# Patient Record
Sex: MALE | Race: WHITE | NOT HISPANIC OR LATINO | Employment: UNEMPLOYED | URBAN - METROPOLITAN AREA
[De-identification: names, ages, dates, MRNs, and addresses within clinical notes are randomized per-mention and may not be internally consistent; named-entity substitution may affect disease eponyms.]

---

## 2021-07-29 ENCOUNTER — TELEPHONE (OUTPATIENT)
Dept: ENDOSCOPY | Facility: HOSPITAL | Age: 56
End: 2021-07-29

## 2021-08-02 ENCOUNTER — TELEPHONE (OUTPATIENT)
Dept: ENDOSCOPY | Facility: HOSPITAL | Age: 56
End: 2021-08-02

## 2021-08-05 ENCOUNTER — TELEPHONE (OUTPATIENT)
Dept: ENDOSCOPY | Facility: HOSPITAL | Age: 56
End: 2021-08-05

## 2021-09-07 ENCOUNTER — TELEPHONE (OUTPATIENT)
Dept: ENDOSCOPY | Facility: HOSPITAL | Age: 56
End: 2021-09-07

## 2021-09-27 ENCOUNTER — HOSPITAL ENCOUNTER (INPATIENT)
Facility: HOSPITAL | Age: 56
LOS: 3 days | Discharge: HOME | DRG: 440 | End: 2021-09-30
Attending: EMERGENCY MEDICINE | Admitting: STUDENT IN AN ORGANIZED HEALTH CARE EDUCATION/TRAINING PROGRAM
Payer: COMMERCIAL

## 2021-09-27 DIAGNOSIS — K85.10 ACUTE BILIARY PANCREATITIS, UNSPECIFIED COMPLICATION STATUS: Primary | ICD-10-CM

## 2021-09-27 DIAGNOSIS — K86.3 PANCREATIC PSEUDOCYST: ICD-10-CM

## 2021-09-27 DIAGNOSIS — R07.9 CHEST PAIN: ICD-10-CM

## 2021-09-27 DIAGNOSIS — K85.90 ACUTE RECURRENT PANCREATITIS: ICD-10-CM

## 2021-09-27 DIAGNOSIS — R10.13 EPIGASTRIC PAIN: ICD-10-CM

## 2021-09-27 PROBLEM — D72.829 LEUKOCYTOSIS: Status: ACTIVE | Noted: 2021-09-27

## 2021-09-27 PROBLEM — E87.6 HYPOKALEMIA: Status: ACTIVE | Noted: 2021-09-27

## 2021-09-27 LAB
ALBUMIN SERPL BCP-MCNC: 3.6 G/DL (ref 3.5–5.2)
ALP SERPL-CCNC: 85 U/L (ref 55–135)
ALT SERPL W/O P-5'-P-CCNC: 8 U/L (ref 10–44)
ANION GAP SERPL CALC-SCNC: 14 MMOL/L (ref 8–16)
ANION GAP SERPL CALC-SCNC: 15 MMOL/L (ref 8–16)
AST SERPL-CCNC: 11 U/L (ref 10–40)
BASOPHILS # BLD AUTO: 0.03 K/UL (ref 0–0.2)
BASOPHILS NFR BLD: 0.2 % (ref 0–1.9)
BILIRUB SERPL-MCNC: 1 MG/DL (ref 0.1–1)
BUN SERPL-MCNC: 10 MG/DL (ref 6–20)
BUN SERPL-MCNC: 14 MG/DL (ref 6–20)
CALCIUM SERPL-MCNC: 10.2 MG/DL (ref 8.7–10.5)
CALCIUM SERPL-MCNC: 9.4 MG/DL (ref 8.7–10.5)
CHLORIDE SERPL-SCNC: 100 MMOL/L (ref 95–110)
CHLORIDE SERPL-SCNC: 101 MMOL/L (ref 95–110)
CHOLEST SERPL-MCNC: 140 MG/DL (ref 120–199)
CHOLEST/HDLC SERPL: 5.8 {RATIO} (ref 2–5)
CO2 SERPL-SCNC: 21 MMOL/L (ref 23–29)
CO2 SERPL-SCNC: 22 MMOL/L (ref 23–29)
CREAT SERPL-MCNC: 0.7 MG/DL (ref 0.5–1.4)
CREAT SERPL-MCNC: 1 MG/DL (ref 0.5–1.4)
CTP QC/QA: YES
DIFFERENTIAL METHOD: ABNORMAL
EOSINOPHIL # BLD AUTO: 0.1 K/UL (ref 0–0.5)
EOSINOPHIL NFR BLD: 0.6 % (ref 0–8)
ERYTHROCYTE [DISTWIDTH] IN BLOOD BY AUTOMATED COUNT: 15 % (ref 11.5–14.5)
EST. GFR  (AFRICAN AMERICAN): >60 ML/MIN/1.73 M^2
EST. GFR  (AFRICAN AMERICAN): >60 ML/MIN/1.73 M^2
EST. GFR  (NON AFRICAN AMERICAN): >60 ML/MIN/1.73 M^2
EST. GFR  (NON AFRICAN AMERICAN): >60 ML/MIN/1.73 M^2
ETHANOL SERPL-MCNC: <10 MG/DL
GLUCOSE SERPL-MCNC: 106 MG/DL (ref 70–110)
GLUCOSE SERPL-MCNC: 130 MG/DL (ref 70–110)
HCT VFR BLD AUTO: 37.8 % (ref 40–54)
HCV AB SERPL QL IA: NEGATIVE
HDLC SERPL-MCNC: 24 MG/DL (ref 40–75)
HDLC SERPL: 17.1 % (ref 20–50)
HEP C VIRUS HOLD SPECIMEN: NORMAL
HGB BLD-MCNC: 12.2 G/DL (ref 14–18)
HIV 1+2 AB+HIV1 P24 AG SERPL QL IA: NEGATIVE
IMM GRANULOCYTES # BLD AUTO: 0.08 K/UL (ref 0–0.04)
IMM GRANULOCYTES NFR BLD AUTO: 0.5 % (ref 0–0.5)
LDLC SERPL CALC-MCNC: 101 MG/DL (ref 63–159)
LIPASE SERPL-CCNC: 333 U/L (ref 4–60)
LYMPHOCYTES # BLD AUTO: 1.1 K/UL (ref 1–4.8)
LYMPHOCYTES NFR BLD: 7.4 % (ref 18–48)
MCH RBC QN AUTO: 27.4 PG (ref 27–31)
MCHC RBC AUTO-ENTMCNC: 32.3 G/DL (ref 32–36)
MCV RBC AUTO: 85 FL (ref 82–98)
MONOCYTES # BLD AUTO: 1 K/UL (ref 0.3–1)
MONOCYTES NFR BLD: 6.5 % (ref 4–15)
NEUTROPHILS # BLD AUTO: 12.5 K/UL (ref 1.8–7.7)
NEUTROPHILS NFR BLD: 84.8 % (ref 38–73)
NONHDLC SERPL-MCNC: 116 MG/DL
NRBC BLD-RTO: 0 /100 WBC
PLATELET # BLD AUTO: 259 K/UL (ref 150–450)
PMV BLD AUTO: 9.6 FL (ref 9.2–12.9)
POTASSIUM SERPL-SCNC: 3.3 MMOL/L (ref 3.5–5.1)
POTASSIUM SERPL-SCNC: 3.4 MMOL/L (ref 3.5–5.1)
PROT SERPL-MCNC: 7.2 G/DL (ref 6–8.4)
RBC # BLD AUTO: 4.45 M/UL (ref 4.6–6.2)
SARS-COV-2 RDRP RESP QL NAA+PROBE: NEGATIVE
SODIUM SERPL-SCNC: 136 MMOL/L (ref 136–145)
SODIUM SERPL-SCNC: 137 MMOL/L (ref 136–145)
TRIGL SERPL-MCNC: 75 MG/DL (ref 30–150)
TROPONIN I SERPL DL<=0.01 NG/ML-MCNC: <0.006 NG/ML (ref 0–0.03)
WBC # BLD AUTO: 14.7 K/UL (ref 3.9–12.7)

## 2021-09-27 PROCEDURE — 83690 ASSAY OF LIPASE: CPT | Performed by: EMERGENCY MEDICINE

## 2021-09-27 PROCEDURE — 63600175 PHARM REV CODE 636 W HCPCS: Performed by: PHYSICIAN ASSISTANT

## 2021-09-27 PROCEDURE — U0002 COVID-19 LAB TEST NON-CDC: HCPCS | Performed by: EMERGENCY MEDICINE

## 2021-09-27 PROCEDURE — 99223 1ST HOSP IP/OBS HIGH 75: CPT | Mod: ,,, | Performed by: INTERNAL MEDICINE

## 2021-09-27 PROCEDURE — 80061 LIPID PANEL: CPT | Performed by: NURSE PRACTITIONER

## 2021-09-27 PROCEDURE — 25000003 PHARM REV CODE 250: Performed by: PHYSICIAN ASSISTANT

## 2021-09-27 PROCEDURE — 63600175 PHARM REV CODE 636 W HCPCS: Performed by: EMERGENCY MEDICINE

## 2021-09-27 PROCEDURE — 87389 HIV-1 AG W/HIV-1&-2 AB AG IA: CPT | Performed by: EMERGENCY MEDICINE

## 2021-09-27 PROCEDURE — 86803 HEPATITIS C AB TEST: CPT | Performed by: EMERGENCY MEDICINE

## 2021-09-27 PROCEDURE — 36415 COLL VENOUS BLD VENIPUNCTURE: CPT | Performed by: NURSE PRACTITIONER

## 2021-09-27 PROCEDURE — 82077 ASSAY SPEC XCP UR&BREATH IA: CPT | Performed by: EMERGENCY MEDICINE

## 2021-09-27 PROCEDURE — 25000003 PHARM REV CODE 250: Performed by: EMERGENCY MEDICINE

## 2021-09-27 PROCEDURE — 25000003 PHARM REV CODE 250: Performed by: NURSE PRACTITIONER

## 2021-09-27 PROCEDURE — 21400001 HC TELEMETRY ROOM

## 2021-09-27 PROCEDURE — 96365 THER/PROPH/DIAG IV INF INIT: CPT

## 2021-09-27 PROCEDURE — 93005 ELECTROCARDIOGRAM TRACING: CPT

## 2021-09-27 PROCEDURE — 63600175 PHARM REV CODE 636 W HCPCS: Performed by: NURSE PRACTITIONER

## 2021-09-27 PROCEDURE — 96361 HYDRATE IV INFUSION ADD-ON: CPT | Performed by: EMERGENCY MEDICINE

## 2021-09-27 PROCEDURE — 93010 EKG 12-LEAD: ICD-10-PCS | Mod: ,,, | Performed by: INTERNAL MEDICINE

## 2021-09-27 PROCEDURE — 99291 CRITICAL CARE FIRST HOUR: CPT | Mod: 25

## 2021-09-27 PROCEDURE — 99223 PR INITIAL HOSPITAL CARE,LEVL III: ICD-10-PCS | Mod: ,,, | Performed by: INTERNAL MEDICINE

## 2021-09-27 PROCEDURE — 96361 HYDRATE IV INFUSION ADD-ON: CPT

## 2021-09-27 PROCEDURE — 93010 ELECTROCARDIOGRAM REPORT: CPT | Mod: ,,, | Performed by: INTERNAL MEDICINE

## 2021-09-27 PROCEDURE — 25500020 PHARM REV CODE 255: Performed by: EMERGENCY MEDICINE

## 2021-09-27 PROCEDURE — 96375 TX/PRO/DX INJ NEW DRUG ADDON: CPT

## 2021-09-27 PROCEDURE — 85025 COMPLETE CBC W/AUTO DIFF WBC: CPT | Performed by: EMERGENCY MEDICINE

## 2021-09-27 PROCEDURE — 80048 BASIC METABOLIC PNL TOTAL CA: CPT | Performed by: STUDENT IN AN ORGANIZED HEALTH CARE EDUCATION/TRAINING PROGRAM

## 2021-09-27 PROCEDURE — 36415 COLL VENOUS BLD VENIPUNCTURE: CPT | Performed by: STUDENT IN AN ORGANIZED HEALTH CARE EDUCATION/TRAINING PROGRAM

## 2021-09-27 PROCEDURE — 96376 TX/PRO/DX INJ SAME DRUG ADON: CPT | Performed by: EMERGENCY MEDICINE

## 2021-09-27 PROCEDURE — 80053 COMPREHEN METABOLIC PANEL: CPT | Performed by: EMERGENCY MEDICINE

## 2021-09-27 PROCEDURE — 84484 ASSAY OF TROPONIN QUANT: CPT | Performed by: EMERGENCY MEDICINE

## 2021-09-27 PROCEDURE — 96376 TX/PRO/DX INJ SAME DRUG ADON: CPT

## 2021-09-27 RX ORDER — SODIUM CHLORIDE 0.9 % (FLUSH) 0.9 %
10 SYRINGE (ML) INJECTION EVERY 12 HOURS PRN
Status: DISCONTINUED | OUTPATIENT
Start: 2021-09-27 | End: 2021-09-30 | Stop reason: HOSPADM

## 2021-09-27 RX ORDER — ONDANSETRON 8 MG/1
TABLET, ORALLY DISINTEGRATING ORAL
COMMUNITY
Start: 2021-07-13 | End: 2022-03-23

## 2021-09-27 RX ORDER — NALOXONE HCL 0.4 MG/ML
0.02 VIAL (ML) INJECTION
Status: DISCONTINUED | OUTPATIENT
Start: 2021-09-27 | End: 2021-09-30 | Stop reason: HOSPADM

## 2021-09-27 RX ORDER — SODIUM CHLORIDE 9 MG/ML
INJECTION, SOLUTION INTRAVENOUS CONTINUOUS
Status: DISCONTINUED | OUTPATIENT
Start: 2021-09-27 | End: 2021-09-30 | Stop reason: HOSPADM

## 2021-09-27 RX ORDER — HYDROMORPHONE HYDROCHLORIDE 2 MG/ML
1 INJECTION, SOLUTION INTRAMUSCULAR; INTRAVENOUS; SUBCUTANEOUS
Status: COMPLETED | OUTPATIENT
Start: 2021-09-27 | End: 2021-09-27

## 2021-09-27 RX ORDER — CARVEDILOL 6.25 MG/1
6.25 TABLET ORAL 2 TIMES DAILY
COMMUNITY
Start: 2021-09-10 | End: 2022-08-16

## 2021-09-27 RX ORDER — MORPHINE SULFATE 4 MG/ML
4 INJECTION, SOLUTION INTRAMUSCULAR; INTRAVENOUS
Status: COMPLETED | OUTPATIENT
Start: 2021-09-27 | End: 2021-09-27

## 2021-09-27 RX ORDER — PANTOPRAZOLE SODIUM 40 MG/1
40 TABLET, DELAYED RELEASE ORAL DAILY
COMMUNITY
Start: 2021-09-16 | End: 2022-01-06 | Stop reason: SDUPTHER

## 2021-09-27 RX ORDER — HYDRALAZINE HYDROCHLORIDE 20 MG/ML
10 INJECTION INTRAMUSCULAR; INTRAVENOUS
Status: COMPLETED | OUTPATIENT
Start: 2021-09-27 | End: 2021-09-27

## 2021-09-27 RX ORDER — HYDRALAZINE HYDROCHLORIDE 20 MG/ML
10 INJECTION INTRAMUSCULAR; INTRAVENOUS EVERY 6 HOURS PRN
Status: DISCONTINUED | OUTPATIENT
Start: 2021-09-27 | End: 2021-09-30 | Stop reason: HOSPADM

## 2021-09-27 RX ORDER — OXYCODONE AND ACETAMINOPHEN 10; 325 MG/1; MG/1
1 TABLET ORAL EVERY 4 HOURS PRN
COMMUNITY
Start: 2021-09-10 | End: 2022-02-19 | Stop reason: SDUPTHER

## 2021-09-27 RX ORDER — PROMETHAZINE HYDROCHLORIDE 25 MG/1
TABLET ORAL
COMMUNITY
Start: 2021-07-13 | End: 2022-02-19

## 2021-09-27 RX ORDER — LANOLIN ALCOHOL/MO/W.PET/CERES
800 CREAM (GRAM) TOPICAL
Status: DISCONTINUED | OUTPATIENT
Start: 2021-09-27 | End: 2021-09-30 | Stop reason: HOSPADM

## 2021-09-27 RX ORDER — PANCRELIPASE 24000; 76000; 120000 [USP'U]/1; [USP'U]/1; [USP'U]/1
1 CAPSULE, DELAYED RELEASE PELLETS ORAL 4 TIMES DAILY
Status: ON HOLD | COMMUNITY
Start: 2021-09-13 | End: 2021-09-30 | Stop reason: ALTCHOICE

## 2021-09-27 RX ORDER — ACETAMINOPHEN 325 MG/1
650 TABLET ORAL EVERY 6 HOURS PRN
Status: DISCONTINUED | OUTPATIENT
Start: 2021-09-27 | End: 2021-09-30 | Stop reason: HOSPADM

## 2021-09-27 RX ORDER — ONDANSETRON 2 MG/ML
4 INJECTION INTRAMUSCULAR; INTRAVENOUS EVERY 6 HOURS
Status: COMPLETED | OUTPATIENT
Start: 2021-09-27 | End: 2021-09-27

## 2021-09-27 RX ORDER — CARVEDILOL 6.25 MG/1
6.25 TABLET ORAL 2 TIMES DAILY
Status: DISCONTINUED | OUTPATIENT
Start: 2021-09-28 | End: 2021-09-30 | Stop reason: HOSPADM

## 2021-09-27 RX ORDER — HYDROMORPHONE HYDROCHLORIDE 2 MG/ML
1 INJECTION, SOLUTION INTRAMUSCULAR; INTRAVENOUS; SUBCUTANEOUS EVERY 6 HOURS PRN
Status: DISCONTINUED | OUTPATIENT
Start: 2021-09-27 | End: 2021-09-30 | Stop reason: HOSPADM

## 2021-09-27 RX ORDER — APIXABAN 5 MG/1
5 TABLET, FILM COATED ORAL 2 TIMES DAILY
Status: ON HOLD | COMMUNITY
Start: 2021-09-09 | End: 2021-10-28 | Stop reason: HOSPADM

## 2021-09-27 RX ORDER — METOPROLOL TARTRATE 1 MG/ML
5 INJECTION, SOLUTION INTRAVENOUS ONCE
Status: COMPLETED | OUTPATIENT
Start: 2021-09-27 | End: 2021-09-27

## 2021-09-27 RX ORDER — OXYCODONE AND ACETAMINOPHEN 10; 325 MG/1; MG/1
1 TABLET ORAL EVERY 4 HOURS PRN
Status: DISCONTINUED | OUTPATIENT
Start: 2021-09-27 | End: 2021-09-30 | Stop reason: HOSPADM

## 2021-09-27 RX ORDER — HYDROMORPHONE HYDROCHLORIDE 2 MG/ML
0.5 INJECTION, SOLUTION INTRAMUSCULAR; INTRAVENOUS; SUBCUTANEOUS
Status: COMPLETED | OUTPATIENT
Start: 2021-09-27 | End: 2021-09-27

## 2021-09-27 RX ORDER — IBUPROFEN 200 MG
16 TABLET ORAL
Status: DISCONTINUED | OUTPATIENT
Start: 2021-09-27 | End: 2021-09-30 | Stop reason: HOSPADM

## 2021-09-27 RX ORDER — ONDANSETRON 2 MG/ML
4 INJECTION INTRAMUSCULAR; INTRAVENOUS EVERY 6 HOURS PRN
Status: DISCONTINUED | OUTPATIENT
Start: 2021-09-27 | End: 2021-09-27

## 2021-09-27 RX ORDER — ENOXAPARIN SODIUM 100 MG/ML
40 INJECTION SUBCUTANEOUS EVERY 24 HOURS
Status: DISCONTINUED | OUTPATIENT
Start: 2021-09-27 | End: 2021-09-30 | Stop reason: HOSPADM

## 2021-09-27 RX ORDER — IPRATROPIUM BROMIDE AND ALBUTEROL SULFATE 2.5; .5 MG/3ML; MG/3ML
3 SOLUTION RESPIRATORY (INHALATION) EVERY 4 HOURS PRN
Status: DISCONTINUED | OUTPATIENT
Start: 2021-09-27 | End: 2021-09-30 | Stop reason: HOSPADM

## 2021-09-27 RX ORDER — HYDROMORPHONE HYDROCHLORIDE 2 MG/ML
0.5 INJECTION, SOLUTION INTRAMUSCULAR; INTRAVENOUS; SUBCUTANEOUS
Status: DISCONTINUED | OUTPATIENT
Start: 2021-09-27 | End: 2021-09-30 | Stop reason: HOSPADM

## 2021-09-27 RX ORDER — ONDANSETRON 2 MG/ML
4 INJECTION INTRAMUSCULAR; INTRAVENOUS
Status: COMPLETED | OUTPATIENT
Start: 2021-09-27 | End: 2021-09-27

## 2021-09-27 RX ORDER — HYOSCYAMINE SULFATE 0.125 MG
125 TABLET ORAL 4 TIMES DAILY PRN
COMMUNITY
Start: 2021-07-16 | End: 2021-10-22

## 2021-09-27 RX ORDER — PROCHLORPERAZINE EDISYLATE 5 MG/ML
10 INJECTION INTRAMUSCULAR; INTRAVENOUS
Status: COMPLETED | OUTPATIENT
Start: 2021-09-27 | End: 2021-09-27

## 2021-09-27 RX ORDER — GLUCAGON 1 MG
1 KIT INJECTION
Status: DISCONTINUED | OUTPATIENT
Start: 2021-09-27 | End: 2021-09-30 | Stop reason: HOSPADM

## 2021-09-27 RX ORDER — IBUPROFEN 200 MG
24 TABLET ORAL
Status: DISCONTINUED | OUTPATIENT
Start: 2021-09-27 | End: 2021-09-30 | Stop reason: HOSPADM

## 2021-09-27 RX ORDER — CARVEDILOL 6.25 MG/1
6.25 TABLET ORAL 2 TIMES DAILY
Status: DISCONTINUED | OUTPATIENT
Start: 2021-09-27 | End: 2021-09-27

## 2021-09-27 RX ADMIN — PROCHLORPERAZINE EDISYLATE 10 MG: 5 INJECTION INTRAMUSCULAR; INTRAVENOUS at 07:09

## 2021-09-27 RX ADMIN — POTASSIUM BICARBONATE 35 MEQ: 391 TABLET, EFFERVESCENT ORAL at 05:09

## 2021-09-27 RX ADMIN — PROMETHAZINE HYDROCHLORIDE 12.5 MG: 25 INJECTION INTRAMUSCULAR; INTRAVENOUS at 05:09

## 2021-09-27 RX ADMIN — ONDANSETRON HYDROCHLORIDE 4 MG: 2 SOLUTION INTRAMUSCULAR; INTRAVENOUS at 05:09

## 2021-09-27 RX ADMIN — HYDROMORPHONE HYDROCHLORIDE 1 MG: 2 INJECTION INTRAMUSCULAR; INTRAVENOUS; SUBCUTANEOUS at 07:09

## 2021-09-27 RX ADMIN — SODIUM CHLORIDE 1000 ML: 0.9 INJECTION, SOLUTION INTRAVENOUS at 05:09

## 2021-09-27 RX ADMIN — ONDANSETRON 4 MG: 2 INJECTION INTRAMUSCULAR; INTRAVENOUS at 04:09

## 2021-09-27 RX ADMIN — SODIUM CHLORIDE 1000 ML: 0.9 INJECTION, SOLUTION INTRAVENOUS at 06:09

## 2021-09-27 RX ADMIN — ONDANSETRON 4 MG: 2 INJECTION INTRAMUSCULAR; INTRAVENOUS at 10:09

## 2021-09-27 RX ADMIN — HYDROMORPHONE HYDROCHLORIDE 0.5 MG: 2 INJECTION INTRAMUSCULAR; INTRAVENOUS; SUBCUTANEOUS at 11:09

## 2021-09-27 RX ADMIN — HYDROMORPHONE HYDROCHLORIDE 0.5 MG: 2 INJECTION INTRAMUSCULAR; INTRAVENOUS; SUBCUTANEOUS at 05:09

## 2021-09-27 RX ADMIN — IOHEXOL 100 ML: 350 INJECTION, SOLUTION INTRAVENOUS at 06:09

## 2021-09-27 RX ADMIN — OXYCODONE AND ACETAMINOPHEN 1 TABLET: 10; 325 TABLET ORAL at 04:09

## 2021-09-27 RX ADMIN — METOROPROLOL TARTRATE 5 MG: 5 INJECTION, SOLUTION INTRAVENOUS at 09:09

## 2021-09-27 RX ADMIN — CARVEDILOL 6.25 MG: 6.25 TABLET, FILM COATED ORAL at 10:09

## 2021-09-27 RX ADMIN — ONDANSETRON 4 MG: 2 INJECTION INTRAMUSCULAR; INTRAVENOUS at 11:09

## 2021-09-27 RX ADMIN — HYDROMORPHONE HYDROCHLORIDE 0.5 MG: 2 INJECTION INTRAMUSCULAR; INTRAVENOUS; SUBCUTANEOUS at 01:09

## 2021-09-27 RX ADMIN — HYDRALAZINE HYDROCHLORIDE 10 MG: 20 INJECTION INTRAMUSCULAR; INTRAVENOUS at 06:09

## 2021-09-27 RX ADMIN — HYDRALAZINE HYDROCHLORIDE 10 MG: 20 INJECTION INTRAMUSCULAR; INTRAVENOUS at 05:09

## 2021-09-27 RX ADMIN — HYDROMORPHONE HYDROCHLORIDE 1 MG: 2 INJECTION INTRAMUSCULAR; INTRAVENOUS; SUBCUTANEOUS at 05:09

## 2021-09-27 RX ADMIN — PROMETHAZINE HYDROCHLORIDE 12.5 MG: 25 INJECTION INTRAMUSCULAR; INTRAVENOUS at 12:09

## 2021-09-27 RX ADMIN — ENOXAPARIN SODIUM 40 MG: 100 INJECTION SUBCUTANEOUS at 04:09

## 2021-09-27 RX ADMIN — MORPHINE SULFATE 4 MG: 4 INJECTION INTRAVENOUS at 06:09

## 2021-09-27 RX ADMIN — SODIUM CHLORIDE: 0.9 INJECTION, SOLUTION INTRAVENOUS at 10:09

## 2021-09-27 RX ADMIN — HYDROMORPHONE HYDROCHLORIDE 0.5 MG: 2 INJECTION INTRAMUSCULAR; INTRAVENOUS; SUBCUTANEOUS at 10:09

## 2021-09-28 PROBLEM — D64.9 NORMOCYTIC ANEMIA: Status: ACTIVE | Noted: 2021-09-28

## 2021-09-28 LAB
ALBUMIN SERPL BCP-MCNC: 2.9 G/DL (ref 3.5–5.2)
ALP SERPL-CCNC: 64 U/L (ref 55–135)
ALT SERPL W/O P-5'-P-CCNC: 8 U/L (ref 10–44)
ANION GAP SERPL CALC-SCNC: 10 MMOL/L (ref 8–16)
AST SERPL-CCNC: 6 U/L (ref 10–40)
BASOPHILS # BLD AUTO: 0.04 K/UL (ref 0–0.2)
BASOPHILS NFR BLD: 0.4 % (ref 0–1.9)
BILIRUB SERPL-MCNC: 0.8 MG/DL (ref 0.1–1)
BUN SERPL-MCNC: 13 MG/DL (ref 6–20)
CALCIUM SERPL-MCNC: 9.1 MG/DL (ref 8.7–10.5)
CHLORIDE SERPL-SCNC: 100 MMOL/L (ref 95–110)
CO2 SERPL-SCNC: 26 MMOL/L (ref 23–29)
CREAT SERPL-MCNC: 0.7 MG/DL (ref 0.5–1.4)
DIFFERENTIAL METHOD: ABNORMAL
EOSINOPHIL # BLD AUTO: 0 K/UL (ref 0–0.5)
EOSINOPHIL NFR BLD: 0.3 % (ref 0–8)
ERYTHROCYTE [DISTWIDTH] IN BLOOD BY AUTOMATED COUNT: 15.5 % (ref 11.5–14.5)
EST. GFR  (AFRICAN AMERICAN): >60 ML/MIN/1.73 M^2
EST. GFR  (NON AFRICAN AMERICAN): >60 ML/MIN/1.73 M^2
GLUCOSE SERPL-MCNC: 95 MG/DL (ref 70–110)
HCT VFR BLD AUTO: 36.1 % (ref 40–54)
HGB BLD-MCNC: 10.8 G/DL (ref 14–18)
IMM GRANULOCYTES # BLD AUTO: 0.1 K/UL (ref 0–0.04)
IMM GRANULOCYTES NFR BLD AUTO: 0.9 % (ref 0–0.5)
LIPASE SERPL-CCNC: 169 U/L (ref 4–60)
LYMPHOCYTES # BLD AUTO: 1 K/UL (ref 1–4.8)
LYMPHOCYTES NFR BLD: 9.2 % (ref 18–48)
MAGNESIUM SERPL-MCNC: 1.8 MG/DL (ref 1.6–2.6)
MCH RBC QN AUTO: 26.5 PG (ref 27–31)
MCHC RBC AUTO-ENTMCNC: 29.9 G/DL (ref 32–36)
MCV RBC AUTO: 89 FL (ref 82–98)
MONOCYTES # BLD AUTO: 1.1 K/UL (ref 0.3–1)
MONOCYTES NFR BLD: 9.6 % (ref 4–15)
NEUTROPHILS # BLD AUTO: 8.9 K/UL (ref 1.8–7.7)
NEUTROPHILS NFR BLD: 79.6 % (ref 38–73)
NRBC BLD-RTO: 0 /100 WBC
PLATELET # BLD AUTO: 210 K/UL (ref 150–450)
PMV BLD AUTO: 9.3 FL (ref 9.2–12.9)
POTASSIUM SERPL-SCNC: 3.9 MMOL/L (ref 3.5–5.1)
PROT SERPL-MCNC: 6.1 G/DL (ref 6–8.4)
RBC # BLD AUTO: 4.08 M/UL (ref 4.6–6.2)
SODIUM SERPL-SCNC: 136 MMOL/L (ref 136–145)
WBC # BLD AUTO: 11.22 K/UL (ref 3.9–12.7)

## 2021-09-28 PROCEDURE — 99231 SBSQ HOSP IP/OBS SF/LOW 25: CPT | Mod: ,,, | Performed by: PHYSICIAN ASSISTANT

## 2021-09-28 PROCEDURE — 83690 ASSAY OF LIPASE: CPT | Performed by: NURSE PRACTITIONER

## 2021-09-28 PROCEDURE — 85025 COMPLETE CBC W/AUTO DIFF WBC: CPT | Performed by: NURSE PRACTITIONER

## 2021-09-28 PROCEDURE — 25000003 PHARM REV CODE 250: Performed by: PHYSICIAN ASSISTANT

## 2021-09-28 PROCEDURE — 63600175 PHARM REV CODE 636 W HCPCS: Performed by: PHYSICIAN ASSISTANT

## 2021-09-28 PROCEDURE — 36415 COLL VENOUS BLD VENIPUNCTURE: CPT | Performed by: NURSE PRACTITIONER

## 2021-09-28 PROCEDURE — 99231 PR SUBSEQUENT HOSPITAL CARE,LEVL I: ICD-10-PCS | Mod: ,,, | Performed by: PHYSICIAN ASSISTANT

## 2021-09-28 PROCEDURE — 21400001 HC TELEMETRY ROOM

## 2021-09-28 PROCEDURE — 80053 COMPREHEN METABOLIC PANEL: CPT | Performed by: NURSE PRACTITIONER

## 2021-09-28 PROCEDURE — 83735 ASSAY OF MAGNESIUM: CPT | Performed by: NURSE PRACTITIONER

## 2021-09-28 PROCEDURE — 63600175 PHARM REV CODE 636 W HCPCS: Performed by: NURSE PRACTITIONER

## 2021-09-28 PROCEDURE — 25000003 PHARM REV CODE 250: Performed by: NURSE PRACTITIONER

## 2021-09-28 RX ADMIN — PROMETHAZINE HYDROCHLORIDE 12.5 MG: 25 INJECTION INTRAMUSCULAR; INTRAVENOUS at 06:09

## 2021-09-28 RX ADMIN — POTASSIUM BICARBONATE 35 MEQ: 391 TABLET, EFFERVESCENT ORAL at 12:09

## 2021-09-28 RX ADMIN — CARVEDILOL 6.25 MG: 6.25 TABLET, FILM COATED ORAL at 08:09

## 2021-09-28 RX ADMIN — SODIUM CHLORIDE: 0.9 INJECTION, SOLUTION INTRAVENOUS at 08:09

## 2021-09-28 RX ADMIN — OXYCODONE AND ACETAMINOPHEN 1 TABLET: 10; 325 TABLET ORAL at 06:09

## 2021-09-28 RX ADMIN — HYDROMORPHONE HYDROCHLORIDE 1 MG: 2 INJECTION INTRAMUSCULAR; INTRAVENOUS; SUBCUTANEOUS at 01:09

## 2021-09-28 RX ADMIN — SODIUM CHLORIDE: 0.9 INJECTION, SOLUTION INTRAVENOUS at 01:09

## 2021-09-28 RX ADMIN — ENOXAPARIN SODIUM 40 MG: 100 INJECTION SUBCUTANEOUS at 05:09

## 2021-09-28 RX ADMIN — PROMETHAZINE HYDROCHLORIDE 12.5 MG: 25 INJECTION INTRAMUSCULAR; INTRAVENOUS at 12:09

## 2021-09-28 RX ADMIN — PROMETHAZINE HYDROCHLORIDE 12.5 MG: 25 INJECTION INTRAMUSCULAR; INTRAVENOUS at 11:09

## 2021-09-29 ENCOUNTER — ANESTHESIA EVENT (OUTPATIENT)
Dept: ENDOSCOPY | Facility: HOSPITAL | Age: 56
DRG: 440 | End: 2021-09-29
Payer: COMMERCIAL

## 2021-09-29 ENCOUNTER — ANESTHESIA (OUTPATIENT)
Dept: ENDOSCOPY | Facility: HOSPITAL | Age: 56
DRG: 440 | End: 2021-09-29
Payer: COMMERCIAL

## 2021-09-29 LAB
ALBUMIN SERPL BCP-MCNC: 2.8 G/DL (ref 3.5–5.2)
ALP SERPL-CCNC: 67 U/L (ref 55–135)
ALT SERPL W/O P-5'-P-CCNC: 5 U/L (ref 10–44)
ANION GAP SERPL CALC-SCNC: 11 MMOL/L (ref 8–16)
AST SERPL-CCNC: 8 U/L (ref 10–40)
BASOPHILS # BLD AUTO: 0.03 K/UL (ref 0–0.2)
BASOPHILS NFR BLD: 0.5 % (ref 0–1.9)
BILIRUB SERPL-MCNC: 0.6 MG/DL (ref 0.1–1)
BUN SERPL-MCNC: 10 MG/DL (ref 6–20)
CALCIUM SERPL-MCNC: 9.1 MG/DL (ref 8.7–10.5)
CHLORIDE SERPL-SCNC: 104 MMOL/L (ref 95–110)
CO2 SERPL-SCNC: 23 MMOL/L (ref 23–29)
CREAT SERPL-MCNC: 0.7 MG/DL (ref 0.5–1.4)
DIFFERENTIAL METHOD: ABNORMAL
EOSINOPHIL # BLD AUTO: 0.1 K/UL (ref 0–0.5)
EOSINOPHIL NFR BLD: 1.1 % (ref 0–8)
ERYTHROCYTE [DISTWIDTH] IN BLOOD BY AUTOMATED COUNT: 15.1 % (ref 11.5–14.5)
EST. GFR  (AFRICAN AMERICAN): >60 ML/MIN/1.73 M^2
EST. GFR  (NON AFRICAN AMERICAN): >60 ML/MIN/1.73 M^2
GLUCOSE SERPL-MCNC: 83 MG/DL (ref 70–110)
HCT VFR BLD AUTO: 34.6 % (ref 40–54)
HGB BLD-MCNC: 10.4 G/DL (ref 14–18)
IMM GRANULOCYTES # BLD AUTO: 0.02 K/UL (ref 0–0.04)
IMM GRANULOCYTES NFR BLD AUTO: 0.4 % (ref 0–0.5)
LIPASE SERPL-CCNC: 20 U/L (ref 4–60)
LYMPHOCYTES # BLD AUTO: 0.7 K/UL (ref 1–4.8)
LYMPHOCYTES NFR BLD: 12.1 % (ref 18–48)
MAGNESIUM SERPL-MCNC: 1.8 MG/DL (ref 1.6–2.6)
MCH RBC QN AUTO: 26.6 PG (ref 27–31)
MCHC RBC AUTO-ENTMCNC: 30.1 G/DL (ref 32–36)
MCV RBC AUTO: 89 FL (ref 82–98)
MONOCYTES # BLD AUTO: 0.4 K/UL (ref 0.3–1)
MONOCYTES NFR BLD: 7.5 % (ref 4–15)
NEUTROPHILS # BLD AUTO: 4.3 K/UL (ref 1.8–7.7)
NEUTROPHILS NFR BLD: 78.4 % (ref 38–73)
NRBC BLD-RTO: 0 /100 WBC
PLATELET # BLD AUTO: 176 K/UL (ref 150–450)
PMV BLD AUTO: 9.3 FL (ref 9.2–12.9)
POTASSIUM SERPL-SCNC: 3.5 MMOL/L (ref 3.5–5.1)
PROT SERPL-MCNC: 6 G/DL (ref 6–8.4)
RBC # BLD AUTO: 3.91 M/UL (ref 4.6–6.2)
SODIUM SERPL-SCNC: 138 MMOL/L (ref 136–145)
WBC # BLD AUTO: 5.46 K/UL (ref 3.9–12.7)

## 2021-09-29 PROCEDURE — 43262 ENDO CHOLANGIOPANCREATOGRAPH: CPT | Mod: ,,, | Performed by: INTERNAL MEDICINE

## 2021-09-29 PROCEDURE — 43262 PR ERCP,SPHINCTEROTOMY: ICD-10-PCS | Mod: ,,, | Performed by: INTERNAL MEDICINE

## 2021-09-29 PROCEDURE — 63600175 PHARM REV CODE 636 W HCPCS: Performed by: NURSE ANESTHETIST, CERTIFIED REGISTERED

## 2021-09-29 PROCEDURE — 37000008 HC ANESTHESIA 1ST 15 MINUTES: Performed by: INTERNAL MEDICINE

## 2021-09-29 PROCEDURE — 63600175 PHARM REV CODE 636 W HCPCS: Performed by: STUDENT IN AN ORGANIZED HEALTH CARE EDUCATION/TRAINING PROGRAM

## 2021-09-29 PROCEDURE — 25000003 PHARM REV CODE 250: Performed by: NURSE ANESTHETIST, CERTIFIED REGISTERED

## 2021-09-29 PROCEDURE — 43262 ENDO CHOLANGIOPANCREATOGRAPH: CPT | Performed by: INTERNAL MEDICINE

## 2021-09-29 PROCEDURE — 36415 COLL VENOUS BLD VENIPUNCTURE: CPT | Performed by: NURSE PRACTITIONER

## 2021-09-29 PROCEDURE — 83735 ASSAY OF MAGNESIUM: CPT | Performed by: NURSE PRACTITIONER

## 2021-09-29 PROCEDURE — 74329 X-RAY FOR PANCREAS ENDOSCOPY: CPT | Performed by: INTERNAL MEDICINE

## 2021-09-29 PROCEDURE — 25000003 PHARM REV CODE 250: Performed by: NURSE PRACTITIONER

## 2021-09-29 PROCEDURE — 25500020 PHARM REV CODE 255: Performed by: INTERNAL MEDICINE

## 2021-09-29 PROCEDURE — 21400001 HC TELEMETRY ROOM

## 2021-09-29 PROCEDURE — C1769 GUIDE WIRE: HCPCS | Performed by: INTERNAL MEDICINE

## 2021-09-29 PROCEDURE — 63600175 PHARM REV CODE 636 W HCPCS: Performed by: PHYSICIAN ASSISTANT

## 2021-09-29 PROCEDURE — 37000009 HC ANESTHESIA EA ADD 15 MINS: Performed by: INTERNAL MEDICINE

## 2021-09-29 PROCEDURE — 25000003 PHARM REV CODE 250: Performed by: INTERNAL MEDICINE

## 2021-09-29 PROCEDURE — 74329 PR  X-RAY FOR PANCREAS ENDOSCOPY: ICD-10-PCS | Mod: 26,,, | Performed by: INTERNAL MEDICINE

## 2021-09-29 PROCEDURE — 83690 ASSAY OF LIPASE: CPT | Performed by: NURSE PRACTITIONER

## 2021-09-29 PROCEDURE — 80053 COMPREHEN METABOLIC PANEL: CPT | Performed by: NURSE PRACTITIONER

## 2021-09-29 PROCEDURE — 25000003 PHARM REV CODE 250: Performed by: STUDENT IN AN ORGANIZED HEALTH CARE EDUCATION/TRAINING PROGRAM

## 2021-09-29 PROCEDURE — 27201674 HC SPHINCTERTOME: Performed by: INTERNAL MEDICINE

## 2021-09-29 PROCEDURE — 25000003 PHARM REV CODE 250: Performed by: PHYSICIAN ASSISTANT

## 2021-09-29 PROCEDURE — 74329 X-RAY FOR PANCREAS ENDOSCOPY: CPT | Mod: 26,,, | Performed by: INTERNAL MEDICINE

## 2021-09-29 PROCEDURE — 63600175 PHARM REV CODE 636 W HCPCS: Performed by: NURSE PRACTITIONER

## 2021-09-29 PROCEDURE — 85025 COMPLETE CBC W/AUTO DIFF WBC: CPT | Performed by: NURSE PRACTITIONER

## 2021-09-29 RX ORDER — INDOMETHACIN 50 MG/1
100 SUPPOSITORY RECTAL ONCE
Status: COMPLETED | OUTPATIENT
Start: 2021-09-29 | End: 2021-09-29

## 2021-09-29 RX ORDER — LIDOCAINE HYDROCHLORIDE 10 MG/ML
INJECTION, SOLUTION EPIDURAL; INFILTRATION; INTRACAUDAL; PERINEURAL
Status: DISCONTINUED | OUTPATIENT
Start: 2021-09-29 | End: 2021-09-29

## 2021-09-29 RX ORDER — SUCCINYLCHOLINE CHLORIDE 20 MG/ML
INJECTION INTRAMUSCULAR; INTRAVENOUS
Status: DISCONTINUED | OUTPATIENT
Start: 2021-09-29 | End: 2021-09-29

## 2021-09-29 RX ORDER — PROPOFOL 10 MG/ML
VIAL (ML) INTRAVENOUS
Status: DISCONTINUED | OUTPATIENT
Start: 2021-09-29 | End: 2021-09-29

## 2021-09-29 RX ORDER — FENTANYL CITRATE 50 UG/ML
INJECTION, SOLUTION INTRAMUSCULAR; INTRAVENOUS
Status: DISCONTINUED | OUTPATIENT
Start: 2021-09-29 | End: 2021-09-29

## 2021-09-29 RX ORDER — ONDANSETRON 4 MG/1
4 TABLET, ORALLY DISINTEGRATING ORAL ONCE
Status: COMPLETED | OUTPATIENT
Start: 2021-09-29 | End: 2021-09-29

## 2021-09-29 RX ORDER — SODIUM CHLORIDE, SODIUM LACTATE, POTASSIUM CHLORIDE, CALCIUM CHLORIDE 600; 310; 30; 20 MG/100ML; MG/100ML; MG/100ML; MG/100ML
INJECTION, SOLUTION INTRAVENOUS CONTINUOUS PRN
Status: DISCONTINUED | OUTPATIENT
Start: 2021-09-29 | End: 2021-09-29

## 2021-09-29 RX ORDER — ROCURONIUM BROMIDE 10 MG/ML
INJECTION, SOLUTION INTRAVENOUS
Status: DISCONTINUED | OUTPATIENT
Start: 2021-09-29 | End: 2021-09-29

## 2021-09-29 RX ORDER — ONDANSETRON 2 MG/ML
INJECTION INTRAMUSCULAR; INTRAVENOUS
Status: DISCONTINUED | OUTPATIENT
Start: 2021-09-29 | End: 2021-09-29

## 2021-09-29 RX ADMIN — SUCCINYLCHOLINE CHLORIDE 120 MG: 20 INJECTION, SOLUTION INTRAMUSCULAR; INTRAVENOUS at 08:09

## 2021-09-29 RX ADMIN — CARVEDILOL 6.25 MG: 6.25 TABLET, FILM COATED ORAL at 11:09

## 2021-09-29 RX ADMIN — ENOXAPARIN SODIUM 40 MG: 100 INJECTION SUBCUTANEOUS at 04:09

## 2021-09-29 RX ADMIN — ONDANSETRON 4 MG: 2 INJECTION, SOLUTION INTRAMUSCULAR; INTRAVENOUS at 08:09

## 2021-09-29 RX ADMIN — FENTANYL CITRATE 50 MCG: 50 INJECTION, SOLUTION INTRAMUSCULAR; INTRAVENOUS at 08:09

## 2021-09-29 RX ADMIN — FENTANYL CITRATE 25 MCG: 50 INJECTION, SOLUTION INTRAMUSCULAR; INTRAVENOUS at 08:09

## 2021-09-29 RX ADMIN — SODIUM CHLORIDE: 0.9 INJECTION, SOLUTION INTRAVENOUS at 11:09

## 2021-09-29 RX ADMIN — INDOMETHACIN 100 MG: 50 SUPPOSITORY RECTAL at 08:09

## 2021-09-29 RX ADMIN — CARVEDILOL 6.25 MG: 6.25 TABLET, FILM COATED ORAL at 09:09

## 2021-09-29 RX ADMIN — SODIUM CHLORIDE, SODIUM LACTATE, POTASSIUM CHLORIDE, AND CALCIUM CHLORIDE: 600; 310; 30; 20 INJECTION, SOLUTION INTRAVENOUS at 08:09

## 2021-09-29 RX ADMIN — SODIUM CHLORIDE, SODIUM LACTATE, POTASSIUM CHLORIDE, AND CALCIUM CHLORIDE: 600; 310; 30; 20 INJECTION, SOLUTION INTRAVENOUS at 09:09

## 2021-09-29 RX ADMIN — PROMETHAZINE HYDROCHLORIDE 12.5 MG: 25 INJECTION INTRAMUSCULAR; INTRAVENOUS at 05:09

## 2021-09-29 RX ADMIN — PROPOFOL 50 MG: 10 INJECTION, EMULSION INTRAVENOUS at 08:09

## 2021-09-29 RX ADMIN — PROMETHAZINE HYDROCHLORIDE 12.5 MG: 25 INJECTION INTRAMUSCULAR; INTRAVENOUS at 11:09

## 2021-09-29 RX ADMIN — LIDOCAINE HYDROCHLORIDE 50 MG: 10 INJECTION, SOLUTION EPIDURAL; INFILTRATION; INTRACAUDAL; PERINEURAL at 08:09

## 2021-09-29 RX ADMIN — IOHEXOL 30 ML: 300 INJECTION, SOLUTION INTRAVENOUS at 08:09

## 2021-09-29 RX ADMIN — PROPOFOL 150 MG: 10 INJECTION, EMULSION INTRAVENOUS at 08:09

## 2021-09-29 RX ADMIN — ROCURONIUM BROMIDE 5 MG: 10 INJECTION, SOLUTION INTRAVENOUS at 08:09

## 2021-09-29 RX ADMIN — ONDANSETRON 4 MG: 4 TABLET, ORALLY DISINTEGRATING ORAL at 09:09

## 2021-09-30 VITALS
DIASTOLIC BLOOD PRESSURE: 88 MMHG | HEART RATE: 67 BPM | OXYGEN SATURATION: 97 % | SYSTOLIC BLOOD PRESSURE: 139 MMHG | HEIGHT: 71 IN | TEMPERATURE: 98 F | BODY MASS INDEX: 27.97 KG/M2 | RESPIRATION RATE: 19 BRPM | WEIGHT: 199.75 LBS

## 2021-09-30 PROBLEM — D72.829 LEUKOCYTOSIS: Status: RESOLVED | Noted: 2021-09-27 | Resolved: 2021-09-30

## 2021-09-30 LAB
ALBUMIN SERPL BCP-MCNC: 2.6 G/DL (ref 3.5–5.2)
ALP SERPL-CCNC: 52 U/L (ref 55–135)
ALT SERPL W/O P-5'-P-CCNC: 6 U/L (ref 10–44)
ANION GAP SERPL CALC-SCNC: 11 MMOL/L (ref 8–16)
AST SERPL-CCNC: 8 U/L (ref 10–40)
BASOPHILS # BLD AUTO: 0.01 K/UL (ref 0–0.2)
BASOPHILS NFR BLD: 0.2 % (ref 0–1.9)
BILIRUB SERPL-MCNC: 0.3 MG/DL (ref 0.1–1)
BUN SERPL-MCNC: 19 MG/DL (ref 6–20)
CALCIUM SERPL-MCNC: 8.6 MG/DL (ref 8.7–10.5)
CHLORIDE SERPL-SCNC: 105 MMOL/L (ref 95–110)
CO2 SERPL-SCNC: 24 MMOL/L (ref 23–29)
CREAT SERPL-MCNC: 0.7 MG/DL (ref 0.5–1.4)
DIFFERENTIAL METHOD: ABNORMAL
EOSINOPHIL # BLD AUTO: 0.1 K/UL (ref 0–0.5)
EOSINOPHIL NFR BLD: 1 % (ref 0–8)
ERYTHROCYTE [DISTWIDTH] IN BLOOD BY AUTOMATED COUNT: 14.5 % (ref 11.5–14.5)
EST. GFR  (AFRICAN AMERICAN): >60 ML/MIN/1.73 M^2
EST. GFR  (NON AFRICAN AMERICAN): >60 ML/MIN/1.73 M^2
GLUCOSE SERPL-MCNC: 106 MG/DL (ref 70–110)
HCT VFR BLD AUTO: 28.9 % (ref 40–54)
HGB BLD-MCNC: 8.8 G/DL (ref 14–18)
IMM GRANULOCYTES # BLD AUTO: 0.02 K/UL (ref 0–0.04)
IMM GRANULOCYTES NFR BLD AUTO: 0.4 % (ref 0–0.5)
LIPASE SERPL-CCNC: 22 U/L (ref 4–60)
LYMPHOCYTES # BLD AUTO: 0.7 K/UL (ref 1–4.8)
LYMPHOCYTES NFR BLD: 13.6 % (ref 18–48)
MAGNESIUM SERPL-MCNC: 1.7 MG/DL (ref 1.6–2.6)
MCH RBC QN AUTO: 26.6 PG (ref 27–31)
MCHC RBC AUTO-ENTMCNC: 30.4 G/DL (ref 32–36)
MCV RBC AUTO: 87 FL (ref 82–98)
MONOCYTES # BLD AUTO: 0.4 K/UL (ref 0.3–1)
MONOCYTES NFR BLD: 8.4 % (ref 4–15)
NEUTROPHILS # BLD AUTO: 3.9 K/UL (ref 1.8–7.7)
NEUTROPHILS NFR BLD: 76.4 % (ref 38–73)
NRBC BLD-RTO: 0 /100 WBC
PLATELET # BLD AUTO: 171 K/UL (ref 150–450)
PMV BLD AUTO: 9.2 FL (ref 9.2–12.9)
POTASSIUM SERPL-SCNC: 3.7 MMOL/L (ref 3.5–5.1)
PROT SERPL-MCNC: 5.5 G/DL (ref 6–8.4)
RBC # BLD AUTO: 3.31 M/UL (ref 4.6–6.2)
SODIUM SERPL-SCNC: 140 MMOL/L (ref 136–145)
WBC # BLD AUTO: 5.14 K/UL (ref 3.9–12.7)

## 2021-09-30 PROCEDURE — 83735 ASSAY OF MAGNESIUM: CPT | Performed by: NURSE PRACTITIONER

## 2021-09-30 PROCEDURE — 80053 COMPREHEN METABOLIC PANEL: CPT | Performed by: NURSE PRACTITIONER

## 2021-09-30 PROCEDURE — 83690 ASSAY OF LIPASE: CPT | Performed by: NURSE PRACTITIONER

## 2021-09-30 PROCEDURE — 85025 COMPLETE CBC W/AUTO DIFF WBC: CPT | Performed by: NURSE PRACTITIONER

## 2021-09-30 PROCEDURE — 25000003 PHARM REV CODE 250: Performed by: NURSE PRACTITIONER

## 2021-09-30 PROCEDURE — 99232 SBSQ HOSP IP/OBS MODERATE 35: CPT | Mod: ,,, | Performed by: PHYSICIAN ASSISTANT

## 2021-09-30 PROCEDURE — 36415 COLL VENOUS BLD VENIPUNCTURE: CPT | Performed by: NURSE PRACTITIONER

## 2021-09-30 PROCEDURE — 99232 PR SUBSEQUENT HOSPITAL CARE,LEVL II: ICD-10-PCS | Mod: ,,, | Performed by: PHYSICIAN ASSISTANT

## 2021-09-30 RX ORDER — ONDANSETRON 4 MG/1
4 TABLET, ORALLY DISINTEGRATING ORAL EVERY 6 HOURS PRN
Qty: 30 TABLET | Refills: 0 | Status: ON HOLD | OUTPATIENT
Start: 2021-09-30 | End: 2021-10-27

## 2021-09-30 RX ORDER — PANCRELIPASE LIPASE, PANCRELIPASE AMYLASE, AND PANCRELIPASE PROTEASE 16800; 98400; 56800 [USP'U]/1; [USP'U]/1; [USP'U]/1
CAPSULE, DELAYED RELEASE ORAL
Qty: 120 CAPSULE | Refills: 2 | Status: SHIPPED | OUTPATIENT
Start: 2021-09-30 | End: 2022-03-02 | Stop reason: SDUPTHER

## 2021-09-30 RX ADMIN — CARVEDILOL 6.25 MG: 6.25 TABLET, FILM COATED ORAL at 08:09

## 2021-09-30 RX ADMIN — SODIUM CHLORIDE: 0.9 INJECTION, SOLUTION INTRAVENOUS at 04:09

## 2021-09-30 RX ADMIN — SODIUM CHLORIDE: 0.9 INJECTION, SOLUTION INTRAVENOUS at 08:09

## 2021-10-05 ENCOUNTER — TELEPHONE (OUTPATIENT)
Dept: GASTROENTEROLOGY | Facility: CLINIC | Age: 56
End: 2021-10-05

## 2021-10-06 ENCOUNTER — TELEPHONE (OUTPATIENT)
Dept: GASTROENTEROLOGY | Facility: CLINIC | Age: 56
End: 2021-10-06

## 2021-10-07 ENCOUNTER — OFFICE VISIT (OUTPATIENT)
Dept: GASTROENTEROLOGY | Facility: CLINIC | Age: 56
End: 2021-10-07
Payer: COMMERCIAL

## 2021-10-07 VITALS
HEART RATE: 102 BPM | SYSTOLIC BLOOD PRESSURE: 128 MMHG | BODY MASS INDEX: 26.33 KG/M2 | DIASTOLIC BLOOD PRESSURE: 70 MMHG | WEIGHT: 188.06 LBS | HEIGHT: 71 IN | OXYGEN SATURATION: 95 %

## 2021-10-07 DIAGNOSIS — K86.1 OTHER CHRONIC PANCREATITIS: ICD-10-CM

## 2021-10-07 DIAGNOSIS — R59.0 ABDOMINAL LYMPHADENOPATHY: ICD-10-CM

## 2021-10-07 DIAGNOSIS — Z01.818 PRE-OP TESTING: ICD-10-CM

## 2021-10-07 DIAGNOSIS — K85.90 ACUTE RECURRENT PANCREATITIS: Primary | ICD-10-CM

## 2021-10-07 PROCEDURE — 3008F PR BODY MASS INDEX (BMI) DOCUMENTED: ICD-10-PCS | Mod: CPTII,S$GLB,, | Performed by: INTERNAL MEDICINE

## 2021-10-07 PROCEDURE — 1111F PR DISCHARGE MEDS RECONCILED W/ CURRENT OUTPATIENT MED LIST: ICD-10-PCS | Mod: CPTII,S$GLB,, | Performed by: INTERNAL MEDICINE

## 2021-10-07 PROCEDURE — 3008F BODY MASS INDEX DOCD: CPT | Mod: CPTII,S$GLB,, | Performed by: INTERNAL MEDICINE

## 2021-10-07 PROCEDURE — 1159F MED LIST DOCD IN RCRD: CPT | Mod: CPTII,S$GLB,, | Performed by: INTERNAL MEDICINE

## 2021-10-07 PROCEDURE — 99999 PR PBB SHADOW E&M-EST. PATIENT-LVL IV: ICD-10-PCS | Mod: PBBFAC,,, | Performed by: INTERNAL MEDICINE

## 2021-10-07 PROCEDURE — 1111F DSCHRG MED/CURRENT MED MERGE: CPT | Mod: CPTII,S$GLB,, | Performed by: INTERNAL MEDICINE

## 2021-10-07 PROCEDURE — 99999 PR PBB SHADOW E&M-EST. PATIENT-LVL IV: CPT | Mod: PBBFAC,,, | Performed by: INTERNAL MEDICINE

## 2021-10-07 PROCEDURE — 3078F DIAST BP <80 MM HG: CPT | Mod: CPTII,S$GLB,, | Performed by: INTERNAL MEDICINE

## 2021-10-07 PROCEDURE — 3078F PR MOST RECENT DIASTOLIC BLOOD PRESSURE < 80 MM HG: ICD-10-PCS | Mod: CPTII,S$GLB,, | Performed by: INTERNAL MEDICINE

## 2021-10-07 PROCEDURE — 99215 PR OFFICE/OUTPT VISIT, EST, LEVL V, 40-54 MIN: ICD-10-PCS | Mod: S$GLB,,, | Performed by: INTERNAL MEDICINE

## 2021-10-07 PROCEDURE — 1159F PR MEDICATION LIST DOCUMENTED IN MEDICAL RECORD: ICD-10-PCS | Mod: CPTII,S$GLB,, | Performed by: INTERNAL MEDICINE

## 2021-10-07 PROCEDURE — 99215 OFFICE O/P EST HI 40 MIN: CPT | Mod: S$GLB,,, | Performed by: INTERNAL MEDICINE

## 2021-10-07 PROCEDURE — 3074F SYST BP LT 130 MM HG: CPT | Mod: CPTII,S$GLB,, | Performed by: INTERNAL MEDICINE

## 2021-10-07 PROCEDURE — 3074F PR MOST RECENT SYSTOLIC BLOOD PRESSURE < 130 MM HG: ICD-10-PCS | Mod: CPTII,S$GLB,, | Performed by: INTERNAL MEDICINE

## 2021-10-08 ENCOUNTER — PATIENT MESSAGE (OUTPATIENT)
Dept: GASTROENTEROLOGY | Facility: CLINIC | Age: 56
End: 2021-10-08

## 2021-10-08 ENCOUNTER — TELEPHONE (OUTPATIENT)
Dept: GASTROENTEROLOGY | Facility: CLINIC | Age: 56
End: 2021-10-08

## 2021-10-09 ENCOUNTER — LAB VISIT (OUTPATIENT)
Dept: PRIMARY CARE CLINIC | Facility: CLINIC | Age: 56
End: 2021-10-09
Payer: COMMERCIAL

## 2021-10-09 DIAGNOSIS — Z01.818 PRE-OP TESTING: ICD-10-CM

## 2021-10-09 PROCEDURE — U0003 INFECTIOUS AGENT DETECTION BY NUCLEIC ACID (DNA OR RNA); SEVERE ACUTE RESPIRATORY SYNDROME CORONAVIRUS 2 (SARS-COV-2) (CORONAVIRUS DISEASE [COVID-19]), AMPLIFIED PROBE TECHNIQUE, MAKING USE OF HIGH THROUGHPUT TECHNOLOGIES AS DESCRIBED BY CMS-2020-01-R: HCPCS | Performed by: INTERNAL MEDICINE

## 2021-10-09 PROCEDURE — U0005 INFEC AGEN DETEC AMPLI PROBE: HCPCS | Performed by: INTERNAL MEDICINE

## 2021-10-11 ENCOUNTER — TELEPHONE (OUTPATIENT)
Dept: PREADMISSION TESTING | Facility: HOSPITAL | Age: 56
End: 2021-10-11

## 2021-10-11 VITALS — HEIGHT: 71 IN | BODY MASS INDEX: 26.6 KG/M2 | WEIGHT: 190 LBS

## 2021-10-11 LAB
SARS-COV-2 RNA RESP QL NAA+PROBE: NOT DETECTED
SARS-COV-2- CYCLE NUMBER: NORMAL

## 2021-10-12 ENCOUNTER — ANESTHESIA (OUTPATIENT)
Dept: ENDOSCOPY | Facility: HOSPITAL | Age: 56
End: 2021-10-12
Payer: COMMERCIAL

## 2021-10-12 ENCOUNTER — HOSPITAL ENCOUNTER (OUTPATIENT)
Facility: HOSPITAL | Age: 56
Discharge: HOME | End: 2021-10-12
Attending: INTERNAL MEDICINE | Admitting: INTERNAL MEDICINE
Payer: COMMERCIAL

## 2021-10-12 ENCOUNTER — ANESTHESIA EVENT (OUTPATIENT)
Dept: ENDOSCOPY | Facility: HOSPITAL | Age: 56
End: 2021-10-12
Payer: COMMERCIAL

## 2021-10-12 DIAGNOSIS — K85.90 ACUTE RECURRENT PANCREATITIS: Primary | ICD-10-CM

## 2021-10-12 DIAGNOSIS — R59.0 ABDOMINAL LYMPHADENOPATHY: ICD-10-CM

## 2021-10-12 PROCEDURE — 27202131 HC NEEDLE, FNB SINGLE (ANY): Performed by: INTERNAL MEDICINE

## 2021-10-12 PROCEDURE — 37000009 HC ANESTHESIA EA ADD 15 MINS: Performed by: INTERNAL MEDICINE

## 2021-10-12 PROCEDURE — 88173 PR  INTERPRETATION OF FNA SMEAR: ICD-10-PCS | Mod: 26,,, | Performed by: PATHOLOGY

## 2021-10-12 PROCEDURE — 25000003 PHARM REV CODE 250: Performed by: NURSE ANESTHETIST, CERTIFIED REGISTERED

## 2021-10-12 PROCEDURE — 88173 CYTOPATH EVAL FNA REPORT: CPT | Mod: 26,,, | Performed by: PATHOLOGY

## 2021-10-12 PROCEDURE — 88173 CYTOPATH EVAL FNA REPORT: CPT | Performed by: PATHOLOGY

## 2021-10-12 PROCEDURE — 43238 PR UPGI ENDOSCOPY W/US FN BX: ICD-10-PCS | Mod: ,,, | Performed by: INTERNAL MEDICINE

## 2021-10-12 PROCEDURE — 43238 EGD US FINE NEEDLE BX/ASPIR: CPT | Performed by: INTERNAL MEDICINE

## 2021-10-12 PROCEDURE — 37000008 HC ANESTHESIA 1ST 15 MINUTES: Performed by: INTERNAL MEDICINE

## 2021-10-12 PROCEDURE — 43238 EGD US FINE NEEDLE BX/ASPIR: CPT | Mod: ,,, | Performed by: INTERNAL MEDICINE

## 2021-10-12 PROCEDURE — 63600175 PHARM REV CODE 636 W HCPCS: Performed by: NURSE ANESTHETIST, CERTIFIED REGISTERED

## 2021-10-12 PROCEDURE — 43242 EGD US FINE NEEDLE BX/ASPIR: CPT | Performed by: INTERNAL MEDICINE

## 2021-10-12 RX ORDER — LIDOCAINE HYDROCHLORIDE 10 MG/ML
INJECTION, SOLUTION EPIDURAL; INFILTRATION; INTRACAUDAL; PERINEURAL
Status: DISCONTINUED | OUTPATIENT
Start: 2021-10-12 | End: 2021-10-12

## 2021-10-12 RX ORDER — SODIUM CHLORIDE, SODIUM LACTATE, POTASSIUM CHLORIDE, CALCIUM CHLORIDE 600; 310; 30; 20 MG/100ML; MG/100ML; MG/100ML; MG/100ML
INJECTION, SOLUTION INTRAVENOUS CONTINUOUS PRN
Status: DISCONTINUED | OUTPATIENT
Start: 2021-10-12 | End: 2021-10-12

## 2021-10-12 RX ORDER — PROPOFOL 10 MG/ML
VIAL (ML) INTRAVENOUS
Status: DISCONTINUED | OUTPATIENT
Start: 2021-10-12 | End: 2021-10-12

## 2021-10-12 RX ORDER — SODIUM CHLORIDE 9 MG/ML
INJECTION, SOLUTION INTRAVENOUS CONTINUOUS
Status: DISCONTINUED | OUTPATIENT
Start: 2021-10-12 | End: 2021-10-12 | Stop reason: HOSPADM

## 2021-10-12 RX ADMIN — PROPOFOL 50 MG: 10 INJECTION, EMULSION INTRAVENOUS at 08:10

## 2021-10-12 RX ADMIN — PROPOFOL 40 MG: 10 INJECTION, EMULSION INTRAVENOUS at 08:10

## 2021-10-12 RX ADMIN — PROPOFOL 70 MG: 10 INJECTION, EMULSION INTRAVENOUS at 08:10

## 2021-10-12 RX ADMIN — SODIUM CHLORIDE, SODIUM LACTATE, POTASSIUM CHLORIDE, AND CALCIUM CHLORIDE: 600; 310; 30; 20 INJECTION, SOLUTION INTRAVENOUS at 08:10

## 2021-10-12 RX ADMIN — LIDOCAINE HYDROCHLORIDE 50 MG: 10 INJECTION, SOLUTION EPIDURAL; INFILTRATION; INTRACAUDAL; PERINEURAL at 08:10

## 2021-10-13 VITALS
DIASTOLIC BLOOD PRESSURE: 77 MMHG | RESPIRATION RATE: 17 BRPM | BODY MASS INDEX: 25.94 KG/M2 | WEIGHT: 186 LBS | OXYGEN SATURATION: 98 % | SYSTOLIC BLOOD PRESSURE: 135 MMHG | TEMPERATURE: 98 F | HEART RATE: 71 BPM

## 2021-10-14 LAB
FINAL PATHOLOGIC DIAGNOSIS: NORMAL
Lab: NORMAL

## 2021-10-15 ENCOUNTER — TELEPHONE (OUTPATIENT)
Dept: GASTROENTEROLOGY | Facility: CLINIC | Age: 56
End: 2021-10-15

## 2021-10-18 ENCOUNTER — PATIENT MESSAGE (OUTPATIENT)
Dept: GASTROENTEROLOGY | Facility: CLINIC | Age: 56
End: 2021-10-18
Payer: COMMERCIAL

## 2021-10-19 ENCOUNTER — TELEPHONE (OUTPATIENT)
Dept: UROLOGY | Facility: CLINIC | Age: 56
End: 2021-10-19

## 2021-10-19 DIAGNOSIS — K86.89 PANCREATIC STONES: Primary | ICD-10-CM

## 2021-10-19 DIAGNOSIS — K85.90 ACUTE RECURRENT PANCREATITIS: Primary | ICD-10-CM

## 2021-10-21 ENCOUNTER — PATIENT MESSAGE (OUTPATIENT)
Dept: GASTROENTEROLOGY | Facility: CLINIC | Age: 56
End: 2021-10-21
Payer: COMMERCIAL

## 2021-10-21 ENCOUNTER — PATIENT MESSAGE (OUTPATIENT)
Dept: SURGERY | Facility: HOSPITAL | Age: 56
End: 2021-10-21
Payer: COMMERCIAL

## 2021-10-21 ENCOUNTER — TELEPHONE (OUTPATIENT)
Dept: UROLOGY | Facility: CLINIC | Age: 56
End: 2021-10-21

## 2021-10-21 ENCOUNTER — PATIENT MESSAGE (OUTPATIENT)
Dept: UROLOGY | Facility: CLINIC | Age: 56
End: 2021-10-21
Payer: COMMERCIAL

## 2021-10-22 ENCOUNTER — TELEPHONE (OUTPATIENT)
Dept: GASTROENTEROLOGY | Facility: CLINIC | Age: 56
End: 2021-10-22
Payer: COMMERCIAL

## 2021-10-22 ENCOUNTER — HOSPITAL ENCOUNTER (EMERGENCY)
Facility: HOSPITAL | Age: 56
Discharge: HOME | End: 2021-10-22
Attending: EMERGENCY MEDICINE
Payer: COMMERCIAL

## 2021-10-22 VITALS
BODY MASS INDEX: 25.12 KG/M2 | DIASTOLIC BLOOD PRESSURE: 67 MMHG | OXYGEN SATURATION: 100 % | RESPIRATION RATE: 18 BRPM | SYSTOLIC BLOOD PRESSURE: 106 MMHG | TEMPERATURE: 98 F | WEIGHT: 180.13 LBS | HEART RATE: 80 BPM

## 2021-10-22 DIAGNOSIS — Z01.818 PREOP TESTING: Primary | ICD-10-CM

## 2021-10-22 DIAGNOSIS — D64.9 CHRONIC ANEMIA: Primary | ICD-10-CM

## 2021-10-22 LAB
ALBUMIN SERPL BCP-MCNC: 3.7 G/DL (ref 3.5–5.2)
ALP SERPL-CCNC: 82 U/L (ref 55–135)
ALT SERPL W/O P-5'-P-CCNC: 12 U/L (ref 10–44)
ANION GAP SERPL CALC-SCNC: 13 MMOL/L (ref 8–16)
AST SERPL-CCNC: 13 U/L (ref 10–40)
BASOPHILS # BLD AUTO: 0.04 K/UL (ref 0–0.2)
BASOPHILS NFR BLD: 0.6 % (ref 0–1.9)
BILIRUB SERPL-MCNC: 0.5 MG/DL (ref 0.1–1)
BILIRUB UR QL STRIP: NEGATIVE
BUN SERPL-MCNC: 18 MG/DL (ref 6–20)
CALCIUM SERPL-MCNC: 10.3 MG/DL (ref 8.7–10.5)
CHLORIDE SERPL-SCNC: 98 MMOL/L (ref 95–110)
CLARITY UR: CLEAR
CO2 SERPL-SCNC: 27 MMOL/L (ref 23–29)
COLOR UR: YELLOW
CREAT SERPL-MCNC: 1 MG/DL (ref 0.5–1.4)
DIFFERENTIAL METHOD: ABNORMAL
EOSINOPHIL # BLD AUTO: 0.2 K/UL (ref 0–0.5)
EOSINOPHIL NFR BLD: 2.1 % (ref 0–8)
ERYTHROCYTE [DISTWIDTH] IN BLOOD BY AUTOMATED COUNT: 16.8 % (ref 11.5–14.5)
EST. GFR  (AFRICAN AMERICAN): >60 ML/MIN/1.73 M^2
EST. GFR  (NON AFRICAN AMERICAN): >60 ML/MIN/1.73 M^2
GLUCOSE SERPL-MCNC: 103 MG/DL (ref 70–110)
GLUCOSE UR QL STRIP: NEGATIVE
HCT VFR BLD AUTO: 31.9 % (ref 40–54)
HGB BLD-MCNC: 9.3 G/DL (ref 14–18)
HGB UR QL STRIP: ABNORMAL
IMM GRANULOCYTES # BLD AUTO: 0.04 K/UL (ref 0–0.04)
IMM GRANULOCYTES NFR BLD AUTO: 0.6 % (ref 0–0.5)
KETONES UR QL STRIP: NEGATIVE
LEUKOCYTE ESTERASE UR QL STRIP: NEGATIVE
LIPASE SERPL-CCNC: 11 U/L (ref 4–60)
LYMPHOCYTES # BLD AUTO: 1.4 K/UL (ref 1–4.8)
LYMPHOCYTES NFR BLD: 19.9 % (ref 18–48)
MCH RBC QN AUTO: 26.9 PG (ref 27–31)
MCHC RBC AUTO-ENTMCNC: 29.2 G/DL (ref 32–36)
MCV RBC AUTO: 92 FL (ref 82–98)
MONOCYTES # BLD AUTO: 1 K/UL (ref 0.3–1)
MONOCYTES NFR BLD: 13.5 % (ref 4–15)
NEUTROPHILS # BLD AUTO: 4.6 K/UL (ref 1.8–7.7)
NEUTROPHILS NFR BLD: 63.3 % (ref 38–73)
NITRITE UR QL STRIP: NEGATIVE
NRBC BLD-RTO: 0 /100 WBC
PH UR STRIP: 6 [PH] (ref 5–8)
PLATELET # BLD AUTO: 213 K/UL (ref 150–450)
PLATELET BLD QL SMEAR: ABNORMAL
PMV BLD AUTO: 8.3 FL (ref 9.2–12.9)
POTASSIUM SERPL-SCNC: 4.4 MMOL/L (ref 3.5–5.1)
PROT SERPL-MCNC: 7.4 G/DL (ref 6–8.4)
PROT UR QL STRIP: NEGATIVE
RBC # BLD AUTO: 3.46 M/UL (ref 4.6–6.2)
SODIUM SERPL-SCNC: 138 MMOL/L (ref 136–145)
SP GR UR STRIP: >=1.03 (ref 1–1.03)
URN SPEC COLLECT METH UR: ABNORMAL
UROBILINOGEN UR STRIP-ACNC: NEGATIVE EU/DL
WBC # BLD AUTO: 7.24 K/UL (ref 3.9–12.7)

## 2021-10-22 PROCEDURE — 96376 TX/PRO/DX INJ SAME DRUG ADON: CPT

## 2021-10-22 PROCEDURE — 96361 HYDRATE IV INFUSION ADD-ON: CPT

## 2021-10-22 PROCEDURE — 25000003 PHARM REV CODE 250: Performed by: EMERGENCY MEDICINE

## 2021-10-22 PROCEDURE — 81003 URINALYSIS AUTO W/O SCOPE: CPT | Performed by: NURSE PRACTITIONER

## 2021-10-22 PROCEDURE — 80053 COMPREHEN METABOLIC PANEL: CPT | Performed by: NURSE PRACTITIONER

## 2021-10-22 PROCEDURE — 99284 EMERGENCY DEPT VISIT MOD MDM: CPT | Mod: 25

## 2021-10-22 PROCEDURE — 96375 TX/PRO/DX INJ NEW DRUG ADDON: CPT

## 2021-10-22 PROCEDURE — 63600175 PHARM REV CODE 636 W HCPCS: Performed by: EMERGENCY MEDICINE

## 2021-10-22 PROCEDURE — 85025 COMPLETE CBC W/AUTO DIFF WBC: CPT | Performed by: NURSE PRACTITIONER

## 2021-10-22 PROCEDURE — 96374 THER/PROPH/DIAG INJ IV PUSH: CPT

## 2021-10-22 PROCEDURE — 83690 ASSAY OF LIPASE: CPT | Performed by: NURSE PRACTITIONER

## 2021-10-22 RX ORDER — ONDANSETRON 2 MG/ML
4 INJECTION INTRAMUSCULAR; INTRAVENOUS
Status: COMPLETED | OUTPATIENT
Start: 2021-10-22 | End: 2021-10-22

## 2021-10-22 RX ORDER — FERROUS GLUCONATE 324(38)MG
324 TABLET ORAL
COMMUNITY
End: 2022-07-15

## 2021-10-22 RX ORDER — MULTIVITAMIN
1 TABLET ORAL DAILY
COMMUNITY

## 2021-10-22 RX ORDER — MORPHINE SULFATE 4 MG/ML
4 INJECTION, SOLUTION INTRAMUSCULAR; INTRAVENOUS
Status: COMPLETED | OUTPATIENT
Start: 2021-10-22 | End: 2021-10-22

## 2021-10-22 RX ADMIN — MORPHINE SULFATE 4 MG: 4 INJECTION INTRAVENOUS at 04:10

## 2021-10-22 RX ADMIN — ONDANSETRON 4 MG: 2 INJECTION INTRAMUSCULAR; INTRAVENOUS at 04:10

## 2021-10-22 RX ADMIN — SODIUM CHLORIDE 1000 ML: 0.9 INJECTION, SOLUTION INTRAVENOUS at 04:10

## 2021-10-25 ENCOUNTER — HOSPITAL ENCOUNTER (OUTPATIENT)
Dept: RADIOLOGY | Facility: HOSPITAL | Age: 56
Discharge: HOME | End: 2021-10-25
Attending: UROLOGY
Payer: COMMERCIAL

## 2021-10-25 DIAGNOSIS — Z01.818 PREOP TESTING: ICD-10-CM

## 2021-10-25 PROCEDURE — 71046 XR CHEST PA AND LATERAL PRE-OP: ICD-10-PCS | Mod: 26,,, | Performed by: RADIOLOGY

## 2021-10-25 PROCEDURE — 71046 X-RAY EXAM CHEST 2 VIEWS: CPT | Mod: 26,,, | Performed by: RADIOLOGY

## 2021-10-25 PROCEDURE — 71046 X-RAY EXAM CHEST 2 VIEWS: CPT | Mod: TC

## 2021-10-26 ENCOUNTER — HOSPITAL ENCOUNTER (OUTPATIENT)
Facility: HOSPITAL | Age: 56
Discharge: HOME | End: 2021-10-28
Attending: EMERGENCY MEDICINE | Admitting: INTERNAL MEDICINE
Payer: COMMERCIAL

## 2021-10-26 ENCOUNTER — TELEPHONE (OUTPATIENT)
Dept: UROLOGY | Facility: CLINIC | Age: 56
End: 2021-10-26
Payer: COMMERCIAL

## 2021-10-26 DIAGNOSIS — K85.90 PANCREATITIS: ICD-10-CM

## 2021-10-26 DIAGNOSIS — K85.90 ACUTE RECURRENT PANCREATITIS: Primary | ICD-10-CM

## 2021-10-26 PROBLEM — E03.9 HYPOTHYROIDISM: Status: ACTIVE | Noted: 2021-10-26

## 2021-10-26 PROBLEM — Z78.9 ALCOHOL USE: Status: ACTIVE | Noted: 2017-11-23

## 2021-10-26 PROBLEM — K21.9 GASTROESOPHAGEAL REFLUX DISEASE: Status: ACTIVE | Noted: 2021-10-26

## 2021-10-26 PROBLEM — Z90.49 HISTORY OF EXCISION OF INTESTINAL STRUCTURE: Status: ACTIVE | Noted: 2021-10-26

## 2021-10-26 PROBLEM — D64.9 NORMOCYTIC ANEMIA: Chronic | Status: ACTIVE | Noted: 2021-09-28

## 2021-10-26 PROBLEM — Z98.890 HISTORY OF EXCISION OF INTESTINAL STRUCTURE: Status: ACTIVE | Noted: 2021-10-26

## 2021-10-26 PROBLEM — G47.33 OSA ON CPAP: Status: ACTIVE | Noted: 2017-11-23

## 2021-10-26 PROBLEM — K52.9 COLITIS: Status: ACTIVE | Noted: 2021-10-26

## 2021-10-26 PROBLEM — Z86.718 HISTORY OF DVT (DEEP VEIN THROMBOSIS): Status: ACTIVE | Noted: 2021-10-26

## 2021-10-26 PROBLEM — G51.0 FACIAL PALSY: Status: ACTIVE | Noted: 2021-10-26

## 2021-10-26 PROBLEM — F10.90 ALCOHOL USE: Status: ACTIVE | Noted: 2017-11-23

## 2021-10-26 PROBLEM — I81 PORTAL VEIN THROMBOSIS: Status: ACTIVE | Noted: 2021-10-26

## 2021-10-26 PROBLEM — K57.30 DIVERTICULAR DISEASE OF COLON: Status: ACTIVE | Noted: 2021-10-26

## 2021-10-26 PROBLEM — E78.2 MIXED HYPERLIPIDEMIA: Status: ACTIVE | Noted: 2021-10-26

## 2021-10-26 LAB
ALBUMIN SERPL BCP-MCNC: 3.8 G/DL (ref 3.5–5.2)
ALP SERPL-CCNC: 79 U/L (ref 55–135)
ALT SERPL W/O P-5'-P-CCNC: 11 U/L (ref 10–44)
ANION GAP SERPL CALC-SCNC: 14 MMOL/L (ref 8–16)
AST SERPL-CCNC: 17 U/L (ref 10–40)
BASOPHILS # BLD AUTO: 0.04 K/UL (ref 0–0.2)
BASOPHILS NFR BLD: 0.4 % (ref 0–1.9)
BILIRUB SERPL-MCNC: 0.2 MG/DL (ref 0.1–1)
BILIRUB UR QL STRIP: NEGATIVE
BUN SERPL-MCNC: 16 MG/DL (ref 6–20)
CALCIUM SERPL-MCNC: 9.7 MG/DL (ref 8.7–10.5)
CHLORIDE SERPL-SCNC: 103 MMOL/L (ref 95–110)
CLARITY UR: CLEAR
CO2 SERPL-SCNC: 23 MMOL/L (ref 23–29)
COLOR UR: YELLOW
CREAT SERPL-MCNC: 0.9 MG/DL (ref 0.5–1.4)
DIFFERENTIAL METHOD: ABNORMAL
EOSINOPHIL # BLD AUTO: 0.2 K/UL (ref 0–0.5)
EOSINOPHIL NFR BLD: 2.3 % (ref 0–8)
ERYTHROCYTE [DISTWIDTH] IN BLOOD BY AUTOMATED COUNT: 16.4 % (ref 11.5–14.5)
EST. GFR  (AFRICAN AMERICAN): >60 ML/MIN/1.73 M^2
EST. GFR  (NON AFRICAN AMERICAN): >60 ML/MIN/1.73 M^2
GLUCOSE SERPL-MCNC: 84 MG/DL (ref 70–110)
GLUCOSE UR QL STRIP: NEGATIVE
HCT VFR BLD AUTO: 31.9 % (ref 40–54)
HGB BLD-MCNC: 9.6 G/DL (ref 14–18)
HGB UR QL STRIP: ABNORMAL
IMM GRANULOCYTES # BLD AUTO: 0.08 K/UL (ref 0–0.04)
IMM GRANULOCYTES NFR BLD AUTO: 0.8 % (ref 0–0.5)
KETONES UR QL STRIP: NEGATIVE
LEUKOCYTE ESTERASE UR QL STRIP: NEGATIVE
LIPASE SERPL-CCNC: 223 U/L (ref 4–60)
LYMPHOCYTES # BLD AUTO: 1.7 K/UL (ref 1–4.8)
LYMPHOCYTES NFR BLD: 17.5 % (ref 18–48)
MCH RBC QN AUTO: 27.3 PG (ref 27–31)
MCHC RBC AUTO-ENTMCNC: 30.1 G/DL (ref 32–36)
MCV RBC AUTO: 91 FL (ref 82–98)
MONOCYTES # BLD AUTO: 0.7 K/UL (ref 0.3–1)
MONOCYTES NFR BLD: 6.6 % (ref 4–15)
NEUTROPHILS # BLD AUTO: 7.2 K/UL (ref 1.8–7.7)
NEUTROPHILS NFR BLD: 72.4 % (ref 38–73)
NITRITE UR QL STRIP: NEGATIVE
NRBC BLD-RTO: 0 /100 WBC
PH UR STRIP: 7 [PH] (ref 5–8)
PLATELET # BLD AUTO: 238 K/UL (ref 150–450)
PMV BLD AUTO: 8.4 FL (ref 9.2–12.9)
POTASSIUM SERPL-SCNC: 3.9 MMOL/L (ref 3.5–5.1)
PROT SERPL-MCNC: 7.3 G/DL (ref 6–8.4)
PROT UR QL STRIP: NEGATIVE
RBC # BLD AUTO: 3.52 M/UL (ref 4.6–6.2)
SODIUM SERPL-SCNC: 140 MMOL/L (ref 136–145)
SP GR UR STRIP: 1.01 (ref 1–1.03)
TROPONIN I SERPL DL<=0.01 NG/ML-MCNC: 0.01 NG/ML (ref 0–0.03)
URN SPEC COLLECT METH UR: ABNORMAL
UROBILINOGEN UR STRIP-ACNC: NEGATIVE EU/DL
WBC # BLD AUTO: 9.93 K/UL (ref 3.9–12.7)

## 2021-10-26 PROCEDURE — 83690 ASSAY OF LIPASE: CPT | Performed by: EMERGENCY MEDICINE

## 2021-10-26 PROCEDURE — 85025 COMPLETE CBC W/AUTO DIFF WBC: CPT | Performed by: EMERGENCY MEDICINE

## 2021-10-26 PROCEDURE — G0378 HOSPITAL OBSERVATION PER HR: HCPCS

## 2021-10-26 PROCEDURE — 25000003 PHARM REV CODE 250: Performed by: EMERGENCY MEDICINE

## 2021-10-26 PROCEDURE — 99285 EMERGENCY DEPT VISIT HI MDM: CPT | Mod: 25

## 2021-10-26 PROCEDURE — 80053 COMPREHEN METABOLIC PANEL: CPT | Performed by: EMERGENCY MEDICINE

## 2021-10-26 PROCEDURE — 96361 HYDRATE IV INFUSION ADD-ON: CPT

## 2021-10-26 PROCEDURE — 96374 THER/PROPH/DIAG INJ IV PUSH: CPT

## 2021-10-26 PROCEDURE — 84484 ASSAY OF TROPONIN QUANT: CPT | Performed by: EMERGENCY MEDICINE

## 2021-10-26 PROCEDURE — 25500020 PHARM REV CODE 255: Performed by: EMERGENCY MEDICINE

## 2021-10-26 PROCEDURE — 96375 TX/PRO/DX INJ NEW DRUG ADDON: CPT

## 2021-10-26 PROCEDURE — 96376 TX/PRO/DX INJ SAME DRUG ADON: CPT

## 2021-10-26 PROCEDURE — 81003 URINALYSIS AUTO W/O SCOPE: CPT | Performed by: EMERGENCY MEDICINE

## 2021-10-26 PROCEDURE — 63600175 PHARM REV CODE 636 W HCPCS: Performed by: EMERGENCY MEDICINE

## 2021-10-26 RX ORDER — CARVEDILOL 6.25 MG/1
6.25 TABLET ORAL 2 TIMES DAILY
Status: DISCONTINUED | OUTPATIENT
Start: 2021-10-27 | End: 2021-10-28 | Stop reason: HOSPADM

## 2021-10-26 RX ORDER — HYDROMORPHONE HYDROCHLORIDE 2 MG/ML
1 INJECTION, SOLUTION INTRAMUSCULAR; INTRAVENOUS; SUBCUTANEOUS
Status: COMPLETED | OUTPATIENT
Start: 2021-10-26 | End: 2021-10-26

## 2021-10-26 RX ORDER — KETOROLAC TROMETHAMINE 30 MG/ML
30 INJECTION, SOLUTION INTRAMUSCULAR; INTRAVENOUS
Status: COMPLETED | OUTPATIENT
Start: 2021-10-26 | End: 2021-10-26

## 2021-10-26 RX ORDER — PANTOPRAZOLE SODIUM 40 MG/1
40 TABLET, DELAYED RELEASE ORAL DAILY
Status: DISCONTINUED | OUTPATIENT
Start: 2021-10-27 | End: 2021-10-28 | Stop reason: HOSPADM

## 2021-10-26 RX ORDER — SODIUM CHLORIDE 9 MG/ML
INJECTION, SOLUTION INTRAVENOUS
Status: COMPLETED | OUTPATIENT
Start: 2021-10-26 | End: 2021-10-26

## 2021-10-26 RX ORDER — NALOXONE HCL 0.4 MG/ML
0.02 VIAL (ML) INJECTION
Status: DISCONTINUED | OUTPATIENT
Start: 2021-10-27 | End: 2021-10-28 | Stop reason: HOSPADM

## 2021-10-26 RX ORDER — SODIUM CHLORIDE, SODIUM LACTATE, POTASSIUM CHLORIDE, CALCIUM CHLORIDE 600; 310; 30; 20 MG/100ML; MG/100ML; MG/100ML; MG/100ML
INJECTION, SOLUTION INTRAVENOUS CONTINUOUS
Status: DISCONTINUED | OUTPATIENT
Start: 2021-10-27 | End: 2021-10-28 | Stop reason: HOSPADM

## 2021-10-26 RX ORDER — MORPHINE SULFATE 4 MG/ML
4 INJECTION, SOLUTION INTRAMUSCULAR; INTRAVENOUS EVERY 4 HOURS PRN
Status: DISCONTINUED | OUTPATIENT
Start: 2021-10-27 | End: 2021-10-27

## 2021-10-26 RX ORDER — MORPHINE SULFATE 2 MG/ML
2 INJECTION, SOLUTION INTRAMUSCULAR; INTRAVENOUS EVERY 4 HOURS PRN
Status: DISCONTINUED | OUTPATIENT
Start: 2021-10-27 | End: 2021-10-27

## 2021-10-26 RX ORDER — SODIUM CHLORIDE 0.9 % (FLUSH) 0.9 %
10 SYRINGE (ML) INJECTION EVERY 12 HOURS PRN
Status: DISCONTINUED | OUTPATIENT
Start: 2021-10-27 | End: 2021-10-28 | Stop reason: HOSPADM

## 2021-10-26 RX ORDER — FERROUS GLUCONATE 324(37.5)
324 TABLET ORAL
Status: DISCONTINUED | OUTPATIENT
Start: 2021-10-27 | End: 2021-10-28 | Stop reason: HOSPADM

## 2021-10-26 RX ORDER — ONDANSETRON 2 MG/ML
4 INJECTION INTRAMUSCULAR; INTRAVENOUS EVERY 8 HOURS PRN
Status: DISCONTINUED | OUTPATIENT
Start: 2021-10-27 | End: 2021-10-28 | Stop reason: HOSPADM

## 2021-10-26 RX ORDER — ONDANSETRON 2 MG/ML
4 INJECTION INTRAMUSCULAR; INTRAVENOUS
Status: COMPLETED | OUTPATIENT
Start: 2021-10-26 | End: 2021-10-26

## 2021-10-26 RX ADMIN — HYDROMORPHONE HYDROCHLORIDE 1 MG: 2 INJECTION INTRAMUSCULAR; INTRAVENOUS; SUBCUTANEOUS at 11:10

## 2021-10-26 RX ADMIN — ONDANSETRON 4 MG: 2 INJECTION INTRAMUSCULAR; INTRAVENOUS at 06:10

## 2021-10-26 RX ADMIN — KETOROLAC TROMETHAMINE 30 MG: 30 INJECTION, SOLUTION INTRAMUSCULAR at 06:10

## 2021-10-26 RX ADMIN — SODIUM CHLORIDE: 0.9 INJECTION, SOLUTION INTRAVENOUS at 11:10

## 2021-10-26 RX ADMIN — SODIUM CHLORIDE 1000 ML: 0.9 INJECTION, SOLUTION INTRAVENOUS at 06:10

## 2021-10-26 RX ADMIN — IOHEXOL 75 ML: 350 INJECTION, SOLUTION INTRAVENOUS at 07:10

## 2021-10-26 RX ADMIN — HYDROMORPHONE HYDROCHLORIDE 1 MG: 2 INJECTION INTRAMUSCULAR; INTRAVENOUS; SUBCUTANEOUS at 07:10

## 2021-10-27 PROBLEM — D50.9 IRON DEFICIENCY ANEMIA: Chronic | Status: ACTIVE | Noted: 2021-09-28

## 2021-10-27 LAB
ALBUMIN SERPL BCP-MCNC: 3.4 G/DL (ref 3.5–5.2)
ALP SERPL-CCNC: 73 U/L (ref 55–135)
ALT SERPL W/O P-5'-P-CCNC: 11 U/L (ref 10–44)
ANION GAP SERPL CALC-SCNC: 12 MMOL/L (ref 8–16)
AST SERPL-CCNC: 11 U/L (ref 10–40)
BASOPHILS # BLD AUTO: 0.03 K/UL (ref 0–0.2)
BASOPHILS NFR BLD: 0.3 % (ref 0–1.9)
BILIRUB SERPL-MCNC: 0.3 MG/DL (ref 0.1–1)
BUN SERPL-MCNC: 14 MG/DL (ref 6–20)
CALCIUM SERPL-MCNC: 9.2 MG/DL (ref 8.7–10.5)
CHLORIDE SERPL-SCNC: 102 MMOL/L (ref 95–110)
CO2 SERPL-SCNC: 26 MMOL/L (ref 23–29)
CREAT SERPL-MCNC: 0.8 MG/DL (ref 0.5–1.4)
DIFFERENTIAL METHOD: ABNORMAL
EOSINOPHIL # BLD AUTO: 0.1 K/UL (ref 0–0.5)
EOSINOPHIL NFR BLD: 1.4 % (ref 0–8)
ERYTHROCYTE [DISTWIDTH] IN BLOOD BY AUTOMATED COUNT: 16.9 % (ref 11.5–14.5)
EST. GFR  (AFRICAN AMERICAN): >60 ML/MIN/1.73 M^2
EST. GFR  (NON AFRICAN AMERICAN): >60 ML/MIN/1.73 M^2
GLUCOSE SERPL-MCNC: 87 MG/DL (ref 70–110)
HCT VFR BLD AUTO: 31.6 % (ref 40–54)
HGB BLD-MCNC: 8.9 G/DL (ref 14–18)
IMM GRANULOCYTES # BLD AUTO: 0.05 K/UL (ref 0–0.04)
IMM GRANULOCYTES NFR BLD AUTO: 0.6 % (ref 0–0.5)
LIPASE SERPL-CCNC: 92 U/L (ref 4–60)
LYMPHOCYTES # BLD AUTO: 1 K/UL (ref 1–4.8)
LYMPHOCYTES NFR BLD: 11.5 % (ref 18–48)
MAGNESIUM SERPL-MCNC: 2.1 MG/DL (ref 1.6–2.6)
MCH RBC QN AUTO: 26.3 PG (ref 27–31)
MCHC RBC AUTO-ENTMCNC: 28.2 G/DL (ref 32–36)
MCV RBC AUTO: 94 FL (ref 82–98)
MONOCYTES # BLD AUTO: 0.4 K/UL (ref 0.3–1)
MONOCYTES NFR BLD: 4.8 % (ref 4–15)
NEUTROPHILS # BLD AUTO: 7.1 K/UL (ref 1.8–7.7)
NEUTROPHILS NFR BLD: 81.4 % (ref 38–73)
NRBC BLD-RTO: 0 /100 WBC
PHOSPHATE SERPL-MCNC: 4 MG/DL (ref 2.7–4.5)
PLATELET # BLD AUTO: 164 K/UL (ref 150–450)
PMV BLD AUTO: 8.5 FL (ref 9.2–12.9)
POTASSIUM SERPL-SCNC: 4 MMOL/L (ref 3.5–5.1)
PROT SERPL-MCNC: 6.3 G/DL (ref 6–8.4)
RBC # BLD AUTO: 3.38 M/UL (ref 4.6–6.2)
SODIUM SERPL-SCNC: 140 MMOL/L (ref 136–145)
WBC # BLD AUTO: 8.76 K/UL (ref 3.9–12.7)

## 2021-10-27 PROCEDURE — 63600175 PHARM REV CODE 636 W HCPCS: Performed by: INTERNAL MEDICINE

## 2021-10-27 PROCEDURE — 96376 TX/PRO/DX INJ SAME DRUG ADON: CPT

## 2021-10-27 PROCEDURE — 85025 COMPLETE CBC W/AUTO DIFF WBC: CPT | Performed by: NURSE PRACTITIONER

## 2021-10-27 PROCEDURE — 80053 COMPREHEN METABOLIC PANEL: CPT | Performed by: NURSE PRACTITIONER

## 2021-10-27 PROCEDURE — 63600175 PHARM REV CODE 636 W HCPCS: Performed by: FAMILY MEDICINE

## 2021-10-27 PROCEDURE — 83690 ASSAY OF LIPASE: CPT | Performed by: NURSE PRACTITIONER

## 2021-10-27 PROCEDURE — 96375 TX/PRO/DX INJ NEW DRUG ADDON: CPT

## 2021-10-27 PROCEDURE — 84100 ASSAY OF PHOSPHORUS: CPT | Performed by: NURSE PRACTITIONER

## 2021-10-27 PROCEDURE — G0378 HOSPITAL OBSERVATION PER HR: HCPCS

## 2021-10-27 PROCEDURE — 36415 COLL VENOUS BLD VENIPUNCTURE: CPT | Performed by: NURSE PRACTITIONER

## 2021-10-27 PROCEDURE — 83735 ASSAY OF MAGNESIUM: CPT | Performed by: NURSE PRACTITIONER

## 2021-10-27 PROCEDURE — 25000003 PHARM REV CODE 250: Performed by: INTERNAL MEDICINE

## 2021-10-27 RX ORDER — HYDROMORPHONE HYDROCHLORIDE 1 MG/ML
0.5 INJECTION, SOLUTION INTRAMUSCULAR; INTRAVENOUS; SUBCUTANEOUS EVERY 6 HOURS PRN
Status: DISCONTINUED | OUTPATIENT
Start: 2021-10-27 | End: 2021-10-27

## 2021-10-27 RX ORDER — HYDROMORPHONE HYDROCHLORIDE 1 MG/ML
1 INJECTION, SOLUTION INTRAMUSCULAR; INTRAVENOUS; SUBCUTANEOUS EVERY 6 HOURS PRN
Status: DISCONTINUED | OUTPATIENT
Start: 2021-10-27 | End: 2021-10-28 | Stop reason: HOSPADM

## 2021-10-27 RX ORDER — HYDROMORPHONE HYDROCHLORIDE 1 MG/ML
0.5 INJECTION, SOLUTION INTRAMUSCULAR; INTRAVENOUS; SUBCUTANEOUS ONCE
Status: COMPLETED | OUTPATIENT
Start: 2021-10-27 | End: 2021-10-27

## 2021-10-27 RX ADMIN — MORPHINE SULFATE 4 MG: 4 INJECTION INTRAVENOUS at 06:10

## 2021-10-27 RX ADMIN — PANCRELIPASE 3 CAPSULE: 30000; 6000; 19000 CAPSULE, DELAYED RELEASE PELLETS ORAL at 12:10

## 2021-10-27 RX ADMIN — Medication 324 MG: at 08:10

## 2021-10-27 RX ADMIN — ONDANSETRON 4 MG: 2 INJECTION INTRAMUSCULAR; INTRAVENOUS at 02:10

## 2021-10-27 RX ADMIN — HYDROMORPHONE HYDROCHLORIDE 0.5 MG: 1 INJECTION, SOLUTION INTRAMUSCULAR; INTRAVENOUS; SUBCUTANEOUS at 01:10

## 2021-10-27 RX ADMIN — PANCRELIPASE 3 CAPSULE: 30000; 6000; 19000 CAPSULE, DELAYED RELEASE PELLETS ORAL at 08:10

## 2021-10-27 RX ADMIN — CARVEDILOL 6.25 MG: 6.25 TABLET, FILM COATED ORAL at 09:10

## 2021-10-27 RX ADMIN — MORPHINE SULFATE 4 MG: 4 INJECTION INTRAVENOUS at 01:10

## 2021-10-27 RX ADMIN — SODIUM CHLORIDE, SODIUM LACTATE, POTASSIUM CHLORIDE, AND CALCIUM CHLORIDE: 600; 310; 30; 20 INJECTION, SOLUTION INTRAVENOUS at 09:10

## 2021-10-27 RX ADMIN — HYDROMORPHONE HYDROCHLORIDE 1 MG: 1 INJECTION, SOLUTION INTRAMUSCULAR; INTRAVENOUS; SUBCUTANEOUS at 09:10

## 2021-10-27 RX ADMIN — CARVEDILOL 6.25 MG: 6.25 TABLET, FILM COATED ORAL at 08:10

## 2021-10-27 RX ADMIN — PANTOPRAZOLE SODIUM 40 MG: 40 TABLET, DELAYED RELEASE ORAL at 08:10

## 2021-10-27 RX ADMIN — SODIUM CHLORIDE, SODIUM LACTATE, POTASSIUM CHLORIDE, AND CALCIUM CHLORIDE: 600; 310; 30; 20 INJECTION, SOLUTION INTRAVENOUS at 12:10

## 2021-10-27 RX ADMIN — HYDROMORPHONE HYDROCHLORIDE 0.5 MG: 1 INJECTION, SOLUTION INTRAMUSCULAR; INTRAVENOUS; SUBCUTANEOUS at 03:10

## 2021-10-28 ENCOUNTER — TELEPHONE (OUTPATIENT)
Dept: GASTROENTEROLOGY | Facility: CLINIC | Age: 56
End: 2021-10-28
Payer: COMMERCIAL

## 2021-10-28 ENCOUNTER — TELEPHONE (OUTPATIENT)
Dept: SURGERY | Facility: CLINIC | Age: 56
End: 2021-10-28
Payer: COMMERCIAL

## 2021-10-28 ENCOUNTER — TELEPHONE (OUTPATIENT)
Dept: UROLOGY | Facility: CLINIC | Age: 56
End: 2021-10-28
Payer: COMMERCIAL

## 2021-10-28 VITALS
TEMPERATURE: 98 F | HEIGHT: 71 IN | RESPIRATION RATE: 14 BRPM | BODY MASS INDEX: 25.31 KG/M2 | WEIGHT: 180.75 LBS | HEART RATE: 70 BPM | SYSTOLIC BLOOD PRESSURE: 140 MMHG | OXYGEN SATURATION: 98 % | DIASTOLIC BLOOD PRESSURE: 84 MMHG

## 2021-10-28 PROBLEM — K85.90 ACUTE RECURRENT PANCREATITIS: Status: RESOLVED | Noted: 2021-09-27 | Resolved: 2021-10-28

## 2021-10-28 LAB
ALBUMIN SERPL BCP-MCNC: 3.4 G/DL (ref 3.5–5.2)
ALP SERPL-CCNC: 72 U/L (ref 55–135)
ALT SERPL W/O P-5'-P-CCNC: 13 U/L (ref 10–44)
ANION GAP SERPL CALC-SCNC: 12 MMOL/L (ref 8–16)
AST SERPL-CCNC: 16 U/L (ref 10–40)
BASOPHILS # BLD AUTO: 0.02 K/UL (ref 0–0.2)
BASOPHILS NFR BLD: 0.3 % (ref 0–1.9)
BILIRUB SERPL-MCNC: 0.4 MG/DL (ref 0.1–1)
BUN SERPL-MCNC: 11 MG/DL (ref 6–20)
CALCIUM SERPL-MCNC: 9.7 MG/DL (ref 8.7–10.5)
CHLORIDE SERPL-SCNC: 100 MMOL/L (ref 95–110)
CO2 SERPL-SCNC: 27 MMOL/L (ref 23–29)
CREAT SERPL-MCNC: 0.8 MG/DL (ref 0.5–1.4)
DIFFERENTIAL METHOD: ABNORMAL
EOSINOPHIL # BLD AUTO: 0.2 K/UL (ref 0–0.5)
EOSINOPHIL NFR BLD: 2.3 % (ref 0–8)
ERYTHROCYTE [DISTWIDTH] IN BLOOD BY AUTOMATED COUNT: 16.8 % (ref 11.5–14.5)
EST. GFR  (AFRICAN AMERICAN): >60 ML/MIN/1.73 M^2
EST. GFR  (NON AFRICAN AMERICAN): >60 ML/MIN/1.73 M^2
GLUCOSE SERPL-MCNC: 88 MG/DL (ref 70–110)
HCT VFR BLD AUTO: 30.9 % (ref 40–54)
HGB BLD-MCNC: 8.9 G/DL (ref 14–18)
IMM GRANULOCYTES # BLD AUTO: 0.03 K/UL (ref 0–0.04)
IMM GRANULOCYTES NFR BLD AUTO: 0.4 % (ref 0–0.5)
LIPASE SERPL-CCNC: 19 U/L (ref 4–60)
LYMPHOCYTES # BLD AUTO: 1 K/UL (ref 1–4.8)
LYMPHOCYTES NFR BLD: 14.4 % (ref 18–48)
MCH RBC QN AUTO: 26.3 PG (ref 27–31)
MCHC RBC AUTO-ENTMCNC: 28.8 G/DL (ref 32–36)
MCV RBC AUTO: 91 FL (ref 82–98)
MONOCYTES # BLD AUTO: 0.4 K/UL (ref 0.3–1)
MONOCYTES NFR BLD: 5.4 % (ref 4–15)
NEUTROPHILS # BLD AUTO: 5.3 K/UL (ref 1.8–7.7)
NEUTROPHILS NFR BLD: 77.2 % (ref 38–73)
NRBC BLD-RTO: 0 /100 WBC
PLATELET # BLD AUTO: 175 K/UL (ref 150–450)
PMV BLD AUTO: 8.4 FL (ref 9.2–12.9)
POTASSIUM SERPL-SCNC: 4.1 MMOL/L (ref 3.5–5.1)
PROT SERPL-MCNC: 6.3 G/DL (ref 6–8.4)
RBC # BLD AUTO: 3.38 M/UL (ref 4.6–6.2)
SODIUM SERPL-SCNC: 139 MMOL/L (ref 136–145)
WBC # BLD AUTO: 6.86 K/UL (ref 3.9–12.7)

## 2021-10-28 PROCEDURE — G0378 HOSPITAL OBSERVATION PER HR: HCPCS

## 2021-10-28 PROCEDURE — 96376 TX/PRO/DX INJ SAME DRUG ADON: CPT

## 2021-10-28 PROCEDURE — 83690 ASSAY OF LIPASE: CPT | Performed by: NURSE PRACTITIONER

## 2021-10-28 PROCEDURE — 85025 COMPLETE CBC W/AUTO DIFF WBC: CPT | Performed by: NURSE PRACTITIONER

## 2021-10-28 PROCEDURE — 80053 COMPREHEN METABOLIC PANEL: CPT | Performed by: NURSE PRACTITIONER

## 2021-10-28 PROCEDURE — 36415 COLL VENOUS BLD VENIPUNCTURE: CPT | Performed by: NURSE PRACTITIONER

## 2021-10-28 PROCEDURE — 25000003 PHARM REV CODE 250: Performed by: INTERNAL MEDICINE

## 2021-10-28 PROCEDURE — 63600175 PHARM REV CODE 636 W HCPCS: Performed by: INTERNAL MEDICINE

## 2021-10-28 PROCEDURE — 63600175 PHARM REV CODE 636 W HCPCS: Performed by: FAMILY MEDICINE

## 2021-10-28 RX ORDER — CELECOXIB 100 MG/1
100 CAPSULE ORAL 2 TIMES DAILY
Status: DISCONTINUED | OUTPATIENT
Start: 2021-10-28 | End: 2021-10-28 | Stop reason: HOSPADM

## 2021-10-28 RX ADMIN — SODIUM CHLORIDE, SODIUM LACTATE, POTASSIUM CHLORIDE, AND CALCIUM CHLORIDE: 600; 310; 30; 20 INJECTION, SOLUTION INTRAVENOUS at 03:10

## 2021-10-28 RX ADMIN — PANTOPRAZOLE SODIUM 40 MG: 40 TABLET, DELAYED RELEASE ORAL at 08:10

## 2021-10-28 RX ADMIN — PANCRELIPASE 3 CAPSULE: 30000; 6000; 19000 CAPSULE, DELAYED RELEASE PELLETS ORAL at 08:10

## 2021-10-28 RX ADMIN — Medication 324 MG: at 08:10

## 2021-10-28 RX ADMIN — CARVEDILOL 6.25 MG: 6.25 TABLET, FILM COATED ORAL at 08:10

## 2021-10-28 RX ADMIN — HYDROMORPHONE HYDROCHLORIDE 1 MG: 1 INJECTION, SOLUTION INTRAMUSCULAR; INTRAVENOUS; SUBCUTANEOUS at 03:10

## 2021-11-02 ENCOUNTER — TELEPHONE (OUTPATIENT)
Dept: GASTROENTEROLOGY | Facility: CLINIC | Age: 56
End: 2021-11-02
Payer: COMMERCIAL

## 2021-11-03 ENCOUNTER — TELEPHONE (OUTPATIENT)
Dept: SURGERY | Facility: CLINIC | Age: 56
End: 2021-11-03
Payer: COMMERCIAL

## 2021-11-04 ENCOUNTER — OFFICE VISIT (OUTPATIENT)
Dept: SURGERY | Facility: CLINIC | Age: 56
End: 2021-11-04
Payer: COMMERCIAL

## 2021-11-04 VITALS
BODY MASS INDEX: 26.88 KG/M2 | HEART RATE: 78 BPM | HEIGHT: 71 IN | TEMPERATURE: 97 F | DIASTOLIC BLOOD PRESSURE: 78 MMHG | WEIGHT: 192 LBS | SYSTOLIC BLOOD PRESSURE: 119 MMHG

## 2021-11-04 DIAGNOSIS — K85.90 ACUTE PANCREATITIS, UNSPECIFIED COMPLICATION STATUS, UNSPECIFIED PANCREATITIS TYPE: Primary | ICD-10-CM

## 2021-11-04 PROCEDURE — 99999 PR PBB SHADOW E&M-EST. PATIENT-LVL IV: CPT | Mod: PBBFAC,,, | Performed by: SURGERY

## 2021-11-04 PROCEDURE — 1160F RVW MEDS BY RX/DR IN RCRD: CPT | Mod: CPTII,S$GLB,, | Performed by: SURGERY

## 2021-11-04 PROCEDURE — 99204 PR OFFICE/OUTPT VISIT, NEW, LEVL IV, 45-59 MIN: ICD-10-PCS | Mod: S$GLB,,, | Performed by: SURGERY

## 2021-11-04 PROCEDURE — 99204 OFFICE O/P NEW MOD 45 MIN: CPT | Mod: S$GLB,,, | Performed by: SURGERY

## 2021-11-04 PROCEDURE — 3078F PR MOST RECENT DIASTOLIC BLOOD PRESSURE < 80 MM HG: ICD-10-PCS | Mod: CPTII,S$GLB,, | Performed by: SURGERY

## 2021-11-04 PROCEDURE — 1160F PR REVIEW ALL MEDS BY PRESCRIBER/CLIN PHARMACIST DOCUMENTED: ICD-10-PCS | Mod: CPTII,S$GLB,, | Performed by: SURGERY

## 2021-11-04 PROCEDURE — 3078F DIAST BP <80 MM HG: CPT | Mod: CPTII,S$GLB,, | Performed by: SURGERY

## 2021-11-04 PROCEDURE — 1159F PR MEDICATION LIST DOCUMENTED IN MEDICAL RECORD: ICD-10-PCS | Mod: CPTII,S$GLB,, | Performed by: SURGERY

## 2021-11-04 PROCEDURE — 3008F PR BODY MASS INDEX (BMI) DOCUMENTED: ICD-10-PCS | Mod: CPTII,S$GLB,, | Performed by: SURGERY

## 2021-11-04 PROCEDURE — 3008F BODY MASS INDEX DOCD: CPT | Mod: CPTII,S$GLB,, | Performed by: SURGERY

## 2021-11-04 PROCEDURE — 3074F SYST BP LT 130 MM HG: CPT | Mod: CPTII,S$GLB,, | Performed by: SURGERY

## 2021-11-04 PROCEDURE — 99999 PR PBB SHADOW E&M-EST. PATIENT-LVL IV: ICD-10-PCS | Mod: PBBFAC,,, | Performed by: SURGERY

## 2021-11-04 PROCEDURE — 1159F MED LIST DOCD IN RCRD: CPT | Mod: CPTII,S$GLB,, | Performed by: SURGERY

## 2021-11-04 PROCEDURE — 3074F PR MOST RECENT SYSTOLIC BLOOD PRESSURE < 130 MM HG: ICD-10-PCS | Mod: CPTII,S$GLB,, | Performed by: SURGERY

## 2021-11-05 ENCOUNTER — PATIENT MESSAGE (OUTPATIENT)
Dept: SURGERY | Facility: HOSPITAL | Age: 56
End: 2021-11-05
Payer: COMMERCIAL

## 2021-11-05 ENCOUNTER — TELEPHONE (OUTPATIENT)
Dept: SURGERY | Facility: CLINIC | Age: 56
End: 2021-11-05
Payer: COMMERCIAL

## 2021-11-15 ENCOUNTER — PATIENT MESSAGE (OUTPATIENT)
Dept: SURGERY | Facility: HOSPITAL | Age: 56
End: 2021-11-15
Payer: COMMERCIAL

## 2021-11-18 ENCOUNTER — TELEPHONE (OUTPATIENT)
Dept: SURGERY | Facility: CLINIC | Age: 56
End: 2021-11-18
Payer: COMMERCIAL

## 2021-11-18 ENCOUNTER — ANESTHESIA EVENT (OUTPATIENT)
Dept: SURGERY | Facility: HOSPITAL | Age: 56
DRG: 407 | End: 2021-11-18
Payer: COMMERCIAL

## 2021-11-19 ENCOUNTER — HOSPITAL ENCOUNTER (INPATIENT)
Facility: HOSPITAL | Age: 56
LOS: 5 days | Discharge: HOME | DRG: 407 | End: 2021-11-24
Attending: SURGERY | Admitting: SURGERY
Payer: COMMERCIAL

## 2021-11-19 ENCOUNTER — ANESTHESIA (OUTPATIENT)
Dept: SURGERY | Facility: HOSPITAL | Age: 56
DRG: 407 | End: 2021-11-19
Payer: COMMERCIAL

## 2021-11-19 DIAGNOSIS — K86.1 CHRONIC PANCREATITIS: Primary | ICD-10-CM

## 2021-11-19 LAB
ABO + RH BLD: NORMAL
BASOPHILS # BLD AUTO: 0.03 K/UL (ref 0–0.2)
BASOPHILS NFR BLD: 0.2 % (ref 0–1.9)
BLD GP AB SCN CELLS X3 SERPL QL: NORMAL
DIFFERENTIAL METHOD: ABNORMAL
EOSINOPHIL # BLD AUTO: 0 K/UL (ref 0–0.5)
EOSINOPHIL NFR BLD: 0 % (ref 0–8)
ERYTHROCYTE [DISTWIDTH] IN BLOOD BY AUTOMATED COUNT: 15.8 % (ref 11.5–14.5)
HCT VFR BLD AUTO: 37.8 % (ref 40–54)
HGB BLD-MCNC: 11.8 G/DL (ref 14–18)
IMM GRANULOCYTES # BLD AUTO: 0.05 K/UL (ref 0–0.04)
IMM GRANULOCYTES NFR BLD AUTO: 0.4 % (ref 0–0.5)
LYMPHOCYTES # BLD AUTO: 0.5 K/UL (ref 1–4.8)
LYMPHOCYTES NFR BLD: 4.2 % (ref 18–48)
MCH RBC QN AUTO: 27.3 PG (ref 27–31)
MCHC RBC AUTO-ENTMCNC: 31.2 G/DL (ref 32–36)
MCV RBC AUTO: 87 FL (ref 82–98)
MONOCYTES # BLD AUTO: 0.6 K/UL (ref 0.3–1)
MONOCYTES NFR BLD: 4.5 % (ref 4–15)
NEUTROPHILS # BLD AUTO: 11.1 K/UL (ref 1.8–7.7)
NEUTROPHILS NFR BLD: 90.7 % (ref 38–73)
NRBC BLD-RTO: 0 /100 WBC
PLATELET # BLD AUTO: 151 K/UL (ref 150–450)
PMV BLD AUTO: 9.5 FL (ref 9.2–12.9)
RBC # BLD AUTO: 4.33 M/UL (ref 4.6–6.2)
WBC # BLD AUTO: 12.23 K/UL (ref 3.9–12.7)

## 2021-11-19 PROCEDURE — 63600175 PHARM REV CODE 636 W HCPCS: Performed by: SURGERY

## 2021-11-19 PROCEDURE — 63600175 PHARM REV CODE 636 W HCPCS: Performed by: NURSE ANESTHETIST, CERTIFIED REGISTERED

## 2021-11-19 PROCEDURE — 71000039 HC RECOVERY, EACH ADD'L HOUR: Performed by: SURGERY

## 2021-11-19 PROCEDURE — 36000709 HC OR TIME LEV III EA ADD 15 MIN: Performed by: SURGERY

## 2021-11-19 PROCEDURE — 37000008 HC ANESTHESIA 1ST 15 MINUTES: Performed by: SURGERY

## 2021-11-19 PROCEDURE — 37000009 HC ANESTHESIA EA ADD 15 MINS: Performed by: SURGERY

## 2021-11-19 PROCEDURE — C1765 ADHESION BARRIER: HCPCS | Performed by: SURGERY

## 2021-11-19 PROCEDURE — 63600175 PHARM REV CODE 636 W HCPCS: Performed by: STUDENT IN AN ORGANIZED HEALTH CARE EDUCATION/TRAINING PROGRAM

## 2021-11-19 PROCEDURE — D9220A PRA ANESTHESIA: Mod: ,,, | Performed by: NURSE ANESTHETIST, CERTIFIED REGISTERED

## 2021-11-19 PROCEDURE — 25000003 PHARM REV CODE 250: Performed by: SURGERY

## 2021-11-19 PROCEDURE — 25000003 PHARM REV CODE 250: Performed by: NURSE ANESTHETIST, CERTIFIED REGISTERED

## 2021-11-19 PROCEDURE — D9220A PRA ANESTHESIA: Mod: ,,, | Performed by: ANESTHESIOLOGY

## 2021-11-19 PROCEDURE — 63600175 PHARM REV CODE 636 W HCPCS

## 2021-11-19 PROCEDURE — 71000015 HC POSTOP RECOV 1ST HR: Performed by: SURGERY

## 2021-11-19 PROCEDURE — 36000708 HC OR TIME LEV III 1ST 15 MIN: Performed by: SURGERY

## 2021-11-19 PROCEDURE — 94640 AIRWAY INHALATION TREATMENT: CPT

## 2021-11-19 PROCEDURE — D9220A PRA ANESTHESIA: ICD-10-PCS | Mod: ,,, | Performed by: NURSE ANESTHETIST, CERTIFIED REGISTERED

## 2021-11-19 PROCEDURE — 85025 COMPLETE CBC W/AUTO DIFF WBC: CPT | Performed by: STUDENT IN AN ORGANIZED HEALTH CARE EDUCATION/TRAINING PROGRAM

## 2021-11-19 PROCEDURE — 48548 FUSE PANCREAS AND BOWEL: CPT | Mod: ,,, | Performed by: SURGERY

## 2021-11-19 PROCEDURE — C1729 CATH, DRAINAGE: HCPCS | Performed by: SURGERY

## 2021-11-19 PROCEDURE — 94761 N-INVAS EAR/PLS OXIMETRY MLT: CPT

## 2021-11-19 PROCEDURE — 27201423 OPTIME MED/SURG SUP & DEVICES STERILE SUPPLY: Performed by: SURGERY

## 2021-11-19 PROCEDURE — 86900 BLOOD TYPING SEROLOGIC ABO: CPT | Performed by: STUDENT IN AN ORGANIZED HEALTH CARE EDUCATION/TRAINING PROGRAM

## 2021-11-19 PROCEDURE — 36415 COLL VENOUS BLD VENIPUNCTURE: CPT | Performed by: SURGERY

## 2021-11-19 PROCEDURE — 12000002 HC ACUTE/MED SURGE SEMI-PRIVATE ROOM

## 2021-11-19 PROCEDURE — 48548: ICD-10-PCS | Mod: ,,, | Performed by: SURGERY

## 2021-11-19 PROCEDURE — 25000003 PHARM REV CODE 250: Performed by: ANESTHESIOLOGY

## 2021-11-19 PROCEDURE — 71000033 HC RECOVERY, INTIAL HOUR: Performed by: SURGERY

## 2021-11-19 PROCEDURE — 86920 COMPATIBILITY TEST SPIN: CPT | Performed by: SURGERY

## 2021-11-19 PROCEDURE — 25000003 PHARM REV CODE 250

## 2021-11-19 PROCEDURE — 25000242 PHARM REV CODE 250 ALT 637 W/ HCPCS: Performed by: STUDENT IN AN ORGANIZED HEALTH CARE EDUCATION/TRAINING PROGRAM

## 2021-11-19 PROCEDURE — D9220A PRA ANESTHESIA: ICD-10-PCS | Mod: ,,, | Performed by: ANESTHESIOLOGY

## 2021-11-19 DEVICE — BARRIER SEPRAFILM ADHESION: Type: IMPLANTABLE DEVICE | Site: ABDOMEN | Status: FUNCTIONAL

## 2021-11-19 RX ORDER — KETAMINE HCL IN 0.9 % NACL 50 MG/5 ML
SYRINGE (ML) INTRAVENOUS
Status: DISCONTINUED | OUTPATIENT
Start: 2021-11-19 | End: 2021-11-19

## 2021-11-19 RX ORDER — LABETALOL HCL 20 MG/4 ML
SYRINGE (ML) INTRAVENOUS
Status: DISPENSED
Start: 2021-11-19 | End: 2021-11-20

## 2021-11-19 RX ORDER — HYDRALAZINE HYDROCHLORIDE 20 MG/ML
5 INJECTION INTRAMUSCULAR; INTRAVENOUS ONCE
Status: COMPLETED | OUTPATIENT
Start: 2021-11-19 | End: 2021-11-19

## 2021-11-19 RX ORDER — LABETALOL HCL 20 MG/4 ML
SYRINGE (ML) INTRAVENOUS
Status: COMPLETED
Start: 2021-11-19 | End: 2021-11-19

## 2021-11-19 RX ORDER — LEVALBUTEROL INHALATION SOLUTION 0.63 MG/3ML
0.63 SOLUTION RESPIRATORY (INHALATION)
Status: COMPLETED | OUTPATIENT
Start: 2021-11-19 | End: 2021-11-21

## 2021-11-19 RX ORDER — HYDRALAZINE HYDROCHLORIDE 20 MG/ML
10 INJECTION INTRAMUSCULAR; INTRAVENOUS ONCE
Status: COMPLETED | OUTPATIENT
Start: 2021-11-19 | End: 2021-11-19

## 2021-11-19 RX ORDER — LABETALOL HCL 20 MG/4 ML
5 SYRINGE (ML) INTRAVENOUS ONCE
Status: COMPLETED | OUTPATIENT
Start: 2021-11-19 | End: 2021-11-19

## 2021-11-19 RX ORDER — VECURONIUM BROMIDE FOR INJECTION 1 MG/ML
INJECTION, POWDER, LYOPHILIZED, FOR SOLUTION INTRAVENOUS
Status: DISCONTINUED | OUTPATIENT
Start: 2021-11-19 | End: 2021-11-19

## 2021-11-19 RX ORDER — ACETAMINOPHEN 500 MG
1000 TABLET ORAL ONCE
Status: COMPLETED | OUTPATIENT
Start: 2021-11-19 | End: 2021-11-19

## 2021-11-19 RX ORDER — TALC
6 POWDER (GRAM) TOPICAL NIGHTLY PRN
Status: DISCONTINUED | OUTPATIENT
Start: 2021-11-19 | End: 2021-11-24 | Stop reason: HOSPADM

## 2021-11-19 RX ORDER — ACETAMINOPHEN 325 MG/1
650 TABLET ORAL EVERY 8 HOURS PRN
Status: DISCONTINUED | OUTPATIENT
Start: 2021-11-19 | End: 2021-11-24 | Stop reason: HOSPADM

## 2021-11-19 RX ORDER — ONDANSETRON 2 MG/ML
INJECTION INTRAMUSCULAR; INTRAVENOUS
Status: DISCONTINUED | OUTPATIENT
Start: 2021-11-19 | End: 2021-11-19

## 2021-11-19 RX ORDER — ENOXAPARIN SODIUM 100 MG/ML
40 INJECTION SUBCUTANEOUS EVERY 24 HOURS
Status: DISCONTINUED | OUTPATIENT
Start: 2021-11-20 | End: 2021-11-24 | Stop reason: HOSPADM

## 2021-11-19 RX ORDER — MIDAZOLAM HYDROCHLORIDE 1 MG/ML
INJECTION, SOLUTION INTRAMUSCULAR; INTRAVENOUS
Status: DISCONTINUED | OUTPATIENT
Start: 2021-11-19 | End: 2021-11-19

## 2021-11-19 RX ORDER — CARVEDILOL 6.25 MG/1
6.25 TABLET ORAL 2 TIMES DAILY
Status: DISCONTINUED | OUTPATIENT
Start: 2021-11-19 | End: 2021-11-24 | Stop reason: HOSPADM

## 2021-11-19 RX ORDER — PROPOFOL 10 MG/ML
VIAL (ML) INTRAVENOUS
Status: DISCONTINUED | OUTPATIENT
Start: 2021-11-19 | End: 2021-11-19

## 2021-11-19 RX ORDER — NALOXONE HCL 0.4 MG/ML
0.02 VIAL (ML) INJECTION
Status: DISCONTINUED | OUTPATIENT
Start: 2021-11-19 | End: 2021-11-20

## 2021-11-19 RX ORDER — SODIUM CHLORIDE 0.9 % (FLUSH) 0.9 %
3 SYRINGE (ML) INJECTION
Status: DISCONTINUED | OUTPATIENT
Start: 2021-11-19 | End: 2021-11-19

## 2021-11-19 RX ORDER — HYDRALAZINE HYDROCHLORIDE 20 MG/ML
INJECTION INTRAMUSCULAR; INTRAVENOUS
Status: COMPLETED
Start: 2021-11-19 | End: 2021-11-19

## 2021-11-19 RX ORDER — ONDANSETRON 8 MG/1
8 TABLET, ORALLY DISINTEGRATING ORAL EVERY 8 HOURS PRN
Status: DISCONTINUED | OUTPATIENT
Start: 2021-11-19 | End: 2021-11-24 | Stop reason: HOSPADM

## 2021-11-19 RX ORDER — LABETALOL HCL 20 MG/4 ML
10 SYRINGE (ML) INTRAVENOUS ONCE
Status: COMPLETED | OUTPATIENT
Start: 2021-11-19 | End: 2021-11-19

## 2021-11-19 RX ORDER — PHENYLEPHRINE HYDROCHLORIDE 10 MG/ML
INJECTION INTRAVENOUS
Status: DISCONTINUED | OUTPATIENT
Start: 2021-11-19 | End: 2021-11-19

## 2021-11-19 RX ORDER — DIPHENHYDRAMINE HYDROCHLORIDE 50 MG/ML
INJECTION INTRAMUSCULAR; INTRAVENOUS
Status: DISCONTINUED | OUTPATIENT
Start: 2021-11-19 | End: 2021-11-19

## 2021-11-19 RX ORDER — FENTANYL CITRATE 50 UG/ML
25 INJECTION, SOLUTION INTRAMUSCULAR; INTRAVENOUS EVERY 5 MIN PRN
Status: DISCONTINUED | OUTPATIENT
Start: 2021-11-19 | End: 2021-11-20 | Stop reason: HOSPADM

## 2021-11-19 RX ORDER — LABETALOL HYDROCHLORIDE 5 MG/ML
20 INJECTION, SOLUTION INTRAVENOUS ONCE
Status: COMPLETED | OUTPATIENT
Start: 2021-11-19 | End: 2021-11-19

## 2021-11-19 RX ORDER — ROCURONIUM BROMIDE 10 MG/ML
INJECTION, SOLUTION INTRAVENOUS
Status: DISCONTINUED | OUTPATIENT
Start: 2021-11-19 | End: 2021-11-19

## 2021-11-19 RX ORDER — LABETALOL HYDROCHLORIDE 5 MG/ML
10 INJECTION, SOLUTION INTRAVENOUS
Status: COMPLETED | OUTPATIENT
Start: 2021-11-19 | End: 2021-11-19

## 2021-11-19 RX ORDER — LIDOCAINE HYDROCHLORIDE 10 MG/ML
1 INJECTION, SOLUTION EPIDURAL; INFILTRATION; INTRACAUDAL; PERINEURAL ONCE
Status: COMPLETED | OUTPATIENT
Start: 2021-11-19 | End: 2021-11-19

## 2021-11-19 RX ORDER — PANTOPRAZOLE SODIUM 40 MG/1
40 TABLET, DELAYED RELEASE ORAL DAILY
Status: DISCONTINUED | OUTPATIENT
Start: 2021-11-20 | End: 2021-11-24 | Stop reason: HOSPADM

## 2021-11-19 RX ORDER — CARVEDILOL 3.12 MG/1
6.25 TABLET ORAL 2 TIMES DAILY
Status: DISCONTINUED | OUTPATIENT
Start: 2021-11-20 | End: 2021-11-19

## 2021-11-19 RX ORDER — HYDROMORPHONE HYDROCHLORIDE 1 MG/ML
INJECTION, SOLUTION INTRAMUSCULAR; INTRAVENOUS; SUBCUTANEOUS
Status: COMPLETED
Start: 2021-11-19 | End: 2021-11-19

## 2021-11-19 RX ORDER — HYDROMORPHONE HCL IN 0.9% NACL 6 MG/30 ML
PATIENT CONTROLLED ANALGESIA SYRINGE INTRAVENOUS CONTINUOUS
Status: DISCONTINUED | OUTPATIENT
Start: 2021-11-19 | End: 2021-11-20

## 2021-11-19 RX ORDER — CEFAZOLIN SODIUM 1 G/3ML
2 INJECTION, POWDER, FOR SOLUTION INTRAMUSCULAR; INTRAVENOUS
Status: COMPLETED | OUTPATIENT
Start: 2021-11-19 | End: 2021-11-19

## 2021-11-19 RX ORDER — DEXAMETHASONE SODIUM PHOSPHATE 4 MG/ML
INJECTION, SOLUTION INTRA-ARTICULAR; INTRALESIONAL; INTRAMUSCULAR; INTRAVENOUS; SOFT TISSUE
Status: DISCONTINUED | OUTPATIENT
Start: 2021-11-19 | End: 2021-11-19

## 2021-11-19 RX ORDER — ONDANSETRON 2 MG/ML
4 INJECTION INTRAMUSCULAR; INTRAVENOUS DAILY PRN
Status: DISCONTINUED | OUTPATIENT
Start: 2021-11-19 | End: 2021-11-20 | Stop reason: HOSPADM

## 2021-11-19 RX ORDER — HYDROMORPHONE HCL IN 0.9% NACL 6 MG/30 ML
PATIENT CONTROLLED ANALGESIA SYRINGE INTRAVENOUS
Status: COMPLETED
Start: 2021-11-19 | End: 2021-11-19

## 2021-11-19 RX ORDER — FENTANYL CITRATE 50 UG/ML
INJECTION, SOLUTION INTRAMUSCULAR; INTRAVENOUS
Status: DISCONTINUED | OUTPATIENT
Start: 2021-11-19 | End: 2021-11-19

## 2021-11-19 RX ORDER — ACETAMINOPHEN 10 MG/ML
INJECTION, SOLUTION INTRAVENOUS
Status: DISCONTINUED | OUTPATIENT
Start: 2021-11-19 | End: 2021-11-19

## 2021-11-19 RX ORDER — HYDROMORPHONE HYDROCHLORIDE 1 MG/ML
0.2 INJECTION, SOLUTION INTRAMUSCULAR; INTRAVENOUS; SUBCUTANEOUS EVERY 5 MIN PRN
Status: COMPLETED | OUTPATIENT
Start: 2021-11-19 | End: 2021-11-19

## 2021-11-19 RX ORDER — SODIUM CHLORIDE 0.9 % (FLUSH) 0.9 %
10 SYRINGE (ML) INJECTION
Status: DISCONTINUED | OUTPATIENT
Start: 2021-11-19 | End: 2021-11-24 | Stop reason: HOSPADM

## 2021-11-19 RX ORDER — LIDOCAINE HYDROCHLORIDE 20 MG/ML
INJECTION INTRAVENOUS
Status: DISCONTINUED | OUTPATIENT
Start: 2021-11-19 | End: 2021-11-19

## 2021-11-19 RX ORDER — SODIUM CHLORIDE 9 MG/ML
INJECTION, SOLUTION INTRAVENOUS CONTINUOUS
Status: DISCONTINUED | OUTPATIENT
Start: 2021-11-19 | End: 2021-11-19

## 2021-11-19 RX ADMIN — LIDOCAINE HYDROCHLORIDE 10 MG: 10 INJECTION, SOLUTION EPIDURAL; INFILTRATION; INTRACAUDAL at 12:11

## 2021-11-19 RX ADMIN — FENTANYL CITRATE 50 MCG: 50 INJECTION, SOLUTION INTRAMUSCULAR; INTRAVENOUS at 06:11

## 2021-11-19 RX ADMIN — HYDROMORPHONE HYDROCHLORIDE 0.2 MG: 1 INJECTION, SOLUTION INTRAMUSCULAR; INTRAVENOUS; SUBCUTANEOUS at 08:11

## 2021-11-19 RX ADMIN — VECURONIUM BROMIDE FOR INJECTION 4 MG: 1 INJECTION, POWDER, LYOPHILIZED, FOR SOLUTION INTRAVENOUS at 06:11

## 2021-11-19 RX ADMIN — ACETAMINOPHEN 1000 MG: 10 INJECTION, SOLUTION INTRAVENOUS at 03:11

## 2021-11-19 RX ADMIN — Medication 10 MG: at 05:11

## 2021-11-19 RX ADMIN — PHENYLEPHRINE HYDROCHLORIDE 100 MCG: 10 INJECTION INTRAVENOUS at 05:11

## 2021-11-19 RX ADMIN — CEFAZOLIN 2 G: 330 INJECTION, POWDER, FOR SOLUTION INTRAMUSCULAR; INTRAVENOUS at 06:11

## 2021-11-19 RX ADMIN — CEFAZOLIN 2 G: 330 INJECTION, POWDER, FOR SOLUTION INTRAMUSCULAR; INTRAVENOUS at 02:11

## 2021-11-19 RX ADMIN — ONDANSETRON 4 MG: 2 INJECTION INTRAMUSCULAR; INTRAVENOUS at 06:11

## 2021-11-19 RX ADMIN — FENTANYL CITRATE 100 MCG: 50 INJECTION, SOLUTION INTRAMUSCULAR; INTRAVENOUS at 02:11

## 2021-11-19 RX ADMIN — DIPHENHYDRAMINE HYDROCHLORIDE 50 MG: 50 INJECTION INTRAMUSCULAR; INTRAVENOUS at 02:11

## 2021-11-19 RX ADMIN — LIDOCAINE HYDROCHLORIDE 100 MG: 20 INJECTION, SOLUTION INTRAVENOUS at 02:11

## 2021-11-19 RX ADMIN — HYDROMORPHONE HYDROCHLORIDE 0.2 MG: 1 INJECTION, SOLUTION INTRAMUSCULAR; INTRAVENOUS; SUBCUTANEOUS at 07:11

## 2021-11-19 RX ADMIN — HYDRALAZINE HYDROCHLORIDE 5 MG: 20 INJECTION, SOLUTION INTRAMUSCULAR; INTRAVENOUS at 08:11

## 2021-11-19 RX ADMIN — VECURONIUM BROMIDE FOR INJECTION 2 MG: 1 INJECTION, POWDER, LYOPHILIZED, FOR SOLUTION INTRAVENOUS at 05:11

## 2021-11-19 RX ADMIN — LABETALOL HYDROCHLORIDE 10 MG: 5 INJECTION, SOLUTION INTRAVENOUS at 08:11

## 2021-11-19 RX ADMIN — LABETALOL HYDROCHLORIDE 10 MG: 5 INJECTION, SOLUTION INTRAVENOUS at 10:11

## 2021-11-19 RX ADMIN — LABETALOL HYDROCHLORIDE 20 MG: 5 INJECTION, SOLUTION INTRAVENOUS at 10:11

## 2021-11-19 RX ADMIN — MIDAZOLAM 2 MG: 1 INJECTION INTRAMUSCULAR; INTRAVENOUS at 02:11

## 2021-11-19 RX ADMIN — ROCURONIUM BROMIDE 30 MG: 10 INJECTION, SOLUTION INTRAVENOUS at 02:11

## 2021-11-19 RX ADMIN — LEVALBUTEROL HYDROCHLORIDE 0.63 MG: 0.63 SOLUTION RESPIRATORY (INHALATION) at 07:11

## 2021-11-19 RX ADMIN — Medication 30 MG: at 02:11

## 2021-11-19 RX ADMIN — Medication: at 08:11

## 2021-11-19 RX ADMIN — ROCURONIUM BROMIDE 20 MG: 10 INJECTION, SOLUTION INTRAVENOUS at 03:11

## 2021-11-19 RX ADMIN — ROCURONIUM BROMIDE 20 MG: 10 INJECTION, SOLUTION INTRAVENOUS at 02:11

## 2021-11-19 RX ADMIN — HYDRALAZINE HYDROCHLORIDE 10 MG: 20 INJECTION INTRAMUSCULAR; INTRAVENOUS at 08:11

## 2021-11-19 RX ADMIN — SUGAMMADEX 200 MG: 100 INJECTION, SOLUTION INTRAVENOUS at 07:11

## 2021-11-19 RX ADMIN — VECURONIUM BROMIDE FOR INJECTION 4 MG: 1 INJECTION, POWDER, LYOPHILIZED, FOR SOLUTION INTRAVENOUS at 05:11

## 2021-11-19 RX ADMIN — PHENYLEPHRINE HYDROCHLORIDE 100 MCG: 10 INJECTION INTRAVENOUS at 04:11

## 2021-11-19 RX ADMIN — PROPOFOL 150 MG: 10 INJECTION, EMULSION INTRAVENOUS at 02:11

## 2021-11-19 RX ADMIN — METHOCARBAMOL 1000 MG: 100 INJECTION INTRAMUSCULAR; INTRAVENOUS at 09:11

## 2021-11-19 RX ADMIN — SODIUM CHLORIDE: 0.9 INJECTION, SOLUTION INTRAVENOUS at 02:11

## 2021-11-19 RX ADMIN — Medication 10 MG: at 04:11

## 2021-11-19 RX ADMIN — Medication 5 MG: at 07:11

## 2021-11-19 RX ADMIN — HYDRALAZINE HYDROCHLORIDE 10 MG: 20 INJECTION, SOLUTION INTRAMUSCULAR; INTRAVENOUS at 08:11

## 2021-11-19 RX ADMIN — LABETALOL HYDROCHLORIDE 5 MG: 5 INJECTION, SOLUTION INTRAVENOUS at 07:11

## 2021-11-19 RX ADMIN — Medication 10 MG: at 10:11

## 2021-11-19 RX ADMIN — DEXAMETHASONE SODIUM PHOSPHATE 8 MG: 4 INJECTION, SOLUTION INTRAMUSCULAR; INTRAVENOUS at 03:11

## 2021-11-19 RX ADMIN — LABETALOL HYDROCHLORIDE 10 MG: 5 INJECTION, SOLUTION INTRAVENOUS at 09:11

## 2021-11-19 RX ADMIN — CARVEDILOL 6.25 MG: 6.25 TABLET, FILM COATED ORAL at 11:11

## 2021-11-19 RX ADMIN — Medication: at 11:11

## 2021-11-19 RX ADMIN — ACETAMINOPHEN 1000 MG: 500 TABLET ORAL at 09:11

## 2021-11-19 RX ADMIN — ROCURONIUM BROMIDE 30 MG: 10 INJECTION, SOLUTION INTRAVENOUS at 04:11

## 2021-11-19 RX ADMIN — PHENYLEPHRINE HYDROCHLORIDE 100 MCG: 10 INJECTION INTRAVENOUS at 03:11

## 2021-11-20 LAB
ALBUMIN SERPL BCP-MCNC: 3.8 G/DL (ref 3.5–5.2)
ALP SERPL-CCNC: 64 U/L (ref 55–135)
ALT SERPL W/O P-5'-P-CCNC: 28 U/L (ref 10–44)
ANION GAP SERPL CALC-SCNC: 9 MMOL/L (ref 8–16)
AST SERPL-CCNC: 25 U/L (ref 10–40)
BASOPHILS # BLD AUTO: 0.02 K/UL (ref 0–0.2)
BASOPHILS NFR BLD: 0.1 % (ref 0–1.9)
BILIRUB SERPL-MCNC: 0.6 MG/DL (ref 0.1–1)
BUN SERPL-MCNC: 18 MG/DL (ref 6–20)
CALCIUM SERPL-MCNC: 8.8 MG/DL (ref 8.7–10.5)
CHLORIDE SERPL-SCNC: 104 MMOL/L (ref 95–110)
CO2 SERPL-SCNC: 24 MMOL/L (ref 23–29)
CREAT SERPL-MCNC: 0.8 MG/DL (ref 0.5–1.4)
DIFFERENTIAL METHOD: ABNORMAL
EOSINOPHIL # BLD AUTO: 0 K/UL (ref 0–0.5)
EOSINOPHIL NFR BLD: 0 % (ref 0–8)
ERYTHROCYTE [DISTWIDTH] IN BLOOD BY AUTOMATED COUNT: 15.9 % (ref 11.5–14.5)
EST. GFR  (AFRICAN AMERICAN): >60 ML/MIN/1.73 M^2
EST. GFR  (NON AFRICAN AMERICAN): >60 ML/MIN/1.73 M^2
GLUCOSE SERPL-MCNC: 139 MG/DL (ref 70–110)
HCT VFR BLD AUTO: 38.6 % (ref 40–54)
HGB BLD-MCNC: 11.8 G/DL (ref 14–18)
IMM GRANULOCYTES # BLD AUTO: 0.06 K/UL (ref 0–0.04)
IMM GRANULOCYTES NFR BLD AUTO: 0.4 % (ref 0–0.5)
LYMPHOCYTES # BLD AUTO: 0.9 K/UL (ref 1–4.8)
LYMPHOCYTES NFR BLD: 6.6 % (ref 18–48)
MAGNESIUM SERPL-MCNC: 2 MG/DL (ref 1.6–2.6)
MCH RBC QN AUTO: 26.9 PG (ref 27–31)
MCHC RBC AUTO-ENTMCNC: 30.6 G/DL (ref 32–36)
MCV RBC AUTO: 88 FL (ref 82–98)
MONOCYTES # BLD AUTO: 1.2 K/UL (ref 0.3–1)
MONOCYTES NFR BLD: 8 % (ref 4–15)
NEUTROPHILS # BLD AUTO: 12.1 K/UL (ref 1.8–7.7)
NEUTROPHILS NFR BLD: 84.9 % (ref 38–73)
NRBC BLD-RTO: 0 /100 WBC
PHOSPHATE SERPL-MCNC: 4.6 MG/DL (ref 2.7–4.5)
PLATELET # BLD AUTO: 196 K/UL (ref 150–450)
PMV BLD AUTO: 9.3 FL (ref 9.2–12.9)
POTASSIUM SERPL-SCNC: 4.8 MMOL/L (ref 3.5–5.1)
PROT SERPL-MCNC: 6.2 G/DL (ref 6–8.4)
RBC # BLD AUTO: 4.38 M/UL (ref 4.6–6.2)
SODIUM SERPL-SCNC: 137 MMOL/L (ref 136–145)
WBC # BLD AUTO: 14.3 K/UL (ref 3.9–12.7)

## 2021-11-20 PROCEDURE — 94761 N-INVAS EAR/PLS OXIMETRY MLT: CPT

## 2021-11-20 PROCEDURE — 25000242 PHARM REV CODE 250 ALT 637 W/ HCPCS: Performed by: STUDENT IN AN ORGANIZED HEALTH CARE EDUCATION/TRAINING PROGRAM

## 2021-11-20 PROCEDURE — 63600175 PHARM REV CODE 636 W HCPCS

## 2021-11-20 PROCEDURE — 63600175 PHARM REV CODE 636 W HCPCS: Performed by: STUDENT IN AN ORGANIZED HEALTH CARE EDUCATION/TRAINING PROGRAM

## 2021-11-20 PROCEDURE — 94664 DEMO&/EVAL PT USE INHALER: CPT

## 2021-11-20 PROCEDURE — 99900035 HC TECH TIME PER 15 MIN (STAT)

## 2021-11-20 PROCEDURE — 83735 ASSAY OF MAGNESIUM: CPT | Performed by: STUDENT IN AN ORGANIZED HEALTH CARE EDUCATION/TRAINING PROGRAM

## 2021-11-20 PROCEDURE — 25000003 PHARM REV CODE 250

## 2021-11-20 PROCEDURE — 27000173 HC ACAPELLA DEVICE DH OR DM

## 2021-11-20 PROCEDURE — 20600001 HC STEP DOWN PRIVATE ROOM

## 2021-11-20 PROCEDURE — 25000003 PHARM REV CODE 250: Performed by: STUDENT IN AN ORGANIZED HEALTH CARE EDUCATION/TRAINING PROGRAM

## 2021-11-20 PROCEDURE — 84100 ASSAY OF PHOSPHORUS: CPT | Performed by: STUDENT IN AN ORGANIZED HEALTH CARE EDUCATION/TRAINING PROGRAM

## 2021-11-20 PROCEDURE — 85025 COMPLETE CBC W/AUTO DIFF WBC: CPT | Performed by: STUDENT IN AN ORGANIZED HEALTH CARE EDUCATION/TRAINING PROGRAM

## 2021-11-20 PROCEDURE — 80053 COMPREHEN METABOLIC PANEL: CPT | Performed by: STUDENT IN AN ORGANIZED HEALTH CARE EDUCATION/TRAINING PROGRAM

## 2021-11-20 PROCEDURE — 94640 AIRWAY INHALATION TREATMENT: CPT

## 2021-11-20 PROCEDURE — 94660 CPAP INITIATION&MGMT: CPT

## 2021-11-20 PROCEDURE — 97165 OT EVAL LOW COMPLEX 30 MIN: CPT

## 2021-11-20 PROCEDURE — 27000646 HC AEROBIKA DEVICE

## 2021-11-20 PROCEDURE — 94799 UNLISTED PULMONARY SVC/PX: CPT

## 2021-11-20 PROCEDURE — 25000242 PHARM REV CODE 250 ALT 637 W/ HCPCS

## 2021-11-20 RX ORDER — HYDROMORPHONE HCL IN 0.9% NACL 6 MG/30 ML
PATIENT CONTROLLED ANALGESIA SYRINGE INTRAVENOUS CONTINUOUS
Status: DISCONTINUED | OUTPATIENT
Start: 2021-11-20 | End: 2021-11-20

## 2021-11-20 RX ORDER — KETOROLAC TROMETHAMINE 30 MG/ML
15 INJECTION, SOLUTION INTRAMUSCULAR; INTRAVENOUS EVERY 6 HOURS
Status: DISPENSED | OUTPATIENT
Start: 2021-11-20 | End: 2021-11-23

## 2021-11-20 RX ORDER — OXYCODONE HYDROCHLORIDE 5 MG/1
5 TABLET ORAL EVERY 4 HOURS PRN
Status: DISCONTINUED | OUTPATIENT
Start: 2021-11-20 | End: 2021-11-21

## 2021-11-20 RX ORDER — GABAPENTIN 300 MG/1
300 CAPSULE ORAL 3 TIMES DAILY
Status: DISCONTINUED | OUTPATIENT
Start: 2021-11-20 | End: 2021-11-24 | Stop reason: HOSPADM

## 2021-11-20 RX ORDER — OXYCODONE HCL 5 MG/5 ML
10 SOLUTION, ORAL ORAL EVERY 4 HOURS PRN
Status: DISCONTINUED | OUTPATIENT
Start: 2021-11-20 | End: 2021-11-20

## 2021-11-20 RX ORDER — HYDROMORPHONE HYDROCHLORIDE 1 MG/ML
0.5 INJECTION, SOLUTION INTRAMUSCULAR; INTRAVENOUS; SUBCUTANEOUS EVERY 4 HOURS PRN
Status: DISCONTINUED | OUTPATIENT
Start: 2021-11-20 | End: 2021-11-21

## 2021-11-20 RX ORDER — SODIUM CHLORIDE, SODIUM LACTATE, POTASSIUM CHLORIDE, CALCIUM CHLORIDE 600; 310; 30; 20 MG/100ML; MG/100ML; MG/100ML; MG/100ML
INJECTION, SOLUTION INTRAVENOUS CONTINUOUS
Status: DISCONTINUED | OUTPATIENT
Start: 2021-11-20 | End: 2021-11-23

## 2021-11-20 RX ORDER — NALOXONE HCL 0.4 MG/ML
0.02 VIAL (ML) INJECTION
Status: DISCONTINUED | OUTPATIENT
Start: 2021-11-20 | End: 2021-11-24 | Stop reason: HOSPADM

## 2021-11-20 RX ORDER — OXYCODONE HYDROCHLORIDE 10 MG/1
10 TABLET ORAL EVERY 4 HOURS PRN
Status: DISCONTINUED | OUTPATIENT
Start: 2021-11-20 | End: 2021-11-21

## 2021-11-20 RX ADMIN — METHOCARBAMOL 500 MG: 100 INJECTION, SOLUTION INTRAMUSCULAR; INTRAVENOUS at 09:11

## 2021-11-20 RX ADMIN — METHOCARBAMOL 500 MG: 100 INJECTION, SOLUTION INTRAMUSCULAR; INTRAVENOUS at 11:11

## 2021-11-20 RX ADMIN — HYDROMORPHONE HYDROCHLORIDE 0.5 MG: 1 INJECTION, SOLUTION INTRAMUSCULAR; INTRAVENOUS; SUBCUTANEOUS at 02:11

## 2021-11-20 RX ADMIN — SODIUM CHLORIDE, SODIUM LACTATE, POTASSIUM CHLORIDE, AND CALCIUM CHLORIDE 1000 ML: .6; .31; .03; .02 INJECTION, SOLUTION INTRAVENOUS at 06:11

## 2021-11-20 RX ADMIN — Medication: at 05:11

## 2021-11-20 RX ADMIN — OXYCODONE HYDROCHLORIDE 10 MG: 10 TABLET ORAL at 09:11

## 2021-11-20 RX ADMIN — LEVALBUTEROL HYDROCHLORIDE 0.63 MG: 0.63 SOLUTION RESPIRATORY (INHALATION) at 08:11

## 2021-11-20 RX ADMIN — OXYCODONE HYDROCHLORIDE 10 MG: 10 TABLET ORAL at 01:11

## 2021-11-20 RX ADMIN — PANCRELIPASE 1 CAPSULE: 24000; 76000; 120000 CAPSULE, DELAYED RELEASE PELLETS ORAL at 05:11

## 2021-11-20 RX ADMIN — OXYCODONE HYDROCHLORIDE 10 MG: 10 TABLET ORAL at 05:11

## 2021-11-20 RX ADMIN — HYDROMORPHONE HYDROCHLORIDE 0.5 MG: 1 INJECTION, SOLUTION INTRAMUSCULAR; INTRAVENOUS; SUBCUTANEOUS at 06:11

## 2021-11-20 RX ADMIN — KETOROLAC TROMETHAMINE 15 MG: 30 INJECTION, SOLUTION INTRAMUSCULAR at 05:11

## 2021-11-20 RX ADMIN — HYDROMORPHONE HYDROCHLORIDE 0.5 MG: 1 INJECTION, SOLUTION INTRAMUSCULAR; INTRAVENOUS; SUBCUTANEOUS at 10:11

## 2021-11-20 RX ADMIN — ONDANSETRON 8 MG: 8 TABLET, ORALLY DISINTEGRATING ORAL at 02:11

## 2021-11-20 RX ADMIN — LEVALBUTEROL HYDROCHLORIDE 0.63 MG: 0.63 SOLUTION RESPIRATORY (INHALATION) at 07:11

## 2021-11-20 RX ADMIN — PROMETHAZINE HYDROCHLORIDE 12.5 MG: 25 INJECTION INTRAMUSCULAR; INTRAVENOUS at 08:11

## 2021-11-20 RX ADMIN — GABAPENTIN 300 MG: 300 CAPSULE ORAL at 08:11

## 2021-11-20 RX ADMIN — KETOROLAC TROMETHAMINE 15 MG: 30 INJECTION, SOLUTION INTRAMUSCULAR at 10:11

## 2021-11-20 RX ADMIN — CARVEDILOL 6.25 MG: 6.25 TABLET, FILM COATED ORAL at 08:11

## 2021-11-20 RX ADMIN — ENOXAPARIN SODIUM 40 MG: 40 INJECTION SUBCUTANEOUS at 06:11

## 2021-11-20 RX ADMIN — OXYCODONE HYDROCHLORIDE 10 MG: 5 SOLUTION ORAL at 10:11

## 2021-11-20 RX ADMIN — PANTOPRAZOLE SODIUM 40 MG: 40 TABLET, DELAYED RELEASE ORAL at 08:11

## 2021-11-20 RX ADMIN — LEVALBUTEROL HYDROCHLORIDE 0.63 MG: 0.63 SOLUTION RESPIRATORY (INHALATION) at 01:11

## 2021-11-20 RX ADMIN — SODIUM CHLORIDE, SODIUM LACTATE, POTASSIUM CHLORIDE, AND CALCIUM CHLORIDE: .6; .31; .03; .02 INJECTION, SOLUTION INTRAVENOUS at 09:11

## 2021-11-20 RX ADMIN — GABAPENTIN 300 MG: 300 CAPSULE ORAL at 02:11

## 2021-11-21 LAB
ALBUMIN SERPL BCP-MCNC: 3.4 G/DL (ref 3.5–5.2)
ALP SERPL-CCNC: 59 U/L (ref 55–135)
ALT SERPL W/O P-5'-P-CCNC: 19 U/L (ref 10–44)
ANION GAP SERPL CALC-SCNC: 10 MMOL/L (ref 8–16)
AST SERPL-CCNC: 15 U/L (ref 10–40)
BASOPHILS # BLD AUTO: 0.03 K/UL (ref 0–0.2)
BASOPHILS NFR BLD: 0.2 % (ref 0–1.9)
BILIRUB SERPL-MCNC: 0.8 MG/DL (ref 0.1–1)
BUN SERPL-MCNC: 19 MG/DL (ref 6–20)
CALCIUM SERPL-MCNC: 8.6 MG/DL (ref 8.7–10.5)
CHLORIDE SERPL-SCNC: 100 MMOL/L (ref 95–110)
CO2 SERPL-SCNC: 27 MMOL/L (ref 23–29)
CREAT SERPL-MCNC: 0.8 MG/DL (ref 0.5–1.4)
DIFFERENTIAL METHOD: ABNORMAL
EOSINOPHIL # BLD AUTO: 0 K/UL (ref 0–0.5)
EOSINOPHIL NFR BLD: 0.3 % (ref 0–8)
ERYTHROCYTE [DISTWIDTH] IN BLOOD BY AUTOMATED COUNT: 16.1 % (ref 11.5–14.5)
EST. GFR  (AFRICAN AMERICAN): >60 ML/MIN/1.73 M^2
EST. GFR  (NON AFRICAN AMERICAN): >60 ML/MIN/1.73 M^2
GLUCOSE SERPL-MCNC: 112 MG/DL (ref 70–110)
HCT VFR BLD AUTO: 37.6 % (ref 40–54)
HGB BLD-MCNC: 11.1 G/DL (ref 14–18)
IMM GRANULOCYTES # BLD AUTO: 0.03 K/UL (ref 0–0.04)
IMM GRANULOCYTES NFR BLD AUTO: 0.2 % (ref 0–0.5)
LYMPHOCYTES # BLD AUTO: 0.8 K/UL (ref 1–4.8)
LYMPHOCYTES NFR BLD: 6.5 % (ref 18–48)
MAGNESIUM SERPL-MCNC: 1.8 MG/DL (ref 1.6–2.6)
MCH RBC QN AUTO: 26.7 PG (ref 27–31)
MCHC RBC AUTO-ENTMCNC: 29.5 G/DL (ref 32–36)
MCV RBC AUTO: 90 FL (ref 82–98)
MONOCYTES # BLD AUTO: 0.9 K/UL (ref 0.3–1)
MONOCYTES NFR BLD: 7.2 % (ref 4–15)
NEUTROPHILS # BLD AUTO: 10.5 K/UL (ref 1.8–7.7)
NEUTROPHILS NFR BLD: 85.6 % (ref 38–73)
NRBC BLD-RTO: 0 /100 WBC
PHOSPHATE SERPL-MCNC: 2.6 MG/DL (ref 2.7–4.5)
PLATELET # BLD AUTO: 144 K/UL (ref 150–450)
PMV BLD AUTO: 9.7 FL (ref 9.2–12.9)
POTASSIUM SERPL-SCNC: 4.3 MMOL/L (ref 3.5–5.1)
PROT SERPL-MCNC: 5.5 G/DL (ref 6–8.4)
RBC # BLD AUTO: 4.16 M/UL (ref 4.6–6.2)
SODIUM SERPL-SCNC: 137 MMOL/L (ref 136–145)
WBC # BLD AUTO: 12.33 K/UL (ref 3.9–12.7)

## 2021-11-21 PROCEDURE — 36415 COLL VENOUS BLD VENIPUNCTURE: CPT | Performed by: STUDENT IN AN ORGANIZED HEALTH CARE EDUCATION/TRAINING PROGRAM

## 2021-11-21 PROCEDURE — 99900031 HC PATIENT EDUCATION (STAT)

## 2021-11-21 PROCEDURE — 94761 N-INVAS EAR/PLS OXIMETRY MLT: CPT

## 2021-11-21 PROCEDURE — 20600001 HC STEP DOWN PRIVATE ROOM

## 2021-11-21 PROCEDURE — 94664 DEMO&/EVAL PT USE INHALER: CPT

## 2021-11-21 PROCEDURE — 63600175 PHARM REV CODE 636 W HCPCS: Performed by: STUDENT IN AN ORGANIZED HEALTH CARE EDUCATION/TRAINING PROGRAM

## 2021-11-21 PROCEDURE — 85025 COMPLETE CBC W/AUTO DIFF WBC: CPT | Performed by: STUDENT IN AN ORGANIZED HEALTH CARE EDUCATION/TRAINING PROGRAM

## 2021-11-21 PROCEDURE — 25000003 PHARM REV CODE 250

## 2021-11-21 PROCEDURE — 63600175 PHARM REV CODE 636 W HCPCS

## 2021-11-21 PROCEDURE — 83735 ASSAY OF MAGNESIUM: CPT | Performed by: STUDENT IN AN ORGANIZED HEALTH CARE EDUCATION/TRAINING PROGRAM

## 2021-11-21 PROCEDURE — 94799 UNLISTED PULMONARY SVC/PX: CPT

## 2021-11-21 PROCEDURE — 94640 AIRWAY INHALATION TREATMENT: CPT

## 2021-11-21 PROCEDURE — 99900035 HC TECH TIME PER 15 MIN (STAT)

## 2021-11-21 PROCEDURE — 80053 COMPREHEN METABOLIC PANEL: CPT | Performed by: STUDENT IN AN ORGANIZED HEALTH CARE EDUCATION/TRAINING PROGRAM

## 2021-11-21 PROCEDURE — 25000003 PHARM REV CODE 250: Performed by: STUDENT IN AN ORGANIZED HEALTH CARE EDUCATION/TRAINING PROGRAM

## 2021-11-21 PROCEDURE — 27000173 HC ACAPELLA DEVICE DH OR DM

## 2021-11-21 PROCEDURE — 25000242 PHARM REV CODE 250 ALT 637 W/ HCPCS: Performed by: STUDENT IN AN ORGANIZED HEALTH CARE EDUCATION/TRAINING PROGRAM

## 2021-11-21 PROCEDURE — 84100 ASSAY OF PHOSPHORUS: CPT | Performed by: STUDENT IN AN ORGANIZED HEALTH CARE EDUCATION/TRAINING PROGRAM

## 2021-11-21 RX ORDER — HYDROMORPHONE HYDROCHLORIDE 1 MG/ML
1 INJECTION, SOLUTION INTRAMUSCULAR; INTRAVENOUS; SUBCUTANEOUS ONCE
Status: COMPLETED | OUTPATIENT
Start: 2021-11-21 | End: 2021-11-21

## 2021-11-21 RX ORDER — OXYCODONE HYDROCHLORIDE 10 MG/1
10 TABLET ORAL EVERY 4 HOURS PRN
Status: DISCONTINUED | OUTPATIENT
Start: 2021-11-21 | End: 2021-11-24 | Stop reason: HOSPADM

## 2021-11-21 RX ORDER — MAGNESIUM SULFATE 1 G/100ML
1 INJECTION INTRAVENOUS ONCE
Status: COMPLETED | OUTPATIENT
Start: 2021-11-21 | End: 2021-11-21

## 2021-11-21 RX ORDER — HYDROMORPHONE HYDROCHLORIDE 1 MG/ML
0.5 INJECTION, SOLUTION INTRAMUSCULAR; INTRAVENOUS; SUBCUTANEOUS ONCE
Status: COMPLETED | OUTPATIENT
Start: 2021-11-21 | End: 2021-11-21

## 2021-11-21 RX ORDER — HYDROMORPHONE HYDROCHLORIDE 1 MG/ML
1 INJECTION, SOLUTION INTRAMUSCULAR; INTRAVENOUS; SUBCUTANEOUS EVERY 4 HOURS PRN
Status: DISCONTINUED | OUTPATIENT
Start: 2021-11-21 | End: 2021-11-23

## 2021-11-21 RX ORDER — SODIUM,POTASSIUM PHOSPHATES 280-250MG
2 POWDER IN PACKET (EA) ORAL ONCE
Status: COMPLETED | OUTPATIENT
Start: 2021-11-21 | End: 2021-11-21

## 2021-11-21 RX ADMIN — SODIUM CHLORIDE, SODIUM LACTATE, POTASSIUM CHLORIDE, AND CALCIUM CHLORIDE: .6; .31; .03; .02 INJECTION, SOLUTION INTRAVENOUS at 11:11

## 2021-11-21 RX ADMIN — HYDROMORPHONE HYDROCHLORIDE 1 MG: 1 INJECTION, SOLUTION INTRAMUSCULAR; INTRAVENOUS; SUBCUTANEOUS at 10:11

## 2021-11-21 RX ADMIN — LEVALBUTEROL HYDROCHLORIDE 0.63 MG: 0.63 SOLUTION RESPIRATORY (INHALATION) at 01:11

## 2021-11-21 RX ADMIN — LEVALBUTEROL HYDROCHLORIDE 0.63 MG: 0.63 SOLUTION RESPIRATORY (INHALATION) at 07:11

## 2021-11-21 RX ADMIN — HYDROMORPHONE HYDROCHLORIDE 1 MG: 1 INJECTION, SOLUTION INTRAMUSCULAR; INTRAVENOUS; SUBCUTANEOUS at 06:11

## 2021-11-21 RX ADMIN — MAGNESIUM SULFATE HEPTAHYDRATE 1 G: 500 INJECTION, SOLUTION INTRAMUSCULAR; INTRAVENOUS at 09:11

## 2021-11-21 RX ADMIN — GABAPENTIN 300 MG: 300 CAPSULE ORAL at 02:11

## 2021-11-21 RX ADMIN — OXYCODONE HYDROCHLORIDE 15 MG: 10 TABLET ORAL at 10:11

## 2021-11-21 RX ADMIN — OXYCODONE HYDROCHLORIDE 10 MG: 10 TABLET ORAL at 01:11

## 2021-11-21 RX ADMIN — PANTOPRAZOLE SODIUM 40 MG: 40 TABLET, DELAYED RELEASE ORAL at 09:11

## 2021-11-21 RX ADMIN — CARVEDILOL 6.25 MG: 6.25 TABLET, FILM COATED ORAL at 09:11

## 2021-11-21 RX ADMIN — OXYCODONE HYDROCHLORIDE 15 MG: 10 TABLET ORAL at 07:11

## 2021-11-21 RX ADMIN — HYDROMORPHONE HYDROCHLORIDE 0.5 MG: 1 INJECTION, SOLUTION INTRAMUSCULAR; INTRAVENOUS; SUBCUTANEOUS at 07:11

## 2021-11-21 RX ADMIN — OXYCODONE HYDROCHLORIDE 10 MG: 10 TABLET ORAL at 05:11

## 2021-11-21 RX ADMIN — GABAPENTIN 300 MG: 300 CAPSULE ORAL at 09:11

## 2021-11-21 RX ADMIN — OXYCODONE HYDROCHLORIDE 15 MG: 10 TABLET ORAL at 03:11

## 2021-11-21 RX ADMIN — PANCRELIPASE 1 CAPSULE: 24000; 76000; 120000 CAPSULE, DELAYED RELEASE PELLETS ORAL at 11:11

## 2021-11-21 RX ADMIN — METHOCARBAMOL 500 MG: 100 INJECTION, SOLUTION INTRAMUSCULAR; INTRAVENOUS at 02:11

## 2021-11-21 RX ADMIN — ACETAMINOPHEN 650 MG: 325 TABLET ORAL at 01:11

## 2021-11-21 RX ADMIN — CARVEDILOL 6.25 MG: 6.25 TABLET, FILM COATED ORAL at 08:11

## 2021-11-21 RX ADMIN — ACETAMINOPHEN 650 MG: 325 TABLET ORAL at 08:11

## 2021-11-21 RX ADMIN — GABAPENTIN 300 MG: 300 CAPSULE ORAL at 08:11

## 2021-11-21 RX ADMIN — HYDROMORPHONE HYDROCHLORIDE 1 MG: 1 INJECTION, SOLUTION INTRAMUSCULAR; INTRAVENOUS; SUBCUTANEOUS at 09:11

## 2021-11-21 RX ADMIN — HYDROMORPHONE HYDROCHLORIDE 1 MG: 1 INJECTION, SOLUTION INTRAMUSCULAR; INTRAVENOUS; SUBCUTANEOUS at 02:11

## 2021-11-21 RX ADMIN — METHOCARBAMOL 500 MG: 100 INJECTION, SOLUTION INTRAMUSCULAR; INTRAVENOUS at 05:11

## 2021-11-21 RX ADMIN — HYDROMORPHONE HYDROCHLORIDE 0.5 MG: 1 INJECTION, SOLUTION INTRAMUSCULAR; INTRAVENOUS; SUBCUTANEOUS at 10:11

## 2021-11-21 RX ADMIN — METHOCARBAMOL 500 MG: 100 INJECTION, SOLUTION INTRAMUSCULAR; INTRAVENOUS at 10:11

## 2021-11-21 RX ADMIN — PANCRELIPASE 1 CAPSULE: 24000; 76000; 120000 CAPSULE, DELAYED RELEASE PELLETS ORAL at 05:11

## 2021-11-21 RX ADMIN — POTASSIUM & SODIUM PHOSPHATES POWDER PACK 280-160-250 MG 2 PACKET: 280-160-250 PACK at 09:11

## 2021-11-21 RX ADMIN — ENOXAPARIN SODIUM 40 MG: 40 INJECTION SUBCUTANEOUS at 06:11

## 2021-11-22 LAB
ALBUMIN SERPL BCP-MCNC: 2.9 G/DL (ref 3.5–5.2)
ALP SERPL-CCNC: 53 U/L (ref 55–135)
ALT SERPL W/O P-5'-P-CCNC: 14 U/L (ref 10–44)
ANION GAP SERPL CALC-SCNC: 7 MMOL/L (ref 8–16)
AST SERPL-CCNC: 10 U/L (ref 10–40)
BASOPHILS # BLD AUTO: 0.03 K/UL (ref 0–0.2)
BASOPHILS # BLD AUTO: 0.04 K/UL (ref 0–0.2)
BASOPHILS NFR BLD: 0.2 % (ref 0–1.9)
BASOPHILS NFR BLD: 0.3 % (ref 0–1.9)
BILIRUB SERPL-MCNC: 0.6 MG/DL (ref 0.1–1)
BUN SERPL-MCNC: 13 MG/DL (ref 6–20)
CALCIUM SERPL-MCNC: 8.6 MG/DL (ref 8.7–10.5)
CHLORIDE SERPL-SCNC: 97 MMOL/L (ref 95–110)
CO2 SERPL-SCNC: 28 MMOL/L (ref 23–29)
CREAT SERPL-MCNC: 0.7 MG/DL (ref 0.5–1.4)
DIFFERENTIAL METHOD: ABNORMAL
DIFFERENTIAL METHOD: ABNORMAL
EOSINOPHIL # BLD AUTO: 0.2 K/UL (ref 0–0.5)
EOSINOPHIL # BLD AUTO: 0.3 K/UL (ref 0–0.5)
EOSINOPHIL NFR BLD: 1.4 % (ref 0–8)
EOSINOPHIL NFR BLD: 1.8 % (ref 0–8)
ERYTHROCYTE [DISTWIDTH] IN BLOOD BY AUTOMATED COUNT: 15.5 % (ref 11.5–14.5)
ERYTHROCYTE [DISTWIDTH] IN BLOOD BY AUTOMATED COUNT: 15.9 % (ref 11.5–14.5)
EST. GFR  (AFRICAN AMERICAN): >60 ML/MIN/1.73 M^2
EST. GFR  (NON AFRICAN AMERICAN): >60 ML/MIN/1.73 M^2
GLUCOSE SERPL-MCNC: 95 MG/DL (ref 70–110)
HCT VFR BLD AUTO: 31.3 % (ref 40–54)
HCT VFR BLD AUTO: 32.1 % (ref 40–54)
HGB BLD-MCNC: 9.4 G/DL (ref 14–18)
HGB BLD-MCNC: 9.7 G/DL (ref 14–18)
IMM GRANULOCYTES # BLD AUTO: 0.05 K/UL (ref 0–0.04)
IMM GRANULOCYTES # BLD AUTO: 0.06 K/UL (ref 0–0.04)
IMM GRANULOCYTES NFR BLD AUTO: 0.4 % (ref 0–0.5)
IMM GRANULOCYTES NFR BLD AUTO: 0.4 % (ref 0–0.5)
LYMPHOCYTES # BLD AUTO: 0.8 K/UL (ref 1–4.8)
LYMPHOCYTES # BLD AUTO: 1.6 K/UL (ref 1–4.8)
LYMPHOCYTES NFR BLD: 12.1 % (ref 18–48)
LYMPHOCYTES NFR BLD: 5.7 % (ref 18–48)
MAGNESIUM SERPL-MCNC: 1.8 MG/DL (ref 1.6–2.6)
MCH RBC QN AUTO: 26.7 PG (ref 27–31)
MCH RBC QN AUTO: 27.1 PG (ref 27–31)
MCHC RBC AUTO-ENTMCNC: 30 G/DL (ref 32–36)
MCHC RBC AUTO-ENTMCNC: 30.2 G/DL (ref 32–36)
MCV RBC AUTO: 89 FL (ref 82–98)
MCV RBC AUTO: 90 FL (ref 82–98)
MONOCYTES # BLD AUTO: 1.2 K/UL (ref 0.3–1)
MONOCYTES # BLD AUTO: 1.4 K/UL (ref 0.3–1)
MONOCYTES NFR BLD: 10.3 % (ref 4–15)
MONOCYTES NFR BLD: 8.6 % (ref 4–15)
NEUTROPHILS # BLD AUTO: 10 K/UL (ref 1.8–7.7)
NEUTROPHILS # BLD AUTO: 11.4 K/UL (ref 1.8–7.7)
NEUTROPHILS NFR BLD: 75.6 % (ref 38–73)
NEUTROPHILS NFR BLD: 83.2 % (ref 38–73)
NRBC BLD-RTO: 0 /100 WBC
NRBC BLD-RTO: 0 /100 WBC
PHOSPHATE SERPL-MCNC: 3.2 MG/DL (ref 2.7–4.5)
PLATELET # BLD AUTO: 148 K/UL (ref 150–450)
PLATELET # BLD AUTO: 166 K/UL (ref 150–450)
PMV BLD AUTO: 9.3 FL (ref 9.2–12.9)
PMV BLD AUTO: 9.4 FL (ref 9.2–12.9)
POTASSIUM SERPL-SCNC: 4 MMOL/L (ref 3.5–5.1)
PROT SERPL-MCNC: 5.6 G/DL (ref 6–8.4)
RBC # BLD AUTO: 3.52 M/UL (ref 4.6–6.2)
RBC # BLD AUTO: 3.58 M/UL (ref 4.6–6.2)
SODIUM SERPL-SCNC: 132 MMOL/L (ref 136–145)
WBC # BLD AUTO: 13.27 K/UL (ref 3.9–12.7)
WBC # BLD AUTO: 13.68 K/UL (ref 3.9–12.7)

## 2021-11-22 PROCEDURE — 25000003 PHARM REV CODE 250: Performed by: STUDENT IN AN ORGANIZED HEALTH CARE EDUCATION/TRAINING PROGRAM

## 2021-11-22 PROCEDURE — 85025 COMPLETE CBC W/AUTO DIFF WBC: CPT | Mod: 91

## 2021-11-22 PROCEDURE — 94799 UNLISTED PULMONARY SVC/PX: CPT

## 2021-11-22 PROCEDURE — 94664 DEMO&/EVAL PT USE INHALER: CPT

## 2021-11-22 PROCEDURE — 25000003 PHARM REV CODE 250

## 2021-11-22 PROCEDURE — 63600175 PHARM REV CODE 636 W HCPCS: Performed by: STUDENT IN AN ORGANIZED HEALTH CARE EDUCATION/TRAINING PROGRAM

## 2021-11-22 PROCEDURE — 63600175 PHARM REV CODE 636 W HCPCS

## 2021-11-22 PROCEDURE — 99900035 HC TECH TIME PER 15 MIN (STAT)

## 2021-11-22 PROCEDURE — 36415 COLL VENOUS BLD VENIPUNCTURE: CPT

## 2021-11-22 PROCEDURE — 83735 ASSAY OF MAGNESIUM: CPT | Performed by: STUDENT IN AN ORGANIZED HEALTH CARE EDUCATION/TRAINING PROGRAM

## 2021-11-22 PROCEDURE — 80053 COMPREHEN METABOLIC PANEL: CPT | Performed by: STUDENT IN AN ORGANIZED HEALTH CARE EDUCATION/TRAINING PROGRAM

## 2021-11-22 PROCEDURE — 94761 N-INVAS EAR/PLS OXIMETRY MLT: CPT

## 2021-11-22 PROCEDURE — 97161 PT EVAL LOW COMPLEX 20 MIN: CPT

## 2021-11-22 PROCEDURE — 84100 ASSAY OF PHOSPHORUS: CPT | Performed by: STUDENT IN AN ORGANIZED HEALTH CARE EDUCATION/TRAINING PROGRAM

## 2021-11-22 PROCEDURE — 20600001 HC STEP DOWN PRIVATE ROOM

## 2021-11-22 PROCEDURE — 36415 COLL VENOUS BLD VENIPUNCTURE: CPT | Performed by: STUDENT IN AN ORGANIZED HEALTH CARE EDUCATION/TRAINING PROGRAM

## 2021-11-22 PROCEDURE — 85025 COMPLETE CBC W/AUTO DIFF WBC: CPT | Performed by: STUDENT IN AN ORGANIZED HEALTH CARE EDUCATION/TRAINING PROGRAM

## 2021-11-22 RX ORDER — PROMETHAZINE HYDROCHLORIDE 12.5 MG/1
12.5 TABLET ORAL EVERY 6 HOURS PRN
Status: DISCONTINUED | OUTPATIENT
Start: 2021-11-22 | End: 2021-11-24 | Stop reason: HOSPADM

## 2021-11-22 RX ORDER — MAGNESIUM SULFATE HEPTAHYDRATE 40 MG/ML
2 INJECTION, SOLUTION INTRAVENOUS ONCE
Status: COMPLETED | OUTPATIENT
Start: 2021-11-22 | End: 2021-11-22

## 2021-11-22 RX ADMIN — GABAPENTIN 300 MG: 300 CAPSULE ORAL at 08:11

## 2021-11-22 RX ADMIN — PANCRELIPASE 1 CAPSULE: 24000; 76000; 120000 CAPSULE, DELAYED RELEASE PELLETS ORAL at 05:11

## 2021-11-22 RX ADMIN — PROMETHAZINE HYDROCHLORIDE 12.5 MG: 25 INJECTION INTRAMUSCULAR; INTRAVENOUS at 09:11

## 2021-11-22 RX ADMIN — ENOXAPARIN SODIUM 40 MG: 40 INJECTION SUBCUTANEOUS at 05:11

## 2021-11-22 RX ADMIN — CARVEDILOL 6.25 MG: 6.25 TABLET, FILM COATED ORAL at 09:11

## 2021-11-22 RX ADMIN — GABAPENTIN 300 MG: 300 CAPSULE ORAL at 09:11

## 2021-11-22 RX ADMIN — OXYCODONE HYDROCHLORIDE 15 MG: 10 TABLET ORAL at 09:11

## 2021-11-22 RX ADMIN — MAGNESIUM SULFATE 2 G: 2 INJECTION INTRAVENOUS at 10:11

## 2021-11-22 RX ADMIN — GABAPENTIN 300 MG: 300 CAPSULE ORAL at 02:11

## 2021-11-22 RX ADMIN — METHOCARBAMOL 500 MG: 100 INJECTION, SOLUTION INTRAMUSCULAR; INTRAVENOUS at 05:11

## 2021-11-22 RX ADMIN — OXYCODONE HYDROCHLORIDE 15 MG: 10 TABLET ORAL at 04:11

## 2021-11-22 RX ADMIN — SODIUM CHLORIDE, SODIUM LACTATE, POTASSIUM CHLORIDE, AND CALCIUM CHLORIDE: .6; .31; .03; .02 INJECTION, SOLUTION INTRAVENOUS at 02:11

## 2021-11-22 RX ADMIN — METHOCARBAMOL 500 MG: 100 INJECTION, SOLUTION INTRAMUSCULAR; INTRAVENOUS at 09:11

## 2021-11-22 RX ADMIN — PANCRELIPASE 1 CAPSULE: 24000; 76000; 120000 CAPSULE, DELAYED RELEASE PELLETS ORAL at 12:11

## 2021-11-22 RX ADMIN — HYDROMORPHONE HYDROCHLORIDE 1 MG: 1 INJECTION, SOLUTION INTRAMUSCULAR; INTRAVENOUS; SUBCUTANEOUS at 11:11

## 2021-11-22 RX ADMIN — PANTOPRAZOLE SODIUM 40 MG: 40 TABLET, DELAYED RELEASE ORAL at 08:11

## 2021-11-22 RX ADMIN — OXYCODONE HYDROCHLORIDE 15 MG: 10 TABLET ORAL at 08:11

## 2021-11-22 RX ADMIN — ACETAMINOPHEN 650 MG: 325 TABLET ORAL at 05:11

## 2021-11-22 RX ADMIN — CARVEDILOL 6.25 MG: 6.25 TABLET, FILM COATED ORAL at 08:11

## 2021-11-22 RX ADMIN — METHOCARBAMOL 500 MG: 100 INJECTION, SOLUTION INTRAMUSCULAR; INTRAVENOUS at 02:11

## 2021-11-22 RX ADMIN — OXYCODONE HYDROCHLORIDE 15 MG: 10 TABLET ORAL at 01:11

## 2021-11-22 RX ADMIN — HYDROMORPHONE HYDROCHLORIDE 1 MG: 1 INJECTION, SOLUTION INTRAMUSCULAR; INTRAVENOUS; SUBCUTANEOUS at 07:11

## 2021-11-22 RX ADMIN — HYDROMORPHONE HYDROCHLORIDE 1 MG: 1 INJECTION, SOLUTION INTRAMUSCULAR; INTRAVENOUS; SUBCUTANEOUS at 05:11

## 2021-11-22 RX ADMIN — HYDROMORPHONE HYDROCHLORIDE 1 MG: 1 INJECTION, SOLUTION INTRAMUSCULAR; INTRAVENOUS; SUBCUTANEOUS at 02:11

## 2021-11-22 RX ADMIN — OXYCODONE HYDROCHLORIDE 15 MG: 10 TABLET ORAL at 12:11

## 2021-11-23 LAB
ALBUMIN SERPL BCP-MCNC: 2.8 G/DL (ref 3.5–5.2)
ALP SERPL-CCNC: 49 U/L (ref 55–135)
ALT SERPL W/O P-5'-P-CCNC: 11 U/L (ref 10–44)
ANION GAP SERPL CALC-SCNC: 8 MMOL/L (ref 8–16)
AST SERPL-CCNC: 9 U/L (ref 10–40)
BASOPHILS # BLD AUTO: 0.02 K/UL (ref 0–0.2)
BASOPHILS NFR BLD: 0.2 % (ref 0–1.9)
BILIRUB SERPL-MCNC: 0.5 MG/DL (ref 0.1–1)
BLD PROD TYP BPU: NORMAL
BLD PROD TYP BPU: NORMAL
BLOOD UNIT EXPIRATION DATE: NORMAL
BLOOD UNIT EXPIRATION DATE: NORMAL
BLOOD UNIT TYPE CODE: 5100
BLOOD UNIT TYPE CODE: 5100
BLOOD UNIT TYPE: NORMAL
BLOOD UNIT TYPE: NORMAL
BUN SERPL-MCNC: 9 MG/DL (ref 6–20)
CALCIUM SERPL-MCNC: 8.1 MG/DL (ref 8.7–10.5)
CHLORIDE SERPL-SCNC: 98 MMOL/L (ref 95–110)
CO2 SERPL-SCNC: 27 MMOL/L (ref 23–29)
CODING SYSTEM: NORMAL
CODING SYSTEM: NORMAL
CREAT SERPL-MCNC: 0.7 MG/DL (ref 0.5–1.4)
DIFFERENTIAL METHOD: ABNORMAL
DISPENSE STATUS: NORMAL
DISPENSE STATUS: NORMAL
EOSINOPHIL # BLD AUTO: 0.4 K/UL (ref 0–0.5)
EOSINOPHIL NFR BLD: 3.6 % (ref 0–8)
ERYTHROCYTE [DISTWIDTH] IN BLOOD BY AUTOMATED COUNT: 15.5 % (ref 11.5–14.5)
EST. GFR  (AFRICAN AMERICAN): >60 ML/MIN/1.73 M^2
EST. GFR  (NON AFRICAN AMERICAN): >60 ML/MIN/1.73 M^2
GLUCOSE SERPL-MCNC: 96 MG/DL (ref 70–110)
HCT VFR BLD AUTO: 28 % (ref 40–54)
HGB BLD-MCNC: 8.6 G/DL (ref 14–18)
IMM GRANULOCYTES # BLD AUTO: 0.04 K/UL (ref 0–0.04)
IMM GRANULOCYTES NFR BLD AUTO: 0.4 % (ref 0–0.5)
LYMPHOCYTES # BLD AUTO: 1.4 K/UL (ref 1–4.8)
LYMPHOCYTES NFR BLD: 12.5 % (ref 18–48)
MAGNESIUM SERPL-MCNC: 1.9 MG/DL (ref 1.6–2.6)
MCH RBC QN AUTO: 27.2 PG (ref 27–31)
MCHC RBC AUTO-ENTMCNC: 30.7 G/DL (ref 32–36)
MCV RBC AUTO: 89 FL (ref 82–98)
MONOCYTES # BLD AUTO: 1.2 K/UL (ref 0.3–1)
MONOCYTES NFR BLD: 11 % (ref 4–15)
NEUTROPHILS # BLD AUTO: 8.2 K/UL (ref 1.8–7.7)
NEUTROPHILS NFR BLD: 72.3 % (ref 38–73)
NRBC BLD-RTO: 0 /100 WBC
NUM UNITS TRANS PACKED RBC: NORMAL
NUM UNITS TRANS PACKED RBC: NORMAL
PHOSPHATE SERPL-MCNC: 3.6 MG/DL (ref 2.7–4.5)
PLATELET # BLD AUTO: 139 K/UL (ref 150–450)
PMV BLD AUTO: 9.2 FL (ref 9.2–12.9)
POTASSIUM SERPL-SCNC: 3.9 MMOL/L (ref 3.5–5.1)
PROT SERPL-MCNC: 4.9 G/DL (ref 6–8.4)
RBC # BLD AUTO: 3.16 M/UL (ref 4.6–6.2)
SODIUM SERPL-SCNC: 133 MMOL/L (ref 136–145)
WBC # BLD AUTO: 11.3 K/UL (ref 3.9–12.7)

## 2021-11-23 PROCEDURE — 36415 COLL VENOUS BLD VENIPUNCTURE: CPT | Performed by: STUDENT IN AN ORGANIZED HEALTH CARE EDUCATION/TRAINING PROGRAM

## 2021-11-23 PROCEDURE — 99900035 HC TECH TIME PER 15 MIN (STAT)

## 2021-11-23 PROCEDURE — 63600175 PHARM REV CODE 636 W HCPCS

## 2021-11-23 PROCEDURE — 83735 ASSAY OF MAGNESIUM: CPT | Performed by: STUDENT IN AN ORGANIZED HEALTH CARE EDUCATION/TRAINING PROGRAM

## 2021-11-23 PROCEDURE — 63600175 PHARM REV CODE 636 W HCPCS: Performed by: STUDENT IN AN ORGANIZED HEALTH CARE EDUCATION/TRAINING PROGRAM

## 2021-11-23 PROCEDURE — 84100 ASSAY OF PHOSPHORUS: CPT | Performed by: STUDENT IN AN ORGANIZED HEALTH CARE EDUCATION/TRAINING PROGRAM

## 2021-11-23 PROCEDURE — 25000003 PHARM REV CODE 250

## 2021-11-23 PROCEDURE — 20600001 HC STEP DOWN PRIVATE ROOM

## 2021-11-23 PROCEDURE — 27000646 HC AEROBIKA DEVICE

## 2021-11-23 PROCEDURE — 25000003 PHARM REV CODE 250: Performed by: STUDENT IN AN ORGANIZED HEALTH CARE EDUCATION/TRAINING PROGRAM

## 2021-11-23 PROCEDURE — 94761 N-INVAS EAR/PLS OXIMETRY MLT: CPT

## 2021-11-23 PROCEDURE — 85025 COMPLETE CBC W/AUTO DIFF WBC: CPT | Performed by: STUDENT IN AN ORGANIZED HEALTH CARE EDUCATION/TRAINING PROGRAM

## 2021-11-23 PROCEDURE — 94664 DEMO&/EVAL PT USE INHALER: CPT

## 2021-11-23 PROCEDURE — 80053 COMPREHEN METABOLIC PANEL: CPT | Performed by: STUDENT IN AN ORGANIZED HEALTH CARE EDUCATION/TRAINING PROGRAM

## 2021-11-23 RX ORDER — POLYETHYLENE GLYCOL 3350 17 G/17G
17 POWDER, FOR SOLUTION ORAL 2 TIMES DAILY
Status: DISCONTINUED | OUTPATIENT
Start: 2021-11-23 | End: 2021-11-24 | Stop reason: HOSPADM

## 2021-11-23 RX ORDER — POLYETHYLENE GLYCOL 3350 17 G/17G
17 POWDER, FOR SOLUTION ORAL DAILY
Status: DISCONTINUED | OUTPATIENT
Start: 2021-11-23 | End: 2021-11-23

## 2021-11-23 RX ORDER — HYDROMORPHONE HYDROCHLORIDE 1 MG/ML
0.5 INJECTION, SOLUTION INTRAMUSCULAR; INTRAVENOUS; SUBCUTANEOUS EVERY 4 HOURS PRN
Status: DISCONTINUED | OUTPATIENT
Start: 2021-11-23 | End: 2021-11-24

## 2021-11-23 RX ADMIN — SODIUM CHLORIDE, SODIUM LACTATE, POTASSIUM CHLORIDE, AND CALCIUM CHLORIDE: .6; .31; .03; .02 INJECTION, SOLUTION INTRAVENOUS at 05:11

## 2021-11-23 RX ADMIN — HYDROMORPHONE HYDROCHLORIDE 0.5 MG: 1 INJECTION, SOLUTION INTRAMUSCULAR; INTRAVENOUS; SUBCUTANEOUS at 09:11

## 2021-11-23 RX ADMIN — PANCRELIPASE 1 CAPSULE: 24000; 76000; 120000 CAPSULE, DELAYED RELEASE PELLETS ORAL at 12:11

## 2021-11-23 RX ADMIN — HYDROMORPHONE HYDROCHLORIDE 1 MG: 1 INJECTION, SOLUTION INTRAMUSCULAR; INTRAVENOUS; SUBCUTANEOUS at 05:11

## 2021-11-23 RX ADMIN — GABAPENTIN 300 MG: 300 CAPSULE ORAL at 08:11

## 2021-11-23 RX ADMIN — GABAPENTIN 300 MG: 300 CAPSULE ORAL at 02:11

## 2021-11-23 RX ADMIN — POLYETHYLENE GLYCOL 3350 17 G: 17 POWDER, FOR SOLUTION ORAL at 09:11

## 2021-11-23 RX ADMIN — POLYETHYLENE GLYCOL 3350 17 G: 17 POWDER, FOR SOLUTION ORAL at 08:11

## 2021-11-23 RX ADMIN — OXYCODONE HYDROCHLORIDE 15 MG: 10 TABLET ORAL at 08:11

## 2021-11-23 RX ADMIN — ENOXAPARIN SODIUM 40 MG: 40 INJECTION SUBCUTANEOUS at 05:11

## 2021-11-23 RX ADMIN — OXYCODONE HYDROCHLORIDE 15 MG: 10 TABLET ORAL at 11:11

## 2021-11-23 RX ADMIN — CARVEDILOL 6.25 MG: 6.25 TABLET, FILM COATED ORAL at 09:11

## 2021-11-23 RX ADMIN — PANCRELIPASE 1 CAPSULE: 24000; 76000; 120000 CAPSULE, DELAYED RELEASE PELLETS ORAL at 07:11

## 2021-11-23 RX ADMIN — GABAPENTIN 300 MG: 300 CAPSULE ORAL at 09:11

## 2021-11-23 RX ADMIN — PANCRELIPASE 1 CAPSULE: 24000; 76000; 120000 CAPSULE, DELAYED RELEASE PELLETS ORAL at 04:11

## 2021-11-23 RX ADMIN — METHOCARBAMOL 500 MG: 100 INJECTION, SOLUTION INTRAMUSCULAR; INTRAVENOUS at 02:11

## 2021-11-23 RX ADMIN — METHOCARBAMOL 500 MG: 100 INJECTION, SOLUTION INTRAMUSCULAR; INTRAVENOUS at 05:11

## 2021-11-23 RX ADMIN — METHOCARBAMOL 500 MG: 100 INJECTION, SOLUTION INTRAMUSCULAR; INTRAVENOUS at 10:11

## 2021-11-23 RX ADMIN — CARVEDILOL 6.25 MG: 6.25 TABLET, FILM COATED ORAL at 08:11

## 2021-11-23 RX ADMIN — PANTOPRAZOLE SODIUM 40 MG: 40 TABLET, DELAYED RELEASE ORAL at 09:11

## 2021-11-23 RX ADMIN — OXYCODONE HYDROCHLORIDE 15 MG: 10 TABLET ORAL at 02:11

## 2021-11-23 RX ADMIN — OXYCODONE HYDROCHLORIDE 15 MG: 10 TABLET ORAL at 07:11

## 2021-11-23 RX ADMIN — HYDROMORPHONE HYDROCHLORIDE 1 MG: 1 INJECTION, SOLUTION INTRAMUSCULAR; INTRAVENOUS; SUBCUTANEOUS at 12:11

## 2021-11-23 RX ADMIN — OXYCODONE HYDROCHLORIDE 15 MG: 10 TABLET ORAL at 04:11

## 2021-11-24 VITALS
WEIGHT: 193 LBS | SYSTOLIC BLOOD PRESSURE: 124 MMHG | OXYGEN SATURATION: 96 % | DIASTOLIC BLOOD PRESSURE: 79 MMHG | BODY MASS INDEX: 27.02 KG/M2 | TEMPERATURE: 97 F | RESPIRATION RATE: 18 BRPM | HEART RATE: 82 BPM | HEIGHT: 71 IN

## 2021-11-24 LAB
ALBUMIN SERPL BCP-MCNC: 2.8 G/DL (ref 3.5–5.2)
ALP SERPL-CCNC: 53 U/L (ref 55–135)
ALT SERPL W/O P-5'-P-CCNC: 9 U/L (ref 10–44)
ANION GAP SERPL CALC-SCNC: 7 MMOL/L (ref 8–16)
AST SERPL-CCNC: 10 U/L (ref 10–40)
BASOPHILS # BLD AUTO: 0.02 K/UL (ref 0–0.2)
BASOPHILS NFR BLD: 0.4 % (ref 0–1.9)
BILIRUB SERPL-MCNC: 0.3 MG/DL (ref 0.1–1)
BUN SERPL-MCNC: 8 MG/DL (ref 6–20)
CALCIUM SERPL-MCNC: 8.5 MG/DL (ref 8.7–10.5)
CHLORIDE SERPL-SCNC: 99 MMOL/L (ref 95–110)
CO2 SERPL-SCNC: 32 MMOL/L (ref 23–29)
CREAT SERPL-MCNC: 0.7 MG/DL (ref 0.5–1.4)
DIFFERENTIAL METHOD: ABNORMAL
EOSINOPHIL # BLD AUTO: 0.3 K/UL (ref 0–0.5)
EOSINOPHIL NFR BLD: 6.5 % (ref 0–8)
ERYTHROCYTE [DISTWIDTH] IN BLOOD BY AUTOMATED COUNT: 15.2 % (ref 11.5–14.5)
EST. GFR  (AFRICAN AMERICAN): >60 ML/MIN/1.73 M^2
EST. GFR  (NON AFRICAN AMERICAN): >60 ML/MIN/1.73 M^2
GLUCOSE SERPL-MCNC: 98 MG/DL (ref 70–110)
HCT VFR BLD AUTO: 27.7 % (ref 40–54)
HGB BLD-MCNC: 8.3 G/DL (ref 14–18)
IMM GRANULOCYTES # BLD AUTO: 0.02 K/UL (ref 0–0.04)
IMM GRANULOCYTES NFR BLD AUTO: 0.4 % (ref 0–0.5)
LYMPHOCYTES # BLD AUTO: 1.1 K/UL (ref 1–4.8)
LYMPHOCYTES NFR BLD: 20.2 % (ref 18–48)
MAGNESIUM SERPL-MCNC: 1.9 MG/DL (ref 1.6–2.6)
MCH RBC QN AUTO: 26.6 PG (ref 27–31)
MCHC RBC AUTO-ENTMCNC: 30 G/DL (ref 32–36)
MCV RBC AUTO: 89 FL (ref 82–98)
MONOCYTES # BLD AUTO: 0.6 K/UL (ref 0.3–1)
MONOCYTES NFR BLD: 11.3 % (ref 4–15)
NEUTROPHILS # BLD AUTO: 3.2 K/UL (ref 1.8–7.7)
NEUTROPHILS NFR BLD: 61.2 % (ref 38–73)
NRBC BLD-RTO: 0 /100 WBC
PHOSPHATE SERPL-MCNC: 4 MG/DL (ref 2.7–4.5)
PLATELET # BLD AUTO: 136 K/UL (ref 150–450)
PMV BLD AUTO: 9.3 FL (ref 9.2–12.9)
POTASSIUM SERPL-SCNC: 4 MMOL/L (ref 3.5–5.1)
PROT SERPL-MCNC: 5 G/DL (ref 6–8.4)
RBC # BLD AUTO: 3.12 M/UL (ref 4.6–6.2)
SODIUM SERPL-SCNC: 138 MMOL/L (ref 136–145)
WBC # BLD AUTO: 5.21 K/UL (ref 3.9–12.7)

## 2021-11-24 PROCEDURE — 63600175 PHARM REV CODE 636 W HCPCS

## 2021-11-24 PROCEDURE — 36415 COLL VENOUS BLD VENIPUNCTURE: CPT | Performed by: STUDENT IN AN ORGANIZED HEALTH CARE EDUCATION/TRAINING PROGRAM

## 2021-11-24 PROCEDURE — 84100 ASSAY OF PHOSPHORUS: CPT | Performed by: STUDENT IN AN ORGANIZED HEALTH CARE EDUCATION/TRAINING PROGRAM

## 2021-11-24 PROCEDURE — 25000003 PHARM REV CODE 250: Performed by: STUDENT IN AN ORGANIZED HEALTH CARE EDUCATION/TRAINING PROGRAM

## 2021-11-24 PROCEDURE — 25000003 PHARM REV CODE 250

## 2021-11-24 PROCEDURE — 94761 N-INVAS EAR/PLS OXIMETRY MLT: CPT

## 2021-11-24 PROCEDURE — 80053 COMPREHEN METABOLIC PANEL: CPT | Performed by: STUDENT IN AN ORGANIZED HEALTH CARE EDUCATION/TRAINING PROGRAM

## 2021-11-24 PROCEDURE — 83735 ASSAY OF MAGNESIUM: CPT | Performed by: STUDENT IN AN ORGANIZED HEALTH CARE EDUCATION/TRAINING PROGRAM

## 2021-11-24 PROCEDURE — 85025 COMPLETE CBC W/AUTO DIFF WBC: CPT | Performed by: STUDENT IN AN ORGANIZED HEALTH CARE EDUCATION/TRAINING PROGRAM

## 2021-11-24 RX ORDER — POLYETHYLENE GLYCOL 3350 17 G/17G
17 POWDER, FOR SOLUTION ORAL 2 TIMES DAILY
Qty: 1020 G | Refills: 0 | Status: SHIPPED | OUTPATIENT
Start: 2021-11-24 | End: 2021-12-24

## 2021-11-24 RX ORDER — PROMETHAZINE HYDROCHLORIDE 12.5 MG/1
12.5 TABLET ORAL EVERY 6 HOURS PRN
Qty: 30 TABLET | Refills: 1 | Status: SHIPPED | OUTPATIENT
Start: 2021-11-24 | End: 2021-12-24

## 2021-11-24 RX ORDER — OXYCODONE HYDROCHLORIDE 15 MG/1
15 TABLET ORAL EVERY 4 HOURS PRN
Qty: 42 TABLET | Refills: 0 | Status: SHIPPED | OUTPATIENT
Start: 2021-11-24 | End: 2021-12-01

## 2021-11-24 RX ORDER — BISACODYL 10 MG
10 SUPPOSITORY, RECTAL RECTAL ONCE
Status: COMPLETED | OUTPATIENT
Start: 2021-11-24 | End: 2021-11-24

## 2021-11-24 RX ADMIN — GABAPENTIN 300 MG: 300 CAPSULE ORAL at 08:11

## 2021-11-24 RX ADMIN — PANCRELIPASE 1 CAPSULE: 24000; 76000; 120000 CAPSULE, DELAYED RELEASE PELLETS ORAL at 10:11

## 2021-11-24 RX ADMIN — POLYETHYLENE GLYCOL 3350 17 G: 17 POWDER, FOR SOLUTION ORAL at 08:11

## 2021-11-24 RX ADMIN — METHOCARBAMOL 500 MG: 100 INJECTION, SOLUTION INTRAMUSCULAR; INTRAVENOUS at 01:11

## 2021-11-24 RX ADMIN — METHOCARBAMOL 500 MG: 100 INJECTION, SOLUTION INTRAMUSCULAR; INTRAVENOUS at 08:11

## 2021-11-24 RX ADMIN — BISACODYL 10 MG: 10 SUPPOSITORY RECTAL at 08:11

## 2021-11-24 RX ADMIN — OXYCODONE HYDROCHLORIDE 15 MG: 10 TABLET ORAL at 04:11

## 2021-11-24 RX ADMIN — OXYCODONE HYDROCHLORIDE 15 MG: 10 TABLET ORAL at 08:11

## 2021-11-24 RX ADMIN — OXYCODONE HYDROCHLORIDE 15 MG: 10 TABLET ORAL at 12:11

## 2021-11-24 RX ADMIN — CARVEDILOL 6.25 MG: 6.25 TABLET, FILM COATED ORAL at 08:11

## 2021-11-24 RX ADMIN — PANTOPRAZOLE SODIUM 40 MG: 40 TABLET, DELAYED RELEASE ORAL at 08:11

## 2021-11-26 ENCOUNTER — PATIENT OUTREACH (OUTPATIENT)
Dept: ADMINISTRATIVE | Facility: CLINIC | Age: 56
End: 2021-11-26
Payer: COMMERCIAL

## 2021-11-30 ENCOUNTER — NURSE TRIAGE (OUTPATIENT)
Dept: ADMINISTRATIVE | Facility: CLINIC | Age: 56
End: 2021-11-30
Payer: COMMERCIAL

## 2021-12-02 ENCOUNTER — OFFICE VISIT (OUTPATIENT)
Dept: SURGERY | Facility: CLINIC | Age: 56
End: 2021-12-02
Payer: COMMERCIAL

## 2021-12-02 VITALS
TEMPERATURE: 98 F | SYSTOLIC BLOOD PRESSURE: 124 MMHG | HEART RATE: 77 BPM | DIASTOLIC BLOOD PRESSURE: 79 MMHG | WEIGHT: 191.81 LBS | BODY MASS INDEX: 26.75 KG/M2

## 2021-12-02 DIAGNOSIS — K86.0 ALCOHOL-INDUCED CHRONIC PANCREATITIS: Primary | ICD-10-CM

## 2021-12-02 DIAGNOSIS — Z86.718 HISTORY OF DVT (DEEP VEIN THROMBOSIS): ICD-10-CM

## 2021-12-02 PROCEDURE — 99999 PR PBB SHADOW E&M-EST. PATIENT-LVL III: ICD-10-PCS | Mod: PBBFAC,,, | Performed by: NURSE PRACTITIONER

## 2021-12-02 PROCEDURE — 99999 PR PBB SHADOW E&M-EST. PATIENT-LVL III: CPT | Mod: PBBFAC,,, | Performed by: NURSE PRACTITIONER

## 2021-12-02 PROCEDURE — 99024 POSTOP FOLLOW-UP VISIT: CPT | Mod: S$GLB,,, | Performed by: NURSE PRACTITIONER

## 2021-12-02 PROCEDURE — 99024 PR POST-OP FOLLOW-UP VISIT: ICD-10-PCS | Mod: S$GLB,,, | Performed by: NURSE PRACTITIONER

## 2021-12-03 PROBLEM — K52.9 COLITIS: Status: RESOLVED | Noted: 2021-10-26 | Resolved: 2021-12-03

## 2021-12-03 PROBLEM — E87.6 HYPOKALEMIA: Status: RESOLVED | Noted: 2021-09-27 | Resolved: 2021-12-03

## 2021-12-15 ENCOUNTER — PATIENT MESSAGE (OUTPATIENT)
Dept: SURGERY | Facility: CLINIC | Age: 56
End: 2021-12-15
Payer: COMMERCIAL

## 2021-12-29 ENCOUNTER — PATIENT MESSAGE (OUTPATIENT)
Dept: SURGERY | Facility: CLINIC | Age: 56
End: 2021-12-29
Payer: COMMERCIAL

## 2021-12-29 NOTE — TELEPHONE ENCOUNTER
"Pt wife stated Fabrice is "doing great" but they had concerns regarding external CT scan. Pt wife is working on getting the scan on a disk to bring to the clinic for Dr. Hills to review. Aliza hopes to have the scan by next week. Overall, Aliza reported that Fabrice is going great.  "

## 2022-01-06 ENCOUNTER — PATIENT MESSAGE (OUTPATIENT)
Dept: GASTROENTEROLOGY | Facility: CLINIC | Age: 57
End: 2022-01-06
Payer: COMMERCIAL

## 2022-01-06 RX ORDER — PANTOPRAZOLE SODIUM 40 MG/1
40 TABLET, DELAYED RELEASE ORAL DAILY
Qty: 60 TABLET | Refills: 2 | Status: SHIPPED | OUTPATIENT
Start: 2022-01-06 | End: 2022-05-30 | Stop reason: SDUPTHER

## 2022-01-28 ENCOUNTER — OFFICE VISIT (OUTPATIENT)
Dept: SURGICAL ONCOLOGY | Facility: CLINIC | Age: 57
End: 2022-01-28
Payer: COMMERCIAL

## 2022-01-28 VITALS
TEMPERATURE: 98 F | SYSTOLIC BLOOD PRESSURE: 140 MMHG | DIASTOLIC BLOOD PRESSURE: 95 MMHG | HEART RATE: 82 BPM | WEIGHT: 205.5 LBS | BODY MASS INDEX: 28.66 KG/M2

## 2022-01-28 DIAGNOSIS — K86.1 IDIOPATHIC CHRONIC PANCREATITIS: Primary | ICD-10-CM

## 2022-01-28 PROCEDURE — 3077F SYST BP >= 140 MM HG: CPT | Mod: CPTII,S$GLB,, | Performed by: SURGERY

## 2022-01-28 PROCEDURE — 1159F MED LIST DOCD IN RCRD: CPT | Mod: CPTII,S$GLB,, | Performed by: SURGERY

## 2022-01-28 PROCEDURE — 99999 PR PBB SHADOW E&M-EST. PATIENT-LVL III: CPT | Mod: PBBFAC,,, | Performed by: SURGERY

## 2022-01-28 PROCEDURE — 1159F PR MEDICATION LIST DOCUMENTED IN MEDICAL RECORD: ICD-10-PCS | Mod: CPTII,S$GLB,, | Performed by: SURGERY

## 2022-01-28 PROCEDURE — 99024 POSTOP FOLLOW-UP VISIT: CPT | Mod: S$GLB,,, | Performed by: SURGERY

## 2022-01-28 PROCEDURE — 3077F PR MOST RECENT SYSTOLIC BLOOD PRESSURE >= 140 MM HG: ICD-10-PCS | Mod: CPTII,S$GLB,, | Performed by: SURGERY

## 2022-01-28 PROCEDURE — 1160F PR REVIEW ALL MEDS BY PRESCRIBER/CLIN PHARMACIST DOCUMENTED: ICD-10-PCS | Mod: CPTII,S$GLB,, | Performed by: SURGERY

## 2022-01-28 PROCEDURE — 1160F RVW MEDS BY RX/DR IN RCRD: CPT | Mod: CPTII,S$GLB,, | Performed by: SURGERY

## 2022-01-28 PROCEDURE — 3080F PR MOST RECENT DIASTOLIC BLOOD PRESSURE >= 90 MM HG: ICD-10-PCS | Mod: CPTII,S$GLB,, | Performed by: SURGERY

## 2022-01-28 PROCEDURE — 99024 PR POST-OP FOLLOW-UP VISIT: ICD-10-PCS | Mod: S$GLB,,, | Performed by: SURGERY

## 2022-01-28 PROCEDURE — 3080F DIAST BP >= 90 MM HG: CPT | Mod: CPTII,S$GLB,, | Performed by: SURGERY

## 2022-01-28 PROCEDURE — 99999 PR PBB SHADOW E&M-EST. PATIENT-LVL III: ICD-10-PCS | Mod: PBBFAC,,, | Performed by: SURGERY

## 2022-01-28 PROCEDURE — 3008F PR BODY MASS INDEX (BMI) DOCUMENTED: ICD-10-PCS | Mod: CPTII,S$GLB,, | Performed by: SURGERY

## 2022-01-28 PROCEDURE — 3008F BODY MASS INDEX DOCD: CPT | Mod: CPTII,S$GLB,, | Performed by: SURGERY

## 2022-02-01 NOTE — PROGRESS NOTES
Fabrice looks like a new man.  The longer having recurrent weekly acute episodes of pancreatitis and getting back into normal activity.    The no to reach out with any recurrent symptoms, otherwise I think he can return to clinic as needed.    Of note he still on blood thinners for this portal vein issue.  I reviewed his scan any has no thrombus in his portal vein.  There is some caliber change which is just inflammatory in nature.  He set to complete this course and a couple weeks I think that seems fine, but I would not continue the use and definitely for non thrombotic issue.      Brian Hills MD  Upper GI / Hepatobiliary Surgical Oncology  [facility] [place], [place]  Office: [phone]  Fax: [phone]

## 2022-02-18 ENCOUNTER — PATIENT MESSAGE (OUTPATIENT)
Dept: ENDOSCOPY | Facility: HOSPITAL | Age: 57
End: 2022-02-18
Payer: COMMERCIAL

## 2022-02-19 ENCOUNTER — HOSPITAL ENCOUNTER (EMERGENCY)
Facility: HOSPITAL | Age: 57
Discharge: HOME | End: 2022-02-19
Attending: EMERGENCY MEDICINE
Payer: COMMERCIAL

## 2022-02-19 VITALS
TEMPERATURE: 98 F | WEIGHT: 210.88 LBS | BODY MASS INDEX: 29.52 KG/M2 | OXYGEN SATURATION: 99 % | HEIGHT: 71 IN | SYSTOLIC BLOOD PRESSURE: 165 MMHG | DIASTOLIC BLOOD PRESSURE: 92 MMHG | RESPIRATION RATE: 18 BRPM | HEART RATE: 78 BPM

## 2022-02-19 DIAGNOSIS — K85.00 IDIOPATHIC ACUTE PANCREATITIS, UNSPECIFIED COMPLICATION STATUS: ICD-10-CM

## 2022-02-19 DIAGNOSIS — R10.13 EPIGASTRIC ABDOMINAL PAIN: Primary | ICD-10-CM

## 2022-02-19 LAB
ALBUMIN SERPL BCP-MCNC: 4.2 G/DL (ref 3.5–5.2)
ALP SERPL-CCNC: 86 U/L (ref 55–135)
ALT SERPL W/O P-5'-P-CCNC: 34 U/L (ref 10–44)
ANION GAP SERPL CALC-SCNC: 9 MMOL/L (ref 8–16)
AST SERPL-CCNC: 21 U/L (ref 10–40)
BASOPHILS # BLD AUTO: 0.04 K/UL (ref 0–0.2)
BASOPHILS NFR BLD: 0.4 % (ref 0–1.9)
BILIRUB SERPL-MCNC: 0.4 MG/DL (ref 0.1–1)
BILIRUB UR QL STRIP: NEGATIVE
BUN SERPL-MCNC: 16 MG/DL (ref 6–20)
CALCIUM SERPL-MCNC: 8.8 MG/DL (ref 8.7–10.5)
CHLORIDE SERPL-SCNC: 104 MMOL/L (ref 95–110)
CLARITY UR: CLEAR
CO2 SERPL-SCNC: 27 MMOL/L (ref 23–29)
COLOR UR: YELLOW
CREAT SERPL-MCNC: 1 MG/DL (ref 0.5–1.4)
DIFFERENTIAL METHOD: ABNORMAL
EOSINOPHIL # BLD AUTO: 0.2 K/UL (ref 0–0.5)
EOSINOPHIL NFR BLD: 2.1 % (ref 0–8)
ERYTHROCYTE [DISTWIDTH] IN BLOOD BY AUTOMATED COUNT: 16.8 % (ref 11.5–14.5)
EST. GFR  (AFRICAN AMERICAN): >60 ML/MIN/1.73 M^2
EST. GFR  (NON AFRICAN AMERICAN): >60 ML/MIN/1.73 M^2
GLUCOSE SERPL-MCNC: 103 MG/DL (ref 70–110)
GLUCOSE UR QL STRIP: NEGATIVE
HCT VFR BLD AUTO: 43.6 % (ref 40–54)
HGB BLD-MCNC: 14.4 G/DL (ref 14–18)
HGB UR QL STRIP: NEGATIVE
IMM GRANULOCYTES # BLD AUTO: 0.03 K/UL (ref 0–0.04)
IMM GRANULOCYTES NFR BLD AUTO: 0.3 % (ref 0–0.5)
KETONES UR QL STRIP: NEGATIVE
LEUKOCYTE ESTERASE UR QL STRIP: NEGATIVE
LIPASE SERPL-CCNC: 120 U/L (ref 4–60)
LYMPHOCYTES # BLD AUTO: 1.3 K/UL (ref 1–4.8)
LYMPHOCYTES NFR BLD: 13.8 % (ref 18–48)
MCH RBC QN AUTO: 27.9 PG (ref 27–31)
MCHC RBC AUTO-ENTMCNC: 33 G/DL (ref 32–36)
MCV RBC AUTO: 84 FL (ref 82–98)
MONOCYTES # BLD AUTO: 0.7 K/UL (ref 0.3–1)
MONOCYTES NFR BLD: 7.8 % (ref 4–15)
NEUTROPHILS # BLD AUTO: 7 K/UL (ref 1.8–7.7)
NEUTROPHILS NFR BLD: 75.6 % (ref 38–73)
NITRITE UR QL STRIP: NEGATIVE
NRBC BLD-RTO: 0 /100 WBC
PH UR STRIP: 6 [PH] (ref 5–8)
PLATELET # BLD AUTO: 141 K/UL (ref 150–450)
PMV BLD AUTO: 9.4 FL (ref 9.2–12.9)
POTASSIUM SERPL-SCNC: 4.2 MMOL/L (ref 3.5–5.1)
PROT SERPL-MCNC: 6.8 G/DL (ref 6–8.4)
PROT UR QL STRIP: ABNORMAL
RBC # BLD AUTO: 5.17 M/UL (ref 4.6–6.2)
SODIUM SERPL-SCNC: 140 MMOL/L (ref 136–145)
SP GR UR STRIP: >=1.03 (ref 1–1.03)
URN SPEC COLLECT METH UR: ABNORMAL
UROBILINOGEN UR STRIP-ACNC: NEGATIVE EU/DL
WBC # BLD AUTO: 9.26 K/UL (ref 3.9–12.7)

## 2022-02-19 PROCEDURE — 96374 THER/PROPH/DIAG INJ IV PUSH: CPT

## 2022-02-19 PROCEDURE — 96361 HYDRATE IV INFUSION ADD-ON: CPT

## 2022-02-19 PROCEDURE — 63600175 PHARM REV CODE 636 W HCPCS: Performed by: EMERGENCY MEDICINE

## 2022-02-19 PROCEDURE — 99284 EMERGENCY DEPT VISIT MOD MDM: CPT | Mod: 25

## 2022-02-19 PROCEDURE — 81003 URINALYSIS AUTO W/O SCOPE: CPT | Performed by: EMERGENCY MEDICINE

## 2022-02-19 PROCEDURE — 83690 ASSAY OF LIPASE: CPT | Performed by: EMERGENCY MEDICINE

## 2022-02-19 PROCEDURE — 80053 COMPREHEN METABOLIC PANEL: CPT | Performed by: EMERGENCY MEDICINE

## 2022-02-19 PROCEDURE — 96375 TX/PRO/DX INJ NEW DRUG ADDON: CPT

## 2022-02-19 PROCEDURE — 85025 COMPLETE CBC W/AUTO DIFF WBC: CPT | Performed by: EMERGENCY MEDICINE

## 2022-02-19 RX ORDER — PROMETHAZINE HYDROCHLORIDE 25 MG/1
25 TABLET ORAL EVERY 6 HOURS PRN
Qty: 15 TABLET | Refills: 0 | Status: SHIPPED | OUTPATIENT
Start: 2022-02-19 | End: 2022-03-23

## 2022-02-19 RX ORDER — MORPHINE SULFATE 4 MG/ML
4 INJECTION, SOLUTION INTRAMUSCULAR; INTRAVENOUS
Status: COMPLETED | OUTPATIENT
Start: 2022-02-19 | End: 2022-02-19

## 2022-02-19 RX ORDER — ONDANSETRON 2 MG/ML
4 INJECTION INTRAMUSCULAR; INTRAVENOUS
Status: COMPLETED | OUTPATIENT
Start: 2022-02-19 | End: 2022-02-19

## 2022-02-19 RX ORDER — OXYCODONE AND ACETAMINOPHEN 10; 325 MG/1; MG/1
1 TABLET ORAL EVERY 4 HOURS PRN
Qty: 12 TABLET | Refills: 0 | Status: SHIPPED | OUTPATIENT
Start: 2022-02-19 | End: 2022-05-12 | Stop reason: SDUPTHER

## 2022-02-19 RX ADMIN — SODIUM CHLORIDE, SODIUM LACTATE, POTASSIUM CHLORIDE, AND CALCIUM CHLORIDE 1000 ML: .6; .31; .03; .02 INJECTION, SOLUTION INTRAVENOUS at 08:02

## 2022-02-19 RX ADMIN — MORPHINE SULFATE 4 MG: 4 INJECTION INTRAVENOUS at 09:02

## 2022-02-19 RX ADMIN — ONDANSETRON 4 MG: 2 INJECTION INTRAMUSCULAR; INTRAVENOUS at 08:02

## 2022-02-19 NOTE — ED PROVIDER NOTES
SCRIBE #1 NOTE: I, Kristen Ashby, am scribing for, and in the presence of, Selma De Jesus MD. I have scribed the entire note.       History     Chief Complaint   Patient presents with    Abdominal Pain     Generalized upper abd pain worsening over the past few hours; reports hx pancreatitis     Review of patient's allergies indicates:  No Known Allergies      History of Present Illness     HPI    2/19/2022, 9:01 AM  History obtained from the patient      History of Present Illness: Fabrice Zamorano is a 57 y.o. male patient with a PMHx of GERD, HTN, and pancreatic pseudocyst who presents to the Emergency Department for evaluation of abdominal pain which onset several hours pta.Pt c/o pain in pancreas and has a hx of pancreatitis. Pt states he took Tylenol around 1 AM. Symptoms are constant and moderate in severity. No mitigating or exacerbating factors reported. Associated sxs include nausea. Patient denies any fever, chills, SOB, CP, weakness, and all other sxs at this time.  No further complaints or concerns at this time.       Arrival mode: Personal vehicle      PCP: Rebecca Jackson MD        Past Medical History:  Past Medical History:   Diagnosis Date    Anticoagulant long-term use     On Eliquis for DVT    GERD (gastroesophageal reflux disease)     Hypertension     Hypothyroidism 10/26/2021    Pancreatic pseudocyst     Sleep apnea        Past Surgical History:  Past Surgical History:   Procedure Laterality Date    CHOLECYSTECTOMY      ENDOSCOPIC ULTRASOUND OF UPPER GASTROINTESTINAL TRACT N/A 10/12/2021    Procedure: ULTRASOUND, UPPER GI TRACT, ENDOSCOPIC;  Surgeon: Odalys Leiva MD;  Location: [facility];  Service: Endoscopy;  Laterality: N/A;  Upper and linear, 22 shark core, cytology and RPMI    ERCP N/A 9/29/2021    Procedure: ERCP (ENDOSCOPIC RETROGRADE CHOLANGIOPANCREATOGRAPHY);  Surgeon: Odalys Leiva MD;  Location: [facility] ENDO;  Service: Endoscopy;  Laterality: N/A;     ERCP W/ PLASTIC STENT PLACEMENT      LATERAL PANCREATICOJEJUNOSTOMY N/A 11/19/2021    Procedure: PANCREATICOJEJUNOSTOMY, SIDE-TO-SIDE tier 1;  Surgeon: Brian Hills MD;  Location: [facility] OR [address] [place] [address];  Service: General;  Laterality: N/A;         Family History:  No family history on file.    Social History:  Social History     Tobacco Use    Smoking status: Former Smoker    Smokeless tobacco: Never Used   Substance and Sexual Activity    Alcohol use: Not Currently    Drug use: Never    Sexual activity: Yes        Review of Systems     Review of Systems   Constitutional: Negative for chills and fever.   HENT: Negative for sore throat.    Respiratory: Negative for shortness of breath.    Cardiovascular: Negative for chest pain.   Gastrointestinal: Positive for abdominal pain and nausea.   Genitourinary: Negative for dysuria.   Musculoskeletal: Negative for back pain.   Skin: Negative for rash.   Neurological: Negative for weakness.   Hematological: Does not bruise/bleed easily.   All other systems reviewed and are negative.       Physical Exam     Initial Vitals [02/19/22 0604]   BP Pulse Resp Temp SpO2   (!) 162/89 97 16 98 °F (36.7 °C) 97 %      MAP       --          Physical Exam  Nursing Notes and Vital Signs Reviewed.  Constitutional: Patient is in no acute distress. Well-developed and well-nourished.  Head: Atraumatic. Normocephalic.  Eyes: PERRL. EOM intact. Conjunctivae are not pale. No scleral icterus.  ENT: Mucous membranes are moist. Oropharynx is clear and symmetric.    Neck: Supple. Full ROM. No lymphadenopathy.  Cardiovascular: Regular rate. Regular rhythm. No murmurs, rubs, or gallops. Distal pulses are 2+ and symmetric.  Pulmonary/Chest: No respiratory distress. Clear to auscultation bilaterally. No wheezing or rales.  Abdominal: Soft and non-distended.  There is no tenderness.  No rebound, guarding, or rigidity. Good bowel sounds.  Genitourinary: No CVA tenderness  Musculoskeletal: Moves  "all extremities. No obvious deformities. No edema. No calf tenderness.  Skin: Warm and dry.  Neurological:  Alert, awake, and appropriate.  Normal speech.  No acute focal neurological deficits are appreciated.  Psychiatric: Normal affect. Good eye contact. Appropriate in content.     ED Course   Procedures  ED Vital Signs:  Vitals:    02/19/22 0604 02/19/22 0804 02/19/22 0901 02/19/22 1007   BP: (!) 162/89 (!) 168/98  (!) 165/92   Pulse: 97 74  78   Resp: 16 18 20 18   Temp: 98 °F (36.7 °C)   98.2 °F (36.8 °C)   TempSrc: Oral      SpO2: 97% 99%  99%   Weight: 95.7 kg (210 lb 13.9 oz)      Height: 5' 11" (1.803 m)          Abnormal Lab Results:  Labs Reviewed   CBC W/ AUTO DIFFERENTIAL - Abnormal; Notable for the following components:       Result Value    RDW 16.8 (*)     Platelets 141 (*)     Gran % 75.6 (*)     Lymph % 13.8 (*)     All other components within normal limits   LIPASE - Abnormal; Notable for the following components:    Lipase 120 (*)     All other components within normal limits   URINALYSIS, REFLEX TO URINE CULTURE - Abnormal; Notable for the following components:    Specific Gravity, UA >=1.030 (*)     Protein, UA Trace (*)     All other components within normal limits    Narrative:     Specimen Source->Urine   COMPREHENSIVE METABOLIC PANEL        All Lab Results:  Results for orders placed or performed during the hospital encounter of 02/19/22   CBC W/ AUTO DIFFERENTIAL   Result Value Ref Range    WBC 9.26 3.90 - 12.70 K/uL    RBC 5.17 4.60 - 6.20 M/uL    Hemoglobin 14.4 14.0 - 18.0 g/dL    Hematocrit 43.6 40.0 - 54.0 %    MCV 84 82 - 98 fL    MCH 27.9 27.0 - 31.0 pg    MCHC 33.0 32.0 - 36.0 g/dL    RDW 16.8 (H) 11.5 - 14.5 %    Platelets 141 (L) 150 - 450 K/uL    MPV 9.4 9.2 - 12.9 fL    Immature Granulocytes 0.3 0.0 - 0.5 %    Gran # (ANC) 7.0 1.8 - 7.7 K/uL    Immature Grans (Abs) 0.03 0.00 - 0.04 K/uL    Lymph # 1.3 1.0 - 4.8 K/uL    Mono # 0.7 0.3 - 1.0 K/uL    Eos # 0.2 0.0 - 0.5 K/uL    " Baso # 0.04 0.00 - 0.20 K/uL    nRBC 0 0 /100 WBC    Gran % 75.6 (H) 38.0 - 73.0 %    Lymph % 13.8 (L) 18.0 - 48.0 %    Mono % 7.8 4.0 - 15.0 %    Eosinophil % 2.1 0.0 - 8.0 %    Basophil % 0.4 0.0 - 1.9 %    Differential Method Automated    Comp. Metabolic Panel   Result Value Ref Range    Sodium 140 136 - 145 mmol/L    Potassium 4.2 3.5 - 5.1 mmol/L    Chloride 104 95 - 110 mmol/L    CO2 27 23 - 29 mmol/L    Glucose 103 70 - 110 mg/dL    BUN 16 6 - 20 mg/dL    Creatinine 1.0 0.5 - 1.4 mg/dL    Calcium 8.8 8.7 - 10.5 mg/dL    Total Protein 6.8 6.0 - 8.4 g/dL    Albumin 4.2 3.5 - 5.2 g/dL    Total Bilirubin 0.4 0.1 - 1.0 mg/dL    Alkaline Phosphatase 86 55 - 135 U/L    AST 21 10 - 40 U/L    ALT 34 10 - 44 U/L    Anion Gap 9 8 - 16 mmol/L    eGFR if African American >60 >60 mL/min/1.73 m^2    eGFR if non African American >60 >60 mL/min/1.73 m^2   Lipase   Result Value Ref Range    Lipase 120 (H) 4 - 60 U/L   Urinalysis, Reflex to Urine Culture Urine, Clean Catch    Specimen: Urine   Result Value Ref Range    Specimen UA Urine, Clean Catch     Color, UA Yellow Yellow, Straw, Amrita    Appearance, UA Clear Clear    pH, UA 6.0 5.0 - 8.0    Specific Gravity, UA >=1.030 (A) 1.005 - 1.030    Protein, UA Trace (A) Negative    Glucose, UA Negative Negative    Ketones, UA Negative Negative    Bilirubin (UA) Negative Negative    Occult Blood UA Negative Negative    Nitrite, UA Negative Negative    Urobilinogen, UA Negative <2.0 EU/dL    Leukocytes, UA Negative Negative       Imaging Results:  Imaging Results    None                The Emergency Provider reviewed the vital signs and test results, which are outlined above.     ED Discussion     9:52 AM: Reassessed pt at this time, well appearing, normal exam, lipase mildly elevated but doesn't warrant imaging at this time, no fever, no N/V, symptoms well controlled, abdominal precautions given including reasons to immediately return. Discussed with pt all pertinent ED information  and results. Discussed pt dx and plan of tx. Gave pt all f/u and return to the ED instructions. All questions and concerns were addressed at this time. Pt expresses understanding of information and instructions, and is comfortable with plan to discharge. Pt is stable for discharge.    I discussed with patient and/or family/caretaker that evaluation in the ED does not suggest any emergent or life threatening medical conditions requiring immediate intervention beyond what was provided in the ED, and I believe patient is safe for discharge.  Regardless, an unremarkable evaluation in the ED does not preclude the development or presence of a serious of life threatening condition. As such, patient was instructed to return immediately for any worsening or change in current symptoms.       Medical Decision Making:   Clinical Tests:   Lab Tests: Ordered and Reviewed           ED Medication(s):  Medications   morphine injection 4 mg (4 mg Intravenous Given 2/19/22 0901)   ondansetron injection 4 mg (4 mg Intravenous Given 2/19/22 0858)   lactated ringers bolus 1,000 mL (0 mLs Intravenous Stopped 2/19/22 1007)       New Prescriptions    PROMETHAZINE (PHENERGAN) 25 MG TABLET    Take 1 tablet (25 mg total) by mouth every 6 (six) hours as needed for Nausea.        Follow-up Information     [facility]. Schedule an appointment as soon as possible for a visit in 2 days.    Specialty: Gastroenterology  Why: Return to the Emergency Room, If symptoms worsen  Contact information:  [address] [facility] [id number] [phone]  Additional information:  Please park on the [address] side and use the Clinic entrance. Check in on the 4th floor, to the left.                           Scribe Attestation:   Scribe #1: I performed the above scribed service and the documentation accurately describes the services I performed. I attest to the accuracy of the note.     Attending:   Physician  Attestation Statement for Scribe #1: I, Selma De Jesus MD, personally performed the services described in this documentation, as scribed by Kristen Ashby, in my presence, and it is both accurate and complete.           Clinical Impression       ICD-10-CM ICD-9-CM   1. Epigastric abdominal pain  R10.13 789.06   2. Idiopathic acute pancreatitis, unspecified complication status  K85.00 577.0       Disposition:   Disposition: Discharged  Condition: Stable       Selma De Jesus MD  02/19/22 1100

## 2022-03-02 RX ORDER — PANCRELIPASE LIPASE, PANCRELIPASE AMYLASE, AND PANCRELIPASE PROTEASE 16800; 98400; 56800 [USP'U]/1; [USP'U]/1; [USP'U]/1
CAPSULE, DELAYED RELEASE ORAL
Qty: 120 CAPSULE | Refills: 2 | Status: SHIPPED | OUTPATIENT
Start: 2022-03-02 | End: 2022-03-09 | Stop reason: SDUPTHER

## 2022-03-09 ENCOUNTER — PATIENT MESSAGE (OUTPATIENT)
Dept: GASTROENTEROLOGY | Facility: CLINIC | Age: 57
End: 2022-03-09
Payer: COMMERCIAL

## 2022-03-09 RX ORDER — PANCRELIPASE LIPASE, PANCRELIPASE AMYLASE, AND PANCRELIPASE PROTEASE 16800; 98400; 56800 [USP'U]/1; [USP'U]/1; [USP'U]/1
CAPSULE, DELAYED RELEASE ORAL
Qty: 120 CAPSULE | Refills: 2 | Status: SHIPPED | OUTPATIENT
Start: 2022-03-09 | End: 2022-05-12 | Stop reason: SDUPTHER

## 2022-03-23 ENCOUNTER — HOSPITAL ENCOUNTER (INPATIENT)
Facility: HOSPITAL | Age: 57
LOS: 1 days | Discharge: HOME | DRG: 439 | End: 2022-03-24
Attending: EMERGENCY MEDICINE | Admitting: INTERNAL MEDICINE
Payer: COMMERCIAL

## 2022-03-23 ENCOUNTER — PATIENT MESSAGE (OUTPATIENT)
Dept: GASTROENTEROLOGY | Facility: CLINIC | Age: 57
End: 2022-03-23
Payer: COMMERCIAL

## 2022-03-23 DIAGNOSIS — K85.90 PANCREATITIS: Primary | ICD-10-CM

## 2022-03-23 DIAGNOSIS — R07.9 CHEST PAIN: ICD-10-CM

## 2022-03-23 DIAGNOSIS — R10.9 ABDOMINAL PAIN: ICD-10-CM

## 2022-03-23 DIAGNOSIS — I82.890 SPLENIC VEIN THROMBOSIS: ICD-10-CM

## 2022-03-23 PROBLEM — I50.43 ACUTE ON CHRONIC COMBINED SYSTOLIC AND DIASTOLIC HEART FAILURE: Status: ACTIVE | Noted: 2021-10-26

## 2022-03-23 PROBLEM — R74.8 ABNORMAL LIVER ENZYMES: Status: ACTIVE | Noted: 2022-03-23

## 2022-03-23 LAB
ALBUMIN SERPL BCP-MCNC: 4.2 G/DL (ref 3.5–5.2)
ALP SERPL-CCNC: 212 U/L (ref 55–135)
ALT SERPL W/O P-5'-P-CCNC: 414 U/L (ref 10–44)
ANION GAP SERPL CALC-SCNC: 14 MMOL/L (ref 8–16)
AST SERPL-CCNC: 341 U/L (ref 10–40)
BASOPHILS # BLD AUTO: 0.04 K/UL (ref 0–0.2)
BASOPHILS NFR BLD: 0.4 % (ref 0–1.9)
BILIRUB SERPL-MCNC: 2.4 MG/DL (ref 0.1–1)
BILIRUB UR QL STRIP: NEGATIVE
BUN SERPL-MCNC: 17 MG/DL (ref 6–20)
CALCIUM SERPL-MCNC: 9.5 MG/DL (ref 8.7–10.5)
CHLORIDE SERPL-SCNC: 100 MMOL/L (ref 95–110)
CLARITY UR: CLEAR
CO2 SERPL-SCNC: 21 MMOL/L (ref 23–29)
COLOR UR: YELLOW
CREAT SERPL-MCNC: 1 MG/DL (ref 0.5–1.4)
CTP QC/QA: YES
DIFFERENTIAL METHOD: ABNORMAL
EOSINOPHIL # BLD AUTO: 0.1 K/UL (ref 0–0.5)
EOSINOPHIL NFR BLD: 1.3 % (ref 0–8)
ERYTHROCYTE [DISTWIDTH] IN BLOOD BY AUTOMATED COUNT: 15.7 % (ref 11.5–14.5)
EST. GFR  (AFRICAN AMERICAN): >60 ML/MIN/1.73 M^2
EST. GFR  (NON AFRICAN AMERICAN): >60 ML/MIN/1.73 M^2
GLUCOSE SERPL-MCNC: 114 MG/DL (ref 70–110)
GLUCOSE UR QL STRIP: NEGATIVE
HCT VFR BLD AUTO: 47 % (ref 40–54)
HGB BLD-MCNC: 15.5 G/DL (ref 14–18)
HGB UR QL STRIP: NEGATIVE
IMM GRANULOCYTES # BLD AUTO: 0.02 K/UL (ref 0–0.04)
IMM GRANULOCYTES NFR BLD AUTO: 0.2 % (ref 0–0.5)
KETONES UR QL STRIP: NEGATIVE
LACTATE SERPL-SCNC: 1.3 MMOL/L (ref 0.5–2.2)
LEUKOCYTE ESTERASE UR QL STRIP: NEGATIVE
LIPASE SERPL-CCNC: 154 U/L (ref 4–60)
LYMPHOCYTES # BLD AUTO: 0.7 K/UL (ref 1–4.8)
LYMPHOCYTES NFR BLD: 7.1 % (ref 18–48)
MCH RBC QN AUTO: 29.1 PG (ref 27–31)
MCHC RBC AUTO-ENTMCNC: 33 G/DL (ref 32–36)
MCV RBC AUTO: 88 FL (ref 82–98)
MONOCYTES # BLD AUTO: 0.7 K/UL (ref 0.3–1)
MONOCYTES NFR BLD: 6.4 % (ref 4–15)
NEUTROPHILS # BLD AUTO: 8.9 K/UL (ref 1.8–7.7)
NEUTROPHILS NFR BLD: 84.6 % (ref 38–73)
NITRITE UR QL STRIP: NEGATIVE
NRBC BLD-RTO: 0 /100 WBC
PH UR STRIP: 7 [PH] (ref 5–8)
PLATELET # BLD AUTO: 148 K/UL (ref 150–450)
PMV BLD AUTO: 9.1 FL (ref 9.2–12.9)
POTASSIUM SERPL-SCNC: 4 MMOL/L (ref 3.5–5.1)
PROT SERPL-MCNC: 7.3 G/DL (ref 6–8.4)
PROT UR QL STRIP: NEGATIVE
RBC # BLD AUTO: 5.32 M/UL (ref 4.6–6.2)
SARS-COV-2 RDRP RESP QL NAA+PROBE: NEGATIVE
SODIUM SERPL-SCNC: 135 MMOL/L (ref 136–145)
SP GR UR STRIP: 1.01 (ref 1–1.03)
URN SPEC COLLECT METH UR: NORMAL
UROBILINOGEN UR STRIP-ACNC: 1 EU/DL
WBC # BLD AUTO: 10.48 K/UL (ref 3.9–12.7)

## 2022-03-23 PROCEDURE — U0002 COVID-19 LAB TEST NON-CDC: HCPCS | Performed by: EMERGENCY MEDICINE

## 2022-03-23 PROCEDURE — 83690 ASSAY OF LIPASE: CPT | Performed by: EMERGENCY MEDICINE

## 2022-03-23 PROCEDURE — G0378 HOSPITAL OBSERVATION PER HR: HCPCS

## 2022-03-23 PROCEDURE — 83605 ASSAY OF LACTIC ACID: CPT | Performed by: EMERGENCY MEDICINE

## 2022-03-23 PROCEDURE — 63600175 PHARM REV CODE 636 W HCPCS: Performed by: INTERNAL MEDICINE

## 2022-03-23 PROCEDURE — 85025 COMPLETE CBC W/AUTO DIFF WBC: CPT | Performed by: EMERGENCY MEDICINE

## 2022-03-23 PROCEDURE — 63600175 PHARM REV CODE 636 W HCPCS: Performed by: EMERGENCY MEDICINE

## 2022-03-23 PROCEDURE — 94761 N-INVAS EAR/PLS OXIMETRY MLT: CPT

## 2022-03-23 PROCEDURE — 99285 EMERGENCY DEPT VISIT HI MDM: CPT | Mod: 25

## 2022-03-23 PROCEDURE — 96376 TX/PRO/DX INJ SAME DRUG ADON: CPT

## 2022-03-23 PROCEDURE — 25000003 PHARM REV CODE 250: Performed by: NURSE PRACTITIONER

## 2022-03-23 PROCEDURE — 25000003 PHARM REV CODE 250: Performed by: EMERGENCY MEDICINE

## 2022-03-23 PROCEDURE — 63600175 PHARM REV CODE 636 W HCPCS: Performed by: NURSE PRACTITIONER

## 2022-03-23 PROCEDURE — 96361 HYDRATE IV INFUSION ADD-ON: CPT

## 2022-03-23 PROCEDURE — 80053 COMPREHEN METABOLIC PANEL: CPT | Performed by: EMERGENCY MEDICINE

## 2022-03-23 PROCEDURE — 96365 THER/PROPH/DIAG IV INF INIT: CPT

## 2022-03-23 PROCEDURE — 93010 EKG 12-LEAD: ICD-10-PCS | Mod: ,,, | Performed by: STUDENT IN AN ORGANIZED HEALTH CARE EDUCATION/TRAINING PROGRAM

## 2022-03-23 PROCEDURE — 81003 URINALYSIS AUTO W/O SCOPE: CPT | Performed by: EMERGENCY MEDICINE

## 2022-03-23 PROCEDURE — 25500020 PHARM REV CODE 255: Performed by: EMERGENCY MEDICINE

## 2022-03-23 PROCEDURE — 96375 TX/PRO/DX INJ NEW DRUG ADDON: CPT

## 2022-03-23 PROCEDURE — 93010 ELECTROCARDIOGRAM REPORT: CPT | Mod: ,,, | Performed by: STUDENT IN AN ORGANIZED HEALTH CARE EDUCATION/TRAINING PROGRAM

## 2022-03-23 PROCEDURE — 93005 ELECTROCARDIOGRAM TRACING: CPT

## 2022-03-23 RX ORDER — IBUPROFEN 200 MG
24 TABLET ORAL
Status: DISCONTINUED | OUTPATIENT
Start: 2022-03-23 | End: 2022-03-24 | Stop reason: HOSPADM

## 2022-03-23 RX ORDER — TALC
6 POWDER (GRAM) TOPICAL NIGHTLY PRN
Status: DISCONTINUED | OUTPATIENT
Start: 2022-03-23 | End: 2022-03-24 | Stop reason: HOSPADM

## 2022-03-23 RX ORDER — CARVEDILOL 6.25 MG/1
6.25 TABLET ORAL 2 TIMES DAILY
Status: DISCONTINUED | OUTPATIENT
Start: 2022-03-23 | End: 2022-03-24 | Stop reason: HOSPADM

## 2022-03-23 RX ORDER — IPRATROPIUM BROMIDE AND ALBUTEROL SULFATE 2.5; .5 MG/3ML; MG/3ML
3 SOLUTION RESPIRATORY (INHALATION) EVERY 4 HOURS PRN
Status: DISCONTINUED | OUTPATIENT
Start: 2022-03-23 | End: 2022-03-24 | Stop reason: HOSPADM

## 2022-03-23 RX ORDER — ACETAMINOPHEN 325 MG/1
650 TABLET ORAL EVERY 4 HOURS PRN
Status: DISCONTINUED | OUTPATIENT
Start: 2022-03-23 | End: 2022-03-24 | Stop reason: HOSPADM

## 2022-03-23 RX ORDER — NALOXONE HCL 0.4 MG/ML
0.02 VIAL (ML) INJECTION
Status: DISCONTINUED | OUTPATIENT
Start: 2022-03-23 | End: 2022-03-24 | Stop reason: HOSPADM

## 2022-03-23 RX ORDER — AMOXICILLIN 250 MG
1 CAPSULE ORAL DAILY PRN
Status: DISCONTINUED | OUTPATIENT
Start: 2022-03-23 | End: 2022-03-24 | Stop reason: HOSPADM

## 2022-03-23 RX ORDER — AMOXICILLIN 250 MG
1 CAPSULE ORAL 2 TIMES DAILY
Status: DISCONTINUED | OUTPATIENT
Start: 2022-03-23 | End: 2022-03-24 | Stop reason: HOSPADM

## 2022-03-23 RX ORDER — MAG HYDROX/ALUMINUM HYD/SIMETH 200-200-20
30 SUSPENSION, ORAL (FINAL DOSE FORM) ORAL
Status: DISCONTINUED | OUTPATIENT
Start: 2022-03-23 | End: 2022-03-24 | Stop reason: HOSPADM

## 2022-03-23 RX ORDER — HYDROMORPHONE HYDROCHLORIDE 2 MG/ML
1 INJECTION, SOLUTION INTRAMUSCULAR; INTRAVENOUS; SUBCUTANEOUS
Status: COMPLETED | OUTPATIENT
Start: 2022-03-23 | End: 2022-03-23

## 2022-03-23 RX ORDER — SIMETHICONE 80 MG
1 TABLET,CHEWABLE ORAL 4 TIMES DAILY PRN
Status: DISCONTINUED | OUTPATIENT
Start: 2022-03-23 | End: 2022-03-24 | Stop reason: HOSPADM

## 2022-03-23 RX ORDER — SODIUM CHLORIDE, SODIUM LACTATE, POTASSIUM CHLORIDE, CALCIUM CHLORIDE 600; 310; 30; 20 MG/100ML; MG/100ML; MG/100ML; MG/100ML
INJECTION, SOLUTION INTRAVENOUS CONTINUOUS
Status: DISCONTINUED | OUTPATIENT
Start: 2022-03-23 | End: 2022-03-24

## 2022-03-23 RX ORDER — HYDROMORPHONE HYDROCHLORIDE 2 MG/ML
1 INJECTION, SOLUTION INTRAMUSCULAR; INTRAVENOUS; SUBCUTANEOUS EVERY 4 HOURS PRN
Status: DISCONTINUED | OUTPATIENT
Start: 2022-03-23 | End: 2022-03-24

## 2022-03-23 RX ORDER — ONDANSETRON 2 MG/ML
4 INJECTION INTRAMUSCULAR; INTRAVENOUS EVERY 8 HOURS PRN
Status: DISCONTINUED | OUTPATIENT
Start: 2022-03-23 | End: 2022-03-24 | Stop reason: HOSPADM

## 2022-03-23 RX ORDER — PANTOPRAZOLE SODIUM 40 MG/1
40 TABLET, DELAYED RELEASE ORAL DAILY
Status: DISCONTINUED | OUTPATIENT
Start: 2022-03-24 | End: 2022-03-24 | Stop reason: HOSPADM

## 2022-03-23 RX ORDER — GLUCAGON 1 MG
1 KIT INJECTION
Status: DISCONTINUED | OUTPATIENT
Start: 2022-03-23 | End: 2022-03-24 | Stop reason: HOSPADM

## 2022-03-23 RX ORDER — IBUPROFEN 200 MG
16 TABLET ORAL
Status: DISCONTINUED | OUTPATIENT
Start: 2022-03-23 | End: 2022-03-24 | Stop reason: HOSPADM

## 2022-03-23 RX ORDER — SODIUM CHLORIDE 0.9 % (FLUSH) 0.9 %
10 SYRINGE (ML) INJECTION EVERY 8 HOURS PRN
Status: DISCONTINUED | OUTPATIENT
Start: 2022-03-23 | End: 2022-03-24 | Stop reason: HOSPADM

## 2022-03-23 RX ADMIN — DOCUSATE SODIUM AND SENNOSIDES 1 TABLET: 8.6; 5 TABLET, FILM COATED ORAL at 08:03

## 2022-03-23 RX ADMIN — IOHEXOL 100 ML: 350 INJECTION, SOLUTION INTRAVENOUS at 11:03

## 2022-03-23 RX ADMIN — CARVEDILOL 6.25 MG: 6.25 TABLET, FILM COATED ORAL at 08:03

## 2022-03-23 RX ADMIN — HYDROMORPHONE HYDROCHLORIDE 1 MG: 2 INJECTION INTRAMUSCULAR; INTRAVENOUS; SUBCUTANEOUS at 08:03

## 2022-03-23 RX ADMIN — MAGNESIUM HYDROXIDE/ALUMINUM HYDROXICE/SIMETHICONE 30 ML: 120; 1200; 1200 SUSPENSION ORAL at 04:03

## 2022-03-23 RX ADMIN — MAGNESIUM HYDROXIDE/ALUMINUM HYDROXICE/SIMETHICONE 30 ML: 120; 1200; 1200 SUSPENSION ORAL at 08:03

## 2022-03-23 RX ADMIN — HYDROMORPHONE HYDROCHLORIDE 1 MG: 2 INJECTION INTRAMUSCULAR; INTRAVENOUS; SUBCUTANEOUS at 02:03

## 2022-03-23 RX ADMIN — HYDROMORPHONE HYDROCHLORIDE 1 MG: 2 INJECTION INTRAMUSCULAR; INTRAVENOUS; SUBCUTANEOUS at 04:03

## 2022-03-23 RX ADMIN — HYDROMORPHONE HYDROCHLORIDE 1 MG: 2 INJECTION INTRAMUSCULAR; INTRAVENOUS; SUBCUTANEOUS at 10:03

## 2022-03-23 RX ADMIN — PROMETHAZINE HYDROCHLORIDE 12.5 MG: 25 INJECTION INTRAMUSCULAR; INTRAVENOUS at 10:03

## 2022-03-23 RX ADMIN — ONDANSETRON 4 MG: 2 INJECTION INTRAMUSCULAR; INTRAVENOUS at 04:03

## 2022-03-23 RX ADMIN — SODIUM CHLORIDE, SODIUM LACTATE, POTASSIUM CHLORIDE, AND CALCIUM CHLORIDE: .6; .31; .03; .02 INJECTION, SOLUTION INTRAVENOUS at 04:03

## 2022-03-23 RX ADMIN — SODIUM CHLORIDE 1000 ML: 0.9 INJECTION, SOLUTION INTRAVENOUS at 10:03

## 2022-03-23 NOTE — ASSESSMENT & PLAN NOTE
-Of note, long standing history of chronic pancreatitis,and [place] procedure performed 11/21 with initial improvement in pain control, but has now started to experience increased abdominal pain again  -IVFs for rehydration/Of note, LR superior to NS for acute pancreatitis   -pain control  -NPO  -Consult GI if needed if no improvement   -AM CMP, amylase, lipase, CBC   -Continue pancrease

## 2022-03-23 NOTE — H&P
[facility] [address] [place] [address] Medicine  History & Physical    Patient Name: Fabrice Zamorano  MRN: [id number]  Patient Class: OP- Observation  Admission Date: 3/23/2022  Attending Physician: Travon Valle MD   Primary Care Provider: Rebecca Jackson MD         Patient information was obtained from patient and ER records.     Subjective:     Principal Problem:Acute on chronic pancreatitis    Chief Complaint:   Chief Complaint   Patient presents with    Abdominal Pain     Pt has hx of pancreatitis and is experiencing upper abdominal pain since Monday. Pt started vomiting this morning and had dark urine and came to ED. Pt has hx of hypertension and took BP med this morning but threw it up.         HPI: Fabrice Zamorano is a 57-year-old  male with PMHx of Hypertension, DVT, and pancreatitis who presents to [facility] ED for worsening epigastric abdominal pain for the past 3 days that was unrelieved by his home pain medication of Percocet  mg. Pt states he has been suffering with pancreatitis since June 2021. On 11/19/21 pt had a Peustow procedure (Lateral Pancreaticojenjunostomy) for his chronic pancreatitis per surgeon Dr. Hills. States he had been doing relatively well until he started having several episodes of watery, odorous bowel movements on this past Monday with increased pain to his epigastric region. States the diarrhea did subside but the epigastric pain continued. States he also noticed on yesterday that his urine was becoming darker and he was urinating less. States he attempted to take a sip of water this AM and immediately began intractable vomiting so he came to [facility] ED for further evaluation. ED workup included a CT of abdomen/pelvis which revealed moderate peripancreatic fat stranding, similar to the comparison examination and remaining compatible with pancreatitis, Xray abdomen showed moderate constipation, Alkaline phosphatase 212, Bilirubin 2.4, , , Lipase 154, Urine  negative, EKG NSR. Based upon these findings, gastroenterology ([facility]mart[facility]) was consulted by ED and recommendations for rehydration, pain control and gut rest were given. Denies ETOH use and is a never smoker. Pt is a Full Code status and his surrogate decision maker is his wife, Junie, at [phone]. He is admitted to Hospital Medicine and placed on observation.       Past Medical History:   Diagnosis Date    Anticoagulant long-term use     On Eliquis for DVT    GERD (gastroesophageal reflux disease)     Hypertension     Hypothyroidism 10/26/2021    Pancreatic pseudocyst     Sleep apnea     Splenic vein thrombosis        Past Surgical History:   Procedure Laterality Date    CHOLECYSTECTOMY      ENDOSCOPIC ULTRASOUND OF UPPER GASTROINTESTINAL TRACT N/A 10/12/2021    Procedure: ULTRASOUND, UPPER GI TRACT, ENDOSCOPIC;  Surgeon: Odalys Leiva MD;  Location: [facility];  Service: Endoscopy;  Laterality: N/A;  Upper and linear, 22 shark core, cytology and RPMI    ERCP N/A 9/29/2021    Procedure: ERCP (ENDOSCOPIC RETROGRADE CHOLANGIOPANCREATOGRAPHY);  Surgeon: Odalys Leiva MD;  Location: [facility];  Service: Endoscopy;  Laterality: N/A;    ERCP W/ PLASTIC STENT PLACEMENT      LATERAL PANCREATICOJEJUNOSTOMY N/A 11/19/2021    Procedure: PANCREATICOJEJUNOSTOMY, SIDE-TO-SIDE tier 1;  Surgeon: Brian Hills MD;  Location: [facility] OR [address] [place] [address];  Service: General;  Laterality: N/A;       Review of patient's allergies indicates:  No Known Allergies    No current facility-administered medications on file prior to encounter.     Current Outpatient Medications on File Prior to Encounter   Medication Sig    carvediloL (COREG) 6.25 MG tablet Take 6.25 mg by mouth 2 (two) times daily.    ferrous gluconate (FERGON) 324 MG tablet Take 324 mg by mouth. twice weekly on an empty stomach (Sunday and Wednesday)    lipase-protease-amylase (PANCREAZE) 16,800-56,800- 98,400 unit CpDR Take before meals  (Patient taking differently: Take 1 capsule by mouth 4 (four) times daily. before meals and with a snack)    multivitamin (THERAGRAN) per tablet Take 1 tablet by mouth once daily.    oxyCODONE-acetaminophen (PERCOCET)  mg per tablet Take 1 tablet by mouth every 4 (four) hours as needed for Pain.    pantoprazole (PROTONIX) 40 MG tablet Take 1 tablet (40 mg total) by mouth once daily.    [DISCONTINUED] apixaban (ELIQUIS) 5 mg Tab one tablet    [DISCONTINUED] ondansetron (ZOFRAN-ODT) 8 MG TbDL DISSOLVE 1 TABLET ON THE TONGUE THREE TIMES DAILY AS NEEDED FOR NAUSEA OR VOMITING    [DISCONTINUED] promethazine (PHENERGAN) 25 MG tablet Take 1 tablet (25 mg total) by mouth every 6 (six) hours as needed for Nausea.     Family History       Problem Relation (Age of Onset)    Arthritis Father    Cancer Father    Pancreatic cancer Father    Thyroid disease Mother, Father          Tobacco Use    Smoking status: Former Smoker    Smokeless tobacco: Never Used   Substance and Sexual Activity    Alcohol use: Not Currently    Drug use: Never    Sexual activity: Yes     Review of Systems   Constitutional:  Positive for appetite change.   HENT:  Negative for congestion, sinus pressure and sinus pain.    Eyes: Negative.    Respiratory:  Negative for cough, shortness of breath and wheezing.    Cardiovascular:  Negative for chest pain, palpitations and leg swelling.   Gastrointestinal:  Positive for abdominal distention, abdominal pain, diarrhea and vomiting.   Endocrine: Negative.    Genitourinary:  Positive for dysuria.   Musculoskeletal: Negative.    Skin: Negative.    Allergic/Immunologic: Negative.    Neurological:  Negative for dizziness, weakness and light-headedness.   Hematological: Negative.    Psychiatric/Behavioral:  Negative for confusion and decreased concentration.    Objective:     Vital Signs (Most Recent):  Temp: 98.7 °F (37.1 °C) (03/23/22 1605)  Pulse: 81 (03/23/22 1605)  Resp: 18 (03/23/22 1605)  BP:  (!) 171/101 (03/23/22 1605)  SpO2: 95 % (03/23/22 1605)   Vital Signs (24h Range):  Temp:  [97.8 °F (36.6 °C)-98.7 °F (37.1 °C)] 98.7 °F (37.1 °C)  Pulse:  [71-90] 81  Resp:  [18-20] 18  SpO2:  [95 %-99 %] 95 %  BP: (125-174)/() 171/101     Weight: 92.1 kg (203 lb)  Body mass index is 28.31 kg/m².    Physical Exam  Vitals reviewed.   Constitutional:       General: He is awake.      Appearance: He is well-groomed and overweight.   HENT:      Head: Normocephalic and atraumatic.   Eyes:      Extraocular Movements: Extraocular movements intact.      Conjunctiva/sclera: Conjunctivae normal.   Cardiovascular:      Rate and Rhythm: Normal rate and regular rhythm.      Heart sounds: Normal heart sounds, S1 normal and S2 normal.   Pulmonary:      Effort: Pulmonary effort is normal.      Breath sounds: Normal breath sounds and air entry.   Abdominal:      General: Bowel sounds are increased.      Palpations: Abdomen is soft.      Tenderness: There is abdominal tenderness in the epigastric area.   Musculoskeletal:      Cervical back: Normal range of motion and neck supple.   Skin:     General: Skin is warm and dry.      Capillary Refill: Capillary refill takes less than 2 seconds.          Neurological:      General: No focal deficit present.      Mental Status: He is alert and oriented to person, place, and time.      Cranial Nerves: Cranial nerves are intact.   Psychiatric:         Attention and Perception: Attention normal.         Mood and Affect: Mood normal.         Speech: Speech normal.         Behavior: Behavior normal. Behavior is cooperative.         Judgment: Judgment normal.           Significant Labs:   Results for orders placed or performed during the hospital encounter of 03/23/22   CBC W/ AUTO DIFFERENTIAL   Result Value Ref Range    WBC 10.48 3.90 - 12.70 K/uL    RBC 5.32 4.60 - 6.20 M/uL    Hemoglobin 15.5 14.0 - 18.0 g/dL    Hematocrit 47.0 40.0 - 54.0 %    MCV 88 82 - 98 fL    MCH 29.1 27.0 - 31.0 pg     MCHC 33.0 32.0 - 36.0 g/dL    RDW 15.7 (H) 11.5 - 14.5 %    Platelets 148 (L) 150 - 450 K/uL    MPV 9.1 (L) 9.2 - 12.9 fL    Immature Granulocytes 0.2 0.0 - 0.5 %    Gran # (ANC) 8.9 (H) 1.8 - 7.7 K/uL    Immature Grans (Abs) 0.02 0.00 - 0.04 K/uL    Lymph # 0.7 (L) 1.0 - 4.8 K/uL    Mono # 0.7 0.3 - 1.0 K/uL    Eos # 0.1 0.0 - 0.5 K/uL    Baso # 0.04 0.00 - 0.20 K/uL    nRBC 0 0 /100 WBC    Gran % 84.6 (H) 38.0 - 73.0 %    Lymph % 7.1 (L) 18.0 - 48.0 %    Mono % 6.4 4.0 - 15.0 %    Eosinophil % 1.3 0.0 - 8.0 %    Basophil % 0.4 0.0 - 1.9 %    Differential Method Automated    Comp. Metabolic Panel   Result Value Ref Range    Sodium 135 (L) 136 - 145 mmol/L    Potassium 4.0 3.5 - 5.1 mmol/L    Chloride 100 95 - 110 mmol/L    CO2 21 (L) 23 - 29 mmol/L    Glucose 114 (H) 70 - 110 mg/dL    BUN 17 6 - 20 mg/dL    Creatinine 1.0 0.5 - 1.4 mg/dL    Calcium 9.5 8.7 - 10.5 mg/dL    Total Protein 7.3 6.0 - 8.4 g/dL    Albumin 4.2 3.5 - 5.2 g/dL    Total Bilirubin 2.4 (H) 0.1 - 1.0 mg/dL    Alkaline Phosphatase 212 (H) 55 - 135 U/L     (H) 10 - 40 U/L     (H) 10 - 44 U/L    Anion Gap 14 8 - 16 mmol/L    eGFR if African American >60 >60 mL/min/1.73 m^2    eGFR if non African American >60 >60 mL/min/1.73 m^2   Lipase   Result Value Ref Range    Lipase 154 (H) 4 - 60 U/L   Urinalysis, Reflex to Urine Culture Urine, Clean Catch    Specimen: Urine   Result Value Ref Range    Specimen UA Urine, Clean Catch     Color, UA Yellow Yellow, Straw, Amrita    Appearance, UA Clear Clear    pH, UA 7.0 5.0 - 8.0    Specific Gravity, UA 1.010 1.005 - 1.030    Protein, UA Negative Negative    Glucose, UA Negative Negative    Ketones, UA Negative Negative    Bilirubin (UA) Negative Negative    Occult Blood UA Negative Negative    Nitrite, UA Negative Negative    Urobilinogen, UA 1.0 <2.0 EU/dL    Leukocytes, UA Negative Negative   Lactic acid, plasma   Result Value Ref Range    Lactate (Lactic Acid) 1.3 0.5 - 2.2 mmol/L   POCT  COVID-19 Rapid Screening   Result Value Ref Range    POC Rapid COVID Negative Negative     Acceptable Yes         Significant Imaging:   Imaging Results              CT Abdomen Pelvis With Contrast (Final result)  Result time 03/23/22 11:44:27      Final result by Cristina De Paz MD (03/23/22 11:44:27)                   Impression:      Moderate peripancreatic fat stranding, similar to the comparison examination and remaining compatible with pancreatitis.  There is involvement of the 2nd portion of the duodenum with wall thickening suggesting duodenitis.  Inflammatory change now extends along the right pericolic gutter.  No focal fluid collection to suggest pseudocyst formation.  No evidence for pancreatic necrosis.    Persistent splenic vein thrombosis with multiple upper abdominal collateral vessels including prominent gastric varices.      Electronically signed by: Cristina De Paz  Date:    03/23/2022  Time:    11:44               Narrative:    EXAMINATION:  CT ABDOMEN PELVIS WITH CONTRAST    CLINICAL HISTORY:  Abdominal pain with history of pancreatitis and splenic vein thrombosis;    TECHNIQUE:  Low dose axial images, sagittal and coronal reformations were obtained from the lung bases to the pubic symphysis following the IV administration of 100 mL of Omnipaque 350 .  Oral contrast was not administered. All CT scans at this facility are performed using dose optimization techniques including the following: automated exposure control; adjustment of the mA and/or kV; use of iterative reconstruction technique.    COMPARISON:  Abdominal radiograph 03/23/2022, CT abdomen pelvis with contrast 10/26/2021    FINDINGS:  Abdomen:    - Lower thorax:Unremarkable.    - Lung bases: There are bibasilar linear and dependent density suggesting subsegmental atelectasis or fibrosis.  Calcified right lower lobe granuloma is noted.    - Liver: No focal mass, noting geographic relative hyperenhancement within  hepatic segment 6, likely transient hepatic attenuation difference.    - Gallbladder: The gallbladder is not seen, potentially surgically absent.    - Bile Ducts: No evidence of intra or extra hepatic biliary ductal dilation.    - Spleen: Negative.  The splenic vein is occluded.    - Kidneys: No mass or hydronephrosis.  There is a 6.4 cm superior right renal cyst.  The ureters are normal in course and caliber.  Retroperitoneal inflammatory change extends along the course of the right ureter, no current evidence for obstruction.    - Adrenals: Unremarkable.    - Pancreas/duodenum: There is significant peripancreatic fat stranding/inflammatory change adjacent to the pancreatic body, neck, head, and uncinate process.  Findings appear similar to the comparison exam with involvement of the 2nd portion of the duodenum noting duodenal wall thickening appears increased since the comparison exam.  Inflammatory change now extends inferiorly within the right pericolic gutter and adjacent to the right ureter.  No ureteral obstruction present.  No focal fluid collection to suggest pseudocyst formation.  Pancreatic enhancement appears relatively homogeneous without evidence for necrosis.  There is mild stable proximal pancreatic ductal dilatation.    - Retroperitoneum:  No significant adenopathy.    - Vascular: No abdominal aortic aneurysm.    - Abdominal wall:  Unremarkable.    Pelvis:    No pelvic mass, adenopathy, or free fluid.  The prostate contains calcification and causes nodular impression upon the inferior urinary bladder.    Bowel/Mesentery:    There are multiple colonic diverticula without inflammatory change of diverticulitis.  The colon contains normal volume stool.  The appendix is normal.  There are multiple gastric varices.  No other gastric abnormality seen.  There is wall thickening involving the duodenum, primarily 2nd portion with surrounding inflammatory change related to previously described pancreatic  process.  The remaining small bowel appears unremarkable.  No obstruction.  Presumed surgical material is noted in the left upper quadrant, new since the prior exam.    Bones:  No acute osseous abnormality and no suspicious lytic or blastic lesion.  There is degenerative change of the spine with prominent Schmorl's nodes noted.                                       X-Ray Abdomen Flat And Erect (Final result)  Result time 03/23/22 09:52:02      Final result by Jadiel Chapin MD (03/23/22 09:52:02)                   Impression:      Moderate constipation.      Electronically signed by: Jadiel Chapin MD  Date:    03/23/2022  Time:    09:52               Narrative:    EXAMINATION:  XR ABDOMEN FLAT AND ERECT    CLINICAL HISTORY:  Abdominal Pain;    FINDINGS:  Nonobstructive bowel gas pattern is noted.  Moderate constipation.  No radiopaque kidney calculus is identified.  There are multiple calcifications in the pelvis most consistent with phleboliths.  No evidence of organomegaly.  The bones demonstrate mild degenerative changes within the lower lumbar region.  The bones are otherwise intact.                                       Assessment/Plan:     * Acute on chronic combined systolic and diastolic heart failure  -Of note, long standing history of chronic pancreatitis,and Peustow procedure performed 11/21 with initial improvement in pain control, but has now started to experience increased abdominal pain again  -IVFs for rehydration/Of note, LR superior to NS for acute pancreatitis   -pain control  -NPO  -Consult GI if needed if no improvement   -AM CMP, amylase, lipase, CBC   -Continue pancrease      Abnormal liver enzymes  -,  noted  -Repeat enzymes in AM  -GI available for consult if needing further management        GERD (gastroesophageal reflux disease)  -continue pantoprazole       Hypertension  -BP uncontrolled d/t pain  -pain meds for pain control  -continue carvedilol  -monitor BP every 4 hours  for improvement  -will reassess if needed for BP optimization       VTE Risk Mitigation (From admission, onward)         Ordered     IP VTE LOW RISK PATIENT  Once         03/23/22 1417     Place sequential compression device  Until discontinued         03/23/22 1417                   Maral Hendrix NP  Department of Hospital Medicine   [facility]

## 2022-03-23 NOTE — PHARMACY MED REC
"Admission Medication History     The home medication history was taken by Alan Hill.    You may go to "Admission" then "Reconcile Home Medications" tabs to review and/or act upon these items.      The home medication list has been updated by the Pharmacy department.    Please read ALL comments highlighted in yellow.    Please address this information as you see fit.     Feel free to contact us if you have any questions or require assistance.      The medications listed below were removed from the home medication list. Please reorder if appropriate:  Patient reports no longer taking the following medication(s):   APIXABAN 5 MG TABLET   CETIRIZINE 10 MG TABLET   ONDANSETRON ODT 8 MG TABLET   PROMETHAZINE 25 MG TABLET    Medications listed below were obtained from: Patient/family, Analytic software- [employer] and Medical records  (Not in a hospital admission)      Potential issues to be addressed PRIOR TO DISCHARGE: NONE      Alan Hill CPhT  [employer] [phone]      Current Outpatient Medications on File Prior to Encounter   Medication Sig Dispense Refill Last Dose    carvediloL (COREG) 6.25 MG tablet Take 6.25 mg by mouth 2 (two) times daily.   3/22/2022 at Unknown time    ferrous gluconate (FERGON) 324 MG tablet Take 324 mg by mouth. twice weekly on an empty stomach (Sunday and Wednesday)   3/16/2022    lipase-protease-amylase (PANCREAZE) 16,800-56,800- 98,400 unit CpDR Take before meals (Patient taking differently: Take 1 capsule by mouth 4 (four) times daily. before meals and with a snack) 120 capsule 2 3/22/2022 at Unknown time    multivitamin (THERAGRAN) per tablet Take 1 tablet by mouth once daily.   3/22/2022 at Unknown time    oxyCODONE-acetaminophen (PERCOCET)  mg per tablet Take 1 tablet by mouth every 4 (four) hours as needed for Pain. 12 tablet 0 3/22/2022 at Unknown time    pantoprazole (PROTONIX) 40 MG tablet Take 1 tablet (40 mg total) by mouth once daily. 60 tablet 2 " 3/22/2022 at Unknown time    [DISCONTINUED] apixaban (ELIQUIS) 5 mg Tab one tablet       [DISCONTINUED] ondansetron (ZOFRAN-ODT) 8 MG TbDL DISSOLVE 1 TABLET ON THE TONGUE THREE TIMES DAILY AS NEEDED FOR NAUSEA OR VOMITING       [DISCONTINUED] promethazine (PHENERGAN) 25 MG tablet Take 1 tablet (25 mg total) by mouth every 6 (six) hours as needed for Nausea. 15 tablet 0                              .

## 2022-03-23 NOTE — SUBJECTIVE & OBJECTIVE
Past Medical History:   Diagnosis Date    Anticoagulant long-term use     On Eliquis for DVT    GERD (gastroesophageal reflux disease)     Hypertension     Hypothyroidism 10/26/2021    Pancreatic pseudocyst     Sleep apnea     Splenic vein thrombosis        Past Surgical History:   Procedure Laterality Date    CHOLECYSTECTOMY      ENDOSCOPIC ULTRASOUND OF UPPER GASTROINTESTINAL TRACT N/A 10/12/2021    Procedure: ULTRASOUND, UPPER GI TRACT, ENDOSCOPIC;  Surgeon: Odalys Leiva MD;  Location: [facility] ENDO;  Service: Endoscopy;  Laterality: N/A;  Upper and linear, 22 shark core, cytology and RPMI    ERCP N/A 9/29/2021    Procedure: ERCP (ENDOSCOPIC RETROGRADE CHOLANGIOPANCREATOGRAPHY);  Surgeon: Odalys Leiva MD;  Location: [facility] ENDO;  Service: Endoscopy;  Laterality: N/A;    ERCP W/ PLASTIC STENT PLACEMENT      LATERAL PANCREATICOJEJUNOSTOMY N/A 11/19/2021    Procedure: PANCREATICOJEJUNOSTOMY, SIDE-TO-SIDE tier 1;  Surgeon: Brian Hills MD;  Location: [facility] OR [address] [place] [address];  Service: General;  Laterality: N/A;       Review of patient's allergies indicates:  No Known Allergies    No current facility-administered medications on file prior to encounter.     Current Outpatient Medications on File Prior to Encounter   Medication Sig    carvediloL (COREG) 6.25 MG tablet Take 6.25 mg by mouth 2 (two) times daily.    ferrous gluconate (FERGON) 324 MG tablet Take 324 mg by mouth. twice weekly on an empty stomach (Sunday and Wednesday)    lipase-protease-amylase (PANCREAZE) 16,800-56,800- 98,400 unit CpDR Take before meals (Patient taking differently: Take 1 capsule by mouth 4 (four) times daily. before meals and with a snack)    multivitamin (THERAGRAN) per tablet Take 1 tablet by mouth once daily.    oxyCODONE-acetaminophen (PERCOCET)  mg per tablet Take 1 tablet by mouth every 4 (four) hours as needed for Pain.    pantoprazole (PROTONIX) 40 MG tablet Take 1 tablet (40 mg total) by mouth once daily.     [DISCONTINUED] apixaban (ELIQUIS) 5 mg Tab one tablet    [DISCONTINUED] ondansetron (ZOFRAN-ODT) 8 MG TbDL DISSOLVE 1 TABLET ON THE TONGUE THREE TIMES DAILY AS NEEDED FOR NAUSEA OR VOMITING    [DISCONTINUED] promethazine (PHENERGAN) 25 MG tablet Take 1 tablet (25 mg total) by mouth every 6 (six) hours as needed for Nausea.     Family History       Problem Relation (Age of Onset)    Arthritis Father    Cancer Father    Pancreatic cancer Father    Thyroid disease Mother, Father          Tobacco Use    Smoking status: Former Smoker    Smokeless tobacco: Never Used   Substance and Sexual Activity    Alcohol use: Not Currently    Drug use: Never    Sexual activity: Yes     Review of Systems   Constitutional:  Positive for appetite change.   HENT:  Negative for congestion, sinus pressure and sinus pain.    Eyes: Negative.    Respiratory:  Negative for cough, shortness of breath and wheezing.    Cardiovascular:  Negative for chest pain, palpitations and leg swelling.   Gastrointestinal:  Positive for abdominal distention, abdominal pain, diarrhea and vomiting.   Endocrine: Negative.    Genitourinary:  Positive for dysuria.   Musculoskeletal: Negative.    Skin: Negative.    Allergic/Immunologic: Negative.    Neurological:  Negative for dizziness, weakness and light-headedness.   Hematological: Negative.    Psychiatric/Behavioral:  Negative for confusion and decreased concentration.    Objective:     Vital Signs (Most Recent):  Temp: 98.7 °F (37.1 °C) (03/23/22 1605)  Pulse: 81 (03/23/22 1605)  Resp: 18 (03/23/22 1605)  BP: (!) 171/101 (03/23/22 1605)  SpO2: 95 % (03/23/22 1605)   Vital Signs (24h Range):  Temp:  [97.8 °F (36.6 °C)-98.7 °F (37.1 °C)] 98.7 °F (37.1 °C)  Pulse:  [71-90] 81  Resp:  [18-20] 18  SpO2:  [95 %-99 %] 95 %  BP: (125-174)/() 171/101     Weight: 92.1 kg (203 lb)  Body mass index is 28.31 kg/m².    Physical Exam  Vitals reviewed.   Constitutional:       General: He is awake.      Appearance: He  is well-groomed and overweight.   HENT:      Head: Normocephalic and atraumatic.   Eyes:      Extraocular Movements: Extraocular movements intact.      Conjunctiva/sclera: Conjunctivae normal.   Cardiovascular:      Rate and Rhythm: Normal rate and regular rhythm.      Heart sounds: Normal heart sounds, S1 normal and S2 normal.   Pulmonary:      Effort: Pulmonary effort is normal.      Breath sounds: Normal breath sounds and air entry.   Abdominal:      General: Bowel sounds are increased.      Palpations: Abdomen is soft.      Tenderness: There is abdominal tenderness in the epigastric area.   Musculoskeletal:      Cervical back: Normal range of motion and neck supple.   Skin:     General: Skin is warm and dry.      Capillary Refill: Capillary refill takes less than 2 seconds.          Neurological:      General: No focal deficit present.      Mental Status: He is alert and oriented to person, place, and time.      Cranial Nerves: Cranial nerves are intact.   Psychiatric:         Attention and Perception: Attention normal.         Mood and Affect: Mood normal.         Speech: Speech normal.         Behavior: Behavior normal. Behavior is cooperative.         Judgment: Judgment normal.           Significant Labs:   Results for orders placed or performed during the hospital encounter of 03/23/22   CBC W/ AUTO DIFFERENTIAL   Result Value Ref Range    WBC 10.48 3.90 - 12.70 K/uL    RBC 5.32 4.60 - 6.20 M/uL    Hemoglobin 15.5 14.0 - 18.0 g/dL    Hematocrit 47.0 40.0 - 54.0 %    MCV 88 82 - 98 fL    MCH 29.1 27.0 - 31.0 pg    MCHC 33.0 32.0 - 36.0 g/dL    RDW 15.7 (H) 11.5 - 14.5 %    Platelets 148 (L) 150 - 450 K/uL    MPV 9.1 (L) 9.2 - 12.9 fL    Immature Granulocytes 0.2 0.0 - 0.5 %    Gran # (ANC) 8.9 (H) 1.8 - 7.7 K/uL    Immature Grans (Abs) 0.02 0.00 - 0.04 K/uL    Lymph # 0.7 (L) 1.0 - 4.8 K/uL    Mono # 0.7 0.3 - 1.0 K/uL    Eos # 0.1 0.0 - 0.5 K/uL    Baso # 0.04 0.00 - 0.20 K/uL    nRBC 0 0 /100 WBC    Gran %  84.6 (H) 38.0 - 73.0 %    Lymph % 7.1 (L) 18.0 - 48.0 %    Mono % 6.4 4.0 - 15.0 %    Eosinophil % 1.3 0.0 - 8.0 %    Basophil % 0.4 0.0 - 1.9 %    Differential Method Automated    Comp. Metabolic Panel   Result Value Ref Range    Sodium 135 (L) 136 - 145 mmol/L    Potassium 4.0 3.5 - 5.1 mmol/L    Chloride 100 95 - 110 mmol/L    CO2 21 (L) 23 - 29 mmol/L    Glucose 114 (H) 70 - 110 mg/dL    BUN 17 6 - 20 mg/dL    Creatinine 1.0 0.5 - 1.4 mg/dL    Calcium 9.5 8.7 - 10.5 mg/dL    Total Protein 7.3 6.0 - 8.4 g/dL    Albumin 4.2 3.5 - 5.2 g/dL    Total Bilirubin 2.4 (H) 0.1 - 1.0 mg/dL    Alkaline Phosphatase 212 (H) 55 - 135 U/L     (H) 10 - 40 U/L     (H) 10 - 44 U/L    Anion Gap 14 8 - 16 mmol/L    eGFR if African American >60 >60 mL/min/1.73 m^2    eGFR if non African American >60 >60 mL/min/1.73 m^2   Lipase   Result Value Ref Range    Lipase 154 (H) 4 - 60 U/L   Urinalysis, Reflex to Urine Culture Urine, Clean Catch    Specimen: Urine   Result Value Ref Range    Specimen UA Urine, Clean Catch     Color, UA Yellow Yellow, Straw, Amrita    Appearance, UA Clear Clear    pH, UA 7.0 5.0 - 8.0    Specific Gravity, UA 1.010 1.005 - 1.030    Protein, UA Negative Negative    Glucose, UA Negative Negative    Ketones, UA Negative Negative    Bilirubin (UA) Negative Negative    Occult Blood UA Negative Negative    Nitrite, UA Negative Negative    Urobilinogen, UA 1.0 <2.0 EU/dL    Leukocytes, UA Negative Negative   Lactic acid, plasma   Result Value Ref Range    Lactate (Lactic Acid) 1.3 0.5 - 2.2 mmol/L   POCT COVID-19 Rapid Screening   Result Value Ref Range    POC Rapid COVID Negative Negative     Acceptable Yes         Significant Imaging:   Imaging Results              CT Abdomen Pelvis With Contrast (Final result)  Result time 03/23/22 11:44:27      Final result by Cristina De Paz MD (03/23/22 11:44:27)                   Impression:      Moderate peripancreatic fat stranding, similar  to the comparison examination and remaining compatible with pancreatitis.  There is involvement of the 2nd portion of the duodenum with wall thickening suggesting duodenitis.  Inflammatory change now extends along the right pericolic gutter.  No focal fluid collection to suggest pseudocyst formation.  No evidence for pancreatic necrosis.    Persistent splenic vein thrombosis with multiple upper abdominal collateral vessels including prominent gastric varices.      Electronically signed by: Cristina De Paz  Date:    03/23/2022  Time:    11:44               Narrative:    EXAMINATION:  CT ABDOMEN PELVIS WITH CONTRAST    CLINICAL HISTORY:  Abdominal pain with history of pancreatitis and splenic vein thrombosis;    TECHNIQUE:  Low dose axial images, sagittal and coronal reformations were obtained from the lung bases to the pubic symphysis following the IV administration of 100 mL of Omnipaque 350 .  Oral contrast was not administered. All CT scans at this facility are performed using dose optimization techniques including the following: automated exposure control; adjustment of the mA and/or kV; use of iterative reconstruction technique.    COMPARISON:  Abdominal radiograph 03/23/2022, CT abdomen pelvis with contrast 10/26/2021    FINDINGS:  Abdomen:    - Lower thorax:Unremarkable.    - Lung bases: There are bibasilar linear and dependent density suggesting subsegmental atelectasis or fibrosis.  Calcified right lower lobe granuloma is noted.    - Liver: No focal mass, noting geographic relative hyperenhancement within hepatic segment 6, likely transient hepatic attenuation difference.    - Gallbladder: The gallbladder is not seen, potentially surgically absent.    - Bile Ducts: No evidence of intra or extra hepatic biliary ductal dilation.    - Spleen: Negative.  The splenic vein is occluded.    - Kidneys: No mass or hydronephrosis.  There is a 6.4 cm superior right renal cyst.  The ureters are normal in course and  caliber.  Retroperitoneal inflammatory change extends along the course of the right ureter, no current evidence for obstruction.    - Adrenals: Unremarkable.    - Pancreas/duodenum: There is significant peripancreatic fat stranding/inflammatory change adjacent to the pancreatic body, neck, head, and uncinate process.  Findings appear similar to the comparison exam with involvement of the 2nd portion of the duodenum noting duodenal wall thickening appears increased since the comparison exam.  Inflammatory change now extends inferiorly within the right pericolic gutter and adjacent to the right ureter.  No ureteral obstruction present.  No focal fluid collection to suggest pseudocyst formation.  Pancreatic enhancement appears relatively homogeneous without evidence for necrosis.  There is mild stable proximal pancreatic ductal dilatation.    - Retroperitoneum:  No significant adenopathy.    - Vascular: No abdominal aortic aneurysm.    - Abdominal wall:  Unremarkable.    Pelvis:    No pelvic mass, adenopathy, or free fluid.  The prostate contains calcification and causes nodular impression upon the inferior urinary bladder.    Bowel/Mesentery:    There are multiple colonic diverticula without inflammatory change of diverticulitis.  The colon contains normal volume stool.  The appendix is normal.  There are multiple gastric varices.  No other gastric abnormality seen.  There is wall thickening involving the duodenum, primarily 2nd portion with surrounding inflammatory change related to previously described pancreatic process.  The remaining small bowel appears unremarkable.  No obstruction.  Presumed surgical material is noted in the left upper quadrant, new since the prior exam.    Bones:  No acute osseous abnormality and no suspicious lytic or blastic lesion.  There is degenerative change of the spine with prominent Schmorl's nodes noted.                                       X-Ray Abdomen Flat And Erect (Final  result)  Result time 03/23/22 09:52:02      Final result by Jadiel Chapin MD (03/23/22 09:52:02)                   Impression:      Moderate constipation.      Electronically signed by: Jadiel Chapin MD  Date:    03/23/2022  Time:    09:52               Narrative:    EXAMINATION:  XR ABDOMEN FLAT AND ERECT    CLINICAL HISTORY:  Abdominal Pain;    FINDINGS:  Nonobstructive bowel gas pattern is noted.  Moderate constipation.  No radiopaque kidney calculus is identified.  There are multiple calcifications in the pelvis most consistent with phleboliths.  No evidence of organomegaly.  The bones demonstrate mild degenerative changes within the lower lumbar region.  The bones are otherwise intact.

## 2022-03-23 NOTE — HPI
Fabrice Zamorano is a 57-year-old  male with PMHx of Hypertension, DVT, and pancreatitis who presents to [facility] ED for worsening epigastric abdominal pain for the past 3 days that was unrelieved by his home pain medication of Percocet  mg. Pt states he has been suffering with pancreatitis since June 2021. On 11/19/21 pt had a [place] procedure (Lateral Pancreaticojenjunostomy) for his chronic pancreatitis per surgeon Dr. Hills. States he had been doing relatively well until he started having several episodes of watery, odorous bowel movements on this past Monday with increased pain to his epigastric region. States the diarrhea did subside but the epigastric pain continued. States he also noticed on yesterday that his urine was becoming darker and he was urinating less. States he attempted to take a sip of water this AM and immediately began intractable vomiting so he came to [facility] ED for further evaluation. ED workup included a CT of abdomen/pelvis which revealed moderate peripancreatic fat stranding, similar to the comparison examination and remaining compatible with pancreatitis, Xray abdomen showed moderate constipation, Alkaline phosphatase 212, Bilirubin 2.4, , , Lipase 154, Urine negative, EKG NSR. Based upon these findings, gastroenterology ([facility]) was consulted by ED and recommendations for rehydration, pain control and gut rest were given. Denies ETOH use and is a never smoker. Pt is a Full Code status and his surrogate decision maker is his wife, Junie, at [phone]. He is admitted to Hospital Medicine and placed on observation.

## 2022-03-23 NOTE — ED PROVIDER NOTES
SCRIBE #1 NOTE: I, Adam Guerrier, am scribing for, and in the presence of, Sunny Blount MD. I have scribed the entire note.      History      Chief Complaint   Patient presents with    Abdominal Pain     Pt has hx of pancreatitis and is experiencing upper abdominal pain since Monday. Pt started vomiting this morning and had dark urine and came to ED. Pt has hx of hypertension and took BP med this morning but threw it up.        Review of patient's allergies indicates:  No Known Allergies     HPI   HPI    3/23/2022, 9:36 AM   History obtained from the patient      History of Present Illness: Fabrice Zamorano is a 57 y.o. male patient HTN, DVT, pancreatitis who presents to the Emergency Department for epigastric abdominal pain, onset 2 days PTA. Symptoms are constant c his previous episodes of pancreatitis and moderate in severity. No mitigating or exacerbating factors reported. Associated sxs include n/v. Patient denies any fever, chills, SOB, CP, weakness, numbness, dizziness, headache, and all other sxs at this time. Pt is currently on Eliquis for splenic vein thrombus. No further complaints or concerns at this time.     Arrival mode: Personal vehicle    PCP: Rebecca Jackson MD       Past Medical History:  Past Medical History:   Diagnosis Date    Anticoagulant long-term use     On Eliquis for DVT    GERD (gastroesophageal reflux disease)     Hypertension     Hypothyroidism 10/26/2021    Pancreatic pseudocyst     Sleep apnea     Splenic vein thrombosis        Past Surgical History:  Past Surgical History:   Procedure Laterality Date    CHOLECYSTECTOMY      ENDOSCOPIC ULTRASOUND OF UPPER GASTROINTESTINAL TRACT N/A 10/12/2021    Procedure: ULTRASOUND, UPPER GI TRACT, ENDOSCOPIC;  Surgeon: Odalys Leiva MD;  Location: [facility];  Service: Endoscopy;  Laterality: N/A;  Upper and linear, 22 shark core, cytology and RPMI    ERCP N/A 9/29/2021    Procedure: ERCP (ENDOSCOPIC RETROGRADE  CHOLANGIOPANCREATOGRAPHY);  Surgeon: Odalys Leiva MD;  Location[facility];  Service: Endoscopy;  Laterality: N/A;    ERCP W/ PLASTIC STENT PLACEMENT      LATERAL PANCREATICOJEJUNOSTOMY N/A 11/19/2021    Procedure: PANCREATICOJEJUNOSTOMY, SIDE-TO-SIDE tier 1;  Surgeon: Brian Hills MD;  Location: [facility] OR [address] [place] [address];  Service: General;  Laterality: N/A;         Family History:  Family History   Problem Relation Age of Onset    Thyroid disease Mother     Cancer Father     Pancreatic cancer Father     Arthritis Father     Thyroid disease Father        Social History:  Social History     Tobacco Use    Smoking status: Former Smoker    Smokeless tobacco: Never Used   Substance and Sexual Activity    Alcohol use: Not Currently    Drug use: Never    Sexual activity: Yes       ROS   Review of Systems   Constitutional: Negative for chills and fever.   HENT: Negative for sore throat.    Respiratory: Negative for shortness of breath.    Cardiovascular: Negative for chest pain.   Gastrointestinal: Positive for abdominal pain (epigastric), nausea and vomiting.   Genitourinary: Negative for dysuria.   Musculoskeletal: Negative for back pain.   Skin: Negative for rash.   Neurological: Negative for dizziness, weakness, light-headedness, numbness and headaches.   Hematological: Does not bruise/bleed easily.   All other systems reviewed and are negative.    Physical Exam      Initial Vitals [03/23/22 0922]   BP Pulse Resp Temp SpO2   (!) 125/99 89 20 97.8 °F (36.6 °C) 97 %      MAP       --          Physical Exam  Nursing Notes and Vital Signs Reviewed.  Constitutional: Patient is in no acute distress. Well-developed and well-nourished.  Head: Atraumatic. Normocephalic.  Eyes: PERRL. EOM intact. Conjunctivae are not pale. No scleral icterus.  ENT: Mucous membranes are moist. Oropharynx is clear and symmetric.    Neck: Supple. Full ROM. No lymphadenopathy.  Cardiovascular: Regular rate. Regular rhythm. No  murmurs, rubs, or gallops. Distal pulses are 2+ and symmetric.  Pulmonary/Chest: No respiratory distress. Clear to auscultation bilaterally. No wheezing or rales.  Abdominal: Soft and non-distended.  There is no tenderness.  No rebound, guarding, or rigidity.   Musculoskeletal: Moves all extremities. No obvious deformities. No edema.  Skin: Warm and dry.  Neurological:  Alert, awake, and appropriate.  Normal speech.  No acute focal neurological deficits are appreciated.  Psychiatric: Normal affect. Good eye contact. Appropriate in content.    ED Course    Procedures  ED Vital Signs:  Vitals:    03/23/22 0922 03/23/22 1000 03/23/22 1100 03/23/22 1200   BP: (!) 125/99 (!) 174/104 (!) 166/99 (!) 166/103   Pulse: 89 90 87 71   Resp: 20 20 20 20   Temp: 97.8 °F (36.6 °C)  98 °F (36.7 °C)    TempSrc: Oral  Oral    SpO2: 97% 99% 95% 95%   Weight: 92.4 kg (203 lb 11.3 oz)          Abnormal Lab Results:  Labs Reviewed   CBC W/ AUTO DIFFERENTIAL - Abnormal; Notable for the following components:       Result Value    RDW 15.7 (*)     Platelets 148 (*)     MPV 9.1 (*)     Gran # (ANC) 8.9 (*)     Lymph # 0.7 (*)     Gran % 84.6 (*)     Lymph % 7.1 (*)     All other components within normal limits   COMPREHENSIVE METABOLIC PANEL - Abnormal; Notable for the following components:    Sodium 135 (*)     CO2 21 (*)     Glucose 114 (*)     Total Bilirubin 2.4 (*)     Alkaline Phosphatase 212 (*)      (*)      (*)     All other components within normal limits   LIPASE - Abnormal; Notable for the following components:    Lipase 154 (*)     All other components within normal limits   LACTIC ACID, PLASMA   URINALYSIS, REFLEX TO URINE CULTURE   SARS-COV-2 RDRP GENE    Narrative:     This test utilizes isothermal nucleic acid amplification   technology to detect the SARS-CoV-2 RdRp nucleic acid segment.   The analytical sensitivity (limit of detection) is 125 genome   equivalents/mL.   A POSITIVE result implies infection with  the SARS-CoV-2 virus;   the patient is presumed to be contagious.     A NEGATIVE result means that SARS-CoV-2 nucleic acids are not   present above the limit of detection. A NEGATIVE result should be   treated as presumptive. It does not rule out the possibility of   COVID-19 and should not be the sole basis for treatment decisions.   If COVID-19 is strongly suspected based on clinical and exposure   history, re-testing using an alternate molecular assay should be   considered.   This test is only for use under the Food and Drug   Administration s Emergency Use Authorization (EUA).   Commercial kits are provided by [employer].   Performance characteristics of the EUA have been independently   verified by [facility] Department of   Pathology and Laboratory Medicine.   _________________________________________________________________   The authorized Fact Sheet for Healthcare Providers and the authorized Fact   Sheet for Patients of the ID NOW COVID-19 are available on the FDA   website:     https://www.fda.gov/media/141708/download  https://www.fda.gov/media/856169/download                All Lab Results:  Results for orders placed or performed during the hospital encounter of 03/23/22   CBC W/ AUTO DIFFERENTIAL   Result Value Ref Range    WBC 10.48 3.90 - 12.70 K/uL    RBC 5.32 4.60 - 6.20 M/uL    Hemoglobin 15.5 14.0 - 18.0 g/dL    Hematocrit 47.0 40.0 - 54.0 %    MCV 88 82 - 98 fL    MCH 29.1 27.0 - 31.0 pg    MCHC 33.0 32.0 - 36.0 g/dL    RDW 15.7 (H) 11.5 - 14.5 %    Platelets 148 (L) 150 - 450 K/uL    MPV 9.1 (L) 9.2 - 12.9 fL    Immature Granulocytes 0.2 0.0 - 0.5 %    Gran # (ANC) 8.9 (H) 1.8 - 7.7 K/uL    Immature Grans (Abs) 0.02 0.00 - 0.04 K/uL    Lymph # 0.7 (L) 1.0 - 4.8 K/uL    Mono # 0.7 0.3 - 1.0 K/uL    Eos # 0.1 0.0 - 0.5 K/uL    Baso # 0.04 0.00 - 0.20 K/uL    nRBC 0 0 /100 WBC    Gran % 84.6 (H) 38.0 - 73.0 %    Lymph % 7.1 (L) 18.0 - 48.0 %    Mono % 6.4 4.0 - 15.0 %     Eosinophil % 1.3 0.0 - 8.0 %    Basophil % 0.4 0.0 - 1.9 %    Differential Method Automated    Comp. Metabolic Panel   Result Value Ref Range    Sodium 135 (L) 136 - 145 mmol/L    Potassium 4.0 3.5 - 5.1 mmol/L    Chloride 100 95 - 110 mmol/L    CO2 21 (L) 23 - 29 mmol/L    Glucose 114 (H) 70 - 110 mg/dL    BUN 17 6 - 20 mg/dL    Creatinine 1.0 0.5 - 1.4 mg/dL    Calcium 9.5 8.7 - 10.5 mg/dL    Total Protein 7.3 6.0 - 8.4 g/dL    Albumin 4.2 3.5 - 5.2 g/dL    Total Bilirubin 2.4 (H) 0.1 - 1.0 mg/dL    Alkaline Phosphatase 212 (H) 55 - 135 U/L     (H) 10 - 40 U/L     (H) 10 - 44 U/L    Anion Gap 14 8 - 16 mmol/L    eGFR if African American >60 >60 mL/min/1.73 m^2    eGFR if non African American >60 >60 mL/min/1.73 m^2   Lipase   Result Value Ref Range    Lipase 154 (H) 4 - 60 U/L   Lactic acid, plasma   Result Value Ref Range    Lactate (Lactic Acid) 1.3 0.5 - 2.2 mmol/L   POCT COVID-19 Rapid Screening   Result Value Ref Range    POC Rapid COVID Negative Negative     Acceptable Yes      Imaging Results:  Imaging Results          CT Abdomen Pelvis With Contrast (Final result)  Result time 03/23/22 11:44:27    Final result by Cristina De Paz MD (03/23/22 11:44:27)                 Impression:      Moderate peripancreatic fat stranding, similar to the comparison examination and remaining compatible with pancreatitis.  There is involvement of the 2nd portion of the duodenum with wall thickening suggesting duodenitis.  Inflammatory change now extends along the right pericolic gutter.  No focal fluid collection to suggest pseudocyst formation.  No evidence for pancreatic necrosis.    Persistent splenic vein thrombosis with multiple upper abdominal collateral vessels including prominent gastric varices.      Electronically signed by: Cristina De Paz  Date:    03/23/2022  Time:    11:44             Narrative:    EXAMINATION:  CT ABDOMEN PELVIS WITH CONTRAST    CLINICAL HISTORY:  Abdominal  pain with history of pancreatitis and splenic vein thrombosis;    TECHNIQUE:  Low dose axial images, sagittal and coronal reformations were obtained from the lung bases to the pubic symphysis following the IV administration of 100 mL of Omnipaque 350 .  Oral contrast was not administered. All CT scans at this facility are performed using dose optimization techniques including the following: automated exposure control; adjustment of the mA and/or kV; use of iterative reconstruction technique.    COMPARISON:  Abdominal radiograph 03/23/2022, CT abdomen pelvis with contrast 10/26/2021    FINDINGS:  Abdomen:    - Lower thorax:Unremarkable.    - Lung bases: There are bibasilar linear and dependent density suggesting subsegmental atelectasis or fibrosis.  Calcified right lower lobe granuloma is noted.    - Liver: No focal mass, noting geographic relative hyperenhancement within hepatic segment 6, likely transient hepatic attenuation difference.    - Gallbladder: The gallbladder is not seen, potentially surgically absent.    - Bile Ducts: No evidence of intra or extra hepatic biliary ductal dilation.    - Spleen: Negative.  The splenic vein is occluded.    - Kidneys: No mass or hydronephrosis.  There is a 6.4 cm superior right renal cyst.  The ureters are normal in course and caliber.  Retroperitoneal inflammatory change extends along the course of the right ureter, no current evidence for obstruction.    - Adrenals: Unremarkable.    - Pancreas/duodenum: There is significant peripancreatic fat stranding/inflammatory change adjacent to the pancreatic body, neck, head, and uncinate process.  Findings appear similar to the comparison exam with involvement of the 2nd portion of the duodenum noting duodenal wall thickening appears increased since the comparison exam.  Inflammatory change now extends inferiorly within the right pericolic gutter and adjacent to the right ureter.  No ureteral obstruction present.  No focal fluid  collection to suggest pseudocyst formation.  Pancreatic enhancement appears relatively homogeneous without evidence for necrosis.  There is mild stable proximal pancreatic ductal dilatation.    - Retroperitoneum:  No significant adenopathy.    - Vascular: No abdominal aortic aneurysm.    - Abdominal wall:  Unremarkable.    Pelvis:    No pelvic mass, adenopathy, or free fluid.  The prostate contains calcification and causes nodular impression upon the inferior urinary bladder.    Bowel/Mesentery:    There are multiple colonic diverticula without inflammatory change of diverticulitis.  The colon contains normal volume stool.  The appendix is normal.  There are multiple gastric varices.  No other gastric abnormality seen.  There is wall thickening involving the duodenum, primarily 2nd portion with surrounding inflammatory change related to previously described pancreatic process.  The remaining small bowel appears unremarkable.  No obstruction.  Presumed surgical material is noted in the left upper quadrant, new since the prior exam.    Bones:  No acute osseous abnormality and no suspicious lytic or blastic lesion.  There is degenerative change of the spine with prominent Schmorl's nodes noted.                               X-Ray Abdomen Flat And Erect (Final result)  Result time 03/23/22 09:52:02    Final result by Jadiel Chapin MD (03/23/22 09:52:02)                 Impression:      Moderate constipation.      Electronically signed by: Jadiel Chapin MD  Date:    03/23/2022  Time:    09:52             Narrative:    EXAMINATION:  XR ABDOMEN FLAT AND ERECT    CLINICAL HISTORY:  Abdominal Pain;    FINDINGS:  Nonobstructive bowel gas pattern is noted.  Moderate constipation.  No radiopaque kidney calculus is identified.  There are multiple calcifications in the pelvis most consistent with phleboliths.  No evidence of organomegaly.  The bones demonstrate mild degenerative changes within the lower lumbar region.  The  bones are otherwise intact.                               The EKG was ordered, reviewed, and independently interpreted by the ED provider.  Interpretation time: 09:51  Rate: 85 BPM  Rhythm: normal sinus rhythm  Interpretation: Baseline artifact. No acute ST changes. No STEMI.           The Emergency Provider reviewed the vital signs and test results, which are outlined above.    ED Discussion     1:37 PM: Discussed pt's case with Dr. Mccain (Gastroenterology) who recommends keeping the pt NPO and admission to Hospital Medicine.    1:48 PM: Discussed case with Estefania Lyman NP (Hospital Medicine). Dr. Valle agrees with current care and management of pt and accepts admission.   Admitting Service: Hospital Medicine  Admitting Physician: Dr. Valle  Admit to: Obs Med Surg    1:49 PM: Re-evaluated pt. I have discussed test results, shared treatment plan, and the need for admission with patient and family at bedside. Pt and family express understanding at this time and agree with all information. All questions answered. Pt and family have no further questions or concerns at this time. Pt is ready for admit.         ED Medication(s):  Medications   sodium chloride 0.9% bolus 1,000 mL (0 mLs Intravenous Stopped 3/23/22 1101)   promethazine (PHENERGAN) 12.5 mg in dextrose 5 % 50 mL IVPB (0 mg Intravenous Stopped 3/23/22 1021)   HYDROmorphone (PF) injection 1 mg (1 mg Intravenous Given 3/23/22 1000)   iohexoL (OMNIPAQUE 350) injection 100 mL (100 mLs Intravenous Given 3/23/22 1105)     New Prescriptions    No medications on file           Medical Decision Making    Medical Decision Making:   Clinical Tests:   Lab Tests: Ordered and Reviewed  Radiological Study: Ordered and Reviewed  Medical Tests: Ordered and Reviewed           Scribe Attestation:   Scribe #1: I performed the above scribed service and the documentation accurately describes the services I performed. I attest to the accuracy of the note.    Attending:    Physician Attestation Statement for Scribe #1: I, Sunny Blount MD, personally performed the services described in this documentation, as scribed by Adam Guerrier, in my presence, and it is both accurate and complete.          Clinical Impression     Final diagnoses:  [R10.9] Abdominal pain  [K85.90] Pancreatitis (Primary)  [I82.890] Splenic vein thrombosis      Disposition:   Disposition: Placed in Observation  Condition: Fair         Sunny Blount MD  03/25/22 0774

## 2022-03-23 NOTE — ASSESSMENT & PLAN NOTE
-BP uncontrolled d/t pain  -pain meds for pain control  -continue carvedilol  -monitor BP every 4 hours for improvement  -will reassess if needed for BP optimization

## 2022-03-24 VITALS
HEART RATE: 73 BPM | WEIGHT: 203 LBS | BODY MASS INDEX: 28.42 KG/M2 | OXYGEN SATURATION: 97 % | TEMPERATURE: 98 F | RESPIRATION RATE: 18 BRPM | DIASTOLIC BLOOD PRESSURE: 89 MMHG | HEIGHT: 71 IN | SYSTOLIC BLOOD PRESSURE: 152 MMHG

## 2022-03-24 LAB
ALBUMIN SERPL BCP-MCNC: 3.7 G/DL (ref 3.5–5.2)
ALP SERPL-CCNC: 215 U/L (ref 55–135)
ALT SERPL W/O P-5'-P-CCNC: 311 U/L (ref 10–44)
AMYLASE SERPL-CCNC: 67 U/L (ref 20–110)
ANION GAP SERPL CALC-SCNC: 12 MMOL/L (ref 8–16)
AST SERPL-CCNC: 140 U/L (ref 10–40)
BASOPHILS # BLD AUTO: 0.02 K/UL (ref 0–0.2)
BASOPHILS NFR BLD: 0.2 % (ref 0–1.9)
BILIRUB SERPL-MCNC: 1.7 MG/DL (ref 0.1–1)
BUN SERPL-MCNC: 16 MG/DL (ref 6–20)
CALCIUM SERPL-MCNC: 9.6 MG/DL (ref 8.7–10.5)
CHLORIDE SERPL-SCNC: 97 MMOL/L (ref 95–110)
CO2 SERPL-SCNC: 26 MMOL/L (ref 23–29)
CREAT SERPL-MCNC: 0.9 MG/DL (ref 0.5–1.4)
DIFFERENTIAL METHOD: ABNORMAL
EOSINOPHIL # BLD AUTO: 0.1 K/UL (ref 0–0.5)
EOSINOPHIL NFR BLD: 1.1 % (ref 0–8)
ERYTHROCYTE [DISTWIDTH] IN BLOOD BY AUTOMATED COUNT: 15.8 % (ref 11.5–14.5)
EST. GFR  (AFRICAN AMERICAN): >60 ML/MIN/1.73 M^2
EST. GFR  (NON AFRICAN AMERICAN): >60 ML/MIN/1.73 M^2
GLUCOSE SERPL-MCNC: 106 MG/DL (ref 70–110)
HCT VFR BLD AUTO: 44.4 % (ref 40–54)
HGB BLD-MCNC: 14.3 G/DL (ref 14–18)
IMM GRANULOCYTES # BLD AUTO: 0.03 K/UL (ref 0–0.04)
IMM GRANULOCYTES NFR BLD AUTO: 0.3 % (ref 0–0.5)
LYMPHOCYTES # BLD AUTO: 1.1 K/UL (ref 1–4.8)
LYMPHOCYTES NFR BLD: 12.8 % (ref 18–48)
MCH RBC QN AUTO: 28.5 PG (ref 27–31)
MCHC RBC AUTO-ENTMCNC: 32.2 G/DL (ref 32–36)
MCV RBC AUTO: 89 FL (ref 82–98)
MONOCYTES # BLD AUTO: 0.7 K/UL (ref 0.3–1)
MONOCYTES NFR BLD: 8.2 % (ref 4–15)
NEUTROPHILS # BLD AUTO: 6.8 K/UL (ref 1.8–7.7)
NEUTROPHILS NFR BLD: 77.4 % (ref 38–73)
NRBC BLD-RTO: 0 /100 WBC
PLATELET # BLD AUTO: 144 K/UL (ref 150–450)
PMV BLD AUTO: 9.2 FL (ref 9.2–12.9)
POTASSIUM SERPL-SCNC: 4.5 MMOL/L (ref 3.5–5.1)
PROT SERPL-MCNC: 6.8 G/DL (ref 6–8.4)
RBC # BLD AUTO: 5.01 M/UL (ref 4.6–6.2)
SODIUM SERPL-SCNC: 135 MMOL/L (ref 136–145)
WBC # BLD AUTO: 8.85 K/UL (ref 3.9–12.7)

## 2022-03-24 PROCEDURE — 25000003 PHARM REV CODE 250: Performed by: NURSE PRACTITIONER

## 2022-03-24 PROCEDURE — 96376 TX/PRO/DX INJ SAME DRUG ADON: CPT

## 2022-03-24 PROCEDURE — 21400001 HC TELEMETRY ROOM

## 2022-03-24 PROCEDURE — 80053 COMPREHEN METABOLIC PANEL: CPT | Performed by: NURSE PRACTITIONER

## 2022-03-24 PROCEDURE — 96361 HYDRATE IV INFUSION ADD-ON: CPT

## 2022-03-24 PROCEDURE — 82150 ASSAY OF AMYLASE: CPT | Performed by: NURSE PRACTITIONER

## 2022-03-24 PROCEDURE — 36415 COLL VENOUS BLD VENIPUNCTURE: CPT | Performed by: NURSE PRACTITIONER

## 2022-03-24 PROCEDURE — 63600175 PHARM REV CODE 636 W HCPCS: Performed by: NURSE PRACTITIONER

## 2022-03-24 PROCEDURE — 94761 N-INVAS EAR/PLS OXIMETRY MLT: CPT

## 2022-03-24 PROCEDURE — 85025 COMPLETE CBC W/AUTO DIFF WBC: CPT | Performed by: NURSE PRACTITIONER

## 2022-03-24 RX ORDER — OXYCODONE HYDROCHLORIDE 5 MG/1
5 TABLET ORAL EVERY 6 HOURS PRN
Status: DISCONTINUED | OUTPATIENT
Start: 2022-03-24 | End: 2022-03-24 | Stop reason: HOSPADM

## 2022-03-24 RX ORDER — OXYCODONE HYDROCHLORIDE 5 MG/1
5 TABLET ORAL EVERY 6 HOURS PRN
Qty: 28 TABLET | Refills: 0 | Status: SHIPPED | OUTPATIENT
Start: 2022-03-24 | End: 2022-03-31

## 2022-03-24 RX ORDER — SODIUM CHLORIDE, SODIUM LACTATE, POTASSIUM CHLORIDE, CALCIUM CHLORIDE 600; 310; 30; 20 MG/100ML; MG/100ML; MG/100ML; MG/100ML
INJECTION, SOLUTION INTRAVENOUS CONTINUOUS
Status: ACTIVE | OUTPATIENT
Start: 2022-03-24 | End: 2022-03-24

## 2022-03-24 RX ADMIN — MAGNESIUM HYDROXIDE/ALUMINUM HYDROXICE/SIMETHICONE 30 ML: 120; 1200; 1200 SUSPENSION ORAL at 05:03

## 2022-03-24 RX ADMIN — DOCUSATE SODIUM AND SENNOSIDES 1 TABLET: 8.6; 5 TABLET, FILM COATED ORAL at 09:03

## 2022-03-24 RX ADMIN — CARVEDILOL 6.25 MG: 6.25 TABLET, FILM COATED ORAL at 09:03

## 2022-03-24 RX ADMIN — OXYCODONE HYDROCHLORIDE 5 MG: 5 TABLET ORAL at 11:03

## 2022-03-24 RX ADMIN — PANTOPRAZOLE SODIUM 40 MG: 40 TABLET, DELAYED RELEASE ORAL at 09:03

## 2022-03-24 RX ADMIN — HYDROMORPHONE HYDROCHLORIDE 1 MG: 2 INJECTION INTRAMUSCULAR; INTRAVENOUS; SUBCUTANEOUS at 02:03

## 2022-03-24 RX ADMIN — SODIUM CHLORIDE, SODIUM LACTATE, POTASSIUM CHLORIDE, AND CALCIUM CHLORIDE: .6; .31; .03; .02 INJECTION, SOLUTION INTRAVENOUS at 11:03

## 2022-03-24 RX ADMIN — SODIUM CHLORIDE 1000 ML: 0.9 INJECTION, SOLUTION INTRAVENOUS at 02:03

## 2022-03-24 RX ADMIN — SODIUM CHLORIDE, SODIUM LACTATE, POTASSIUM CHLORIDE, AND CALCIUM CHLORIDE: .6; .31; .03; .02 INJECTION, SOLUTION INTRAVENOUS at 02:03

## 2022-03-24 NOTE — DISCHARGE INSTRUCTIONS
Avoid Tylenol containing products at this time as your liver function enzymes were elevated.  I have sent you in oxycodone to help manage your acute pain.  Please follow up with PCP and GI specialist.  Return to Emergency Department with worsening pain or symptoms.

## 2022-03-24 NOTE — DISCHARGE SUMMARY
[facility]  [facility] Medicine  Discharge Summary      Patient Name: Fabrice Zamorano  MRN: [id number]  Patient Class: IP- Inpatient  Admission Date: 3/23/2022  Hospital Length of Stay: 0 days  Discharge Date and Time:  03/24/2022 1:50 PM  Attending Physician: Travon Valle MD   Discharging Provider: Odessa Covington NP  Primary Care Provider: Rebecca Jackson MD      HPI:   Fabrice Zamorano is a 57-year-old  male with PMHx of Hypertension, DVT, and pancreatitis who presents to [facility] ED for worsening epigastric abdominal pain for the past 3 days that was unrelieved by his home pain medication of Percocet  mg. Pt states he has been suffering with pancreatitis since June 2021. On 11/19/21 pt had a Peustow procedure (Lateral Pancreaticojenjunostomy) for his chronic pancreatitis per surgeon Dr. Hills. States he had been doing relatively well until he started having several episodes of watery, odorous bowel movements on this past Monday with increased pain to his epigastric region. States the diarrhea did subside but the epigastric pain continued. States he also noticed on yesterday that his urine was becoming darker and he was urinating less. States he attempted to take a sip of water this AM and immediately began intractable vomiting so he came to [facility] ED for further evaluation. ED workup included a CT of abdomen/pelvis which revealed moderate peripancreatic fat stranding, similar to the comparison examination and remaining compatible with pancreatitis, Xray abdomen showed moderate constipation, Alkaline phosphatase 212, Bilirubin 2.4, , , Lipase 154, Urine negative, EKG NSR. Based upon these findings, gastroenterology (Adán) was consulted by ED and recommendations for rehydration, pain control and gut rest were given. Denies ETOH use and is a never smoker. Pt is a Full Code status and his surrogate decision maker is his wife, Junie, at [phone]. He is admitted to Hospital  Medicine and placed on observation.       * No surgery found *      Hospital Course:   3/24/2022  Patient admitted yesterday for the treatment of acute on chronic pancreatitis. Pain is much better controlled, rating 2/10. Vitals and Labs reviewed. LFTs improving from arrival. Requesting oxycodone vs Dilaudid. His son's rehearsal dinner is tonight and is eager and wanting to discharge. Urine color much improved from arrival. Liter bolus of LR and PO challenge completed. Tolerated liquids. Will discharge home. Advised to hydrate heavily and follow a bland diet. Will send oxycodone for pain control. Patinet to follow up with PCP and GI outpatient. Instructed to return to Emergency Department for worsening symptoms. Reinforced need to avoid ETOH although insures he has not drank in over 4 years.     Total Bilirubin 1.7 High  mg/dL    Alkaline Phosphatase 215 High  U/L     High  U/L     High  U/L        Goals of Care Treatment Preferences:  Code Status: Full Code      Consults:     * Acute on chronic pancreatitis  -Of note, long standing history of chronic pancreatitis,and Peustow procedure performed 11/21 with initial improvement in pain control, but has now started to experience increased abdominal pain again  -IVFs for rehydration/Of note, LR superior to NS for acute pancreatitis   -pain control  -NPO  -Consult GI if needed if no improvement   -AM CMP, amylase, lipase, CBC   -Continue pancrease  03/24/2022  1 L LR bolus  Pain meds for d/c  PO challange    Abnormal liver enzymes  -,  noted  -Repeat enzymes in AM  -GI available for consult if needing further management    03/24/2022  Improving, IVF, f/u outpatient  Avoid tylenol   Oxycodone sent for acute pain   Total Bilirubin 1.7 High  mg/dL    Alkaline Phosphatase 215 High  U/L     High  U/L     High  U/L         GERD (gastroesophageal reflux disease)  -continue pantoprazole       Hypertension  -BP uncontrolled d/t  pain  -pain meds for pain control  -continue carvedilol  -monitor BP every 4 hours for improvement  -will reassess if needed for BP optimization       Final Active Diagnoses:    Diagnosis Date Noted POA    PRINCIPAL PROBLEM:  Acute on chronic pancreatitis [K85.90, K86.1] 10/26/2021 Yes    Abnormal liver enzymes [R74.8] 03/23/2022 Yes    GERD (gastroesophageal reflux disease) [K21.9] 10/26/2021 Yes    Hypertension [I10]  Yes      Problems Resolved During this Admission:       Discharged Condition: good    Disposition: Home or Self Care    Follow Up:   Follow-up Information     Odalys Leiva MD. Schedule an appointment as soon as possible for a visit.    Specialty: Gastroenterology  Why: As needed  Contact information:  [address] [place] [address] [address] [place] [address] [phone]             April H MD Manuel Follow up in 3 day(s).    Specialty: Family Medicine  Contact information:  [address] [place] [address] [address]  [place] [address] [phone]                       Patient Instructions:   No discharge procedures on file.    Significant Diagnostic Studies:   Results for orders placed or performed during the hospital encounter of 03/23/22   CBC W/ AUTO DIFFERENTIAL   Result Value Ref Range    WBC 10.48 3.90 - 12.70 K/uL    RBC 5.32 4.60 - 6.20 M/uL    Hemoglobin 15.5 14.0 - 18.0 g/dL    Hematocrit 47.0 40.0 - 54.0 %    MCV 88 82 - 98 fL    MCH 29.1 27.0 - 31.0 pg    MCHC 33.0 32.0 - 36.0 g/dL    RDW 15.7 (H) 11.5 - 14.5 %    Platelets 148 (L) 150 - 450 K/uL    MPV 9.1 (L) 9.2 - 12.9 fL    Immature Granulocytes 0.2 0.0 - 0.5 %    Gran # (ANC) 8.9 (H) 1.8 - 7.7 K/uL    Immature Grans (Abs) 0.02 0.00 - 0.04 K/uL    Lymph # 0.7 (L) 1.0 - 4.8 K/uL    Mono # 0.7 0.3 - 1.0 K/uL    Eos # 0.1 0.0 - 0.5 K/uL    Baso # 0.04 0.00 - 0.20 K/uL    nRBC 0 0 /100 WBC    Gran % 84.6 (H) 38.0 - 73.0 %    Lymph % 7.1 (L) 18.0 - 48.0 %    Mono % 6.4 4.0 - 15.0 %    Eosinophil % 1.3 0.0 - 8.0 %    Basophil % 0.4 0.0 - 1.9 %     Differential Method Automated    Comp. Metabolic Panel   Result Value Ref Range    Sodium 135 (L) 136 - 145 mmol/L    Potassium 4.0 3.5 - 5.1 mmol/L    Chloride 100 95 - 110 mmol/L    CO2 21 (L) 23 - 29 mmol/L    Glucose 114 (H) 70 - 110 mg/dL    BUN 17 6 - 20 mg/dL    Creatinine 1.0 0.5 - 1.4 mg/dL    Calcium 9.5 8.7 - 10.5 mg/dL    Total Protein 7.3 6.0 - 8.4 g/dL    Albumin 4.2 3.5 - 5.2 g/dL    Total Bilirubin 2.4 (H) 0.1 - 1.0 mg/dL    Alkaline Phosphatase 212 (H) 55 - 135 U/L     (H) 10 - 40 U/L     (H) 10 - 44 U/L    Anion Gap 14 8 - 16 mmol/L    eGFR if African American >60 >60 mL/min/1.73 m^2    eGFR if non African American >60 >60 mL/min/1.73 m^2   Lipase   Result Value Ref Range    Lipase 154 (H) 4 - 60 U/L   Urinalysis, Reflex to Urine Culture Urine, Clean Catch    Specimen: Urine   Result Value Ref Range    Specimen UA Urine, Clean Catch     Color, UA Yellow Yellow, Straw, Amrita    Appearance, UA Clear Clear    pH, UA 7.0 5.0 - 8.0    Specific Gravity, UA 1.010 1.005 - 1.030    Protein, UA Negative Negative    Glucose, UA Negative Negative    Ketones, UA Negative Negative    Bilirubin (UA) Negative Negative    Occult Blood UA Negative Negative    Nitrite, UA Negative Negative    Urobilinogen, UA 1.0 <2.0 EU/dL    Leukocytes, UA Negative Negative   Lactic acid, plasma   Result Value Ref Range    Lactate (Lactic Acid) 1.3 0.5 - 2.2 mmol/L   Comprehensive Metabolic Panel (CMP)   Result Value Ref Range    Sodium 135 (L) 136 - 145 mmol/L    Potassium 4.5 3.5 - 5.1 mmol/L    Chloride 97 95 - 110 mmol/L    CO2 26 23 - 29 mmol/L    Glucose 106 70 - 110 mg/dL    BUN 16 6 - 20 mg/dL    Creatinine 0.9 0.5 - 1.4 mg/dL    Calcium 9.6 8.7 - 10.5 mg/dL    Total Protein 6.8 6.0 - 8.4 g/dL    Albumin 3.7 3.5 - 5.2 g/dL    Total Bilirubin 1.7 (H) 0.1 - 1.0 mg/dL    Alkaline Phosphatase 215 (H) 55 - 135 U/L     (H) 10 - 40 U/L     (H) 10 - 44 U/L    Anion Gap 12 8 - 16 mmol/L    eGFR if African  American >60 >60 mL/min/1.73 m^2    eGFR if non African American >60 >60 mL/min/1.73 m^2   CBC with Automated Differential   Result Value Ref Range    WBC 8.85 3.90 - 12.70 K/uL    RBC 5.01 4.60 - 6.20 M/uL    Hemoglobin 14.3 14.0 - 18.0 g/dL    Hematocrit 44.4 40.0 - 54.0 %    MCV 89 82 - 98 fL    MCH 28.5 27.0 - 31.0 pg    MCHC 32.2 32.0 - 36.0 g/dL    RDW 15.8 (H) 11.5 - 14.5 %    Platelets 144 (L) 150 - 450 K/uL    MPV 9.2 9.2 - 12.9 fL    Immature Granulocytes 0.3 0.0 - 0.5 %    Gran # (ANC) 6.8 1.8 - 7.7 K/uL    Immature Grans (Abs) 0.03 0.00 - 0.04 K/uL    Lymph # 1.1 1.0 - 4.8 K/uL    Mono # 0.7 0.3 - 1.0 K/uL    Eos # 0.1 0.0 - 0.5 K/uL    Baso # 0.02 0.00 - 0.20 K/uL    nRBC 0 0 /100 WBC    Gran % 77.4 (H) 38.0 - 73.0 %    Lymph % 12.8 (L) 18.0 - 48.0 %    Mono % 8.2 4.0 - 15.0 %    Eosinophil % 1.1 0.0 - 8.0 %    Basophil % 0.2 0.0 - 1.9 %    Differential Method Automated    Amylase   Result Value Ref Range    Amylase 67 20 - 110 U/L   POCT COVID-19 Rapid Screening   Result Value Ref Range    POC Rapid COVID Negative Negative     Acceptable Yes      Imaging Results          CT Abdomen Pelvis With Contrast (Final result)  Result time 03/23/22 11:44:27    Final result by Cristina De Paz MD (03/23/22 11:44:27)                 Impression:      Moderate peripancreatic fat stranding, similar to the comparison examination and remaining compatible with pancreatitis.  There is involvement of the 2nd portion of the duodenum with wall thickening suggesting duodenitis.  Inflammatory change now extends along the right pericolic gutter.  No focal fluid collection to suggest pseudocyst formation.  No evidence for pancreatic necrosis.    Persistent splenic vein thrombosis with multiple upper abdominal collateral vessels including prominent gastric varices.      Electronically signed by: Cristina De Paz  Date:    03/23/2022  Time:    11:44             Narrative:    EXAMINATION:  CT ABDOMEN PELVIS  WITH CONTRAST    CLINICAL HISTORY:  Abdominal pain with history of pancreatitis and splenic vein thrombosis;    TECHNIQUE:  Low dose axial images, sagittal and coronal reformations were obtained from the lung bases to the pubic symphysis following the IV administration of 100 mL of Omnipaque 350 .  Oral contrast was not administered. All CT scans at this facility are performed using dose optimization techniques including the following: automated exposure control; adjustment of the mA and/or kV; use of iterative reconstruction technique.    COMPARISON:  Abdominal radiograph 03/23/2022, CT abdomen pelvis with contrast 10/26/2021    FINDINGS:  Abdomen:    - Lower thorax:Unremarkable.    - Lung bases: There are bibasilar linear and dependent density suggesting subsegmental atelectasis or fibrosis.  Calcified right lower lobe granuloma is noted.    - Liver: No focal mass, noting geographic relative hyperenhancement within hepatic segment 6, likely transient hepatic attenuation difference.    - Gallbladder: The gallbladder is not seen, potentially surgically absent.    - Bile Ducts: No evidence of intra or extra hepatic biliary ductal dilation.    - Spleen: Negative.  The splenic vein is occluded.    - Kidneys: No mass or hydronephrosis.  There is a 6.4 cm superior right renal cyst.  The ureters are normal in course and caliber.  Retroperitoneal inflammatory change extends along the course of the right ureter, no current evidence for obstruction.    - Adrenals: Unremarkable.    - Pancreas/duodenum: There is significant peripancreatic fat stranding/inflammatory change adjacent to the pancreatic body, neck, head, and uncinate process.  Findings appear similar to the comparison exam with involvement of the 2nd portion of the duodenum noting duodenal wall thickening appears increased since the comparison exam.  Inflammatory change now extends inferiorly within the right pericolic gutter and adjacent to the right ureter.  No  ureteral obstruction present.  No focal fluid collection to suggest pseudocyst formation.  Pancreatic enhancement appears relatively homogeneous without evidence for necrosis.  There is mild stable proximal pancreatic ductal dilatation.    - Retroperitoneum:  No significant adenopathy.    - Vascular: No abdominal aortic aneurysm.    - Abdominal wall:  Unremarkable.    Pelvis:    No pelvic mass, adenopathy, or free fluid.  The prostate contains calcification and causes nodular impression upon the inferior urinary bladder.    Bowel/Mesentery:    There are multiple colonic diverticula without inflammatory change of diverticulitis.  The colon contains normal volume stool.  The appendix is normal.  There are multiple gastric varices.  No other gastric abnormality seen.  There is wall thickening involving the duodenum, primarily 2nd portion with surrounding inflammatory change related to previously described pancreatic process.  The remaining small bowel appears unremarkable.  No obstruction.  Presumed surgical material is noted in the left upper quadrant, new since the prior exam.    Bones:  No acute osseous abnormality and no suspicious lytic or blastic lesion.  There is degenerative change of the spine with prominent Schmorl's nodes noted.                               X-Ray Abdomen Flat And Erect (Final result)  Result time 03/23/22 09:52:02    Final result by Jadiel Chapin MD (03/23/22 09:52:02)                 Impression:      Moderate constipation.      Electronically signed by: Jadiel Chapin MD  Date:    03/23/2022  Time:    09:52             Narrative:    EXAMINATION:  XR ABDOMEN FLAT AND ERECT    CLINICAL HISTORY:  Abdominal Pain;    FINDINGS:  Nonobstructive bowel gas pattern is noted.  Moderate constipation.  No radiopaque kidney calculus is identified.  There are multiple calcifications in the pelvis most consistent with phleboliths.  No evidence of organomegaly.  The bones demonstrate mild degenerative  changes within the lower lumbar region.  The bones are otherwise intact.                                  Pending Diagnostic Studies:     Procedure Component Value Units Date/Time    Lipase [[id number]]     Order Status: Sent Lab Status: No result     Specimen: Blood          Medications:  Reconciled Home Medications:      Medication List      START taking these medications    oxyCODONE 5 MG immediate release tablet  Commonly known as: ROXICODONE  Take 1 tablet (5 mg total) by mouth every 6 (six) hours as needed for Pain.        CHANGE how you take these medications    PANCREAZE [phone]- [phone] unit Cpdr  Generic drug: lipase-protease-amylase  Take before meals  What changed:   · how much to take  · how to take this  · when to take this  · additional instructions        CONTINUE taking these medications    carvediloL 6.25 MG tablet  Commonly known as: COREG  Take 6.25 mg by mouth 2 (two) times daily.     ferrous gluconate 324 MG tablet  Commonly known as: FERGON  Take 324 mg by mouth. twice weekly on an empty stomach (Sunday and Wednesday)     multivitamin per tablet  Commonly known as: THERAGRAN  Take 1 tablet by mouth once daily.     oxyCODONE-acetaminophen  mg per tablet  Commonly known as: PERCOCET  Take 1 tablet by mouth every 4 (four) hours as needed for Pain.     pantoprazole 40 MG tablet  Commonly known as: PROTONIX  Take 1 tablet (40 mg total) by mouth once daily.            Indwelling Lines/Drains at time of discharge:   Lines/Drains/Airways    --none       Time spent on the discharge of patient: >35 minutes         Odessa Covington NP  Department of Hospital Medicine  [facility]

## 2022-03-24 NOTE — NURSING
Patient with discharge orders this shift. Discharge meds reviewed with patient. Home medication list reviewed with patient. Upcoming appointment reviewed patient. All questions and concerns addressed. Patient verbalizes no c/o pain at this time.     IV to left forearm discontinued with cath tip intact. Patient tolerated well.     Discharge meds presented to patient at bedside. Patient escorted down by hospital staff.

## 2022-03-24 NOTE — PLAN OF CARE
[facility]  Initial Discharge Assessment       Primary Care Provider: Rebecca Jackson MD    Admission Diagnosis: Pancreatitis [K85.90]  Abdominal pain [R10.9]  Chest pain [R07.9]    Admission Date: 3/23/2022  Expected Discharge Date:     Discharge Barriers Identified: None    Payor: BLUE CROSS BLUE SHIELD / Plan: BCBS BLUE SAVER PPO - HD / Product Type: PPO /     Extended Emergency Contact Information  Primary Emergency Contact: Aliza Zamorano  Mobile Phone: [phone]  Relation: Spouse  Preferred language: English   needed? No    Discharge Plan A: Home with family  Discharge Plan B: Home with family      [id number] - [place], [place] - [address] AT [facility] [address]  [address]  [facility] [facility] [place] [address]  Phone: [phone] Fax: [phone]      Initial Assessment (most recent)     Adult Discharge Assessment - 03/24/22 1005        Discharge Assessment    Assessment Type Discharge Planning Assessment     Confirmed/corrected address, phone number and insurance Yes     Confirmed Demographics Correct on Facesheet     Source of Information patient     When was your last doctors appointment? 01/28/22     Communicated AMBER with patient/caregiver Yes     Reason For Admission Abdominal pain     Lives With spouse     Facility Arrived From: Home     Do you expect to return to your current living situation? Yes     Do you have help at home or someone to help you manage your care at home? Yes     Who are your caregiver(s) and their phone number(s)? Aliza Zamorano [phone]     Prior to hospitilization cognitive status: Alert/Oriented     Current cognitive status: Alert/Oriented     Walking or Climbing Stairs Difficulty none     Dressing/Bathing Difficulty none     Equipment Currently Used at Home CPAP     Readmission within 30 days? No     Patient currently being followed by outpatient case management? No     Do you currently have service(s) that help you  manage your care at home? No     Do you take prescription medications? Yes     Do you have prescription coverage? Yes     Coverage BCBS     Do you have any problems affording any of your prescribed medications? No     Is the patient taking medications as prescribed? yes     Who is going to help you get home at discharge? Patient is independent     How do you get to doctors appointments? car, drives self     Are you on dialysis? No     Do you take coumadin? No     Discharge Plan A Home with family     Discharge Plan B Home with family     DME Needed Upon Discharge  none     Discharge Plan discussed with: Patient     Discharge Barriers Identified None        Relationship/Environment    Name(s) of Who Lives With Patient Aliza Zamorano               CM met with patient at the bedside to assess for discharge needs.  Patient is independent with all ADL's and does not have any anticipated discharge needs at this time.  CM provided a transitional care folder, information on advanced directives, information on pharmacy bedside delivery, and discharge planning begins on admission with contact information for any needs/questions.

## 2022-03-24 NOTE — ASSESSMENT & PLAN NOTE
-,  noted  -Repeat enzymes in AM  -GI available for consult if needing further management    03/24/2022  Improving, IVF, f/u outpatient  Avoid tylenol   Oxycodone sent for acute pain   Total Bilirubin 1.7 High  mg/dL    Alkaline Phosphatase 215 High  U/L     High  U/L     High  U/L

## 2022-03-24 NOTE — PLAN OF CARE
[facility]  Discharge Final Note    Primary Care Provider: Rebecca Jackson MD    Expected Discharge Date: 3/24/2022    Final Discharge Note (most recent)     Final Note - 03/24/22 1407        Final Note    Assessment Type Final Discharge Note     Anticipated Discharge Disposition Home or Self Care     Hospital Resources/Appts/Education Provided Appointments scheduled and added to AVS                 Important Message from Medicare             Contact Info     Odalys Levia MD   Specialty: Gastroenterology    [address] [place] [address] [address] [place] [address]   Phone: [phone]       Next Steps: Schedule an appointment as soon as possible for a visit    Instructions: As needed    Rebecca Jackson MD   Specialty: Family Medicine   Relationship: PCP - General    [address]  [place] [address]   Phone: [phone]       Next Steps: Go on 3/30/2022    Instructions: at 8:40 am with Gamaliel Aguilar NP

## 2022-03-24 NOTE — ASSESSMENT & PLAN NOTE
-Of note, long standing history of chronic pancreatitis,and Peustow procedure performed 11/21 with initial improvement in pain control, but has now started to experience increased abdominal pain again  -IVFs for rehydration/Of note, LR superior to NS for acute pancreatitis   -pain control  -NPO  -Consult GI if needed if no improvement   -AM CMP, amylase, lipase, CBC   -Continue pancrease  03/24/2022  1 L LR bolus  Pain meds for d/c  PO challange

## 2022-03-24 NOTE — HOSPITAL COURSE
3/24/2022  Patient admitted yesterday for the treatment of acute on chronic pancreatitis. Pain is much better controlled, rating 2/10. Vitals and Labs reviewed. LFTs improving from arrival. Requesting oxycodone vs Dilaudid. His son's rehearsal dinner is tonight and is eager and wanting to discharge. Urine color much improved from arrival. Liter bolus of LR and PO challenge completed. Tolerated liquids. Will discharge home. Advised to hydrate heavily and follow a bland diet. Will send oxycodone for pain control. Patinet to follow up with PCP and GI outpatient. Instructed to return to Emergency Department for worsening symptoms. Reinforced need to avoid ETOH although insures he has not drank in over 4 years.     Total Bilirubin 1.7 High  mg/dL    Alkaline Phosphatase 215 High  U/L     High  U/L     High  U/L

## 2022-03-25 ENCOUNTER — PATIENT OUTREACH (OUTPATIENT)
Dept: ADMINISTRATIVE | Facility: CLINIC | Age: 57
End: 2022-03-25
Payer: COMMERCIAL

## 2022-03-25 ENCOUNTER — PATIENT MESSAGE (OUTPATIENT)
Dept: ADMINISTRATIVE | Facility: CLINIC | Age: 57
End: 2022-03-25
Payer: COMMERCIAL

## 2022-03-25 NOTE — PROGRESS NOTES
C3 nurse attempted to contact Fabrice Zamorano for a TCC post hospital discharge follow up call. No answer.  Left voicemail with callback information. The patient does not have a scheduled HOSFU appointment, and the pt does not have an [facility] PCP.

## 2022-05-12 ENCOUNTER — LAB VISIT (OUTPATIENT)
Dept: LAB | Facility: HOSPITAL | Age: 57
End: 2022-05-12
Attending: INTERNAL MEDICINE
Payer: COMMERCIAL

## 2022-05-12 ENCOUNTER — OFFICE VISIT (OUTPATIENT)
Dept: GASTROENTEROLOGY | Facility: CLINIC | Age: 57
End: 2022-05-12
Payer: COMMERCIAL

## 2022-05-12 VITALS
DIASTOLIC BLOOD PRESSURE: 82 MMHG | HEIGHT: 71 IN | BODY MASS INDEX: 28.89 KG/M2 | WEIGHT: 206.38 LBS | SYSTOLIC BLOOD PRESSURE: 120 MMHG

## 2022-05-12 DIAGNOSIS — K85.90 ACUTE RECURRENT PANCREATITIS: Primary | ICD-10-CM

## 2022-05-12 DIAGNOSIS — K85.90 ACUTE RECURRENT PANCREATITIS: ICD-10-CM

## 2022-05-12 PROCEDURE — 3074F SYST BP LT 130 MM HG: CPT | Mod: CPTII,S$GLB,, | Performed by: INTERNAL MEDICINE

## 2022-05-12 PROCEDURE — 3074F PR MOST RECENT SYSTOLIC BLOOD PRESSURE < 130 MM HG: ICD-10-PCS | Mod: CPTII,S$GLB,, | Performed by: INTERNAL MEDICINE

## 2022-05-12 PROCEDURE — 3008F BODY MASS INDEX DOCD: CPT | Mod: CPTII,S$GLB,, | Performed by: INTERNAL MEDICINE

## 2022-05-12 PROCEDURE — 3008F PR BODY MASS INDEX (BMI) DOCUMENTED: ICD-10-PCS | Mod: CPTII,S$GLB,, | Performed by: INTERNAL MEDICINE

## 2022-05-12 PROCEDURE — 3079F DIAST BP 80-89 MM HG: CPT | Mod: CPTII,S$GLB,, | Performed by: INTERNAL MEDICINE

## 2022-05-12 PROCEDURE — 99999 PR PBB SHADOW E&M-EST. PATIENT-LVL III: ICD-10-PCS | Mod: PBBFAC,,, | Performed by: INTERNAL MEDICINE

## 2022-05-12 PROCEDURE — 99999 PR PBB SHADOW E&M-EST. PATIENT-LVL III: CPT | Mod: PBBFAC,,, | Performed by: INTERNAL MEDICINE

## 2022-05-12 PROCEDURE — 3079F PR MOST RECENT DIASTOLIC BLOOD PRESSURE 80-89 MM HG: ICD-10-PCS | Mod: CPTII,S$GLB,, | Performed by: INTERNAL MEDICINE

## 2022-05-12 PROCEDURE — 36415 COLL VENOUS BLD VENIPUNCTURE: CPT | Performed by: INTERNAL MEDICINE

## 2022-05-12 PROCEDURE — 80076 HEPATIC FUNCTION PANEL: CPT | Performed by: INTERNAL MEDICINE

## 2022-05-12 PROCEDURE — 99213 OFFICE O/P EST LOW 20 MIN: CPT | Mod: S$GLB,,, | Performed by: INTERNAL MEDICINE

## 2022-05-12 PROCEDURE — 99213 PR OFFICE/OUTPT VISIT, EST, LEVL III, 20-29 MIN: ICD-10-PCS | Mod: S$GLB,,, | Performed by: INTERNAL MEDICINE

## 2022-05-12 RX ORDER — CARVEDILOL 6.25 MG/1
3.12 TABLET ORAL 2 TIMES DAILY WITH MEALS
COMMUNITY

## 2022-05-12 RX ORDER — PANCRELIPASE LIPASE, PANCRELIPASE AMYLASE, AND PANCRELIPASE PROTEASE 16800; 98400; 56800 [USP'U]/1; [USP'U]/1; [USP'U]/1
CAPSULE, DELAYED RELEASE ORAL
Qty: 120 CAPSULE | Refills: 2 | Status: SHIPPED | OUTPATIENT
Start: 2022-05-12 | End: 2022-11-02

## 2022-05-12 RX ORDER — PANCRELIPASE LIPASE, PANCRELIPASE AMYLASE, AND PANCRELIPASE PROTEASE 4200; 24600; 14200 [USP'U]/1; [USP'U]/1; [USP'U]/1
CAPSULE, DELAYED RELEASE ORAL
COMMUNITY
End: 2022-08-16

## 2022-05-12 RX ORDER — OXYCODONE AND ACETAMINOPHEN 10; 325 MG/1; MG/1
1 TABLET ORAL EVERY 4 HOURS PRN
Qty: 12 TABLET | Refills: 0 | Status: SHIPPED | OUTPATIENT
Start: 2022-05-12 | End: 2022-08-16

## 2022-05-12 NOTE — PATIENT INSTRUCTIONS
Schedule the MRCP to look at the bile duct and pancreatic duct since the surgery  Based on the results an ERCP will be scheduled after to assess the bile duct as I am suspecting an issue with the bile duct based on your last liver enzymes.

## 2022-05-12 NOTE — PROGRESS NOTES
Clinic Consult:  [facility] Consultation Note    Reason for Consult:  The encounter diagnosis was Acute recurrent pancreatitis.    PCP: Rebecca Jackson       HPI:  This is a 57 y.o. male here for follow up    Chronic pancreaitis and recurrenc acute s/p Alejandro procedure  Was admitted on March 2022 with pain and elevated liver enzymes of. Epigastric and radiates to the back, nausea, emesis    Discharged and last liver enzymes     Pain is different from prior acute recurrent pancreaittis and currently has an ache.         GI history:    Colonoscopy: 2021 and due for repeat in 5 years.    ROS:  CONSTITUTIONAL: Denies weight change,  fatigue, fevers, chills, night sweats.  EYES: No changes in vision.   ENT: No oral lesions or sore throat.  HEMATOLOGICAL/Lymph: Denies bleeding tendency, bruising tendency. No swellings or enlarged lymph nodes.  CARDIOVASCULAR: Denies chest pain, shortness of breath, orthopnea and edema.  RESPIRATORY: Denies cough, hemoptysis, dyspnea, and wheezing.  GI: See HPI.  : Denies dysuria and hematuria  MUSCULOSKELETAL: Denies joint pain or swelling, back pain and muscle pain.  SKIN: Denies rashes.  NEUROLOGIC: Denies headaches, seizures and numbness.  PSYCHIATRIC: Denies depression or anxiety.  ENDOCRINE: Denies heat or cold intolerance and excessive thirst or urination.    Medical History:   Past Medical History:   Diagnosis Date    Anticoagulant long-term use     On Eliquis for DVT    GERD (gastroesophageal reflux disease)     Hypertension     Hypothyroidism 10/26/2021    Pancreatic pseudocyst     Sleep apnea     Splenic vein thrombosis        Surgical History:  Past Surgical History:   Procedure Laterality Date    CHOLECYSTECTOMY      ENDOSCOPIC ULTRASOUND OF UPPER GASTROINTESTINAL TRACT N/A 10/12/2021    Procedure: ULTRASOUND, UPPER GI TRACT, ENDOSCOPIC;  Surgeon: Odalys Leiva MD;  Location: [facility] ENDO;  Service: Endoscopy;  Laterality: N/A;  Upper and  "linear, 22 shark core, cytology and RPMI    ERCP N/A 9/29/2021    Procedure: ERCP (ENDOSCOPIC RETROGRADE CHOLANGIOPANCREATOGRAPHY);  Surgeon: Odalys Leiva MD;  [facility];  Service: Endoscopy;  Laterality: N/A;    ERCP W/ PLASTIC STENT PLACEMENT      LATERAL PANCREATICOJEJUNOSTOMY N/A 11/19/2021    Procedure: PANCREATICOJEJUNOSTOMY, SIDE-TO-SIDE tier 1;  Surgeon: Brian Hills MD;  Location: [address];  Service: General;  Laterality: N/A;       Family History:   Family History   Problem Relation Age of Onset    Thyroid disease Mother     Cancer Father     Pancreatic cancer Father     Arthritis Father     Thyroid disease Father        Social History:   Social History     Tobacco Use    Smoking status: Former Smoker    Smokeless tobacco: Never Used   Substance Use Topics    Alcohol use: Not Currently    Drug use: Never       Allergies: Reviewed    Home Medications:   Medication List with Changes/Refills   Current Medications    CARVEDILOL (COREG) 6.25 MG TABLET    Take 6.25 mg by mouth 2 (two) times daily.    CARVEDILOL (COREG) 6.25 MG TABLET    Take 1 tablet by mouth 2 (two) times daily with meals.    FERROUS GLUCONATE (FERGON) 324 MG TABLET    Take 324 mg by mouth. twice weekly on an empty stomach (Sunday and Wednesday)    LIPASE-PROTEASE-AMYLASE (PANCREAZE) 16,800-56,800- 98,400 UNIT CPDR    Take before meals    LIPASE-PROTEASE-AMYLASE (PANCREAZE) 4,200-14,200- 24,600 UNIT CPDR    as directed    MULTIVITAMIN (THERAGRAN) PER TABLET    Take 1 tablet by mouth once daily.    OXYCODONE-ACETAMINOPHEN (PERCOCET)  MG PER TABLET    Take 1 tablet by mouth every 4 (four) hours as needed for Pain.    PANTOPRAZOLE (PROTONIX) 40 MG TABLET    Take 1 tablet (40 mg total) by mouth once daily.         Physical Exam:  Vital Signs:  /82   Ht 5' 11" (1.803 m)   Wt 93.6 kg (206 lb 5.6 oz)   BMI 28.78 kg/m²   Body mass index is 28.78 kg/m².      GENERAL: No acute distress, " A&Ox3  EYES: Anicteric, no pallor noted.  ENT: OP clear  NECK: Supple, no masses, no thyromegally.  CHEST: Equal breath sounds bilaterally, no wheezing.  CARDIOVASCULAR: Regular rate and rhythm. Murmurs, rubs and gallops absent.  ABDOMEN: soft, non-tender, non-distended, normal bowel sounds, no hepatosplenomegaly   EXTREMITIES: No clubbing, cyanosis or edema.  SKIN: Without lesion or erythema.  LYMPH: No cervical, axillary lymphadenopathy palpable.   NEUROLOGICAL: Grossly normal, no asterixis present.    Labs: Pertinent labs reviewed.    Assessment and Plan:  Acute recurrent pancreatitis  -     MRI MRCP Without Contrast; Future; Expected date: 05/12/2022  -     Hepatic Function Panel; Future; Expected date: 05/12/2022    Other orders  -     lipase-protease-amylase (PANCREAZE) 16,800-56,800- 98,400 unit CpDR; Take before meals  Dispense: 120 capsule; Refill: 2  -     oxyCODONE-acetaminophen (PERCOCET)  mg per tablet; Take 1 tablet by mouth every 4 (four) hours as needed for Pain.  Dispense: 12 tablet; Refill: 0        MRCP to assess the biliary and pancreatic anatomy and then ERCP if biliary stones or strictures    No follow-ups on file.        Thank you so much for allowing me to participate in the care of Fabrice Leiva MD  Gastroenterology

## 2022-05-13 LAB
ALBUMIN SERPL BCP-MCNC: 4.4 G/DL (ref 3.5–5.2)
ALP SERPL-CCNC: 115 U/L (ref 55–135)
ALT SERPL W/O P-5'-P-CCNC: 91 U/L (ref 10–44)
AST SERPL-CCNC: 38 U/L (ref 10–40)
BILIRUB DIRECT SERPL-MCNC: 0.3 MG/DL (ref 0.1–0.3)
BILIRUB SERPL-MCNC: 0.7 MG/DL (ref 0.1–1)
PROT SERPL-MCNC: 7.2 G/DL (ref 6–8.4)

## 2022-05-23 ENCOUNTER — HOSPITAL ENCOUNTER (OUTPATIENT)
Dept: RADIOLOGY | Facility: HOSPITAL | Age: 57
Discharge: HOME | End: 2022-05-23
Attending: INTERNAL MEDICINE
Payer: COMMERCIAL

## 2022-05-23 DIAGNOSIS — K85.90 ACUTE RECURRENT PANCREATITIS: ICD-10-CM

## 2022-05-23 PROCEDURE — 74181 MRI ABDOMEN W/O CONTRAST: CPT | Mod: TC

## 2022-05-23 PROCEDURE — 76376 3D RENDER W/INTRP POSTPROCES: CPT | Mod: 26,,, | Performed by: RADIOLOGY

## 2022-05-23 PROCEDURE — 74181 MRI ABDOMEN WITHOUT CONTRAST MRCP: ICD-10-PCS | Mod: 26,,, | Performed by: RADIOLOGY

## 2022-05-23 PROCEDURE — 76376 MRI ABDOMEN WITHOUT CONTRAST MRCP: ICD-10-PCS | Mod: 26,,, | Performed by: RADIOLOGY

## 2022-05-23 PROCEDURE — 74181 MRI ABDOMEN W/O CONTRAST: CPT | Mod: 26,,, | Performed by: RADIOLOGY

## 2022-05-30 ENCOUNTER — PATIENT MESSAGE (OUTPATIENT)
Dept: GASTROENTEROLOGY | Facility: CLINIC | Age: 57
End: 2022-05-30
Payer: COMMERCIAL

## 2022-06-28 ENCOUNTER — OFFICE VISIT (OUTPATIENT)
Dept: SURGERY | Facility: CLINIC | Age: 57
End: 2022-06-28
Payer: COMMERCIAL

## 2022-06-28 VITALS
HEART RATE: 73 BPM | HEIGHT: 71 IN | DIASTOLIC BLOOD PRESSURE: 87 MMHG | OXYGEN SATURATION: 97 % | SYSTOLIC BLOOD PRESSURE: 121 MMHG | BODY MASS INDEX: 29.52 KG/M2 | WEIGHT: 210.88 LBS

## 2022-06-28 DIAGNOSIS — Z48.89 POSTOPERATIVE VISIT: Primary | ICD-10-CM

## 2022-06-28 PROCEDURE — 3008F BODY MASS INDEX DOCD: CPT | Mod: CPTII,S$GLB,, | Performed by: SURGERY

## 2022-06-28 PROCEDURE — 99024 PR POST-OP FOLLOW-UP VISIT: ICD-10-PCS | Mod: S$GLB,,, | Performed by: SURGERY

## 2022-06-28 PROCEDURE — 3079F DIAST BP 80-89 MM HG: CPT | Mod: CPTII,S$GLB,, | Performed by: SURGERY

## 2022-06-28 PROCEDURE — 3074F SYST BP LT 130 MM HG: CPT | Mod: CPTII,S$GLB,, | Performed by: SURGERY

## 2022-06-28 PROCEDURE — 3008F PR BODY MASS INDEX (BMI) DOCUMENTED: ICD-10-PCS | Mod: CPTII,S$GLB,, | Performed by: SURGERY

## 2022-06-28 PROCEDURE — 1159F PR MEDICATION LIST DOCUMENTED IN MEDICAL RECORD: ICD-10-PCS | Mod: CPTII,S$GLB,, | Performed by: SURGERY

## 2022-06-28 PROCEDURE — 99999 PR PBB SHADOW E&M-EST. PATIENT-LVL III: CPT | Mod: PBBFAC,,, | Performed by: SURGERY

## 2022-06-28 PROCEDURE — 3079F PR MOST RECENT DIASTOLIC BLOOD PRESSURE 80-89 MM HG: ICD-10-PCS | Mod: CPTII,S$GLB,, | Performed by: SURGERY

## 2022-06-28 PROCEDURE — 99024 POSTOP FOLLOW-UP VISIT: CPT | Mod: S$GLB,,, | Performed by: SURGERY

## 2022-06-28 PROCEDURE — 99999 PR PBB SHADOW E&M-EST. PATIENT-LVL III: ICD-10-PCS | Mod: PBBFAC,,, | Performed by: SURGERY

## 2022-06-28 PROCEDURE — 3074F PR MOST RECENT SYSTOLIC BLOOD PRESSURE < 130 MM HG: ICD-10-PCS | Mod: CPTII,S$GLB,, | Performed by: SURGERY

## 2022-06-28 PROCEDURE — 1159F MED LIST DOCD IN RCRD: CPT | Mod: CPTII,S$GLB,, | Performed by: SURGERY

## 2022-07-07 ENCOUNTER — OFFICE VISIT (OUTPATIENT)
Dept: GASTROENTEROLOGY | Facility: CLINIC | Age: 57
End: 2022-07-07
Payer: COMMERCIAL

## 2022-07-07 ENCOUNTER — LAB VISIT (OUTPATIENT)
Dept: LAB | Facility: HOSPITAL | Age: 57
End: 2022-07-07
Attending: INTERNAL MEDICINE
Payer: COMMERCIAL

## 2022-07-07 ENCOUNTER — PATIENT MESSAGE (OUTPATIENT)
Dept: ENDOSCOPY | Facility: HOSPITAL | Age: 57
End: 2022-07-07
Payer: COMMERCIAL

## 2022-07-07 VITALS
OXYGEN SATURATION: 98 % | WEIGHT: 210.56 LBS | HEART RATE: 64 BPM | DIASTOLIC BLOOD PRESSURE: 82 MMHG | BODY MASS INDEX: 29.48 KG/M2 | HEIGHT: 71 IN | SYSTOLIC BLOOD PRESSURE: 122 MMHG

## 2022-07-07 DIAGNOSIS — K86.1 CHRONIC PANCREATITIS, UNSPECIFIED PANCREATITIS TYPE: ICD-10-CM

## 2022-07-07 DIAGNOSIS — K85.90 ACUTE RECURRENT PANCREATITIS: Primary | ICD-10-CM

## 2022-07-07 DIAGNOSIS — K85.90 ACUTE RECURRENT PANCREATITIS: ICD-10-CM

## 2022-07-07 LAB
ALBUMIN SERPL BCP-MCNC: 4.3 G/DL (ref 3.5–5.2)
ALP SERPL-CCNC: 87 U/L (ref 55–135)
ALT SERPL W/O P-5'-P-CCNC: 40 U/L (ref 10–44)
ANION GAP SERPL CALC-SCNC: 7 MMOL/L (ref 8–16)
AST SERPL-CCNC: 24 U/L (ref 10–40)
BILIRUB SERPL-MCNC: 0.5 MG/DL (ref 0.1–1)
BUN SERPL-MCNC: 11 MG/DL (ref 6–20)
CALCIUM SERPL-MCNC: 9.9 MG/DL (ref 8.7–10.5)
CHLORIDE SERPL-SCNC: 102 MMOL/L (ref 95–110)
CO2 SERPL-SCNC: 30 MMOL/L (ref 23–29)
CREAT SERPL-MCNC: 1.1 MG/DL (ref 0.5–1.4)
EST. GFR  (AFRICAN AMERICAN): >60 ML/MIN/1.73 M^2
EST. GFR  (NON AFRICAN AMERICAN): >60 ML/MIN/1.73 M^2
GLUCOSE SERPL-MCNC: 84 MG/DL (ref 70–110)
POTASSIUM SERPL-SCNC: 4.3 MMOL/L (ref 3.5–5.1)
PROT SERPL-MCNC: 7.1 G/DL (ref 6–8.4)
SODIUM SERPL-SCNC: 139 MMOL/L (ref 136–145)

## 2022-07-07 PROCEDURE — 99214 PR OFFICE/OUTPT VISIT, EST, LEVL IV, 30-39 MIN: ICD-10-PCS | Mod: S$GLB,,, | Performed by: INTERNAL MEDICINE

## 2022-07-07 PROCEDURE — 3008F BODY MASS INDEX DOCD: CPT | Mod: CPTII,S$GLB,, | Performed by: INTERNAL MEDICINE

## 2022-07-07 PROCEDURE — 1159F PR MEDICATION LIST DOCUMENTED IN MEDICAL RECORD: ICD-10-PCS | Mod: CPTII,S$GLB,, | Performed by: INTERNAL MEDICINE

## 2022-07-07 PROCEDURE — 99999 PR PBB SHADOW E&M-EST. PATIENT-LVL IV: ICD-10-PCS | Mod: PBBFAC,,, | Performed by: INTERNAL MEDICINE

## 2022-07-07 PROCEDURE — 3074F SYST BP LT 130 MM HG: CPT | Mod: CPTII,S$GLB,, | Performed by: INTERNAL MEDICINE

## 2022-07-07 PROCEDURE — 3008F PR BODY MASS INDEX (BMI) DOCUMENTED: ICD-10-PCS | Mod: CPTII,S$GLB,, | Performed by: INTERNAL MEDICINE

## 2022-07-07 PROCEDURE — 80053 COMPREHEN METABOLIC PANEL: CPT | Performed by: INTERNAL MEDICINE

## 2022-07-07 PROCEDURE — 99214 OFFICE O/P EST MOD 30 MIN: CPT | Mod: S$GLB,,, | Performed by: INTERNAL MEDICINE

## 2022-07-07 PROCEDURE — 3079F PR MOST RECENT DIASTOLIC BLOOD PRESSURE 80-89 MM HG: ICD-10-PCS | Mod: CPTII,S$GLB,, | Performed by: INTERNAL MEDICINE

## 2022-07-07 PROCEDURE — 36415 COLL VENOUS BLD VENIPUNCTURE: CPT | Performed by: INTERNAL MEDICINE

## 2022-07-07 PROCEDURE — 3074F PR MOST RECENT SYSTOLIC BLOOD PRESSURE < 130 MM HG: ICD-10-PCS | Mod: CPTII,S$GLB,, | Performed by: INTERNAL MEDICINE

## 2022-07-07 PROCEDURE — 1159F MED LIST DOCD IN RCRD: CPT | Mod: CPTII,S$GLB,, | Performed by: INTERNAL MEDICINE

## 2022-07-07 PROCEDURE — 3079F DIAST BP 80-89 MM HG: CPT | Mod: CPTII,S$GLB,, | Performed by: INTERNAL MEDICINE

## 2022-07-07 PROCEDURE — 99999 PR PBB SHADOW E&M-EST. PATIENT-LVL IV: CPT | Mod: PBBFAC,,, | Performed by: INTERNAL MEDICINE

## 2022-07-07 RX ORDER — PROMETHAZINE HYDROCHLORIDE 25 MG/1
25 TABLET ORAL EVERY 6 HOURS PRN
Status: ON HOLD | COMMUNITY
Start: 2022-06-08 | End: 2022-08-17 | Stop reason: SDUPTHER

## 2022-07-07 NOTE — PROGRESS NOTES
Clinic Consult:  [facility] Consultation Note    Reason for Consult:  The primary encounter diagnosis was Acute recurrent pancreatitis. A diagnosis of Chronic pancreatitis, unspecified pancreatitis type was also pertinent to this visit.    PCP: Rebecca Jackson       HPI:  This is a 57 y.o. male here for follow up.    Mr. 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 He would be admitted on a weekly basis with acute pancreatitis and had PD stones and dilation and ERCP did not improve his symptoms. He underwent a pancreaticojejunostomy on 11/19/2021 and symptoms improved. He started having acute episodes of pancreatitis again.   They are less severe and less frequent though than prior to his surgery.    MRCP on 5/12/2022:  Multifocal dilatation of the main pancreatic duct measuring 4-5 mm with dilatation of the side chain branches.  This is not significantly changed when compared to the prior exam.  Improved peripancreatic edema.  Please note evaluation for pancreatic mass is limited by the lack of intravenous contrast.  Recommend continued surveillance.     The spleen is enlarged.  Periportal collateral vessels.  The findings are worrisome for portal venous hypertension.    He was recently admitted and had a CT abdomen not available for review. 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    Still has abdominal pain and recurrent pancreatitis but less severe than prior to the surgery.    ROS:  CONSTITUTIONAL: Denies weight change,  fatigue, fevers, chills, night sweats.  EYES: No changes in vision.   ENT: No oral lesions or sore throat.  HEMATOLOGICAL/Lymph: Denies bleeding tendency, bruising tendency. No swellings or enlarged lymph nodes.  CARDIOVASCULAR: Denies chest pain, shortness of breath, orthopnea and edema.  RESPIRATORY: Denies cough, hemoptysis, dyspnea, and wheezing.  GI: See HPI.  : Denies  dysuria and hematuria  MUSCULOSKELETAL: Denies joint pain or swelling, back pain and muscle pain.  SKIN: Denies rashes.  NEUROLOGIC: Denies headaches, seizures and numbness.  PSYCHIATRIC: Denies depression or anxiety.  ENDOCRINE: Denies heat or cold intolerance and excessive thirst or urination.    Medical History:   Past Medical History:   Diagnosis Date    Anticoagulant long-term use     On Eliquis for DVT    GERD (gastroesophageal reflux disease)     Hypertension     Hypothyroidism 10/26/2021    Pancreatic pseudocyst     Sleep apnea     Splenic vein thrombosis        Surgical History:  Past Surgical History:   Procedure Laterality Date    CHOLECYSTECTOMY      ENDOSCOPIC ULTRASOUND OF UPPER GASTROINTESTINAL TRACT N/A 10/12/2021    Procedure: ULTRASOUND, UPPER GI TRACT, ENDOSCOPIC;  Surgeon: Odalys Leiva MD;  Location[facility] ENDO;  Service: Endoscopy;  Laterality: N/A;  Upper and linear, 22 shark core, cytology and RPMI    ERCP N/A 9/29/2021    Procedure: ERCP (ENDOSCOPIC RETROGRADE CHOLANGIOPANCREATOGRAPHY);  Surgeon: Odalys Leiva MD;  Location: [facility];  Service: Endoscopy;  Laterality: N/A;    ERCP W/ PLASTIC STENT PLACEMENT      LATERAL PANCREATICOJEJUNOSTOMY N/A 11/19/2021    Procedure: PANCREATICOJEJUNOSTOMY, SIDE-TO-SIDE tier 1;  Surgeon: Brian Hills MD;  Location: [facility] OR [address] [place] [address];  Service: General;  Laterality: N/A;       Family History:   Family History   Problem Relation Age of Onset    Thyroid disease Mother     Cancer Father     Pancreatic cancer Father     Arthritis Father     Thyroid disease Father        Social History:   Social History     Tobacco Use    Smoking status: Former Smoker    Smokeless tobacco: Never Used   Substance Use Topics    Alcohol use: Not Currently    Drug use: Never       Allergies: Reviewed    Home Medications:   Medication List with Changes/Refills   Current Medications    CARVEDILOL (COREG) 6.25 MG TABLET    Take 6.25 mg  "by mouth 2 (two) times daily.    CARVEDILOL (COREG) 6.25 MG TABLET    Take 1 tablet by mouth 2 (two) times daily with meals.    FERROUS GLUCONATE (FERGON) 324 MG TABLET    Take 324 mg by mouth. twice weekly on an empty stomach (Sunday and Wednesday)    LIPASE-PROTEASE-AMYLASE (PANCREAZE) 16,800-56,800- 98,400 UNIT CPDR    Take before meals    LIPASE-PROTEASE-AMYLASE (PANCREAZE) 4,200-14,200- 24,600 UNIT CPDR    as directed    MULTIVITAMIN (THERAGRAN) PER TABLET    Take 1 tablet by mouth once daily.    OXYCODONE-ACETAMINOPHEN (PERCOCET)  MG PER TABLET    Take 1 tablet by mouth every 4 (four) hours as needed for Pain.    PANTOPRAZOLE (PROTONIX) 40 MG TABLET    Take 1 tablet (40 mg total) by mouth once daily.    PROMETHAZINE (PHENERGAN) 25 MG TABLET    Take 25 mg by mouth every 6 (six) hours as needed.         Physical Exam:  Vital Signs:  /82   Pulse 64   Ht 5' 11" (1.803 m)   Wt 95.5 kg (210 lb 8.6 oz)   SpO2 98%   BMI 29.36 kg/m²   Body mass index is 29.36 kg/m².      GENERAL: No acute distress, A&Ox3  EYES: Anicteric, no pallor noted.  ENT: OP clear  NECK: Supple, no masses, no thyromegally.  CHEST: Equal breath sounds bilaterally, no wheezing.  CARDIOVASCULAR: Regular rate and rhythm. Murmurs, rubs and gallops absent.  ABDOMEN: soft, non-tender, non-distended, normal bowel sounds, no hepatosplenomegaly   EXTREMITIES: No clubbing, cyanosis or edema.  SKIN: Without lesion or erythema.  LYMPH: No cervical, axillary lymphadenopathy palpable.   NEUROLOGICAL: Grossly normal, no asterixis present.    Labs: Pertinent labs reviewed.    Assessment and Plan:  Acute recurrent pancreatitis  -     CT Abdomen Pelvis W Wo Contrast; Future; Expected date: 07/07/2022  -     Comprehensive Metabolic Panel; Future; Expected date: 07/07/2022  -     Case Request Endoscopy: ERCP (ENDOSCOPIC RETROGRADE CHOLANGIOPANCREATOGRAPHY)    Chronic pancreatitis, unspecified pancreatitis type  -     Ambulatory referral/consult to " Pain Clinic; Future; Expected date: 07/14/2022  -     Case Request Endoscopy: ERCP (ENDOSCOPIC RETROGRADE CHOLANGIOPANCREATOGRAPHY)    Will obtain CT abdomen to assess the aneurysm and will consult IR if present to discuss possible embolization.     ERCP for recurrent pancreatitis to assess dilation on recent MRCP and if any stent may help. Explained it will be difficult post op but will attempt.    Pain management consult for chronic pain secondary to his chronic pancreatitis.    To continue Creon.      No follow-ups on file.        Thank you so much for allowing me to participate in the care of Fabrice Leiva MD  Gastroenterology  Director of Advanced Endoscopy at [facility]

## 2022-07-11 ENCOUNTER — PATIENT MESSAGE (OUTPATIENT)
Dept: GASTROENTEROLOGY | Facility: HOSPITAL | Age: 57
End: 2022-07-11
Payer: COMMERCIAL

## 2022-07-12 ENCOUNTER — PATIENT MESSAGE (OUTPATIENT)
Dept: ENDOSCOPY | Facility: HOSPITAL | Age: 57
End: 2022-07-12
Payer: COMMERCIAL

## 2022-07-14 ENCOUNTER — PATIENT MESSAGE (OUTPATIENT)
Dept: GASTROENTEROLOGY | Facility: CLINIC | Age: 57
End: 2022-07-14
Payer: COMMERCIAL

## 2022-07-15 ENCOUNTER — OFFICE VISIT (OUTPATIENT)
Dept: PAIN MEDICINE | Facility: CLINIC | Age: 57
End: 2022-07-15
Payer: COMMERCIAL

## 2022-07-15 VITALS
HEIGHT: 71 IN | BODY MASS INDEX: 29.8 KG/M2 | RESPIRATION RATE: 17 BRPM | HEART RATE: 71 BPM | DIASTOLIC BLOOD PRESSURE: 82 MMHG | SYSTOLIC BLOOD PRESSURE: 127 MMHG | WEIGHT: 212.88 LBS

## 2022-07-15 DIAGNOSIS — K85.90 ACUTE ON CHRONIC PANCREATITIS: Primary | ICD-10-CM

## 2022-07-15 DIAGNOSIS — K86.1 CHRONIC PANCREATITIS, UNSPECIFIED PANCREATITIS TYPE: ICD-10-CM

## 2022-07-15 DIAGNOSIS — K86.1 ACUTE ON CHRONIC PANCREATITIS: Primary | ICD-10-CM

## 2022-07-15 PROCEDURE — 99202 PR OFFICE/OUTPT VISIT, NEW, LEVL II, 15-29 MIN: ICD-10-PCS | Mod: S$GLB,,, | Performed by: ANESTHESIOLOGY

## 2022-07-15 PROCEDURE — 3079F DIAST BP 80-89 MM HG: CPT | Mod: CPTII,S$GLB,, | Performed by: ANESTHESIOLOGY

## 2022-07-15 PROCEDURE — 1159F PR MEDICATION LIST DOCUMENTED IN MEDICAL RECORD: ICD-10-PCS | Mod: CPTII,S$GLB,, | Performed by: ANESTHESIOLOGY

## 2022-07-15 PROCEDURE — 3008F PR BODY MASS INDEX (BMI) DOCUMENTED: ICD-10-PCS | Mod: CPTII,S$GLB,, | Performed by: ANESTHESIOLOGY

## 2022-07-15 PROCEDURE — 99202 OFFICE O/P NEW SF 15 MIN: CPT | Mod: S$GLB,,, | Performed by: ANESTHESIOLOGY

## 2022-07-15 PROCEDURE — 3008F BODY MASS INDEX DOCD: CPT | Mod: CPTII,S$GLB,, | Performed by: ANESTHESIOLOGY

## 2022-07-15 PROCEDURE — 1160F PR REVIEW ALL MEDS BY PRESCRIBER/CLIN PHARMACIST DOCUMENTED: ICD-10-PCS | Mod: CPTII,S$GLB,, | Performed by: ANESTHESIOLOGY

## 2022-07-15 PROCEDURE — 3074F SYST BP LT 130 MM HG: CPT | Mod: CPTII,S$GLB,, | Performed by: ANESTHESIOLOGY

## 2022-07-15 PROCEDURE — 1160F RVW MEDS BY RX/DR IN RCRD: CPT | Mod: CPTII,S$GLB,, | Performed by: ANESTHESIOLOGY

## 2022-07-15 PROCEDURE — 99999 PR PBB SHADOW E&M-EST. PATIENT-LVL IV: ICD-10-PCS | Mod: PBBFAC,,, | Performed by: ANESTHESIOLOGY

## 2022-07-15 PROCEDURE — 1159F MED LIST DOCD IN RCRD: CPT | Mod: CPTII,S$GLB,, | Performed by: ANESTHESIOLOGY

## 2022-07-15 PROCEDURE — 99999 PR PBB SHADOW E&M-EST. PATIENT-LVL IV: CPT | Mod: PBBFAC,,, | Performed by: ANESTHESIOLOGY

## 2022-07-15 PROCEDURE — 3079F PR MOST RECENT DIASTOLIC BLOOD PRESSURE 80-89 MM HG: ICD-10-PCS | Mod: CPTII,S$GLB,, | Performed by: ANESTHESIOLOGY

## 2022-07-15 PROCEDURE — 3074F PR MOST RECENT SYSTOLIC BLOOD PRESSURE < 130 MM HG: ICD-10-PCS | Mod: CPTII,S$GLB,, | Performed by: ANESTHESIOLOGY

## 2022-07-15 RX ORDER — OXYCODONE HYDROCHLORIDE 10 MG/1
10 TABLET ORAL EVERY 6 HOURS PRN
COMMUNITY
Start: 2022-06-08 | End: 2022-08-16

## 2022-07-15 RX ORDER — AMOXICILLIN 500 MG/1
CAPSULE ORAL
Status: ON HOLD | COMMUNITY
Start: 2022-07-07 | End: 2022-08-15 | Stop reason: HOSPADM

## 2022-07-15 RX ORDER — PREGABALIN 50 MG/1
100 CAPSULE ORAL NIGHTLY
Qty: 60 CAPSULE | Refills: 1 | Status: SHIPPED | OUTPATIENT
Start: 2022-07-15 | End: 2022-09-23 | Stop reason: SDUPTHER

## 2022-07-15 NOTE — PROGRESS NOTES
New Patient Chronic Pain Note (Initial Visit)    Referring Physician: Odalys Leiva *    PCP: Rebecca Jackson MD    Chief Complaint:   Chief Complaint   Patient presents with    Abdominal Pain     C/o abdominal pain in the middle     Back Pain     C/o pain in middle of back         SUBJECTIVE:    Fabrice Zamorano is a 57 y.o. male who presents to the clinic for the evaluation of  abdominal pain. The pain started 6 years ago followingsymptoms have been worsening.The pain is located in the RUQ and radiate in band across his abdomen to his thoracic spine and to his right shoulder.The pain is described as aching, crushing, dull, sharp, shooting, stabbing and tight band and is rated as 5/10. The pain is rated with a score of  2/10 on the BEST day and a score of 10/10 on the WORST day.  Symptoms interfere with daily activity. The pain is exacerbated by eating a mean.  The pain is mitigated by fasting.         Non-Pharmacologic Treatments:  Physical Therapy/Home Exercise: no  Ice/Heat:no  TENS: no  Acupuncture: no  Massage: no  Chiropractic: no        Previous Pain Medications:  Tylenol, ASA, Percocet     report:  Reviewed and consistent with medication use as prescribed.    Pain Procedures:   None      Imaging:   MRI MRCP Without Contrast  Narrative: EXAMINATION:  MRI ABDOMEN WITHOUT CONTRAST MRCP    CLINICAL HISTORY:  chronic pancreatitis, elevated bilirubin concern for biliary stricture;  Acute pancreatitis without necrosis or infection, unspecified    TECHNIQUE:  Multiplanar, multisequence images are performed through the liver and upper abdomen.  Additionally 2D and 3D MRCP sequences are performed as well as MIP images.    COMPARISON:  09/30/2021.    FINDINGS:  The visualized lung bases and mediastinum are unremarkable.    The liver is unremarkable.    The spleen is enlarged measuring 14.6 cm craniocaudal.    The bilateral adrenal glands are unremarkable.    The pancreatic parenchyma is unremarkable.   However, there is multifocal dilatation of the main pancreatic duct measuring 4-5 mm in the pancreatic body with dilatation of the side chain branches and 3 mm in the pancreatic tail.    The gallbladder has been removed.  No intrahepatic or extrahepatic biliary dilatation.    The kidneys are symmetric in size.  7.1 x 6.0 by 6.7 cm right renal cyst.  No hydronephrosis or hydroureter.    The visualized large and small bowel are unremarkable.    There are prominent periportal collateral vessels in the left upper quadrant.    No free fluid or lymphadenopathy.    Osseous structures are unremarkable.  Impression: Multifocal dilatation of the main pancreatic duct measuring 4-5 mm with dilatation of the side chain branches.  This is not significantly changed when compared to the prior exam.  Improved peripancreatic edema.  Please note evaluation for pancreatic mass is limited by the lack of intravenous contrast.  Recommend continued surveillance.    The spleen is enlarged.  Periportal collateral vessels.  The findings are worrisome for portal venous hypertension.    Electronically signed by: Mc Grajeda  Date:    05/23/2022  Time:    18:58          Past Medical History:   Diagnosis Date    Anticoagulant long-term use     On Eliquis for DVT    GERD (gastroesophageal reflux disease)     Hypertension     Hypothyroidism 10/26/2021    Pancreatic pseudocyst     Sleep apnea     Splenic vein thrombosis      Past Surgical History:   Procedure Laterality Date    CHOLECYSTECTOMY      ENDOSCOPIC ULTRASOUND OF UPPER GASTROINTESTINAL TRACT N/A 10/12/2021    Procedure: ULTRASOUND, UPPER GI TRACT, ENDOSCOPIC;  Surgeon: Odalys Leiva MD;  Location: [facility];  Service: Endoscopy;  Laterality: N/A;  Upper and linear, 22 shark core, cytology and RPMI    ERCP N/A 9/29/2021    Procedure: ERCP (ENDOSCOPIC RETROGRADE CHOLANGIOPANCREATOGRAPHY);  Surgeon: Odalys Leiva MD;  Location: [facility];  Service: Endoscopy;   Laterality: N/A;    ERCP W/ PLASTIC STENT PLACEMENT      LATERAL PANCREATICOJEJUNOSTOMY N/A 11/19/2021    Procedure: PANCREATICOJEJUNOSTOMY, SIDE-TO-SIDE tier 1;  Surgeon: Brian Hills MD;  Location: [facility] OR [address] [place] [address];  Service: General;  Laterality: N/A;     Social History     Socioeconomic History    Marital status:    Tobacco Use    Smoking status: Former Smoker    Smokeless tobacco: Never Used   Substance and Sexual Activity    Alcohol use: Not Currently    Drug use: Never    Sexual activity: Yes     Family History   Problem Relation Age of Onset    Thyroid disease Mother     Cancer Father     Pancreatic cancer Father     Arthritis Father     Thyroid disease Father        Review of patient's allergies indicates:  No Known Allergies    Current Outpatient Medications   Medication Sig    amoxicillin (AMOXIL) 500 MG capsule TAKE ONE CAPSULE BY MOUTH THREE TIMES DAILY UNTIL ALL TAKEN    carvediloL (COREG) 6.25 MG tablet Take 6.25 mg by mouth 2 (two) times daily.    carvediloL (COREG) 6.25 MG tablet Take 1 tablet by mouth 2 (two) times daily with meals.    lipase-protease-amylase (PANCREAZE) 16,800-56,800- 98,400 unit CpDR Take before meals    multivitamin (THERAGRAN) per tablet Take 1 tablet by mouth once daily.    oxyCODONE (ROXICODONE) 10 mg Tab immediate release tablet Take 10 mg by mouth every 6 (six) hours as needed.    oxyCODONE-acetaminophen (PERCOCET)  mg per tablet Take 1 tablet by mouth every 4 (four) hours as needed for Pain.    pantoprazole (PROTONIX) 40 MG tablet Take 1 tablet (40 mg total) by mouth once daily.    promethazine (PHENERGAN) 25 MG tablet Take 25 mg by mouth every 6 (six) hours as needed.    ferrous gluconate (FERGON) 324 MG tablet Take 324 mg by mouth. twice weekly on an empty stomach (Sunday and Wednesday)    lipase-protease-amylase (PANCREAZE) 4,200-14,200- 24,600 unit CpDR as directed    pregabalin (LYRICA) 50 MG capsule Take 2 capsules (100 mg  "total) by mouth every evening.     No current facility-administered medications for this visit.         ROS  Review of Systems   Constitutional: Positive for appetite change. Negative for activity change, fatigue and fever.   HENT: Negative for facial swelling, rhinorrhea and sore throat.    Eyes: Negative for pain and redness.   Respiratory: Negative for cough, chest tightness, shortness of breath, wheezing and stridor.    Cardiovascular: Positive for leg swelling. Negative for chest pain and palpitations.   Gastrointestinal: Positive for abdominal pain, blood in stool and constipation. Negative for diarrhea, nausea and vomiting.   Endocrine: Negative for polydipsia, polyphagia and polyuria.   Genitourinary: Negative for dysuria, hematuria and urgency.   Musculoskeletal: Negative for arthralgias, back pain, gait problem, joint swelling, myalgias, neck pain and neck stiffness.   Skin: Negative for rash.   Allergic/Immunologic: Negative for food allergies.   Neurological: Negative for dizziness, tremors, seizures, syncope, facial asymmetry, speech difficulty, weakness, light-headedness, numbness and headaches.   Psychiatric/Behavioral: Negative for agitation, hallucinations, self-injury and suicidal ideas. The patient is not nervous/anxious and is not hyperactive.             OBJECTIVE:    /82   Pulse 71   Resp 17   Ht 5' 11" (1.803 m)   Wt 96.6 kg (212 lb 13.7 oz)   BMI 29.69 kg/m²         Physical Exam  Constitutional:       General: He is not in acute distress.     Appearance: Normal appearance. He is not ill-appearing.   HENT:      Head: Normocephalic and atraumatic.      Nose: No congestion or rhinorrhea.      Mouth/Throat:      Mouth: Mucous membranes are moist.   Eyes:      Extraocular Movements: Extraocular movements intact.      Pupils: Pupils are equal, round, and reactive to light.   Cardiovascular:      Pulses: Normal pulses.   Pulmonary:      Effort: Pulmonary effort is normal.   Abdominal:    "   General: Abdomen is flat.      Palpations: Abdomen is soft.      Tenderness: There is abdominal tenderness (Tenderness to deep palpation over the right upper quadrant).   Musculoskeletal:      Cervical back: Normal range of motion and neck supple.   Skin:     General: Skin is warm and dry.      Capillary Refill: Capillary refill takes less than 2 seconds.   Neurological:      General: No focal deficit present.      Mental Status: He is alert and oriented to person, place, and time.      Cranial Nerves: No cranial nerve deficit.      Sensory: No sensory deficit.      Motor: No weakness.      Gait: Gait normal.   Psychiatric:         Mood and Affect: Mood normal.         Behavior: Behavior normal.         Thought Content: Thought content normal.           Musculoskeletal:    Thoracic Exam  Incision: no  Pain with Flexion: no  Pain with Extension: no  Paraspinous TTP: no  Facet TTP: no  ROM: FROM    Lumbar Exam  Incision: no  Pain with Flexion: no  Pain with Extension: no  ROM: FROM  Paraspinous TTP: no  Facet TTP: no  Facet Loading: no        LABS:  Lab Results   Component Value Date    WBC 8.85 03/24/2022    HGB 14.3 03/24/2022    HCT 44.4 03/24/2022    MCV 89 03/24/2022     (L) 03/24/2022       CMP  Sodium   Date Value Ref Range Status   07/07/2022 139 136 - 145 mmol/L Final     Potassium   Date Value Ref Range Status   07/07/2022 4.3 3.5 - 5.1 mmol/L Final     Chloride   Date Value Ref Range Status   07/07/2022 102 95 - 110 mmol/L Final     CO2   Date Value Ref Range Status   07/07/2022 30 (H) 23 - 29 mmol/L Final     Glucose   Date Value Ref Range Status   07/07/2022 84 70 - 110 mg/dL Final     BUN   Date Value Ref Range Status   07/07/2022 11 6 - 20 mg/dL Final     Creatinine   Date Value Ref Range Status   07/07/2022 1.1 0.5 - 1.4 mg/dL Final     Calcium   Date Value Ref Range Status   07/07/2022 9.9 8.7 - 10.5 mg/dL Final     Total Protein   Date Value Ref Range Status   07/07/2022 7.1 6.0 - 8.4 g/dL  Final     Albumin   Date Value Ref Range Status   07/07/2022 4.3 3.5 - 5.2 g/dL Final     Total Bilirubin   Date Value Ref Range Status   07/07/2022 0.5 0.1 - 1.0 mg/dL Final     Comment:     For infants and newborns, interpretation of results should be based  on gestational age, weight and in agreement with clinical  observations.    Premature Infant recommended reference ranges:  Up to 24 hours.............<8.0 mg/dL  Up to 48 hours............<12.0 mg/dL  3-5 days..................<15.0 mg/dL  6-29 days.................<15.0 mg/dL       Alkaline Phosphatase   Date Value Ref Range Status   07/07/2022 87 55 - 135 U/L Final     AST   Date Value Ref Range Status   07/07/2022 24 10 - 40 U/L Final     ALT   Date Value Ref Range Status   07/07/2022 40 10 - 44 U/L Final     Anion Gap   Date Value Ref Range Status   07/07/2022 7 (L) 8 - 16 mmol/L Final     eGFR if    Date Value Ref Range Status   07/07/2022 >60.0 >60 mL/min/1.73 m^2 Final     eGFR if non    Date Value Ref Range Status   07/07/2022 >60.0 >60 mL/min/1.73 m^2 Final     Comment:     Calculation used to obtain the estimated glomerular filtration  rate (eGFR) is the CKD-EPI equation.          No results found for: LABA1C, HGBA1C          ASSESSMENT:       57 y.o. year old male with abdominal pain, consistent with     1. Acute on chronic pancreatitis  pregabalin (LYRICA) 50 MG capsule   2. Chronic pancreatitis, unspecified pancreatitis type  Ambulatory referral/consult to Pain Clinic    pregabalin (LYRICA) 50 MG capsule     Acute on chronic pancreatitis  -     pregabalin (LYRICA) 50 MG capsule; Take 2 capsules (100 mg total) by mouth every evening.  Dispense: 60 capsule; Refill: 1    Chronic pancreatitis, unspecified pancreatitis type  -     Ambulatory referral/consult to Pain Clinic  -     pregabalin (LYRICA) 50 MG capsule; Take 2 capsules (100 mg total) by mouth every evening.  Dispense: 60 capsule; Refill: 1             PLAN:    - Interventions:    Patient is scheduled for ERCP with stenting to help with chronic pancreatitis pain.  We will see how this helps patient has chronic pancreatitis pain.   If patient continues to have pain after this procedure we can consider celiac plexus block for chronic abdominal pain  - Anticoagulation use:   no no anticoagulation    - Medications:   Start Lyrica 50 mg at night, patient will titrate up to 100 mg at night as tolerated   Continue Tylenol and Percocet as needed as prescribed by gastroenterology  - Imaging:   CT abdomen reviewed and findings discussed the patient    - Consults/Referrals:   Continue follow-up with GI       - Counseled patient regarding the importance of activity modification    - Patient Questions: Answered all of the patient's questions regarding diagnosis, therapy, and treatment    - Follow up visit: return to clinic PRN        The above plan and management options were discussed at length with patient. Patient is in agreement with the above and verbalized understanding.    I discussed the goals of interventional chronic pain management with the patient on today's visit.  I explained the utility of injections for diagnostic and therapeutic purposes.  We discussed a multimodal approach to pain including treating the patient's given worst pain at any given time.  We will use a systematic approach to addressing pain.  We will also adopt a multimodal approach that includes injections, adjuvant medications, physical therapy, at times psychiatry.  There may be a limited role for opioid use intermittently in the treatment of pain, more particularly for acute pain although no one approach can be used as a sole treatment modality.    I emphasized the importance of regular exercise, core strengthening and stretching, diet and weight loss as a cornerstone of long-term pain management.      Mc Campos MD  Interventional Pain Management  [facility]    Disclaimer:  This note  was prepared using voice recognition system and is likely to have sound alike errors that may have been overlooked even after proof reading.  Please call me with any questions

## 2022-07-18 ENCOUNTER — HOSPITAL ENCOUNTER (OUTPATIENT)
Dept: RADIOLOGY | Facility: HOSPITAL | Age: 57
Discharge: HOME | End: 2022-07-18
Attending: INTERNAL MEDICINE
Payer: COMMERCIAL

## 2022-07-18 DIAGNOSIS — K85.90 ACUTE RECURRENT PANCREATITIS: ICD-10-CM

## 2022-07-18 PROCEDURE — 74177 CT ABD & PELVIS W/CONTRAST: CPT | Mod: 26,,, | Performed by: RADIOLOGY

## 2022-07-18 PROCEDURE — 74177 CT ABD & PELVIS W/CONTRAST: CPT | Mod: TC

## 2022-07-18 PROCEDURE — 74177 CT ABDOMEN PELVIS WITH CONTRAST: ICD-10-PCS | Mod: 26,,, | Performed by: RADIOLOGY

## 2022-07-18 PROCEDURE — 25500020 PHARM REV CODE 255: Performed by: INTERNAL MEDICINE

## 2022-07-18 RX ADMIN — IOHEXOL 100 ML: 350 INJECTION, SOLUTION INTRAVENOUS at 09:07

## 2022-07-26 ENCOUNTER — PATIENT MESSAGE (OUTPATIENT)
Dept: ENDOSCOPY | Facility: HOSPITAL | Age: 57
End: 2022-07-26
Payer: COMMERCIAL

## 2022-08-15 ENCOUNTER — ANESTHESIA (OUTPATIENT)
Dept: ENDOSCOPY | Facility: HOSPITAL | Age: 57
End: 2022-08-15
Payer: COMMERCIAL

## 2022-08-15 ENCOUNTER — ANESTHESIA EVENT (OUTPATIENT)
Dept: ENDOSCOPY | Facility: HOSPITAL | Age: 57
End: 2022-08-15
Payer: COMMERCIAL

## 2022-08-15 ENCOUNTER — HOSPITAL ENCOUNTER (OUTPATIENT)
Facility: HOSPITAL | Age: 57
Discharge: HOME | End: 2022-08-15
Attending: INTERNAL MEDICINE | Admitting: INTERNAL MEDICINE
Payer: COMMERCIAL

## 2022-08-15 DIAGNOSIS — K85.90 ACUTE ON CHRONIC PANCREATITIS: Primary | ICD-10-CM

## 2022-08-15 DIAGNOSIS — K86.1 ACUTE ON CHRONIC PANCREATITIS: Primary | ICD-10-CM

## 2022-08-15 PROCEDURE — C1769 GUIDE WIRE: HCPCS | Performed by: INTERNAL MEDICINE

## 2022-08-15 PROCEDURE — 37000009 HC ANESTHESIA EA ADD 15 MINS: Performed by: INTERNAL MEDICINE

## 2022-08-15 PROCEDURE — 74329 PR  X-RAY FOR PANCREAS ENDOSCOPY: ICD-10-PCS | Mod: 26,,, | Performed by: INTERNAL MEDICINE

## 2022-08-15 PROCEDURE — 96375 TX/PRO/DX INJ NEW DRUG ADDON: CPT

## 2022-08-15 PROCEDURE — 37000008 HC ANESTHESIA 1ST 15 MINUTES: Performed by: INTERNAL MEDICINE

## 2022-08-15 PROCEDURE — C2617 STENT, NON-COR, TEM W/O DEL: HCPCS | Performed by: INTERNAL MEDICINE

## 2022-08-15 PROCEDURE — 25000003 PHARM REV CODE 250: Performed by: NURSE ANESTHETIST, CERTIFIED REGISTERED

## 2022-08-15 PROCEDURE — 43274 ERCP DUCT STENT PLACEMENT: CPT | Performed by: INTERNAL MEDICINE

## 2022-08-15 PROCEDURE — 27201674 HC SPHINCTERTOME: Performed by: INTERNAL MEDICINE

## 2022-08-15 PROCEDURE — 25000003 PHARM REV CODE 250: Performed by: ANESTHESIOLOGY

## 2022-08-15 PROCEDURE — 63600175 PHARM REV CODE 636 W HCPCS: Performed by: NURSE ANESTHETIST, CERTIFIED REGISTERED

## 2022-08-15 PROCEDURE — 74329 X-RAY FOR PANCREAS ENDOSCOPY: CPT | Performed by: INTERNAL MEDICINE

## 2022-08-15 PROCEDURE — 74329 X-RAY FOR PANCREAS ENDOSCOPY: CPT | Mod: 26,,, | Performed by: INTERNAL MEDICINE

## 2022-08-15 PROCEDURE — C1726 CATH, BAL DIL, NON-VASCULAR: HCPCS | Performed by: INTERNAL MEDICINE

## 2022-08-15 PROCEDURE — 25000003 PHARM REV CODE 250: Performed by: INTERNAL MEDICINE

## 2022-08-15 PROCEDURE — 96376 TX/PRO/DX INJ SAME DRUG ADON: CPT

## 2022-08-15 PROCEDURE — 43274 PR ERCP W/STENT PLCMNT BILIARY/PANCREATIC DUCT: ICD-10-PCS | Mod: ,,, | Performed by: INTERNAL MEDICINE

## 2022-08-15 PROCEDURE — 43274 ERCP DUCT STENT PLACEMENT: CPT | Mod: ,,, | Performed by: INTERNAL MEDICINE

## 2022-08-15 PROCEDURE — 96361 HYDRATE IV INFUSION ADD-ON: CPT

## 2022-08-15 PROCEDURE — 43264 ERCP REMOVE DUCT CALCULI: CPT | Mod: 51,,, | Performed by: INTERNAL MEDICINE

## 2022-08-15 PROCEDURE — 63600175 PHARM REV CODE 636 W HCPCS: Performed by: INTERNAL MEDICINE

## 2022-08-15 PROCEDURE — 27201014 HC GRASPER DEVICE: Performed by: INTERNAL MEDICINE

## 2022-08-15 PROCEDURE — 99291 CRITICAL CARE FIRST HOUR: CPT | Mod: 25

## 2022-08-15 PROCEDURE — 63600175 PHARM REV CODE 636 W HCPCS: Performed by: ANESTHESIOLOGY

## 2022-08-15 PROCEDURE — 43264 PR ERCP,W/REMOVAL STONE,BIL/PANCR DUCTS: ICD-10-PCS | Mod: 51,,, | Performed by: INTERNAL MEDICINE

## 2022-08-15 PROCEDURE — 43264 ERCP REMOVE DUCT CALCULI: CPT | Performed by: INTERNAL MEDICINE

## 2022-08-15 RX ORDER — HYDROMORPHONE HYDROCHLORIDE 1 MG/ML
0.5 INJECTION, SOLUTION INTRAMUSCULAR; INTRAVENOUS; SUBCUTANEOUS ONCE
Status: COMPLETED | OUTPATIENT
Start: 2022-08-15 | End: 2022-08-15

## 2022-08-15 RX ORDER — INDOMETHACIN 50 MG/1
SUPPOSITORY RECTAL
Status: COMPLETED | OUTPATIENT
Start: 2022-08-15 | End: 2022-08-15

## 2022-08-15 RX ORDER — PROPOFOL 10 MG/ML
VIAL (ML) INTRAVENOUS
Status: DISCONTINUED | OUTPATIENT
Start: 2022-08-15 | End: 2022-08-15

## 2022-08-15 RX ORDER — INDOMETHACIN 50 MG/1
100 SUPPOSITORY RECTAL EVERY 12 HOURS PRN
Status: DISCONTINUED | OUTPATIENT
Start: 2022-08-15 | End: 2022-08-15 | Stop reason: HOSPADM

## 2022-08-15 RX ORDER — SUCCINYLCHOLINE CHLORIDE 20 MG/ML
INJECTION INTRAMUSCULAR; INTRAVENOUS
Status: DISCONTINUED | OUTPATIENT
Start: 2022-08-15 | End: 2022-08-15

## 2022-08-15 RX ORDER — HYDROMORPHONE HYDROCHLORIDE 1 MG/ML
1 INJECTION, SOLUTION INTRAMUSCULAR; INTRAVENOUS; SUBCUTANEOUS
Status: COMPLETED | OUTPATIENT
Start: 2022-08-15 | End: 2022-08-16

## 2022-08-15 RX ORDER — SODIUM CHLORIDE 9 MG/ML
INJECTION, SOLUTION INTRAVENOUS CONTINUOUS
Status: DISCONTINUED | OUTPATIENT
Start: 2022-08-15 | End: 2022-08-15 | Stop reason: HOSPADM

## 2022-08-15 RX ORDER — DEXAMETHASONE SODIUM PHOSPHATE 4 MG/ML
INJECTION, SOLUTION INTRA-ARTICULAR; INTRALESIONAL; INTRAMUSCULAR; INTRAVENOUS; SOFT TISSUE
Status: DISCONTINUED | OUTPATIENT
Start: 2022-08-15 | End: 2022-08-15

## 2022-08-15 RX ORDER — ONDANSETRON 2 MG/ML
INJECTION INTRAMUSCULAR; INTRAVENOUS
Status: DISCONTINUED | OUTPATIENT
Start: 2022-08-15 | End: 2022-08-15

## 2022-08-15 RX ORDER — FENTANYL CITRATE 50 UG/ML
INJECTION, SOLUTION INTRAMUSCULAR; INTRAVENOUS
Status: DISCONTINUED | OUTPATIENT
Start: 2022-08-15 | End: 2022-08-15

## 2022-08-15 RX ORDER — SODIUM CHLORIDE, SODIUM LACTATE, POTASSIUM CHLORIDE, CALCIUM CHLORIDE 600; 310; 30; 20 MG/100ML; MG/100ML; MG/100ML; MG/100ML
INJECTION, SOLUTION INTRAVENOUS CONTINUOUS PRN
Status: DISCONTINUED | OUTPATIENT
Start: 2022-08-15 | End: 2022-08-15

## 2022-08-15 RX ORDER — LIDOCAINE HYDROCHLORIDE 10 MG/ML
INJECTION, SOLUTION EPIDURAL; INFILTRATION; INTRACAUDAL; PERINEURAL
Status: DISCONTINUED | OUTPATIENT
Start: 2022-08-15 | End: 2022-08-15

## 2022-08-15 RX ORDER — ONDANSETRON 2 MG/ML
4 INJECTION INTRAMUSCULAR; INTRAVENOUS
Status: COMPLETED | OUTPATIENT
Start: 2022-08-15 | End: 2022-08-16

## 2022-08-15 RX ORDER — ROCURONIUM BROMIDE 10 MG/ML
INJECTION, SOLUTION INTRAVENOUS
Status: DISCONTINUED | OUTPATIENT
Start: 2022-08-15 | End: 2022-08-15

## 2022-08-15 RX ADMIN — FENTANYL CITRATE 100 MCG: 50 INJECTION, SOLUTION INTRAMUSCULAR; INTRAVENOUS at 07:08

## 2022-08-15 RX ADMIN — SODIUM CHLORIDE, SODIUM LACTATE, POTASSIUM CHLORIDE, AND CALCIUM CHLORIDE: 600; 310; 30; 20 INJECTION, SOLUTION INTRAVENOUS at 07:08

## 2022-08-15 RX ADMIN — PROMETHAZINE HYDROCHLORIDE 12.5 MG: 25 INJECTION INTRAMUSCULAR; INTRAVENOUS at 09:08

## 2022-08-15 RX ADMIN — ONDANSETRON 4 MG: 2 INJECTION, SOLUTION INTRAMUSCULAR; INTRAVENOUS at 07:08

## 2022-08-15 RX ADMIN — SUCCINYLCHOLINE CHLORIDE 120 MG: 20 INJECTION, SOLUTION INTRAMUSCULAR; INTRAVENOUS at 06:08

## 2022-08-15 RX ADMIN — ROCURONIUM BROMIDE 25 MG: 10 INJECTION, SOLUTION INTRAVENOUS at 07:08

## 2022-08-15 RX ADMIN — HYDROMORPHONE HYDROCHLORIDE 0.5 MG: 1 INJECTION, SOLUTION INTRAMUSCULAR; INTRAVENOUS; SUBCUTANEOUS at 08:08

## 2022-08-15 RX ADMIN — ONDANSETRON 4 MG: 2 INJECTION, SOLUTION INTRAMUSCULAR; INTRAVENOUS at 08:08

## 2022-08-15 RX ADMIN — INDOMETHACIN 100 MG: 50 SUPPOSITORY RECTAL at 07:08

## 2022-08-15 RX ADMIN — PROPOFOL 150 MG: 10 INJECTION, EMULSION INTRAVENOUS at 06:08

## 2022-08-15 RX ADMIN — SODIUM CHLORIDE, SODIUM LACTATE, POTASSIUM CHLORIDE, AND CALCIUM CHLORIDE: 600; 310; 30; 20 INJECTION, SOLUTION INTRAVENOUS at 06:08

## 2022-08-15 RX ADMIN — DEXAMETHASONE SODIUM PHOSPHATE 4 MG: 4 INJECTION, SOLUTION INTRAMUSCULAR; INTRAVENOUS at 07:08

## 2022-08-15 RX ADMIN — ROCURONIUM BROMIDE 5 MG: 10 INJECTION, SOLUTION INTRAVENOUS at 06:08

## 2022-08-15 RX ADMIN — LIDOCAINE HYDROCHLORIDE 50 MG: 10 INJECTION, SOLUTION EPIDURAL; INFILTRATION; INTRACAUDAL; PERINEURAL at 06:08

## 2022-08-15 RX ADMIN — HYDROMORPHONE HYDROCHLORIDE 0.5 MG: 1 INJECTION, SOLUTION INTRAMUSCULAR; INTRAVENOUS; SUBCUTANEOUS at 09:08

## 2022-08-15 NOTE — PR[employer]ATION PATIENT INSTRUCTIONS
Discharge Summary/Instructions after an Endoscopic Procedure  Patient Name: Fabrice Zamorano  Patient MRN: [id number]  Patient YOB: 1965  Monday, August 15, 2022 Odalys Leiva MD  Dear patient,  As a result of recent federal legislation (The Federal Cures Act), you may   receive lab or pathology results from your procedure in your [facility]   account before your physician is able to contact you. Your physician or   their representative will relay the results to you with their   recommendations at their soonest availability.  Thank you,  RESTRICTIONS:  During your procedure today, you received medications for sedation.  These   medications may affect your judgment, balance and coordination.  Therefore,   for 24 hours, you have the following restrictions:   - DO NOT drive a car, operate machinery, make legal/financial decisions,   sign important papers or drink alcohol.    ACTIVITY:  Today: no heavy lifting, straining or running due to procedural   sedation/anesthesia.  The following day: return to full activity including work.  DIET:  Eat and drink normally unless instructed otherwise.     TREATMENT FOR COMMON SIDE EFFECTS:  - Mild abdominal pain, nausea, belching, bloating or excessive gas:  rest,   eat lightly and use a heating pad.  - Sore Throat: treat with throat lozenges and/or gargle with warm salt   water.  - Because air was used during the procedure, expelling large amounts of air   from your rectum or belching is normal.  - If a bowel prep was taken, you may not have a bowel movement for 1-3 days.    This is normal.  SYMPTOMS TO WATCH FOR AND REPORT TO YOUR PHYSICIAN:  1. Abdominal pain or bloating, other than gas cramps.  2. Chest pain.  3. Back pain.  4. Signs of infection such as: chills or fever occurring within 24 hours   after the procedure.  5. Rectal bleeding, which would show as bright red, maroon, or black stools.   (A tablespoon of blood from the rectum is not serious,  especially if   hemorrhoids are present.)  6. Vomiting.  7. Weakness or dizziness.  GO DIRECTLY TO THE NEAREST EMERGENCY ROOM IF YOU HAVE ANY OF THE FOLLOWING:      Difficulty breathing              Chills and/or fever over 101 F   Persistent vomiting and/or vomiting blood   Severe abdominal pain   Severe chest pain   Black, tarry stools   Bleeding- more than one tablespoon   Any other symptom or condition that you feel may need urgent attention  Your doctor recommends these additional instructions:  If any biopsies were taken, your doctors clinic will contact you in 1 to 2   weeks with any results.  - Discharge patient to home.   - Resume previous diet.   - Continue present medications.   - Perform an upright abdominal x-ray in 2 weeks to assess PD stent   migration.  - Return to my office in 3 months.  For questions, problems or results please call your physician Odalys Leiva MD at Work:  [phone]  If you have any questions about the above instructions, call the GI   department at [phone] or call the endoscopy unit at [phone]   from 7am until 3 pm.  [facility], EMERGENCY ROOM PHONE NUMBER:   [phone]  IF A COMPLICATION OR EMERGENCY SITUATION ARISES AND YOU ARE UNABLE TO REACH   YOUR PHYSICIAN - GO DIRECTLY TO THE EMERGENCY ROOM.  I have read or have had read to me these discharge instructions for my   procedure and have received a written copy.  I understand these   instructions and will follow-up with my physician if I have any questions.     __________________________________       _____________________________________  Nurse Signature                                          Patient/Designated   Responsible Party Signature  MD Odalys Cooper MD  8/15/2022 8:37:13 AM  This report has been verified and signed electronically.  Dear patient,  As a result of recent federal legislation (The Federal Cures Act), you may    receive lab or pathology results from your procedure in your [facility]   account before your physician is able to contact you. Your physician or   their representative will relay the results to you with their   recommendations at their soonest availability.  Thank you,  [employer]ATION

## 2022-08-15 NOTE — ANESTHESIA PROCEDURE NOTES
Intubation    Date/Time: 8/15/2022 7:02 AM  Performed by: Best Canchola CRNA  Authorized by: Roldan Mccann II, MD     Intubation:     Induction:  Intravenous    Intubated:  Postinduction    Mask Ventilation:  Easy mask    Attempts:  1    Attempted By:  CRNA    Method of Intubation:  Video laryngoscopy    Blade:  Bose 3    Laryngeal View Grade: Grade I - full view of cords      Difficult Airway Encountered?: No      Complications:  None    Airway Device:  Oral endotracheal tube    Airway Device Size:  7.5    Style/Cuff Inflation:  Cuffed (inflated to minimal occlusive pressure)    Inflation Amount (mL):  6    Tube secured:  22    Secured at:  The lips    Placement Verified By:  Capnometry    Complicating Factors:  None    Findings Post-Intubation:  BS equal bilateral and atraumatic/condition of teeth unchanged

## 2022-08-15 NOTE — ANESTHESIA POSTPROCEDURE EVALUATION
Anesthesia Post Evaluation    Patient: Fabrice Zamorano    Procedure(s) Performed: Procedure(s) (LRB):  ERCP (ENDOSCOPIC RETROGRADE CHOLANGIOPANCREATOGRAPHY) (N/A)    Final Anesthesia Type: general      Patient location during evaluation: GI PACU  Patient participation: Yes- Able to Participate  Level of consciousness: awake and alert and oriented  Post-procedure vital signs: reviewed and stable  Pain management: adequate  Airway patency: patent  BRAIN mitigation strategies: Multimodal analgesia  PONV status at discharge: No PONV  Anesthetic complications: no      Cardiovascular status: hemodynamically stable  Respiratory status: unassisted, spontaneous ventilation and room air  Hydration status: euvolemic  Follow-up not needed.          Vitals Value Taken Time   /87 08/15/22 0938   Temp 36.6 °C (97.9 °F) 08/15/22 0818   Pulse 60 08/15/22 0938   Resp 17 08/15/22 0938   SpO2 96 % 08/15/22 0938         Event Time   Out of Recovery 09:50:31         Pain/Kellie Score: Pain Rating Prior to Med Admin: 5 (8/15/2022  9:22 AM)  Kellie Score: 10 (8/15/2022  9:38 AM)

## 2022-08-15 NOTE — H&P
PRE PROCEDURE H&P    Patient Name: Fabrice Zamorano  MRN: [id number]  : 1965  Date of Procedure:  8/15/2022  Referring Physician: Odalys Leiva *  Primary Physician: Rebecca Jackson MD  Procedure Physician: Odalys Leiva MD       Planned Procedure: ERCP  Diagnosis: recurrent pancreatitis  Chief Complaint: Same as above    HPI: Patient is an 57 y.o. male is here for the above.     ERCP is currently recommended. The risks, benefits and alternatives of the procedure were discussed with the patient in detail. Benefits include removal of stones, stents, debri, sludge; dilation; placement of a stent; biopsies. Risks include bleeding (0.3-2%), pancreatitis (3%), cholangitis (0.5-3%), perforation (0.08-0.6%), cholecystitis (0.5%), sedation related adverse events. Educational material of the biliary anatomy has been provided today for educational purposes. Other risks include, risks of adverse reaction to sedation requiring the use of reversal agents, bleeding requiring blood transfusion, perforation requiring surgical intervention, technical failure, aspiration leading to respiratory distress and respiratory failure resulting in endotracheal intubation and mechanical ventilation including death. Anesthesia is utilized for this procedure, it is up to the anesthesiologist to determine airway safety including elective endotracheal intubation. Questions were answered, the patient agree to proceed. There was no language barriers.       Past Medical History:   Past Medical History:   Diagnosis Date    Anticoagulant long-term use     On Eliquis for DVT    GERD (gastroesophageal reflux disease)     Hypertension     Hypothyroidism 10/26/2021    Pancreatic pseudocyst     Sleep apnea     Splenic vein thrombosis         Past Surgical History:  Past Surgical History:   Procedure Laterality Date    CHOLECYSTECTOMY      ENDOSCOPIC ULTRASOUND OF UPPER GASTROINTESTINAL TRACT N/A 10/12/2021    Procedure:  ULTRASOUND, UPPER GI TRACT, ENDOSCOPIC;  Surgeon: Odalys Leiva MD;  Location: [facility] ENDO;  Service: Endoscopy;  Laterality: N/A;  Upper and linear, 22 shark core, cytology and RPMI    ERCP N/A 9/29/2021    Procedure: ERCP (ENDOSCOPIC RETROGRADE CHOLANGIOPANCREATOGRAPHY);  Surgeon: Odalys Leiva MD;  Location: [facility] ENDO;  Service: Endoscopy;  Laterality: N/A;    ERCP W/ PLASTIC STENT PLACEMENT      LATERAL PANCREATICOJEJUNOSTOMY N/A 11/19/2021    Procedure: PANCREATICOJEJUNOSTOMY, SIDE-TO-SIDE tier 1;  Surgeon: Brian Hills MD;  Location: [facility] OR [address] [place] [address];  Service: General;  Laterality: N/A;        Home Medications:  Prior to Admission medications    Medication Sig Start Date End Date Taking? Authorizing Provider   carvediloL (COREG) 6.25 MG tablet Take 6.25 mg by mouth 2 (two) times daily. 9/10/21  Yes Historical Provider   lipase-protease-amylase (PANCREAZE) 16,800-56,800- 98,400 unit CpDR Take before meals 5/12/22  Yes Odalys Leiva MD   lipase-protease-amylase (PANCREAZE) 4,200-14,200- 24,600 unit CpDR as directed   Yes Historical Provider   multivitamin (THERAGRAN) per tablet Take 1 tablet by mouth once daily.   Yes Historical Provider   oxyCODONE-acetaminophen (PERCOCET)  mg per tablet Take 1 tablet by mouth every 4 (four) hours as needed for Pain. 5/12/22  Yes Odalys Leiva MD   pantoprazole (PROTONIX) 40 MG tablet Take 1 tablet (40 mg total) by mouth once daily. 5/31/22 8/15/22 Yes Odalys Leiva MD   pregabalin (LYRICA) 50 MG capsule Take 2 capsules (100 mg total) by mouth every evening. 7/15/22  Yes Mc Campos MD   promethazine (PHENERGAN) 25 MG tablet Take 25 mg by mouth every 6 (six) hours as needed. 6/8/22  Yes Historical Provider   amoxicillin (AMOXIL) 500 MG capsule TAKE ONE CAPSULE BY MOUTH THREE TIMES DAILY UNTIL ALL TAKEN 7/7/22   Historical Provider   carvediloL (COREG) 6.25 MG tablet Take 1 tablet by mouth 2 (two)  "times daily with meals.    Historical Provider   oxyCODONE (ROXICODONE) 10 mg Tab immediate release tablet Take 10 mg by mouth every 6 (six) hours as needed. 22   Historical Provider        Allergies:  Review of patient's allergies indicates:  No Known Allergies     Social History:   Social History     Socioeconomic History    Marital status:    Tobacco Use    Smoking status: Former Smoker     Quit date: 2001     Years since quittin.0    Smokeless tobacco: Never Used   Substance and Sexual Activity    Alcohol use: Not Currently    Drug use: Never    Sexual activity: Yes       Family History:  Family History   Problem Relation Age of Onset    Thyroid disease Mother     Cancer Father     Pancreatic cancer Father     Arthritis Father     Thyroid disease Father        ROS: No acute cardiac events, no acute respiratory complaints.     Physical Exam (all patients):    BP (!) 140/85 (BP Location: Left arm, Patient Position: Lying)   Pulse (!) 58   Temp 98.1 °F (36.7 °C) (Temporal)   Resp 18   Ht 5' 11" (1.803 m)   Wt 96.2 kg (212 lb)   SpO2 98%   BMI 29.57 kg/m²   Lungs: Clear to auscultation bilaterally, respirations unlabored  Heart: Regular rate and rhythm, S1 and S2 normal, no obvious murmurs  Abdomen:         Soft, non-tender, bowel sounds normal, no masses, no organomegaly    Lab Results   Component Value Date    WBC 8.85 2022    MCV 89 2022    RDW 15.8 (H) 2022     (L) 2022    GLU 84 2022    BUN 11 2022     2022    K 4.3 2022     2022        SEDATION PLAN: per anesthesia      History reviewed, vital signs satisfactory, cardiopulmonary status satisfactory, sedation options, risks and plans have been discussed with the patient  All their questions were answered and the patient agrees to the sedation procedures as planned and the patient is deemed an appropriate candidate for the sedation as " planned.    Procedure explained to patient, informed consent obtained and placed in chart.    Odalys Leiva  8/15/2022  6:51 AM

## 2022-08-15 NOTE — TRANSFER OF CARE
"Anesthesia Transfer of Care Note    Patient: Fabrice Zamorano    Procedure(s) Performed: Procedure(s) (LRB):  ERCP (ENDOSCOPIC RETROGRADE CHOLANGIOPANCREATOGRAPHY) (N/A)    Patient location: GI    Anesthesia Type: general    Transport from OR: Transported from OR on room air with adequate spontaneous ventilation    Post pain: adequate analgesia    Post assessment: no apparent anesthetic complications and tolerated procedure well    Post vital signs: stable    Level of consciousness: awake, alert and oriented    Nausea/Vomiting: no nausea/vomiting    Complications: none    Transfer of care protocol was followed      Last vitals:   Visit Vitals  BP (!) 147/101 (BP Location: Left arm, Patient Position: Lying)   Pulse 78   Temp 36.6 °C (97.9 °F)   Resp 18   Ht 5' 11" (1.803 m)   Wt 96.2 kg (212 lb)   SpO2 (!) 94%   BMI 29.57 kg/m²     "

## 2022-08-15 NOTE — ANESTHESIA PREPROCEDURE EVALUATION
08/15/2022  Fabrice Zamorano is a 57 y.o., male.    Pre-op Assessment    I have reviewed the Patient Summary Reports.   I have reviewed the NPO Status.   I have reviewed the Medications.     Review of Systems  Anesthesia Hx:  No problems with previous Anesthesia  Denies Family Hx of Anesthesia complications.   Denies Personal Hx of Anesthesia complications.   Social:  Non-Smoker, No Alcohol Use    Hematology/Oncology:     Oncology Normal    -- Anemia:   EENT/Dental:EENT/Dental Normal   Cardiovascular:   Hypertension, well controlled ECG has been reviewed. On eliquis for splenic vein and mesenteric vein thrombosis   Pulmonary:   Sleep Apnea, CPAP Lung nodule on CT   Education provided regarding risk of obstructive sleep apnea     Hepatic/GI:   GERD  Hepatic/GI Symptoms: nausea, vomiting, abdominal pain.  Biliary Disease Pancreatic pseudocyst Pancreatic Disease (chronic pancreatitis (PRSS 1 gene mutation))   Musculoskeletal:  Musculoskeletal Normal    Neurological:  Neurology Normal    Endocrine:   Hypothyroidism    Dermatological:  Skin Normal    Psych:  Psychiatric Normal           Physical Exam  General:  Well nourished      Airway/Jaw/Neck:  Airway Findings: Mouth Opening: Normal   Tongue: Normal   General Airway Assessment: Adult Mallampati: II  TM Distance: Normal, at least 6 cm       Dental:  Dental Findings: In tact     Chest/Lungs:  Chest/Lungs Findings: Normal Respiratory Rate      Heart/Vascular:  Heart Findings: Rate: Normal        Mental Status:  Mental Status Findings:  Cooperative         Anesthesia Plan  Type of Anesthesia, risks & benefits discussed:  Anesthesia Type:  general    Patient's Preference:   Plan Factors:          Intra-op Monitoring Plan: standard ASA monitors  Intra-op Monitoring Plan Comments:   Post Op Pain Control Plan: per primary service following discharge from PACU  Post Op  Pain Control Plan Comments:     Induction:   IV  Beta Blocker:  Patient is on a Beta-Blocker and has received one dose within the past 24 hours (No further documentation required).       Informed Consent: Informed consent signed with the Patient and all parties understand the risks and agree with anesthesia plan.  All questions answered.  Anesthesia consent signed with patient.  ASA Score: 2     Day of Surgery Review of History & Physical: I have interviewed and examined the patient. I have reviewed the patient's H&P dated:  There are no significant changes.            Ready For Surgery From Anesthesia Perspective.           Physical Exam  General: Well nourished    Airway:  Mallampati: II   Mouth Opening: Normal  TM Distance: Normal, at least 6 cm  Tongue: Normal    Dental:  In tact    Chest/Lungs:  Normal Respiratory Rate    Heart:  Rate: Normal          Anesthesia Plan  Type of Anesthesia, risks & benefits discussed:    Anesthesia Type: general  Intra-op Monitoring Plan: standard ASA monitors  Post Op Pain Control Plan: per primary service following discharge from PACU  Induction:  IV  Informed Consent: Informed consent signed with the Patient and all parties understand the risks and agree with anesthesia plan.  All questions answered.   ASA Score: 2  Day of Surgery Review of History & Physical: I have interviewed and examined the patient. I have reviewed the patient's H&P dated:     Ready For Surgery From Anesthesia Perspective.       .

## 2022-08-15 NOTE — ANESTHESIA RELEASE NOTE
"Anesthesia Release from PACU Note    Patient: Fabrice Zamorano    Procedure(s) Performed: Procedure(s) (LRB):  ERCP (ENDOSCOPIC RETROGRADE CHOLANGIOPANCREATOGRAPHY) (N/A)    Anesthesia type: general    Post pain: Adequate analgesia    Post assessment: no apparent anesthetic complications, tolerated procedure well and no evidence of recall    Last Vitals:   Visit Vitals  /87   Pulse 60   Temp 36.6 °C (97.9 °F)   Resp 17   Ht 5' 11" (1.803 m)   Wt 96.2 kg (212 lb)   SpO2 96%   BMI 29.57 kg/m²       Post vital signs: stable    Level of consciousness: awake, alert  and oriented    Nausea/Vomiting: no nausea/no vomiting    Complications: none    Airway Patency: patent    Respiratory: unassisted, spontaneous ventilation, room air    Cardiovascular: stable and blood pressure at baseline    Hydration: euvolemic  "

## 2022-08-16 ENCOUNTER — HOSPITAL ENCOUNTER (OUTPATIENT)
Facility: HOSPITAL | Age: 57
Discharge: HOME | End: 2022-08-17
Attending: EMERGENCY MEDICINE | Admitting: INTERNAL MEDICINE
Payer: COMMERCIAL

## 2022-08-16 ENCOUNTER — PATIENT MESSAGE (OUTPATIENT)
Dept: SURGERY | Facility: CLINIC | Age: 57
End: 2022-08-16
Payer: COMMERCIAL

## 2022-08-16 VITALS
DIASTOLIC BLOOD PRESSURE: 87 MMHG | HEART RATE: 60 BPM | HEIGHT: 71 IN | SYSTOLIC BLOOD PRESSURE: 137 MMHG | WEIGHT: 212 LBS | RESPIRATION RATE: 17 BRPM | TEMPERATURE: 98 F | OXYGEN SATURATION: 96 % | BODY MASS INDEX: 29.68 KG/M2

## 2022-08-16 DIAGNOSIS — K91.89 POST-ERCP ACUTE PANCREATITIS: Primary | ICD-10-CM

## 2022-08-16 DIAGNOSIS — K86.1 ACUTE ON CHRONIC PANCREATITIS: ICD-10-CM

## 2022-08-16 DIAGNOSIS — K85.90 ACUTE ON CHRONIC PANCREATITIS: ICD-10-CM

## 2022-08-16 DIAGNOSIS — R10.13 EPIGASTRIC PAIN: ICD-10-CM

## 2022-08-16 DIAGNOSIS — K85.90 POST-ERCP ACUTE PANCREATITIS: Primary | ICD-10-CM

## 2022-08-16 DIAGNOSIS — R10.9 ABDOMINAL PAIN: ICD-10-CM

## 2022-08-16 DIAGNOSIS — K85.91 ACUTE PANCREATITIS WITH UNINFECTED NECROSIS, UNSPECIFIED PANCREATITIS TYPE: ICD-10-CM

## 2022-08-16 PROBLEM — I16.0 HYPERTENSIVE URGENCY: Status: ACTIVE | Noted: 2022-08-16

## 2022-08-16 LAB
ALBUMIN SERPL BCP-MCNC: 4.4 G/DL (ref 3.5–5.2)
ALP SERPL-CCNC: 81 U/L (ref 55–135)
ALT SERPL W/O P-5'-P-CCNC: 72 U/L (ref 10–44)
ANION GAP SERPL CALC-SCNC: 12 MMOL/L (ref 8–16)
AST SERPL-CCNC: 51 U/L (ref 10–40)
BACTERIA #/AREA URNS HPF: ABNORMAL /HPF
BASOPHILS # BLD AUTO: 0.02 K/UL (ref 0–0.2)
BASOPHILS NFR BLD: 0.1 % (ref 0–1.9)
BILIRUB SERPL-MCNC: 1.3 MG/DL (ref 0.1–1)
BILIRUB UR QL STRIP: NEGATIVE
BUN SERPL-MCNC: 17 MG/DL (ref 6–20)
CALCIUM SERPL-MCNC: 9.5 MG/DL (ref 8.7–10.5)
CHLORIDE SERPL-SCNC: 102 MMOL/L (ref 95–110)
CLARITY UR: CLEAR
CO2 SERPL-SCNC: 28 MMOL/L (ref 23–29)
COLOR UR: YELLOW
CREAT SERPL-MCNC: 1.1 MG/DL (ref 0.5–1.4)
CTP QC/QA: YES
DIFFERENTIAL METHOD: ABNORMAL
EOSINOPHIL # BLD AUTO: 0 K/UL (ref 0–0.5)
EOSINOPHIL NFR BLD: 0 % (ref 0–8)
ERYTHROCYTE [DISTWIDTH] IN BLOOD BY AUTOMATED COUNT: 13.2 % (ref 11.5–14.5)
EST. GFR  (NO RACE VARIABLE): >60 ML/MIN/1.73 M^2
GLUCOSE SERPL-MCNC: 121 MG/DL (ref 70–110)
GLUCOSE UR QL STRIP: NEGATIVE
HCT VFR BLD AUTO: 45.6 % (ref 40–54)
HGB BLD-MCNC: 15.5 G/DL (ref 14–18)
HGB UR QL STRIP: ABNORMAL
HYALINE CASTS #/AREA URNS LPF: 0 /LPF
IMM GRANULOCYTES # BLD AUTO: 0.06 K/UL (ref 0–0.04)
IMM GRANULOCYTES NFR BLD AUTO: 0.4 % (ref 0–0.5)
KETONES UR QL STRIP: ABNORMAL
LEUKOCYTE ESTERASE UR QL STRIP: NEGATIVE
LIPASE SERPL-CCNC: 318 U/L (ref 4–60)
LYMPHOCYTES # BLD AUTO: 0.8 K/UL (ref 1–4.8)
LYMPHOCYTES NFR BLD: 5.7 % (ref 18–48)
MCH RBC QN AUTO: 29.4 PG (ref 27–31)
MCHC RBC AUTO-ENTMCNC: 34 G/DL (ref 32–36)
MCV RBC AUTO: 87 FL (ref 82–98)
MICROSCOPIC COMMENT: ABNORMAL
MONOCYTES # BLD AUTO: 1.1 K/UL (ref 0.3–1)
MONOCYTES NFR BLD: 7.1 % (ref 4–15)
NEUTROPHILS # BLD AUTO: 12.9 K/UL (ref 1.8–7.7)
NEUTROPHILS NFR BLD: 86.7 % (ref 38–73)
NITRITE UR QL STRIP: NEGATIVE
NRBC BLD-RTO: 0 /100 WBC
PH UR STRIP: 6 [PH] (ref 5–8)
PLATELET # BLD AUTO: 127 K/UL (ref 150–450)
PMV BLD AUTO: 9.2 FL (ref 9.2–12.9)
POTASSIUM SERPL-SCNC: 4.6 MMOL/L (ref 3.5–5.1)
PROCALCITONIN SERPL IA-MCNC: 0.08 NG/ML
PROT SERPL-MCNC: 7 G/DL (ref 6–8.4)
PROT UR QL STRIP: ABNORMAL
RBC # BLD AUTO: 5.27 M/UL (ref 4.6–6.2)
RBC #/AREA URNS HPF: 9 /HPF (ref 0–4)
SARS-COV-2 RDRP RESP QL NAA+PROBE: NEGATIVE
SODIUM SERPL-SCNC: 142 MMOL/L (ref 136–145)
SP GR UR STRIP: 1.02 (ref 1–1.03)
URN SPEC COLLECT METH UR: ABNORMAL
UROBILINOGEN UR STRIP-ACNC: NEGATIVE EU/DL
WBC # BLD AUTO: 14.82 K/UL (ref 3.9–12.7)
WBC #/AREA URNS HPF: 0 /HPF (ref 0–5)

## 2022-08-16 PROCEDURE — 25000003 PHARM REV CODE 250: Performed by: PHYSICIAN ASSISTANT

## 2022-08-16 PROCEDURE — 96361 HYDRATE IV INFUSION ADD-ON: CPT

## 2022-08-16 PROCEDURE — G0378 HOSPITAL OBSERVATION PER HR: HCPCS

## 2022-08-16 PROCEDURE — 84145 PROCALCITONIN (PCT): CPT | Performed by: FAMILY MEDICINE

## 2022-08-16 PROCEDURE — 63600175 PHARM REV CODE 636 W HCPCS: Performed by: FAMILY MEDICINE

## 2022-08-16 PROCEDURE — 25000003 PHARM REV CODE 250: Performed by: FAMILY MEDICINE

## 2022-08-16 PROCEDURE — 25000003 PHARM REV CODE 250: Performed by: INTERNAL MEDICINE

## 2022-08-16 PROCEDURE — 83690 ASSAY OF LIPASE: CPT | Performed by: EMERGENCY MEDICINE

## 2022-08-16 PROCEDURE — 96376 TX/PRO/DX INJ SAME DRUG ADON: CPT

## 2022-08-16 PROCEDURE — U0002 COVID-19 LAB TEST NON-CDC: HCPCS | Performed by: EMERGENCY MEDICINE

## 2022-08-16 PROCEDURE — 25500020 PHARM REV CODE 255: Performed by: EMERGENCY MEDICINE

## 2022-08-16 PROCEDURE — 63600175 PHARM REV CODE 636 W HCPCS: Performed by: NURSE PRACTITIONER

## 2022-08-16 PROCEDURE — 99214 PR OFFICE/OUTPT VISIT, EST, LEVL IV, 30-39 MIN: ICD-10-PCS | Mod: ,,, | Performed by: PHYSICIAN ASSISTANT

## 2022-08-16 PROCEDURE — 63600175 PHARM REV CODE 636 W HCPCS: Performed by: PHYSICIAN ASSISTANT

## 2022-08-16 PROCEDURE — 93010 ELECTROCARDIOGRAM REPORT: CPT | Mod: ,,, | Performed by: STUDENT IN AN ORGANIZED HEALTH CARE EDUCATION/TRAINING PROGRAM

## 2022-08-16 PROCEDURE — 96365 THER/PROPH/DIAG IV INF INIT: CPT

## 2022-08-16 PROCEDURE — 85025 COMPLETE CBC W/AUTO DIFF WBC: CPT | Performed by: NURSE PRACTITIONER

## 2022-08-16 PROCEDURE — 99214 OFFICE O/P EST MOD 30 MIN: CPT | Mod: ,,, | Performed by: PHYSICIAN ASSISTANT

## 2022-08-16 PROCEDURE — 63600175 PHARM REV CODE 636 W HCPCS: Performed by: INTERNAL MEDICINE

## 2022-08-16 PROCEDURE — 80053 COMPREHEN METABOLIC PANEL: CPT | Performed by: NURSE PRACTITIONER

## 2022-08-16 PROCEDURE — 93005 ELECTROCARDIOGRAM TRACING: CPT

## 2022-08-16 PROCEDURE — 63600175 PHARM REV CODE 636 W HCPCS: Performed by: EMERGENCY MEDICINE

## 2022-08-16 PROCEDURE — 96367 TX/PROPH/DG ADDL SEQ IV INF: CPT

## 2022-08-16 PROCEDURE — 96372 THER/PROPH/DIAG INJ SC/IM: CPT | Mod: 59 | Performed by: INTERNAL MEDICINE

## 2022-08-16 PROCEDURE — 25000003 PHARM REV CODE 250: Performed by: EMERGENCY MEDICINE

## 2022-08-16 PROCEDURE — 96375 TX/PRO/DX INJ NEW DRUG ADDON: CPT

## 2022-08-16 PROCEDURE — 81000 URINALYSIS NONAUTO W/SCOPE: CPT | Performed by: EMERGENCY MEDICINE

## 2022-08-16 PROCEDURE — 93010 EKG 12-LEAD: ICD-10-PCS | Mod: ,,, | Performed by: STUDENT IN AN ORGANIZED HEALTH CARE EDUCATION/TRAINING PROGRAM

## 2022-08-16 RX ORDER — SODIUM CHLORIDE 9 MG/ML
INJECTION, SOLUTION INTRAVENOUS CONTINUOUS
Status: ACTIVE | OUTPATIENT
Start: 2022-08-16 | End: 2022-08-17

## 2022-08-16 RX ORDER — ONDANSETRON 2 MG/ML
4 INJECTION INTRAMUSCULAR; INTRAVENOUS
Status: DISCONTINUED | OUTPATIENT
Start: 2022-08-16 | End: 2022-08-16

## 2022-08-16 RX ORDER — FLUORIDE (SODIUM) 1.1 %
PASTE (ML) DENTAL
COMMUNITY
Start: 2022-07-19

## 2022-08-16 RX ORDER — HYDROMORPHONE HYDROCHLORIDE 2 MG/1
2 TABLET ORAL EVERY 4 HOURS PRN
Status: ON HOLD | COMMUNITY
End: 2022-08-17 | Stop reason: HOSPADM

## 2022-08-16 RX ORDER — HYDROMORPHONE HYDROCHLORIDE 1 MG/ML
1 INJECTION, SOLUTION INTRAMUSCULAR; INTRAVENOUS; SUBCUTANEOUS
Status: COMPLETED | OUTPATIENT
Start: 2022-08-16 | End: 2022-08-16

## 2022-08-16 RX ORDER — HYDROMORPHONE HYDROCHLORIDE 1 MG/ML
1 INJECTION, SOLUTION INTRAMUSCULAR; INTRAVENOUS; SUBCUTANEOUS EVERY 4 HOURS PRN
Status: DISCONTINUED | OUTPATIENT
Start: 2022-08-16 | End: 2022-08-16 | Stop reason: CLARIF

## 2022-08-16 RX ORDER — IPRATROPIUM BROMIDE AND ALBUTEROL SULFATE 2.5; .5 MG/3ML; MG/3ML
3 SOLUTION RESPIRATORY (INHALATION) EVERY 4 HOURS PRN
Status: DISCONTINUED | OUTPATIENT
Start: 2022-08-16 | End: 2022-08-17 | Stop reason: HOSPADM

## 2022-08-16 RX ORDER — ONDANSETRON 2 MG/ML
4 INJECTION INTRAMUSCULAR; INTRAVENOUS
Status: COMPLETED | OUTPATIENT
Start: 2022-08-16 | End: 2022-08-16

## 2022-08-16 RX ORDER — ONDANSETRON 2 MG/ML
4 INJECTION INTRAMUSCULAR; INTRAVENOUS EVERY 8 HOURS PRN
Status: DISCONTINUED | OUTPATIENT
Start: 2022-08-16 | End: 2022-08-17 | Stop reason: HOSPADM

## 2022-08-16 RX ORDER — HYDROMORPHONE HYDROCHLORIDE 2 MG/ML
1 INJECTION, SOLUTION INTRAMUSCULAR; INTRAVENOUS; SUBCUTANEOUS EVERY 4 HOURS PRN
Status: DISCONTINUED | OUTPATIENT
Start: 2022-08-16 | End: 2022-08-17 | Stop reason: HOSPADM

## 2022-08-16 RX ORDER — CLONIDINE HYDROCHLORIDE 0.2 MG/1
0.2 TABLET ORAL
Status: COMPLETED | OUTPATIENT
Start: 2022-08-16 | End: 2022-08-16

## 2022-08-16 RX ORDER — ONDANSETRON 2 MG/ML
4 INJECTION INTRAMUSCULAR; INTRAVENOUS EVERY 8 HOURS PRN
Status: DISCONTINUED | OUTPATIENT
Start: 2022-08-16 | End: 2022-08-16

## 2022-08-16 RX ORDER — HYDRALAZINE HYDROCHLORIDE 20 MG/ML
10 INJECTION INTRAMUSCULAR; INTRAVENOUS EVERY 8 HOURS PRN
Status: DISCONTINUED | OUTPATIENT
Start: 2022-08-16 | End: 2022-08-17 | Stop reason: HOSPADM

## 2022-08-16 RX ORDER — ENOXAPARIN SODIUM 100 MG/ML
40 INJECTION SUBCUTANEOUS EVERY 24 HOURS
Status: DISCONTINUED | OUTPATIENT
Start: 2022-08-16 | End: 2022-08-17 | Stop reason: HOSPADM

## 2022-08-16 RX ORDER — MORPHINE SULFATE 4 MG/ML
4 INJECTION, SOLUTION INTRAMUSCULAR; INTRAVENOUS
Status: DISCONTINUED | OUTPATIENT
Start: 2022-08-16 | End: 2022-08-16

## 2022-08-16 RX ORDER — METOPROLOL TARTRATE 1 MG/ML
5 INJECTION, SOLUTION INTRAVENOUS
Status: COMPLETED | OUTPATIENT
Start: 2022-08-16 | End: 2022-08-16

## 2022-08-16 RX ADMIN — METOROPROLOL TARTRATE 5 MG: 5 INJECTION, SOLUTION INTRAVENOUS at 05:08

## 2022-08-16 RX ADMIN — CLONIDINE HYDROCHLORIDE 0.2 MG: 0.2 TABLET ORAL at 03:08

## 2022-08-16 RX ADMIN — HYDROMORPHONE HYDROCHLORIDE 1 MG: 1 INJECTION, SOLUTION INTRAMUSCULAR; INTRAVENOUS; SUBCUTANEOUS at 05:08

## 2022-08-16 RX ADMIN — ONDANSETRON 4 MG: 2 INJECTION INTRAMUSCULAR; INTRAVENOUS at 01:08

## 2022-08-16 RX ADMIN — ENOXAPARIN SODIUM 40 MG: 100 INJECTION SUBCUTANEOUS at 10:08

## 2022-08-16 RX ADMIN — SODIUM CHLORIDE: 0.9 INJECTION, SOLUTION INTRAVENOUS at 06:08

## 2022-08-16 RX ADMIN — HYDROMORPHONE HYDROCHLORIDE 1 MG: 2 INJECTION INTRAMUSCULAR; INTRAVENOUS; SUBCUTANEOUS at 06:08

## 2022-08-16 RX ADMIN — HYDROMORPHONE HYDROCHLORIDE 1 MG: 1 INJECTION, SOLUTION INTRAMUSCULAR; INTRAVENOUS; SUBCUTANEOUS at 02:08

## 2022-08-16 RX ADMIN — SODIUM CHLORIDE 1000 ML: 0.9 INJECTION, SOLUTION INTRAVENOUS at 01:08

## 2022-08-16 RX ADMIN — AZITHROMYCIN MONOHYDRATE 500 MG: 500 INJECTION, POWDER, LYOPHILIZED, FOR SOLUTION INTRAVENOUS at 12:08

## 2022-08-16 RX ADMIN — SODIUM CHLORIDE: 0.9 INJECTION, SOLUTION INTRAVENOUS at 05:08

## 2022-08-16 RX ADMIN — HYDROMORPHONE HYDROCHLORIDE 1 MG: 1 INJECTION, SOLUTION INTRAMUSCULAR; INTRAVENOUS; SUBCUTANEOUS at 09:08

## 2022-08-16 RX ADMIN — HYDROMORPHONE HYDROCHLORIDE 1 MG: 2 INJECTION INTRAMUSCULAR; INTRAVENOUS; SUBCUTANEOUS at 11:08

## 2022-08-16 RX ADMIN — IOHEXOL 100 ML: 350 INJECTION, SOLUTION INTRAVENOUS at 02:08

## 2022-08-16 RX ADMIN — HYDRALAZINE HYDROCHLORIDE 10 MG: 20 INJECTION INTRAMUSCULAR; INTRAVENOUS at 09:08

## 2022-08-16 RX ADMIN — PROMETHAZINE HYDROCHLORIDE 12.5 MG: 25 INJECTION INTRAMUSCULAR; INTRAVENOUS at 11:08

## 2022-08-16 RX ADMIN — CEFTRIAXONE 1 G: 1 INJECTION, SOLUTION INTRAVENOUS at 11:08

## 2022-08-16 RX ADMIN — ONDANSETRON 4 MG: 2 INJECTION INTRAMUSCULAR; INTRAVENOUS at 05:08

## 2022-08-16 RX ADMIN — HYDROMORPHONE HYDROCHLORIDE 1 MG: 1 INJECTION, SOLUTION INTRAMUSCULAR; INTRAVENOUS; SUBCUTANEOUS at 01:08

## 2022-08-16 RX ADMIN — ONDANSETRON 4 MG: 2 INJECTION INTRAMUSCULAR; INTRAVENOUS at 02:08

## 2022-08-16 NOTE — ASSESSMENT & PLAN NOTE
Patient has a current diagnosis of hypertensive urgency (without evidence of end organ damage) which is uncontrolled.  Latest blood pressure and vitals reviewed-   Temp:  [97.8 °F (36.6 °C)-98.1 °F (36.7 °C)]   Pulse:  [57-78]   Resp:  [12-20]   BP: (134-188)/()   SpO2:  [94 %-98 %] .   Patient currently on IV antihypertensives.   Home meds for hypertension were reviewed and noted below.   Hypertension Medications             carvediloL (COREG) 6.25 MG tablet Take 6.25 mg by mouth 2 (two) times daily.    carvediloL (COREG) 6.25 MG tablet Take 1 tablet by mouth 2 (two) times daily with meals.          Medication adjustment for hospital antihypertensives is as follows- IV hydralazine, IV labetalol as needed    Will aim for controlled BP reduction by medications noted above. Monitor and mitigate end organ damage as indicated.

## 2022-08-16 NOTE — PHARMACY MED REC
"Admission Medication History     The home medication history was taken by Alan Hill.    You may go to "Admission" then "Reconcile Home Medications" tabs to review and/or act upon these items.      The home medication list has been updated by the Pharmacy department.    Please read ALL comments highlighted in yellow.    Please address this information as you see fit.     Feel free to contact us if you have any questions or require assistance.      The medications listed below were removed from the home medication list. Please reorder if appropriate:  Patient reports no longer taking the following medication(s):   AMOXICILLIN 500 MG CAPSULE   OXYCODONE 10 MG IMMEDIATE RELEASE TABLET   OXYCODONE-ACETAMINOPHEN  MG PER TABLET    Medications listed below were obtained from: Patient/family, Analytic software- [employer] and Patient's pharmacy  (Not in a hospital admission)      Potential issues to be addressed PRIOR TO DISCHARGE: NONE    Alan Hill, [facility]  Pharmacy Technician Specialist-Medication History  [employer] [phone]  Secure chat preferred     Current Outpatient Medications on File Prior to Encounter   Medication Sig Dispense Refill Last Dose    carvediloL (COREG) 6.25 MG tablet Take 1 tablet by mouth 2 (two) times daily with meals.   8/15/2022 at Unknown time    HYDROmorphone (DILAUDID) 2 MG tablet Take 2 mg by mouth every 4 (four) hours as needed for Pain.   8/15/2022 at Unknown time    lipase-protease-amylase (PANCREAZE) 16,800-56,800- 98,400 unit CpDR Take before meals (Patient taking differently: Take 1 capsule by mouth 3 (three) times daily with meals.) 120 capsule 2 8/15/2022 at Unknown time    multivitamin (THERAGRAN) per tablet Take 1 tablet by mouth once daily.   8/15/2022 at Unknown time    pantoprazole (PROTONIX) 40 MG tablet Take 1 tablet (40 mg total) by mouth once daily. 60 tablet 2 8/15/2022 at Unknown time    pregabalin (LYRICA) 50 MG capsule Take 2 capsules (100 mg " total) by mouth every evening. 60 capsule 1 8/15/2022 at Unknown time    PREVIDENT 5000 BOOSTER PLUS 1.1 % Pste BRUSH TEETH WITH PASTE TWICE DAILY. SPIT OUT EXCESS. DO NOT RINSE   8/15/2022 at Unknown time    promethazine (PHENERGAN) 25 MG tablet Take 25 mg by mouth every 6 (six) hours as needed.   8/15/2022 at Unknown time    [DISCONTINUED] carvediloL (COREG) 6.25 MG tablet Take 6.25 mg by mouth 2 (two) times daily.       [DISCONTINUED] lipase-protease-amylase (PANCREAZE) 4,200-14,200- 24,600 unit CpDR as directed       [DISCONTINUED] oxyCODONE (ROXICODONE) 10 mg Tab immediate release tablet Take 10 mg by mouth every 6 (six) hours as needed.       [DISCONTINUED] oxyCODONE-acetaminophen (PERCOCET)  mg per tablet Take 1 tablet by mouth every 4 (four) hours as needed for Pain. 12 tablet 0                                .

## 2022-08-16 NOTE — SUBJECTIVE & OBJECTIVE
Past Medical History:   Diagnosis Date    Anticoagulant long-term use     On Eliquis for DVT    GERD (gastroesophageal reflux disease)     Hypertension     Hypothyroidism 10/26/2021    Pancreatic pseudocyst     Sleep apnea     Splenic vein thrombosis        Past Surgical History:   Procedure Laterality Date    CHOLECYSTECTOMY      ENDOSCOPIC ULTRASOUND OF UPPER GASTROINTESTINAL TRACT N/A 10/12/2021    Procedure: ULTRASOUND, UPPER GI TRACT, ENDOSCOPIC;  Surgeon: Odalys Leiva MD;  Location: [facility] ENDO;  Service: Endoscopy;  Laterality: N/A;  Upper and linear, 22 shark core, cytology and RPMI    ERCP N/A 9/29/2021    Procedure: ERCP (ENDOSCOPIC RETROGRADE CHOLANGIOPANCREATOGRAPHY);  Surgeon: Odalys Leiva MD;  Location: [facility] ENDO;  Service: Endoscopy;  Laterality: N/A;    ERCP W/ PLASTIC STENT PLACEMENT      LATERAL PANCREATICOJEJUNOSTOMY N/A 11/19/2021    Procedure: PANCREATICOJEJUNOSTOMY, SIDE-TO-SIDE tier 1;  Surgeon: Brian Hills MD;  Location: [facility] OR [address] [place] [address];  Service: General;  Laterality: N/A;       Review of patient's allergies indicates:  No Known Allergies    Current Facility-Administered Medications on File Prior to Encounter   Medication    [COMPLETED] HYDROmorphone injection 0.5 mg    [COMPLETED] HYDROmorphone injection 0.5 mg    [COMPLETED] indomethacin 50 mg suppository    [COMPLETED] promethazine (PHENERGAN) 12.5 mg in dextrose 5 % 50 mL IVPB    [DISCONTINUED] 0.9%  NaCl infusion    [DISCONTINUED] dexamethasone injection    [DISCONTINUED] fentaNYL 50 mcg/mL injection    [DISCONTINUED] indomethacin 50 mg suppository 100 mg    [DISCONTINUED] iohexoL (OMNIPAQUE 240) injection 50 mL    [DISCONTINUED] lactated ringers infusion    [DISCONTINUED] LIDOcaine (PF) 10 mg/ml (1%) injection    [DISCONTINUED] ondansetron injection    [DISCONTINUED] propofol (DIPRIVAN) 10 mg/mL infusion    [DISCONTINUED] rocuronium injection    [DISCONTINUED] succinylcholine injection     Current  Outpatient Medications on File Prior to Encounter   Medication Sig    carvediloL (COREG) 6.25 MG tablet Take 6.25 mg by mouth 2 (two) times daily.    carvediloL (COREG) 6.25 MG tablet Take 1 tablet by mouth 2 (two) times daily with meals.    lipase-protease-amylase (PANCREAZE) 16,800-56,800- 98,400 unit CpDR Take before meals    lipase-protease-amylase (PANCREAZE) 4,200-14,200- 24,600 unit CpDR as directed    multivitamin (THERAGRAN) per tablet Take 1 tablet by mouth once daily.    oxyCODONE (ROXICODONE) 10 mg Tab immediate release tablet Take 10 mg by mouth every 6 (six) hours as needed.    oxyCODONE-acetaminophen (PERCOCET)  mg per tablet Take 1 tablet by mouth every 4 (four) hours as needed for Pain.    pantoprazole (PROTONIX) 40 MG tablet Take 1 tablet (40 mg total) by mouth once daily.    pregabalin (LYRICA) 50 MG capsule Take 2 capsules (100 mg total) by mouth every evening.    promethazine (PHENERGAN) 25 MG tablet Take 25 mg by mouth every 6 (six) hours as needed.     Family History       Problem Relation (Age of Onset)    Arthritis Father    Cancer Father    Pancreatic cancer Father    Thyroid disease Mother, Father          Tobacco Use    Smoking status: Former Smoker     Quit date: 2001     Years since quittin.0    Smokeless tobacco: Never Used   Substance and Sexual Activity    Alcohol use: Not Currently    Drug use: Never    Sexual activity: Yes     Review of Systems   Constitutional: Negative.  Negative for chills and fever.   HENT: Negative.  Negative for congestion, rhinorrhea, sore throat and trouble swallowing.    Eyes: Negative.  Negative for visual disturbance.   Respiratory: Negative.  Negative for cough, shortness of breath and wheezing.    Cardiovascular: Negative.  Negative for chest pain and palpitations.   Gastrointestinal:  Positive for abdominal pain and nausea. Negative for diarrhea and vomiting.   Endocrine: Negative.    Genitourinary: Negative.  Negative for dysuria and  flank pain.   Musculoskeletal: Negative.  Negative for back pain.   Skin: Negative.  Negative for rash.   Allergic/Immunologic: Negative.    Neurological: Negative.  Negative for speech difficulty, weakness, numbness and headaches.   Hematological: Negative.    Psychiatric/Behavioral: Negative.  Negative for hallucinations.    All other systems reviewed and are negative.  Objective:     Vital Signs (Most Recent):  Temp: 97.8 °F (36.6 °C) (08/15/22 2023)  Pulse: 62 (08/16/22 0230)  Resp: 12 (08/16/22 0230)  BP: (!) 188/101 (08/16/22 0523)  SpO2: 98 % (08/16/22 0230)   Vital Signs (24h Range):  Temp:  [97.8 °F (36.6 °C)-98.1 °F (36.7 °C)] 97.8 °F (36.6 °C)  Pulse:  [57-78] 62  Resp:  [12-20] 12  SpO2:  [94 %-98 %] 98 %  BP: (134-188)/() 188/101     Weight: 98.1 kg (216 lb 4.3 oz)  Body mass index is 30.16 kg/m².    Physical Exam  Vitals and nursing note reviewed.   Constitutional:       General: He is awake. He is not in acute distress.     Appearance: He is not ill-appearing.   HENT:      Head: Normocephalic and atraumatic.   Eyes:      General: No scleral icterus.     Conjunctiva/sclera: Conjunctivae normal.   Cardiovascular:      Rate and Rhythm: Normal rate and regular rhythm.      Heart sounds: No murmur heard.  Pulmonary:      Effort: Pulmonary effort is normal. No respiratory distress.      Breath sounds: Normal breath sounds. No wheezing.   Abdominal:      Palpations: Abdomen is soft.      Tenderness: There is abdominal tenderness.   Musculoskeletal:         General: No swelling. Normal range of motion.      Cervical back: Normal range of motion and neck supple.   Skin:     General: Skin is warm.      Coloration: Skin is not jaundiced.   Neurological:      General: No focal deficit present.      Mental Status: He is alert and oriented to person, place, and time. Mental status is at baseline.   Psychiatric:         Attention and Perception: Attention normal.         Speech: Speech normal.          Behavior: Behavior is cooperative.           Significant Labs: All pertinent labs within the past 24 hours have been reviewed.  BMP:   Recent Labs   Lab 08/16/22 0139   *      K 4.6      CO2 28   BUN 17   CREATININE 1.1   CALCIUM 9.5     CBC:   Recent Labs   Lab 08/16/22 0139   WBC 14.82*   HGB 15.5   HCT 45.6   *     CMP:   Recent Labs   Lab 08/16/22 0139      K 4.6      CO2 28   *   BUN 17   CREATININE 1.1   CALCIUM 9.5   PROT 7.0   ALBUMIN 4.4   BILITOT 1.3*   ALKPHOS 81   AST 51*   ALT 72*   ANIONGAP 12       Significant Imaging: I have reviewed all pertinent imaging results/findings within the past 24 hours.  I have reviewed and interpreted all pertinent imaging results/findings within the past 24 hours.    Imaging Results              CT Abdomen Pelvis With Contrast (In process)                      X-Ray Chest AP Portable (In process)                      X-Ray Abdomen Flat And Erect (Final result)  Result time 08/15/22 20:58:39      Final result by Rebecca Valdivia MD (08/15/22 20:58:39)                   Impression:      As above      Electronically signed by: Skip Fernandez  Date:    08/15/2022  Time:    20:58               Narrative:    EXAMINATION:  XR ABDOMEN FLAT AND ERECT    CLINICAL HISTORY:  Unspecified abdominal pain    TECHNIQUE:  Flat and erect AP views of the abdomen were performed.    COMPARISON:  None    FINDINGS:  Moderate amount of stool in the colon.  Postsurgical changes in the left hemiabdomen.  Segment of dilated small bowel loop measuring 3.4 cm in the left hemiabdomen suggestive of possible partial small bowel obstruction.  Tubing in the mid right hemiabdomen                                      I have independently reviewed all pertinent labs within the past 24 hours.    I have independently reviewed, visualized and interpreted all pertinent imaging results within the past 24 hours and discussed the findings with the ED physician, Dheeraj  Maixne Hudson MD

## 2022-08-16 NOTE — ASSESSMENT & PLAN NOTE
-status post ERCP yesterday, pancreatic duct stent placement.  -known history of chronic recurrent pancreatitis.  -supportive care.  -IV fluids, IV narcotic pain medications.  -GI consult

## 2022-08-16 NOTE — ASSESSMENT & PLAN NOTE
Keep NPO.   Increase IV hydration if no contraindication.   Pain medication and antiemetics PRN.   Await results of CT scan.

## 2022-08-16 NOTE — CONSULTS
[facility] - Emergency Dept.  Gastroenterology  Consult Note    Patient Name: Fabrice Zamorano  MRN: [id number]  Admission Date: 8/16/2022  Hospital Length of Stay: 0 days  Code Status: Prior   Attending Provider: Koffi Christine MD   Consulting Provider: Adiel Quispe PA-C  Primary Care Physician: Rebecca Jackson MD  Principal Problem:Post-ERCP acute pancreatitis    Inpatient consult to Gastroenterology  Consult performed by: Adiel Quispe PA-C  Consult ordered by: Justin Gonzalez MD  Reason for consult: Pancreatitis        Subjective:     HPI:  The patient has a history of PRSS-1 gene mutation with chronic pancreatitis. He underwent a pancreaticojejunostomy on 11/19/2021 and initially did well, but recently having more symptoms of pancreatitis. Yesterday he had an ERCP with removal of pancreatic duct debri, and dilation of a stricture at the pancreaticojejunostomy with stent placement. He was also noted to have gastric varices. Procedure was uncomplicated and patient discharged. Overnight, he developed severe epigastric pain with nausea. Worse with breathing. Lipase 318 and WBC count 14.82. Renal function and Hgb normal. Mild elevation in LFTs. CT results pending.      Past Medical History:   Diagnosis Date    Anticoagulant long-term use     On Eliquis for DVT    GERD (gastroesophageal reflux disease)     Hypertension     Hypothyroidism 10/26/2021    Pancreatic pseudocyst     Sleep apnea     Splenic vein thrombosis        Past Surgical History:   Procedure Laterality Date    CHOLECYSTECTOMY      ENDOSCOPIC ULTRASOUND OF UPPER GASTROINTESTINAL TRACT N/A 10/12/2021    Procedure: ULTRASOUND, UPPER GI TRACT, ENDOSCOPIC;  Surgeon: Odalys Leiva MD;  Location: [facility] ENDO;  Service: Endoscopy;  Laterality: N/A;  Upper and linear, 22 shark core, cytology and RPMI    ERCP N/A 9/29/2021    Procedure: ERCP (ENDOSCOPIC RETROGRADE CHOLANGIOPANCREATOGRAPHY);  Surgeon: Odalys Leiva MD;  Location: [facility]  ENDO;  Service: Endoscopy;  Laterality: N/A;    ERCP W/ PLASTIC STENT PLACEMENT      LATERAL PANCREATICOJEJUNOSTOMY N/A 2021    Procedure: PANCREATICOJEJUNOSTOMY, SIDE-TO-SIDE tier 1;  Surgeon: Brian Hills MD;  Location: [facility] OR [address] [place] [address];  Service: General;  Laterality: N/A;       Review of patient's allergies indicates:  No Known Allergies  Family History       Problem Relation (Age of Onset)    Arthritis Father    Cancer Father    Pancreatic cancer Father    Thyroid disease Mother, Father          Tobacco Use    Smoking status: Former Smoker     Quit date: 2001     Years since quittin.0    Smokeless tobacco: Never Used   Substance and Sexual Activity    Alcohol use: Not Currently    Drug use: Never    Sexual activity: Yes     Review of Systems   Constitutional:  Negative for fever.   HENT:  Negative for hearing loss.    Eyes:  Negative for visual disturbance.   Respiratory:  Negative for cough and shortness of breath.    Cardiovascular:  Negative for chest pain and palpitations.   Gastrointestinal:         As per HPI.   Genitourinary:  Negative for difficulty urinating, dysuria, frequency and hematuria.   Musculoskeletal:  Negative for arthralgias and back pain.   Skin:  Negative for color change and rash.   Neurological:  Negative for seizures, syncope, weakness, numbness and headaches.   Hematological:  Does not bruise/bleed easily.   Psychiatric/Behavioral:  The patient is not nervous/anxious.    Objective:     Vital Signs (Most Recent):  Temp: 97.8 °F (36.6 °C) (08/15/22 2023)  Pulse: 75 (2245)  Resp: 13 (22)  BP: (!) 168/104 (22)  SpO2: 98 % (22)   Vital Signs (24h Range):  Temp:  [97.8 °F (36.6 °C)] 97.8 °F (36.6 °C)  Pulse:  [57-75] 75  Resp:  [12-20] 13  SpO2:  [94 %-98 %] 98 %  BP: (134-188)/() 168/104     Weight: 98.1 kg (216 lb 4.3 oz) (08/15/22 2023)  Body mass index is 30.16 kg/m².      Intake/Output Summary (Last 24 hours) at  8/16/2022 0833  Last data filed at 8/16/2022 0359  Gross per 24 hour   Intake 1000 ml   Output --   Net 1000 ml       Lines/Drains/Airways       Drain  Duration                  Closed/Suction Drain 11/19/21 1853 Right;Anterior RUQ Accordion 15 Fr. 269 days              Peripheral Intravenous Line  Duration                  Peripheral IV - Single Lumen 08/16/22 0130 20 G Left Forearm <1 day                    Physical Exam  Constitutional:       Appearance: He is well-developed. He is not ill-appearing.   HENT:      Head: Normocephalic and atraumatic.   Eyes:      Extraocular Movements: Extraocular movements intact.   Cardiovascular:      Rate and Rhythm: Normal rate and regular rhythm.      Heart sounds: Normal heart sounds. No murmur heard.  Pulmonary:      Effort: Pulmonary effort is normal. No respiratory distress.      Breath sounds: Normal breath sounds. No wheezing.   Abdominal:      General: Bowel sounds are normal. There is distension.      Palpations: Abdomen is soft. There is no hepatomegaly or mass.      Tenderness: There is abdominal tenderness (moderate) in the epigastric area. There is no guarding or rebound.   Musculoskeletal:      Cervical back: Normal range of motion and neck supple.      Right lower leg: No edema.      Left lower leg: No edema.   Skin:     General: Skin is warm and dry.      Findings: No rash.   Neurological:      Mental Status: He is alert and oriented to person, place, and time.      Cranial Nerves: No cranial nerve deficit.   Psychiatric:         Behavior: Behavior normal.       Significant Labs:  CBC:   Recent Labs   Lab 08/16/22 0139   WBC 14.82*   HGB 15.5   HCT 45.6   *     CMP:   Recent Labs   Lab 08/16/22 0139   *   CALCIUM 9.5   ALBUMIN 4.4   PROT 7.0      K 4.6   CO2 28      BUN 17   CREATININE 1.1   ALKPHOS 81   ALT 72*   AST 51*   BILITOT 1.3*     Coagulation: No results for input(s): PT, INR, APTT in the last 48 hours.    Significant  Imaging:  Imaging results within the past 24 hours have been reviewed.    Assessment/Plan:     * Post-ERCP acute pancreatitis  Acute on chronic pancreatitis.   Keep NPO.   Increase IV hydration if no contraindication.   Pain medication and antiemetics PRN.   Await results of CT scan.     Hypertensive urgency  Management of HTN per HM team.         Thank you for your consult. I will follow-up with patient. Please contact us if you have any additional questions.    Adiel Quispe PA-C  Gastroenterology  [facility] - Emergency Dept.

## 2022-08-16 NOTE — FIRST PROVIDER EVALUATION
Medical screening exam completed.  I have conducted a focused provider triage encounter, findings are as follows:    Brief history of present illness:  Pt presents with c/o abdominal pain, nausea and vomiting after ERCP done today.  Took percocet 10 with no relief    Vitals:    08/15/22 2023   BP: (!) 183/108   Pulse: 75   Resp: 18   Temp: 97.8 °F (36.6 °C)   TempSrc: Oral   SpO2: 97%   Weight: 98.1 kg (216 lb 4.3 oz)       Pertinent physical exam:      Brief workup plan:      Preliminary workup initiated; this workup will be continued and followed by the physician or advanced practice provider that is assigned to the patient when roomed.

## 2022-08-16 NOTE — SUBJECTIVE & OBJECTIVE
Past Medical History:   Diagnosis Date    Anticoagulant long-term use     On Eliquis for DVT    GERD (gastroesophageal reflux disease)     Hypertension     Hypothyroidism 10/26/2021    Pancreatic pseudocyst     Sleep apnea     Splenic vein thrombosis        Past Surgical History:   Procedure Laterality Date    CHOLECYSTECTOMY      ENDOSCOPIC ULTRASOUND OF UPPER GASTROINTESTINAL TRACT N/A 10/12/2021    Procedure: ULTRASOUND, UPPER GI TRACT, ENDOSCOPIC;  Surgeon: Odalys Leiva MD;  Location: [facility] ENDO;  Service: Endoscopy;  Laterality: N/A;  Upper and linear, 22 shark core, cytology and RPMI    ERCP N/A 2021    Procedure: ERCP (ENDOSCOPIC RETROGRADE CHOLANGIOPANCREATOGRAPHY);  Surgeon: Odalys Leiva MD;  Location: [facility] ENDO;  Service: Endoscopy;  Laterality: N/A;    ERCP W/ PLASTIC STENT PLACEMENT      LATERAL PANCREATICOJEJUNOSTOMY N/A 2021    Procedure: PANCREATICOJEJUNOSTOMY, SIDE-TO-SIDE tier 1;  Surgeon: Brian Hills MD;  Location: [facility] OR [address] [place] [address];  Service: General;  Laterality: N/A;       Review of patient's allergies indicates:  No Known Allergies  Family History       Problem Relation (Age of Onset)    Arthritis Father    Cancer Father    Pancreatic cancer Father    Thyroid disease Mother, Father          Tobacco Use    Smoking status: Former Smoker     Quit date: 2001     Years since quittin.0    Smokeless tobacco: Never Used   Substance and Sexual Activity    Alcohol use: Not Currently    Drug use: Never    Sexual activity: Yes     Review of Systems   Constitutional:  Negative for fever.   HENT:  Negative for hearing loss.    Eyes:  Negative for visual disturbance.   Respiratory:  Negative for cough and shortness of breath.    Cardiovascular:  Negative for chest pain and palpitations.   Gastrointestinal:         As per HPI.   Genitourinary:  Negative for difficulty urinating, dysuria, frequency and hematuria.   Musculoskeletal:  Negative for arthralgias and  back pain.   Skin:  Negative for color change and rash.   Neurological:  Negative for seizures, syncope, weakness, numbness and headaches.   Hematological:  Does not bruise/bleed easily.   Psychiatric/Behavioral:  The patient is not nervous/anxious.    Objective:     Vital Signs (Most Recent):  Temp: 97.8 °F (36.6 °C) (08/15/22 2023)  Pulse: 75 (08/16/22 0645)  Resp: 13 (08/16/22 0645)  BP: (!) 168/104 (08/16/22 0645)  SpO2: 98 % (08/16/22 0645)   Vital Signs (24h Range):  Temp:  [97.8 °F (36.6 °C)] 97.8 °F (36.6 °C)  Pulse:  [57-75] 75  Resp:  [12-20] 13  SpO2:  [94 %-98 %] 98 %  BP: (134-188)/() 168/104     Weight: 98.1 kg (216 lb 4.3 oz) (08/15/22 2023)  Body mass index is 30.16 kg/m².      Intake/Output Summary (Last 24 hours) at 8/16/2022 0833  Last data filed at 8/16/2022 0359  Gross per 24 hour   Intake 1000 ml   Output --   Net 1000 ml       Lines/Drains/Airways       Drain  Duration                  Closed/Suction Drain 11/19/21 1853 Right;Anterior RUQ Accordion 15 Fr. 269 days              Peripheral Intravenous Line  Duration                  Peripheral IV - Single Lumen 08/16/22 0130 20 G Left Forearm <1 day                    Physical Exam  Constitutional:       Appearance: He is well-developed. He is not ill-appearing.   HENT:      Head: Normocephalic and atraumatic.   Eyes:      Extraocular Movements: Extraocular movements intact.   Cardiovascular:      Rate and Rhythm: Normal rate and regular rhythm.      Heart sounds: Normal heart sounds. No murmur heard.  Pulmonary:      Effort: Pulmonary effort is normal. No respiratory distress.      Breath sounds: Normal breath sounds. No wheezing.   Abdominal:      General: Bowel sounds are normal. There is distension.      Palpations: Abdomen is soft. There is no hepatomegaly or mass.      Tenderness: There is abdominal tenderness (moderate) in the epigastric area. There is no guarding or rebound.   Musculoskeletal:      Cervical back: Normal range of  motion and neck supple.      Right lower leg: No edema.      Left lower leg: No edema.   Skin:     General: Skin is warm and dry.      Findings: No rash.   Neurological:      Mental Status: He is alert and oriented to person, place, and time.      Cranial Nerves: No cranial nerve deficit.   Psychiatric:         Behavior: Behavior normal.       Significant Labs:  CBC:   Recent Labs   Lab 08/16/22 0139   WBC 14.82*   HGB 15.5   HCT 45.6   *     CMP:   Recent Labs   Lab 08/16/22 0139   *   CALCIUM 9.5   ALBUMIN 4.4   PROT 7.0      K 4.6   CO2 28      BUN 17   CREATININE 1.1   ALKPHOS 81   ALT 72*   AST 51*   BILITOT 1.3*     Coagulation: No results for input(s): PT, INR, APTT in the last 48 hours.    Significant Imaging:  Imaging results within the past 24 hours have been reviewed.

## 2022-08-16 NOTE — H&P
[facility] - Emergency Dept.  [facility] Medicine  History & Physical    Patient Name: Fabrice Zamorano  MRN: [id number]  Patient Class: OP- Observation  Admission Date: 8/16/2022  Attending Physician: Dheeraj Goodman Do, MD   Primary Care Provider: Rebecca Jackson MD         Patient information was obtained from patient, past medical records and ER records.     Subjective:     Principal Problem:Post-ERCP acute pancreatitis    Chief Complaint:   Chief Complaint   Patient presents with    Post-op Problem     ERCP today w/ 2 stents now c/o nausea and pain         HPI: Ms. Zamorano is a 57-year-old  male with PMH significant for chronic recurrent pancreatitis, DVT, splenic vein thrombosis for which she is on apixaban, HTN, hyperlipidemia, underwent ERCP yesterday by Dr. Leiav, and plastic stent was placed in the pancreatic duct.  A stricture tat he pancreaticojejunostomy was seen and partially dilated.  Patient presented to the ED today complaining of abdominal pain, epigastric in nature similar to previous episodes of pancreatitis.  BP elevated 183/108.  In laboratory workup reveals lipase 318. Total bilirubin 1.3.  WBC 56956. Received metoprolol 5 mg IV x1 for his elevated blood pressure along with 1 L normal saline bolus.  Consult received multiple rounds of Zofran IV and multiple rounds of IV Dilaudid.    Admitting diagnosis:  Acute recurrent pancreatitis, post ERCP pancreatitis, status post pancreatic duct stent placement yesterday.      Past Medical History:   Diagnosis Date    Anticoagulant long-term use     On Eliquis for DVT    GERD (gastroesophageal reflux disease)     Hypertension     Hypothyroidism 10/26/2021    Pancreatic pseudocyst     Sleep apnea     Splenic vein thrombosis        Past Surgical History:   Procedure Laterality Date    CHOLECYSTECTOMY      ENDOSCOPIC ULTRASOUND OF UPPER GASTROINTESTINAL TRACT N/A 10/12/2021    Procedure: ULTRASOUND, UPPER GI TRACT, ENDOSCOPIC;  Surgeon:  Odalys Leiva MD;  Location: [facility];  Service: Endoscopy;  Laterality: N/A;  Upper and linear, 22 shark core, cytology and RPMI    ERCP N/A 9/29/2021    Procedure: ERCP (ENDOSCOPIC RETROGRADE CHOLANGIOPANCREATOGRAPHY);  Surgeon: Odalys Leiva MD;  Location: [facility] ENDO;  Service: Endoscopy;  Laterality: N/A;    ERCP W/ PLASTIC STENT PLACEMENT      LATERAL PANCREATICOJEJUNOSTOMY N/A 11/19/2021    Procedure: PANCREATICOJEJUNOSTOMY, SIDE-TO-SIDE tier 1;  Surgeon: Brian Hills MD;  Location: [facility] OR [address] [place] [address];  Service: General;  Laterality: N/A;       Review of patient's allergies indicates:  No Known Allergies    Current Facility-Administered Medications on File Prior to Encounter   Medication    [COMPLETED] HYDROmorphone injection 0.5 mg    [COMPLETED] HYDROmorphone injection 0.5 mg    [COMPLETED] indomethacin 50 mg suppository    [COMPLETED] promethazine (PHENERGAN) 12.5 mg in dextrose 5 % 50 mL IVPB    [DISCONTINUED] 0.9%  NaCl infusion    [DISCONTINUED] dexamethasone injection    [DISCONTINUED] fentaNYL 50 mcg/mL injection    [DISCONTINUED] indomethacin 50 mg suppository 100 mg    [DISCONTINUED] iohexoL (OMNIPAQUE 240) injection 50 mL    [DISCONTINUED] lactated ringers infusion    [DISCONTINUED] LIDOcaine (PF) 10 mg/ml (1%) injection    [DISCONTINUED] ondansetron injection    [DISCONTINUED] propofol (DIPRIVAN) 10 mg/mL infusion    [DISCONTINUED] rocuronium injection    [DISCONTINUED] succinylcholine injection     Current Outpatient Medications on File Prior to Encounter   Medication Sig    carvediloL (COREG) 6.25 MG tablet Take 6.25 mg by mouth 2 (two) times daily.    carvediloL (COREG) 6.25 MG tablet Take 1 tablet by mouth 2 (two) times daily with meals.    lipase-protease-amylase (PANCREAZE) 16,800-56,800- 98,400 unit CpDR Take before meals    lipase-protease-amylase (PANCREAZE) 4,200-14,200- 24,600 unit CpDR as directed    multivitamin (THERAGRAN) per tablet  Take 1 tablet by mouth once daily.    oxyCODONE (ROXICODONE) 10 mg Tab immediate release tablet Take 10 mg by mouth every 6 (six) hours as needed.    oxyCODONE-acetaminophen (PERCOCET)  mg per tablet Take 1 tablet by mouth every 4 (four) hours as needed for Pain.    pantoprazole (PROTONIX) 40 MG tablet Take 1 tablet (40 mg total) by mouth once daily.    pregabalin (LYRICA) 50 MG capsule Take 2 capsules (100 mg total) by mouth every evening.    promethazine (PHENERGAN) 25 MG tablet Take 25 mg by mouth every 6 (six) hours as needed.     Family History       Problem Relation (Age of Onset)    Arthritis Father    Cancer Father    Pancreatic cancer Father    Thyroid disease Mother, Father          Tobacco Use    Smoking status: Former Smoker     Quit date: 2001     Years since quittin.0    Smokeless tobacco: Never Used   Substance and Sexual Activity    Alcohol use: Not Currently    Drug use: Never    Sexual activity: Yes     Review of Systems   Constitutional: Negative.  Negative for chills and fever.   HENT: Negative.  Negative for congestion, rhinorrhea, sore throat and trouble swallowing.    Eyes: Negative.  Negative for visual disturbance.   Respiratory: Negative.  Negative for cough, shortness of breath and wheezing.    Cardiovascular: Negative.  Negative for chest pain and palpitations.   Gastrointestinal:  Positive for abdominal pain and nausea. Negative for diarrhea and vomiting.   Endocrine: Negative.    Genitourinary: Negative.  Negative for dysuria and flank pain.   Musculoskeletal: Negative.  Negative for back pain.   Skin: Negative.  Negative for rash.   Allergic/Immunologic: Negative.    Neurological: Negative.  Negative for speech difficulty, weakness, numbness and headaches.   Hematological: Negative.    Psychiatric/Behavioral: Negative.  Negative for hallucinations.    All other systems reviewed and are negative.  Objective:     Vital Signs (Most Recent):  Temp: 97.8 °F (36.6  °C) (08/15/22 2023)  Pulse: 62 (08/16/22 0230)  Resp: 12 (08/16/22 0230)  BP: (!) 188/101 (08/16/22 0523)  SpO2: 98 % (08/16/22 0230)   Vital Signs (24h Range):  Temp:  [97.8 °F (36.6 °C)-98.1 °F (36.7 °C)] 97.8 °F (36.6 °C)  Pulse:  [57-78] 62  Resp:  [12-20] 12  SpO2:  [94 %-98 %] 98 %  BP: (134-188)/() 188/101     Weight: 98.1 kg (216 lb 4.3 oz)  Body mass index is 30.16 kg/m².    Physical Exam  Vitals and nursing note reviewed.   Constitutional:       General: He is awake. He is not in acute distress.     Appearance: He is not ill-appearing.   HENT:      Head: Normocephalic and atraumatic.   Eyes:      General: No scleral icterus.     Conjunctiva/sclera: Conjunctivae normal.   Cardiovascular:      Rate and Rhythm: Normal rate and regular rhythm.      Heart sounds: No murmur heard.  Pulmonary:      Effort: Pulmonary effort is normal. No respiratory distress.      Breath sounds: Normal breath sounds. No wheezing.   Abdominal:      Palpations: Abdomen is soft.      Tenderness: There is abdominal tenderness.   Musculoskeletal:         General: No swelling. Normal range of motion.      Cervical back: Normal range of motion and neck supple.   Skin:     General: Skin is warm.      Coloration: Skin is not jaundiced.   Neurological:      General: No focal deficit present.      Mental Status: He is alert and oriented to person, place, and time. Mental status is at baseline.   Psychiatric:         Attention and Perception: Attention normal.         Speech: Speech normal.         Behavior: Behavior is cooperative.           Significant Labs: All pertinent labs within the past 24 hours have been reviewed.  BMP:   Recent Labs   Lab 08/16/22 0139   *      K 4.6      CO2 28   BUN 17   CREATININE 1.1   CALCIUM 9.5     CBC:   Recent Labs   Lab 08/16/22 0139   WBC 14.82*   HGB 15.5   HCT 45.6   *     CMP:   Recent Labs   Lab 08/16/22 0139      K 4.6      CO2 28   *   BUN 17    CREATININE 1.1   CALCIUM 9.5   PROT 7.0   ALBUMIN 4.4   BILITOT 1.3*   ALKPHOS 81   AST 51*   ALT 72*   ANIONGAP 12       Significant Imaging: I have reviewed all pertinent imaging results/findings within the past 24 hours.  I have reviewed and interpreted all pertinent imaging results/findings within the past 24 hours.    Imaging Results              CT Abdomen Pelvis With Contrast (In process)                      X-Ray Chest AP Portable (In process)                      X-Ray Abdomen Flat And Erect (Final result)  Result time 08/15/22 20:58:39      Final result by Rebecca Valdivia MD (08/15/22 20:58:39)                   Impression:      As above      Electronically signed by: Skip Fernandez  Date:    08/15/2022  Time:    20:58               Narrative:    EXAMINATION:  XR ABDOMEN FLAT AND ERECT    CLINICAL HISTORY:  Unspecified abdominal pain    TECHNIQUE:  Flat and erect AP views of the abdomen were performed.    COMPARISON:  None    FINDINGS:  Moderate amount of stool in the colon.  Postsurgical changes in the left hemiabdomen.  Segment of dilated small bowel loop measuring 3.4 cm in the left hemiabdomen suggestive of possible partial small bowel obstruction.  Tubing in the mid right hemiabdomen                                      I have independently reviewed all pertinent labs within the past 24 hours.    I have independently reviewed, visualized and interpreted all pertinent imaging results within the past 24 hours and discussed the findings with the ED physician, Dheeraj Goodman Do, MD          Assessment/Plan:     * Post-ERCP acute pancreatitis  -status post ERCP yesterday, pancreatic duct stent placement.  -known history of chronic recurrent pancreatitis.  -supportive care.  -IV fluids, IV narcotic pain medications.  -GI consult      Hypertensive urgency  Patient has a current diagnosis of hypertensive urgency (without evidence of end organ damage) which is uncontrolled.  Latest blood pressure and vitals  reviewed-   Temp:  [97.8 °F (36.6 °C)-98.1 °F (36.7 °C)]   Pulse:  [57-78]   Resp:  [12-20]   BP: (134-188)/()   SpO2:  [94 %-98 %] .   Patient currently on IV antihypertensives.   Home meds for hypertension were reviewed and noted below.   Hypertension Medications             carvediloL (COREG) 6.25 MG tablet Take 6.25 mg by mouth 2 (two) times daily.    carvediloL (COREG) 6.25 MG tablet Take 1 tablet by mouth 2 (two) times daily with meals.          Medication adjustment for hospital antihypertensives is as follows- IV hydralazine, IV labetalol as needed    Will aim for controlled BP reduction by medications noted above. Monitor and mitigate end organ damage as indicated.    History of DVT (deep vein thrombosis)  -history of DVT, was taken off apixaban many months ago  -        VTE Risk Mitigation (From admission, onward)         Ordered     enoxaparin injection 40 mg  Daily         08/16/22 0536     Place sequential compression device  Until discontinued         08/16/22 0536                 The patient is placed in OBSERVATION status.    Justin Gonzalez MD  Department of Hospital Medicine   '[facility] - Emergency Dept.

## 2022-08-16 NOTE — PROGRESS NOTES
Pt is back in ED following yesterday's procedure as per emergency contact due to abdominal pain and suspected pancreatitis.

## 2022-08-16 NOTE — ED PROVIDER NOTES
SCRIBE #1 NOTE: I, Sandor Avery, am scribing for, and in the presence of, Dheeraj Goodman Do, MD. I have scribed the entire note.       History     Chief Complaint   Patient presents with    Post-op Problem     ERCP today w/ 2 stents now c/o nausea and pain      Review of patient's allergies indicates:  No Known Allergies      History of Present Illness     HPI    8/16/2022, 1:15 AM  History obtained from the wife and patient      History of Present Illness: Fabrice Zamorano is a 57 y.o. male patient with a PMHx of GERD, HTN, hypothyroidism, pancreatic pseudocyst, and splenic vein thrombosis who presents to the Emergency Department for evaluation of a post op problem which onset gradually around 8PM. Pt had ERCP yesterday and had 2 stents placed in the pancreatic duct. He was discharge around 10AM yesterday and reports worsening abdominal pain began around 8PM. He took a 10mg oxycodone and then a 5mg with no pain relief. Symptoms are constant and moderate in severity. No mitigating or exacerbating factors reported. Associated sxs include nausea. Patient denies any vomiting, diarrhea, fever, chills, weakness, and all other sxs at this time. No further complaints or concerns at this time.       Arrival mode: Personal vehicle    PCP: Rebecca Jackson MD        Past Medical History:  Past Medical History:   Diagnosis Date    Anticoagulant long-term use     On Eliquis for DVT    GERD (gastroesophageal reflux disease)     Hypertension     Hypothyroidism 10/26/2021    Pancreatic pseudocyst     Sleep apnea     Splenic vein thrombosis        Past Surgical History:  Past Surgical History:   Procedure Laterality Date    CHOLECYSTECTOMY      ENDOSCOPIC ULTRASOUND OF UPPER GASTROINTESTINAL TRACT N/A 10/12/2021    Procedure: ULTRASOUND, UPPER GI TRACT, ENDOSCOPIC;  Surgeon: Odalys Leiva MD;  Location: [facility];  Service: Endoscopy;  Laterality: N/A;  Upper and linear, 22 shark core, cytology and RPMI     ERCP N/A 2021    Procedure: ERCP (ENDOSCOPIC RETROGRADE CHOLANGIOPANCREATOGRAPHY);  Surgeon: Odalys Leiva MD;  Location: [facility];  Service: Endoscopy;  Laterality: N/A;    ERCP W/ PLASTIC STENT PLACEMENT      LATERAL PANCREATICOJEJUNOSTOMY N/A 2021    Procedure: PANCREATICOJEJUNOSTOMY, SIDE-TO-SIDE tier 1;  Surgeon: Brian Hills MD;  Location: [address];  Service: General;  Laterality: N/A;         Family History:  Family History   Problem Relation Age of Onset    Thyroid disease Mother     Cancer Father     Pancreatic cancer Father     Arthritis Father     Thyroid disease Father        Social History:  Social History     Tobacco Use    Smoking status: Former Smoker     Quit date: 2001     Years since quittin.0    Smokeless tobacco: Never Used   Substance and Sexual Activity    Alcohol use: Not Currently    Drug use: Never    Sexual activity: Yes        Review of Systems     Review of Systems   Constitutional: Negative for chills and fever.   HENT: Negative for sore throat.    Respiratory: Negative for shortness of breath.    Cardiovascular: Negative for chest pain.   Gastrointestinal: Positive for abdominal pain and nausea. Negative for diarrhea and vomiting.   Genitourinary: Negative for dysuria.   Musculoskeletal: Negative for back pain.   Skin: Negative for rash.   Neurological: Negative for weakness.   Hematological: Does not bruise/bleed easily.   All other systems reviewed and are negative.       Physical Exam     Initial Vitals [08/15/22 2023]   BP Pulse Resp Temp SpO2   (!) 183/108 75 18 97.8 °F (36.6 °C) 97 %      MAP       --          Physical Exam  Nursing Notes and Vital Signs Reviewed.  Constitutional: Patient is in no acute distress.   Head: Atraumatic. Normocephalic.  Eyes: PERRL. EOM intact. Conjunctivae are not pale. No scleral icterus.  ENT: Mucous membranes are moist. Oropharynx is clear and symmetric.    Neck: Supple. Full  ROM.  Cardiovascular: Regular rate. Regular rhythm. No murmurs, rubs, or gallops. Distal pulses are 2+ and symmetric.  Pulmonary/Chest: No respiratory distress. Clear to auscultation bilaterally. No wheezing or rales.  Abdominal: Soft and non-distended. Tender to upper abdomen bilaterally. No rebound, guarding, or rigidity.   Musculoskeletal: Moves all extremities. No obvious deformities. No edema.   Skin: Warm and dry.  Neurological:  Alert, awake, and appropriate.  Normal speech.  No acute focal neurological deficits are appreciated.  Psychiatric: Normal affect. Good eye contact. Appropriate in content.     ED Course   Critical Care    Date/Time: 8/16/2022 5:26 AM  Performed by: Dheeraj Goodman Do, MD  Authorized by: Dheeraj Goodman Do, MD   Direct patient critical care time: 10 minutes  Additional history critical care time: 5 minutes  Ordering / reviewing critical care time: 10 minutes  Documentation critical care time: 5 minutes  Consulting other physicians critical care time: 5 minutes  Total critical care time (exclusive of procedural time) : 35 minutes  Critical care time was exclusive of separately billable procedures and treating other patients.  Critical care was necessary to treat or prevent imminent or life-threatening deterioration of the following conditions: Pancreatitis; HTN.  Critical care was time spent personally by me on the following activities: blood draw for specimens, discussions with consultants, evaluation of patient's response to treatment, obtaining history from patient or surrogate, ordering and review of laboratory studies, pulse oximetry, review of old charts, re-evaluation of patient's condition, ordering and review of radiographic studies, ordering and performing treatments and interventions, examination of patient, interpretation of cardiac output measurements and development of treatment plan with patient or surrogate.        ED Vital Signs:  Vitals:    08/15/22 2023 08/16/22 0127  08/16/22 0223 08/16/22 0230   BP: (!) 183/108   (!) 186/99   Pulse: 75   62   Resp: 18 18 18 12   Temp: 97.8 °F (36.6 °C)      TempSrc: Oral      SpO2: 97%   98%   Weight: 98.1 kg (216 lb 4.3 oz)       08/16/22 0354 08/16/22 0523   BP: (!) 179/109 (!) 188/101   Pulse:     Resp:     Temp:     TempSrc:     SpO2:     Weight:         Abnormal Lab Results:  Labs Reviewed   CBC W/ AUTO DIFFERENTIAL - Abnormal; Notable for the following components:       Result Value    WBC 14.82 (*)     Platelets 127 (*)     Gran # (ANC) 12.9 (*)     Immature Grans (Abs) 0.06 (*)     Lymph # 0.8 (*)     Mono # 1.1 (*)     Gran % 86.7 (*)     Lymph % 5.7 (*)     All other components within normal limits   COMPREHENSIVE METABOLIC PANEL - Abnormal; Notable for the following components:    Glucose 121 (*)     Total Bilirubin 1.3 (*)     AST 51 (*)     ALT 72 (*)     All other components within normal limits   LIPASE - Abnormal; Notable for the following components:    Lipase 318 (*)     All other components within normal limits   URINALYSIS, REFLEX TO URINE CULTURE - Abnormal; Notable for the following components:    Protein, UA 1+ (*)     Ketones, UA 1+ (*)     Occult Blood UA Trace (*)     All other components within normal limits    Narrative:     Specimen Source->Urine   URINALYSIS MICROSCOPIC - Abnormal; Notable for the following components:    RBC, UA 9 (*)     All other components within normal limits    Narrative:     Specimen Source->Urine   SARS-COV-2 RDRP GENE        All Lab Results:  Results for orders placed or performed during the hospital encounter of 08/16/22   CBC auto differential   Result Value Ref Range    WBC 14.82 (H) 3.90 - 12.70 K/uL    RBC 5.27 4.60 - 6.20 M/uL    Hemoglobin 15.5 14.0 - 18.0 g/dL    Hematocrit 45.6 40.0 - 54.0 %    MCV 87 82 - 98 fL    MCH 29.4 27.0 - 31.0 pg    MCHC 34.0 32.0 - 36.0 g/dL    RDW 13.2 11.5 - 14.5 %    Platelets 127 (L) 150 - 450 K/uL    MPV 9.2 9.2 - 12.9 fL    Immature Granulocytes  0.4 0.0 - 0.5 %    Gran # (ANC) 12.9 (H) 1.8 - 7.7 K/uL    Immature Grans (Abs) 0.06 (H) 0.00 - 0.04 K/uL    Lymph # 0.8 (L) 1.0 - 4.8 K/uL    Mono # 1.1 (H) 0.3 - 1.0 K/uL    Eos # 0.0 0.0 - 0.5 K/uL    Baso # 0.02 0.00 - 0.20 K/uL    nRBC 0 0 /100 WBC    Gran % 86.7 (H) 38.0 - 73.0 %    Lymph % 5.7 (L) 18.0 - 48.0 %    Mono % 7.1 4.0 - 15.0 %    Eosinophil % 0.0 0.0 - 8.0 %    Basophil % 0.1 0.0 - 1.9 %    Differential Method Automated    Comprehensive metabolic panel   Result Value Ref Range    Sodium 142 136 - 145 mmol/L    Potassium 4.6 3.5 - 5.1 mmol/L    Chloride 102 95 - 110 mmol/L    CO2 28 23 - 29 mmol/L    Glucose 121 (H) 70 - 110 mg/dL    BUN 17 6 - 20 mg/dL    Creatinine 1.1 0.5 - 1.4 mg/dL    Calcium 9.5 8.7 - 10.5 mg/dL    Total Protein 7.0 6.0 - 8.4 g/dL    Albumin 4.4 3.5 - 5.2 g/dL    Total Bilirubin 1.3 (H) 0.1 - 1.0 mg/dL    Alkaline Phosphatase 81 55 - 135 U/L    AST 51 (H) 10 - 40 U/L    ALT 72 (H) 10 - 44 U/L    Anion Gap 12 8 - 16 mmol/L    eGFR >60 >60 mL/min/1.73 m^2   Lipase   Result Value Ref Range    Lipase 318 (H) 4 - 60 U/L   Urinalysis, Reflex to Urine Culture Urine, Clean Catch    Specimen: Urine   Result Value Ref Range    Specimen UA Urine, Clean Catch     Color, UA Yellow Yellow, Straw, Amrita    Appearance, UA Clear Clear    pH, UA 6.0 5.0 - 8.0    Specific Gravity, UA 1.020 1.005 - 1.030    Protein, UA 1+ (A) Negative    Glucose, UA Negative Negative    Ketones, UA 1+ (A) Negative    Bilirubin (UA) Negative Negative    Occult Blood UA Trace (A) Negative    Nitrite, UA Negative Negative    Urobilinogen, UA Negative <2.0 EU/dL    Leukocytes, UA Negative Negative   Urinalysis Microscopic   Result Value Ref Range    RBC, UA 9 (H) 0 - 4 /hpf    WBC, UA 0 0 - 5 /hpf    Bacteria None None-Occ /hpf    Hyaline Casts, UA 0 0-1/lpf /lpf    Microscopic Comment SEE COMMENT          Imaging Results:  Imaging Results          CT Abdomen Pelvis With Contrast (In process)                X-Ray  Chest AP Portable (In process)                X-Ray Abdomen Flat And Erect (Final result)  Result time 08/15/22 20:58:39    Final result by Rebecca Valdivia MD (08/15/22 20:58:39)                 Impression:      As above      Electronically signed by: Skip Fernandez  Date:    08/15/2022  Time:    20:58             Narrative:    EXAMINATION:  XR ABDOMEN FLAT AND ERECT    CLINICAL HISTORY:  Unspecified abdominal pain    TECHNIQUE:  Flat and erect AP views of the abdomen were performed.    COMPARISON:  None    FINDINGS:  Moderate amount of stool in the colon.  Postsurgical changes in the left hemiabdomen.  Segment of dilated small bowel loop measuring 3.4 cm in the left hemiabdomen suggestive of possible partial small bowel obstruction.  Tubing in the mid right hemiabdomen                                         The EKG was ordered, reviewed, and independently interpreted by the ED provider.  Interpretation time: 1:41  Rate: 56 BPM  Rhythm: sinus bradycardia  Interpretation: No ST or T wave abnormality. No STEMI.             The Emergency Provider reviewed the vital signs and test results, which are outlined above.     ED Discussion       5:25 AM: Discussed case with Taniya Jasso MD (Hospital Medicine). Dr. Gonzalez agrees with current care and management of pt and accepts admission.   Admitting Service: Hospital Medicine  Admitting Physician: Dr. Gonzalez  Admit to: Obs Med Tele    5:25 AM: Re-evaluated pt. I have discussed test results, shared treatment plan, and the need for admission with patient and family at bedside. Pt and family express understanding at this time and agree with all information. All questions answered. Pt and family have no further questions or concerns at this time. Pt is ready for admit.         Medical Decision Making:   Clinical Tests:   Lab Tests: Ordered and Reviewed  Radiological Study: Ordered and Reviewed  Medical Tests: Ordered and Reviewed           ED Medication(s):  Medications    ondansetron injection 4 mg (has no administration in time range)   HYDROmorphone injection 1 mg (has no administration in time range)   albuterol-ipratropium 2.5 mg-0.5 mg/3 mL nebulizer solution 3 mL (has no administration in time range)   0.9%  NaCl infusion (has no administration in time range)   hydrALAZINE injection 10 mg (has no administration in time range)   enoxaparin injection 40 mg (has no administration in time range)   HYDROmorphone injection 1 mg (1 mg Intravenous Given 8/16/22 0127)   ondansetron injection 4 mg (4 mg Intravenous Given 8/16/22 0127)   sodium chloride 0.9% bolus 1,000 mL (0 mLs Intravenous Stopped 8/16/22 0359)   HYDROmorphone injection 1 mg (1 mg Intravenous Given 8/16/22 0223)   ondansetron injection 4 mg (4 mg Intravenous Given 8/16/22 0223)   iohexoL (OMNIPAQUE 350) injection 100 mL (100 mLs Intravenous Given 8/16/22 0253)   cloNIDine tablet 0.2 mg (0.2 mg Oral Given 8/16/22 0354)   metoprolol injection 5 mg (5 mg Intravenous Given 8/16/22 0523)       New Prescriptions    No medications on file               Scribe Attestation:   Scribe #1: I performed the above scribed service and the documentation accurately describes the services I performed. I attest to the accuracy of the note.     Attending:   Physician Attestation Statement for Scribe #1: I, Dheeraj Goodman Do, MD, personally performed the services described in this documentation, as scribed by Sandor Avery, in my presence, and it is both accurate and complete.           Clinical Impression       ICD-10-CM ICD-9-CM   1. Acute pancreatitis with uninfected necrosis, unspecified pancreatitis type  K85.91 577.0   2. Abdominal pain  R10.9 789.00   3. Epigastric pain  R10.13 789.06       Disposition:   Disposition: Placed in Observation  Condition: Fair         Dheeraj Goodman Do, MD  08/16/22 7186

## 2022-08-16 NOTE — HPI
Ms. Zamorano is a 57-year-old  male with PMH significant for chronic recurrent pancreatitis, DVT, splenic vein thrombosis for which she is on apixaban, HTN, hyperlipidemia, underwent ERCP yesterday by Dr. Leiva, and plastic stent was placed in the pancreatic duct.  A stricture tat he pancreaticojejunostomy was seen and partially dilated.  Patient presented to the ED today complaining of abdominal pain, epigastric in nature similar to previous episodes of pancreatitis.  BP elevated 183/108.  In laboratory workup reveals lipase 318. Total bilirubin 1.3.  WBC 26660. Received metoprolol 5 mg IV x1 for his elevated blood pressure along with 1 L normal saline bolus.  Consult received multiple rounds of Zofran IV and multiple rounds of IV Dilaudid.    Admitting diagnosis:  Acute recurrent pancreatitis, post ERCP pancreatitis, status post pancreatic duct stent placement yesterday.

## 2022-08-16 NOTE — HPI
The patient has a history of PRSS-1 gene mutation with chronic pancreatitis. He underwent a pancreaticojejunostomy on 11/19/2021 and initially did well, but recently having more symptoms of pancreatitis. Yesterday he had an ERCP with removal of pancreatic duct debri, and dilation of a stricture at the pancreaticojejunostomy with stent placement. He was also noted to have gastric varices. Procedure was uncomplicated and patient discharged. Overnight, he developed severe epigastric pain with nausea. Worse with breathing. Lipase 318 and WBC count 14.82. Renal function and Hgb normal. Mild elevation in LFTs. CT results pending.

## 2022-08-17 VITALS
TEMPERATURE: 100 F | HEIGHT: 71 IN | SYSTOLIC BLOOD PRESSURE: 131 MMHG | DIASTOLIC BLOOD PRESSURE: 68 MMHG | RESPIRATION RATE: 18 BRPM | HEART RATE: 82 BPM | BODY MASS INDEX: 31.64 KG/M2 | WEIGHT: 226 LBS | OXYGEN SATURATION: 96 %

## 2022-08-17 PROBLEM — K85.90 POST-ERCP ACUTE PANCREATITIS: Status: RESOLVED | Noted: 2022-08-16 | Resolved: 2022-08-17

## 2022-08-17 PROBLEM — K91.89 POST-ERCP ACUTE PANCREATITIS: Status: RESOLVED | Noted: 2022-08-16 | Resolved: 2022-08-17

## 2022-08-17 PROBLEM — I16.0 HYPERTENSIVE URGENCY: Status: RESOLVED | Noted: 2022-08-16 | Resolved: 2022-08-17

## 2022-08-17 LAB
ALBUMIN SERPL BCP-MCNC: 3.8 G/DL (ref 3.5–5.2)
ALP SERPL-CCNC: 77 U/L (ref 55–135)
ALT SERPL W/O P-5'-P-CCNC: 92 U/L (ref 10–44)
ANION GAP SERPL CALC-SCNC: 10 MMOL/L (ref 8–16)
AST SERPL-CCNC: 46 U/L (ref 10–40)
BASOPHILS # BLD AUTO: 0.02 K/UL (ref 0–0.2)
BASOPHILS NFR BLD: 0.2 % (ref 0–1.9)
BILIRUB SERPL-MCNC: 1.5 MG/DL (ref 0.1–1)
BUN SERPL-MCNC: 11 MG/DL (ref 6–20)
CALCIUM SERPL-MCNC: 8.4 MG/DL (ref 8.7–10.5)
CHLORIDE SERPL-SCNC: 104 MMOL/L (ref 95–110)
CO2 SERPL-SCNC: 23 MMOL/L (ref 23–29)
CREAT SERPL-MCNC: 0.9 MG/DL (ref 0.5–1.4)
DIFFERENTIAL METHOD: ABNORMAL
EOSINOPHIL # BLD AUTO: 0.1 K/UL (ref 0–0.5)
EOSINOPHIL NFR BLD: 0.9 % (ref 0–8)
ERYTHROCYTE [DISTWIDTH] IN BLOOD BY AUTOMATED COUNT: 13.6 % (ref 11.5–14.5)
EST. GFR  (NO RACE VARIABLE): >60 ML/MIN/1.73 M^2
GLUCOSE SERPL-MCNC: 97 MG/DL (ref 70–110)
HCT VFR BLD AUTO: 43.2 % (ref 40–54)
HGB BLD-MCNC: 14.2 G/DL (ref 14–18)
IMM GRANULOCYTES # BLD AUTO: 0.04 K/UL (ref 0–0.04)
IMM GRANULOCYTES NFR BLD AUTO: 0.5 % (ref 0–0.5)
LIPASE SERPL-CCNC: 60 U/L (ref 4–60)
LYMPHOCYTES # BLD AUTO: 1 K/UL (ref 1–4.8)
LYMPHOCYTES NFR BLD: 11.4 % (ref 18–48)
MAGNESIUM SERPL-MCNC: 1.8 MG/DL (ref 1.6–2.6)
MCH RBC QN AUTO: 29.3 PG (ref 27–31)
MCHC RBC AUTO-ENTMCNC: 32.9 G/DL (ref 32–36)
MCV RBC AUTO: 89 FL (ref 82–98)
MONOCYTES # BLD AUTO: 0.8 K/UL (ref 0.3–1)
MONOCYTES NFR BLD: 9.5 % (ref 4–15)
NEUTROPHILS # BLD AUTO: 6.9 K/UL (ref 1.8–7.7)
NEUTROPHILS NFR BLD: 77.5 % (ref 38–73)
NRBC BLD-RTO: 0 /100 WBC
PLATELET # BLD AUTO: 116 K/UL (ref 150–450)
PMV BLD AUTO: 9 FL (ref 9.2–12.9)
POTASSIUM SERPL-SCNC: 4 MMOL/L (ref 3.5–5.1)
PROT SERPL-MCNC: 6.1 G/DL (ref 6–8.4)
RBC # BLD AUTO: 4.84 M/UL (ref 4.6–6.2)
SODIUM SERPL-SCNC: 137 MMOL/L (ref 136–145)
WBC # BLD AUTO: 8.87 K/UL (ref 3.9–12.7)

## 2022-08-17 PROCEDURE — 99214 OFFICE O/P EST MOD 30 MIN: CPT | Mod: ,,, | Performed by: PHYSICIAN ASSISTANT

## 2022-08-17 PROCEDURE — 36415 COLL VENOUS BLD VENIPUNCTURE: CPT | Performed by: INTERNAL MEDICINE

## 2022-08-17 PROCEDURE — 80053 COMPREHEN METABOLIC PANEL: CPT | Performed by: INTERNAL MEDICINE

## 2022-08-17 PROCEDURE — 83690 ASSAY OF LIPASE: CPT | Performed by: FAMILY MEDICINE

## 2022-08-17 PROCEDURE — 99214 PR OFFICE/OUTPT VISIT, EST, LEVL IV, 30-39 MIN: ICD-10-PCS | Mod: ,,, | Performed by: PHYSICIAN ASSISTANT

## 2022-08-17 PROCEDURE — G0378 HOSPITAL OBSERVATION PER HR: HCPCS

## 2022-08-17 PROCEDURE — 25000003 PHARM REV CODE 250: Performed by: NURSE PRACTITIONER

## 2022-08-17 PROCEDURE — 85025 COMPLETE CBC W/AUTO DIFF WBC: CPT | Performed by: INTERNAL MEDICINE

## 2022-08-17 PROCEDURE — 83735 ASSAY OF MAGNESIUM: CPT | Performed by: INTERNAL MEDICINE

## 2022-08-17 RX ORDER — ACETAMINOPHEN 325 MG/1
650 TABLET ORAL ONCE
Status: COMPLETED | OUTPATIENT
Start: 2022-08-17 | End: 2022-08-17

## 2022-08-17 RX ORDER — PROMETHAZINE HYDROCHLORIDE 25 MG/1
25 TABLET ORAL EVERY 6 HOURS PRN
Qty: 12 TABLET | Refills: 0 | Status: SHIPPED | OUTPATIENT
Start: 2022-08-17 | End: 2022-08-20

## 2022-08-17 RX ORDER — OXYCODONE AND ACETAMINOPHEN 10; 325 MG/1; MG/1
1 TABLET ORAL EVERY 8 HOURS PRN
Qty: 9 TABLET | Refills: 0 | Status: SHIPPED | OUTPATIENT
Start: 2022-08-17 | End: 2022-08-20

## 2022-08-17 RX ADMIN — ACETAMINOPHEN 650 MG: 325 TABLET ORAL at 12:08

## 2022-08-17 NOTE — PLAN OF CARE
[facility]  Discharge Final Note    Primary Care Provider: Rebecca Jackson MD    Expected Discharge Date: 8/17/2022    Final Discharge Note (most recent)     Final Note - 08/17/22 1256        Final Note    Assessment Type Final Discharge Note     Anticipated Discharge Disposition Home or Self Care        Post-Acute Status    Discharge Delays None known at this time                   Contact Info     Odalys Leiva MD   Specialty: Gastroenterology    [address] [place] [address] [address] [address]   Phone: [phone]       Next Steps: Follow up in 1 week(s)    Rebecca Jackson MD   Specialty: Family Medicine   Relationship: PCP - General    [address]  [place] [address]   Phone: [phone]       Next Steps: Follow up in 3 day(s)    Instructions: -hospital follow up as needed          Pt to DC home with no CM DC needs.    Aneudy Boston LMSW 8/17/2022 12:57 PM

## 2022-08-17 NOTE — NURSING
"Discharge instructions reviewed with patient and spouse at bedside. Patient verbalized understanding. Patient's spouse voiced concern that no "pain or nausea meds" were not prescribed at discharge. Patient stated that he has not been in any severe pain today, only "lower back pain", and patient stated he informed Faina NP about this. One time dose of tylenol administered per MAR. Patient's spouse visibly upset and stated "anyone with a brain would order those meds to prevent him from coming back." I informed the patient and his spouse that as the nurse, I would secure chat the discharging provider and will let them know when I receive and update from discharging provider. No further questions/concerns at this time.   "

## 2022-08-17 NOTE — NURSING
Printed prescription for Percocet handed to patient and patient was informed that Phenergan delivered bedside by [facility].

## 2022-08-17 NOTE — PROGRESS NOTES
[facility]  Gastroenterology  Progress Note    Patient Name: Fabrice Zamorano  MRN: [id number]  Admission Date: 8/16/2022  Hospital Length of Stay: 0 days  Code Status: Prior   Attending Provider: Travon Valle MD  Consulting Provider: Adiel Quispe PA-C  Primary Care Physician: Rebecca Jackson MD  Principal Problem: Post-ERCP acute pancreatitis      Subjective:     Interval History: The patient is feeling better. Abdominal pain decreased. No nausea or vomiting. Reports low back pain.     Review of Systems   Constitutional:         See Interval History for daily ROS.    Objective:     Vital Signs (Most Recent):  Temp: 99.9 °F (37.7 °C) (08/17/22 0800)  Pulse: 82 (08/17/22 0800)  Resp: 18 (08/17/22 0800)  BP: 131/68 (08/17/22 0800)  SpO2: 96 % (08/17/22 0800) Vital Signs (24h Range):  Temp:  [98.4 °F (36.9 °C)-99.9 °F (37.7 °C)] 99.9 °F (37.7 °C)  Pulse:  [67-86] 82  Resp:  [10-25] 18  SpO2:  [92 %-97 %] 96 %  BP: (108-143)/(68-85) 131/68     Weight: 102.5 kg (225 lb 15.5 oz) (08/17/22 0423)  Body mass index is 31.52 kg/m².      Intake/Output Summary (Last 24 hours) at 8/17/2022 1049  Last data filed at 8/17/2022 0900  Gross per 24 hour   Intake 299.89 ml   Output 2600 ml   Net -2300.11 ml       Lines/Drains/Airways       Peripheral Intravenous Line  Duration                  Peripheral IV - Single Lumen 08/16/22 0130 20 G Left Forearm 1 day                    Physical Exam  Constitutional:       General: He is not in acute distress.     Appearance: He is well-developed. He is not diaphoretic.   HENT:      Head: Normocephalic and atraumatic.   Eyes:      Extraocular Movements: Extraocular movements intact.   Cardiovascular:      Rate and Rhythm: Normal rate and regular rhythm.   Pulmonary:      Effort: Pulmonary effort is normal. No respiratory distress.      Breath sounds: Normal breath sounds. No wheezing.   Abdominal:      General: Bowel sounds are normal. There is no distension.      Palpations:  Abdomen is soft.      Tenderness: There is abdominal tenderness (minimal) in the epigastric area. There is no guarding or rebound.   Neurological:      Mental Status: He is alert and oriented to person, place, and time.      Cranial Nerves: No cranial nerve deficit.   Psychiatric:         Behavior: Behavior normal.       Significant Labs:  CBC:   Recent Labs   Lab 08/16/22  0139 08/17/22  0731   WBC 14.82* 8.87   HGB 15.5 14.2   HCT 45.6 43.2   * 116*     CMP:   Recent Labs   Lab 08/17/22  0731   GLU 97   CALCIUM 8.4*   ALBUMIN 3.8   PROT 6.1      K 4.0   CO2 23      BUN 11   CREATININE 0.9   ALKPHOS 77   ALT 92*   AST 46*   BILITOT 1.5*     Coagulation: No results for input(s): PT, INR, APTT in the last 48 hours.      Significant Imaging:  Imaging results within the past 24 hours have been reviewed.    Assessment/Plan:     Acute on chronic pancreatitis  Symptoms improving and lipase normal.   Agree with starting clear liquid diet. Advance as tolerated.   Continue PRN pain medication and antiemetics as needed.   Resume home dose of Creon.  Follow up with Dr. Hills next week as scheduled.           Thank you for your consult. I will sign off. Please contact us if you have any additional questions.    Adiel Quispe PA-C  Gastroenterology  [facility]

## 2022-08-17 NOTE — ASSESSMENT & PLAN NOTE
Symptoms improving and lipase normal.   Agree with starting clear liquid diet. Advance as tolerated.   Continue PRN pain medication and antiemetics as needed.   Resume home dose of Creon.  Follow up with Dr. Hills next week as scheduled.

## 2022-08-17 NOTE — PLAN OF CARE
Problem: Fall Injury Risk  Goal: Absence of Fall and Fall-Related Injury  Intervention: Promote Injury-Free Environment  Flowsheets (Taken 8/17/2022 0332)  Safety Promotion/Fall Prevention:   assistive device/personal item within reach   bed alarm set   Fall Risk reviewed with patient/family   instructed to call staff for mobility   medications reviewed     Problem: Fluid Imbalance (Pancreatitis)  Goal: Fluid Balance  Intervention: Monitor and Manage Fluid Balance  Flowsheets (Taken 8/17/2022 0332)  Fluid/Electrolyte Management: intravenous fluid replacement initiated     Problem: Pain (Pancreatitis)  Goal: Acceptable Pain Control  Intervention: Monitor and Manage Pain  Flowsheets (Taken 8/17/2022 0332)  Sleep/Rest Enhancement: relaxation techniques promoted  Diversional Activities: television  Pain Management Interventions:   care clustered   pain management plan reviewed with patient/caregiver   position adjusted   relaxation techniques promoted   pillow support provided

## 2022-08-17 NOTE — SUBJECTIVE & OBJECTIVE
Subjective:     Interval History: The patient is feeling better. Abdominal pain decreased. No nausea or vomiting. Reports low back pain.     Review of Systems   Constitutional:         See Interval History for daily ROS.    Objective:     Vital Signs (Most Recent):  Temp: 99.9 °F (37.7 °C) (08/17/22 0800)  Pulse: 82 (08/17/22 0800)  Resp: 18 (08/17/22 0800)  BP: 131/68 (08/17/22 0800)  SpO2: 96 % (08/17/22 0800) Vital Signs (24h Range):  Temp:  [98.4 °F (36.9 °C)-99.9 °F (37.7 °C)] 99.9 °F (37.7 °C)  Pulse:  [67-86] 82  Resp:  [10-25] 18  SpO2:  [92 %-97 %] 96 %  BP: (108-143)/(68-85) 131/68     Weight: 102.5 kg (225 lb 15.5 oz) (08/17/22 0423)  Body mass index is 31.52 kg/m².      Intake/Output Summary (Last 24 hours) at 8/17/2022 1049  Last data filed at 8/17/2022 0900  Gross per 24 hour   Intake 299.89 ml   Output 2600 ml   Net -2300.11 ml       Lines/Drains/Airways       Peripheral Intravenous Line  Duration                  Peripheral IV - Single Lumen 08/16/22 0130 20 G Left Forearm 1 day                    Physical Exam  Constitutional:       General: He is not in acute distress.     Appearance: He is well-developed. He is not diaphoretic.   HENT:      Head: Normocephalic and atraumatic.   Eyes:      Extraocular Movements: Extraocular movements intact.   Cardiovascular:      Rate and Rhythm: Normal rate and regular rhythm.   Pulmonary:      Effort: Pulmonary effort is normal. No respiratory distress.      Breath sounds: Normal breath sounds. No wheezing.   Abdominal:      General: Bowel sounds are normal. There is no distension.      Palpations: Abdomen is soft.      Tenderness: There is abdominal tenderness (minimal) in the epigastric area. There is no guarding or rebound.   Neurological:      Mental Status: He is alert and oriented to person, place, and time.      Cranial Nerves: No cranial nerve deficit.   Psychiatric:         Behavior: Behavior normal.       Significant Labs:  CBC:   Recent Labs   Lab  08/16/22  0139 08/17/22  0731   WBC 14.82* 8.87   HGB 15.5 14.2   HCT 45.6 43.2   * 116*     CMP:   Recent Labs   Lab 08/17/22  0731   GLU 97   CALCIUM 8.4*   ALBUMIN 3.8   PROT 6.1      K 4.0   CO2 23      BUN 11   CREATININE 0.9   ALKPHOS 77   ALT 92*   AST 46*   BILITOT 1.5*     Coagulation: No results for input(s): PT, INR, APTT in the last 48 hours.      Significant Imaging:  Imaging results within the past 24 hours have been reviewed.

## 2022-08-17 NOTE — PLAN OF CARE
[facility]  Initial Discharge Assessment       Primary Care Provider: Rebecca Jackson MD    Admission Diagnosis: Epigastric pain [R10.13]  Abdominal pain [R10.9]  Acute pancreatitis with uninfected necrosis, unspecified pancreatitis type [K85.91]    Admission Date: 8/16/2022  Expected Discharge Date: 8/17/2022    Discharge Barriers Identified: None    Payor: BLUE CROSS BLUE SHIELD / Plan: BCBS BLUE SAVER PPO - HD / Product Type: PPO /     Extended Emergency Contact Information  Primary Emergency Contact: Aliza Zamorano  Mobile Phone: [phone]  Relation: Spouse  Preferred language: English   needed? No    Discharge Plan A: Home with family  Discharge Plan B: Home with family      [id number] - [place], [place] - [address] AT [facility] [address]  [address]  [place] [address]  Phone: [phone] Fax: [phone]      Initial Assessment (most recent)     Adult Discharge Assessment - 08/17/22 1042        Discharge Assessment    Assessment Type Discharge Planning Assessment     Confirmed/corrected address, phone number and insurance Yes     Confirmed Demographics Correct on Facesheet     Source of Information patient     Does patient/caregiver understand observation status Yes     Communicated AMBER with patient/caregiver Yes     Lives With spouse     Do you expect to return to your current living situation? Yes     Do you have help at home or someone to help you manage your care at home? Yes     Who are your caregiver(s) and their phone number(s)? Aliza Lona (wife) [phone]     Prior to hospitilization cognitive status: Alert/Oriented     Current cognitive status: Alert/Oriented     Walking or Climbing Stairs Difficulty none     Dressing/Bathing Difficulty none     Home Accessibility wheelchair accessible     Home Layout Able to live on 1st floor     Equipment Currently Used at Home CPAP     Readmission within 30 days? No     Patient  currently being followed by outpatient case management? No     Do you currently have service(s) that help you manage your care at home? No     Do you take prescription medications? Yes     Do you have prescription coverage? Yes     Do you have any problems affording any of your prescribed medications? No     Is the patient taking medications as prescribed? yes     Who is going to help you get home at discharge? Aliza Zamorano (wife) [phone]     How do you get to doctors appointments? car, drives self     Are you on dialysis? No     Do you take coumadin? No     Discharge Plan A Home with family     Discharge Plan B Home with family     DME Needed Upon Discharge  none     Discharge Plan discussed with: Patient     Discharge Barriers Identified None        Relationship/Environment    Name(s) of Who Lives With Patient Aliza Zamorano (wife) [phone]               Met with pt at bedside for DC assessment. Pt lives at home with his wife and uses a CPAP. No CM DC needs identified at this time. SWer provided a transitional care folder, information on advanced directives, information on pharmacy bedside delivery, and discharge planning begins on admission with contact information for any needs/questions.    Aneudy Boston LMSW 8/17/2022 10:46 AM

## 2022-08-17 NOTE — HOSPITAL COURSE
Pt admitted to Observation for post ERCP acute pancreatitis. GI consulted. Patient treated with IV hydration, bowel rest, and analgesia as needed. Lipase normalized. LFTs trending down. Pt verbalized symptom improvement and ambulating in room without difficulty. Case discussed with GI and pt cleared for discharge to home. Pt seen and examined and deemed stable for discharge to home. Medications reconciled. Pt instructed to follow up with PCP and GI upon discharge for further evaluation.

## 2022-08-17 NOTE — DISCHARGE SUMMARY
[facility]  [facility]  Discharge Summary      Patient Name: Fabrice Zamorano  MRN: [id number]  Patient Class: OP- Observation  Admission Date: 8/16/2022  Hospital Length of Stay: 0 days  Discharge Date and Time: No discharge date for patient encounter.  Attending Physician: Travon Valle MD   Discharging Provider: Faina Blunt NP  Primary Care Provider: Rebecca Jackson MD      HPI:   Ms. Zamorano is a 57-year-old  male with PMH significant for chronic recurrent pancreatitis, DVT, splenic vein thrombosis for which she is on apixaban, HTN, hyperlipidemia, underwent ERCP yesterday by Dr. Leiva, and plastic stent was placed in the pancreatic duct.  A stricture tat he pancreaticojejunostomy was seen and partially dilated.  Patient presented to the ED today complaining of abdominal pain, epigastric in nature similar to previous episodes of pancreatitis.  BP elevated 183/108.  In laboratory workup reveals lipase 318. Total bilirubin 1.3.  WBC 97734. Received metoprolol 5 mg IV x1 for his elevated blood pressure along with 1 L normal saline bolus.  Consult received multiple rounds of Zofran IV and multiple rounds of IV Dilaudid.    Admitting diagnosis:  Acute recurrent pancreatitis, post ERCP pancreatitis, status post pancreatic duct stent placement yesterday.      * No surgery found *      Hospital Course:   Pt admitted to Observation for post ERCP acute pancreatitis. GI consulted. Patient treated with IV hydration, bowel rest, and analgesia as needed. Lipase normalized. LFTs trending down. Pt verbalized symptom improvement and ambulating in room without difficulty. Case discussed with GI and pt cleared for discharge to home. Pt seen and examined and deemed stable for discharge to home. Medications reconciled. Pt instructed to follow up with PCP and GI upon discharge for further evaluation.        Goals of Care Treatment Preferences:  Code Status: Full Code      Consults:   Consults (From  admission, onward)        Status Ordering Provider     Inpatient consult to Gastroenterology  Once        Provider:  Robe Chase MD    Completed ANDIE ALEXANDER          Final Active Diagnoses:    Diagnosis Date Noted POA    History of DVT (deep vein thrombosis) [Z86.718] 10/26/2021 Not Applicable    Acute on chronic pancreatitis [K85.90, K86.1] 10/26/2021 Yes      Problems Resolved During this Admission:    Diagnosis Date Noted Date Resolved POA    PRINCIPAL PROBLEM:  Post-ERCP acute pancreatitis [K91.89, K85.90] 08/16/2022 08/17/2022 Yes    Hypertensive urgency [I16.0] 08/16/2022 08/17/2022 Yes       Discharged Condition: stable    Disposition: Home or Self Care    Follow Up:   Follow-up Information     Odalys Leiva MD Follow up in 1 week(s).    Specialty: Gastroenterology  Contact information:  [address] [place] [address] [address] [place] [address] [phone]             April H MD Manuel Follow up in 3 day(s).    Specialty: Family Medicine  Why: -hospital follow up as needed  Contact information:  [address]  [place] [address] [phone]                       Patient Instructions:      Activity as tolerated       Significant Diagnostic Studies: Labs: All labs within the past 24 hours have been reviewed    Pending Diagnostic Studies:     None         Medications:  Reconciled Home Medications:      Medication List      CHANGE how you take these medications    PANCREAZE 16,800-56,800- 98,400 unit Cpdr  Generic drug: lipase-protease-amylase  Take before meals  What changed:   · how much to take  · how to take this  · when to take this  · additional instructions        CONTINUE taking these medications    carvediloL 6.25 MG tablet  Commonly known as: COREG  Take 1 tablet by mouth 2 (two) times daily with meals.     HYDROmorphone 2 MG tablet  Commonly known as: DILAUDID  Take 2 mg by mouth every 4 (four) hours as needed for Pain.     multivitamin per tablet  Commonly known as:  THERAGRAN  Take 1 tablet by mouth once daily.     pantoprazole 40 MG tablet  Commonly known as: PROTONIX  Take 1 tablet (40 mg total) by mouth once daily.     pregabalin 50 MG capsule  Commonly known as: LYRICA  Take 2 capsules (100 mg total) by mouth every evening.     PREVIDENT 5000 BOOSTER PLUS 1.1 % Pste  Generic drug: fluoride (sodium)  BRUSH TEETH WITH PASTE TWICE DAILY. SPIT OUT EXCESS. DO NOT RINSE     promethazine 25 MG tablet  Commonly known as: PHENERGAN  Take 25 mg by mouth every 6 (six) hours as needed.        STOP taking these medications    amoxicillin 500 MG capsule  Commonly known as: AMOXIL            Indwelling Lines/Drains at time of discharge:   Lines/Drains/Airways     None                 Time spent on the discharge of patient: > 32 minutes         Faina Blunt NP  Department of Hospital Medicine  [facility]

## 2022-08-17 NOTE — TELEPHONE ENCOUNTER
PT was seen and discussed plan for follow up with me in 1 month and follow up with surgical oncology.    -CORINA

## 2022-08-26 ENCOUNTER — OFFICE VISIT (OUTPATIENT)
Dept: SURGICAL ONCOLOGY | Facility: CLINIC | Age: 57
End: 2022-08-26
Payer: COMMERCIAL

## 2022-08-26 VITALS
TEMPERATURE: 98 F | BODY MASS INDEX: 29.09 KG/M2 | DIASTOLIC BLOOD PRESSURE: 73 MMHG | HEART RATE: 81 BPM | WEIGHT: 208.56 LBS | SYSTOLIC BLOOD PRESSURE: 104 MMHG

## 2022-08-26 DIAGNOSIS — K86.1 IDIOPATHIC CHRONIC PANCREATITIS: Primary | ICD-10-CM

## 2022-08-26 PROCEDURE — 99999 PR PBB SHADOW E&M-EST. PATIENT-LVL III: CPT | Mod: PBBFAC,,, | Performed by: SURGERY

## 2022-08-26 PROCEDURE — 99214 OFFICE O/P EST MOD 30 MIN: CPT | Mod: S$GLB,,, | Performed by: SURGERY

## 2022-08-26 PROCEDURE — 1160F PR REVIEW ALL MEDS BY PRESCRIBER/CLIN PHARMACIST DOCUMENTED: ICD-10-PCS | Mod: CPTII,S$GLB,, | Performed by: SURGERY

## 2022-08-26 PROCEDURE — 99999 PR PBB SHADOW E&M-EST. PATIENT-LVL III: ICD-10-PCS | Mod: PBBFAC,,, | Performed by: SURGERY

## 2022-08-26 PROCEDURE — 99214 PR OFFICE/OUTPT VISIT, EST, LEVL IV, 30-39 MIN: ICD-10-PCS | Mod: S$GLB,,, | Performed by: SURGERY

## 2022-08-26 PROCEDURE — 3078F PR MOST RECENT DIASTOLIC BLOOD PRESSURE < 80 MM HG: ICD-10-PCS | Mod: CPTII,S$GLB,, | Performed by: SURGERY

## 2022-08-26 PROCEDURE — 3008F PR BODY MASS INDEX (BMI) DOCUMENTED: ICD-10-PCS | Mod: CPTII,S$GLB,, | Performed by: SURGERY

## 2022-08-26 PROCEDURE — 3078F DIAST BP <80 MM HG: CPT | Mod: CPTII,S$GLB,, | Performed by: SURGERY

## 2022-08-26 PROCEDURE — 1159F PR MEDICATION LIST DOCUMENTED IN MEDICAL RECORD: ICD-10-PCS | Mod: CPTII,S$GLB,, | Performed by: SURGERY

## 2022-08-26 PROCEDURE — 3074F SYST BP LT 130 MM HG: CPT | Mod: CPTII,S$GLB,, | Performed by: SURGERY

## 2022-08-26 PROCEDURE — 3008F BODY MASS INDEX DOCD: CPT | Mod: CPTII,S$GLB,, | Performed by: SURGERY

## 2022-08-26 PROCEDURE — 1159F MED LIST DOCD IN RCRD: CPT | Mod: CPTII,S$GLB,, | Performed by: SURGERY

## 2022-08-26 PROCEDURE — 1160F RVW MEDS BY RX/DR IN RCRD: CPT | Mod: CPTII,S$GLB,, | Performed by: SURGERY

## 2022-08-26 PROCEDURE — 3074F PR MOST RECENT SYSTOLIC BLOOD PRESSURE < 130 MM HG: ICD-10-PCS | Mod: CPTII,S$GLB,, | Performed by: SURGERY

## 2022-08-26 RX ORDER — OXYCODONE HCL 10 MG/1
10 TABLET, FILM COATED, EXTENDED RELEASE ORAL EVERY 12 HOURS PRN
COMMUNITY

## 2022-08-26 RX ORDER — PROMETHAZINE HYDROCHLORIDE 25 MG/1
25 TABLET ORAL EVERY 6 HOURS PRN
COMMUNITY
End: 2022-12-02 | Stop reason: SDUPTHER

## 2022-08-29 ENCOUNTER — HOSPITAL ENCOUNTER (OUTPATIENT)
Dept: RADIOLOGY | Facility: HOSPITAL | Age: 57
Discharge: HOME | End: 2022-08-29
Attending: INTERNAL MEDICINE
Payer: COMMERCIAL

## 2022-08-29 DIAGNOSIS — K86.1 ACUTE ON CHRONIC PANCREATITIS: ICD-10-CM

## 2022-08-29 DIAGNOSIS — K85.90 ACUTE ON CHRONIC PANCREATITIS: ICD-10-CM

## 2022-08-29 PROCEDURE — 74018 RADEX ABDOMEN 1 VIEW: CPT | Mod: 26,,, | Performed by: STUDENT IN AN ORGANIZED HEALTH CARE EDUCATION/TRAINING PROGRAM

## 2022-08-29 PROCEDURE — 74018 RADEX ABDOMEN 1 VIEW: CPT | Mod: TC

## 2022-08-29 PROCEDURE — 74018 XR KUB: ICD-10-PCS | Mod: 26,,, | Performed by: STUDENT IN AN ORGANIZED HEALTH CARE EDUCATION/TRAINING PROGRAM

## 2022-08-30 ENCOUNTER — DOCUMENTATION ONLY (OUTPATIENT)
Dept: GASTROENTEROLOGY | Facility: CLINIC | Age: 57
End: 2022-08-30
Payer: COMMERCIAL

## 2022-08-30 NOTE — PROGRESS NOTES
[facility]    Date: 08/30/2022      Diagnosis:  []Pancreatic Cyst []PD Dilation []Peripancreatic Fluid Collection    []Pancreatic Mass []PD Stricture  []Biliary Stricture    []Chronic Pancreatitis  [x]Acute/Acute Recurrent/ Idiopathic Pancreatitis  []Other:        Presenter    []Shawn Chisholm  []Joni Waite  []Derrick Cunha   []Jose Guevara []Cain Sweeney []Abdulhameed Al-Sabban   [x]Brian Hills []Joni Hills  []Josiah Mckinley   []Kwadwo Gonsalez         Collaborators  [x]Shawn Chisholm  []Joni Waite  []Derrick Cunha   [x]Jose El Kristifialexa []Cain Sweeney [x]Abdulhameed Al-Sabban   [x]Brian Hills [x]Joni Hills  []Josiah Mckinley   [x]Kwadwo BROWN    Fabrice Zamorano is a 57 y.o. male with a PMH of acute on chronic recurrent pancreatitis secondary to PRRS-1. He has had multiple hospital admissions for acute pancreatitis. Patient underwent a pancreaticojejunostomy 11/19/2021 with improvement of symptoms. However, in July 2022 patient began having pancreatitis-type pain again.     Previous Imaging/Procedures  CTAP 8/16/22   Impression:  1.  Chronic or recurrent inflammatory changes surrounding the head of the pancreas with prominent surrounding lymph nodes.  Chronic or recurrent acute pancreatitis must be considered.  Considering the adjacent adenopathy, an underlying mass is difficult to completely exclude.  2. Moderate gastric distention with fluid.  Probable reactive duodenitis from the adjacent pancreatitis.  Findings are concerning for gastric outlet obstruction.  Clinical correlation is advised.  Decompression with a nasogastric tube should be considered.  3.  There is an endo biliary prosthesis in a loop of small bowel within the posterior left hemiabdomen.  Stable postoperative changes to the multiple loops of small bowel.  Negative for free air.  4.  Slightly worsening bilateral lower lobe atelectasis/consolidation.  Tiny left effusion.    CTAP  7/7/22  Impression:  Acute appearing pancreatitis with no definitive evidence of pancreatic mass, pseudocyst or abscess.  Right renal cyst.  Postsurgical changes of the small bowel from prior resection.  Varices are noted in the spleno renal region without definite evidence splenic aneurysm.  Fatty infiltration of the hepatic parenchyma.     MRCP on 5/12/2022:  Multifocal dilatation of the main pancreatic duct measuring 4-5 mm with dilatation of the side chain branches.  This is not significantly changed when compared to the prior exam.  Improved peripancreatic edema.  Please note evaluation for pancreatic mass is limited by the lack of intravenous contrast.  Recommend continued surveillance. The spleen is enlarged.  Periportal collateral vessels.  The findings are worrisome for portal venous hypertension.    ERCP 8/15/22   Impression:            - Type 1 isolated gastric varices (IGV1, varices                          located in the fundus), without bleeding.                          - Normal gastric body and antrum.                          - Pancreatic duct debri removed from the ventral                          duct. A stricture at the pancreaticojejunostomy                          was seen and partially dilated with a 4 mm                          Hurricane balloon. A 5 Fr 4 cm pancreatic duct                          plastic stent was placed.   EUS 10/12/21  Impression:            - Normal esophagus.                          - Type 1 isolated gastric varices (IGV1, varices                          located in the fundus), without bleeding.                          - Normal duodenal bulb.                          - Mucosal changes in the duodenum.                          - Pancreatic parenchymal abnormalities consisting                          of hyperechoic foci, lobularity with honeycombing                          and hyperechoic strands were noted in the entire                          pancreas.                           - Two abnormal lymph nodes were visualized in the                          loren hepatis region. Fine needle biopsy performed.                          - There was no sign of significant pathology in                          the common bile duct.   ERCP 9/29/21  Impression:            - A pancreatic sphincterotomy was performed.                          - Irragular pancreatic duct consistent with                          chronic pancreatitis and stricture at the PD at                          the head unable to traverse to the PD at the neck                          and body.       Surgery Date: 11/19/2021   Surgeon(s) and Role:     * Brian Hills MD - Primary     * Osmar Kline MD - Resident - Chief  Procedure(s) (LRB): PANCREATICOJEJUNOSTOMY    After review of pertinent history and imaging, the group consensus for the plan of care for Fabrice Zamorano shall include the following recommendations:    Follow up in PRN for:    Imaging  []MRI MRCP W Contrast []CT Abd W Contrast  []UGI    []MRI MRCP WO Contrast []CT Abd WO Contrast [] Other:      Procedures    []EUS   []ERCP  []IR for:     []Other:        Clinical Consultation   [x][facility]) [x]Surgery Clinic []IR   [x]Pain Management []Other:         Continue conservative management. Consider whipple if pain management ineffective

## 2022-08-31 NOTE — PROGRESS NOTES
Fabrice returns to me with history of chronic pancreatitis. He underwent [place] with some limited parenchymal debulking in November of 2021 for significant crescendo acute pancreatitis with good result.  He has had several scans over the past year that showed initially great radiographic result but now progressive inflammatory response in the pancreatic head with a reasonably normal body/tail.    His symptoms have become more significant but he remains essentially off narcotics, has made some trips and is managing his symptoms at home. I have reviewed his scan in detail and we discussed potential surgical options and outcomes. I think we could really help him with whipple vs a pretty thorough Alejandro. We discussed the operative outcomes with this procedure, and the risks associated. He is generally doing well right now, and would like to wait and monitor his symptoms for progression before committing to a big operation. He will reach out when/if his symptoms progress.    I spent 30 minutes with Mr. Zamorano, with >50% of time spent face to face and additional time preparing to see the patient (eg, review of tests), obtaining and/or reviewing separately obtained history, documenting clinical information in the electronic or other health record, independently interpreting results (not separately reported) and communicating results to the patient/family/caregiver, or Care coordination (not separately reported).       Brian Hills MD  Upper GI / Hepatobiliary Surgical Oncology  [facility] [place], [place]  Office: [phone]  Fax: [phone]

## 2022-09-16 ENCOUNTER — PATIENT MESSAGE (OUTPATIENT)
Dept: GASTROENTEROLOGY | Facility: CLINIC | Age: 57
End: 2022-09-16
Payer: COMMERCIAL

## 2022-10-11 ENCOUNTER — PATIENT MESSAGE (OUTPATIENT)
Dept: GASTROENTEROLOGY | Facility: CLINIC | Age: 57
End: 2022-10-11
Payer: COMMERCIAL

## 2022-11-04 ENCOUNTER — TELEPHONE (OUTPATIENT)
Dept: GASTROENTEROLOGY | Facility: CLINIC | Age: 57
End: 2022-11-04
Payer: COMMERCIAL

## 2022-11-04 NOTE — TELEPHONE ENCOUNTER
Returned call to [employer] at [phone]. PA for Pancreaze submitted and approved by phone. Case #[id number]

## 2022-11-04 NOTE — TELEPHONE ENCOUNTER
----- Message from Sita Cool sent at 11/4/2022 10:47 AM CDT -----  Contact: Radha/Ashwin Garcia from [facility] is calling to check the status of a PA on lipase-protease-amylase (PANCREAZE) 65,140-98,677- 98,400 unit CpDR.Please call back at [phone] and fax [phone]      Thanks  AURORA

## 2022-11-10 ENCOUNTER — PATIENT MESSAGE (OUTPATIENT)
Dept: GASTROENTEROLOGY | Facility: CLINIC | Age: 57
End: 2022-11-10
Payer: COMMERCIAL

## 2022-11-17 ENCOUNTER — HOSPITAL ENCOUNTER (OUTPATIENT)
Dept: RADIOLOGY | Facility: HOSPITAL | Age: 57
Discharge: HOME | End: 2022-11-17
Attending: INTERNAL MEDICINE
Payer: COMMERCIAL

## 2022-11-17 ENCOUNTER — OFFICE VISIT (OUTPATIENT)
Dept: GASTROENTEROLOGY | Facility: CLINIC | Age: 57
End: 2022-11-17
Payer: COMMERCIAL

## 2022-11-17 ENCOUNTER — PATIENT MESSAGE (OUTPATIENT)
Dept: GASTROENTEROLOGY | Facility: CLINIC | Age: 57
End: 2022-11-17

## 2022-11-17 VITALS
BODY MASS INDEX: 30.86 KG/M2 | DIASTOLIC BLOOD PRESSURE: 84 MMHG | HEART RATE: 80 BPM | WEIGHT: 220.44 LBS | SYSTOLIC BLOOD PRESSURE: 130 MMHG | HEIGHT: 71 IN

## 2022-11-17 DIAGNOSIS — R10.9 CHRONIC ABDOMINAL PAIN: ICD-10-CM

## 2022-11-17 DIAGNOSIS — K86.1 CHRONIC PANCREATITIS, UNSPECIFIED PANCREATITIS TYPE: ICD-10-CM

## 2022-11-17 DIAGNOSIS — K86.1 ACUTE ON CHRONIC PANCREATITIS: ICD-10-CM

## 2022-11-17 DIAGNOSIS — K76.0 FATTY LIVER: ICD-10-CM

## 2022-11-17 DIAGNOSIS — K85.90 ACUTE ON CHRONIC PANCREATITIS: ICD-10-CM

## 2022-11-17 DIAGNOSIS — K76.0 FATTY LIVER: Primary | ICD-10-CM

## 2022-11-17 DIAGNOSIS — D69.6 THROMBOCYTOPENIA: ICD-10-CM

## 2022-11-17 DIAGNOSIS — G89.29 CHRONIC ABDOMINAL PAIN: ICD-10-CM

## 2022-11-17 DIAGNOSIS — R74.8 ABNORMAL LIVER ENZYMES: ICD-10-CM

## 2022-11-17 PROCEDURE — 3079F DIAST BP 80-89 MM HG: CPT | Mod: CPTII,S$GLB,, | Performed by: INTERNAL MEDICINE

## 2022-11-17 PROCEDURE — 3075F PR MOST RECENT SYSTOLIC BLOOD PRESS GE 130-139MM HG: ICD-10-PCS | Mod: CPTII,S$GLB,, | Performed by: INTERNAL MEDICINE

## 2022-11-17 PROCEDURE — 1159F PR MEDICATION LIST DOCUMENTED IN MEDICAL RECORD: ICD-10-PCS | Mod: CPTII,S$GLB,, | Performed by: INTERNAL MEDICINE

## 2022-11-17 PROCEDURE — 1159F MED LIST DOCD IN RCRD: CPT | Mod: CPTII,S$GLB,, | Performed by: INTERNAL MEDICINE

## 2022-11-17 PROCEDURE — 99999 PR PBB SHADOW E&M-EST. PATIENT-LVL V: ICD-10-PCS | Mod: PBBFAC,,, | Performed by: INTERNAL MEDICINE

## 2022-11-17 PROCEDURE — 74018 XR KUB: ICD-10-PCS | Mod: 26,,, | Performed by: RADIOLOGY

## 2022-11-17 PROCEDURE — 3008F BODY MASS INDEX DOCD: CPT | Mod: CPTII,S$GLB,, | Performed by: INTERNAL MEDICINE

## 2022-11-17 PROCEDURE — 3075F SYST BP GE 130 - 139MM HG: CPT | Mod: CPTII,S$GLB,, | Performed by: INTERNAL MEDICINE

## 2022-11-17 PROCEDURE — 99215 PR OFFICE/OUTPT VISIT, EST, LEVL V, 40-54 MIN: ICD-10-PCS | Mod: S$GLB,,, | Performed by: INTERNAL MEDICINE

## 2022-11-17 PROCEDURE — 74018 RADEX ABDOMEN 1 VIEW: CPT | Mod: 26,,, | Performed by: RADIOLOGY

## 2022-11-17 PROCEDURE — 74018 RADEX ABDOMEN 1 VIEW: CPT | Mod: TC

## 2022-11-17 PROCEDURE — 3008F PR BODY MASS INDEX (BMI) DOCUMENTED: ICD-10-PCS | Mod: CPTII,S$GLB,, | Performed by: INTERNAL MEDICINE

## 2022-11-17 PROCEDURE — 99999 PR PBB SHADOW E&M-EST. PATIENT-LVL V: CPT | Mod: PBBFAC,,, | Performed by: INTERNAL MEDICINE

## 2022-11-17 PROCEDURE — 3079F PR MOST RECENT DIASTOLIC BLOOD PRESSURE 80-89 MM HG: ICD-10-PCS | Mod: CPTII,S$GLB,, | Performed by: INTERNAL MEDICINE

## 2022-11-17 PROCEDURE — 99215 OFFICE O/P EST HI 40 MIN: CPT | Mod: S$GLB,,, | Performed by: INTERNAL MEDICINE

## 2022-11-17 RX ORDER — METFORMIN HYDROCHLORIDE 500 MG/1
500 TABLET, EXTENDED RELEASE ORAL DAILY
COMMUNITY
Start: 2022-09-21

## 2022-11-17 RX ORDER — ERGOCALCIFEROL 1.25 MG/1
50000 CAPSULE ORAL
COMMUNITY
Start: 2022-09-21 | End: 2023-06-22

## 2022-11-17 RX ORDER — HYDROCODONE BITARTRATE AND ACETAMINOPHEN 10; 325 MG/1; MG/1
1 TABLET ORAL EVERY 6 HOURS PRN
COMMUNITY
Start: 2022-09-11 | End: 2023-08-15

## 2022-11-17 RX ORDER — IBUPROFEN 800 MG/1
800 TABLET ORAL EVERY 6 HOURS PRN
COMMUNITY
Start: 2022-09-11

## 2022-11-17 NOTE — PATIENT INSTRUCTIONS
For the chronic pancreatitis continue with the pancreas enzymes with meals.  I will look into finding a patient programs for the pancreas enzymes.      If he can attack of pain that is not as bad you can try to not eat drink liquids and take some Lortab for pain control.  Please keep track of how many severe attacks of pain again in the next 3 months until you see me for a follow-up so we can see any further action needs to be performed.  On the last ERCP I did stretch the connection between the pancreas duct, drain, in the small intestines because it was little bit narrowed and this might need to be repeated and placing another stent.    Get an x-ray today to check if that stent that was placed is out of her body or not which is what I expect.      Your platelet counts were on the lower side and there in the were trending down the last year and not at a dangerous low-level but they need to be monitored.  This can be from possible liver disease or a bone marrow issue.  He does have evidence of fatty liver disease on the scans and lab testing.  I want to recheck her liver tests today your blood counts to check on your platelet counts.  Also would like to check her INR to see how things your blood is.  These are all indicators were use to to assess for liver disease.  An appointment will be made with the hepatologist.    For pain control if you need to use Tylenol limit the amount of Tylenol to 2 g in 24 hours.  Please make sure on the amount of Tylenol, acetaminophen, is in pain control use either foods Tylenol or other medications that might contained acetaminophen.    If the pain control is not improving, a celiac nerve block can be considered to be performed.  The celiac nerve block can be performed either by me or by Dr. Campos.     Schedule an appointment with the nutritionist, a consult has been placed to expect a call from them otherwise if you do not hear within the next week please call the nutritionist  office here.  This is to discuss weight loss options to help with the pre diabetes and fatty liver disease.  For weight loss the her new medications that are injections to help with weight loss, they are called GLP-1 agonists.  You are not a candidate because of the chronic pancreatitis.    For your prediabetes follow up is her primary doctor.  If her A1c does not improve I am concerned that you might have diabetes secondary to chronic pancreatitis and also having a part of her pancreas resected.  At I would recommend seeing an endocrinologist at that point if your primary doctor agrees.

## 2022-11-17 NOTE — PROGRESS NOTES
Clinic Consult:  [facility] Consultation Note    Reason for Consult:  The primary encounter diagnosis was Fatty liver. Diagnoses of Abnormal liver enzymes, Acute on chronic pancreatitis, Chronic pancreatitis, unspecified pancreatitis type, Chronic abdominal pain, and Thrombocytopenia were also pertinent to this visit.    PCP: Rebecca Jackson       HPI:  This is a 57 y.o. male here for follow up.    Mr. 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 He would be admitted on a weekly basis with acute pancreatitis and had PD stones and dilation and ERCP did not improve his symptoms. He underwent a pancreaticojejunostomy on 11/19/2021 and symptoms improved. He started having acute episodes of pancreatitis again.   They are less severe and less frequent though than prior to his surgery.     MRCP on 5/12/2022:  Multifocal dilatation of the main pancreatic duct measuring 4-5 mm with dilatation of the side chain branches.  This is not significantly changed when compared to the prior exam.  Improved peripancreatic edema.  Please note evaluation for pancreatic mass is limited by the lack of intravenous contrast.  Recommend continued surveillance.     The spleen is enlarged.  Periportal collateral vessels.  The findings are worrisome for portal venous hypertension.     Last ERCP was performed on August 15, 2022 and I was concerned about a stricture at the pancreaticojejunostomy.  Her can balloon dilation to 4 mm was performed and a single pigtail pancreatic duct stent was placed into the ventral portion of the pancreas.  Stent has migrated out and that was seen on CT scan next day but not clear if it is outside of his mesenteric system.      He did have abdominal pain postprocedure for 3 weeks that she is finally improved after he was NPO and took a dose of Lortab then symptoms improved significantly.  Currently he does have occasional twinges of pain in the left flank  epigastrium left upper quadrant mainly.  He has been able to tolerate liquids and solids.  He is still on Creon and gets occasional diarrhea whenever he is off of Creon.  In more recently he started pancreas he is history of Creon.         ROS:  CONSTITUTIONAL: Denies weight change,  fatigue, fevers, chills, night sweats.  EYES: No changes in vision.   ENT: No oral lesions or sore throat.  HEMATOLOGICAL/Lymph: Denies bleeding tendency, bruising tendency. No swellings or enlarged lymph nodes.  CARDIOVASCULAR: Denies chest pain, shortness of breath, orthopnea and edema.  RESPIRATORY: Denies cough, hemoptysis, dyspnea, and wheezing.  GI: See HPI.  : Denies dysuria and hematuria  MUSCULOSKELETAL: Denies joint pain or swelling, back pain and muscle pain.  SKIN: Denies rashes.  NEUROLOGIC: Denies headaches, seizures and numbness.  PSYCHIATRIC: Denies depression or anxiety.  ENDOCRINE: Denies heat or cold intolerance and excessive thirst or urination.    Medical History:   Past Medical History:   Diagnosis Date    Anticoagulant long-term use     On Eliquis for DVT    GERD (gastroesophageal reflux disease)     Hypertension     Hypothyroidism 10/26/2021    Pancreatic pseudocyst     Sleep apnea     Splenic vein thrombosis        Surgical History:  Past Surgical History:   Procedure Laterality Date    CHOLECYSTECTOMY      ENDOSCOPIC ULTRASOUND OF UPPER GASTROINTESTINAL TRACT N/A 10/12/2021    Procedure: ULTRASOUND, UPPER GI TRACT, ENDOSCOPIC;  Surgeon: Odalys Leiva MD;  Location: [facility];  Service: Endoscopy;  Laterality: N/A;  Upper and linear, 22 shark core, cytology and RPMI    ERCP N/A 9/29/2021    Procedure: ERCP (ENDOSCOPIC RETROGRADE CHOLANGIOPANCREATOGRAPHY);  Surgeon: Odalys Leiva MD;  Location: [facility];  Service: Endoscopy;  Laterality: N/A;    ERCP N/A 8/15/2022    Procedure: ERCP (ENDOSCOPIC RETROGRADE CHOLANGIOPANCREATOGRAPHY);  Surgeon: Odalys Leiva MD;  Location: [facility]  ENDO;  Service: Endoscopy;  Laterality: N/A;    ERCP W/ PLASTIC STENT PLACEMENT      LATERAL PANCREATICOJEJUNOSTOMY N/A 2021    Procedure: PANCREATICOJEJUNOSTOMY, SIDE-TO-SIDE tier 1;  Surgeon: Brian Hills MD;  Location: [facility] OR [address] [place] [address];  Service: General;  Laterality: N/A;       Family History:   Family History   Problem Relation Age of Onset    Thyroid disease Mother     Cancer Father     Pancreatic cancer Father     Arthritis Father     Thyroid disease Father        Social History:   Social History     Tobacco Use    Smoking status: Former     Types: Cigarettes     Quit date: 2001     Years since quittin.2    Smokeless tobacco: Never   Substance Use Topics    Alcohol use: Not Currently    Drug use: Never       Allergies: Reviewed    Home Medications:   Medication List with Changes/Refills   Current Medications    CARVEDILOL (COREG) 6.25 MG TABLET    Take 1 tablet by mouth 2 (two) times daily with meals.    ERGOCALCIFEROL (ERGOCALCIFEROL) 50,000 UNIT CAP    Take 50,000 Units by mouth every 7 days.    HYDROCODONE-ACETAMINOPHEN (NORCO)  MG PER TABLET    Take 1 tablet by mouth every 6 (six) hours as needed.    IBUPROFEN (ADVIL,MOTRIN) 800 MG TABLET    Take 800 mg by mouth every 6 (six) hours as needed.    LIPASE-PROTEASE-AMYLASE (PANCREAZE) 16,800-56,800- 98,400 UNIT CPDR    TAKE 1 CAPSULE BY MOUTH BEFORE MEALS AND BEFORE A SNACK FOR A TOTAL OF FOUR TIMES DAILY    METFORMIN (GLUCOPHAGE-XR) 500 MG ER 24HR TABLET    Take 500 mg by mouth once daily.    MULTIVITAMIN (THERAGRAN) PER TABLET    Take 1 tablet by mouth once daily.    OXYCODONE (OXYCONTIN) 10 MG 12 HR TABLET    Take 10 mg by mouth every 12 (twelve) hours as needed for Pain.    PANTOPRAZOLE (PROTONIX) 40 MG TABLET    Take 1 tablet (40 mg total) by mouth once daily.    PREGABALIN (LYRICA) 50 MG CAPSULE    Take 2 capsules (100 mg total) by mouth every evening.    PREVIDENT 5000 BOOSTER PLUS 1.1 % PSTE    BRUSH TEETH WITH PASTE TWICE  "DAILY. SPIT OUT EXCESS. DO NOT RINSE    PROMETHAZINE (PHENERGAN) 25 MG TABLET    Take 25 mg by mouth every 6 (six) hours as needed for Nausea.         Physical Exam:  Vital Signs:  /84   Pulse 80   Ht 5' 11" (1.803 m)   Wt 100 kg (220 lb 7.4 oz)   BMI 30.75 kg/m²   Body mass index is 30.75 kg/m².      GENERAL: No acute distress, A&Ox3  EYES: Anicteric, no pallor noted.  ENT: OP clear  NECK: Supple, no masses, no thyromegally.  CHEST: Equal breath sounds bilaterally, no wheezing.  CARDIOVASCULAR: Regular rate and rhythm. Murmurs, rubs and gallops absent.  ABDOMEN: soft, non-tender, non-distended, normal bowel sounds, no hepatosplenomegaly   EXTREMITIES: No clubbing, cyanosis or edema.  SKIN: Without lesion or erythema.  LYMPH: No cervical, axillary lymphadenopathy palpable.   NEUROLOGICAL: Grossly normal, no asterixis present.    Labs: Pertinent labs reviewed.    Assessment and Plan:  Fatty liver  -     Ambulatory referral/consult to Hepatology; Future; Expected date: 11/24/2022  -     Hepatic Function Panel; Future; Expected date: 11/17/2022  -     CBC Auto Differential; Future; Expected date: 11/17/2022  -     Protime-INR; Future; Expected date: 11/17/2022  -     X-Ray KUB; Future; Expected date: 11/17/2022  -     Ambulatory referral/consult to Nutrition Services; Future; Expected date: 11/24/2022    Abnormal liver enzymes    Acute on chronic pancreatitis    Chronic pancreatitis, unspecified pancreatitis type    Chronic abdominal pain    Thrombocytopenia    For chronic pancreatitis:   We will continue with Pancrease further pancreatic enzyme replacement therapy.  For the pancreaticojejunostomy if he gets another attack will consider repeating an ERCP with further dilation of the pancreatic jejunostomy and placing a stent.  We will check KUB to assess exposure of the migrated pancreatic stent today.      His platelet counts were on the lower end and continue to decrease over the last year.  This can be " either due to cirrhosis or liver dysfunction or due to bone marrow disease.  He is going to follow up with him at follow-up with his hematologist.  We will check the rest of his liver enzymes and liver function including liver enzymes INR and repeat CBC to assess his platelets.  Consultation with hepatology was recommended in order was placed.  Imaging showed a fatty liver disease as well.  In case of liver disease I did recommend limiting Tylenol use to less than 2 g every 24 hours.      If pain control is not achieved and pain worsens nerve block can be considered that can either be performed by his pain doctor Dr. Campos or by me by EUS.    He is prediabetic and I am concerned that part of his diabetes may be secondary to his chronic pancreatitis last resection.  He is currently on metformin and ask if his A1c does not improve and he does respond he might need to see an endocrinologist as he might require insulin or further treatment and management for his diabetes or prediabetes.    Follow up in 3 months.    No follow-ups on file.        Thank you so much for allowing me to participate in the care of Fabrice Leiva MD  Gastroenterology  Director of Advanced Endoscopy at [facility]

## 2022-11-18 ENCOUNTER — PATIENT MESSAGE (OUTPATIENT)
Dept: HEPATOLOGY | Facility: CLINIC | Age: 57
End: 2022-11-18
Payer: COMMERCIAL

## 2022-11-21 ENCOUNTER — OFFICE VISIT (OUTPATIENT)
Dept: HEPATOLOGY | Facility: CLINIC | Age: 57
End: 2022-11-21
Payer: COMMERCIAL

## 2022-11-21 VITALS
HEIGHT: 71 IN | WEIGHT: 220 LBS | BODY MASS INDEX: 30.8 KG/M2 | HEART RATE: 82 BPM | SYSTOLIC BLOOD PRESSURE: 124 MMHG | DIASTOLIC BLOOD PRESSURE: 80 MMHG

## 2022-11-21 DIAGNOSIS — R79.89 ABNORMAL LFTS: Primary | ICD-10-CM

## 2022-11-21 DIAGNOSIS — K76.0 FATTY LIVER: ICD-10-CM

## 2022-11-21 PROCEDURE — 3079F DIAST BP 80-89 MM HG: CPT | Mod: CPTII,S$GLB,, | Performed by: INTERNAL MEDICINE

## 2022-11-21 PROCEDURE — 3079F PR MOST RECENT DIASTOLIC BLOOD PRESSURE 80-89 MM HG: ICD-10-PCS | Mod: CPTII,S$GLB,, | Performed by: INTERNAL MEDICINE

## 2022-11-21 PROCEDURE — 1159F MED LIST DOCD IN RCRD: CPT | Mod: CPTII,S$GLB,, | Performed by: INTERNAL MEDICINE

## 2022-11-21 PROCEDURE — 3008F PR BODY MASS INDEX (BMI) DOCUMENTED: ICD-10-PCS | Mod: CPTII,S$GLB,, | Performed by: INTERNAL MEDICINE

## 2022-11-21 PROCEDURE — 1159F PR MEDICATION LIST DOCUMENTED IN MEDICAL RECORD: ICD-10-PCS | Mod: CPTII,S$GLB,, | Performed by: INTERNAL MEDICINE

## 2022-11-21 PROCEDURE — 3008F BODY MASS INDEX DOCD: CPT | Mod: CPTII,S$GLB,, | Performed by: INTERNAL MEDICINE

## 2022-11-21 PROCEDURE — 99204 PR OFFICE/OUTPT VISIT, NEW, LEVL IV, 45-59 MIN: ICD-10-PCS | Mod: S$GLB,,, | Performed by: INTERNAL MEDICINE

## 2022-11-21 PROCEDURE — 99999 PR PBB SHADOW E&M-EST. PATIENT-LVL V: CPT | Mod: PBBFAC,,, | Performed by: INTERNAL MEDICINE

## 2022-11-21 PROCEDURE — 1160F RVW MEDS BY RX/DR IN RCRD: CPT | Mod: CPTII,S$GLB,, | Performed by: INTERNAL MEDICINE

## 2022-11-21 PROCEDURE — 99999 PR PBB SHADOW E&M-EST. PATIENT-LVL V: ICD-10-PCS | Mod: PBBFAC,,, | Performed by: INTERNAL MEDICINE

## 2022-11-21 PROCEDURE — 3074F SYST BP LT 130 MM HG: CPT | Mod: CPTII,S$GLB,, | Performed by: INTERNAL MEDICINE

## 2022-11-21 PROCEDURE — 99204 OFFICE O/P NEW MOD 45 MIN: CPT | Mod: S$GLB,,, | Performed by: INTERNAL MEDICINE

## 2022-11-21 PROCEDURE — 3074F PR MOST RECENT SYSTOLIC BLOOD PRESSURE < 130 MM HG: ICD-10-PCS | Mod: CPTII,S$GLB,, | Performed by: INTERNAL MEDICINE

## 2022-11-21 PROCEDURE — 1160F PR REVIEW ALL MEDS BY PRESCRIBER/CLIN PHARMACIST DOCUMENTED: ICD-10-PCS | Mod: CPTII,S$GLB,, | Performed by: INTERNAL MEDICINE

## 2022-11-21 NOTE — PROGRESS NOTES
Subjective:     Fabrice Zmaorano is here for evaluation of abnormal LFTs    History of Present Illness:  Fabrice Zamorano is a 57-year-old male with recurrent acute on chronic pancreatitis episodes.  He had pancreatic go jejunostomy in 11/2021, even though he reports episodes of pancreatitis since then the intensity and frequency is not as severe as it used to be prior to surgery.  He had hospitalization in 3/2022 with an acute episode of pancreatitis, where he had elevated transaminases and total bilirubin along with a decreased platelet count.  CT scan of the abdomen at that time showed no splenomegaly, does have fatty infiltration of liver.  He admits to eating grits every day, also bread, as these are the diet that he can tolerate with pancreatitis.  Since March of 2022 hospitalization, transaminases have been trending down, also platelet count has improved.     Duration of abnormality- 2022  Medications/OTC/Herbal-denies  ETOH- occasional  Metabolic issues- pre diabetes  BMI-30  Family Hx-no      Review of Systems   Constitutional:  Negative for fatigue, fever and unexpected weight change.   Gastrointestinal:  Positive for abdominal pain. Negative for abdominal distention, blood in stool, nausea and vomiting.   Musculoskeletal:  Negative for arthralgias and gait problem.   Skin:  Negative for pallor and rash.   Neurological:  Negative for dizziness.   Hematological:  Does not bruise/bleed easily.   Psychiatric/Behavioral:  Negative for confusion, hallucinations and sleep disturbance.      Objective:     Physical Exam  Vitals and nursing note reviewed.   Constitutional:       Appearance: Normal appearance.   Eyes:      General: No scleral icterus.  Cardiovascular:      Heart sounds: No murmur heard.  Pulmonary:      Effort: Pulmonary effort is normal.      Breath sounds: No wheezing, rhonchi or rales.   Abdominal:      General: Bowel sounds are normal. There is no distension.      Palpations: There is no mass.       Tenderness: There is abdominal tenderness.   Musculoskeletal:         General: No tenderness.      Right lower leg: No edema.      Left lower leg: No edema.   Lymphadenopathy:      Cervical: No cervical adenopathy.   Skin:     Coloration: Skin is not jaundiced.      Findings: No bruising or rash.   Neurological:      Mental Status: He is alert and oriented to person, place, and time.      Coordination: Coordination normal.   Psychiatric:         Behavior: Behavior normal.         Thought Content: Thought content normal.       Computed MELD-Na score unavailable. Necessary lab results were not found in the last year.  Computed MELD score unavailable. Necessary lab results were not found in the last year.    WBC   Date Value Ref Range Status   11/17/2022 7.30 3.90 - 12.70 K/uL Final     Hemoglobin   Date Value Ref Range Status   11/17/2022 15.6 14.0 - 18.0 g/dL Final     Hematocrit   Date Value Ref Range Status   11/17/2022 48.7 40.0 - 54.0 % Final     Platelets   Date Value Ref Range Status   11/17/2022 166 150 - 450 K/uL Final     BUN   Date Value Ref Range Status   08/17/2022 11 6 - 20 mg/dL Final     Creatinine   Date Value Ref Range Status   08/17/2022 0.9 0.5 - 1.4 mg/dL Final     Glucose   Date Value Ref Range Status   08/17/2022 97 70 - 110 mg/dL Final     Calcium   Date Value Ref Range Status   08/17/2022 8.4 (L) 8.7 - 10.5 mg/dL Final     Sodium   Date Value Ref Range Status   08/17/2022 137 136 - 145 mmol/L Final     Potassium   Date Value Ref Range Status   08/17/2022 4.0 3.5 - 5.1 mmol/L Final     Chloride   Date Value Ref Range Status   08/17/2022 104 95 - 110 mmol/L Final     Magnesium   Date Value Ref Range Status   08/17/2022 1.8 1.6 - 2.6 mg/dL Final     AST   Date Value Ref Range Status   11/17/2022 33 10 - 40 U/L Final     ALT   Date Value Ref Range Status   11/17/2022 79 (H) 10 - 44 U/L Final     Alkaline Phosphatase   Date Value Ref Range Status   11/17/2022 112 55 - 135 U/L Final     Total  Bilirubin   Date Value Ref Range Status   11/17/2022 0.5 0.1 - 1.0 mg/dL Final     Comment:     For infants and newborns, interpretation of results should be based  on gestational age, weight and in agreement with clinical  observations.    Premature Infant recommended reference ranges:  Up to 24 hours.............<8.0 mg/dL  Up to 48 hours............<12.0 mg/dL  3-5 days..................<15.0 mg/dL  6-29 days.................<15.0 mg/dL       Albumin   Date Value Ref Range Status   11/17/2022 4.4 3.5 - 5.2 g/dL Final     INR   Date Value Ref Range Status   11/17/2022 1.0 0.8 - 1.2 Final     Comment:     Coumadin Therapy:  2.0 - 3.0 for INR for all indicators except mechanical heart valves  and antiphospholipid syndromes which should use 2.5 - 3.5.           Assessment/Plan:     1. Abnormal LFTs  US Elastography Liver    US Abdomen Limited    Comprehensive Metabolic Panel    CBC Without Differential    Anti-Smooth Muscle Antibody    Immunoglobulins (IgG, IgA, IgM) Quantitative    Immunoglobulin G Subclass 4    JESI Screen w/Reflex    Antimitochondrial Antibody    Hepatitis B Surface Antigen    Hepatitis B Core Antibody, Total    Hepatitis B Surface Ab, Qualitative      2. Fatty liver  Ambulatory referral/consult to Hepatology    Comprehensive Metabolic Panel    CBC Without Differential    Anti-Smooth Muscle Antibody    Immunoglobulins (IgG, IgA, IgM) Quantitative    Immunoglobulin G Subclass 4    JESI Screen w/Reflex    Antimitochondrial Antibody    Hepatitis B Surface Antigen    Hepatitis B Core Antibody, Total    Hepatitis B Surface Ab, Qualitative          1.Abnormal LFTs:  I suspect elevated liver enzymes are secondary to acute and recurrent inflammation of pancreas, however underlying fatty liver disease or other etiology can not be ruled out, will initiate workup to rule out infectious/autoimmune disorders of the liver, also will obtain a FibroScan to assess steatosis and fibrosis.  We will obtain ultrasound  of the abdomen to assess splenomegaly, if he does not have significant fibrosis I doubt thrombocytopenia is secondary to liver pathology.      Will see in clinic in a month with work up      I have reviewed existing labs, imaging, procedures. Educated patient about disease process, prognosis. Ordered required labs, images and discussed treatment plan.     Anyi Norris MD  Transplant Hepatologist  Dept of Hepatology, [place] [facility]

## 2022-11-30 ENCOUNTER — PATIENT MESSAGE (OUTPATIENT)
Dept: GASTROENTEROLOGY | Facility: CLINIC | Age: 57
End: 2022-11-30
Payer: COMMERCIAL

## 2022-12-02 ENCOUNTER — OFFICE VISIT (OUTPATIENT)
Dept: PAIN MEDICINE | Facility: CLINIC | Age: 57
End: 2022-12-02
Payer: COMMERCIAL

## 2022-12-02 VITALS
HEIGHT: 71 IN | BODY MASS INDEX: 30.17 KG/M2 | DIASTOLIC BLOOD PRESSURE: 87 MMHG | SYSTOLIC BLOOD PRESSURE: 129 MMHG | WEIGHT: 215.5 LBS | HEART RATE: 75 BPM

## 2022-12-02 DIAGNOSIS — K85.90 ACUTE ON CHRONIC PANCREATITIS: ICD-10-CM

## 2022-12-02 DIAGNOSIS — R74.8 ABNORMAL LIVER ENZYMES: ICD-10-CM

## 2022-12-02 DIAGNOSIS — K86.1 CHRONIC PANCREATITIS, UNSPECIFIED PANCREATITIS TYPE: Primary | ICD-10-CM

## 2022-12-02 DIAGNOSIS — G89.4 CHRONIC PAIN SYNDROME: ICD-10-CM

## 2022-12-02 DIAGNOSIS — R11.0 NAUSEA: ICD-10-CM

## 2022-12-02 DIAGNOSIS — K86.1 ACUTE ON CHRONIC PANCREATITIS: ICD-10-CM

## 2022-12-02 PROCEDURE — 3074F SYST BP LT 130 MM HG: CPT | Mod: CPTII,S$GLB,, | Performed by: ANESTHESIOLOGY

## 2022-12-02 PROCEDURE — 3074F PR MOST RECENT SYSTOLIC BLOOD PRESSURE < 130 MM HG: ICD-10-PCS | Mod: CPTII,S$GLB,, | Performed by: ANESTHESIOLOGY

## 2022-12-02 PROCEDURE — 99213 PR OFFICE/OUTPT VISIT, EST, LEVL III, 20-29 MIN: ICD-10-PCS | Mod: S$GLB,,, | Performed by: ANESTHESIOLOGY

## 2022-12-02 PROCEDURE — 3079F DIAST BP 80-89 MM HG: CPT | Mod: CPTII,S$GLB,, | Performed by: ANESTHESIOLOGY

## 2022-12-02 PROCEDURE — 3008F PR BODY MASS INDEX (BMI) DOCUMENTED: ICD-10-PCS | Mod: CPTII,S$GLB,, | Performed by: ANESTHESIOLOGY

## 2022-12-02 PROCEDURE — 99999 PR PBB SHADOW E&M-EST. PATIENT-LVL III: CPT | Mod: PBBFAC,,, | Performed by: ANESTHESIOLOGY

## 2022-12-02 PROCEDURE — 3008F BODY MASS INDEX DOCD: CPT | Mod: CPTII,S$GLB,, | Performed by: ANESTHESIOLOGY

## 2022-12-02 PROCEDURE — 99999 PR PBB SHADOW E&M-EST. PATIENT-LVL III: ICD-10-PCS | Mod: PBBFAC,,, | Performed by: ANESTHESIOLOGY

## 2022-12-02 PROCEDURE — 3079F PR MOST RECENT DIASTOLIC BLOOD PRESSURE 80-89 MM HG: ICD-10-PCS | Mod: CPTII,S$GLB,, | Performed by: ANESTHESIOLOGY

## 2022-12-02 PROCEDURE — 99213 OFFICE O/P EST LOW 20 MIN: CPT | Mod: S$GLB,,, | Performed by: ANESTHESIOLOGY

## 2022-12-02 PROCEDURE — 1159F MED LIST DOCD IN RCRD: CPT | Mod: CPTII,S$GLB,, | Performed by: ANESTHESIOLOGY

## 2022-12-02 PROCEDURE — 1159F PR MEDICATION LIST DOCUMENTED IN MEDICAL RECORD: ICD-10-PCS | Mod: CPTII,S$GLB,, | Performed by: ANESTHESIOLOGY

## 2022-12-02 RX ORDER — PROMETHAZINE HYDROCHLORIDE 25 MG/1
25 TABLET ORAL EVERY 6 HOURS PRN
Qty: 30 TABLET | Refills: 0 | Status: SHIPPED | OUTPATIENT
Start: 2022-12-02

## 2022-12-02 RX ORDER — PREGABALIN 50 MG/1
100 CAPSULE ORAL NIGHTLY
Qty: 60 CAPSULE | Refills: 1 | Status: SHIPPED | OUTPATIENT
Start: 2022-12-02 | End: 2023-02-09 | Stop reason: SDUPTHER

## 2022-12-02 NOTE — PROGRESS NOTES
Interventional Pain Progress Note       Referring Physician: No ref. provider found    PCP: Rebecca Jackson MD    Chief Complaint:   Chief Complaint   Patient presents with    Abdominal Pain       Interval History (12/02/2022):  Patient returns to clinic for evaluation of abdominal pain.  Patient reports that since last being seen his pain has overall improved.  He reports having 1 episode of pancreatitis pain every 3-4 weeks that lasts approximately 24-36 hours.  He also reports mild epigastric burning pain with food or liquids, that is typically relieved with standing from food.  Pain is currently rated at 0/10.       SUBJECTIVE:    Fabrice Zamorano is a 57 y.o. male who presents to the clinic for the evaluation of  abdominal pain. The pain started 6 years ago followingsymptoms have been worsening.The pain is located in the RUQ and radiate in band across his abdomen to his thoracic spine and to his right shoulder.The pain is described as aching, crushing, dull, sharp, shooting, stabbing and tight band and is rated as 5/10. The pain is rated with a score of  2/10 on the BEST day and a score of 10/10 on the WORST day.  Symptoms interfere with daily activity. The pain is exacerbated by eating a mean.  The pain is mitigated by fasting.         Non-Pharmacologic Treatments:  Physical Therapy/Home Exercise: no  Ice/Heat:no  TENS: no  Acupuncture: no  Massage: no  Chiropractic: no        Previous Pain Medications:  Tylenol, ASA, Percocet     report:  Reviewed and consistent with medication use as prescribed.    Pain Procedures:   None      Imaging:   X-Ray KUB  Narrative: EXAMINATION:  XR KUB    CLINICAL HISTORY:  To assess pancreas stent migration;Fatty (change of) liver, not elsewhere classified    TECHNIQUE:  Single AP supine view of the abdomen (KUB) was performed    COMPARISON:  08/29/2022    FINDINGS:  No evidence of high-grade bowel obstruction is present.  No definitive evidence nephroureterolithiasis.   Reported pancreatic stent is not well visualized by radiograph.  Moderate amount of stool is noted overlying the colon concerning for constipation.  Degenerative changes of the spine.  Impression: No evidence of high-grade bowel obstruction.    Possible constipation.    No definitive evidence of radiopaque density to suggest pancreatic stent.  Consider CT for further evaluation if warranted.    Electronically signed by: Jadiel Thomas  Date:    2022  Time:    10:07          Past Medical History:   Diagnosis Date    Anticoagulant long-term use     On Eliquis for DVT    GERD (gastroesophageal reflux disease)     Hypertension     Hypothyroidism 10/26/2021    Pancreatic pseudocyst     Sleep apnea     Splenic vein thrombosis      Past Surgical History:   Procedure Laterality Date    CHOLECYSTECTOMY      ENDOSCOPIC ULTRASOUND OF UPPER GASTROINTESTINAL TRACT N/A 10/12/2021    Procedure: ULTRASOUND, UPPER GI TRACT, ENDOSCOPIC;  Surgeon: Odalys Leiva MD;  Location: [facility];  Service: Endoscopy;  Laterality: N/A;  Upper and linear, 22 shark core, cytology and RPMI    ERCP N/A 2021    Procedure: ERCP (ENDOSCOPIC RETROGRADE CHOLANGIOPANCREATOGRAPHY);  Surgeon: Odalys Leiva MD;  Location: [facility];  Service: Endoscopy;  Laterality: N/A;    ERCP N/A 8/15/2022    Procedure: ERCP (ENDOSCOPIC RETROGRADE CHOLANGIOPANCREATOGRAPHY);  Surgeon: Odalys Leiva MD;  Location: [facility];  Service: Endoscopy;  Laterality: N/A;    ERCP W/ PLASTIC STENT PLACEMENT      LATERAL PANCREATICOJEJUNOSTOMY N/A 2021    Procedure: PANCREATICOJEJUNOSTOMY, SIDE-TO-SIDE tier 1;  Surgeon: Brian Hills MD;  Location: [address];  Service: General;  Laterality: N/A;     Social History     Socioeconomic History    Marital status:    Tobacco Use    Smoking status: Former     Types: Cigarettes     Quit date: 2001     Years since quittin.3    Smokeless tobacco: Never   Substance  and Sexual Activity    Alcohol use: Not Currently    Drug use: Never    Sexual activity: Yes     Family History   Problem Relation Age of Onset    Thyroid disease Mother     Cancer Father     Pancreatic cancer Father     Arthritis Father     Thyroid disease Father        Review of patient's allergies indicates:  No Known Allergies    Current Outpatient Medications   Medication Sig    carvediloL (COREG) 6.25 MG tablet Take 1 tablet by mouth 2 (two) times daily with meals.    ergocalciferol (ERGOCALCIFEROL) 50,000 unit Cap Take 50,000 Units by mouth every 7 days.    HYDROcodone-acetaminophen (NORCO)  mg per tablet Take 1 tablet by mouth every 6 (six) hours as needed.    ibuprofen (ADVIL,MOTRIN) 800 MG tablet Take 800 mg by mouth every 6 (six) hours as needed.    lipase-protease-amylase (PANCREAZE) 16,800-56,800- 98,400 unit CpDR TAKE 1 CAPSULE BY MOUTH BEFORE MEALS AND BEFORE A SNACK FOR A TOTAL OF FOUR TIMES DAILY    metFORMIN (GLUCOPHAGE-XR) 500 MG ER 24hr tablet Take 500 mg by mouth once daily.    multivitamin (THERAGRAN) per tablet Take 1 tablet by mouth once daily.    oxyCODONE (OXYCONTIN) 10 mg 12 hr tablet Take 10 mg by mouth every 12 (twelve) hours as needed for Pain.    pantoprazole (PROTONIX) 40 MG tablet TAKE 1 TABLET(40 MG) BY MOUTH EVERY DAY    PREVIDENT 5000 BOOSTER PLUS 1.1 % Pste BRUSH TEETH WITH PASTE TWICE DAILY. SPIT OUT EXCESS. DO NOT RINSE    pregabalin (LYRICA) 50 MG capsule Take 2 capsules (100 mg total) by mouth every evening.    promethazine (PHENERGAN) 25 MG tablet Take 1 tablet (25 mg total) by mouth every 6 (six) hours as needed for Nausea.     No current facility-administered medications for this visit.         ROS  Review of Systems   Constitutional:  Positive for appetite change. Negative for activity change, fatigue and fever.   HENT:  Negative for sore throat.    Respiratory:  Negative for cough, chest tightness, shortness of breath, wheezing and stridor.    Cardiovascular:   "Positive for leg swelling. Negative for chest pain and palpitations.   Gastrointestinal:  Positive for abdominal pain, blood in stool and constipation. Negative for diarrhea, nausea and vomiting.   Endocrine: Negative for polydipsia, polyphagia and polyuria.   Genitourinary:  Negative for dysuria, hematuria and urgency.   Musculoskeletal:  Negative for arthralgias, back pain, gait problem, joint swelling, myalgias, neck pain and neck stiffness.   Skin:  Negative for rash.   Allergic/Immunologic: Negative for food allergies.   Neurological:  Negative for dizziness, tremors, seizures, syncope, facial asymmetry, speech difficulty, weakness, light-headedness, numbness and headaches.   Psychiatric/Behavioral:  Negative for agitation, hallucinations, self-injury and suicidal ideas. The patient is not nervous/anxious and is not hyperactive.           OBJECTIVE:    /87   Pulse 75   Ht 5' 11" (1.803 m)   Wt 97.7 kg (215 lb 8 oz)   BMI 30.06 kg/m²         Physical Exam  Constitutional:       General: He is not in acute distress.     Appearance: Normal appearance. He is not ill-appearing.   HENT:      Head: Normocephalic and atraumatic.      Nose: No congestion or rhinorrhea.      Mouth/Throat:      Mouth: Mucous membranes are moist.   Eyes:      Extraocular Movements: Extraocular movements intact.      Pupils: Pupils are equal, round, and reactive to light.   Cardiovascular:      Pulses: Normal pulses.   Pulmonary:      Effort: Pulmonary effort is normal.   Abdominal:      General: Abdomen is flat.      Palpations: Abdomen is soft.      Tenderness: There is abdominal tenderness (Tenderness to deep palpation over the right upper quadrant).   Musculoskeletal:      Cervical back: Normal range of motion and neck supple.   Skin:     General: Skin is warm and dry.      Capillary Refill: Capillary refill takes less than 2 seconds.   Neurological:      General: No focal deficit present.      Mental Status: He is alert and " oriented to person, place, and time.      Cranial Nerves: No cranial nerve deficit.      Sensory: No sensory deficit.      Motor: No weakness.      Gait: Gait normal.   Psychiatric:         Mood and Affect: Mood normal.         Behavior: Behavior normal.         Thought Content: Thought content normal.             LABS:  Lab Results   Component Value Date    WBC 7.30 11/17/2022    HGB 15.6 11/17/2022    HCT 48.7 11/17/2022    MCV 93 11/17/2022     11/17/2022       CMP  Sodium   Date Value Ref Range Status   08/17/2022 137 136 - 145 mmol/L Final     Potassium   Date Value Ref Range Status   08/17/2022 4.0 3.5 - 5.1 mmol/L Final     Chloride   Date Value Ref Range Status   08/17/2022 104 95 - 110 mmol/L Final     CO2   Date Value Ref Range Status   08/17/2022 23 23 - 29 mmol/L Final     Glucose   Date Value Ref Range Status   08/17/2022 97 70 - 110 mg/dL Final     BUN   Date Value Ref Range Status   08/17/2022 11 6 - 20 mg/dL Final     Creatinine   Date Value Ref Range Status   08/17/2022 0.9 0.5 - 1.4 mg/dL Final     Calcium   Date Value Ref Range Status   08/17/2022 8.4 (L) 8.7 - 10.5 mg/dL Final     Total Protein   Date Value Ref Range Status   11/17/2022 7.1 6.0 - 8.4 g/dL Final     Albumin   Date Value Ref Range Status   11/17/2022 4.4 3.5 - 5.2 g/dL Final     Total Bilirubin   Date Value Ref Range Status   11/17/2022 0.5 0.1 - 1.0 mg/dL Final     Comment:     For infants and newborns, interpretation of results should be based  on gestational age, weight and in agreement with clinical  observations.    Premature Infant recommended reference ranges:  Up to 24 hours.............<8.0 mg/dL  Up to 48 hours............<12.0 mg/dL  3-5 days..................<15.0 mg/dL  6-29 days.................<15.0 mg/dL       Alkaline Phosphatase   Date Value Ref Range Status   11/17/2022 112 55 - 135 U/L Final     AST   Date Value Ref Range Status   11/17/2022 33 10 - 40 U/L Final     ALT   Date Value Ref Range Status    11/17/2022 79 (H) 10 - 44 U/L Final     Anion Gap   Date Value Ref Range Status   08/17/2022 10 8 - 16 mmol/L Final     eGFR if    Date Value Ref Range Status   07/07/2022 >60.0 >60 mL/min/1.73 m^2 Final     eGFR if non    Date Value Ref Range Status   07/07/2022 >60.0 >60 mL/min/1.73 m^2 Final     Comment:     Calculation used to obtain the estimated glomerular filtration  rate (eGFR) is the CKD-EPI equation.          No results found for: LABA1C, HGBA1C          ASSESSMENT:       57 y.o. year old male with abdominal pain, consistent with     1. Chronic pancreatitis, unspecified pancreatitis type  pregabalin (LYRICA) 50 MG capsule      2. Nausea  promethazine (PHENERGAN) 25 MG tablet      3. Acute on chronic pancreatitis  pregabalin (LYRICA) 50 MG capsule      4. Abnormal liver enzymes        5. Chronic pain syndrome          Chronic pancreatitis, unspecified pancreatitis type  -     pregabalin (LYRICA) 50 MG capsule; Take 2 capsules (100 mg total) by mouth every evening.  Dispense: 60 capsule; Refill: 1    Nausea  -     promethazine (PHENERGAN) 25 MG tablet; Take 1 tablet (25 mg total) by mouth every 6 (six) hours as needed for Nausea.  Dispense: 30 tablet; Refill: 0    Acute on chronic pancreatitis  -     pregabalin (LYRICA) 50 MG capsule; Take 2 capsules (100 mg total) by mouth every evening.  Dispense: 60 capsule; Refill: 1    Abnormal liver enzymes    Chronic pain syndrome           PLAN:   - Interventions:    None at this time.  Can consider celiac plexus block if pain worsens.  - Anticoagulation use:   no no anticoagulation    - Medications:   Refilled Lyrica 100 mg at night   Refill promethazine 25 mg as needed for nausea  - Imaging:   CT abdomen reviewed    - Consults/Referrals:   Continue follow-up with GI  and hepatology.         - Counseled patient regarding the importance of activity modification    - Patient Questions: Answered all of the patient's questions regarding  diagnosis, therapy, and treatment    - Follow up visit: return to clinic PRN        The above plan and management options were discussed at length with patient. Patient is in agreement with the above and verbalized understanding.    I discussed the goals of interventional chronic pain management with the patient on today's visit.  I explained the utility of injections for diagnostic and therapeutic purposes.  We discussed a multimodal approach to pain including treating the patient's given worst pain at any given time.  We will use a systematic approach to addressing pain.  We will also adopt a multimodal approach that includes injections, adjuvant medications, physical therapy, at times psychiatry.  There may be a limited role for opioid use intermittently in the treatment of pain, more particularly for acute pain although no one approach can be used as a sole treatment modality.    I emphasized the importance of regular exercise, core strengthening and stretching, diet and weight loss as a cornerstone of long-term pain management.      Mc Campos MD  Interventional Pain Management  [facility]    Disclaimer:  This note was prepared using voice recognition system and is likely to have sound alike errors that may have been overlooked even after proof reading.  Please call me with any questions

## 2022-12-10 ENCOUNTER — PATIENT MESSAGE (OUTPATIENT)
Dept: GASTROENTEROLOGY | Facility: CLINIC | Age: 57
End: 2022-12-10
Payer: COMMERCIAL

## 2022-12-12 ENCOUNTER — TELEPHONE (OUTPATIENT)
Dept: HEMATOLOGY/ONCOLOGY | Facility: CLINIC | Age: 57
End: 2022-12-12
Payer: COMMERCIAL

## 2022-12-12 NOTE — TELEPHONE ENCOUNTER
Spoke to pt informed Hematology appt not needed at this time at all labs are normal follow up with PCP.  Pt verbalized understanding.

## 2022-12-21 ENCOUNTER — PROCEDURE VISIT (OUTPATIENT)
Dept: HEPATOLOGY | Facility: CLINIC | Age: 57
End: 2022-12-21
Attending: INTERNAL MEDICINE
Payer: COMMERCIAL

## 2022-12-21 ENCOUNTER — HOSPITAL ENCOUNTER (OUTPATIENT)
Dept: RADIOLOGY | Facility: HOSPITAL | Age: 57
Discharge: HOME | End: 2022-12-21
Attending: INTERNAL MEDICINE
Payer: COMMERCIAL

## 2022-12-21 VITALS
HEART RATE: 76 BPM | SYSTOLIC BLOOD PRESSURE: 130 MMHG | HEIGHT: 71 IN | WEIGHT: 216.06 LBS | BODY MASS INDEX: 30.25 KG/M2 | DIASTOLIC BLOOD PRESSURE: 86 MMHG

## 2022-12-21 DIAGNOSIS — R79.89 ABNORMAL LFTS: ICD-10-CM

## 2022-12-21 PROCEDURE — 76981 USE PARENCHYMA: CPT | Mod: S$GLB,,, | Performed by: NURSE PRACTITIONER

## 2022-12-21 PROCEDURE — 76705 US ABDOMEN LIMITED: ICD-10-PCS | Mod: 26,,, | Performed by: STUDENT IN AN ORGANIZED HEALTH CARE EDUCATION/TRAINING PROGRAM

## 2022-12-21 PROCEDURE — 76705 ECHO EXAM OF ABDOMEN: CPT | Mod: TC

## 2022-12-21 PROCEDURE — 76705 ECHO EXAM OF ABDOMEN: CPT | Mod: 26,,, | Performed by: STUDENT IN AN ORGANIZED HEALTH CARE EDUCATION/TRAINING PROGRAM

## 2022-12-21 PROCEDURE — 76981 PR US, ELASTOGRAPHY, PARENCHYMA: ICD-10-PCS | Mod: S$GLB,,, | Performed by: NURSE PRACTITIONER

## 2022-12-21 NOTE — PROGRESS NOTES
Patient presented in clinic today for fibroscan examination of their liver. Patient verbalized that they have fasted 4 hours prior to their examination. Patient denies having any active implantable metal devices; such as a pacemaker, defibrillator, or pump.     EDWIGE MelissaN, RN   Registered Nurse Lead- Hepatology   [facility]

## 2022-12-21 NOTE — PROCEDURES
Fibroscan Procedure     Name: Fabrice Zamorano  Date of Procedure : 2022   :: JUAN Robles  Diagnosis: NAFLD    Probe: M    Fibroscan readin.4 KPa    Fibrosis:F 0-1     CAP readin dB/m    Steatosis: :S3       Interpretation:   Severe steatosis with mild fibrosis

## 2022-12-22 ENCOUNTER — PATIENT MESSAGE (OUTPATIENT)
Dept: HEPATOLOGY | Facility: CLINIC | Age: 57
End: 2022-12-22
Payer: COMMERCIAL

## 2022-12-22 ENCOUNTER — TELEPHONE (OUTPATIENT)
Dept: HEPATOLOGY | Facility: CLINIC | Age: 57
End: 2022-12-22
Payer: COMMERCIAL

## 2022-12-22 NOTE — TELEPHONE ENCOUNTER
Portal message has been sent to patient in regards to test results.     EDWIGE MelissaN, RN  Lead Registered Nurse, Hepatology  [facility]

## 2022-12-22 NOTE — TELEPHONE ENCOUNTER
----- Message from JUAN Mauricio sent at 12/21/2022  9:39 AM CST -----  Severe steatosis with mild fibrosis

## 2022-12-23 ENCOUNTER — TELEPHONE (OUTPATIENT)
Dept: HEPATOLOGY | Facility: CLINIC | Age: 57
End: 2022-12-23
Payer: COMMERCIAL

## 2022-12-23 NOTE — TELEPHONE ENCOUNTER
Spoke with patient on [phone] to advise of results.    ----- Message from JUAN Mauricio sent at 12/21/2022  9:39 AM CST -----  Severe steatosis with mild fibrosis

## 2022-12-30 ENCOUNTER — PATIENT MESSAGE (OUTPATIENT)
Dept: HEPATOLOGY | Facility: CLINIC | Age: 57
End: 2022-12-30
Payer: COMMERCIAL

## 2023-01-06 ENCOUNTER — LAB VISIT (OUTPATIENT)
Dept: LAB | Facility: HOSPITAL | Age: 58
End: 2023-01-06
Attending: INTERNAL MEDICINE
Payer: COMMERCIAL

## 2023-01-06 ENCOUNTER — NUTRITION (OUTPATIENT)
Dept: INTERNAL MEDICINE | Facility: CLINIC | Age: 58
End: 2023-01-06
Payer: COMMERCIAL

## 2023-01-06 ENCOUNTER — OFFICE VISIT (OUTPATIENT)
Dept: HEPATOLOGY | Facility: CLINIC | Age: 58
End: 2023-01-06
Payer: COMMERCIAL

## 2023-01-06 VITALS
BODY MASS INDEX: 29.44 KG/M2 | SYSTOLIC BLOOD PRESSURE: 134 MMHG | WEIGHT: 210.31 LBS | DIASTOLIC BLOOD PRESSURE: 80 MMHG | HEIGHT: 71 IN | HEART RATE: 82 BPM

## 2023-01-06 DIAGNOSIS — R79.89 ABNORMAL LFTS: ICD-10-CM

## 2023-01-06 DIAGNOSIS — R79.89 ABNORMAL LFTS: Primary | ICD-10-CM

## 2023-01-06 DIAGNOSIS — Z71.3 DIETARY COUNSELING: Primary | ICD-10-CM

## 2023-01-06 DIAGNOSIS — I81 PORTAL VEIN THROMBOSIS: ICD-10-CM

## 2023-01-06 DIAGNOSIS — K76.0 FATTY LIVER: ICD-10-CM

## 2023-01-06 LAB
ALBUMIN SERPL BCP-MCNC: 4.2 G/DL (ref 3.5–5.2)
ALP SERPL-CCNC: 82 U/L (ref 55–135)
ALT SERPL W/O P-5'-P-CCNC: 26 U/L (ref 10–44)
ANION GAP SERPL CALC-SCNC: 7 MMOL/L (ref 8–16)
AST SERPL-CCNC: 23 U/L (ref 10–40)
BILIRUB SERPL-MCNC: 0.7 MG/DL (ref 0.1–1)
BUN SERPL-MCNC: 17 MG/DL (ref 6–20)
CALCIUM SERPL-MCNC: 9.5 MG/DL (ref 8.7–10.5)
CHLORIDE SERPL-SCNC: 103 MMOL/L (ref 95–110)
CO2 SERPL-SCNC: 27 MMOL/L (ref 23–29)
CREAT SERPL-MCNC: 1 MG/DL (ref 0.5–1.4)
EST. GFR  (NO RACE VARIABLE): >60 ML/MIN/1.73 M^2
GLUCOSE SERPL-MCNC: 79 MG/DL (ref 70–110)
POTASSIUM SERPL-SCNC: 4.1 MMOL/L (ref 3.5–5.1)
PROT SERPL-MCNC: 7 G/DL (ref 6–8.4)
SODIUM SERPL-SCNC: 137 MMOL/L (ref 136–145)

## 2023-01-06 PROCEDURE — 1159F PR MEDICATION LIST DOCUMENTED IN MEDICAL RECORD: ICD-10-PCS | Mod: CPTII,S$GLB,, | Performed by: INTERNAL MEDICINE

## 2023-01-06 PROCEDURE — 97802 MEDICAL NUTRITION INDIV IN: CPT | Mod: S$GLB,,, | Performed by: DIETITIAN, REGISTERED

## 2023-01-06 PROCEDURE — 1159F MED LIST DOCD IN RCRD: CPT | Mod: CPTII,S$GLB,, | Performed by: INTERNAL MEDICINE

## 2023-01-06 PROCEDURE — 99214 OFFICE O/P EST MOD 30 MIN: CPT | Mod: S$GLB,,, | Performed by: INTERNAL MEDICINE

## 2023-01-06 PROCEDURE — 1160F PR REVIEW ALL MEDS BY PRESCRIBER/CLIN PHARMACIST DOCUMENTED: ICD-10-PCS | Mod: CPTII,S$GLB,, | Performed by: INTERNAL MEDICINE

## 2023-01-06 PROCEDURE — 3008F BODY MASS INDEX DOCD: CPT | Mod: CPTII,S$GLB,, | Performed by: INTERNAL MEDICINE

## 2023-01-06 PROCEDURE — 36415 COLL VENOUS BLD VENIPUNCTURE: CPT | Performed by: INTERNAL MEDICINE

## 2023-01-06 PROCEDURE — 99999 PR PBB SHADOW E&M-EST. PATIENT-LVL IV: CPT | Mod: PBBFAC,,, | Performed by: INTERNAL MEDICINE

## 2023-01-06 PROCEDURE — 1160F RVW MEDS BY RX/DR IN RCRD: CPT | Mod: CPTII,S$GLB,, | Performed by: INTERNAL MEDICINE

## 2023-01-06 PROCEDURE — 99214 PR OFFICE/OUTPT VISIT, EST, LEVL IV, 30-39 MIN: ICD-10-PCS | Mod: S$GLB,,, | Performed by: INTERNAL MEDICINE

## 2023-01-06 PROCEDURE — 3079F PR MOST RECENT DIASTOLIC BLOOD PRESSURE 80-89 MM HG: ICD-10-PCS | Mod: CPTII,S$GLB,, | Performed by: INTERNAL MEDICINE

## 2023-01-06 PROCEDURE — 3008F PR BODY MASS INDEX (BMI) DOCUMENTED: ICD-10-PCS | Mod: CPTII,S$GLB,, | Performed by: INTERNAL MEDICINE

## 2023-01-06 PROCEDURE — 80053 COMPREHEN METABOLIC PANEL: CPT | Performed by: INTERNAL MEDICINE

## 2023-01-06 PROCEDURE — 99999 PR PBB SHADOW E&M-EST. PATIENT-LVL IV: ICD-10-PCS | Mod: PBBFAC,,, | Performed by: INTERNAL MEDICINE

## 2023-01-06 PROCEDURE — 97802 PR MED NUTR THER, 1ST, INDIV, EA 15 MIN: ICD-10-PCS | Mod: S$GLB,,, | Performed by: DIETITIAN, REGISTERED

## 2023-01-06 PROCEDURE — 3075F SYST BP GE 130 - 139MM HG: CPT | Mod: CPTII,S$GLB,, | Performed by: INTERNAL MEDICINE

## 2023-01-06 PROCEDURE — 3079F DIAST BP 80-89 MM HG: CPT | Mod: CPTII,S$GLB,, | Performed by: INTERNAL MEDICINE

## 2023-01-06 PROCEDURE — 3075F PR MOST RECENT SYSTOLIC BLOOD PRESS GE 130-139MM HG: ICD-10-PCS | Mod: CPTII,S$GLB,, | Performed by: INTERNAL MEDICINE

## 2023-01-06 NOTE — PROGRESS NOTES
"Nutrition Assessment  Session Time:  40      Client name:  Fabrice Zamorano  :  1965  Age:  57 y.o.  Gender:  male    Client states:  referred by Odalys Leiva MD with a diagnosis of:   Fatty liver    Present for nutrition counseling.   Reports fatty liver and pancreatitis (began in 2017).  Some days are better than others.  Knows to stay away from spicy foods, nuts, and large portions.  Does not consume any fatty foods or fried foods.   Has not eaten [employer] for 5 years.     Current intake:  Breakfast: grits, eggs, turkey sausage or turkey and pork sausage  Lunch: banana, crackers (8) + peanut butter  Daily Snack: jeet kisses (14)  Dinner: air fried chicken, potatoes, english peas, green beans// burger once weekly  Drinks: unsweetened tea (just enough sugar to take out the bitter taste), water    Dine out: pork chops, baked potatoes (small portion)   No alcohol in 5 or 6 years.     Exercise: no intentional exercise// lack of energy + sick some days// stays active with daily activities     Goal: 190 lbs     Anthropometrics  Height:  71"     Weight:  210 lbs    2022: 216 lbs  BMI:  29.33  % Body Fat:  n/a    Clinical Signs/Symptoms  N/V/D:  none noted  Appetite:  good       Past Medical History:   Diagnosis Date    Anticoagulant long-term use     On Eliquis for DVT    GERD (gastroesophageal reflux disease)     Hypertension     Hypothyroidism 10/26/2021    Pancreatic pseudocyst     Sleep apnea     Splenic vein thrombosis        Past Surgical History:   Procedure Laterality Date    CHOLECYSTECTOMY      ENDOSCOPIC ULTRASOUND OF UPPER GASTROINTESTINAL TRACT N/A 10/12/2021    Procedure: ULTRASOUND, UPPER GI TRACT, ENDOSCOPIC;  Surgeon: Odalys Leiva MD;  Location: [facility];  Service: Endoscopy;  Laterality: N/A;  Upper and linear, 22 shark core, cytology and RPMI    ERCP N/A 2021    Procedure: ERCP (ENDOSCOPIC RETROGRADE CHOLANGIOPANCREATOGRAPHY);  Surgeon: Odalys LR" MD Cali;  Location: [facility] END[facility];  Service: Endoscopy;  Laterality: N/A;    ERCP N/A 8/15/2022    Procedure: ERCP (ENDOSCOPIC RETROGRADE CHOLANGIOPANCREATOGRAPHY);  Surgeon: Odalys Leiva MD;  Location: [facility];  Service: Endoscopy;  Laterality: N/A;    ERCP W/ PLASTIC STENT PLACEMENT      LATERAL PANCREATICOJEJUNOSTOMY N/A 2021    Procedure: PANCREATICOJEJUNOSTOMY, SIDE-TO-SIDE tier 1;  Surgeon: Brian Hills MD;  Location: [address];  Service: General;  Laterality: N/A;       Medications    has a current medication list which includes the following prescription(s): carvedilol, ergocalciferol, hydrocodone-acetaminophen, ibuprofen, pancreaze, metformin, multivitamin, oxycodone, pantoprazole, pregabalin, prevident 5000 booster plus, and promethazine.    Vitamins, Minerals, and/or Supplements:  not discussed     Food/Medication Interactions:  Reviewed     Food Allergies or Intolerances:  NKFA     Social History    Marital status:    Employment:  not discussed    Social History     Tobacco Use    Smoking status: Former     Types: Cigarettes     Quit date: 2001     Years since quittin.4    Smokeless tobacco: Never   Substance Use Topics    Alcohol use: Not Currently        Lab Reports   Sodium   Date Value Ref Range Status   2022 140 136 - 145 mmol/L Final     Potassium   Date Value Ref Range Status   2022 4.2 3.5 - 5.1 mmol/L Final     Chloride   Date Value Ref Range Status   2022 103 95 - 110 mmol/L Final     CO2   Date Value Ref Range Status   2022 28 23 - 29 mmol/L Final     Glucose   Date Value Ref Range Status   2022 101 70 - 110 mg/dL Final     BUN   Date Value Ref Range Status   2022 14 6 - 20 mg/dL Final     Creatinine   Date Value Ref Range Status   2022 0.9 0.5 - 1.4 mg/dL Final     Calcium   Date Value Ref Range Status   2022 9.0 8.7 - 10.5 mg/dL Final     Total Protein   Date Value Ref Range Status   2022  6.5 6.0 - 8.4 g/dL Final     Albumin   Date Value Ref Range Status   12/21/2022 4.2 3.5 - 5.2 g/dL Final     Total Bilirubin   Date Value Ref Range Status   12/21/2022 0.6 0.1 - 1.0 mg/dL Final     Comment:     For infants and newborns, interpretation of results should be based  on gestational age, weight and in agreement with clinical  observations.    Premature Infant recommended reference ranges:  Up to 24 hours.............<8.0 mg/dL  Up to 48 hours............<12.0 mg/dL  3-5 days..................<15.0 mg/dL  6-29 days.................<15.0 mg/dL       Alkaline Phosphatase   Date Value Ref Range Status   12/21/2022 100 55 - 135 U/L Final     AST   Date Value Ref Range Status   12/21/2022 39 10 - 40 U/L Final     ALT   Date Value Ref Range Status   12/21/2022 70 (H) 10 - 44 U/L Final     Anion Gap   Date Value Ref Range Status   12/21/2022 9 8 - 16 mmol/L Final     eGFR if    Date Value Ref Range Status   07/07/2022 >60.0 >60 mL/min/1.73 m^2 Final     eGFR if non    Date Value Ref Range Status   07/07/2022 >60.0 >60 mL/min/1.73 m^2 Final     Comment:     Calculation used to obtain the estimated glomerular filtration  rate (eGFR) is the CKD-EPI equation.         Lab Results   Component Value Date    WBC 7.26 12/21/2022    HGB 15.4 12/21/2022    HCT 47.2 12/21/2022    MCV 93 12/21/2022     (L) 12/21/2022        Lab Results   Component Value Date    CHOL 140 09/27/2021     Lab Results   Component Value Date    HDL 24 (L) 09/27/2021     Lab Results   Component Value Date    LDLCALC 101.0 09/27/2021     Lab Results   Component Value Date    TRIG 75 09/27/2021     Lab Results   Component Value Date    CHOLHDL 17.1 (L) 09/27/2021     No results found for: LABA1C, HGBA1C  BP Readings from Last 1 Encounters:   12/21/22 130/86       Food History  Listed above    Exercise History:  listed above    Cultural/Spiritual/Personal Preferences:  None identified    Support System:    spouse    State of Change:  Preparation    Barriers to Change:  none    Diagnosis    Overweight related to excess energy intake and physical inactivity as evidenced by BMI 29.    Intervention    RMR (Method:  [place] Jeor):  1805 kcal  Activity Factor:   1.2     SAMPSON:  2166 - 500 = 1666 kcal    Goals:  1. Begin an exercise routine, as able.   2.  Plate method at meals  3.  Decrease intake of daily jeet's kisses    Nutrition Education  The following education was provided to the patient:  Discussed meal planning/[employer] My Plate design.  Discussed weight management.  Suggested dietary modifications based on current dietary behaviors and individual food preferences.  Discussed physical activity guidelines and its associated benefits.    Patient verbalized understanding of nutrition education and recommendations received.    Handouts Provided  Balanced Plate    Monitoring/Evaluation    Monitor the following:  Weight  BMI  Caloric intake  Labs:      Follow Up Plan:  as needed

## 2023-01-06 NOTE — PROGRESS NOTES
Subjective:     Fabrice Zamorano is here for evaluation of abnormal LFTs    History of Present Illness:  Fabrice Zamorano is a 57-year-old male with recurrent acute on chronic pancreatitis episodes.  He had pancreatic go jejunostomy in 11/2021, even though he reports episodes of pancreatitis since then the intensity and frequency is not as severe as it used to be prior to surgery.  He had hospitalization in 3/2022 with an acute episode of pancreatitis, where he had elevated transaminases and total bilirubin along with a decreased platelet count.  CT scan of the abdomen at that time showed no splenomegaly, does have fatty infiltration of liver.  He admits to eating grits every day, also bread, as these are the diet that he can tolerate with pancreatitis.  Since March of 2022 hospitalization, transaminases have been trending down, also platelet count has improved.     Duration of abnormality- 2022  Medications/OTC/Herbal-denies  ETOH- occasional  Metabolic issues- pre diabetes  BMI-30  Family Hx-no    1/6/23  Has been doing well since last visit, denies any liver related complaints.  Has made some changes to his diet, lost 10 lb so far      Review of Systems   Constitutional:  Negative for fatigue, fever and unexpected weight change.   Gastrointestinal:  Positive for abdominal pain. Negative for abdominal distention, blood in stool, nausea and vomiting.   Musculoskeletal:  Negative for arthralgias and gait problem.   Skin:  Negative for pallor and rash.   Neurological:  Negative for dizziness.   Hematological:  Does not bruise/bleed easily.   Psychiatric/Behavioral:  Negative for confusion, hallucinations and sleep disturbance.      Objective:     Physical Exam  Vitals and nursing note reviewed.   Constitutional:       Appearance: Normal appearance.   Eyes:      General: No scleral icterus.  Cardiovascular:      Heart sounds: No murmur heard.  Pulmonary:      Effort: Pulmonary effort is normal.      Breath sounds: No  wheezing, rhonchi or rales.   Abdominal:      General: Bowel sounds are normal. There is no distension.      Palpations: There is no mass.      Tenderness: There is abdominal tenderness.   Musculoskeletal:         General: No tenderness.      Right lower leg: No edema.      Left lower leg: No edema.   Lymphadenopathy:      Cervical: No cervical adenopathy.   Skin:     Coloration: Skin is not jaundiced.      Findings: No bruising or rash.   Neurological:      Mental Status: He is alert and oriented to person, place, and time.      Coordination: Coordination normal.   Psychiatric:         Behavior: Behavior normal.         Thought Content: Thought content normal.       Computed MELD-Na score unavailable. Necessary lab results were not found in the last year.  Computed MELD score unavailable. Necessary lab results were not found in the last year.    WBC   Date Value Ref Range Status   12/21/2022 7.26 3.90 - 12.70 K/uL Final     Hemoglobin   Date Value Ref Range Status   12/21/2022 15.4 14.0 - 18.0 g/dL Final     Hematocrit   Date Value Ref Range Status   12/21/2022 47.2 40.0 - 54.0 % Final     Platelets   Date Value Ref Range Status   12/21/2022 139 (L) 150 - 450 K/uL Final     BUN   Date Value Ref Range Status   12/21/2022 14 6 - 20 mg/dL Final     Creatinine   Date Value Ref Range Status   12/21/2022 0.9 0.5 - 1.4 mg/dL Final     Glucose   Date Value Ref Range Status   12/21/2022 101 70 - 110 mg/dL Final     Calcium   Date Value Ref Range Status   12/21/2022 9.0 8.7 - 10.5 mg/dL Final     Sodium   Date Value Ref Range Status   12/21/2022 140 136 - 145 mmol/L Final     Potassium   Date Value Ref Range Status   12/21/2022 4.2 3.5 - 5.1 mmol/L Final     Chloride   Date Value Ref Range Status   12/21/2022 103 95 - 110 mmol/L Final     Magnesium   Date Value Ref Range Status   08/17/2022 1.8 1.6 - 2.6 mg/dL Final     AST   Date Value Ref Range Status   12/21/2022 39 10 - 40 U/L Final     ALT   Date Value Ref Range Status    12/21/2022 70 (H) 10 - 44 U/L Final     Alkaline Phosphatase   Date Value Ref Range Status   12/21/2022 100 55 - 135 U/L Final     Total Bilirubin   Date Value Ref Range Status   12/21/2022 0.6 0.1 - 1.0 mg/dL Final     Comment:     For infants and newborns, interpretation of results should be based  on gestational age, weight and in agreement with clinical  observations.    Premature Infant recommended reference ranges:  Up to 24 hours.............<8.0 mg/dL  Up to 48 hours............<12.0 mg/dL  3-5 days..................<15.0 mg/dL  6-29 days.................<15.0 mg/dL       Albumin   Date Value Ref Range Status   12/21/2022 4.2 3.5 - 5.2 g/dL Final     INR   Date Value Ref Range Status   11/17/2022 1.0 0.8 - 1.2 Final     Comment:     Coumadin Therapy:  2.0 - 3.0 for INR for all indicators except mechanical heart valves  and antiphospholipid syndromes which should use 2.5 - 3.5.           Assessment/Plan:     No diagnosis found.      1.Abnormal LFTs:  I suspect elevated liver enzymes are secondary to acute and recurrent inflammation of pancreas, however underlying fatty liver disease or other etiology can not be ruled out, will initiate workup to rule out infectious/autoimmune disorders of the liver, also will obtain a FibroScan to assess steatosis and fibrosis.  We will obtain ultrasound of the abdomen to assess splenomegaly, if he does not have significant fibrosis I doubt thrombocytopenia is secondary to liver pathology.      Will see in clinic in a month with work up    1/9/2023  Workup to rule out infectious/autoimmune disorders of the liver has been negative.  Transaminases have been trending down, will repeat the liver enzymes transaminases are trending down transaminases are trending down.  Encouraged to continue low carb, high-protein diet with consistent exercise.  Weight loss of 3-5% more is ideal.     I have reviewed existing labs, imaging, procedures. Educated patient about disease process,  prognosis. Ordered required labs, images and discussed treatment plan.     Anyi Norris MD  Transplant Hepatologist  Dept of Hepatology, [place] [facility] [place]

## 2023-01-10 ENCOUNTER — TELEPHONE (OUTPATIENT)
Dept: GASTROENTEROLOGY | Facility: CLINIC | Age: 58
End: 2023-01-10
Payer: COMMERCIAL

## 2023-01-10 ENCOUNTER — PATIENT MESSAGE (OUTPATIENT)
Dept: GASTROENTEROLOGY | Facility: CLINIC | Age: 58
End: 2023-01-10
Payer: COMMERCIAL

## 2023-01-10 NOTE — TELEPHONE ENCOUNTER
Informed patient Dr. Leiva will not be in clinic in February and is leaving [facility] in March - therefore we need to reschedule his appointment to [facility]. He agreed to an appointment on 3/9/23. He asked if he needed a procedure who would do it - I informed him as of now it would be MAR[facility]A. He verbalized understanding.

## 2023-01-11 ENCOUNTER — HOSPITAL ENCOUNTER (OUTPATIENT)
Dept: RADIOLOGY | Facility: HOSPITAL | Age: 58
Discharge: HOME | End: 2023-01-11
Attending: INTERNAL MEDICINE
Payer: COMMERCIAL

## 2023-01-11 DIAGNOSIS — R79.89 ABNORMAL LFTS: ICD-10-CM

## 2023-01-11 DIAGNOSIS — I81 PORTAL VEIN THROMBOSIS: ICD-10-CM

## 2023-01-11 PROCEDURE — 93975 VASCULAR STUDY: CPT | Mod: 26,,, | Performed by: RADIOLOGY

## 2023-01-11 PROCEDURE — 93975 VASCULAR STUDY: CPT | Mod: TC

## 2023-01-11 PROCEDURE — 93975 US DOPPLER ABDOMEN COMPLETE: ICD-10-PCS | Mod: 26,,, | Performed by: RADIOLOGY

## 2023-01-16 ENCOUNTER — PATIENT MESSAGE (OUTPATIENT)
Dept: GASTROENTEROLOGY | Facility: CLINIC | Age: 58
End: 2023-01-16
Payer: COMMERCIAL

## 2023-01-17 ENCOUNTER — TELEPHONE (OUTPATIENT)
Dept: ENDOSCOPY | Facility: HOSPITAL | Age: 58
End: 2023-01-17
Payer: COMMERCIAL

## 2023-01-17 NOTE — TELEPHONE ENCOUNTER
----- Message from oJan Torres sent at 1/17/2023  8:27 AM CST -----  Fabrice Zamorano wife Aliza  calling regarding Appointment Access  for only wanting to see Dr. Chisholm for the Pancreatic stents, call back [phone]

## 2023-01-26 ENCOUNTER — PATIENT MESSAGE (OUTPATIENT)
Dept: GASTROENTEROLOGY | Facility: CLINIC | Age: 58
End: 2023-01-26
Payer: COMMERCIAL

## 2023-01-26 ENCOUNTER — PATIENT MESSAGE (OUTPATIENT)
Dept: PAIN MEDICINE | Facility: CLINIC | Age: 58
End: 2023-01-26
Payer: COMMERCIAL

## 2023-01-26 DIAGNOSIS — K86.1 CHRONIC PANCREATITIS, UNSPECIFIED PANCREATITIS TYPE: ICD-10-CM

## 2023-01-26 DIAGNOSIS — G89.4 CHRONIC PAIN SYNDROME: Primary | ICD-10-CM

## 2023-01-26 RX ORDER — OXYCODONE AND ACETAMINOPHEN 10; 325 MG/1; MG/1
1 TABLET ORAL EVERY 8 HOURS PRN
Qty: 21 TABLET | Refills: 0 | Status: SHIPPED | OUTPATIENT
Start: 2023-01-26 | End: 2023-02-02

## 2023-02-02 ENCOUNTER — TELEPHONE (OUTPATIENT)
Dept: ENDOSCOPY | Facility: HOSPITAL | Age: 58
End: 2023-02-02
Payer: COMMERCIAL

## 2023-02-02 ENCOUNTER — OFFICE VISIT (OUTPATIENT)
Dept: GASTROENTEROLOGY | Facility: CLINIC | Age: 58
End: 2023-02-02
Payer: COMMERCIAL

## 2023-02-02 VITALS
DIASTOLIC BLOOD PRESSURE: 90 MMHG | BODY MASS INDEX: 29.5 KG/M2 | HEART RATE: 57 BPM | WEIGHT: 210.75 LBS | SYSTOLIC BLOOD PRESSURE: 132 MMHG | HEIGHT: 71 IN

## 2023-02-02 DIAGNOSIS — R93.89 ABNORMAL FINDING ON IMAGING: Primary | ICD-10-CM

## 2023-02-02 DIAGNOSIS — K85.90 AUTOSOMAL DOMINANT HEREDITARY PANCREATITIS ASSOCIATED WITH MUTATION IN PRSS1 GENE: Primary | ICD-10-CM

## 2023-02-02 PROCEDURE — 3075F PR MOST RECENT SYSTOLIC BLOOD PRESS GE 130-139MM HG: ICD-10-PCS | Mod: CPTII,S$GLB,, | Performed by: INTERNAL MEDICINE

## 2023-02-02 PROCEDURE — 3080F DIAST BP >= 90 MM HG: CPT | Mod: CPTII,S$GLB,, | Performed by: INTERNAL MEDICINE

## 2023-02-02 PROCEDURE — 3080F PR MOST RECENT DIASTOLIC BLOOD PRESSURE >= 90 MM HG: ICD-10-PCS | Mod: CPTII,S$GLB,, | Performed by: INTERNAL MEDICINE

## 2023-02-02 PROCEDURE — 99999 PR PBB SHADOW E&M-EST. PATIENT-LVL IV: ICD-10-PCS | Mod: PBBFAC,,, | Performed by: INTERNAL MEDICINE

## 2023-02-02 PROCEDURE — 1160F RVW MEDS BY RX/DR IN RCRD: CPT | Mod: CPTII,S$GLB,, | Performed by: INTERNAL MEDICINE

## 2023-02-02 PROCEDURE — 1160F PR REVIEW ALL MEDS BY PRESCRIBER/CLIN PHARMACIST DOCUMENTED: ICD-10-PCS | Mod: CPTII,S$GLB,, | Performed by: INTERNAL MEDICINE

## 2023-02-02 PROCEDURE — 99214 PR OFFICE/OUTPT VISIT, EST, LEVL IV, 30-39 MIN: ICD-10-PCS | Mod: S$GLB,,, | Performed by: INTERNAL MEDICINE

## 2023-02-02 PROCEDURE — 3075F SYST BP GE 130 - 139MM HG: CPT | Mod: CPTII,S$GLB,, | Performed by: INTERNAL MEDICINE

## 2023-02-02 PROCEDURE — 1159F MED LIST DOCD IN RCRD: CPT | Mod: CPTII,S$GLB,, | Performed by: INTERNAL MEDICINE

## 2023-02-02 PROCEDURE — 99214 OFFICE O/P EST MOD 30 MIN: CPT | Mod: S$GLB,,, | Performed by: INTERNAL MEDICINE

## 2023-02-02 PROCEDURE — 3008F BODY MASS INDEX DOCD: CPT | Mod: CPTII,S$GLB,, | Performed by: INTERNAL MEDICINE

## 2023-02-02 PROCEDURE — 3008F PR BODY MASS INDEX (BMI) DOCUMENTED: ICD-10-PCS | Mod: CPTII,S$GLB,, | Performed by: INTERNAL MEDICINE

## 2023-02-02 PROCEDURE — 1159F PR MEDICATION LIST DOCUMENTED IN MEDICAL RECORD: ICD-10-PCS | Mod: CPTII,S$GLB,, | Performed by: INTERNAL MEDICINE

## 2023-02-02 PROCEDURE — 99999 PR PBB SHADOW E&M-EST. PATIENT-LVL IV: CPT | Mod: PBBFAC,,, | Performed by: INTERNAL MEDICINE

## 2023-02-02 NOTE — PROGRESS NOTES
Advanced Endoscopy / Pancreaticobiliary Service    Reason for visit (Chief Complaint):  Hereditary pancreatitis    Referring provider/PCP: Aaareferral Self  No address on file    History of Present Illness: Patient presents for above.  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  He has been seen by 1 of my colleagues, Dr. Jamil, in [place] over the last few years.  He underwent a peustow procedure with some parenchymal debulking in November of 2021.  He has done much better since the surgery, however he still has episodes of ongoing mild attacks of pain and pancreatitis.  He last underwent ERCP in August 2022.  During this ERCP, a pancreatic sphincterotomy was performed and partial dilation of a stricture in the neck of the pancreas was performed.  Upstream from this area of stenosis was the area of the surgical pancreaticojejunostomy.  Although the patient did develop post ERCP pancreatitis, he felt well for about 2 months after recovery from the postprocedure pancreatitis.  Over the last 1 and half months, he feels like he is not doing as well with recurrent attacks of abdominal pain.  He manages these with his pain medication regimen.  He does see a pain management specialist in [place].  He does take pancreatic enzymes         Physical Exam:  General: Well-developed, well-appearing, no acute distress      Laboratory:   Reviewed.    Imaging:  Reviewed images from last ERCP and CT scan.    Assessment/Plan:  1- Hereditary pancreatitis- given the two-month benefit he had after his last ERCP with pancreatic endo therapy, I think it is very reasonable to plan for a repeat EUS and ERCP.  The patient and his wife are in agreement.  We will arrange for this in the near future.  We also discussed the need for pancreatic cancer screening in the setting of his hereditary pancreatitis.  We will plan to do this in an annual basis.        Shawn Chisholm MD,  HAIR   - Department of Gastroenterology  [facility] [place]

## 2023-02-03 ENCOUNTER — TELEPHONE (OUTPATIENT)
Dept: ENDOSCOPY | Facility: HOSPITAL | Age: 58
End: 2023-02-03
Payer: COMMERCIAL

## 2023-02-03 ENCOUNTER — PATIENT MESSAGE (OUTPATIENT)
Dept: ENDOSCOPY | Facility: HOSPITAL | Age: 58
End: 2023-02-03
Payer: COMMERCIAL

## 2023-02-03 NOTE — TELEPHONE ENCOUNTER
Telephoned pt to schedule EUS/ERCP.  Spoke with pt's wife, Aliza, and procedure scheduled for 2/24/23 at 9:00am.  Reviewed history and medications.  Instructed on procedure and preparation.  Aliza verbalized understanding.  Instructions sent via patient portal.

## 2023-02-03 NOTE — TELEPHONE ENCOUNTER
----- Message from Shawn Chisholm MD sent at 2/2/2023 10:42 AM CST -----  Clinic patient seen today.  Please schedule for EUS and ERCP with me.  Main campus only.  60 minute case okay.  Okay to schedule with me over [place] a week while I am on service if that is my next available.

## 2023-02-10 ENCOUNTER — OFFICE VISIT (OUTPATIENT)
Dept: HEPATOLOGY | Facility: CLINIC | Age: 58
End: 2023-02-10
Payer: COMMERCIAL

## 2023-02-10 VITALS — HEIGHT: 71 IN | WEIGHT: 206 LBS | BODY MASS INDEX: 28.84 KG/M2

## 2023-02-10 DIAGNOSIS — K76.0 NAFLD (NONALCOHOLIC FATTY LIVER DISEASE): ICD-10-CM

## 2023-02-10 DIAGNOSIS — R79.89 ABNORMAL LFTS: Primary | ICD-10-CM

## 2023-02-10 PROCEDURE — 1159F MED LIST DOCD IN RCRD: CPT | Mod: CPTII,95,, | Performed by: INTERNAL MEDICINE

## 2023-02-10 PROCEDURE — 99214 PR OFFICE/OUTPT VISIT, EST, LEVL IV, 30-39 MIN: ICD-10-PCS | Mod: 95,,, | Performed by: INTERNAL MEDICINE

## 2023-02-10 PROCEDURE — 99214 OFFICE O/P EST MOD 30 MIN: CPT | Mod: 95,,, | Performed by: INTERNAL MEDICINE

## 2023-02-10 PROCEDURE — 1159F PR MEDICATION LIST DOCUMENTED IN MEDICAL RECORD: ICD-10-PCS | Mod: CPTII,95,, | Performed by: INTERNAL MEDICINE

## 2023-02-10 PROCEDURE — 3008F PR BODY MASS INDEX (BMI) DOCUMENTED: ICD-10-PCS | Mod: CPTII,95,, | Performed by: INTERNAL MEDICINE

## 2023-02-10 PROCEDURE — 3008F BODY MASS INDEX DOCD: CPT | Mod: CPTII,95,, | Performed by: INTERNAL MEDICINE

## 2023-02-10 NOTE — PROGRESS NOTES
Subjective:     Fabrice Zamorano is here for evaluation of abnormal LFTs    History of Present Illness:  Fabrice Zamorano is a 57-year-old male with recurrent acute on chronic pancreatitis episodes secondary to autosomal dominant autoimmune pancreatitis.  He had pancreatico jejunostomy in 11/2021, even though he reports episodes of pancreatitis since then the intensity and frequency is not as severe as it used to be prior to surgery.  He had hospitalization in 3/2022 with an acute episode of pancreatitis, where he had elevated transaminases and total bilirubin along with a decreased platelet count.  CT scan of the abdomen at that time showed no splenomegaly, does have fatty infiltration of liver.  He admits to eating grits every day, also bread, as these are the diet that he can tolerate with pancreatitis.  Since March of 2022 hospitalization, transaminases have been trending down, also platelet count has improved.  He follows pain management for chronic pain.    Duration of abnormality- 2022  Medications/OTC/Herbal-denies  ETOH- occasional  Metabolic issues- pre diabetes  BMI-30  Family Hx-no    1/6/23  Has been doing well since last visit, denies any liver related complaints.  Has made some changes to his diet, lost 10 lb so far    2/10/2023     Visit type: audiovisual     Face to Face time with patient: 15 minutes  35 minutes of total time spent on the encounter, which includes face to face time and non-face to face time preparing to see the patient (eg, review of tests), Obtaining and/or reviewing separately obtained history, Documenting clinical information in the electronic or other health record, Independently interpreting results (not separately reported) and communicating results to the patient/family/caregiver, or Care coordination (not separately reported).   Each patient to whom he or she provides medical services by telemedicine is:  (1) informed of the relationship between the physician and patient and  the respective role of any other health care provider with respect to management of the patient; and (2) notified that he or she may decline to receive medical services by telemedicine and may withdraw from such care at any time.    Has been doing well since last visit, no episodes of pancreatitis requiring hospitalization.  Still has some difficulty with diet, otherwise trying to work on losing weight    Review of Systems   Constitutional:  Negative for fatigue, fever and unexpected weight change.   Gastrointestinal:  Positive for abdominal pain. Negative for abdominal distention, blood in stool, nausea and vomiting.   Musculoskeletal:  Negative for arthralgias and gait problem.   Skin:  Negative for pallor and rash.   Neurological:  Negative for dizziness.   Hematological:  Does not bruise/bleed easily.   Psychiatric/Behavioral:  Negative for confusion, hallucinations and sleep disturbance.      Objective:     Physical Exam  Vitals and nursing note reviewed.   Constitutional:       Appearance: Normal appearance.   Eyes:      General: No scleral icterus.  Cardiovascular:      Heart sounds: No murmur heard.  Pulmonary:      Effort: Pulmonary effort is normal.      Breath sounds: No wheezing, rhonchi or rales.   Abdominal:      General: Bowel sounds are normal. There is no distension.      Palpations: There is no mass.      Tenderness: There is abdominal tenderness.   Musculoskeletal:         General: No tenderness.      Right lower leg: No edema.      Left lower leg: No edema.   Lymphadenopathy:      Cervical: No cervical adenopathy.   Skin:     Coloration: Skin is not jaundiced.      Findings: No bruising or rash.   Neurological:      Mental Status: He is alert and oriented to person, place, and time.      Coordination: Coordination normal.   Psychiatric:         Behavior: Behavior normal.         Thought Content: Thought content normal.       Computed MELD-Na score unavailable. Necessary lab results were not found  in the last year.  Computed MELD score unavailable. Necessary lab results were not found in the last year.    WBC   Date Value Ref Range Status   12/21/2022 7.26 3.90 - 12.70 K/uL Final     Hemoglobin   Date Value Ref Range Status   12/21/2022 15.4 14.0 - 18.0 g/dL Final     Hematocrit   Date Value Ref Range Status   12/21/2022 47.2 40.0 - 54.0 % Final     Platelets   Date Value Ref Range Status   12/21/2022 139 (L) 150 - 450 K/uL Final     BUN   Date Value Ref Range Status   01/06/2023 17 6 - 20 mg/dL Final     Creatinine   Date Value Ref Range Status   01/06/2023 1.0 0.5 - 1.4 mg/dL Final     Glucose   Date Value Ref Range Status   01/06/2023 79 70 - 110 mg/dL Final     Calcium   Date Value Ref Range Status   01/06/2023 9.5 8.7 - 10.5 mg/dL Final     Sodium   Date Value Ref Range Status   01/06/2023 137 136 - 145 mmol/L Final     Potassium   Date Value Ref Range Status   01/06/2023 4.1 3.5 - 5.1 mmol/L Final     Chloride   Date Value Ref Range Status   01/06/2023 103 95 - 110 mmol/L Final     Magnesium   Date Value Ref Range Status   08/17/2022 1.8 1.6 - 2.6 mg/dL Final     AST   Date Value Ref Range Status   01/06/2023 23 10 - 40 U/L Final     ALT   Date Value Ref Range Status   01/06/2023 26 10 - 44 U/L Final     Alkaline Phosphatase   Date Value Ref Range Status   01/06/2023 82 55 - 135 U/L Final     Total Bilirubin   Date Value Ref Range Status   01/06/2023 0.7 0.1 - 1.0 mg/dL Final     Comment:     For infants and newborns, interpretation of results should be based  on gestational age, weight and in agreement with clinical  observations.    Premature Infant recommended reference ranges:  Up to 24 hours.............<8.0 mg/dL  Up to 48 hours............<12.0 mg/dL  3-5 days..................<15.0 mg/dL  6-29 days.................<15.0 mg/dL       Albumin   Date Value Ref Range Status   01/06/2023 4.2 3.5 - 5.2 g/dL Final     INR   Date Value Ref Range Status   11/17/2022 1.0 0.8 - 1.2 Final     Comment:      Coumadin Therapy:  2.0 - 3.0 for INR for all indicators except mechanical heart valves  and antiphospholipid syndromes which should use 2.5 - 3.5.           Assessment/Plan:     No diagnosis found.      1.Abnormal LFTs:  I suspect elevated liver enzymes are secondary to acute and recurrent inflammation of pancreas, however underlying fatty liver disease or other etiology can not be ruled out, will initiate workup to rule out infectious/autoimmune disorders of the liver, also will obtain a FibroScan to assess steatosis and fibrosis.  We will obtain ultrasound of the abdomen to assess splenomegaly, if he does not have significant fibrosis I doubt thrombocytopenia is secondary to liver pathology.      Will see in clinic in a month with work up    2023  Workup to rule out infectious/autoimmune disorders of the liver has been negative.  Transaminases have been trending down, will repeat the liver enzymes transaminases are trending down. Encouraged to continue low carb, high-protein diet with consistent exercise.  Weight loss of 3-5% more is ideal.     2/15/2023  Fibroscan readin.4 KPa  Fibrosis:F 0-1   CAP readin dB/m  Steatosis: :S3   Discussed FibroScan findings, steatosis is at stage III level with minimal to no fibrosis, which is encouraging.  LFTs have been normalized since last visit.  Encouraged to continue low carb, high-protein diet with consistent exercise and weight loss of 3-5% to improve steatosis and 7-10% to improve histological change    I have reviewed existing labs, imaging, procedures. Educated patient about disease process, prognosis. Ordered required labs, images and discussed treatment plan.     Anyi Norris MD  Transplant Hepatologist  Dept of Hepatology, [place] [facility] [place]

## 2023-02-17 ENCOUNTER — PATIENT MESSAGE (OUTPATIENT)
Dept: SURGICAL ONCOLOGY | Facility: CLINIC | Age: 58
End: 2023-02-17
Payer: COMMERCIAL

## 2023-02-20 ENCOUNTER — TELEPHONE (OUTPATIENT)
Dept: SURGERY | Facility: CLINIC | Age: 58
End: 2023-02-20
Payer: COMMERCIAL

## 2023-02-20 NOTE — TELEPHONE ENCOUNTER
"Follow up call placed to pt. Spoke to pt spouse. Spouse reports pt has not been feeling too good. Reports pt saw Dr. Chisholm and pt is scheduled for stent on Friday. Also reports pt has a "knot on his stomach which has been sore". Pt spouse requesting appt for possible hernia. Appt made for 3/8. Time date location provided. Pt spouse verbalized understanding.  "

## 2023-02-24 ENCOUNTER — ANESTHESIA EVENT (OUTPATIENT)
Dept: ENDOSCOPY | Facility: HOSPITAL | Age: 58
End: 2023-02-24
Payer: COMMERCIAL

## 2023-02-24 ENCOUNTER — HOSPITAL ENCOUNTER (OUTPATIENT)
Facility: HOSPITAL | Age: 58
Discharge: HOME | End: 2023-02-24
Attending: INTERNAL MEDICINE | Admitting: INTERNAL MEDICINE
Payer: COMMERCIAL

## 2023-02-24 ENCOUNTER — ANESTHESIA (OUTPATIENT)
Dept: ENDOSCOPY | Facility: HOSPITAL | Age: 58
End: 2023-02-24
Payer: COMMERCIAL

## 2023-02-24 VITALS
OXYGEN SATURATION: 99 % | RESPIRATION RATE: 16 BRPM | BODY MASS INDEX: 28.7 KG/M2 | DIASTOLIC BLOOD PRESSURE: 90 MMHG | SYSTOLIC BLOOD PRESSURE: 153 MMHG | WEIGHT: 205 LBS | HEIGHT: 71 IN | HEART RATE: 53 BPM | TEMPERATURE: 98 F

## 2023-02-24 DIAGNOSIS — K86.1 ACUTE ON CHRONIC PANCREATITIS: Primary | ICD-10-CM

## 2023-02-24 DIAGNOSIS — Z87.19 HISTORY OF ACUTE PANCREATITIS: ICD-10-CM

## 2023-02-24 DIAGNOSIS — K85.90 ACUTE ON CHRONIC PANCREATITIS: Primary | ICD-10-CM

## 2023-02-24 LAB — POCT GLUCOSE: 97 MG/DL (ref 70–110)

## 2023-02-24 PROCEDURE — D9220A PRA ANESTHESIA: ICD-10-PCS | Mod: ANES,,, | Performed by: STUDENT IN AN ORGANIZED HEALTH CARE EDUCATION/TRAINING PROGRAM

## 2023-02-24 PROCEDURE — 43259 EGD US EXAM DUODENUM/JEJUNUM: CPT | Mod: 59 | Performed by: INTERNAL MEDICINE

## 2023-02-24 PROCEDURE — 63600175 PHARM REV CODE 636 W HCPCS: Performed by: NURSE ANESTHETIST, CERTIFIED REGISTERED

## 2023-02-24 PROCEDURE — 25000003 PHARM REV CODE 250: Performed by: INTERNAL MEDICINE

## 2023-02-24 PROCEDURE — C1769 GUIDE WIRE: HCPCS | Performed by: INTERNAL MEDICINE

## 2023-02-24 PROCEDURE — 43264 ERCP REMOVE DUCT CALCULI: CPT | Mod: ,,, | Performed by: INTERNAL MEDICINE

## 2023-02-24 PROCEDURE — 25000003 PHARM REV CODE 250: Performed by: NURSE ANESTHETIST, CERTIFIED REGISTERED

## 2023-02-24 PROCEDURE — 74328 X-RAY BILE DUCT ENDOSCOPY: CPT | Mod: 26,,, | Performed by: INTERNAL MEDICINE

## 2023-02-24 PROCEDURE — D9220A PRA ANESTHESIA: ICD-10-PCS | Mod: CRNA,,, | Performed by: NURSE ANESTHETIST, CERTIFIED REGISTERED

## 2023-02-24 PROCEDURE — 43259 EGD US EXAM DUODENUM/JEJUNUM: CPT | Mod: 59,,, | Performed by: INTERNAL MEDICINE

## 2023-02-24 PROCEDURE — 74328 X-RAY BILE DUCT ENDOSCOPY: CPT | Mod: TC | Performed by: INTERNAL MEDICINE

## 2023-02-24 PROCEDURE — 82962 GLUCOSE BLOOD TEST: CPT | Performed by: INTERNAL MEDICINE

## 2023-02-24 PROCEDURE — 25500020 PHARM REV CODE 255: Performed by: INTERNAL MEDICINE

## 2023-02-24 PROCEDURE — 43264 ERCP REMOVE DUCT CALCULI: CPT | Performed by: INTERNAL MEDICINE

## 2023-02-24 PROCEDURE — 43264 PR ERCP,W/REMOVAL STONE,BIL/PANCR DUCTS: ICD-10-PCS | Mod: ,,, | Performed by: INTERNAL MEDICINE

## 2023-02-24 PROCEDURE — 27201674 HC SPHINCTERTOME: Performed by: INTERNAL MEDICINE

## 2023-02-24 PROCEDURE — 37000009 HC ANESTHESIA EA ADD 15 MINS: Performed by: INTERNAL MEDICINE

## 2023-02-24 PROCEDURE — 74328 PR  X-RAY FOR BILE DUCT ENDOSCOPY: ICD-10-PCS | Mod: 26,,, | Performed by: INTERNAL MEDICINE

## 2023-02-24 PROCEDURE — 43259 PR ENDOSCOPIC ULTRASOUND EXAM: ICD-10-PCS | Mod: 59,,, | Performed by: INTERNAL MEDICINE

## 2023-02-24 PROCEDURE — 27202125 HC BALLOON, EXTRACTION (ANY): Performed by: INTERNAL MEDICINE

## 2023-02-24 PROCEDURE — D9220A PRA ANESTHESIA: Mod: ANES,,, | Performed by: STUDENT IN AN ORGANIZED HEALTH CARE EDUCATION/TRAINING PROGRAM

## 2023-02-24 PROCEDURE — 37000008 HC ANESTHESIA 1ST 15 MINUTES: Performed by: INTERNAL MEDICINE

## 2023-02-24 PROCEDURE — D9220A PRA ANESTHESIA: Mod: CRNA,,, | Performed by: NURSE ANESTHETIST, CERTIFIED REGISTERED

## 2023-02-24 RX ORDER — INDOMETHACIN 50 MG/1
SUPPOSITORY RECTAL
Status: DISCONTINUED | OUTPATIENT
Start: 2023-02-24 | End: 2023-02-24 | Stop reason: HOSPADM

## 2023-02-24 RX ORDER — FENTANYL CITRATE 50 UG/ML
INJECTION, SOLUTION INTRAMUSCULAR; INTRAVENOUS
Status: DISCONTINUED | OUTPATIENT
Start: 2023-02-24 | End: 2023-02-24

## 2023-02-24 RX ORDER — PROPOFOL 10 MG/ML
VIAL (ML) INTRAVENOUS
Status: DISCONTINUED | OUTPATIENT
Start: 2023-02-24 | End: 2023-02-24

## 2023-02-24 RX ORDER — SODIUM CHLORIDE 0.9 % (FLUSH) 0.9 %
10 SYRINGE (ML) INJECTION
Status: DISCONTINUED | OUTPATIENT
Start: 2023-02-24 | End: 2023-02-24 | Stop reason: HOSPADM

## 2023-02-24 RX ORDER — LIDOCAINE HYDROCHLORIDE 20 MG/ML
INJECTION, SOLUTION EPIDURAL; INFILTRATION; INTRACAUDAL; PERINEURAL
Status: DISCONTINUED | OUTPATIENT
Start: 2023-02-24 | End: 2023-02-24

## 2023-02-24 RX ORDER — PROPOFOL 10 MG/ML
VIAL (ML) INTRAVENOUS CONTINUOUS PRN
Status: DISCONTINUED | OUTPATIENT
Start: 2023-02-24 | End: 2023-02-24

## 2023-02-24 RX ORDER — DEXAMETHASONE SODIUM PHOSPHATE 4 MG/ML
INJECTION, SOLUTION INTRA-ARTICULAR; INTRALESIONAL; INTRAMUSCULAR; INTRAVENOUS; SOFT TISSUE
Status: DISCONTINUED | OUTPATIENT
Start: 2023-02-24 | End: 2023-02-24

## 2023-02-24 RX ORDER — SODIUM CHLORIDE 9 MG/ML
INJECTION, SOLUTION INTRAVENOUS CONTINUOUS
Status: DISCONTINUED | OUTPATIENT
Start: 2023-02-24 | End: 2023-02-24 | Stop reason: HOSPADM

## 2023-02-24 RX ORDER — ONDANSETRON 2 MG/ML
INJECTION INTRAMUSCULAR; INTRAVENOUS
Status: DISCONTINUED | OUTPATIENT
Start: 2023-02-24 | End: 2023-02-24

## 2023-02-24 RX ADMIN — LIDOCAINE HYDROCHLORIDE 60 MG: 20 INJECTION, SOLUTION EPIDURAL; INFILTRATION; INTRACAUDAL; PERINEURAL at 08:02

## 2023-02-24 RX ADMIN — PROPOFOL 10 MG: 10 INJECTION, EMULSION INTRAVENOUS at 08:02

## 2023-02-24 RX ADMIN — PROPOFOL 20 MG: 10 INJECTION, EMULSION INTRAVENOUS at 08:02

## 2023-02-24 RX ADMIN — FENTANYL CITRATE 25 MCG: 50 INJECTION, SOLUTION INTRAMUSCULAR; INTRAVENOUS at 09:02

## 2023-02-24 RX ADMIN — GLYCOPYRROLATE 0.2 MG: 0.2 INJECTION, SOLUTION INTRAMUSCULAR; INTRAVENOUS at 09:02

## 2023-02-24 RX ADMIN — ONDANSETRON 4 MG: 2 INJECTION INTRAMUSCULAR; INTRAVENOUS at 09:02

## 2023-02-24 RX ADMIN — PROPOFOL 50 MG: 10 INJECTION, EMULSION INTRAVENOUS at 08:02

## 2023-02-24 RX ADMIN — SODIUM CHLORIDE: 9 INJECTION, SOLUTION INTRAVENOUS at 08:02

## 2023-02-24 RX ADMIN — Medication 150 MCG/KG/MIN: at 08:02

## 2023-02-24 RX ADMIN — DEXAMETHASONE SODIUM PHOSPHATE 4 MG: 4 INJECTION, SOLUTION INTRAMUSCULAR; INTRAVENOUS at 09:02

## 2023-02-24 NOTE — ANESTHESIA PREPROCEDURE EVALUATION
Pre-operative evaluation for Procedure(s) (LRB):  ULTRASOUND, UPPER GI TRACT, ENDOSCOPIC (N/A)  ERCP (ENDOSCOPIC RETROGRADE CHOLANGIOPANCREATOGRAPHY) (N/A)    Fabrice Zamorano is a 58 y.o. male w/ hx of HTN, BRAIN (on CPAP), hx of DVT, GERD, and acute on chronic pancreatitis who presents for the above procedure.       Prev airway (8/15/2022):   Mask Ventilation:  Easy mask    Attempts:  1    Attempted By:  CRNA    Method of Intubation:  Video laryngoscopy    Blade:  Bose 3    Laryngeal View Grade: Grade I - full view of cords      Difficult Airway Encountered?: No      Complications:  None    Airway Device:  Oral endotracheal tube    Airway Device Size:  7.5    Style/Cuff Inflation:  Cuffed (inflated to minimal occlusive pressure)    Inflation Amount (mL):  6    Tube secured:  22    Secured at:  The lips      EKG (8/16/2022):   Vent. Rate : 056 BPM     Atrial Rate : 056 BPM      P-R Int : 152 ms          QRS Dur : 084 ms       QT Int : 396 ms       P-R-T Axes : 022 036 058 degrees      QTc Int : 382 ms     Sinus bradycardia   Otherwise normal ECG   When compared with ECG of 23-MAR-2022 09:51,   Vent. rate has decreased BY  29 BPM   T wave amplitude has decreased in Anterior leads   T wave amplitude has increased in Lateral leads       2D Echo: none on record    Patient Active Problem List   Diagnosis    Iron deficiency anemia    Hypertension    Acute on chronic pancreatitis    Alcohol use    GERD (gastroesophageal reflux disease)    Facial palsy    Diverticular disease of colon    History of excision of intestinal structure    Hypothyroidism    Mixed hyperlipidemia    BRAIN on CPAP    Portal vein thrombosis    History of DVT (deep vein thrombosis)    Abnormal liver enzymes       Review of patient's allergies indicates:  No Known Allergies     No current facility-administered medications on file prior to encounter.     Current Outpatient Medications on File Prior to Encounter   Medication Sig Dispense  Refill    carvediloL (COREG) 6.25 MG tablet Take 3.125 mg by mouth 2 (two) times daily with meals.      ergocalciferol (ERGOCALCIFEROL) 50,000 unit Cap Take 50,000 Units by mouth every 7 days.      HYDROcodone-acetaminophen (NORCO)  mg per tablet Take 1 tablet by mouth every 6 (six) hours as needed.      ibuprofen (ADVIL,MOTRIN) 800 MG tablet Take 800 mg by mouth every 6 (six) hours as needed.      metFORMIN (GLUCOPHAGE-XR) 500 MG ER 24hr tablet Take 500 mg by mouth once daily.      multivitamin (THERAGRAN) per tablet Take 1 tablet by mouth once daily.      oxyCODONE (OXYCONTIN) 10 mg 12 hr tablet Take 10 mg by mouth every 12 (twelve) hours as needed for Pain.      PREVIDENT 5000 BOOSTER PLUS 1.1 % Pste BRUSH TEETH WITH PASTE TWICE DAILY. SPIT OUT EXCESS. DO NOT RINSE      promethazine (PHENERGAN) 25 MG tablet Take 1 tablet (25 mg total) by mouth every 6 (six) hours as needed for Nausea. (Patient not taking: Reported on 2/10/2023) 30 tablet 0       Past Surgical History:   Procedure Laterality Date    CHOLECYSTECTOMY      ENDOSCOPIC ULTRASOUND OF UPPER GASTROINTESTINAL TRACT N/A 10/12/2021    Procedure: ULTRASOUND, UPPER GI TRACT, ENDOSCOPIC;  Surgeon: Odalys Leiva MD;  Location: [facility];  Service: Endoscopy;  Laterality: N/A;  Upper and linear, 22 shark core, cytology and RPMI    ERCP N/A 9/29/2021    Procedure: ERCP (ENDOSCOPIC RETROGRADE CHOLANGIOPANCREATOGRAPHY);  Surgeon: Odalys Leiva MD;  Location: [facility];  Service: Endoscopy;  Laterality: N/A;    ERCP N/A 8/15/2022    Procedure: ERCP (ENDOSCOPIC RETROGRADE CHOLANGIOPANCREATOGRAPHY);  Surgeon: Odalys Leiva MD;  Location: [facility];  Service: Endoscopy;  Laterality: N/A;    ERCP W/ PLASTIC STENT PLACEMENT      LATERAL PANCREATICOJEJUNOSTOMY N/A 11/19/2021    Procedure: PANCREATICOJEJUNOSTOMY, SIDE-TO-SIDE tier 1;  Surgeon: Brian Hills MD;  Location: [address];  Service: General;   Laterality: N/A;       Social History     Socioeconomic History    Marital status:    Tobacco Use    Smoking status: Former     Types: Cigarettes     Quit date: 2001     Years since quittin.5    Smokeless tobacco: Never   Substance and Sexual Activity    Alcohol use: Not Currently    Drug use: Never    Sexual activity: Yes         Vital Signs Range (Last 24H):         CBC: No results for input(s): WBC, RBC, HGB, HCT, PLT, MCV, MCH, MCHC in the last 72 hours.    CMP: No results for input(s): NA, K, CL, CO2, BUN, CREATININE, GLU, MG, PHOS, CALCIUM, ALBUMIN, PROT, ALKPHOS, ALT, AST, BILITOT in the last 72 hours.    INR  No results for input(s): PT, INR, PROTIME, APTT in the last 72 hours.          Pre-op Assessment    I have reviewed the Patient Summary Reports.     I have reviewed the Nursing Notes. I have reviewed the NPO Status.   I have reviewed the Medications.     Review of Systems  Anesthesia Hx:   Denies Personal Hx of Anesthesia complications.   Cardiovascular:   Hypertension    Pulmonary:   Sleep Apnea    Renal/:  Renal/ Normal     Hepatic/GI:   GERD    Neurological:   Neuromuscular Disease,    Endocrine:   Hypothyroidism        Physical Exam  General: Alert and Oriented    Airway:  Mallampati: II   Mouth Opening: Normal  TM Distance: Normal  Tongue: Normal    Dental:  Intact    Chest/Lungs:  Clear to auscultation, Normal Respiratory Rate    Heart:  Rate: Normal  Rhythm: Regular Rhythm        Anesthesia Plan  Type of Anesthesia, risks & benefits discussed:    Anesthesia Type: Gen Natural Airway  Intra-op Monitoring Plan: Standard ASA Monitors  Post Op Pain Control Plan: IV/PO Opioids PRN and multimodal analgesia  Induction:  IV  Informed Consent: Informed consent signed with the Patient and all parties understand the risks and agree with anesthesia plan.  All questions answered.   ASA Score: 3  Day of Surgery Review of History & Physical: H&P Update referred to the  surgeon/provider.    Ready For Surgery From Anesthesia Perspective.     .

## 2023-02-24 NOTE — PROVATION PATIENT INSTRUCTIONS
Discharge Summary/Instructions after an Endoscopic Procedure  Patient Name: Fabrice Zamorano  Patient MRN: [id number]  Patient YOB: 1965 Friday, February 24, 2023  Shawn Chisholm MD  Dear patient,  As a result of recent federal legislation (The Federal Cures Act), you may   receive lab or pathology results from your procedure in your [facility]   account before your physician is able to contact you. Your physician or   their representative will relay the results to you with their   recommendations at their soonest availability.  Thank you,  RESTRICTIONS:  During your procedure today, you received medications for sedation.  These   medications may affect your judgment, balance and coordination.  Therefore,   for 24 hours, you have the following restrictions:   - DO NOT drive a car, operate machinery, make legal/financial decisions,   sign important papers or drink alcohol.    ACTIVITY:  Today: no heavy lifting, straining or running due to procedural   sedation/anesthesia.  The following day: return to full activity including work.  DIET:  Eat and drink normally unless instructed otherwise.     TREATMENT FOR COMMON SIDE EFFECTS:  - Mild abdominal pain, nausea, belching, bloating or excessive gas:  rest,   eat lightly and use a heating pad.  - Sore Throat: treat with throat lozenges and/or gargle with warm salt   water.  - Because air was used during the procedure, expelling large amounts of air   from your rectum or belching is normal.  - If a bowel prep was taken, you may not have a bowel movement for 1-3 days.    This is normal.  SYMPTOMS TO WATCH FOR AND REPORT TO YOUR PHYSICIAN:  1. Abdominal pain or bloating, other than gas cramps.  2. Chest pain.  3. Back pain.  4. Signs of infection such as: chills or fever occurring within 24 hours   after the procedure.  5. Rectal bleeding, which would show as bright red, maroon, or black stools.   (A tablespoon of blood from the rectum is not serious, especially if    hemorrhoids are present.)  6. Vomiting.  7. Weakness or dizziness.  GO DIRECTLY TO THE NEAREST EMERGENCY ROOM IF YOU HAVE ANY OF THE FOLLOWING:      Difficulty breathing              Chills and/or fever over 101 F   Persistent vomiting and/or vomiting blood   Severe abdominal pain   Severe chest pain   Black, tarry stools   Bleeding- more than one tablespoon   Any other symptom or condition that you feel may need urgent attention  Your doctor recommends these additional instructions:  If any biopsies were taken, your doctors clinic will contact you in 1 to 2   weeks with any results.  - Discharge patient to home (ambulatory).   - Resume previous diet; Discharge to home (ambulatory); Resume outpatient   medications  - Return to primary care physician as previously scheduled.   - Perform an ERCP today.  For questions, problems or results please call your physician - Shawn Chisholm MD at Work:  [phone].  [facility] EMERGENCY ROOM PHONE NUMBER: [phone]  IF A COMPLICATION OR EMERGENCY SITUATION ARISES AND YOU ARE UNABLE TO REACH   YOUR PHYSICIAN - GO DIRECTLY TO THE EMERGENCY ROOM.  Shawn Chisholm MD  2/24/2023 9:50:00 AM  This report has been verified and signed electronically.  Dear patient,  As a result of recent federal legislation (The Federal Cures Act), you may   receive lab or pathology results from your procedure in your [facility]   account before your physician is able to contact you. Your physician or   their representative will relay the results to you with their   recommendations at their soonest availability.  Thank you,  PROVATION

## 2023-02-24 NOTE — TRANSFER OF CARE
"Anesthesia Transfer of Care Note    Patient: Fabrice Zamorano    Procedure(s) Performed: Procedure(s) (LRB):  ULTRASOUND, UPPER GI TRACT, ENDOSCOPIC (N/A)  ERCP (ENDOSCOPIC RETROGRADE CHOLANGIOPANCREATOGRAPHY) (N/A)    Patient location: [facility]    Anesthesia Type: general    Transport from OR: Transported from OR on room air with adequate spontaneous ventilation    Post pain: adequate analgesia    Post assessment: no apparent anesthetic complications    Post vital signs: stable    Level of consciousness: awake    Nausea/Vomiting: no nausea/vomiting    Complications: none    Transfer of care protocol was followed      Last vitals:   Visit Vitals  /61   Pulse (!) 55   Temp 36.7 °C (98.1 °F) (Temporal)   Resp 16   Ht 5' 11" (1.803 m)   Wt 93 kg (205 lb)   SpO2 99%   BMI 28.59 kg/m²     "

## 2023-02-24 NOTE — PROVATION PATIENT INSTRUCTIONS
Discharge Summary/Instructions after an Endoscopic Procedure  Patient Name: Fabrice Zamorano  Patient MRN: [id number]  Patient YOB: 1965 Friday, February 24, 2023  Shawn Chisholm MD  Dear patient,  As a result of recent federal legislation (The Federal Cures Act), you may   receive lab or pathology results from your procedure in your [facility]   account before your physician is able to contact you. Your physician or   their representative will relay the results to you with their   recommendations at their soonest availability.  Thank you,  RESTRICTIONS:  During your procedure today, you received medications for sedation.  These   medications may affect your judgment, balance and coordination.  Therefore,   for 24 hours, you have the following restrictions:   - DO NOT drive a car, operate machinery, make legal/financial decisions,   sign important papers or drink alcohol.    ACTIVITY:  Today: no heavy lifting, straining or running due to procedural   sedation/anesthesia.  The following day: return to full activity including work.  DIET:  Eat and drink normally unless instructed otherwise.     TREATMENT FOR COMMON SIDE EFFECTS:  - Mild abdominal pain, nausea, belching, bloating or excessive gas:  rest,   eat lightly and use a heating pad.  - Sore Throat: treat with throat lozenges and/or gargle with warm salt   water.  - Because air was used during the procedure, expelling large amounts of air   from your rectum or belching is normal.  - If a bowel prep was taken, you may not have a bowel movement for 1-3 days.    This is normal.  SYMPTOMS TO WATCH FOR AND REPORT TO YOUR PHYSICIAN:  1. Abdominal pain or bloating, other than gas cramps.  2. Chest pain.  3. Back pain.  4. Signs of infection such as: chills or fever occurring within 24 hours   after the procedure.  5. Rectal bleeding, which would show as bright red, maroon, or black stools.   (A tablespoon of blood from the rectum is not serious, especially if    hemorrhoids are present.)  6. Vomiting.  7. Weakness or dizziness.  GO DIRECTLY TO THE NEAREST EMERGENCY ROOM IF YOU HAVE ANY OF THE FOLLOWING:      Difficulty breathing              Chills and/or fever over 101 F   Persistent vomiting and/or vomiting blood   Severe abdominal pain   Severe chest pain   Black, tarry stools   Bleeding- more than one tablespoon   Any other symptom or condition that you feel may need urgent attention  Your doctor recommends these additional instructions:  If any biopsies were taken, your doctors clinic will contact you in 1 to 2   weeks with any results.  - Discharge patient to home (ambulatory).   - Resume previous diet; Discharge to home (ambulatory); Resume outpatient   medications  - Return to primary care physician as previously scheduled.   - Return to my office in 6 months.  For questions, problems or results please call your physician - Shawn Chisholm MD at Work:  [phone].  [facility] [place], EMERGENCY ROOM PHONE NUMBER: [phone]  IF A COMPLICATION OR EMERGENCY SITUATION ARISES AND YOU ARE UNABLE TO REACH   YOUR PHYSICIAN - GO DIRECTLY TO THE EMERGENCY ROOM.  Shawn Chisholm MD  2/24/2023 10:03:48 AM  This report has been verified and signed electronically.  Dear patient,  As a result of recent federal legislation (The Federal Cures Act), you may   receive lab or pathology results from your procedure in your [facility]   account before your physician is able to contact you. Your physician or   their representative will relay the results to you with their   recommendations at their soonest availability.  Thank you,  PROVATION

## 2023-02-24 NOTE — H&P
Short Stay Endoscopy History and Physical    PCP - Rebecca Jackson MD  Referring Physician - Shawn Chisholm MD  [address] [place] [address]    Procedure - EUS/ERCP  ASA - per anesthesia  Mallampati - per anesthesia  History of Anesthesia problems - no  Family history Anesthesia problems -  no   Plan of anesthesia - General    HPI:  This is a 58 y.o. male here for evaluation of: pancreatic duct stricture    Reflux - no  Dysphagia - no  Abdominal pain - POS  Diarrhea - no    ROS:  Constitutional: No fevers, chills, No weight loss  CV: No chest pain  Pulm: No cough, No shortness of breath  GI: see HPI    Medical History:  has a past medical history of Anticoagulant long-term use, GERD (gastroesophageal reflux disease), Hypertension, Hypothyroidism (10/26/2021), Liver disease, Pancreatic pseudocyst, Personal history of colonic polyps, Sleep apnea, and Splenic vein thrombosis.    Surgical History:  has a past surgical history that includes ERCP w/ plastic stent placement; Cholecystectomy; ERCP (N/A, 09/29/2021); Endoscopic ultrasound of upper gastrointestinal tract (N/A, 10/12/2021); Lateral pancreaticojejunostomy (N/A, 11/19/2021); ERCP (N/A, 08/15/2022); and Colonoscopy.    Family History: family history includes Arthritis in his father; Cancer in his father; Pancreatic cancer in his father; Thyroid disease in his father and mother..    Social History:  reports that he quit smoking about 21 years ago. His smoking use included cigarettes. He has never used smokeless tobacco. He reports that he does not currently use alcohol. He reports that he does not use drugs.    Review of patient's allergies indicates:  No Known Allergies    Medications:   Medications Prior to Admission   Medication Sig Dispense Refill Last Dose    carvediloL (COREG) 6.25 MG tablet Take 3.125 mg by mouth 2 (two) times daily with meals.   2/24/2023 at 0500    HYDROcodone-acetaminophen (NORCO)  mg per tablet Take 1 tablet by  mouth every 6 (six) hours as needed.   Past Month    ibuprofen (ADVIL,MOTRIN) 800 MG tablet Take 800 mg by mouth every 6 (six) hours as needed.   Past Week    lipase-protease-amylase (PANCREAZE) 16,800-56,800- 98,400 unit CpDR TAKE ONE CAPSULE BY MOUTH BEFORE MEALS AND BEFORE A SNACK FOR A TOTAL OF FOUR TIMES DAILY 120 capsule 2 2/23/2023    metFORMIN (GLUCOPHAGE-XR) 500 MG ER 24hr tablet Take 500 mg by mouth once daily.   2/23/2023    multivitamin (THERAGRAN) per tablet Take 1 tablet by mouth once daily.   2/23/2023    oxyCODONE (OXYCONTIN) 10 mg 12 hr tablet Take 10 mg by mouth every 12 (twelve) hours as needed for Pain.   Past Month    pantoprazole (PROTONIX) 40 MG tablet Take 1 tablet (40 mg total) by mouth once daily. 90 tablet 0 2/23/2023    pregabalin (LYRICA) 50 MG capsule Take 2 capsules (100 mg total) by mouth every evening. 60 capsule 1 2/23/2023    promethazine (PHENERGAN) 25 MG tablet Take 1 tablet (25 mg total) by mouth every 6 (six) hours as needed for Nausea. 30 tablet 0 Past Week    ergocalciferol (ERGOCALCIFEROL) 50,000 unit Cap Take 50,000 Units by mouth every 7 days.       PREVIDENT 5000 BOOSTER PLUS 1.1 % Pste BRUSH TEETH WITH PASTE TWICE DAILY. SPIT OUT EXCESS. DO NOT RINSE          Physical Exam:    Vital Signs:   Vitals:    02/24/23 0818   BP: (!) 145/91   Pulse:    Resp:    Temp:        General Appearance: Well appearing in no acute distress  Eyes:    No scleral icterus  Lungs: no labored breathing  Heart:  Regular  Abdomen: non tender    Labs:  Lab Results   Component Value Date    WBC 7.26 12/21/2022    HGB 15.4 12/21/2022    HCT 47.2 12/21/2022     (L) 12/21/2022    CHOL 140 09/27/2021    TRIG 75 09/27/2021    HDL 24 (L) 09/27/2021    ALT 26 01/06/2023    AST 23 01/06/2023     01/06/2023    K 4.1 01/06/2023     01/06/2023    CREATININE 1.0 01/06/2023    BUN 17 01/06/2023    CO2 27 01/06/2023    INR 1.0 11/17/2022       I have explained the risks and benefits of this  endoscopic procedure to the patient including but not limited to bleeding, inflammation, infection, perforation, and death.      Shawn Chisholm MD

## 2023-02-27 NOTE — ANESTHESIA POSTPROCEDURE EVALUATION
Anesthesia Post Evaluation    Patient: Fabrice Zamorano    Procedure(s) Performed: Procedure(s) (LRB):  ULTRASOUND, UPPER GI TRACT, ENDOSCOPIC (N/A)  ERCP (ENDOSCOPIC RETROGRADE CHOLANGIOPANCREATOGRAPHY) (N/A)    Final Anesthesia Type: general      Patient location during evaluation: PACU  Patient participation: Yes- Able to Participate  Level of consciousness: awake  Post-procedure vital signs: reviewed and stable  Pain management: adequate  Airway patency: patent    PONV status at discharge: No PONV  Anesthetic complications: no      Cardiovascular status: blood pressure returned to baseline  Respiratory status: unassisted, spontaneous ventilation and room air            Vitals Value Taken Time   /90 02/24/23 1033   Temp 36.7 °C (98 °F) 02/24/23 0940   Pulse 46 02/24/23 1043   Resp 30 02/24/23 1043   SpO2 99 % 02/24/23 1043   Vitals shown include unvalidated device data.      No case tracking events are documented in the log.      Pain/Kellie Score: No data recorded

## 2023-03-07 ENCOUNTER — TELEPHONE (OUTPATIENT)
Dept: SURGERY | Facility: CLINIC | Age: 58
End: 2023-03-07
Payer: COMMERCIAL

## 2023-03-07 DIAGNOSIS — K86.1 ACUTE ON CHRONIC PANCREATITIS: Primary | ICD-10-CM

## 2023-03-07 DIAGNOSIS — K85.90 ACUTE ON CHRONIC PANCREATITIS: Primary | ICD-10-CM

## 2023-03-07 NOTE — TELEPHONE ENCOUNTER
Call placed to pt. Informed of need of CT per Dr Hills's verbal order. Time date location and fasting instructions provided. Advised pt to arrive early for oral contrast. Pt verbally acknowledged all appt details.

## 2023-03-08 ENCOUNTER — OFFICE VISIT (OUTPATIENT)
Dept: SURGERY | Facility: CLINIC | Age: 58
End: 2023-03-08
Payer: COMMERCIAL

## 2023-03-08 ENCOUNTER — HOSPITAL ENCOUNTER (OUTPATIENT)
Dept: RADIOLOGY | Facility: HOSPITAL | Age: 58
Discharge: HOME | End: 2023-03-08
Attending: SURGERY
Payer: COMMERCIAL

## 2023-03-08 VITALS
BODY MASS INDEX: 29.1 KG/M2 | WEIGHT: 207.88 LBS | DIASTOLIC BLOOD PRESSURE: 80 MMHG | HEART RATE: 55 BPM | OXYGEN SATURATION: 98 % | SYSTOLIC BLOOD PRESSURE: 135 MMHG | HEIGHT: 71 IN

## 2023-03-08 DIAGNOSIS — K86.1 ACUTE ON CHRONIC PANCREATITIS: ICD-10-CM

## 2023-03-08 DIAGNOSIS — K85.90 ACUTE ON CHRONIC PANCREATITIS: ICD-10-CM

## 2023-03-08 DIAGNOSIS — K86.1 OTHER CHRONIC PANCREATITIS: Primary | ICD-10-CM

## 2023-03-08 PROCEDURE — 99214 OFFICE O/P EST MOD 30 MIN: CPT | Mod: S$GLB,,, | Performed by: SURGERY

## 2023-03-08 PROCEDURE — 74177 CT ABD & PELVIS W/CONTRAST: CPT | Mod: TC

## 2023-03-08 PROCEDURE — 3008F BODY MASS INDEX DOCD: CPT | Mod: CPTII,S$GLB,, | Performed by: SURGERY

## 2023-03-08 PROCEDURE — 3079F DIAST BP 80-89 MM HG: CPT | Mod: CPTII,S$GLB,, | Performed by: SURGERY

## 2023-03-08 PROCEDURE — 3008F PR BODY MASS INDEX (BMI) DOCUMENTED: ICD-10-PCS | Mod: CPTII,S$GLB,, | Performed by: SURGERY

## 2023-03-08 PROCEDURE — 1159F PR MEDICATION LIST DOCUMENTED IN MEDICAL RECORD: ICD-10-PCS | Mod: CPTII,S$GLB,, | Performed by: SURGERY

## 2023-03-08 PROCEDURE — 74177 CT ABD & PELVIS W/CONTRAST: CPT | Mod: 26,,, | Performed by: STUDENT IN AN ORGANIZED HEALTH CARE EDUCATION/TRAINING PROGRAM

## 2023-03-08 PROCEDURE — 25500020 PHARM REV CODE 255: Performed by: SURGERY

## 2023-03-08 PROCEDURE — 3075F PR MOST RECENT SYSTOLIC BLOOD PRESS GE 130-139MM HG: ICD-10-PCS | Mod: CPTII,S$GLB,, | Performed by: SURGERY

## 2023-03-08 PROCEDURE — 99999 PR PBB SHADOW E&M-EST. PATIENT-LVL III: ICD-10-PCS | Mod: PBBFAC,,, | Performed by: SURGERY

## 2023-03-08 PROCEDURE — 3075F SYST BP GE 130 - 139MM HG: CPT | Mod: CPTII,S$GLB,, | Performed by: SURGERY

## 2023-03-08 PROCEDURE — 1159F MED LIST DOCD IN RCRD: CPT | Mod: CPTII,S$GLB,, | Performed by: SURGERY

## 2023-03-08 PROCEDURE — 74177 CT ABDOMEN PELVIS WITH CONTRAST: ICD-10-PCS | Mod: 26,,, | Performed by: STUDENT IN AN ORGANIZED HEALTH CARE EDUCATION/TRAINING PROGRAM

## 2023-03-08 PROCEDURE — 99214 PR OFFICE/OUTPT VISIT, EST, LEVL IV, 30-39 MIN: ICD-10-PCS | Mod: S$GLB,,, | Performed by: SURGERY

## 2023-03-08 PROCEDURE — 99999 PR PBB SHADOW E&M-EST. PATIENT-LVL III: CPT | Mod: PBBFAC,,, | Performed by: SURGERY

## 2023-03-08 PROCEDURE — 3079F PR MOST RECENT DIASTOLIC BLOOD PRESSURE 80-89 MM HG: ICD-10-PCS | Mod: CPTII,S$GLB,, | Performed by: SURGERY

## 2023-03-08 RX ADMIN — IOHEXOL 100 ML: 350 INJECTION, SOLUTION INTRAVENOUS at 09:03

## 2023-03-08 NOTE — PROGRESS NOTES
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 He underwent [place] with some limited parenchymal debulking in November of 2021 for significant crescendo acute pancreatitis with good result.  He has had several scans over the past year that showed initially great radiographic result but now progressive inflammatory response in the pancreatic head with a reasonably normal body/tail.    He has since had EUS/ERCP that demonstrated good drainage through his ampulla and into the jejunum from the [place].    He comes to see me today with concerns of small midline hernia.        Hernia not really bothering him, and I think non-urgent. He is set for screening for panc cancer and we can follow both his clinical pancreatitis and hernia from this. I do think if he should have worsening pancreatitis symptoms unfortunately his best recourse given his PRSS mutation would be a completion total pancreatectomy/whipple.    I spent 30 minutes with Mr. Zamorano, with >50% of time spent face to face and additional time preparing to see the patient (eg, review of tests), obtaining and/or reviewing separately obtained history, documenting clinical information in the electronic or other health record, independently interpreting results (not separately reported) and communicating results to the patient/family/caregiver, or Care coordination (not separately reported).           Brian Hills MD  Upper GI / Hepatobiliary Surgical Oncology  [facility] [place], [place]  Office: [phone]  Fax: [phone]

## 2023-03-09 ENCOUNTER — OFFICE VISIT (OUTPATIENT)
Dept: PAIN MEDICINE | Facility: CLINIC | Age: 58
End: 2023-03-09
Payer: COMMERCIAL

## 2023-03-09 VITALS
HEIGHT: 71 IN | BODY MASS INDEX: 28.95 KG/M2 | HEART RATE: 69 BPM | WEIGHT: 206.81 LBS | RESPIRATION RATE: 16 BRPM | DIASTOLIC BLOOD PRESSURE: 69 MMHG | SYSTOLIC BLOOD PRESSURE: 116 MMHG

## 2023-03-09 DIAGNOSIS — K86.1 CHRONIC PANCREATITIS, UNSPECIFIED PANCREATITIS TYPE: ICD-10-CM

## 2023-03-09 DIAGNOSIS — K86.1 ACUTE ON CHRONIC PANCREATITIS: ICD-10-CM

## 2023-03-09 DIAGNOSIS — K85.90 ACUTE ON CHRONIC PANCREATITIS: ICD-10-CM

## 2023-03-09 DIAGNOSIS — R11.0 NAUSEA: ICD-10-CM

## 2023-03-09 PROCEDURE — 1160F RVW MEDS BY RX/DR IN RCRD: CPT | Mod: CPTII,S$GLB,, | Performed by: PHYSICIAN ASSISTANT

## 2023-03-09 PROCEDURE — 3008F PR BODY MASS INDEX (BMI) DOCUMENTED: ICD-10-PCS | Mod: CPTII,S$GLB,, | Performed by: PHYSICIAN ASSISTANT

## 2023-03-09 PROCEDURE — 99999 PR PBB SHADOW E&M-EST. PATIENT-LVL III: CPT | Mod: PBBFAC,,, | Performed by: PHYSICIAN ASSISTANT

## 2023-03-09 PROCEDURE — 3074F PR MOST RECENT SYSTOLIC BLOOD PRESSURE < 130 MM HG: ICD-10-PCS | Mod: CPTII,S$GLB,, | Performed by: PHYSICIAN ASSISTANT

## 2023-03-09 PROCEDURE — 3008F BODY MASS INDEX DOCD: CPT | Mod: CPTII,S$GLB,, | Performed by: PHYSICIAN ASSISTANT

## 2023-03-09 PROCEDURE — 99999 PR PBB SHADOW E&M-EST. PATIENT-LVL III: ICD-10-PCS | Mod: PBBFAC,,, | Performed by: PHYSICIAN ASSISTANT

## 2023-03-09 PROCEDURE — 99214 PR OFFICE/OUTPT VISIT, EST, LEVL IV, 30-39 MIN: ICD-10-PCS | Mod: S$GLB,,, | Performed by: PHYSICIAN ASSISTANT

## 2023-03-09 PROCEDURE — 1159F MED LIST DOCD IN RCRD: CPT | Mod: CPTII,S$GLB,, | Performed by: PHYSICIAN ASSISTANT

## 2023-03-09 PROCEDURE — 3078F DIAST BP <80 MM HG: CPT | Mod: CPTII,S$GLB,, | Performed by: PHYSICIAN ASSISTANT

## 2023-03-09 PROCEDURE — 1160F PR REVIEW ALL MEDS BY PRESCRIBER/CLIN PHARMACIST DOCUMENTED: ICD-10-PCS | Mod: CPTII,S$GLB,, | Performed by: PHYSICIAN ASSISTANT

## 2023-03-09 PROCEDURE — 1159F PR MEDICATION LIST DOCUMENTED IN MEDICAL RECORD: ICD-10-PCS | Mod: CPTII,S$GLB,, | Performed by: PHYSICIAN ASSISTANT

## 2023-03-09 PROCEDURE — 3078F PR MOST RECENT DIASTOLIC BLOOD PRESSURE < 80 MM HG: ICD-10-PCS | Mod: CPTII,S$GLB,, | Performed by: PHYSICIAN ASSISTANT

## 2023-03-09 PROCEDURE — 99214 OFFICE O/P EST MOD 30 MIN: CPT | Mod: S$GLB,,, | Performed by: PHYSICIAN ASSISTANT

## 2023-03-09 PROCEDURE — 3074F SYST BP LT 130 MM HG: CPT | Mod: CPTII,S$GLB,, | Performed by: PHYSICIAN ASSISTANT

## 2023-03-09 RX ORDER — PREGABALIN 50 MG/1
100 CAPSULE ORAL NIGHTLY
Qty: 60 CAPSULE | Refills: 2 | Status: SHIPPED | OUTPATIENT
Start: 2023-03-09 | End: 2023-06-10 | Stop reason: SDUPTHER

## 2023-03-09 NOTE — PROGRESS NOTES
Interventional Pain Progress Note       Referring Physician: No ref. provider found    PCP: Rebecca Jackson MD    Chief Complaint:   No chief complaint on file.    Interval History (3/9/2023):  Fabrice Zamorano presents today for follow-up visit.  Patient was last seen on 12/2/2022. At that visit, the plan was to continue Lyrica for abdominal pain and consider celiac plexus block should pain worsen.  He reports Lyrica has been in game changer and has significantly improved his pain.  He continues to be followed by GI and recently had a repeat ERCP.  He also recently had an updated CT of his abdomen which demonstrated a hernia, he reports Dr. Hills stated that at some point in the future he will likely need a hernia repair, but this was deferred at this time. Patient reports pain as just mild discomfort today today.  He takes Lyrica 100 mg nightly and promethazine as needed for nausea.  He is requesting a refill of his Lyrica.      Interval History (12/02/2022):  Patient returns to clinic for evaluation of abdominal pain.  Patient reports that since last being seen his pain has overall improved.  He reports having 1 episode of pancreatitis pain every 3-4 weeks that lasts approximately 24-36 hours.  He also reports mild epigastric burning pain with food or liquids, that is typically relieved with standing from food.  Pain is currently rated at 0/10.       SUBJECTIVE:    Fabrice Zamorano is a 58 y.o. male who presents to the clinic for the evaluation of  abdominal pain. The pain started 6 years ago followingsymptoms have been worsening.The pain is located in the RUQ and radiate in band across his abdomen to his thoracic spine and to his right shoulder.The pain is described as aching, crushing, dull, sharp, shooting, stabbing and tight band and is rated as 5/10. The pain is rated with a score of  2/10 on the BEST day and a score of 10/10 on the WORST day.  Symptoms interfere with daily activity. The pain is  exacerbated by eating a mean.  The pain is mitigated by fasting.         Non-Pharmacologic Treatments:  Physical Therapy/Home Exercise: no  Ice/Heat:no  TENS: no  Acupuncture: no  Massage: no  Chiropractic: no        Previous Pain Medications:  Tylenol, ASA, Percocet     report:  Reviewed and consistent with medication use as prescribed.    Pain Procedures:   None      Imaging:   CT Abdomen Pelvis With Contrast  Narrative: EXAMINATION:  CT ABDOMEN PELVIS WITH CONTRAST    CLINICAL HISTORY:  pancreatitis;Acute pancreatitis without necrosis or infection, unspecified    TECHNIQUE:  Low dose axial images were obtained from the lung bases to the pubic symphysis following the intravenous administration of 100 cc of Omnipaque 350.  Sagittal and coronal reformats were provided.    COMPARISON:  Abdominal ultrasound 01/11/2023.  CT abdomen pelvis 08/16/2022, 07/18/2022.    FINDINGS:  Heart: Normal in size.  No pericardial effusion.    Lung bases: Right lower lobe calcified granuloma.  No consolidation, pleural effusion, or pneumothorax.    Liver: Normal in size.  Diffusely decreased parenchymal attenuation suggesting steatosis.  Wedge-shaped focus of relative parenchymal hyperenhancement in the inferior right hepatic lobe, likely transient hepatic attenuation difference.  No suspicious focal hepatic abnormality.  Portal vein is patent    Gallbladder: Surgically absent.    Bile Ducts: No intra or extrahepatic biliary ductal dilation.    Pancreas: Postoperative change including pancreaticojejunostomy.  Slightly decreased edematous enlargement of the pancreatic head.  Persistent stranding about the pancreaticoduodenal junction, slightly decreased compared to prior.  Stable appearance of the pancreatic body and tail.  Similar prominent peripancreatic nodes, nonenlarged by size criteria.    Spleen: Mildly enlarged, measuring 13.4 cm.    Adrenals: Unremarkable.    Kidneys/ Ureters: Normal in size and location.  Kidneys enhance  symmetrically.  Stable right superior pole renal cyst.  No suspicious focal renal abnormality or nephrolithiasis.  No hydroureteronephrosis.    Bladder: Smooth contours without bladder wall thickening.    Reproductive organs: Prostatomegaly.    GI Tract/Mesentery: The stomach is unremarkable.  Postoperative change of pancreaticojejunostomy as above.  Continued wall thickening of the duodenum adjacent to the pancreatic head.  No evidence of bowel obstruction.  Few colonic diverticula.  Moderate colonic stool.    Peritoneal Space: No intraperitoneal free fluid or free air. No pathologically enlarged lymph nodes.    Retroperitoneum: No significant adenopathy.    Abdominal wall/extraperitoneal soft tissues: Stable small fat-containing ventral abdominal wall hernias.    Vasculature: Abdominal aorta is normal in caliber, contour, and course .  Mild aortoiliac calcific atherosclerosis.  Splenic vein is not definitively visualized.  Perisplenic collaterals and gastric varices    Bones: Degenerative changes without acute fracture or bone destructive process.  Stable sclerotic focus of the right ilium.  Impression: 1. Postoperative changes of pancreaticojejunostomy.  Slightly decreased edematous enlarged of the pancreatic head with slightly improved peripancreatic inflammatory changes.  Similar prominent peripancreatic lymph nodes.  2. Continued wall thickening of the duodenum adjacent to the pancreatic head, possible reactive duodenitis.  3. Mild splenomegaly, perisplenic collaterals, gastric varices.  Splenic vein is not definitively visualized and could be thrombosed.  4. Suspect hepatic steatosis. Consider correlation with LFTs.  5. Moderate colonic stool.  Consider correlation for constipation.  6. Additional findings as above.  This report was flagged in Epic as abnormal.    Electronically signed by resident: Logan Valiente  Date:    03/08/2023  Time:    10:29    Electronically signed by: Ronald  Foto  Date:    03/08/2023  Time:    11:24            Past Medical History:   Diagnosis Date    Anticoagulant long-term use     On Eliquis for DVT    GERD (gastroesophageal reflux disease)     Hypertension     Hypothyroidism 10/26/2021    Liver disease     fatty liver    Pancreatic pseudocyst     Personal history of colonic polyps     Sleep apnea     Splenic vein thrombosis      Past Surgical History:   Procedure Laterality Date    CHOLECYSTECTOMY      COLONOSCOPY      ENDOSCOPIC ULTRASOUND OF UPPER GASTROINTESTINAL TRACT N/A 10/12/2021    Procedure: ULTRASOUND, UPPER GI TRACT, ENDOSCOPIC;  Surgeon: Odalys Leiva MD;  Location: [facility];  Service: Endoscopy;  Laterality: N/A;  Upper and linear, 22 shark core, cytology and RPMI    ENDOSCOPIC ULTRASOUND OF UPPER GASTROINTESTINAL TRACT N/A 2/24/2023    Procedure: ULTRASOUND, UPPER GI TRACT, ENDOSCOPIC;  Surgeon: Shawn Chisholm MD;  Location: [facility] (2ND FLR);  Service: Endoscopy;  Laterality: N/A;  2/3/23-Instructions via portal-DS    ERCP N/A 09/29/2021    Procedure: ERCP (ENDOSCOPIC RETROGRADE CHOLANGIOPANCREATOGRAPHY);  Surgeon: Odalys Leiva MD;  Location: [facility];  Service: Endoscopy;  Laterality: N/A;    ERCP N/A 08/15/2022    Procedure: ERCP (ENDOSCOPIC RETROGRADE CHOLANGIOPANCREATOGRAPHY);  Surgeon: Odalys Leiva MD;  Location: [facility];  Service: Endoscopy;  Laterality: N/A;    ERCP N/A 2/24/2023    Procedure: ERCP (ENDOSCOPIC RETROGRADE CHOLANGIOPANCREATOGRAPHY);  Surgeon: Shawn Chisholm MD;  [facility]  Service: Endoscopy;  Laterality: N/A;    ERCP W/ PLASTIC STENT PLACEMENT      LATERAL PANCREATICOJEJUNOSTOMY N/A 11/19/2021    Procedure: PANCREATICOJEJUNOSTOMY, SIDE-TO-SIDE tier 1;  Surgeon: Brian Hills MD;  Location: [facility] OR [address] [place] [address];  Service: General;  Laterality: N/A;     Social History     Socioeconomic History    Marital status:    Tobacco Use    Smoking status: Former      Types: Cigarettes     Quit date: 2001     Years since quittin.5    Smokeless tobacco: Never   Substance and Sexual Activity    Alcohol use: Not Currently    Drug use: Never    Sexual activity: Yes     Family History   Problem Relation Age of Onset    Thyroid disease Mother     Cancer Father     Pancreatic cancer Father     Arthritis Father     Thyroid disease Father        Review of patient's allergies indicates:  No Known Allergies    Current Outpatient Medications   Medication Sig    carvediloL (COREG) 6.25 MG tablet Take 3.125 mg by mouth 2 (two) times daily with meals.    ergocalciferol (ERGOCALCIFEROL) 50,000 unit Cap Take 50,000 Units by mouth every 7 days.    HYDROcodone-acetaminophen (NORCO)  mg per tablet Take 1 tablet by mouth every 6 (six) hours as needed.    ibuprofen (ADVIL,MOTRIN) 800 MG tablet Take 800 mg by mouth every 6 (six) hours as needed.    lipase-protease-amylase (PANCREAZE) 16,800-56,800- 98,400 unit CpDR TAKE ONE CAPSULE BY MOUTH BEFORE MEALS AND BEFORE A SNACK FOR A TOTAL OF FOUR TIMES DAILY    metFORMIN (GLUCOPHAGE-XR) 500 MG ER 24hr tablet Take 500 mg by mouth once daily.    multivitamin (THERAGRAN) per tablet Take 1 tablet by mouth once daily.    oxyCODONE (OXYCONTIN) 10 mg 12 hr tablet Take 10 mg by mouth every 12 (twelve) hours as needed for Pain.    pantoprazole (PROTONIX) 40 MG tablet Take 1 tablet (40 mg total) by mouth once daily.    PREVIDENT 5000 BOOSTER PLUS 1.1 % Pste BRUSH TEETH WITH PASTE TWICE DAILY. SPIT OUT EXCESS. DO NOT RINSE    promethazine (PHENERGAN) 25 MG tablet Take 1 tablet (25 mg total) by mouth every 6 (six) hours as needed for Nausea.    pregabalin (LYRICA) 50 MG capsule Take 2 capsules (100 mg total) by mouth every evening.     No current facility-administered medications for this visit.     Facility-Administered Medications Ordered in Other Visits   Medication    iohexoL (OMNIPAQUE 300) injection         ROS  Review of Systems   Constitutional:  " Positive for appetite change. Negative for activity change, fatigue and fever.   HENT:  Negative for sore throat.    Respiratory:  Negative for cough, chest tightness, shortness of breath, wheezing and stridor.    Cardiovascular:  Positive for leg swelling. Negative for chest pain and palpitations.   Gastrointestinal:  Positive for abdominal pain, blood in stool and constipation. Negative for diarrhea, nausea and vomiting.   Endocrine: Negative for polydipsia, polyphagia and polyuria.   Genitourinary:  Negative for dysuria, hematuria and urgency.   Musculoskeletal:  Negative for arthralgias, back pain, gait problem, joint swelling, myalgias, neck pain and neck stiffness.   Skin:  Negative for rash.   Allergic/Immunologic: Negative for food allergies.   Neurological:  Negative for dizziness, tremors, seizures, syncope, facial asymmetry, speech difficulty, weakness, light-headedness, numbness and headaches.   Psychiatric/Behavioral:  Negative for agitation, hallucinations, self-injury and suicidal ideas. The patient is not nervous/anxious and is not hyperactive.           OBJECTIVE:    /69   Pulse 69   Resp 16   Ht 5' 11" (1.803 m)   Wt 93.8 kg (206 lb 12.7 oz)   BMI 28.84 kg/m²         Physical Exam  Constitutional:       General: He is not in acute distress.     Appearance: Normal appearance. He is not ill-appearing.   HENT:      Head: Normocephalic and atraumatic.      Nose: No congestion or rhinorrhea.      Mouth/Throat:      Mouth: Mucous membranes are moist.   Eyes:      Extraocular Movements: Extraocular movements intact.      Pupils: Pupils are equal, round, and reactive to light.   Cardiovascular:      Pulses: Normal pulses.   Pulmonary:      Effort: Pulmonary effort is normal.   Abdominal:      General: Abdomen is flat.      Palpations: Abdomen is soft.      Tenderness: There is abdominal tenderness (Tenderness to deep palpation over the right upper quadrant).   Musculoskeletal:      Cervical " back: Normal range of motion and neck supple.   Skin:     General: Skin is warm and dry.      Capillary Refill: Capillary refill takes less than 2 seconds.   Neurological:      General: No focal deficit present.      Mental Status: He is alert and oriented to person, place, and time.      Cranial Nerves: No cranial nerve deficit.      Sensory: No sensory deficit.      Motor: No weakness.      Gait: Gait normal.   Psychiatric:         Mood and Affect: Mood normal.         Behavior: Behavior normal.         Thought Content: Thought content normal.             LABS:  Lab Results   Component Value Date    WBC 7.26 12/21/2022    HGB 15.4 12/21/2022    HCT 47.2 12/21/2022    MCV 93 12/21/2022     (L) 12/21/2022       CMP  Sodium   Date Value Ref Range Status   01/06/2023 137 136 - 145 mmol/L Final     Potassium   Date Value Ref Range Status   01/06/2023 4.1 3.5 - 5.1 mmol/L Final     Chloride   Date Value Ref Range Status   01/06/2023 103 95 - 110 mmol/L Final     CO2   Date Value Ref Range Status   01/06/2023 27 23 - 29 mmol/L Final     Glucose   Date Value Ref Range Status   01/06/2023 79 70 - 110 mg/dL Final     BUN   Date Value Ref Range Status   01/06/2023 17 6 - 20 mg/dL Final     Creatinine   Date Value Ref Range Status   01/06/2023 1.0 0.5 - 1.4 mg/dL Final     Calcium   Date Value Ref Range Status   01/06/2023 9.5 8.7 - 10.5 mg/dL Final     Total Protein   Date Value Ref Range Status   01/06/2023 7.0 6.0 - 8.4 g/dL Final     Albumin   Date Value Ref Range Status   01/06/2023 4.2 3.5 - 5.2 g/dL Final     Total Bilirubin   Date Value Ref Range Status   01/06/2023 0.7 0.1 - 1.0 mg/dL Final     Comment:     For infants and newborns, interpretation of results should be based  on gestational age, weight and in agreement with clinical  observations.    Premature Infant recommended reference ranges:  Up to 24 hours.............<8.0 mg/dL  Up to 48 hours............<12.0 mg/dL  3-5 days..................<15.0  mg/dL  6-29 days.................<15.0 mg/dL       Alkaline Phosphatase   Date Value Ref Range Status   01/06/2023 82 55 - 135 U/L Final     AST   Date Value Ref Range Status   01/06/2023 23 10 - 40 U/L Final     ALT   Date Value Ref Range Status   01/06/2023 26 10 - 44 U/L Final     Anion Gap   Date Value Ref Range Status   01/06/2023 7 (L) 8 - 16 mmol/L Final     eGFR if    Date Value Ref Range Status   07/07/2022 >60.0 >60 mL/min/1.73 m^2 Final     eGFR if non    Date Value Ref Range Status   07/07/2022 >60.0 >60 mL/min/1.73 m^2 Final     Comment:     Calculation used to obtain the estimated glomerular filtration  rate (eGFR) is the CKD-EPI equation.          No results found for: LABA1C, HGBA1C          ASSESSMENT:       58 y.o. year old male with abdominal pain, consistent with     1. Chronic pancreatitis, unspecified pancreatitis type  pregabalin (LYRICA) 50 MG capsule      2. Acute on chronic pancreatitis  pregabalin (LYRICA) 50 MG capsule      3. Nausea            Chronic pancreatitis, unspecified pancreatitis type  -     pregabalin (LYRICA) 50 MG capsule; Take 2 capsules (100 mg total) by mouth every evening.  Dispense: 60 capsule; Refill: 2    Acute on chronic pancreatitis  -     pregabalin (LYRICA) 50 MG capsule; Take 2 capsules (100 mg total) by mouth every evening.  Dispense: 60 capsule; Refill: 2    Nausea               PLAN:   - Interventions:    None at this time.  Can consider celiac plexus block or increase in Lyrica if pain worsens.  - Anticoagulation use:   no no anticoagulation    - Medications:   Refilled Lyrica 100 mg at night   Refill promethazine 25 mg as needed for nausea  - Imaging:   CT abdomen reviewed    - Consults/Referrals:   Continue follow-up with GI  and hepatology.         - Counseled patient regarding the importance of activity modification    - Patient Questions: Answered all of the patient's questions regarding diagnosis, therapy, and  treatment    - Follow up visit: 3 months or PRN        The above plan and management options were discussed at length with patient. Patient is in agreement with the above and verbalized understanding.    I discussed the goals of interventional chronic pain management with the patient on today's visit.  I explained the utility of injections for diagnostic and therapeutic purposes.  We discussed a multimodal approach to pain including treating the patient's given worst pain at any given time.  We will use a systematic approach to addressing pain.  We will also adopt a multimodal approach that includes injections, adjuvant medications, physical therapy, at times psychiatry.  There may be a limited role for opioid use intermittently in the treatment of pain, more particularly for acute pain although no one approach can be used as a sole treatment modality.    I emphasized the importance of regular exercise, core strengthening and stretching, diet and weight loss as a cornerstone of long-term pain management.      Cata Stewart PA-C  Interventional Pain Management  [facility]    Disclaimer:  This note was prepared using voice recognition system and is likely to have sound alike errors that may have been overlooked even after proof reading.  Please call me with any questions

## 2023-03-19 ENCOUNTER — NURSE TRIAGE (OUTPATIENT)
Dept: ADMINISTRATIVE | Facility: CLINIC | Age: 58
End: 2023-03-19
Payer: COMMERCIAL

## 2023-03-19 ENCOUNTER — HOSPITAL ENCOUNTER (EMERGENCY)
Facility: HOSPITAL | Age: 58
Discharge: HOME | End: 2023-03-19
Attending: EMERGENCY MEDICINE
Payer: COMMERCIAL

## 2023-03-19 VITALS
SYSTOLIC BLOOD PRESSURE: 119 MMHG | OXYGEN SATURATION: 97 % | TEMPERATURE: 100 F | HEIGHT: 71 IN | WEIGHT: 211.63 LBS | BODY MASS INDEX: 29.63 KG/M2 | DIASTOLIC BLOOD PRESSURE: 82 MMHG | RESPIRATION RATE: 19 BRPM | HEART RATE: 95 BPM

## 2023-03-19 DIAGNOSIS — M54.50 ACUTE LEFT-SIDED LOW BACK PAIN WITHOUT SCIATICA: ICD-10-CM

## 2023-03-19 DIAGNOSIS — R68.89 RIGORS: ICD-10-CM

## 2023-03-19 DIAGNOSIS — K86.1 CHRONIC PANCREATITIS, UNSPECIFIED PANCREATITIS TYPE: Primary | ICD-10-CM

## 2023-03-19 LAB
ALBUMIN SERPL BCP-MCNC: 3.8 G/DL (ref 3.5–5.2)
ALP SERPL-CCNC: 103 U/L (ref 55–135)
ALT SERPL W/O P-5'-P-CCNC: 35 U/L (ref 10–44)
ANION GAP SERPL CALC-SCNC: 9 MMOL/L (ref 8–16)
AST SERPL-CCNC: 26 U/L (ref 10–40)
BASOPHILS # BLD AUTO: 0.02 K/UL (ref 0–0.2)
BASOPHILS NFR BLD: 0.3 % (ref 0–1.9)
BILIRUB SERPL-MCNC: 0.8 MG/DL (ref 0.1–1)
BILIRUB UR QL STRIP: NEGATIVE
BUN SERPL-MCNC: 16 MG/DL (ref 6–20)
CALCIUM SERPL-MCNC: 8.9 MG/DL (ref 8.7–10.5)
CHLORIDE SERPL-SCNC: 103 MMOL/L (ref 95–110)
CLARITY UR: CLEAR
CO2 SERPL-SCNC: 23 MMOL/L (ref 23–29)
COLOR UR: YELLOW
CREAT SERPL-MCNC: 1 MG/DL (ref 0.5–1.4)
DIFFERENTIAL METHOD: ABNORMAL
EOSINOPHIL # BLD AUTO: 0.1 K/UL (ref 0–0.5)
EOSINOPHIL NFR BLD: 1.2 % (ref 0–8)
ERYTHROCYTE [DISTWIDTH] IN BLOOD BY AUTOMATED COUNT: 13.3 % (ref 11.5–14.5)
EST. GFR  (NO RACE VARIABLE): >60 ML/MIN/1.73 M^2
GLUCOSE SERPL-MCNC: 115 MG/DL (ref 70–110)
GLUCOSE UR QL STRIP: NEGATIVE
HCT VFR BLD AUTO: 44.3 % (ref 40–54)
HGB BLD-MCNC: 14.4 G/DL (ref 14–18)
HGB UR QL STRIP: NEGATIVE
IMM GRANULOCYTES # BLD AUTO: 0.03 K/UL (ref 0–0.04)
IMM GRANULOCYTES NFR BLD AUTO: 0.4 % (ref 0–0.5)
INFLUENZA A, MOLECULAR: NEGATIVE
INFLUENZA B, MOLECULAR: NEGATIVE
KETONES UR QL STRIP: NEGATIVE
LACTATE SERPL-SCNC: 1.7 MMOL/L (ref 0.5–2.2)
LEUKOCYTE ESTERASE UR QL STRIP: NEGATIVE
LIPASE SERPL-CCNC: 50 U/L (ref 4–60)
LYMPHOCYTES # BLD AUTO: 0.4 K/UL (ref 1–4.8)
LYMPHOCYTES NFR BLD: 6.1 % (ref 18–48)
MCH RBC QN AUTO: 28.6 PG (ref 27–31)
MCHC RBC AUTO-ENTMCNC: 32.5 G/DL (ref 32–36)
MCV RBC AUTO: 88 FL (ref 82–98)
MONOCYTES # BLD AUTO: 0.5 K/UL (ref 0.3–1)
MONOCYTES NFR BLD: 7.6 % (ref 4–15)
NEUTROPHILS # BLD AUTO: 5.7 K/UL (ref 1.8–7.7)
NEUTROPHILS NFR BLD: 84.4 % (ref 38–73)
NITRITE UR QL STRIP: NEGATIVE
NRBC BLD-RTO: 0 /100 WBC
PH UR STRIP: 7 [PH] (ref 5–8)
PLATELET # BLD AUTO: 106 K/UL (ref 150–450)
PMV BLD AUTO: 9.5 FL (ref 9.2–12.9)
POCT GLUCOSE: 100 MG/DL (ref 70–110)
POCT GLUCOSE: 118 MG/DL (ref 70–110)
POTASSIUM SERPL-SCNC: 3.6 MMOL/L (ref 3.5–5.1)
PROT SERPL-MCNC: 6.5 G/DL (ref 6–8.4)
PROT UR QL STRIP: NEGATIVE
RBC # BLD AUTO: 5.04 M/UL (ref 4.6–6.2)
SARS-COV-2 RDRP RESP QL NAA+PROBE: NEGATIVE
SODIUM SERPL-SCNC: 135 MMOL/L (ref 136–145)
SP GR UR STRIP: 1.02 (ref 1–1.03)
SPECIMEN SOURCE: NORMAL
TROPONIN I SERPL DL<=0.01 NG/ML-MCNC: <0.006 NG/ML (ref 0–0.03)
TSH SERPL DL<=0.005 MIU/L-ACNC: 2.67 UIU/ML (ref 0.4–4)
URN SPEC COLLECT METH UR: NORMAL
UROBILINOGEN UR STRIP-ACNC: NEGATIVE EU/DL
WBC # BLD AUTO: 6.7 K/UL (ref 3.9–12.7)

## 2023-03-19 PROCEDURE — 25500020 PHARM REV CODE 255: Performed by: EMERGENCY MEDICINE

## 2023-03-19 PROCEDURE — 80053 COMPREHEN METABOLIC PANEL: CPT | Performed by: EMERGENCY MEDICINE

## 2023-03-19 PROCEDURE — 96360 HYDRATION IV INFUSION INIT: CPT | Mod: 59

## 2023-03-19 PROCEDURE — U0002 COVID-19 LAB TEST NON-CDC: HCPCS | Performed by: EMERGENCY MEDICINE

## 2023-03-19 PROCEDURE — 84484 ASSAY OF TROPONIN QUANT: CPT | Performed by: EMERGENCY MEDICINE

## 2023-03-19 PROCEDURE — 93010 ELECTROCARDIOGRAM REPORT: CPT | Mod: ,,, | Performed by: INTERNAL MEDICINE

## 2023-03-19 PROCEDURE — 83690 ASSAY OF LIPASE: CPT | Performed by: EMERGENCY MEDICINE

## 2023-03-19 PROCEDURE — 85025 COMPLETE CBC W/AUTO DIFF WBC: CPT | Performed by: EMERGENCY MEDICINE

## 2023-03-19 PROCEDURE — 83605 ASSAY OF LACTIC ACID: CPT | Performed by: EMERGENCY MEDICINE

## 2023-03-19 PROCEDURE — 87502 INFLUENZA DNA AMP PROBE: CPT | Performed by: EMERGENCY MEDICINE

## 2023-03-19 PROCEDURE — 93005 ELECTROCARDIOGRAM TRACING: CPT

## 2023-03-19 PROCEDURE — 84443 ASSAY THYROID STIM HORMONE: CPT | Performed by: EMERGENCY MEDICINE

## 2023-03-19 PROCEDURE — 93010 EKG 12-LEAD: ICD-10-PCS | Mod: ,,, | Performed by: INTERNAL MEDICINE

## 2023-03-19 PROCEDURE — 82962 GLUCOSE BLOOD TEST: CPT

## 2023-03-19 PROCEDURE — 87040 BLOOD CULTURE FOR BACTERIA: CPT | Performed by: EMERGENCY MEDICINE

## 2023-03-19 PROCEDURE — 81003 URINALYSIS AUTO W/O SCOPE: CPT | Performed by: EMERGENCY MEDICINE

## 2023-03-19 PROCEDURE — 25000003 PHARM REV CODE 250: Performed by: EMERGENCY MEDICINE

## 2023-03-19 PROCEDURE — 99285 EMERGENCY DEPT VISIT HI MDM: CPT | Mod: 25

## 2023-03-19 RX ORDER — ACETAMINOPHEN 325 MG/1
650 TABLET ORAL
Status: COMPLETED | OUTPATIENT
Start: 2023-03-19 | End: 2023-03-19

## 2023-03-19 RX ADMIN — IOHEXOL 100 ML: 350 INJECTION, SOLUTION INTRAVENOUS at 08:03

## 2023-03-19 RX ADMIN — SODIUM CHLORIDE 1000 ML: 9 INJECTION, SOLUTION INTRAVENOUS at 06:03

## 2023-03-19 RX ADMIN — ACETAMINOPHEN 650 MG: 325 TABLET ORAL at 06:03

## 2023-03-19 NOTE — ED PROVIDER NOTES
"SCRIBE #1 NOTE: I, Joni Sweeney, am scribing for, and in the presence of, Gamaliel Cherry MD. I have scribed the entire note.       History     Chief Complaint   Patient presents with    Chills     Pt woke from sleep with chills and "shaking the whole bed" with "slight disorientation" c/o chronic pancreatitis and aching. Ate a lifes[employer] PTA and "got a lot better with shaking" CGB in triage 100     Review of patient's allergies indicates:  No Known Allergies      History of Present Illness     HPI    3/19/2023, 6:30 AM  History obtained from the patient      History of Present Illness: Fabrice Zamorano is a 58 y.o. male patient with a PMHx of GERD, HTN, chronic pancreatitis, hypothyroidism, cholecystectomy, and lateral pancreaticojejunostomy who presents to the Emergency Department for evaluation of chills which onset gradually this morning. Per wife, pt was shaking so uncontrollably with chills this morning while lying in bed that it woke her up. Per wife, it reported being extremely thirsty this morning but could not drink due to the shaking. Wife reports that the pt's chills improved after giving him mints. Pt reports he has chronic pancreatitis. Pt also complains of left lower back pain. Pt reports Symptoms are constant and moderate in severity. No mitigating or exacerbating factors reported. Associated sxs include lightheadedness. Patient denies any n/v/d, CP, SOB, rhinorrhea, cough, congestion, and all other sxs at this time. Prior No further complaints or concerns at this time.       Arrival mode: Personal vehicle     PCP: Rebecca Jackson MD        Past Medical History:  Past Medical History:   Diagnosis Date    Anticoagulant long-term use     On Eliquis for DVT    GERD (gastroesophageal reflux disease)     Hypertension     Hypothyroidism 10/26/2021    Liver disease     fatty liver    Pancreatic pseudocyst     Personal history of colonic polyps     Sleep apnea     Splenic vein thrombosis        Past Surgical " History:  Past Surgical History:   Procedure Laterality Date    CHOLECYSTECTOMY      COLONOSCOPY      ENDOSCOPIC ULTRASOUND OF UPPER GASTROINTESTINAL TRACT N/A 10/12/2021    Procedure: ULTRASOUND, UPPER GI TRACT, ENDOSCOPIC;  Surgeon: Odalys Leiva MD;  Location: [facility];  Service: Endoscopy;  Laterality: N/A;  Upper and linear, 22 shark core, cytology and RPMI    ENDOSCOPIC ULTRASOUND OF UPPER GASTROINTESTINAL TRACT N/A 2023    Procedure: ULTRASOUND, UPPER GI TRACT, ENDOSCOPIC;  Surgeon: Shawn Chisholm MD;  [facility]  Service: Endoscopy;  Laterality: N/A;  2/3/23-Instructions via portal-DS    ERCP N/A 2021    Procedure: ERCP (ENDOSCOPIC RETROGRADE CHOLANGIOPANCREATOGRAPHY);  Surgeon: Odalys Leiva MD;  Location: [facility];  Service: Endoscopy;  Laterality: N/A;    ERCP N/A 08/15/2022    Procedure: ERCP (ENDOSCOPIC RETROGRADE CHOLANGIOPANCREATOGRAPHY);  Surgeon: Odalys Leiva MD;  Location: [facility];  Service: Endoscopy;  Laterality: N/A;    ERCP N/A 2023    Procedure: ERCP (ENDOSCOPIC RETROGRADE CHOLANGIOPANCREATOGRAPHY);  Surgeon: Shawn Chisholm MD;  [facility]  Service: Endoscopy;  Laterality: N/A;    ERCP W/ PLASTIC STENT PLACEMENT      LATERAL PANCREATICOJEJUNOSTOMY N/A 2021    Procedure: PANCREATICOJEJUNOSTOMY, SIDE-TO-SIDE tier 1;  Surgeon: Brian Hills MD;  Location: [facility] OR [address] [place] [address];  Service: General;  Laterality: N/A;         Family History:  Family History   Problem Relation Age of Onset    Thyroid disease Mother     Cancer Father     Pancreatic cancer Father     Arthritis Father     Thyroid disease Father        Social History:  Social History     Tobacco Use    Smoking status: Former     Types: Cigarettes     Quit date: 2001     Years since quittin.6    Smokeless tobacco: Never   Substance and Sexual Activity    Alcohol use: Not Currently    Drug use: Never    Sexual activity: Yes         Review of Systems     Review of Systems   Constitutional:  Positive for chills.   HENT:  Negative for congestion, rhinorrhea and sore throat.    Respiratory:  Negative for cough and shortness of breath.    Cardiovascular:  Negative for chest pain.   Gastrointestinal:  Negative for diarrhea, nausea and vomiting.   Genitourinary:  Negative for dysuria.   Musculoskeletal:  Positive for back pain.   Skin:  Negative for rash.   Neurological:  Positive for light-headedness. Negative for weakness.   Hematological:  Does not bruise/bleed easily.   All other systems reviewed and are negative.     Physical Exam     Initial Vitals [03/19/23 0538]   BP Pulse Resp Temp SpO2   117/82 (!) 130 (!) 24 99.7 °F (37.6 °C) 96 %      MAP       --          Physical Exam  Nursing Notes and Vital Signs Reviewed.  Constitutional: Patient is in no acute distress. Well-developed and well-nourished.  Head: Atraumatic. Normocephalic.  Eyes: PERRL. EOM intact. Conjunctivae are not pale. No scleral icterus.  ENT: Mucous membranes are moist. Oropharynx is clear and symmetric.    Neck: Supple. Full ROM. No lymphadenopathy.  Cardiovascular: Regular rate. Regular rhythm. No murmurs, rubs, or gallops. Distal pulses are 2+ and symmetric.  Pulmonary/Chest: No respiratory distress. Clear to auscultation bilaterally. No wheezing or rales.  Abdominal: Soft and non-distended.  There is no tenderness.  No rebound, guarding, or rigidity. Good bowel sounds.  Genitourinary: Left CVA tenderness with percussion  Musculoskeletal: Moves all extremities. No obvious deformities. No edema. No calf tenderness.  Skin: Warm and dry.  Neurological:  Alert, awake, and appropriate.  Normal speech.  No acute focal neurological deficits are appreciated.  Psychiatric: Normal affect. Good eye contact. Appropriate in content.     ED Course   Procedures  ED Vital Signs:  Vitals:    03/19/23 0538 03/19/23 0647 03/19/23 0657 03/19/23 0802   BP: 117/82  137/85 125/78   Pulse: (!) 130  " 103 99   Resp: (!) 24 19 17   Temp: 99.7 °F (37.6 °C) 99.7 °F (37.6 °C)  99.7 °F (37.6 °C)   TempSrc: Oral   Oral   SpO2: 96%  96% 95%   Weight: 96 kg (211 lb 10.3 oz)      Height: 5' 11" (1.803 m)          Abnormal Lab Results:  Labs Reviewed   CBC W/ AUTO DIFFERENTIAL - Abnormal; Notable for the following components:       Result Value    Platelets 106 (*)     Lymph # 0.4 (*)     Gran % 84.4 (*)     Lymph % 6.1 (*)     All other components within normal limits   COMPREHENSIVE METABOLIC PANEL - Abnormal; Notable for the following components:    Sodium 135 (*)     Glucose 115 (*)     All other components within normal limits   POCT GLUCOSE - Abnormal; Notable for the following components:    POCT Glucose 118 (*)     All other components within normal limits   INFLUENZA A & B BY MOLECULAR   CULTURE, BLOOD   CULTURE, BLOOD   LIPASE   URINALYSIS, REFLEX TO URINE CULTURE    Narrative:     Specimen Source->Urine   TROPONIN I   TSH   LACTIC ACID, PLASMA   SARS-COV-2 RNA AMPLIFICATION, QUAL   POCT GLUCOSE        All Lab Results:  Results for orders placed or performed during the hospital encounter of 03/19/23   Influenza A & B by Molecular    Specimen: Nasopharyngeal Swab   Result Value Ref Range    Influenza A, Molecular Negative Negative    Influenza B, Molecular Negative Negative    Flu A & B Source Nasal swab    Lipase   Result Value Ref Range    Lipase 50 4 - 60 U/L   CBC auto differential   Result Value Ref Range    WBC 6.70 3.90 - 12.70 K/uL    RBC 5.04 4.60 - 6.20 M/uL    Hemoglobin 14.4 14.0 - 18.0 g/dL    Hematocrit 44.3 40.0 - 54.0 %    MCV 88 82 - 98 fL    MCH 28.6 27.0 - 31.0 pg    MCHC 32.5 32.0 - 36.0 g/dL    RDW 13.3 11.5 - 14.5 %    Platelets 106 (L) 150 - 450 K/uL    MPV 9.5 9.2 - 12.9 fL    Immature Granulocytes 0.4 0.0 - 0.5 %    Gran # (ANC) 5.7 1.8 - 7.7 K/uL    Immature Grans (Abs) 0.03 0.00 - 0.04 K/uL    Lymph # 0.4 (L) 1.0 - 4.8 K/uL    Mono # 0.5 0.3 - 1.0 K/uL    Eos # 0.1 0.0 - 0.5 K/uL    " Baso # 0.02 0.00 - 0.20 K/uL    nRBC 0 0 /100 WBC    Gran % 84.4 (H) 38.0 - 73.0 %    Lymph % 6.1 (L) 18.0 - 48.0 %    Mono % 7.6 4.0 - 15.0 %    Eosinophil % 1.2 0.0 - 8.0 %    Basophil % 0.3 0.0 - 1.9 %    Differential Method Automated    Comprehensive metabolic panel   Result Value Ref Range    Sodium 135 (L) 136 - 145 mmol/L    Potassium 3.6 3.5 - 5.1 mmol/L    Chloride 103 95 - 110 mmol/L    CO2 23 23 - 29 mmol/L    Glucose 115 (H) 70 - 110 mg/dL    BUN 16 6 - 20 mg/dL    Creatinine 1.0 0.5 - 1.4 mg/dL    Calcium 8.9 8.7 - 10.5 mg/dL    Total Protein 6.5 6.0 - 8.4 g/dL    Albumin 3.8 3.5 - 5.2 g/dL    Total Bilirubin 0.8 0.1 - 1.0 mg/dL    Alkaline Phosphatase 103 55 - 135 U/L    AST 26 10 - 40 U/L    ALT 35 10 - 44 U/L    Anion Gap 9 8 - 16 mmol/L    eGFR >60 >60 mL/min/1.73 m^2   Urinalysis, Reflex to Urine Culture Urine, Clean Catch    Specimen: Urine   Result Value Ref Range    Specimen UA Urine, Clean Catch     Color, UA Yellow Yellow, Straw, Amrita    Appearance, UA Clear Clear    pH, UA 7.0 5.0 - 8.0    Specific Gravity, UA 1.020 1.005 - 1.030    Protein, UA Negative Negative    Glucose, UA Negative Negative    Ketones, UA Negative Negative    Bilirubin (UA) Negative Negative    Occult Blood UA Negative Negative    Nitrite, UA Negative Negative    Urobilinogen, UA Negative <2.0 EU/dL    Leukocytes, UA Negative Negative   Troponin I   Result Value Ref Range    Troponin I <0.006 0.000 - 0.026 ng/mL   TSH   Result Value Ref Range    TSH 2.669 0.400 - 4.000 uIU/mL   Lactic acid, plasma   Result Value Ref Range    Lactate (Lactic Acid) 1.7 0.5 - 2.2 mmol/L   COVID-19 Rapid Screening   Result Value Ref Range    SARS-CoV-2 RNA, Amplification, Qual Negative Negative   POCT glucose   Result Value Ref Range    POCT Glucose 100 70 - 110 mg/dL   POCT glucose   Result Value Ref Range    POCT Glucose 118 (H) 70 - 110 mg/dL       Imaging Results:  Imaging Results              CT Abdomen Pelvis With Contrast (Final  result)  Result time 03/19/23 08:32:35      Final result by Pepe Dexter MD (03/19/23 08:32:35)                   Impression:      1. No acute finding in the abdomen or pelvis  2. Known chronic changes at the pancreatic head.  Stable adjacent duodenal thickening is likely reactive.  3. Hepatic steatosis and splenomegaly.  4. Other findings as above.      Electronically signed by: Pepe Dexter  Date:    03/19/2023  Time:    08:32               Narrative:    EXAMINATION:  CT ABDOMEN PELVIS WITH CONTRAST    CLINICAL HISTORY:  Abdominal abscess/infection suspected;    TECHNIQUE:  Low dose axial images, sagittal and coronal reformations were obtained from the lung bases to the pubic symphysis following the IV administration of 100 mL of Omnipaque 350. oral contrast was administered.    COMPARISON:  March 8, 2023.    FINDINGS:  - Lower thorax:Minimal bibasilar atelectasis/scarring.  Heart size is within normal limits.    - Liver: Diffusely hypodense.  No focal lesion.    - Gallbladder: Surgically absent.    - Bile Ducts: Expected reservoir effect in the common bile duct.    - Spleen: Enlarged, 14.6 cm.    - Kidneys: Largest simple cyst anterior right kidney.  The kidneys otherwise unremarkable without hydronephrosis or nephrolithiasis.    - Adrenals: Unremarkable.    - Pancreas: Known chronic inflammatory changes at the head of the pancreas.  No significant surrounding inflammatory stranding.  Body and tail the pancreas are stable.    - Retroperitoneum:  No adenopathy.    - Vascular: Atherosclerotic calcifications.  No abdominal aortic aneurysm.  Circumaortic left renal vein.    - Abdominal wall:  Diastasis recti with small noninflamed fat containing hernias.    Pelvis:    No pelvic mass, adenopathy, or free fluid.    Bowel/Mesentery:    Duodenal diverticulum.  Thickening of the duodenal wall in the region of the pancreatic head is stable.  Colonic diverticula without CT evidence of acute diverticulitis.    Bones:   No acute osseous finding.  No aggressive osseous lesion.  Mild multilevel degenerative changes of the spine.                                       X-Ray Chest AP Portable (Final result)  Result time 03/19/23 07:20:33      Final result by Pepe Dexter MD (03/19/23 07:20:33)                   Impression:      No acute finding in the chest.      Electronically signed by: Pepe Dexter  Date:    03/19/2023  Time:    07:20               Narrative:    EXAMINATION:  XR CHEST AP PORTABLE    CLINICAL HISTORY:  Rigors;    FINDINGS:  Comparison: August 16, 2022.    Mediastinal silhouette is within normal limits.  The lungs are clear.  No pneumothorax or pleural effusion.  No acute osseous finding.                                       The EKG was ordered, reviewed, and independently interpreted by the ED provider.  Interpretation time: 05:44:15  Rate: 127 BPM  Rhythm: sinus tachycardia  Interpretation: No acute ST changes. No STEMI.           The Emergency Provider reviewed the vital signs and test results, which are outlined above.     ED Discussion     9:08 AM: Reassessed pt at this time. Discussed with pt all pertinent ED information and results. Discussed pt dx and plan of tx. Gave pt all f/u and return to the ED instructions. All questions and concerns were addressed at this time. Pt expresses understanding of information and instructions, and is comfortable with plan to discharge. Pt is stable for discharge.    I discussed with patient and/or family/caretaker that evaluation in the ED does not suggest any emergent or life threatening medical conditions requiring immediate intervention beyond what was provided in the ED, and I believe patient is safe for discharge.  Regardless, an unremarkable evaluation in the ED does not preclude the development or presence of a serious of life threatening condition. As such, patient was instructed to return immediately for any worsening or change in current symptoms.              Medical Decision Making:   History:   I obtained history from: someone other than patient.       <> Summary of History: Wife reports pt was shaking so uncontrollably while sleeping that it woke her up. Wife reports pt was extremely thirsty this morning after shaking. Wife reports pt's symptoms improved after giving him a mint.   Old Medical Records: I decided to obtain old medical records.  Old Records Summarized: records from clinic visits.       <> Summary of Records: Reviewed CT scan results from 3/8/2023.    Impression:     1. Postoperative changes of pancreaticojejunostomy.  Slightly decreased edematous enlarged of the pancreatic head with slightly improved peripancreatic inflammatory changes.  Similar prominent peripancreatic lymph nodes.  2. Continued wall thickening of the duodenum adjacent to the pancreatic head, possible reactive duodenitis.  3. Mild splenomegaly, perisplenic collaterals, gastric varices.  Splenic vein is not definitively visualized and could be thrombosed.  4. Suspect hepatic steatosis. Consider correlation with LFTs.  5. Moderate colonic stool.  Consider correlation for constipation.  6. Additional findings as above.  This report was flagged in Epic as abnormal.  Clinical Tests:   Lab Tests: Ordered and Reviewed  Radiological Study: Ordered and Reviewed  Medical Tests: Ordered and Reviewed         ED Medication(s):  Medications   sodium chloride 0.9% bolus 1,000 mL 1,000 mL (0 mLs Intravenous Stopped 3/19/23 3177)   acetaminophen tablet 650 mg (650 mg Oral Given 3/19/23 0647)   iohexoL (OMNIPAQUE 350) injection 100 mL (100 mLs Intravenous Given 3/19/23 0810)       New Prescriptions    No medications on file        Follow-up Information       April H MD Manuel. Schedule an appointment as soon as possible for a visit in 2 days.    Specialty: Family Medicine  Why: For re-evaluation and further treatment  Contact information:  [address]  [place]  [address]  [phone]               [facility]    Specialty: Emergency Medicine  Why: If symptoms worsen, For re-evaluation and further treatment, As needed  Contact information:  [address]  [facility] [address] [phone]                               Scribe Attestation:   Scribe #1: I performed the above scribed service and the documentation accurately describes the services I performed. I attest to the accuracy of the note.     Attending:   Physician Attestation Statement for Scribe #1: I, Gamaliel Cherry MD, personally performed the services described in this documentation, as scribed by Joni Sweeney, in my presence, and it is both accurate and complete.           Clinical Impression       ICD-10-CM ICD-9-CM   1. Chronic pancreatitis, unspecified pancreatitis type  K86.1 577.1   2. Rigors  R68.89 780.99   3. Acute left-sided low back pain without sciatica  M54.50 724.2       Disposition:   Disposition: Discharged  Condition: Stable       Gamaliel Cherry MD  03/19/23 2027

## 2023-03-20 NOTE — TELEPHONE ENCOUNTER
Patient seen in the ER today for fever and chills. Currently at home an reports a temp of 103.2. Patient was administered tylenol 1000 mg 1.5 hours ago and ibuprofen 400 mg 1 hour ago. Advised per protocol to continue to monitor at home. Advised the patient to call back with any further questions or if symptoms worsen.        Reason for Disposition   [1] Fever AND [2] no signs of serious infection or localizing symptoms (all other triage questions negative)    Additional Information   Negative: Shock suspected (e.g., cold/pale/clammy skin, too weak to stand, low BP, rapid pulse)   Negative: Difficult to awaken or acting confused (e.g., disoriented, slurred speech)   Negative: [1] Difficulty breathing AND [2] bluish lips, tongue or face   Negative: New-onset rash with multiple purple (or blood-colored) spots or dots   Negative: Sounds like a life-threatening emergency to the triager   Negative: [1] Headache AND [2] stiff neck (can't touch chin to chest)   Negative: Difficulty breathing   Negative: IV Drug Use (IVDU)   Negative: [1] Drinking very little AND [2] dehydration suspected (e.g., no urine > 12 hours, very dry mouth, very lightheaded)   Negative: Widespread rash and cause unknown   Negative: Patient sounds very sick or weak to the triager  (Exception: mild weakness and hasn't taken fever medicine)   Negative: Fever > 104 F (40 C)   Negative: [1] Fever > 101 F (38.3 C) AND [2] age > 60 years   Negative: [1] Fever > 100.0 F (37.8 C) AND [2] bedridden (e.g., nursing home patient, CVA, chronic illness, recovering from surgery)   Negative: [1] Fever > 100.0 F (37.8 C) AND [2] indwelling urinary catheter (e.g., Bills, Coude)   Negative: [1] Fever > 100.0 F (37.8 C) AND [2] has port (portacath), central line, or PICC line   Negative: [1] Fever > 100.0 F (37.8 C) AND [2] diabetes mellitus or weak immune system (e.g., HIV positive, cancer chemo, splenectomy, organ transplant, chronic steroids)   Negative: [1] Fever >  100.0 F (37.8 C) AND [2] surgery in the last month   Negative: Transplant patient (e.g., kidney, liver, lung, heart)   Negative: Severe chills (i.e., feeling extremely cold WITH shaking chills)   Negative: Fever present > 3 days (72 hours)   Negative: [1] Fever > 100.0 F (37.8 C) AND [2] foreign travel to a developing country in the last month   Negative: [1] Intermittent fever > 100.0 F (37.8 C) AND [2] lasts > 3 weeks    Protocols used: Fever-A-AH

## 2023-03-24 LAB
BACTERIA BLD CULT: NORMAL
BACTERIA BLD CULT: NORMAL

## 2023-05-16 RX ORDER — PANTOPRAZOLE SODIUM 40 MG/1
TABLET, DELAYED RELEASE ORAL
Qty: 90 TABLET | Refills: 0 | Status: SHIPPED | OUTPATIENT
Start: 2023-05-16 | End: 2023-08-14

## 2023-06-10 DIAGNOSIS — K86.1 CHRONIC PANCREATITIS, UNSPECIFIED PANCREATITIS TYPE: ICD-10-CM

## 2023-06-10 DIAGNOSIS — K86.1 ACUTE ON CHRONIC PANCREATITIS: ICD-10-CM

## 2023-06-10 DIAGNOSIS — K85.90 ACUTE ON CHRONIC PANCREATITIS: ICD-10-CM

## 2023-06-12 RX ORDER — PREGABALIN 50 MG/1
100 CAPSULE ORAL NIGHTLY
Qty: 60 CAPSULE | Refills: 2 | Status: SHIPPED | OUTPATIENT
Start: 2023-06-12 | End: 2023-06-22 | Stop reason: DRUGHIGH

## 2023-06-12 NOTE — TELEPHONE ENCOUNTER
Good Morning/Afternoon,     Pt is requesting for a refill of: Pregabalin 50 MG  Last filed: 3/9/23  Last encounter: 3/9/23  Up coming apt: 6/13/23  Pharmacy: [facility] #[id number]  Is this something you can do?      Disha Carrasco  Medical Assistant

## 2023-06-13 ENCOUNTER — OFFICE VISIT (OUTPATIENT)
Dept: PAIN MEDICINE | Facility: CLINIC | Age: 58
End: 2023-06-13
Payer: COMMERCIAL

## 2023-06-13 ENCOUNTER — PATIENT MESSAGE (OUTPATIENT)
Dept: GASTROENTEROLOGY | Facility: CLINIC | Age: 58
End: 2023-06-13
Payer: COMMERCIAL

## 2023-06-13 DIAGNOSIS — R11.0 NAUSEA: ICD-10-CM

## 2023-06-13 PROCEDURE — 99214 PR OFFICE/OUTPT VISIT, EST, LEVL IV, 30-39 MIN: ICD-10-PCS | Mod: 95,,, | Performed by: PHYSICIAN ASSISTANT

## 2023-06-13 PROCEDURE — 99214 OFFICE O/P EST MOD 30 MIN: CPT | Mod: 95,,, | Performed by: PHYSICIAN ASSISTANT

## 2023-06-13 PROCEDURE — 1159F PR MEDICATION LIST DOCUMENTED IN MEDICAL RECORD: ICD-10-PCS | Mod: CPTII,95,, | Performed by: PHYSICIAN ASSISTANT

## 2023-06-13 PROCEDURE — 1159F MED LIST DOCD IN RCRD: CPT | Mod: CPTII,95,, | Performed by: PHYSICIAN ASSISTANT

## 2023-06-13 PROCEDURE — 1160F PR REVIEW ALL MEDS BY PRESCRIBER/CLIN PHARMACIST DOCUMENTED: ICD-10-PCS | Mod: CPTII,95,, | Performed by: PHYSICIAN ASSISTANT

## 2023-06-13 PROCEDURE — 1160F RVW MEDS BY RX/DR IN RCRD: CPT | Mod: CPTII,95,, | Performed by: PHYSICIAN ASSISTANT

## 2023-06-13 NOTE — PROGRESS NOTES
Interventional Pain Progress Note       Referring Physician: No ref. provider found    PCP: Rebecca Jackson MD    Chief Complaint:   No chief complaint on file.    Established Patient - TeleHealth Visit    The patient location is: [place]  The chief complaint leading to consultation is: chronic pain     Visit type: audiovisual    Face to Face time with patient: 10-15 minutes  20 minutes of total time spent on the encounter, which includes face to face time and non-face to face time preparing to see the patient (eg, review of tests), Obtaining and/or reviewing separately obtained history, Documenting clinical information in the electronic or other health record, Independently interpreting results (not separately reported) and communicating results to the patient/family/caregiver, or Care coordination (not separately reported).     Each patient to whom he or she provides medical services by telemedicine is:  (1) informed of the relationship between the physician and patient and the respective role of any other health care provider with respect to management of the patient; and (2) notified that he or she may decline to receive medical services by telemedicine and may withdraw from such care at any time.      Interval History (6/13/2023):  Fabrice Zamorano presents today  via telemed for follow-up visit.  Patient was last seen on 3/9/2023. At that visit, the plan was to continue Lyrica. He continues to report improvement in his symtpoms with Lyrica. He reports that every few weeks he will have an episode of severe abdominal pain and nausea that requires him to be bed bound and utilize phenergan, but he has not needed hospitalization. Today he is very nauseated, but again reports that he is able to manage his symptoms at home.Patient reports pain as 2/10 today.      Interval History (3/9/2023):  Fabrice Zamorano presents today for follow-up visit.  Patient was last seen on 12/2/2022. At that visit, the plan was to continue  Lyrica for abdominal pain and consider celiac plexus block should pain worsen.  He reports Lyrica has been in game changer and has significantly improved his pain.  He continues to be followed by GI and recently had a repeat ERCP.  He also recently had an updated CT of his abdomen which demonstrated a hernia, he reports Dr. Hills stated that at some point in the future he will likely need a hernia repair, but this was deferred at this time. Patient reports pain as just mild discomfort today today.  He takes Lyrica 100 mg nightly and promethazine as needed for nausea.  He is requesting a refill of his Lyrica.      Interval History (12/02/2022):  Patient returns to clinic for evaluation of abdominal pain.  Patient reports that since last being seen his pain has overall improved.  He reports having 1 episode of pancreatitis pain every 3-4 weeks that lasts approximately 24-36 hours.  He also reports mild epigastric burning pain with food or liquids, that is typically relieved with standing from food.  Pain is currently rated at 0/10.       SUBJECTIVE:    Fabrice Zamorano is a 58 y.o. male who presents to the clinic for the evaluation of  abdominal pain. The pain started 6 years ago followingsymptoms have been worsening.The pain is located in the RUQ and radiate in band across his abdomen to his thoracic spine and to his right shoulder.The pain is described as aching, crushing, dull, sharp, shooting, stabbing and tight band and is rated as 5/10. The pain is rated with a score of  2/10 on the BEST day and a score of 10/10 on the WORST day.  Symptoms interfere with daily activity. The pain is exacerbated by eating a mean.  The pain is mitigated by fasting.         Non-Pharmacologic Treatments:  Physical Therapy/Home Exercise: no  Ice/Heat:no  TENS: no  Acupuncture: no  Massage: no  Chiropractic: no        Previous Pain Medications:  Tylenol, ASA, Percocet     report:  Reviewed and consistent with medication use as  prescribed.    Pain Procedures:   None      Imaging:   CT Abdomen Pelvis With Contrast  Narrative: EXAMINATION:  CT ABDOMEN PELVIS WITH CONTRAST    CLINICAL HISTORY:  Abdominal abscess/infection suspected;    TECHNIQUE:  Low dose axial images, sagittal and coronal reformations were obtained from the lung bases to the pubic symphysis following the IV administration of 100 mL of Omnipaque 350. oral contrast was administered.    COMPARISON:  March 8, 2023.    FINDINGS:  - Lower thorax:Minimal bibasilar atelectasis/scarring.  Heart size is within normal limits.    - Liver: Diffusely hypodense.  No focal lesion.    - Gallbladder: Surgically absent.    - Bile Ducts: Expected reservoir effect in the common bile duct.    - Spleen: Enlarged, 14.6 cm.    - Kidneys: Largest simple cyst anterior right kidney.  The kidneys otherwise unremarkable without hydronephrosis or nephrolithiasis.    - Adrenals: Unremarkable.    - Pancreas: Known chronic inflammatory changes at the head of the pancreas.  No significant surrounding inflammatory stranding.  Body and tail the pancreas are stable.    - Retroperitoneum:  No adenopathy.    - Vascular: Atherosclerotic calcifications.  No abdominal aortic aneurysm.  Circumaortic left renal vein.    - Abdominal wall:  Diastasis recti with small noninflamed fat containing hernias.    Pelvis:    No pelvic mass, adenopathy, or free fluid.    Bowel/Mesentery:    Duodenal diverticulum.  Thickening of the duodenal wall in the region of the pancreatic head is stable.  Colonic diverticula without CT evidence of acute diverticulitis.    Bones:  No acute osseous finding.  No aggressive osseous lesion.  Mild multilevel degenerative changes of the spine.  Impression: 1. No acute finding in the abdomen or pelvis  2. Known chronic changes at the pancreatic head.  Stable adjacent duodenal thickening is likely reactive.  3. Hepatic steatosis and splenomegaly.  4. Other findings as above.    Electronically signed  by: Pepe Dexter  Date:    03/19/2023  Time:    08:32  X-Ray Chest AP Portable  Narrative: EXAMINATION:  XR CHEST AP PORTABLE    CLINICAL HISTORY:  Rigors;    FINDINGS:  Comparison: August 16, 2022.    Mediastinal silhouette is within normal limits.  The lungs are clear.  No pneumothorax or pleural effusion.  No acute osseous finding.  Impression: No acute finding in the chest.    Electronically signed by: Pepe Dexter  Date:    03/19/2023  Time:    07:20            Past Medical History:   Diagnosis Date    Anticoagulant long-term use     On Eliquis for DVT    GERD (gastroesophageal reflux disease)     Hypertension     Hypothyroidism 10/26/2021    Liver disease     fatty liver    Pancreatic pseudocyst     Personal history of colonic polyps     Sleep apnea     Splenic vein thrombosis      Past Surgical History:   Procedure Laterality Date    CHOLECYSTECTOMY      COLONOSCOPY      ENDOSCOPIC ULTRASOUND OF UPPER GASTROINTESTINAL TRACT N/A 10/12/2021    Procedure: ULTRASOUND, UPPER GI TRACT, ENDOSCOPIC;  Surgeon: Odalys Leiva MD;  Location: [facility];  Service: Endoscopy;  Laterality: N/A;  Upper and linear, 22 shark core, cytology and RPMI    ENDOSCOPIC ULTRASOUND OF UPPER GASTROINTESTINAL TRACT N/A 2/24/2023    Procedure: ULTRASOUND, UPPER GI TRACT, ENDOSCOPIC;  Surgeon: Shawn Chisholm MD;  Location: [facility] ([address] [place] [address]);  Service: Endoscopy;  Laterality: N/A;  2/3/23-Instructions via portal-DS    ERCP N/A 09/29/2021    Procedure: ERCP (ENDOSCOPIC RETROGRADE CHOLANGIOPANCREATOGRAPHY);  Surgeon: Odalys Leiva MD;  Location: [facility];  Service: Endoscopy;  Laterality: N/A;    ERCP N/A 08/15/2022    Procedure: ERCP (ENDOSCOPIC RETROGRADE CHOLANGIOPANCREATOGRAPHY);  Surgeon: Odalys Leiva MD;  Location: [facility];  Service: Endoscopy;  Laterality: N/A;    ERCP N/A 2/24/2023    Procedure: ERCP (ENDOSCOPIC RETROGRADE CHOLANGIOPANCREATOGRAPHY);  Surgeon: Shawn Chisholm MD;  Location:  [facility] ENDO (2ND FLR);  Service: Endoscopy;  Laterality: N/A;    ERCP W/ PLASTIC STENT PLACEMENT      LATERAL PANCREATICOJEJUNOSTOMY N/A 2021    Procedure: PANCREATICOJEJUNOSTOMY, SIDE-TO-SIDE tier 1;  Surgeon: Brian Hills MD;  [facility];  Service: General;  Laterality: N/A;     Social History     Socioeconomic History    Marital status:    Tobacco Use    Smoking status: Former     Types: Cigarettes     Quit date: 2001     Years since quittin.8    Smokeless tobacco: Never   Substance and Sexual Activity    Alcohol use: Not Currently    Drug use: Never    Sexual activity: Yes     Family History   Problem Relation Age of Onset    Thyroid disease Mother     Cancer Father     Pancreatic cancer Father     Arthritis Father     Thyroid disease Father        Review of patient's allergies indicates:  No Known Allergies    Current Outpatient Medications   Medication Sig    carvediloL (COREG) 6.25 MG tablet Take 3.125 mg by mouth 2 (two) times daily with meals.    ergocalciferol (ERGOCALCIFEROL) 50,000 unit Cap Take 50,000 Units by mouth every 7 days.    HYDROcodone-acetaminophen (NORCO)  mg per tablet Take 1 tablet by mouth every 6 (six) hours as needed.    ibuprofen (ADVIL,MOTRIN) 800 MG tablet Take 800 mg by mouth every 6 (six) hours as needed.    lipase-protease-amylase (PANCREAZE) 16,800-56,800- 98,400 unit CpDR TAKE ONE CAPSULE BY MOUTH BEFORE MEALS AND BEFORE SNACK FOR A TOTAL OF FOUR TIMES DAILY    metFORMIN (GLUCOPHAGE-XR) 500 MG ER 24hr tablet Take 500 mg by mouth once daily.    multivitamin (THERAGRAN) per tablet Take 1 tablet by mouth once daily.    oxyCODONE (OXYCONTIN) 10 mg 12 hr tablet Take 10 mg by mouth every 12 (twelve) hours as needed for Pain.    pantoprazole (PROTONIX) 40 MG tablet TAKE 1 TABLET(40 MG) BY MOUTH EVERY DAY    pregabalin (LYRICA) 50 MG capsule Take 2 capsules (100 mg total) by mouth every evening.    PREVIDENT 5000 BOOSTER PLUS 1.1 % Pste  BRUSH TEETH WITH PASTE TWICE DAILY. SPIT OUT EXCESS. DO NOT RINSE    promethazine (PHENERGAN) 25 MG tablet Take 1 tablet (25 mg total) by mouth every 6 (six) hours as needed for Nausea.     No current facility-administered medications for this visit.     Facility-Administered Medications Ordered in Other Visits   Medication    iohexoL (OMNIPAQUE 300) injection         ROS  Review of Systems   Constitutional:  Positive for appetite change. Negative for activity change, fatigue and fever.   HENT:  Negative for sore throat.    Respiratory:  Negative for cough, chest tightness, shortness of breath, wheezing and stridor.    Cardiovascular:  Positive for leg swelling. Negative for chest pain and palpitations.   Gastrointestinal:  Positive for abdominal pain, blood in stool and constipation. Negative for diarrhea, nausea and vomiting.   Endocrine: Negative for polydipsia, polyphagia and polyuria.   Genitourinary:  Negative for dysuria, hematuria and urgency.   Musculoskeletal:  Negative for arthralgias, back pain, gait problem, joint swelling, myalgias, neck pain and neck stiffness.   Skin:  Negative for rash.   Allergic/Immunologic: Negative for food allergies.   Neurological:  Negative for dizziness, tremors, seizures, syncope, facial asymmetry, speech difficulty, weakness, light-headedness, numbness and headaches.   Psychiatric/Behavioral:  Negative for agitation, hallucinations, self-injury and suicidal ideas. The patient is not nervous/anxious and is not hyperactive.           OBJECTIVE:    There were no vitals taken for this visit.        Physical Exam  Constitutional:       General: He is not in acute distress.     Appearance: Normal appearance. He is not ill-appearing.   HENT:      Head: Normocephalic and atraumatic.      Nose: No congestion or rhinorrhea.      Mouth/Throat:      Mouth: Mucous membranes are moist.   Eyes:      Extraocular Movements: Extraocular movements intact.      Pupils: Pupils are equal,  round, and reactive to light.   Cardiovascular:      Pulses: Normal pulses.   Pulmonary:      Effort: Pulmonary effort is normal.   Abdominal:      General: Abdomen is flat.      Palpations: Abdomen is soft.      Tenderness: There is abdominal tenderness (Tenderness to deep palpation over the right upper quadrant).   Musculoskeletal:      Cervical back: Normal range of motion and neck supple.   Skin:     General: Skin is warm and dry.      Capillary Refill: Capillary refill takes less than 2 seconds.   Neurological:      General: No focal deficit present.      Mental Status: He is alert and oriented to person, place, and time.      Cranial Nerves: No cranial nerve deficit.      Sensory: No sensory deficit.      Motor: No weakness.      Gait: Gait normal.   Psychiatric:         Mood and Affect: Mood normal.         Behavior: Behavior normal.         Thought Content: Thought content normal.             LABS:  Lab Results   Component Value Date    WBC 6.70 03/19/2023    HGB 14.4 03/19/2023    HCT 44.3 03/19/2023    MCV 88 03/19/2023     (L) 03/19/2023       CMP  Sodium   Date Value Ref Range Status   03/19/2023 135 (L) 136 - 145 mmol/L Final     Potassium   Date Value Ref Range Status   03/19/2023 3.6 3.5 - 5.1 mmol/L Final     Chloride   Date Value Ref Range Status   03/19/2023 103 95 - 110 mmol/L Final     CO2   Date Value Ref Range Status   03/19/2023 23 23 - 29 mmol/L Final     Glucose   Date Value Ref Range Status   03/19/2023 115 (H) 70 - 110 mg/dL Final     BUN   Date Value Ref Range Status   03/19/2023 16 6 - 20 mg/dL Final     Creatinine   Date Value Ref Range Status   03/19/2023 1.0 0.5 - 1.4 mg/dL Final     Calcium   Date Value Ref Range Status   03/19/2023 8.9 8.7 - 10.5 mg/dL Final     Total Protein   Date Value Ref Range Status   03/19/2023 6.5 6.0 - 8.4 g/dL Final     Albumin   Date Value Ref Range Status   03/19/2023 3.8 3.5 - 5.2 g/dL Final     Total Bilirubin   Date Value Ref Range Status    03/19/2023 0.8 0.1 - 1.0 mg/dL Final     Comment:     For infants and newborns, interpretation of results should be based  on gestational age, weight and in agreement with clinical  observations.    Premature Infant recommended reference ranges:  Up to 24 hours.............<8.0 mg/dL  Up to 48 hours............<12.0 mg/dL  3-5 days..................<15.0 mg/dL  6-29 days.................<15.0 mg/dL       Alkaline Phosphatase   Date Value Ref Range Status   03/19/2023 103 55 - 135 U/L Final     AST   Date Value Ref Range Status   03/19/2023 26 10 - 40 U/L Final     ALT   Date Value Ref Range Status   03/19/2023 35 10 - 44 U/L Final     Anion Gap   Date Value Ref Range Status   03/19/2023 9 8 - 16 mmol/L Final     eGFR if    Date Value Ref Range Status   07/07/2022 >60.0 >60 mL/min/1.73 m^2 Final     eGFR if non    Date Value Ref Range Status   07/07/2022 >60.0 >60 mL/min/1.73 m^2 Final     Comment:     Calculation used to obtain the estimated glomerular filtration  rate (eGFR) is the CKD-EPI equation.          No results found for: LABA1C, HGBA1C          ASSESSMENT:       58 y.o. year old male with abdominal pain, consistent with     No diagnosis found.      There are no diagnoses linked to this encounter.             PLAN:   - Interventions:    None at this time.  Can consider celiac plexus block in the future if needed, but he is currently better controlled with Lyrica  - Anticoagulation use:   no no anticoagulation    - Medications:   Increase from 100 mg nightly to 150 mg nightly, additional 75 mg tablet as needed during flares for additional relief   Continue promethazine 25 mg as needed for nausea  - Imaging:   CT abdomen reviewed    - Consults/Referrals:   Continue follow-up with GI  and hepatology.         - Counseled patient regarding the importance of activity modification    - Patient Questions: Answered all of the patient's questions regarding diagnosis, therapy, and  treatment    - Follow up visit: 3 months or PRN        The above plan and management options were discussed at length with patient. Patient is in agreement with the above and verbalized understanding.    I discussed the goals of interventional chronic pain management with the patient on today's visit.  I explained the utility of injections for diagnostic and therapeutic purposes.  We discussed a multimodal approach to pain including treating the patient's given worst pain at any given time.  We will use a systematic approach to addressing pain.  We will also adopt a multimodal approach that includes injections, adjuvant medications, physical therapy, at times psychiatry.  There may be a limited role for opioid use intermittently in the treatment of pain, more particularly for acute pain although no one approach can be used as a sole treatment modality.    I emphasized the importance of regular exercise, core strengthening and stretching, diet and weight loss as a cornerstone of long-term pain management.      Cata Stewart PA-C  Interventional Pain Management  [facility]    Disclaimer:  This note was prepared using voice recognition system and is likely to have sound alike errors that may have been overlooked even after proof reading.  Please call me with any questions

## 2023-06-22 RX ORDER — PREGABALIN 75 MG/1
CAPSULE ORAL
Qty: 90 CAPSULE | Refills: 2 | Status: SHIPPED | OUTPATIENT
Start: 2023-06-22 | End: 2023-08-15 | Stop reason: SDUPTHER

## 2023-08-14 ENCOUNTER — TELEPHONE (OUTPATIENT)
Dept: PAIN MEDICINE | Facility: CLINIC | Age: 58
End: 2023-08-14
Payer: COMMERCIAL

## 2023-08-15 ENCOUNTER — OFFICE VISIT (OUTPATIENT)
Dept: PAIN MEDICINE | Facility: CLINIC | Age: 58
End: 2023-08-15
Payer: COMMERCIAL

## 2023-08-15 DIAGNOSIS — K86.1 CHRONIC PANCREATITIS, UNSPECIFIED PANCREATITIS TYPE: Primary | ICD-10-CM

## 2023-08-15 PROCEDURE — 99214 PR OFFICE/OUTPT VISIT, EST, LEVL IV, 30-39 MIN: ICD-10-PCS | Mod: 95,ICN,, | Performed by: PHYSICIAN ASSISTANT

## 2023-08-15 PROCEDURE — 99214 OFFICE O/P EST MOD 30 MIN: CPT | Mod: 95,ICN,, | Performed by: PHYSICIAN ASSISTANT

## 2023-08-15 PROCEDURE — 1160F RVW MEDS BY RX/DR IN RCRD: CPT | Mod: CPTII,95,ICN, | Performed by: PHYSICIAN ASSISTANT

## 2023-08-15 PROCEDURE — 1159F PR MEDICATION LIST DOCUMENTED IN MEDICAL RECORD: ICD-10-PCS | Mod: CPTII,95,ICN, | Performed by: PHYSICIAN ASSISTANT

## 2023-08-15 PROCEDURE — 1159F MED LIST DOCD IN RCRD: CPT | Mod: CPTII,95,ICN, | Performed by: PHYSICIAN ASSISTANT

## 2023-08-15 PROCEDURE — 1160F PR REVIEW ALL MEDS BY PRESCRIBER/CLIN PHARMACIST DOCUMENTED: ICD-10-PCS | Mod: CPTII,95,ICN, | Performed by: PHYSICIAN ASSISTANT

## 2023-08-15 RX ORDER — OXYCODONE HCL 10 MG/1
10 TABLET, FILM COATED, EXTENDED RELEASE ORAL EVERY 12 HOURS PRN
Status: CANCELLED | OUTPATIENT
Start: 2023-08-15

## 2023-08-15 RX ORDER — OXYCODONE AND ACETAMINOPHEN 10; 325 MG/1; MG/1
1 TABLET ORAL EVERY 4 HOURS PRN
Qty: 21 TABLET | Refills: 0 | Status: SHIPPED | OUTPATIENT
Start: 2023-08-15

## 2023-08-15 RX ORDER — PREGABALIN 75 MG/1
CAPSULE ORAL
Qty: 90 CAPSULE | Refills: 2 | Status: SHIPPED | OUTPATIENT
Start: 2023-08-15 | End: 2024-03-14 | Stop reason: SDUPTHER

## 2023-08-15 NOTE — PROGRESS NOTES
"Interventional Pain Progress Note       Referring Physician: No ref. provider found    PCP: Rebecca Mcmillan MD    Chief Complaint:   No chief complaint on file.    Established Patient - TeleHealth Visit    The patient location is: [place]  The chief complaint leading to consultation is: chronic pain     Visit type: audiovisual    Face to Face time with patient: 10-15 minutes  20 minutes of total time spent on the encounter, which includes face to face time and non-face to face time preparing to see the patient (eg, review of tests), Obtaining and/or reviewing separately obtained history, Documenting clinical information in the electronic or other health record, Independently interpreting results (not separately reported) and communicating results to the patient/family/caregiver, or Care coordination (not separately reported).     Each patient to whom he or she provides medical services by telemedicine is:  (1) informed of the relationship between the physician and patient and the respective role of any other health care provider with respect to management of the patient; and (2) notified that he or she may decline to receive medical services by telemedicine and may withdraw from such care at any time.    Interval History (8/15/2023):  Fabrice Zamorano presents today for follow-up visit via telemed.  Patient was last seen on 6/13/2023. He reports he continues to have good days and bad days. He reports June was particularly rough. He reports he tries to stay hydrated and avoid dehydration by getting IV hydration therapy and through oral rehydration packets. He reports his pain flares typically last 30-36 hours, but he is still able to manage at home with phenergan, lyrica, and occasional Percocet. Patient reports pain as "0/10 today.      Interval History (6/13/2023):  Fabrice Zamorano presents today  via telemed for follow-up visit.  Patient was last seen on 3/9/2023. At that visit, the plan was to continue Lyrica. He " continues to report improvement in his symtpoms with Lyrica. He reports that every few weeks he will have an episode of severe abdominal pain and nausea that requires him to be bed bound and utilize phenergan, but he has not needed hospitalization. Today he is very nauseated, but again reports that he is able to manage his symptoms at home.Patient reports pain as 2/10 today.      Interval History (3/9/2023):  Fabrice Zamorano presents today for follow-up visit.  Patient was last seen on 12/2/2022. At that visit, the plan was to continue Lyrica for abdominal pain and consider celiac plexus block should pain worsen.  He reports Lyrica has been in game changer and has significantly improved his pain.  He continues to be followed by GI and recently had a repeat ERCP.  He also recently had an updated CT of his abdomen which demonstrated a hernia, he reports Dr. Hills stated that at some point in the future he will likely need a hernia repair, but this was deferred at this time. Patient reports pain as just mild discomfort today today.  He takes Lyrica 100 mg nightly and promethazine as needed for nausea.  He is requesting a refill of his Lyrica.      Interval History (12/02/2022):  Patient returns to clinic for evaluation of abdominal pain.  Patient reports that since last being seen his pain has overall improved.  He reports having 1 episode of pancreatitis pain every 3-4 weeks that lasts approximately 24-36 hours.  He also reports mild epigastric burning pain with food or liquids, that is typically relieved with standing from food.  Pain is currently rated at 0/10.       SUBJECTIVE:    Fabrice Zamorano is a 58 y.o. male who presents to the clinic for the evaluation of  abdominal pain. The pain started 6 years ago followingsymptoms have been worsening.The pain is located in the RUQ and radiate in band across his abdomen to his thoracic spine and to his right shoulder.The pain is described as aching, crushing, dull,  sharp, shooting, stabbing and tight band and is rated as 5/10. The pain is rated with a score of  2/10 on the BEST day and a score of 10/10 on the WORST day.  Symptoms interfere with daily activity. The pain is exacerbated by eating a mean.  The pain is mitigated by fasting.         Non-Pharmacologic Treatments:  Physical Therapy/Home Exercise: no  Ice/Heat:no  TENS: no  Acupuncture: no  Massage: no  Chiropractic: no        Previous Pain Medications:  Tylenol, ASA, Percocet     report:  Reviewed and consistent with medication use as prescribed.    Pain Procedures:   None      Imaging:   CT Abdomen Pelvis With Contrast  Narrative: EXAMINATION:  CT ABDOMEN PELVIS WITH CONTRAST    CLINICAL HISTORY:  Abdominal abscess/infection suspected;    TECHNIQUE:  Low dose axial images, sagittal and coronal reformations were obtained from the lung bases to the pubic symphysis following the IV administration of 100 mL of Omnipaque 350. oral contrast was administered.    COMPARISON:  March 8, 2023.    FINDINGS:  - Lower thorax:Minimal bibasilar atelectasis/scarring.  Heart size is within normal limits.    - Liver: Diffusely hypodense.  No focal lesion.    - Gallbladder: Surgically absent.    - Bile Ducts: Expected reservoir effect in the common bile duct.    - Spleen: Enlarged, 14.6 cm.    - Kidneys: Largest simple cyst anterior right kidney.  The kidneys otherwise unremarkable without hydronephrosis or nephrolithiasis.    - Adrenals: Unremarkable.    - Pancreas: Known chronic inflammatory changes at the head of the pancreas.  No significant surrounding inflammatory stranding.  Body and tail the pancreas are stable.    - Retroperitoneum:  No adenopathy.    - Vascular: Atherosclerotic calcifications.  No abdominal aortic aneurysm.  Circumaortic left renal vein.    - Abdominal wall:  Diastasis recti with small noninflamed fat containing hernias.    Pelvis:    No pelvic mass, adenopathy, or free  fluid.    Bowel/Mesentery:    Duodenal diverticulum.  Thickening of the duodenal wall in the region of the pancreatic head is stable.  Colonic diverticula without CT evidence of acute diverticulitis.    Bones:  No acute osseous finding.  No aggressive osseous lesion.  Mild multilevel degenerative changes of the spine.  Impression: 1. No acute finding in the abdomen or pelvis  2. Known chronic changes at the pancreatic head.  Stable adjacent duodenal thickening is likely reactive.  3. Hepatic steatosis and splenomegaly.  4. Other findings as above.    Electronically signed by: Pepe Dexter  Date:    03/19/2023  Time:    08:32  X-Ray Chest AP Portable  Narrative: EXAMINATION:  XR CHEST AP PORTABLE    CLINICAL HISTORY:  Rigors;    FINDINGS:  Comparison: August 16, 2022.    Mediastinal silhouette is within normal limits.  The lungs are clear.  No pneumothorax or pleural effusion.  No acute osseous finding.  Impression: No acute finding in the chest.    Electronically signed by: Pepe Dexter  Date:    03/19/2023  Time:    07:20            Past Medical History:   Diagnosis Date    Anticoagulant long-term use     On Eliquis for DVT    GERD (gastroesophageal reflux disease)     Hypertension     Hypothyroidism 10/26/2021    Liver disease     fatty liver    Pancreatic pseudocyst     Personal history of colonic polyps     Sleep apnea     Splenic vein thrombosis      Past Surgical History:   Procedure Laterality Date    CHOLECYSTECTOMY      COLONOSCOPY      ENDOSCOPIC ULTRASOUND OF UPPER GASTROINTESTINAL TRACT N/A 10/12/2021    Procedure: ULTRASOUND, UPPER GI TRACT, ENDOSCOPIC;  Surgeon: Odalys Leiva MD;  Location: [facility];  Service: Endoscopy;  Laterality: N/A;  Upper and linear, 22 shark core, cytology and RPMI    ENDOSCOPIC ULTRASOUND OF UPPER GASTROINTESTINAL TRACT N/A 2/24/2023    Procedure: ULTRASOUND, UPPER GI TRACT, ENDOSCOPIC;  Surgeon: Shawn Chisholm MD;  Location: [facility] ([address] [place] [address]);  Service:  Endoscopy;  Laterality: N/A;  2/3/23-Instructions via portal-DS    ERCP N/A 2021    Procedure: ERCP (ENDOSCOPIC RETROGRADE CHOLANGIOPANCREATOGRAPHY);  Surgeon: Odalys Leiva MD;  Location: [facility];  Service: Endoscopy;  Laterality: N/A;    ERCP N/A 08/15/2022    Procedure: ERCP (ENDOSCOPIC RETROGRADE CHOLANGIOPANCREATOGRAPHY);  Surgeon: Odalys Leiva MD;  Location: [facility];  Service: Endoscopy;  Laterality: N/A;    ERCP N/A 2023    Procedure: ERCP (ENDOSCOPIC RETROGRADE CHOLANGIOPANCREATOGRAPHY);  Surgeon: Shawn Chisholm MD;  [facility]  Service: Endoscopy;  Laterality: N/A;    ERCP W/ PLASTIC STENT PLACEMENT      LATERAL PANCREATICOJEJUNOSTOMY N/A 2021    Procedure: PANCREATICOJEJUNOSTOMY, SIDE-TO-SIDE tier 1;  Surgeon: Brian Hills MD;  Location: [facility] OR [address] [place] [address];  Service: General;  Laterality: N/A;     Social History     Socioeconomic History    Marital status:    Tobacco Use    Smoking status: Former     Current packs/day: 0.00     Types: Cigarettes     Quit date: 2001     Years since quittin.0    Smokeless tobacco: Never   Substance and Sexual Activity    Alcohol use: Not Currently    Drug use: Never    Sexual activity: Yes     Family History   Problem Relation Age of Onset    Thyroid disease Mother     Cancer Father     Pancreatic cancer Father     Arthritis Father     Thyroid disease Father        Review of patient's allergies indicates:  No Known Allergies    Current Outpatient Medications   Medication Sig    carvediloL (COREG) 6.25 MG tablet Take 3.125 mg by mouth 2 (two) times daily with meals.    ibuprofen (ADVIL,MOTRIN) 800 MG tablet Take 800 mg by mouth every 6 (six) hours as needed.    lipase-protease-amylase (PANCREAZE) 16,800-56,800- 98,400 unit CpDR TAKE ONE CAPSULE BY MOUTH BEFORE MEALS AND BEFORE SNACK FOR A TOTAL OF FOUR TIMES DAILY    metFORMIN (GLUCOPHAGE-XR) 500 MG ER 24hr tablet Take 500 mg by mouth once  daily.    multivitamin (THERAGRAN) per tablet Take 1 tablet by mouth once daily.    oxyCODONE (OXYCONTIN) 10 mg 12 hr tablet Take 10 mg by mouth every 12 (twelve) hours as needed for Pain.    oxyCODONE-acetaminophen (PERCOCET)  mg per tablet Take 1 tablet by mouth every 4 (four) hours as needed for Pain.    pantoprazole (PROTONIX) 40 MG tablet TAKE 1 TABLET(40 MG) BY MOUTH EVERY DAY    pregabalin (LYRICA) 75 MG capsule Take 2 capsules (150 mg total) by mouth every evening. May also take 1 capsule (75 mg total) daily as needed.    PREVIDENT 5000 BOOSTER PLUS 1.1 % Pste BRUSH TEETH WITH PASTE TWICE DAILY. SPIT OUT EXCESS. DO NOT RINSE    promethazine (PHENERGAN) 25 MG tablet Take 1 tablet (25 mg total) by mouth every 6 (six) hours as needed for Nausea.     No current facility-administered medications for this visit.     Facility-Administered Medications Ordered in Other Visits   Medication    iohexoL (OMNIPAQUE 300) injection         ROS  Review of Systems   Constitutional:  Positive for appetite change. Negative for activity change, fatigue and fever.   HENT:  Negative for sore throat.    Respiratory:  Negative for cough, chest tightness, shortness of breath, wheezing and stridor.    Cardiovascular:  Positive for leg swelling. Negative for chest pain and palpitations.   Gastrointestinal:  Positive for abdominal pain, blood in stool and constipation. Negative for diarrhea, nausea and vomiting.   Endocrine: Negative for polydipsia, polyphagia and polyuria.   Genitourinary:  Negative for dysuria, hematuria and urgency.   Musculoskeletal:  Negative for arthralgias, back pain, gait problem, joint swelling, myalgias, neck pain and neck stiffness.   Skin:  Negative for rash.   Allergic/Immunologic: Negative for food allergies.   Neurological:  Negative for dizziness, tremors, seizures, syncope, facial asymmetry, speech difficulty, weakness, light-headedness, numbness and headaches.   Psychiatric/Behavioral:  Negative  for agitation, hallucinations, self-injury and suicidal ideas. The patient is not nervous/anxious and is not hyperactive.             OBJECTIVE:    There were no vitals taken for this visit.        Physical Exam  Constitutional:       General: He is not in acute distress.     Appearance: Normal appearance. He is not ill-appearing.   HENT:      Head: Normocephalic and atraumatic.      Nose: No congestion or rhinorrhea.      Mouth/Throat:      Mouth: Mucous membranes are moist.   Eyes:      Extraocular Movements: Extraocular movements intact.      Pupils: Pupils are equal, round, and reactive to light.   Cardiovascular:      Pulses: Normal pulses.   Pulmonary:      Effort: Pulmonary effort is normal.   Abdominal:      General: Abdomen is flat.      Palpations: Abdomen is soft.      Tenderness: There is abdominal tenderness (Tenderness to deep palpation over the right upper quadrant).   Musculoskeletal:      Cervical back: Normal range of motion and neck supple.   Skin:     General: Skin is warm and dry.      Capillary Refill: Capillary refill takes less than 2 seconds.   Neurological:      General: No focal deficit present.      Mental Status: He is alert and oriented to person, place, and time.      Cranial Nerves: No cranial nerve deficit.      Sensory: No sensory deficit.      Motor: No weakness.      Gait: Gait normal.   Psychiatric:         Mood and Affect: Mood normal.         Behavior: Behavior normal.         Thought Content: Thought content normal.               LABS:  Lab Results   Component Value Date    WBC 6.70 03/19/2023    HGB 14.4 03/19/2023    HCT 44.3 03/19/2023    MCV 88 03/19/2023     (L) 03/19/2023       CMP  Sodium   Date Value Ref Range Status   03/19/2023 135 (L) 136 - 145 mmol/L Final     Potassium   Date Value Ref Range Status   03/19/2023 3.6 3.5 - 5.1 mmol/L Final     Chloride   Date Value Ref Range Status   03/19/2023 103 95 - 110 mmol/L Final     CO2   Date Value Ref Range Status  "  03/19/2023 23 23 - 29 mmol/L Final     Glucose   Date Value Ref Range Status   03/19/2023 115 (H) 70 - 110 mg/dL Final     BUN   Date Value Ref Range Status   03/19/2023 16 6 - 20 mg/dL Final     Creatinine   Date Value Ref Range Status   03/19/2023 1.0 0.5 - 1.4 mg/dL Final     Calcium   Date Value Ref Range Status   03/19/2023 8.9 8.7 - 10.5 mg/dL Final     Total Protein   Date Value Ref Range Status   03/19/2023 6.5 6.0 - 8.4 g/dL Final     Albumin   Date Value Ref Range Status   03/19/2023 3.8 3.5 - 5.2 g/dL Final     Total Bilirubin   Date Value Ref Range Status   03/19/2023 0.8 0.1 - 1.0 mg/dL Final     Comment:     For infants and newborns, interpretation of results should be based  on gestational age, weight and in agreement with clinical  observations.    Premature Infant recommended reference ranges:  Up to 24 hours.............<8.0 mg/dL  Up to 48 hours............<12.0 mg/dL  3-5 days..................<15.0 mg/dL  6-29 days.................<15.0 mg/dL       Alkaline Phosphatase   Date Value Ref Range Status   03/19/2023 103 55 - 135 U/L Final     AST   Date Value Ref Range Status   03/19/2023 26 10 - 40 U/L Final     ALT   Date Value Ref Range Status   03/19/2023 35 10 - 44 U/L Final     Anion Gap   Date Value Ref Range Status   03/19/2023 9 8 - 16 mmol/L Final     eGFR if    Date Value Ref Range Status   07/07/2022 >60.0 >60 mL/min/1.73 m^2 Final     eGFR if non    Date Value Ref Range Status   07/07/2022 >60.0 >60 mL/min/1.73 m^2 Final     Comment:     Calculation used to obtain the estimated glomerular filtration  rate (eGFR) is the CKD-EPI equation.          No results found for: "LABA1C", "HGBA1C"          ASSESSMENT:       58 y.o. year old male with abdominal pain, consistent with     1. Chronic pancreatitis, unspecified pancreatitis type              Chronic pancreatitis, unspecified pancreatitis type    Other orders  -     pregabalin (LYRICA) 75 MG capsule; Take " 2 capsules (150 mg total) by mouth every evening. May also take 1 capsule (75 mg total) daily as needed.  Dispense: 90 capsule; Refill: 2                 PLAN:   - Interventions:    None at this time.  Can consider celiac plexus block in the future if needed, but he is currently better controlled with Lyrica  - Anticoagulation use:   no no anticoagulation    - Medications:   Continue 150 mg nightly, additional 75 mg tablet as needed during flares for additional relief   Continue promethazine 25 mg as needed for nausea   Refill Percocet to take for severe pain only, takes sparingly- once weekly or less  - Imaging:   CT abdomen reviewed    - Consults/Referrals:   Continue follow-up with GI  and hepatology.         - Counseled patient regarding the importance of activity modification    - Patient Questions: Answered all of the patient's questions regarding diagnosis, therapy, and treatment    - Follow up visit: 3 months or PRN        The above plan and management options were discussed at length with patient. Patient is in agreement with the above and verbalized understanding.    I discussed the goals of interventional chronic pain management with the patient on today's visit.  I explained the utility of injections for diagnostic and therapeutic purposes.  We discussed a multimodal approach to pain including treating the patient's given worst pain at any given time.  We will use a systematic approach to addressing pain.  We will also adopt a multimodal approach that includes injections, adjuvant medications, physical therapy, at times psychiatry.  There may be a limited role for opioid use intermittently in the treatment of pain, more particularly for acute pain although no one approach can be used as a sole treatment modality.    I emphasized the importance of regular exercise, core strengthening and stretching, diet and weight loss as a cornerstone of long-term pain management.      Cata Stewart,  JOHN  Interventional Pain Management  [facility]    Disclaimer:  This note was prepared using voice recognition system and is likely to have sound alike errors that may have been overlooked even after proof reading.  Please call me with any questions

## 2023-09-28 RX ORDER — PANCRELIPASE LIPASE, PANCRELIPASE AMYLASE, AND PANCRELIPASE PROTEASE 16800; 98400; 56800 [USP'U]/1; [USP'U]/1; [USP'U]/1
CAPSULE, DELAYED RELEASE ORAL
Qty: 120 CAPSULE | Refills: 2 | Status: SHIPPED | OUTPATIENT
Start: 2023-09-28 | End: 2023-12-15 | Stop reason: DRUGHIGH

## 2023-10-04 ENCOUNTER — OFFICE VISIT (OUTPATIENT)
Dept: GASTROENTEROLOGY | Facility: CLINIC | Age: 58
End: 2023-10-04
Payer: COMMERCIAL

## 2023-10-04 VITALS
SYSTOLIC BLOOD PRESSURE: 129 MMHG | HEART RATE: 57 BPM | BODY MASS INDEX: 29.78 KG/M2 | HEIGHT: 71 IN | DIASTOLIC BLOOD PRESSURE: 81 MMHG | WEIGHT: 212.75 LBS

## 2023-10-04 DIAGNOSIS — K85.90 HEREDITARY PANCREATITIS: Primary | ICD-10-CM

## 2023-10-04 PROCEDURE — 1159F PR MEDICATION LIST DOCUMENTED IN MEDICAL RECORD: ICD-10-PCS | Mod: CPTII,S$GLB,, | Performed by: INTERNAL MEDICINE

## 2023-10-04 PROCEDURE — 99213 OFFICE O/P EST LOW 20 MIN: CPT | Mod: S$GLB,,, | Performed by: INTERNAL MEDICINE

## 2023-10-04 PROCEDURE — 99999 PR PBB SHADOW E&M-EST. PATIENT-LVL III: CPT | Mod: PBBFAC,,, | Performed by: INTERNAL MEDICINE

## 2023-10-04 PROCEDURE — 3008F PR BODY MASS INDEX (BMI) DOCUMENTED: ICD-10-PCS | Mod: CPTII,S$GLB,, | Performed by: INTERNAL MEDICINE

## 2023-10-04 PROCEDURE — 3008F BODY MASS INDEX DOCD: CPT | Mod: CPTII,S$GLB,, | Performed by: INTERNAL MEDICINE

## 2023-10-04 PROCEDURE — 99213 PR OFFICE/OUTPT VISIT, EST, LEVL III, 20-29 MIN: ICD-10-PCS | Mod: S$GLB,,, | Performed by: INTERNAL MEDICINE

## 2023-10-04 PROCEDURE — 3079F PR MOST RECENT DIASTOLIC BLOOD PRESSURE 80-89 MM HG: ICD-10-PCS | Mod: CPTII,S$GLB,, | Performed by: INTERNAL MEDICINE

## 2023-10-04 PROCEDURE — 3079F DIAST BP 80-89 MM HG: CPT | Mod: CPTII,S$GLB,, | Performed by: INTERNAL MEDICINE

## 2023-10-04 PROCEDURE — 1159F MED LIST DOCD IN RCRD: CPT | Mod: CPTII,S$GLB,, | Performed by: INTERNAL MEDICINE

## 2023-10-04 PROCEDURE — 3074F PR MOST RECENT SYSTOLIC BLOOD PRESSURE < 130 MM HG: ICD-10-PCS | Mod: CPTII,S$GLB,, | Performed by: INTERNAL MEDICINE

## 2023-10-04 PROCEDURE — 3074F SYST BP LT 130 MM HG: CPT | Mod: CPTII,S$GLB,, | Performed by: INTERNAL MEDICINE

## 2023-10-04 PROCEDURE — 99999 PR PBB SHADOW E&M-EST. PATIENT-LVL III: ICD-10-PCS | Mod: PBBFAC,,, | Performed by: INTERNAL MEDICINE

## 2023-10-04 NOTE — PROGRESS NOTES
Advanced Endoscopy / Pancreaticobiliary Service    Reason for visit (Chief Complaint):  Hereditary pancreatitis    Referring provider/PCP: No referring provider defined for this encounter.    History of Present Illness: Patient presents for follow-up of above. 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  He was previously seen by 1 of my colleagues, Dr. Jamil, in [place] over the last few years.  He underwent a peustow procedure with some parenchymal debulking in November of 2021.  He has done much better since the surgery, however he still has episodes of ongoing mild attacks of pain.     His father and multiple extended family members on his father's side, had pancreatic cancer. He is concerned about the possibility of developing this given his underlying hereditary pancreatitis.     He underwent an EUS and ERCP by me earlier this year.  On ERCP, his ventral pancreatic duct appeared to drain nicely into the duodenal.  There was a stricture in the region of the neck of the pancreas.  And upstream from the stricture contrast injection appeared to drain, likely representing a patent, lateral pancreaticojejunostomy (from his prior peustow procedure).    He takes 1 pancreatic enzyme capsule with meals.  He has gained some weight on this regimen.    Physical Exam:  General: Well-developed, well-appearing, no acute distress      Laboratory:       Imaging:  Reviewed images from his last CT scan performed in March.    Assessment/Plan:  Hereditary pancreatitis- His major concern at this time is his risk of developing pancreatic cancer.  We discussed the options and rationale for performing pancreatic cancer screening.  At this time, he would like to pursue a more conservative approach to this and would like to have some sort of imaging surveillance twice per year.  I think that is quite reasonable given his family history.  I will plan for an endoscopic ultrasound in  the near future.        Shawn Chisholm MD, HAIR   - Department of Gastroenterology  [facility]

## 2023-10-04 NOTE — PROGRESS NOTES
"GENERAL GI PATIENT INTAKE:    COVID symptoms in the last 7 days (runny nose, sore throat, congestion, cough, fever): No  PCP: Deyanira April H  If not PCP-  number given to establish [phone]: N/A    ALLERGIES REVIEWED:  N/A    CHIEF COMPLAINT:    Chief Complaint   Patient presents with    Follow-up       VITAL SIGNS:  /81   Pulse (!) 57   Ht 5' 11" (1.803 m)   Wt 96.5 kg (212 lb 11.9 oz)   BMI 29.67 kg/m²      Change in medical, surgical, family or social history: No      REVIEWED MEDICATION LIST RECONCILED INCLUDING ABOVE MEDS:  yes    "

## 2023-10-12 DIAGNOSIS — K85.90 HEREDITARY PANCREATITIS: Primary | ICD-10-CM

## 2023-10-13 ENCOUNTER — TELEPHONE (OUTPATIENT)
Dept: ENDOSCOPY | Facility: HOSPITAL | Age: 58
End: 2023-10-13
Payer: COMMERCIAL

## 2023-10-13 ENCOUNTER — PATIENT MESSAGE (OUTPATIENT)
Dept: ENDOSCOPY | Facility: HOSPITAL | Age: 58
End: 2023-10-13
Payer: COMMERCIAL

## 2023-10-13 NOTE — TELEPHONE ENCOUNTER
Call placed to pt. Spoke to pt spouse. Pt is still admitted to the hospital but overall doing ok. Instructed appt with Dr. Hills is for follow up regarding recurrent pancreatitis and placement of stents. Pt spouse was appreciative of the call.   minutes on the discharge service.

## 2023-10-13 NOTE — TELEPHONE ENCOUNTER
Spoke to Pt to schedule procedure(s) Upper Endoscopy Ultrasound (EUS)       Physician to perform procedure(s) Dr. JIAN Chisholm  Date of Procedure (s) 11/14/23  Arrival Time 7:30 AM  Time of Procedure(s) 8:30 AM   Location of Procedure(s) [facility]  Type of Rx Prep sent to patient: N/A  Instructions provided to patient via [facility]    Patient was informed on the following information and verbalized understanding. Screening questionnaire reviewed with patient and complete. If procedure requires anesthesia, a responsible adult needs to be present to accompany the patient home, patient cannot drive after receiving anesthesia. Appointment details are tentative, especially check-in time. Patient will receive a prep-op call 4 days prior to confirm check-in time for procedure. If applicable the patient should contact their pharmacy to verify Rx for procedure prep is ready for pick-up. Patient was advised to call the scheduling department at [phone] if pharmacy states no Rx is available. Patient was advised to call the endoscopy scheduling department if any questions or concerns arise.      [facility] Scheduling Department

## 2023-10-23 NOTE — PROGRESS NOTES
HPI:  Patient presents for six-month follow-up for the no hernia trial.  He is doing well from a surgery standpoint.  Incision has healed very well.  Has no pain complaints related to his incision.  Has felt no bulges or masses consistent with concern for hernia.  Continues to have some pain and nausea vomiting episodes related to his chronic pancreatitis, but this has improved since his operation.  He had an MRI performed at 6 months that showed no evidence of abdominal wall hernia related to surgery.    PHYSICAL EXAM:  Physical Exam  No acute distress  Regular rate and rhythm  Abdomen soft, nontender nondistended.  Incision well healed, no evidence of hernia    EXAMINATION:  MRI ABDOMEN WITHOUT CONTRAST MRCP     CLINICAL HISTORY:  chronic pancreatitis, elevated bilirubin concern for biliary stricture;  Acute pancreatitis without necrosis or infection, unspecified     TECHNIQUE:  Multiplanar, multisequence images are performed through the liver and upper abdomen.  Additionally 2D and 3D MRCP sequences are performed as well as MIP images.     COMPARISON:  09/30/2021.     FINDINGS:  The visualized lung bases and mediastinum are unremarkable.     The liver is unremarkable.     The spleen is enlarged measuring 14.6 cm craniocaudal.     The bilateral adrenal glands are unremarkable.     The pancreatic parenchyma is unremarkable.  However, there is multifocal dilatation of the main pancreatic duct measuring 4-5 mm in the pancreatic body with dilatation of the side chain branches and 3 mm in the pancreatic tail.     The gallbladder has been removed.  No intrahepatic or extrahepatic biliary dilatation.     The kidneys are symmetric in size.  7.1 x 6.0 by 6.7 cm right renal cyst.  No hydronephrosis or hydroureter.     The visualized large and small bowel are unremarkable.     There are prominent periportal collateral vessels in the left upper quadrant.     No free fluid or lymphadenopathy.     Osseous structures are  unremarkable.     Impression:     Multifocal dilatation of the main pancreatic duct measuring 4-5 mm with dilatation of the side chain branches.  This is not significantly changed when compared to the prior exam.  Improved peripancreatic edema.  Please note evaluation for pancreatic mass is limited by the lack of intravenous contrast.  Recommend continued surveillance.     The spleen is enlarged.  Periportal collateral vessels.  The findings are worrisome for portal venous hypertension.      US personally performed by myself in clinic.  Midline fascia is intact with no defects consistent with no findings of hernia related to prior surgery.    ASSESSMENT:    The patient is doing well after surgery.  Patient has no evidence of hernia at 6 month follow-up for no hernia trial.     PLAN:    Follow up PRN.    Buck Tabares MD  Acute Care Surgery  [facility] [place] Department of Surgery           as note above

## 2023-11-07 ENCOUNTER — TELEPHONE (OUTPATIENT)
Dept: PAIN MEDICINE | Facility: CLINIC | Age: 58
End: 2023-11-07
Payer: COMMERCIAL

## 2023-11-07 ENCOUNTER — PATIENT MESSAGE (OUTPATIENT)
Dept: PAIN MEDICINE | Facility: CLINIC | Age: 58
End: 2023-11-07
Payer: COMMERCIAL

## 2023-11-07 NOTE — TELEPHONE ENCOUNTER
----- Message from Raysa San sent at 11/7/2023 10:47 AM CST -----  Contact: [facility] Is calling to get clarification on pregabalin (LYRICA) 75 MG capsule dosage. Please call back at [phone]            Thanks

## 2023-11-07 NOTE — TELEPHONE ENCOUNTER
Returned pharmacy call. Clarified that the prescriptions is for Lyrica 75 mg. Pharmacy verified understanding.

## 2023-11-09 ENCOUNTER — TELEPHONE (OUTPATIENT)
Dept: ENDOSCOPY | Facility: HOSPITAL | Age: 58
End: 2023-11-09
Payer: COMMERCIAL

## 2023-11-09 NOTE — TELEPHONE ENCOUNTER
Spoke with patient about arrival time @ *. [phone]  Covid test =    NPO status reviewed: Patient may eat until 7:00pm.  After 7pm, pt may have CLEAR liquids ONLY until 3 hrs prior to arrival, then completely NPO..    Medications: Do not take Insulin or oral diabetic medications the day of the procedure.    Take as prescribed: heart, seizure and blood pressure medication in the morning with a sip of water (less than an ounce).  Take any breathing medications and bring inhalers to hospital with you.     Leave all valuables and jewelry at home. Wear comfortable clothes to procedure to change into hospital gown.   You cannot drive for 24 hours after your procedure because you will receive sedation for your procedure to make you comfortable.    A ride must be provided at discharge.

## 2023-11-12 ENCOUNTER — PATIENT MESSAGE (OUTPATIENT)
Dept: ENDOSCOPY | Facility: HOSPITAL | Age: 58
End: 2023-11-12
Payer: COMMERCIAL

## 2023-11-14 ENCOUNTER — HOSPITAL ENCOUNTER (OUTPATIENT)
Facility: HOSPITAL | Age: 58
Discharge: HOME | End: 2023-11-14
Attending: INTERNAL MEDICINE | Admitting: INTERNAL MEDICINE
Payer: COMMERCIAL

## 2023-11-14 ENCOUNTER — ANESTHESIA (OUTPATIENT)
Dept: ENDOSCOPY | Facility: HOSPITAL | Age: 58
End: 2023-11-14
Payer: COMMERCIAL

## 2023-11-14 ENCOUNTER — ANESTHESIA EVENT (OUTPATIENT)
Dept: ENDOSCOPY | Facility: HOSPITAL | Age: 58
End: 2023-11-14
Payer: COMMERCIAL

## 2023-11-14 VITALS
OXYGEN SATURATION: 97 % | BODY MASS INDEX: 29.12 KG/M2 | TEMPERATURE: 98 F | RESPIRATION RATE: 16 BRPM | HEIGHT: 71 IN | SYSTOLIC BLOOD PRESSURE: 121 MMHG | HEART RATE: 64 BPM | DIASTOLIC BLOOD PRESSURE: 76 MMHG | WEIGHT: 208 LBS

## 2023-11-14 DIAGNOSIS — K85.90 HEREDITARY PANCREATITIS: ICD-10-CM

## 2023-11-14 LAB — POCT GLUCOSE: 90 MG/DL (ref 70–110)

## 2023-11-14 PROCEDURE — 25000003 PHARM REV CODE 250: Performed by: INTERNAL MEDICINE

## 2023-11-14 PROCEDURE — 63600175 PHARM REV CODE 636 W HCPCS: Performed by: INTERNAL MEDICINE

## 2023-11-14 PROCEDURE — D9220A PRA ANESTHESIA: ICD-10-PCS | Mod: ANES,,, | Performed by: STUDENT IN AN ORGANIZED HEALTH CARE EDUCATION/TRAINING PROGRAM

## 2023-11-14 PROCEDURE — 43253 EGD US TRANSMURAL INJXN/MARK: CPT | Mod: ,,, | Performed by: INTERNAL MEDICINE

## 2023-11-14 PROCEDURE — D9220A PRA ANESTHESIA: Mod: ANES,,, | Performed by: STUDENT IN AN ORGANIZED HEALTH CARE EDUCATION/TRAINING PROGRAM

## 2023-11-14 PROCEDURE — 25000003 PHARM REV CODE 250: Performed by: NURSE ANESTHETIST, CERTIFIED REGISTERED

## 2023-11-14 PROCEDURE — 43253 EGD US TRANSMURAL INJXN/MARK: CPT | Performed by: INTERNAL MEDICINE

## 2023-11-14 PROCEDURE — 63600175 PHARM REV CODE 636 W HCPCS: Performed by: NURSE ANESTHETIST, CERTIFIED REGISTERED

## 2023-11-14 PROCEDURE — 37000008 HC ANESTHESIA 1ST 15 MINUTES: Performed by: INTERNAL MEDICINE

## 2023-11-14 PROCEDURE — D9220A PRA ANESTHESIA: ICD-10-PCS | Mod: CRNA,,, | Performed by: NURSE ANESTHETIST, CERTIFIED REGISTERED

## 2023-11-14 PROCEDURE — 37000009 HC ANESTHESIA EA ADD 15 MINS: Performed by: INTERNAL MEDICINE

## 2023-11-14 PROCEDURE — 43253 PR EGD, FLEX, W/US GUIDED INJ, DX/THER SUBST/FIDUCIAL MARKER: ICD-10-PCS | Mod: ,,, | Performed by: INTERNAL MEDICINE

## 2023-11-14 PROCEDURE — D9220A PRA ANESTHESIA: Mod: CRNA,,, | Performed by: NURSE ANESTHETIST, CERTIFIED REGISTERED

## 2023-11-14 RX ORDER — SODIUM CHLORIDE 9 MG/ML
INJECTION, SOLUTION INTRAVENOUS CONTINUOUS
Status: DISCONTINUED | OUTPATIENT
Start: 2023-11-14 | End: 2023-11-14 | Stop reason: HOSPADM

## 2023-11-14 RX ORDER — PROPOFOL 10 MG/ML
VIAL (ML) INTRAVENOUS CONTINUOUS PRN
Status: DISCONTINUED | OUTPATIENT
Start: 2023-11-14 | End: 2023-11-14

## 2023-11-14 RX ORDER — PROPOFOL 10 MG/ML
VIAL (ML) INTRAVENOUS
Status: DISCONTINUED | OUTPATIENT
Start: 2023-11-14 | End: 2023-11-14

## 2023-11-14 RX ORDER — LIDOCAINE HYDROCHLORIDE 20 MG/ML
INJECTION INTRAVENOUS
Status: DISCONTINUED | OUTPATIENT
Start: 2023-11-14 | End: 2023-11-14

## 2023-11-14 RX ORDER — BUPIVACAINE HYDROCHLORIDE AND EPINEPHRINE 2.5; 5 MG/ML; UG/ML
20 INJECTION, SOLUTION EPIDURAL; INFILTRATION; INTRACAUDAL; PERINEURAL ONCE
Status: COMPLETED | OUTPATIENT
Start: 2023-11-14 | End: 2023-11-14

## 2023-11-14 RX ORDER — SODIUM CHLORIDE 0.9 % (FLUSH) 0.9 %
10 SYRINGE (ML) INJECTION
Status: DISCONTINUED | OUTPATIENT
Start: 2023-11-14 | End: 2023-11-14 | Stop reason: HOSPADM

## 2023-11-14 RX ORDER — SODIUM CHLORIDE 0.9 % (FLUSH) 0.9 %
10 SYRINGE (ML) INJECTION
Status: CANCELLED | OUTPATIENT
Start: 2023-11-14

## 2023-11-14 RX ORDER — TRIAMCINOLONE ACETONIDE 40 MG/ML
80 INJECTION, SUSPENSION INTRA-ARTICULAR; INTRAMUSCULAR ONCE
Status: COMPLETED | OUTPATIENT
Start: 2023-11-14 | End: 2023-11-14

## 2023-11-14 RX ORDER — SODIUM CHLORIDE, SODIUM LACTATE, POTASSIUM CHLORIDE, CALCIUM CHLORIDE 600; 310; 30; 20 MG/100ML; MG/100ML; MG/100ML; MG/100ML
INJECTION, SOLUTION INTRAVENOUS CONTINUOUS PRN
Status: DISCONTINUED | OUTPATIENT
Start: 2023-11-14 | End: 2023-11-14

## 2023-11-14 RX ORDER — SODIUM CHLORIDE 9 MG/ML
INJECTION, SOLUTION INTRAVENOUS CONTINUOUS
Status: CANCELLED | OUTPATIENT
Start: 2023-11-14

## 2023-11-14 RX ADMIN — PROPOFOL 175 MCG/KG/MIN: 10 INJECTION, EMULSION INTRAVENOUS at 09:11

## 2023-11-14 RX ADMIN — Medication 30 MG: at 09:11

## 2023-11-14 RX ADMIN — TOPICAL ANESTHETIC 1 EACH: 200 SPRAY DENTAL; PERIODONTAL at 09:11

## 2023-11-14 RX ADMIN — TRIAMCINOLONE ACETONIDE 80 MG: 40 INJECTION, SUSPENSION INTRA-ARTICULAR; INTRAMUSCULAR at 10:11

## 2023-11-14 RX ADMIN — SODIUM CHLORIDE, SODIUM LACTATE, POTASSIUM CHLORIDE, AND CALCIUM CHLORIDE: .6; .31; .03; .02 INJECTION, SOLUTION INTRAVENOUS at 07:11

## 2023-11-14 RX ADMIN — GLYCOPYRROLATE 0.2 MG: 0.2 INJECTION, SOLUTION INTRAMUSCULAR; INTRAVITREAL at 09:11

## 2023-11-14 RX ADMIN — Medication 20 MG: at 09:11

## 2023-11-14 RX ADMIN — BUPIVACAINE HYDROCHLORIDE AND EPINEPHRINE BITARTRATE 20 ML: 2.5; .005 INJECTION, SOLUTION EPIDURAL; INFILTRATION; INTRACAUDAL; PERINEURAL at 10:11

## 2023-11-14 RX ADMIN — LIDOCAINE HYDROCHLORIDE 75 MG: 20 INJECTION, SOLUTION INTRAVENOUS at 09:11

## 2023-11-14 RX ADMIN — Medication 80 MG: at 09:11

## 2023-11-14 NOTE — PR[employer]ATION PATIENT INSTRUCTIONS
Discharge Summary/Instructions after an Endoscopic Procedure  Patient Name: Fabrice Zamorano  Patient MRN: [id number]  Patient YOB: 1965 Tuesday, November 14, 2023  Kwadwo Gonsalez MD  Dear patient,  As a result of recent federal legislation (The Federal Cures Act), you may   receive lab or pathology results from your procedure in your [facility]   account before your physician is able to contact you. Your physician or   their representative will relay the results to you with their   recommendations at their soonest availability.  Thank you,  Your health is very important to us during the Covid Crisis. Following your   procedure today, you will receive a daily text for 2 weeks asking about   signs or symptoms of Covid 19.  Please respond to this text when you   receive it so we can follow up and keep you as safe as possible.   RESTRICTIONS:  During your procedure today, you received medications for sedation.  These   medications may affect your judgment, balance and coordination.  Therefore,   for 24 hours, you have the following restrictions:   - DO NOT drive a car, operate machinery, make legal/financial decisions,   sign important papers or drink alcohol.    ACTIVITY:  Today: no heavy lifting, straining or running due to procedural   sedation/anesthesia.  The following day: return to full activity including work.  DIET:  Eat and drink normally unless instructed otherwise.     TREATMENT FOR COMMON SIDE EFFECTS:  - Mild abdominal pain, nausea, belching, bloating or excessive gas:  rest,   eat lightly and use a heating pad.  - Sore Throat: treat with throat lozenges and/or gargle with warm salt   water.  - Because air was used during the procedure, expelling large amounts of air   from your rectum or belching is normal.  - If a bowel prep was taken, you may not have a bowel movement for 1-3 days.    This is normal.  SYMPTOMS TO WATCH FOR AND REPORT TO YOUR PHYSICIAN:  1. Abdominal pain or bloating, other than  gas cramps.  2. Chest pain.  3. Back pain.  4. Signs of infection such as: chills or fever occurring within 24 hours   after the procedure.  5. Rectal bleeding, which would show as bright red, maroon, or black stools.   (A tablespoon of blood from the rectum is not serious, especially if   hemorrhoids are present.)  6. Vomiting.  7. Weakness or dizziness.  GO DIRECTLY TO THE NEAREST EMERGENCY ROOM IF YOU HAVE ANY OF THE FOLLOWING:      Difficulty breathing              Chills and/or fever over 101 F   Persistent vomiting and/or vomiting blood   Severe abdominal pain   Severe chest pain   Black, tarry stools   Bleeding- more than one tablespoon   Any other symptom or condition that you feel may need urgent attention  Your doctor recommends these additional instructions:  If any biopsies were taken, your doctors clinic will contact you in 1 to 2   weeks with any results.  - Discharge patient to home (ambulatory).   - Patient has a contact number available for emergencies.  The signs and   symptoms of potential delayed complications were discussed with the   patient.  Return to normal activities tomorrow.  Written discharge   instructions were provided to the patient.   - Resume previous diet.   - For pancreas cancer screening in setting of family history of pancreas   cancer, can consider next screening modality as contrast enhanced MRI/MRCP   in 6-12 months.   - Can repeat future EUS for celiac block in 4-6 months for pain managment   pending patient's clinical response to today's initial celiac block.  For questions, problems or results please call your physician - Kwadwo Gonsalez MD.  EMERGENCY PHONE NUMBER: [phone],  LAB RESULTS: [phone]  IF A COMPLICATION OR EMERGENCY SITUATION ARISES AND YOU ARE UNABLE TO REACH   YOUR PHYSICIAN - GO DIRECTLY TO THE EMERGENCY ROOM.  Kwadwo Gonsalez MD  11/14/2023 10:24:47 AM  This report has been verified and signed electronically.  Dear patient,  As a result of  recent federal legislation (The Federal Cures Act), you may   receive lab or pathology results from your procedure in your [facility]   account before your physician is able to contact you. Your physician or   their representative will relay the results to you with their   recommendations at their soonest availability.  Thank you,  PROVATION

## 2023-11-14 NOTE — ANESTHESIA PREPROCEDURE EVALUATION
11/14/2023  Fabrice Zamorano is a 58 y.o., male.      Pre-op Assessment    I have reviewed the Patient Summary Reports.    I have reviewed the NPO Status.   I have reviewed the Medications.     Review of Systems  Anesthesia Hx:  No problems with previous Anesthesia               Denies Personal Hx of Anesthesia complications.                    Cardiovascular:     Hypertension           hyperlipidemia                             Pulmonary:        Sleep Apnea                Renal/:  Renal/ Normal                 Hepatic/GI:     GERD Liver Disease,            Neurological:  Neurology Normal                                      Endocrine:   Hypothyroidism              Physical Exam  General: Well nourished and Cooperative    Airway:  Mallampati: II   Mouth Opening: Normal  TM Distance: Normal  Tongue: Normal  Neck ROM: Normal ROM        Anesthesia Plan  Type of Anesthesia, risks & benefits discussed:    Anesthesia Type: Gen Natural Airway  Intra-op Monitoring Plan: Standard ASA Monitors  Post Op Pain Control Plan: multimodal analgesia  Induction:  IV  Informed Consent: Informed consent signed with the Patient and all parties understand the risks and agree with anesthesia plan.  All questions answered.   ASA Score: 2  Day of Surgery Review of History & Physical: H&P Update referred to the surgeon/provider.    Ready For Surgery From Anesthesia Perspective.     .

## 2023-11-14 NOTE — H&P
Short Stay Endoscopy History and Physical    PCP - Rebecca Mcmillan MD  Referring Physician - Shawn Chisholm MD  [address] [place] [address]    Procedure - EUS with celiac plexus block  ASA - per anesthesia  Mallampati - per anesthesia  History of Anesthesia problems - per anesthesia  Family history Anesthesia problems -  per anesthesia   Plan of anesthesia - per anesthesia    HPI:  This is a 58 y.o. male here for evaluation of: EUS with celiac block for chronic pancreatitis pain in setting of hereditary pancreatitis (s/p surgical pancreaticojejunostomy procedure); pancreas screening as well in setting of family history of pancreas cancer    Reflux - no  Dysphagia - no  Abdominal pain - intermittently  Diarrhea - no    ROS:  Constitutional: No fevers, chills, No weight loss  CV: No chest pain  Pulm: No cough, No shortness of breath  Ophtho: No vision changes  GI: see HPI  Derm: No rash    Medical History:  has a past medical history of Anticoagulant long-term use, GERD (gastroesophageal reflux disease), Hypertension, Hypothyroidism (10/26/2021), Liver disease, Pancreatic pseudocyst, Personal history of colonic polyps, Sleep apnea, and Splenic vein thrombosis.    Surgical History:  has a past surgical history that includes ERCP w/ plastic stent placement; Cholecystectomy; ERCP (N/A, 09/29/2021); Endoscopic ultrasound of upper gastrointestinal tract (N/A, 10/12/2021); Lateral pancreaticojejunostomy (N/A, 11/19/2021); ERCP (N/A, 08/15/2022); Colonoscopy; Endoscopic ultrasound of upper gastrointestinal tract (N/A, 2/24/2023); and ERCP (N/A, 2/24/2023).    Family History: family history includes Arthritis in his father; Cancer in his father; Pancreatic cancer in his father; Thyroid disease in his father and mother..    Social History:  reports that he quit smoking about 22 years ago. His smoking use included cigarettes. He has never used smokeless tobacco. He reports that he does not currently use  alcohol. He reports that he does not use drugs.    Review of patient's allergies indicates:  No Known Allergies    Medications:   Medications Prior to Admission   Medication Sig Dispense Refill Last Dose    carvediloL (COREG) 6.25 MG tablet Take 3.125 mg by mouth 2 (two) times daily with meals.   11/14/2023    lipase-protease-amylase (PANCREAZE) 16,800-56,800- 98,400 unit CpDR TAKE ONE CAPSULE BY MOUTH FOUR TIMES DAILY BEFORE MEALS AND BEFORE SNACK 120 capsule 2 11/13/2023    metFORMIN (GLUCOPHAGE-XR) 500 MG ER 24hr tablet Take 500 mg by mouth once daily.   11/13/2023    multivitamin (THERAGRAN) per tablet Take 1 tablet by mouth once daily.   11/13/2023    oxyCODONE (OXYCONTIN) 10 mg 12 hr tablet Take 10 mg by mouth every 12 (twelve) hours as needed for Pain.   Past Week    pantoprazole (PROTONIX) 40 MG tablet TAKE 1 TABLET(40 MG) BY MOUTH EVERY DAY 90 tablet 0 11/13/2023    pregabalin (LYRICA) 75 MG capsule Take 2 capsules (150 mg total) by mouth every evening. May also take 1 capsule (75 mg total) daily as needed. 90 capsule 2 11/13/2023    ibuprofen (ADVIL,MOTRIN) 800 MG tablet Take 800 mg by mouth every 6 (six) hours as needed.   More than a month    oxyCODONE-acetaminophen (PERCOCET)  mg per tablet Take 1 tablet by mouth every 4 (four) hours as needed for Pain. 21 tablet 0 More than a month    PREVIDENT 5000 BOOSTER PLUS 1.1 % Pste BRUSH TEETH WITH PASTE TWICE DAILY. SPIT OUT EXCESS. DO NOT RINSE   More than a month    promethazine (PHENERGAN) 25 MG tablet Take 1 tablet (25 mg total) by mouth every 6 (six) hours as needed for Nausea. 30 tablet 0 More than a month       Physical Exam:    Vital Signs:   Vitals:    11/14/23 0830   BP: (!) 146/87   Pulse: (!) 58   Resp: 18   Temp: 98.3 °F (36.8 °C)       General Appearance: Well appearing in no acute distress    Labs:  Lab Results   Component Value Date    WBC 6.70 03/19/2023    HGB 14.4 03/19/2023    HCT 44.3 03/19/2023     (L) 03/19/2023    CHOL 140  09/27/2021    TRIG 75 09/27/2021    HDL 24 (L) 09/27/2021    ALT 35 03/19/2023    AST 26 03/19/2023     (L) 03/19/2023    K 3.6 03/19/2023     03/19/2023    CREATININE 1.0 03/19/2023    BUN 16 03/19/2023    CO2 23 03/19/2023    TSH 2.669 03/19/2023    INR 1.0 11/17/2022       I have explained the risks and benefits of this endoscopic procedure to the patient including but not limited to bleeding, inflammation, infection, perforation, pancreatitis, missing a lesion and death.      Kwadwo Gonsalez MD

## 2023-11-14 NOTE — TRANSFER OF CARE
"Anesthesia Transfer of Care Note    Patient: Fabrice Zamorano    Procedure(s) Performed: Procedure(s) (LRB):  ULTRASOUND, UPPER GI TRACT, ENDOSCOPIC (N/A)    Patient location: GI    Anesthesia Type: general    Transport from OR: Transported from OR on room air with adequate spontaneous ventilation    Post pain: adequate analgesia    Post assessment: no apparent anesthetic complications and tolerated procedure well    Post vital signs: stable    Level of consciousness: awake    Nausea/Vomiting: no nausea/vomiting    Complications: none    Transfer of care protocol was followed      Last vitals: Visit Vitals  BP (!) 146/87 (BP Location: Left arm, Patient Position: Lying)   Pulse (!) 58   Temp 36.8 °C (98.3 °F)   Resp 18   Ht 5' 11" (1.803 m)   Wt 94.3 kg (208 lb)   SpO2 99%   BMI 29.01 kg/m²     "

## 2023-11-14 NOTE — ANESTHESIA POSTPROCEDURE EVALUATION
Anesthesia Post Evaluation    Patient: Fabrice Zamorano    Procedure(s) Performed: Procedure(s) (LRB):  ULTRASOUND, UPPER GI TRACT, ENDOSCOPIC (N/A)    Final Anesthesia Type: general      Patient location during evaluation: PACU  Patient participation: Yes- Able to Participate  Level of consciousness: awake and alert  Post-procedure vital signs: reviewed and stable  Pain management: adequate  Airway patency: patent    PONV status at discharge: No PONV  Anesthetic complications: no      Cardiovascular status: stable  Respiratory status: room air  Hydration status: euvolemic  Follow-up not needed.          Vitals Value Taken Time   /76 11/14/23 1028   Temp 36 11/14/23 1046   Pulse 64 11/14/23 1028   Resp 16 11/14/23 1028   SpO2 97 % 11/14/23 1028         Event Time   Out of Recovery 10:44:09         Pain/Kellie Score: Kellie Score: 10 (11/14/2023 10:28 AM)

## 2023-12-01 ENCOUNTER — PATIENT MESSAGE (OUTPATIENT)
Dept: GASTROENTEROLOGY | Facility: CLINIC | Age: 58
End: 2023-12-01
Payer: COMMERCIAL

## 2023-12-07 ENCOUNTER — TELEPHONE (OUTPATIENT)
Dept: GASTROENTEROLOGY | Facility: CLINIC | Age: 58
End: 2023-12-07
Payer: COMMERCIAL

## 2023-12-07 NOTE — TELEPHONE ENCOUNTER
----- Message from Shireen Fairbanks sent at 12/7/2023  1:19 PM CST -----  .Type:  Pharmacy Calling to Clarify an RX    Name of Caller:Robel  [facility]  Prescription Name:Pancreaze  What do they need to clarify?:prior auth  Best Call Back Number [phone]  Additional Information: reference number [id number]

## 2023-12-15 ENCOUNTER — TELEPHONE (OUTPATIENT)
Dept: ENDOSCOPY | Facility: HOSPITAL | Age: 58
End: 2023-12-15
Payer: COMMERCIAL

## 2023-12-15 NOTE — TELEPHONE ENCOUNTER
Spoke with laron about getting generic medication ordered. Consulting with MD Chisholm about new prescription. Pt agreeable with plan, will continue to monitor.

## 2024-02-16 ENCOUNTER — TELEPHONE (OUTPATIENT)
Dept: ENDOSCOPY | Facility: HOSPITAL | Age: 59
End: 2024-02-16
Payer: MEDICARE

## 2024-02-16 ENCOUNTER — PATIENT MESSAGE (OUTPATIENT)
Dept: ENDOSCOPY | Facility: HOSPITAL | Age: 59
End: 2024-02-16
Payer: MEDICARE

## 2024-02-16 DIAGNOSIS — K85.90 ACUTE ON CHRONIC PANCREATITIS: Primary | ICD-10-CM

## 2024-02-16 DIAGNOSIS — K86.1 ACUTE ON CHRONIC PANCREATITIS: Primary | ICD-10-CM

## 2024-02-16 NOTE — TELEPHONE ENCOUNTER
Spoke to pt to schedule procedure(s) Upper Endoscopy Ultrasound (EUS)       Physician to perform procedure(s) Dr. JIAN Chisholm  Date of Procedure (s) 3/12/24  Arrival Time 8:15 AM  Time of Procedure(s) 9:15 AM   Location of Procedure(s) [facility] [address] Floor  Type of Rx Prep sent to patient: N/A  Instructions provided to patient via [facility]    Patient was informed on the following information and verbalized understanding. Screening questionnaire reviewed with patient and complete. If procedure requires anesthesia, a responsible adult needs to be present to accompany the patient home, patient cannot drive after receiving anesthesia. Appointment details are tentative, especially check-in time. Patient will receive a prep-op call 7 days prior to confirm check-in time for procedure. If applicable the patient should contact their pharmacy to verify Rx for procedure prep is ready for pick-up. Patient was advised to call the scheduling department at [phone] if pharmacy states no Rx is available. Patient was advised to call the endoscopy scheduling department if any questions or concerns arise.      [facility] Scheduling Department

## 2024-03-08 ENCOUNTER — TELEPHONE (OUTPATIENT)
Dept: ENDOSCOPY | Facility: HOSPITAL | Age: 59
End: 2024-03-08
Payer: MEDICARE

## 2024-03-08 NOTE — TELEPHONE ENCOUNTER
Spoke with patient' spouse about arrival time @ 0815.   Upper EUS    Day Before Procedure 3/11/24      You may have a light evening meal.   No solid food after 7:00 pm.   Continue drinking clear liquids.        Day of the Procedure 3/12/24      You may have water/clear liquids until 4 hours before your procedure or as directed by the scheduling nurse 5:15 AM.   See below for list.     What You CANNOT do:   Do not drink milk or anything colored red.  Do not drink alcohol.  No gum chewing or candy morning of procedure     Liquids That Are OK to Drink:   Water  Sports drinks (Gatorade, Power-Aid)  Coffee or tea (no cream or nondairy creamer)  Clear juices without pulp (apple, white grape)  Gelatin desserts (no fruit or toppings)  Clear soda (sprite, coke, ginger ale)  Chicken broth (until 12 midnight the night before procedure)     Medications: Do not take Insulin or oral diabetic medications the day of the procedure.    Take as prescribed: heart, seizure and blood pressure medication in the morning with a sip of water (less than an ounce).  Take any breathing medications and bring inhalers to hospital with you.     Leave all valuables and jewelry at home. Wear comfortable clothes to procedure to change into hospital gown.   You cannot drive for 24 hours after your procedure because you will receive sedation for your procedure to make you comfortable.    A ride must be provided at discharge.

## 2024-03-12 ENCOUNTER — HOSPITAL ENCOUNTER (OUTPATIENT)
Facility: HOSPITAL | Age: 59
Discharge: HOME | End: 2024-03-12
Attending: INTERNAL MEDICINE | Admitting: INTERNAL MEDICINE
Payer: MEDICARE

## 2024-03-12 ENCOUNTER — ANESTHESIA (OUTPATIENT)
Dept: ENDOSCOPY | Facility: HOSPITAL | Age: 59
End: 2024-03-12
Payer: MEDICARE

## 2024-03-12 ENCOUNTER — ANESTHESIA EVENT (OUTPATIENT)
Dept: ENDOSCOPY | Facility: HOSPITAL | Age: 59
End: 2024-03-12
Payer: MEDICARE

## 2024-03-12 VITALS
TEMPERATURE: 98 F | WEIGHT: 210 LBS | HEIGHT: 71 IN | BODY MASS INDEX: 29.4 KG/M2 | DIASTOLIC BLOOD PRESSURE: 93 MMHG | RESPIRATION RATE: 16 BRPM | HEART RATE: 65 BPM | SYSTOLIC BLOOD PRESSURE: 137 MMHG | OXYGEN SATURATION: 97 %

## 2024-03-12 DIAGNOSIS — K86.1 CHRONIC PANCREATITIS: ICD-10-CM

## 2024-03-12 LAB
GLUCOSE SERPL-MCNC: 95 MG/DL (ref 70–110)
POCT GLUCOSE: 95 MG/DL (ref 70–110)

## 2024-03-12 PROCEDURE — 25000003 PHARM REV CODE 250: Performed by: INTERNAL MEDICINE

## 2024-03-12 PROCEDURE — 27202124 HC NEEDLE, CELIAC PLEXUS (ANY): Performed by: INTERNAL MEDICINE

## 2024-03-12 PROCEDURE — 88305 TISSUE EXAM BY PATHOLOGIST: CPT | Mod: 26,,, | Performed by: PATHOLOGY

## 2024-03-12 PROCEDURE — D9220A PRA ANESTHESIA: Mod: CRNA,,, | Performed by: NURSE ANESTHETIST, CERTIFIED REGISTERED

## 2024-03-12 PROCEDURE — 63600175 PHARM REV CODE 636 W HCPCS: Mod: JZ,JG | Performed by: INTERNAL MEDICINE

## 2024-03-12 PROCEDURE — 88342 IMHCHEM/IMCYTCHM 1ST ANTB: CPT | Mod: 26,,, | Performed by: PATHOLOGY

## 2024-03-12 PROCEDURE — 27202131 HC NEEDLE, FNB SINGLE (ANY): Performed by: INTERNAL MEDICINE

## 2024-03-12 PROCEDURE — 88305 TISSUE EXAM BY PATHOLOGIST: CPT | Performed by: PATHOLOGY

## 2024-03-12 PROCEDURE — 88173 CYTOPATH EVAL FNA REPORT: CPT | Mod: 26,,, | Performed by: PATHOLOGY

## 2024-03-12 PROCEDURE — 43253 EGD US TRANSMURAL INJXN/MARK: CPT | Performed by: INTERNAL MEDICINE

## 2024-03-12 PROCEDURE — 37000008 HC ANESTHESIA 1ST 15 MINUTES: Performed by: INTERNAL MEDICINE

## 2024-03-12 PROCEDURE — 63600175 PHARM REV CODE 636 W HCPCS: Performed by: NURSE ANESTHETIST, CERTIFIED REGISTERED

## 2024-03-12 PROCEDURE — 88173 CYTOPATH EVAL FNA REPORT: CPT | Performed by: PATHOLOGY

## 2024-03-12 PROCEDURE — 88342 IMHCHEM/IMCYTCHM 1ST ANTB: CPT | Performed by: PATHOLOGY

## 2024-03-12 PROCEDURE — 25000003 PHARM REV CODE 250: Performed by: NURSE ANESTHETIST, CERTIFIED REGISTERED

## 2024-03-12 PROCEDURE — D9220A PRA ANESTHESIA: Mod: ANES,,, | Performed by: STUDENT IN AN ORGANIZED HEALTH CARE EDUCATION/TRAINING PROGRAM

## 2024-03-12 PROCEDURE — 43253 EGD US TRANSMURAL INJXN/MARK: CPT | Mod: ,,, | Performed by: INTERNAL MEDICINE

## 2024-03-12 PROCEDURE — 37000009 HC ANESTHESIA EA ADD 15 MINS: Performed by: INTERNAL MEDICINE

## 2024-03-12 RX ORDER — PROPOFOL 10 MG/ML
VIAL (ML) INTRAVENOUS
Status: DISCONTINUED | OUTPATIENT
Start: 2024-03-12 | End: 2024-03-12

## 2024-03-12 RX ORDER — TRIAMCINOLONE ACETONIDE 40 MG/ML
80 INJECTION, SUSPENSION INTRA-ARTICULAR; INTRAMUSCULAR ONCE
Status: COMPLETED | OUTPATIENT
Start: 2024-03-12 | End: 2024-03-12

## 2024-03-12 RX ORDER — LIDOCAINE HYDROCHLORIDE 20 MG/ML
INJECTION INTRAVENOUS
Status: DISCONTINUED | OUTPATIENT
Start: 2024-03-12 | End: 2024-03-12

## 2024-03-12 RX ORDER — SODIUM CHLORIDE 9 MG/ML
INJECTION, SOLUTION INTRAVENOUS CONTINUOUS
Status: DISCONTINUED | OUTPATIENT
Start: 2024-03-12 | End: 2024-03-12 | Stop reason: HOSPADM

## 2024-03-12 RX ORDER — CIPROFLOXACIN 2 MG/ML
INJECTION, SOLUTION INTRAVENOUS
Status: DISCONTINUED | OUTPATIENT
Start: 2024-03-12 | End: 2024-03-12

## 2024-03-12 RX ORDER — BUPIVACAINE HYDROCHLORIDE 2.5 MG/ML
20 INJECTION, SOLUTION EPIDURAL; INFILTRATION; INTRACAUDAL ONCE
Status: COMPLETED | OUTPATIENT
Start: 2024-03-12 | End: 2024-03-12

## 2024-03-12 RX ORDER — BUPIVACAINE HYDROCHLORIDE AND EPINEPHRINE 2.5; 5 MG/ML; UG/ML
20 INJECTION, SOLUTION EPIDURAL; INFILTRATION; INTRACAUDAL; PERINEURAL ONCE
Status: DISCONTINUED | OUTPATIENT
Start: 2024-03-12 | End: 2024-03-12

## 2024-03-12 RX ORDER — SODIUM CHLORIDE 0.9 % (FLUSH) 0.9 %
10 SYRINGE (ML) INJECTION
Status: DISCONTINUED | OUTPATIENT
Start: 2024-03-12 | End: 2024-03-12 | Stop reason: HOSPADM

## 2024-03-12 RX ORDER — OXYCODONE AND ACETAMINOPHEN 5; 325 MG/1; MG/1
1 TABLET ORAL ONCE
Status: COMPLETED | OUTPATIENT
Start: 2024-03-12 | End: 2024-03-12

## 2024-03-12 RX ORDER — ONDANSETRON HYDROCHLORIDE 2 MG/ML
INJECTION, SOLUTION INTRAVENOUS
Status: DISCONTINUED | OUTPATIENT
Start: 2024-03-12 | End: 2024-03-12

## 2024-03-12 RX ORDER — PROPOFOL 10 MG/ML
VIAL (ML) INTRAVENOUS CONTINUOUS PRN
Status: DISCONTINUED | OUTPATIENT
Start: 2024-03-12 | End: 2024-03-12

## 2024-03-12 RX ADMIN — ONDANSETRON 4 MG: 2 INJECTION, SOLUTION INTRAMUSCULAR; INTRAVENOUS at 09:03

## 2024-03-12 RX ADMIN — CIPROFLOXACIN 400 MG: 2 INJECTION, SOLUTION INTRAVENOUS at 09:03

## 2024-03-12 RX ADMIN — BUPIVACAINE HYDROCHLORIDE 50 MG: 2.5 INJECTION, SOLUTION EPIDURAL; INFILTRATION; INTRACAUDAL; PERINEURAL at 09:03

## 2024-03-12 RX ADMIN — OXYCODONE HYDROCHLORIDE AND ACETAMINOPHEN 1 TABLET: 5; 325 TABLET ORAL at 10:03

## 2024-03-12 RX ADMIN — SODIUM CHLORIDE: 9 INJECTION, SOLUTION INTRAVENOUS at 08:03

## 2024-03-12 RX ADMIN — TOPICAL ANESTHETIC 1 EACH: 200 SPRAY DENTAL; PERIODONTAL at 09:03

## 2024-03-12 RX ADMIN — PROPOFOL 100 MG: 10 INJECTION, EMULSION INTRAVENOUS at 09:03

## 2024-03-12 RX ADMIN — GLYCOPYRROLATE 0.1 MG: 0.2 INJECTION, SOLUTION INTRAMUSCULAR; INTRAVITREAL at 09:03

## 2024-03-12 RX ADMIN — TRIAMCINOLONE ACETONIDE 80 MG: 40 INJECTION, SUSPENSION INTRA-ARTICULAR; INTRAMUSCULAR at 09:03

## 2024-03-12 RX ADMIN — LIDOCAINE HYDROCHLORIDE 100 MG: 20 INJECTION, SOLUTION INTRAVENOUS at 09:03

## 2024-03-12 RX ADMIN — PROPOFOL 175 MCG/KG/MIN: 10 INJECTION, EMULSION INTRAVENOUS at 09:03

## 2024-03-12 NOTE — H&P
Short Stay Endoscopy History and Physical    PCP - Rebecca Mcmillan MD  Referring Physician - Shawn Chisholm MD  [address] [place] [address]    Procedure - EUS  ASA - per anesthesia  Mallampati - per anesthesia  History of Anesthesia problems - no  Family history Anesthesia problems -  no   Plan of anesthesia - General    HPI:  This is a 59 y.o. male here for evaluation of: chronic pancreatitis - celiac block    Reflux - no  Dysphagia - no  Abdominal pain - POS  Diarrhea - no    ROS:  Constitutional: No fevers, chills, No weight loss  CV: No chest pain  Pulm: No cough, No shortness of breath  GI: see HPI    Medical History:  has a past medical history of Anticoagulant long-term use, GERD (gastroesophageal reflux disease), Hypertension, Hypothyroidism (10/26/2021), Liver disease, Pancreatic pseudocyst, Personal history of colonic polyps, Sleep apnea, and Splenic vein thrombosis.    Surgical History:  has a past surgical history that includes ERCP w/ plastic stent placement; Cholecystectomy; ERCP (N/A, 09/29/2021); Endoscopic ultrasound of upper gastrointestinal tract (N/A, 10/12/2021); Lateral pancreaticojejunostomy (N/A, 11/19/2021); ERCP (N/A, 08/15/2022); Colonoscopy; Endoscopic ultrasound of upper gastrointestinal tract (N/A, 2/24/2023); ERCP (N/A, 2/24/2023); and Endoscopic ultrasound of upper gastrointestinal tract (N/A, 11/14/2023).    Family History: family history includes Arthritis in his father; Cancer in his father; Pancreatic cancer in his father; Thyroid disease in his father and mother..    Social History:  reports that he quit smoking about 22 years ago. His smoking use included cigarettes. He has never used smokeless tobacco. He reports that he does not currently use alcohol. He reports that he does not use drugs.    Review of patient's allergies indicates:  No Known Allergies    Medications:   Medications Prior to Admission   Medication Sig Dispense Refill Last Dose     carvediloL (COREG) 6.25 MG tablet Take 3.125 mg by mouth 2 (two) times daily with meals.   3/12/2024 at 0600    ibuprofen (ADVIL,MOTRIN) 800 MG tablet Take 800 mg by mouth every 6 (six) hours as needed.   Past Month    lipase-protease-amylase (CREON) 36,000-114,000- 180,000 unit CpDR Take 1 capsule by mouth 3 (three) times daily with meals. 270 capsule 3 3/11/2024    metFORMIN (GLUCOPHAGE-XR) 500 MG ER 24hr tablet Take 500 mg by mouth once daily.   3/11/2024    multivitamin (THERAGRAN) per tablet Take 1 tablet by mouth once daily.   3/11/2024    oxyCODONE-acetaminophen (PERCOCET)  mg per tablet Take 1 tablet by mouth every 4 (four) hours as needed for Pain. 21 tablet 0 Past Month    pantoprazole (PROTONIX) 40 MG tablet TAKE 1 TABLET(40 MG) BY MOUTH EVERY DAY 90 tablet 0 3/11/2024    pregabalin (LYRICA) 75 MG capsule Take 2 capsules (150 mg total) by mouth every evening. May also take 1 capsule (75 mg total) daily as needed. 90 capsule 2 3/11/2024    promethazine (PHENERGAN) 25 MG tablet Take 1 tablet (25 mg total) by mouth every 6 (six) hours as needed for Nausea. 30 tablet 0 Past Month    oxyCODONE (OXYCONTIN) 10 mg 12 hr tablet Take 10 mg by mouth every 12 (twelve) hours as needed for Pain.       PREVIDENT 5000 BOOSTER PLUS 1.1 % Pste BRUSH TEETH WITH PASTE TWICE DAILY. SPIT OUT EXCESS. DO NOT RINSE          Physical Exam:    Vital Signs:   Vitals:    03/12/24 0843   BP: (!) 153/96   Pulse: 64   Resp: 18   Temp: 98.4 °F (36.9 °C)       General Appearance: Well appearing in no acute distress  Lungs: no labored breathing  CVS:  regular rate  Abdomen: non tender    Labs:  Lab Results   Component Value Date    WBC 6.70 03/19/2023    HGB 14.4 03/19/2023    HCT 44.3 03/19/2023     (L) 03/19/2023    CHOL 140 09/27/2021    TRIG 75 09/27/2021    HDL 24 (L) 09/27/2021    ALT 35 03/19/2023    AST 26 03/19/2023     (L) 03/19/2023    K 3.6 03/19/2023     03/19/2023    CREATININE 1.0 03/19/2023    BUN  16 03/19/2023    CO2 23 03/19/2023    TSH 2.669 03/19/2023    INR 1.0 11/17/2022       I have explained the risks and benefits of this endoscopic procedure to the patient including but not limited to bleeding, inflammation, infection, perforation, and death.      Shawn Chisholm MD

## 2024-03-12 NOTE — PROVATION PATIENT INSTRUCTIONS
Discharge Summary/Instructions after an Endoscopic Procedure  Patient Name: Fabrice Zamorano  Patient MRN: [id number]  Patient YOB: 1965 Tuesday, March 12, 2024  Shawn Chisholm MD  Dear patient,  As a result of recent federal legislation (The Federal Cures Act), you may   receive lab or pathology results from your procedure in your [facility]   account before your physician is able to contact you. Your physician or   their representative will relay the results to you with their   recommendations at their soonest availability.  Thank you,  Your health is very important to us during the Covid Crisis. Following your   procedure today, you will receive a daily text for 2 weeks asking about   signs or symptoms of Covid 19.  Please respond to this text when you   receive it so we can follow up and keep you as safe as possible.   RESTRICTIONS:  During your procedure today, you received medications for sedation.  These   medications may affect your judgment, balance and coordination.  Therefore,   for 24 hours, you have the following restrictions:   - DO NOT drive a car, operate machinery, make legal/financial decisions,   sign important papers or drink alcohol.    ACTIVITY:  Today: no heavy lifting, straining or running due to procedural   sedation/anesthesia.  The following day: return to full activity including work.  DIET:  Eat and drink normally unless instructed otherwise.     TREATMENT FOR COMMON SIDE EFFECTS:  - Mild abdominal pain, nausea, belching, bloating or excessive gas:  rest,   eat lightly and use a heating pad.  - Sore Throat: treat with throat lozenges and/or gargle with warm salt   water.  - Because air was used during the procedure, expelling large amounts of air   from your rectum or belching is normal.  - If a bowel prep was taken, you may not have a bowel movement for 1-3 days.    This is normal.  SYMPTOMS TO WATCH FOR AND REPORT TO YOUR PHYSICIAN:  1. Abdominal pain or bloating, other than  gas cramps.  2. Chest pain.  3. Back pain.  4. Signs of infection such as: chills or fever occurring within 24 hours   after the procedure.  5. Rectal bleeding, which would show as bright red, maroon, or black stools.   (A tablespoon of blood from the rectum is not serious, especially if   hemorrhoids are present.)  6. Vomiting.  7. Weakness or dizziness.  GO DIRECTLY TO THE NEAREST EMERGENCY ROOM IF YOU HAVE ANY OF THE FOLLOWING:      Difficulty breathing              Chills and/or fever over 101 F   Persistent vomiting and/or vomiting blood   Severe abdominal pain   Severe chest pain   Black, tarry stools   Bleeding- more than one tablespoon   Any other symptom or condition that you feel may need urgent attention  Your doctor recommends these additional instructions:  If any biopsies were taken, your doctors clinic will contact you in 1 to 2   weeks with any results.  - Discharge patient to home (ambulatory).   - Patient has a contact number available for emergencies.  The signs and   symptoms of potential delayed complications were discussed with the   patient.  Return to normal activities tomorrow.  Written discharge   instructions were provided to the patient.   - Resume previous diet.   - Continue present medications.   - Await path results.   - Observe patient's clinical course following today's procedure with   therapeutic intervention.  For questions, problems or results please call your physician - Shawn Chisholm MD.  EMERGENCY PHONE NUMBER: [phone],  LAB RESULTS: [phone]  IF A COMPLICATION OR EMERGENCY SITUATION ARISES AND YOU ARE UNABLE TO REACH   YOUR PHYSICIAN - GO DIRECTLY TO THE EMERGENCY ROOM.  Shawn Chisholm MD  3/12/2024 10:03:16 AM  This report has been verified and signed electronically.  Dear patient,  As a result of recent federal legislation (The Federal Cures Act), you may   receive lab or pathology results from your procedure in your [facility]   account before your physician  is able to contact you. Your physician or   their representative will relay the results to you with their   recommendations at their soonest availability.  Thank you,  [employer]ATION

## 2024-03-12 NOTE — ANESTHESIA POSTPROCEDURE EVALUATION
Anesthesia Post Evaluation    Patient: Fabrice Zamorano    Procedure(s) Performed: Procedure(s) (LRB):  ULTRASOUND, UPPER GI TRACT, ENDOSCOPIC (N/A)    Final Anesthesia Type: general      Patient location during evaluation: PACU  Patient participation: Yes- Able to Participate  Level of consciousness: awake and alert  Post-procedure vital signs: reviewed and stable  Pain management: adequate  Airway patency: patent    PONV status at discharge: No PONV  Anesthetic complications: no      Cardiovascular status: stable  Respiratory status: room air  Hydration status: euvolemic  Follow-up not needed.              Vitals Value Taken Time   /93 03/12/24 1025   Temp 36.8 °C (98.2 °F) 03/12/24 0955   Pulse 65 03/12/24 1025   Resp 16 03/12/24 1025   SpO2 97 % 03/12/24 1025         No case tracking events are documented in the log.      Pain/Kellie Score: No data recorded

## 2024-03-12 NOTE — PLAN OF CARE
Pain medication administered as ordered. Patient verbalized abdominal pain scale is 2/10. Patient seen by MD at bedside. Per ujan Corey for discharge.

## 2024-03-12 NOTE — PLAN OF CARE
Patient is alert and oriented, VSS, complaining of mild abdominal pain with a pain scale of 5/10. Notified MD Chisholm. Pain medication ordered.   Ultrasound Used Text: Patient has a location which was not amenable to ultrasound.

## 2024-03-12 NOTE — ANESTHESIA PREPROCEDURE EVALUATION
03/12/2024  Fabrice Zamorano is a 59 y.o., male.      Pre-op Assessment    I have reviewed the Patient Summary Reports.     I have reviewed the Nursing Notes. I have reviewed the NPO Status.   I have reviewed the Medications.     Review of Systems  Anesthesia Hx:  No problems with previous Anesthesia               Denies Personal Hx of Anesthesia complications.                    Hematology/Oncology:                   Hematology Comments: Thrombocytopenia                     Cardiovascular:     Hypertension           hyperlipidemia                             Pulmonary:        Sleep Apnea                Renal/:  Renal/ Normal                 Hepatic/GI:     GERD Liver Disease,  Chronic pancreatitis           Neurological:    Neuromuscular Disease,                                   Endocrine:   Hypothyroidism              Physical Exam  General: Well nourished, Cooperative, Alert and Oriented    Airway:  Mallampati: II   Mouth Opening: Normal  TM Distance: Normal  Tongue: Normal  Neck ROM: Normal ROM    Dental:  Intact        Anesthesia Plan  Type of Anesthesia, risks & benefits discussed:    Anesthesia Type: Gen Natural Airway  Intra-op Monitoring Plan: Standard ASA Monitors  Post Op Pain Control Plan: multimodal analgesia  Induction:  IV  Informed Consent: Informed consent signed with the Patient and all parties understand the risks and agree with anesthesia plan.  All questions answered.   ASA Score: 2  Day of Surgery Review of History & Physical: H&P Update referred to the surgeon/provider.    Ready For Surgery From Anesthesia Perspective.     .

## 2024-03-12 NOTE — TRANSFER OF CARE
"Anesthesia Transfer of Care Note    Patient: Fabrice Zamorano    Procedure(s) Performed: Procedure(s) (LRB):  ULTRASOUND, UPPER GI TRACT, ENDOSCOPIC (N/A)    Patient location: GI    Anesthesia Type: general    Transport from OR: Transported from OR on room air with adequate spontaneous ventilation    Post pain: adequate analgesia    Post assessment: no apparent anesthetic complications and tolerated procedure well    Post vital signs: stable    Level of consciousness: awake and alert    Nausea/Vomiting: no nausea/vomiting    Complications: none    Transfer of care protocol was followed      Last vitals: Visit Vitals  BP (!) 153/96 (BP Location: Left arm, Patient Position: Lying)   Pulse 64   Temp 36.9 °C (98.4 °F) (Temporal)   Resp 18   Ht 5' 11" (1.803 m)   Wt 95.3 kg (210 lb)   SpO2 97%   BMI 29.29 kg/m²     "

## 2024-03-14 LAB
COMMENT: NORMAL
FINAL PATHOLOGIC DIAGNOSIS: NORMAL

## 2024-03-14 RX ORDER — PREGABALIN 75 MG/1
CAPSULE ORAL
Qty: 90 CAPSULE | Refills: 2 | Status: SHIPPED | OUTPATIENT
Start: 2024-03-14 | End: 2024-04-16 | Stop reason: SDUPTHER

## 2024-03-14 NOTE — TELEPHONE ENCOUNTER
Returned pt call. Spoke with pt wife. States that pt needs a refill on lyrica 75 mg and take one capsule at night. Pharmacy verified. Appointment scheduled for  further refills.     Can you refill?    Last Visit:8/15  Next Visit:4/16  Last refill: 5/185 w/ 1 refill

## 2024-03-14 NOTE — TELEPHONE ENCOUNTER
----- Message from Casey Dumont MA sent at 3/14/2024  9:13 AM CDT -----  Type: RX Refill Request    Who Called:  Self    Have you contacted your pharmacy: no    Refill or New Rx:refill    RX Name and Strength: Last saw this doctor July of 2022 and saw the PA Aug. of 2023..    pregabalin (LYRICA) 75 MG capsule      Preferred Pharmacy with phone number:[id number] [address] [facility] [address]   Phone: [phone]  Fax: [phone]          Local or Mail Order:local    Ordering Provider:    Would the patient rather a call back or a response via [facility]? Yes, call     Best Call Back Number:[phone] (home)

## 2024-04-16 ENCOUNTER — OFFICE VISIT (OUTPATIENT)
Dept: PAIN MEDICINE | Facility: CLINIC | Age: 59
End: 2024-04-16
Payer: MEDICARE

## 2024-04-16 DIAGNOSIS — R11.0 NAUSEA: ICD-10-CM

## 2024-04-16 DIAGNOSIS — K85.90 ACUTE ON CHRONIC PANCREATITIS: ICD-10-CM

## 2024-04-16 DIAGNOSIS — K86.1 ACUTE ON CHRONIC PANCREATITIS: ICD-10-CM

## 2024-04-16 DIAGNOSIS — G89.4 CHRONIC PAIN SYNDROME: ICD-10-CM

## 2024-04-16 DIAGNOSIS — K86.1 CHRONIC PANCREATITIS, UNSPECIFIED PANCREATITIS TYPE: Primary | ICD-10-CM

## 2024-04-16 PROCEDURE — 99213 OFFICE O/P EST LOW 20 MIN: CPT | Mod: 95,,, | Performed by: ANESTHESIOLOGY

## 2024-04-16 RX ORDER — PREGABALIN 75 MG/1
CAPSULE ORAL
Qty: 90 CAPSULE | Refills: 2 | Status: ON HOLD | OUTPATIENT
Start: 2024-04-16

## 2024-04-16 RX ORDER — ONDANSETRON 4 MG/1
4 TABLET, ORALLY DISINTEGRATING ORAL EVERY 8 HOURS PRN
Qty: 30 TABLET | Refills: 0 | Status: ON HOLD | OUTPATIENT
Start: 2024-04-16

## 2024-04-16 RX ORDER — OXYCODONE AND ACETAMINOPHEN 10; 325 MG/1; MG/1
1 TABLET ORAL EVERY 4 HOURS PRN
Qty: 21 TABLET | Refills: 0 | Status: ON HOLD | OUTPATIENT
Start: 2024-04-16

## 2024-04-16 NOTE — PROGRESS NOTES
Interventional Pain Progress Note     The patient location is: home  The chief complaint leading to consultation is: chronic pain      Visit type: audiovisual     Face to Face time with patient: 10-15 minutes  20 minutes of total time spent on the encounter, which includes face to face time and non-face to face time preparing to see the patient (eg, review of tests), Obtaining and/or reviewing separately obtained history, Documenting clinical information in the electronic or other health record, Independently interpreting results (not separately reported) and communicating results to the patient/family/caregiver, or Care coordination (not separately reported).      Each patient to whom he or she provides medical services by telemedicine is:  (1) informed of the relationship between the physician and patient and the respective role of any other health care provider with respect to management of the patient; and (2) notified that he or she may decline to receive medical services by telemedicine and may withdraw from such care at any time.    Referring Physician: No ref. provider found    PCP: Rebecca Mcmillan MD    Chief Complaint:   Abdominal pain  Established Patient - TeleHealth Visit    Interval History (04/16/2024):  Patient returns to clinic for evaluation abdominal pain.  Since last being seen, patient reports the abdominal pain has generally been well-controlled.  Patient reports proximally than 1 episode of increased abdominal pain per month.  Of note, patient 2nd celiac plexus block with gastroenterology on 03/12/2024.  Patient reports that pain is worse with p.o. intake, better with hydration.  Pain is currently rated a 3/10, but can increase to an 8/10 with exacerbating activities.      Interval History (8/15/2023):  Fabrice MAGNOLIA MunozLona presents today for follow-up visit via telemed.  Patient was last seen on 6/13/2023. He reports he continues to have good days and bad days. He reports June was particularly  "rough. He reports he tries to stay hydrated and avoid dehydration by getting IV hydration therapy and through oral rehydration packets. He reports his pain flares typically last 30-36 hours, but he is still able to manage at home with phenergan, lyrica, and occasional Percocet. Patient reports pain as "0/10 today.      Interval History (6/13/2023):  Fabrice Zamorano presents today  via telemed for follow-up visit.  Patient was last seen on 3/9/2023. At that visit, the plan was to continue Lyrica. He continues to report improvement in his symtpoms with Lyrica. He reports that every few weeks he will have an episode of severe abdominal pain and nausea that requires him to be bed bound and utilize phenergan, but he has not needed hospitalization. Today he is very nauseated, but again reports that he is able to manage his symptoms at home.Patient reports pain as 2/10 today.      Interval History (3/9/2023):  Fabrice Zamorano presents today for follow-up visit.  Patient was last seen on 12/2/2022. At that visit, the plan was to continue Lyrica for abdominal pain and consider celiac plexus block should pain worsen.  He reports Lyrica has been in game changer and has significantly improved his pain.  He continues to be followed by GI and recently had a repeat ERCP.  He also recently had an updated CT of his abdomen which demonstrated a hernia, he reports Dr. Hills stated that at some point in the future he will likely need a hernia repair, but this was deferred at this time. Patient reports pain as just mild discomfort today today.  He takes Lyrica 100 mg nightly and promethazine as needed for nausea.  He is requesting a refill of his Lyrica.      Interval History (12/02/2022):  Patient returns to clinic for evaluation of abdominal pain.  Patient reports that since last being seen his pain has overall improved.  He reports having 1 episode of pancreatitis pain every 3-4 weeks that lasts approximately 24-36 hours.  He also " reports mild epigastric burning pain with food or liquids, that is typically relieved with standing from food.  Pain is currently rated at 0/10.       SUBJECTIVE:    Fabrice Zamorano is a 59 y.o. male who presents to the clinic for the evaluation of  abdominal pain. The pain started 6 years ago followingsymptoms have been worsening.The pain is located in the RUQ and radiate in band across his abdomen to his thoracic spine and to his right shoulder.The pain is described as aching, crushing, dull, sharp, shooting, stabbing and tight band and is rated as 5/10. The pain is rated with a score of  2/10 on the BEST day and a score of 10/10 on the WORST day.  Symptoms interfere with daily activity. The pain is exacerbated by eating a mean.  The pain is mitigated by fasting.         Non-Pharmacologic Treatments:  Physical Therapy/Home Exercise: no  Ice/Heat:no  TENS: no  Acupuncture: no  Massage: no  Chiropractic: no        Previous Pain Medications:  Tylenol, ASA, Percocet     report:  Reviewed and consistent with medication use as prescribed.    Pain Procedures:   None      Imaging:   CT Abdomen Pelvis With Contrast  Narrative: EXAMINATION:  CT ABDOMEN PELVIS WITH CONTRAST    CLINICAL HISTORY:  Abdominal abscess/infection suspected;    TECHNIQUE:  Low dose axial images, sagittal and coronal reformations were obtained from the lung bases to the pubic symphysis following the IV administration of 100 mL of Omnipaque 350. oral contrast was administered.    COMPARISON:  March 8, 2023.    FINDINGS:  - Lower thorax:Minimal bibasilar atelectasis/scarring.  Heart size is within normal limits.    - Liver: Diffusely hypodense.  No focal lesion.    - Gallbladder: Surgically absent.    - Bile Ducts: Expected reservoir effect in the common bile duct.    - Spleen: Enlarged, 14.6 cm.    - Kidneys: Largest simple cyst anterior right kidney.  The kidneys otherwise unremarkable without hydronephrosis or nephrolithiasis.    - Adrenals:  Unremarkable.    - Pancreas: Known chronic inflammatory changes at the head of the pancreas.  No significant surrounding inflammatory stranding.  Body and tail the pancreas are stable.    - Retroperitoneum:  No adenopathy.    - Vascular: Atherosclerotic calcifications.  No abdominal aortic aneurysm.  Circumaortic left renal vein.    - Abdominal wall:  Diastasis recti with small noninflamed fat containing hernias.    Pelvis:    No pelvic mass, adenopathy, or free fluid.    Bowel/Mesentery:    Duodenal diverticulum.  Thickening of the duodenal wall in the region of the pancreatic head is stable.  Colonic diverticula without CT evidence of acute diverticulitis.    Bones:  No acute osseous finding.  No aggressive osseous lesion.  Mild multilevel degenerative changes of the spine.  Impression: 1. No acute finding in the abdomen or pelvis  2. Known chronic changes at the pancreatic head.  Stable adjacent duodenal thickening is likely reactive.  3. Hepatic steatosis and splenomegaly.  4. Other findings as above.    Electronically signed by: Pepe Dexter  Date:    03/19/2023  Time:    08:32  X-Ray Chest AP Portable  Narrative: EXAMINATION:  XR CHEST AP PORTABLE    CLINICAL HISTORY:  Rigors;    FINDINGS:  Comparison: August 16, 2022.    Mediastinal silhouette is within normal limits.  The lungs are clear.  No pneumothorax or pleural effusion.  No acute osseous finding.  Impression: No acute finding in the chest.    Electronically signed by: Pepe Dexter  Date:    03/19/2023  Time:    07:20            Past Medical History:   Diagnosis Date    Anticoagulant long-term use     On Eliquis for DVT    GERD (gastroesophageal reflux disease)     Hypertension     Hypothyroidism 10/26/2021    Liver disease     fatty liver    Pancreatic pseudocyst     Personal history of colonic polyps     Sleep apnea     Splenic vein thrombosis      Past Surgical History:   Procedure Laterality Date    CHOLECYSTECTOMY      COLONOSCOPY       ENDOSCOPIC ULTRASOUND OF UPPER GASTROINTESTINAL TRACT N/A 10/12/2021    Procedure: ULTRASOUND, UPPER GI TRACT, ENDOSCOPIC;  Surgeon: Odalys Leiva MD;  Location: [facility];  Service: Endoscopy;  Laterality: N/A;  Upper and linear, 22 shark core, cytology and RPMI    ENDOSCOPIC ULTRASOUND OF UPPER GASTROINTESTINAL TRACT N/A 2/24/2023    Procedure: ULTRASOUND, UPPER GI TRACT, ENDOSCOPIC;  Surgeon: Shawn Chisholm MD;  Location: [facility] (2ND FLR);  Service: Endoscopy;  Laterality: N/A;  2/3/23-Instructions via portal-DS    ENDOSCOPIC ULTRASOUND OF UPPER GASTROINTESTINAL TRACT N/A 11/14/2023    Procedure: ULTRASOUND, UPPER GI TRACT, ENDOSCOPIC;  Surgeon: Kwadwo Gonsalez MD;  Location: [facility];  Service: Gastroenterology;  Laterality: N/A;  10/13/23: instructions sent vie portal-GD    ENDOSCOPIC ULTRASOUND OF UPPER GASTROINTESTINAL TRACT N/A 3/12/2024    Procedure: ULTRASOUND, UPPER GI TRACT, ENDOSCOPIC;  Surgeon: Shawn Chisholm MD;  Location: [facility];  Service: Endoscopy;  Laterality: N/A;  2/16/24: inst sent via portal-GD    ERCP N/A 09/29/2021    Procedure: ERCP (ENDOSCOPIC RETROGRADE CHOLANGIOPANCREATOGRAPHY);  Surgeon: Odalys Leiva MD;  Location: [facility];  Service: Endoscopy;  Laterality: N/A;    ERCP N/A 08/15/2022    Procedure: ERCP (ENDOSCOPIC RETROGRADE CHOLANGIOPANCREATOGRAPHY);  Surgeon: Odalys Leiva MD;  Location: [facility];  Service: Endoscopy;  Laterality: N/A;    ERCP N/A 2/24/2023    Procedure: ERCP (ENDOSCOPIC RETROGRADE CHOLANGIOPANCREATOGRAPHY);  Surgeon: Shawn Chisholm MD;  Location: [facility] (2ND FLR);  Service: Endoscopy;  Laterality: N/A;    ERCP W/ PLASTIC STENT PLACEMENT      LATERAL PANCREATICOJEJUNOSTOMY N/A 11/19/2021    Procedure: PANCREATICOJEJUNOSTOMY, SIDE-TO-SIDE tier 1;  Surgeon: Brian Hills MD;  Location: [facility] OR [address] [place] [address];  Service: General;  Laterality: N/A;     Social History     Socioeconomic History    Marital status:     Tobacco Use    Smoking status: Former     Current packs/day: 0.00     Types: Cigarettes     Quit date: 2001     Years since quittin.6    Smokeless tobacco: Never   Substance and Sexual Activity    Alcohol use: Not Currently    Drug use: Never    Sexual activity: Yes     Family History   Problem Relation Name Age of Onset    Thyroid disease Mother      Cancer Father      Pancreatic cancer Father      Arthritis Father      Thyroid disease Father         Review of patient's allergies indicates:  No Known Allergies    Current Outpatient Medications   Medication Sig Dispense Refill    carvediloL (COREG) 6.25 MG tablet Take 3.125 mg by mouth 2 (two) times daily with meals.      ibuprofen (ADVIL,MOTRIN) 800 MG tablet Take 800 mg by mouth every 6 (six) hours as needed.      lipase-protease-amylase (CREON) 36,000-114,000- 180,000 unit CpDR Take 1 capsule by mouth 3 (three) times daily with meals. 270 capsule 3    metFORMIN (GLUCOPHAGE-XR) 500 MG ER 24hr tablet Take 500 mg by mouth once daily.      multivitamin (THERAGRAN) per tablet Take 1 tablet by mouth once daily.      ondansetron (ZOFRAN-ODT) 4 MG TbDL Take 1 tablet (4 mg total) by mouth every 8 (eight) hours as needed (Nausea). 30 tablet 0    oxyCODONE-acetaminophen (PERCOCET)  mg per tablet Take 1 tablet by mouth every 4 (four) hours as needed for Pain. 21 tablet 0    pantoprazole (PROTONIX) 40 MG tablet TAKE 1 TABLET(40 MG) BY MOUTH EVERY DAY 90 tablet 0    pregabalin (LYRICA) 75 MG capsule Take 2 capsules (150 mg total) by mouth every evening. May also take 1 capsule (75 mg total) daily as needed. 90 capsule 2    PREVIDENT 5000 BOOSTER PLUS 1.1 % Pste BRUSH TEETH WITH PASTE TWICE DAILY. SPIT OUT EXCESS. DO NOT RINSE       No current facility-administered medications for this visit.     Facility-Administered Medications Ordered in Other Visits   Medication Dose Route Frequency Provider Last Rate Last Admin    iohexoL (OMNIPAQUE 300) injection    PRN  Shawn Chisholm MD   10 mL at 02/24/23 0930         ROS  Review of Systems   Constitutional:  Positive for appetite change. Negative for activity change, fatigue and fever.   HENT:  Negative for sore throat.    Respiratory:  Negative for cough, chest tightness, shortness of breath, wheezing and stridor.    Cardiovascular:  Positive for leg swelling. Negative for chest pain and palpitations.   Gastrointestinal:  Positive for abdominal pain, blood in stool and constipation. Negative for diarrhea, nausea and vomiting.   Endocrine: Negative for polydipsia, polyphagia and polyuria.   Genitourinary:  Negative for dysuria, hematuria and urgency.   Musculoskeletal:  Negative for arthralgias, back pain, gait problem, joint swelling, myalgias, neck pain and neck stiffness.   Skin:  Negative for rash.   Allergic/Immunologic: Negative for food allergies.   Neurological:  Negative for dizziness, tremors, seizures, syncope, facial asymmetry, speech difficulty, weakness, light-headedness, numbness and headaches.   Psychiatric/Behavioral:  Negative for agitation, hallucinations, self-injury and suicidal ideas. The patient is not nervous/anxious and is not hyperactive.             OBJECTIVE:    There were no vitals taken for this visit.    Virtual exam, physical exam from previous clinic visit       Physical Exam  Constitutional:       General: He is not in acute distress.     Appearance: Normal appearance. He is not ill-appearing.   HENT:      Head: Normocephalic and atraumatic.      Nose: No congestion or rhinorrhea.      Mouth/Throat:      Mouth: Mucous membranes are moist.   Eyes:      Extraocular Movements: Extraocular movements intact.      Pupils: Pupils are equal, round, and reactive to light.   Cardiovascular:      Pulses: Normal pulses.   Pulmonary:      Effort: Pulmonary effort is normal.   Abdominal:      General: Abdomen is flat.      Palpations: Abdomen is soft.      Tenderness: There is abdominal tenderness  (Tenderness to deep palpation over the right upper quadrant).   Musculoskeletal:      Cervical back: Normal range of motion and neck supple.   Skin:     General: Skin is warm and dry.      Capillary Refill: Capillary refill takes less than 2 seconds.   Neurological:      General: No focal deficit present.      Mental Status: He is alert and oriented to person, place, and time.      Cranial Nerves: No cranial nerve deficit.      Sensory: No sensory deficit.      Motor: No weakness.      Gait: Gait normal.   Psychiatric:         Mood and Affect: Mood normal.         Behavior: Behavior normal.         Thought Content: Thought content normal.               LABS:  Lab Results   Component Value Date    WBC 6.70 03/19/2023    HGB 14.4 03/19/2023    HCT 44.3 03/19/2023    MCV 88 03/19/2023     (L) 03/19/2023       CMP  Sodium   Date Value Ref Range Status   03/19/2023 135 (L) 136 - 145 mmol/L Final     Potassium   Date Value Ref Range Status   03/19/2023 3.6 3.5 - 5.1 mmol/L Final     Chloride   Date Value Ref Range Status   03/19/2023 103 95 - 110 mmol/L Final     CO2   Date Value Ref Range Status   03/19/2023 23 23 - 29 mmol/L Final     Glucose   Date Value Ref Range Status   03/19/2023 115 (H) 70 - 110 mg/dL Final     BUN   Date Value Ref Range Status   03/19/2023 16 6 - 20 mg/dL Final     Creatinine   Date Value Ref Range Status   03/19/2023 1.0 0.5 - 1.4 mg/dL Final     Calcium   Date Value Ref Range Status   03/19/2023 8.9 8.7 - 10.5 mg/dL Final     Total Protein   Date Value Ref Range Status   03/19/2023 6.5 6.0 - 8.4 g/dL Final     Albumin   Date Value Ref Range Status   03/19/2023 3.8 3.5 - 5.2 g/dL Final     Total Bilirubin   Date Value Ref Range Status   03/19/2023 0.8 0.1 - 1.0 mg/dL Final     Comment:     For infants and newborns, interpretation of results should be based  on gestational age, weight and in agreement with clinical  observations.    Premature Infant recommended reference ranges:  Up to 24  "hours.............<8.0 mg/dL  Up to 48 hours............<12.0 mg/dL  3-5 days..................<15.0 mg/dL  6-29 days.................<15.0 mg/dL       Alkaline Phosphatase   Date Value Ref Range Status   03/19/2023 103 55 - 135 U/L Final     AST   Date Value Ref Range Status   03/19/2023 26 10 - 40 U/L Final     ALT   Date Value Ref Range Status   03/19/2023 35 10 - 44 U/L Final     Anion Gap   Date Value Ref Range Status   03/19/2023 9 8 - 16 mmol/L Final     eGFR if    Date Value Ref Range Status   07/07/2022 >60.0 >60 mL/min/1.73 m^2 Final     eGFR if non    Date Value Ref Range Status   07/07/2022 >60.0 >60 mL/min/1.73 m^2 Final     Comment:     Calculation used to obtain the estimated glomerular filtration  rate (eGFR) is the CKD-EPI equation.          No results found for: "LABA1C", "HGBA1C"          ASSESSMENT:       59 y.o. year old male with abdominal pain, consistent with     1. Nausea  ondansetron (ZOFRAN-ODT) 4 MG TbDL      2. Chronic pancreatitis, unspecified pancreatitis type  pregabalin (LYRICA) 75 MG capsule    oxyCODONE-acetaminophen (PERCOCET)  mg per tablet              Nausea  -     ondansetron (ZOFRAN-ODT) 4 MG TbDL; Take 1 tablet (4 mg total) by mouth every 8 (eight) hours as needed (Nausea).  Dispense: 30 tablet; Refill: 0    Chronic pancreatitis, unspecified pancreatitis type  -     pregabalin (LYRICA) 75 MG capsule; Take 2 capsules (150 mg total) by mouth every evening. May also take 1 capsule (75 mg total) daily as needed.  Dispense: 90 capsule; Refill: 2  -     oxyCODONE-acetaminophen (PERCOCET)  mg per tablet; Take 1 tablet by mouth every 4 (four) hours as needed for Pain.  Dispense: 21 tablet; Refill: 0                   PLAN:   - Interventions:    None at this time.  Patient has had 2nd celiac plexus block with gastroenterology on 03/12/2024.  - Anticoagulation use:   no no anticoagulation    - Medications:   Refill Lyrica 150 mg nightly, " additional 75 mg tablet as needed during flares for additional relief   Refill Zofran every 8 hours as needed for nausea   Refill Percocet 10 mg every 8 hours as needed to take for severe pain only, takes sparingly- once weekly or less  - Imaging:   CT abdomen reviewed    - Consults/Referrals:   Continue follow-up with GI  and hepatology.         - Counseled patient regarding the importance of activity modification    - Patient Questions: Answered all of the patient's questions regarding diagnosis, therapy, and treatment    - Follow up visit:  Return to clinic p.r.n.        The above plan and management options were discussed at length with patient. Patient is in agreement with the above and verbalized understanding.    I discussed the goals of interventional chronic pain management with the patient on today's visit.  I explained the utility of injections for diagnostic and therapeutic purposes.  We discussed a multimodal approach to pain including treating the patient's given worst pain at any given time.  We will use a systematic approach to addressing pain.  We will also adopt a multimodal approach that includes injections, adjuvant medications, physical therapy, at times psychiatry.  There may be a limited role for opioid use intermittently in the treatment of pain, more particularly for acute pain although no one approach can be used as a sole treatment modality.    I emphasized the importance of regular exercise, core strengthening and stretching, diet and weight loss as a cornerstone of long-term pain management.      Mc Campos MD  Interventional Pain Management  [facility]    Disclaimer:  This note was prepared using voice recognition system and is likely to have sound alike errors that may have been overlooked even after proof reading.  Please call me with any questions

## 2024-06-14 ENCOUNTER — PATIENT MESSAGE (OUTPATIENT)
Dept: GASTROENTEROLOGY | Facility: CLINIC | Age: 59
End: 2024-06-14
Payer: MEDICARE

## 2024-06-18 NOTE — TELEPHONE ENCOUNTER
Pt sent a request stating: Recurring issues from pancreatitis. Increased pain and nausea     Please advise  [place]   TAVR Failure

## 2024-06-19 ENCOUNTER — HOSPITAL ENCOUNTER (INPATIENT)
Facility: HOSPITAL | Age: 59
LOS: 2 days | Discharge: ANOTHER HOSPITAL | DRG: 438 | End: 2024-06-21
Attending: EMERGENCY MEDICINE | Admitting: HOSPITALIST
Payer: MEDICARE

## 2024-06-19 DIAGNOSIS — R79.89 ABNORMAL LFTS: ICD-10-CM

## 2024-06-19 DIAGNOSIS — K85.90 ACUTE PANCREATITIS: Primary | ICD-10-CM

## 2024-06-19 DIAGNOSIS — R07.9 CHEST PAIN: ICD-10-CM

## 2024-06-19 DIAGNOSIS — I10 HTN (HYPERTENSION): ICD-10-CM

## 2024-06-19 DIAGNOSIS — K83.1 BILIARY OBSTRUCTION: ICD-10-CM

## 2024-06-19 DIAGNOSIS — E80.6 HYPERBILIRUBINEMIA: ICD-10-CM

## 2024-06-19 DIAGNOSIS — I10 UNCONTROLLED HYPERTENSION: ICD-10-CM

## 2024-06-19 LAB
ALBUMIN SERPL BCP-MCNC: 3.8 G/DL (ref 3.5–5.2)
ALBUMIN SERPL BCP-MCNC: 3.9 G/DL (ref 3.5–5.2)
ALP SERPL-CCNC: 225 U/L (ref 55–135)
ALP SERPL-CCNC: 232 U/L (ref 55–135)
ALT SERPL W/O P-5'-P-CCNC: 215 U/L (ref 10–44)
ALT SERPL W/O P-5'-P-CCNC: 224 U/L (ref 10–44)
ANION GAP SERPL CALC-SCNC: 10 MMOL/L (ref 8–16)
ANION GAP SERPL CALC-SCNC: 10 MMOL/L (ref 8–16)
APTT PPP: 30 SEC (ref 21–32)
AST SERPL-CCNC: 187 U/L (ref 10–40)
AST SERPL-CCNC: 192 U/L (ref 10–40)
BASOPHILS # BLD AUTO: 0.03 K/UL (ref 0–0.2)
BASOPHILS NFR BLD: 0.4 % (ref 0–1.9)
BILIRUB SERPL-MCNC: 4.4 MG/DL (ref 0.1–1)
BILIRUB SERPL-MCNC: 5.7 MG/DL (ref 0.1–1)
BILIRUB UR QL STRIP: ABNORMAL
BUN SERPL-MCNC: 10 MG/DL (ref 6–20)
BUN SERPL-MCNC: 12 MG/DL (ref 6–20)
CALCIUM SERPL-MCNC: 9.2 MG/DL (ref 8.7–10.5)
CALCIUM SERPL-MCNC: 9.4 MG/DL (ref 8.7–10.5)
CHLORIDE SERPL-SCNC: 102 MMOL/L (ref 95–110)
CHLORIDE SERPL-SCNC: 102 MMOL/L (ref 95–110)
CLARITY UR: CLEAR
CO2 SERPL-SCNC: 23 MMOL/L (ref 23–29)
CO2 SERPL-SCNC: 26 MMOL/L (ref 23–29)
COLOR UR: YELLOW
CREAT SERPL-MCNC: 0.8 MG/DL (ref 0.5–1.4)
CREAT SERPL-MCNC: 0.8 MG/DL (ref 0.5–1.4)
DIFFERENTIAL METHOD BLD: ABNORMAL
EOSINOPHIL # BLD AUTO: 0.2 K/UL (ref 0–0.5)
EOSINOPHIL NFR BLD: 2.8 % (ref 0–8)
ERYTHROCYTE [DISTWIDTH] IN BLOOD BY AUTOMATED COUNT: 14 % (ref 11.5–14.5)
EST. GFR  (NO RACE VARIABLE): >60 ML/MIN/1.73 M^2
EST. GFR  (NO RACE VARIABLE): >60 ML/MIN/1.73 M^2
GLUCOSE SERPL-MCNC: 118 MG/DL (ref 70–110)
GLUCOSE SERPL-MCNC: 97 MG/DL (ref 70–110)
GLUCOSE UR QL STRIP: NEGATIVE
HCT VFR BLD AUTO: 44 % (ref 40–54)
HGB BLD-MCNC: 14.8 G/DL (ref 14–18)
HGB UR QL STRIP: NEGATIVE
IMM GRANULOCYTES # BLD AUTO: 0.02 K/UL (ref 0–0.04)
IMM GRANULOCYTES NFR BLD AUTO: 0.3 % (ref 0–0.5)
INR PPP: 1.1 (ref 0.8–1.2)
KETONES UR QL STRIP: NEGATIVE
LEUKOCYTE ESTERASE UR QL STRIP: NEGATIVE
LIPASE SERPL-CCNC: 143 U/L (ref 4–60)
LIPASE SERPL-CCNC: 268 U/L (ref 4–60)
LYMPHOCYTES # BLD AUTO: 1.1 K/UL (ref 1–4.8)
LYMPHOCYTES NFR BLD: 15.7 % (ref 18–48)
MCH RBC QN AUTO: 30.1 PG (ref 27–31)
MCHC RBC AUTO-ENTMCNC: 33.6 G/DL (ref 32–36)
MCV RBC AUTO: 90 FL (ref 82–98)
MONOCYTES # BLD AUTO: 0.6 K/UL (ref 0.3–1)
MONOCYTES NFR BLD: 8.9 % (ref 4–15)
NEUTROPHILS # BLD AUTO: 4.9 K/UL (ref 1.8–7.7)
NEUTROPHILS NFR BLD: 71.9 % (ref 38–73)
NITRITE UR QL STRIP: NEGATIVE
NRBC BLD-RTO: 0 /100 WBC
OHS QRS DURATION: 74 MS
OHS QTC CALCULATION: 367 MS
PH UR STRIP: 7 [PH] (ref 5–8)
PLATELET # BLD AUTO: 148 K/UL (ref 150–450)
PMV BLD AUTO: 9.4 FL (ref 9.2–12.9)
POTASSIUM SERPL-SCNC: 3.8 MMOL/L (ref 3.5–5.1)
POTASSIUM SERPL-SCNC: 4 MMOL/L (ref 3.5–5.1)
PROT SERPL-MCNC: 6.6 G/DL (ref 6–8.4)
PROT SERPL-MCNC: 6.9 G/DL (ref 6–8.4)
PROT UR QL STRIP: NEGATIVE
PROTHROMBIN TIME: 11.5 SEC (ref 9–12.5)
RBC # BLD AUTO: 4.91 M/UL (ref 4.6–6.2)
SODIUM SERPL-SCNC: 135 MMOL/L (ref 136–145)
SODIUM SERPL-SCNC: 138 MMOL/L (ref 136–145)
SP GR UR STRIP: 1.01 (ref 1–1.03)
TROPONIN I SERPL DL<=0.01 NG/ML-MCNC: <0.006 NG/ML (ref 0–0.03)
URN SPEC COLLECT METH UR: ABNORMAL
UROBILINOGEN UR STRIP-ACNC: >=8 EU/DL
WBC # BLD AUTO: 6.86 K/UL (ref 3.9–12.7)

## 2024-06-19 PROCEDURE — 93005 ELECTROCARDIOGRAM TRACING: CPT

## 2024-06-19 PROCEDURE — C9113 INJ PANTOPRAZOLE SODIUM, VIA: HCPCS | Performed by: HOSPITALIST

## 2024-06-19 PROCEDURE — 85025 COMPLETE CBC W/AUTO DIFF WBC: CPT | Performed by: EMERGENCY MEDICINE

## 2024-06-19 PROCEDURE — 96365 THER/PROPH/DIAG IV INF INIT: CPT

## 2024-06-19 PROCEDURE — 63600175 PHARM REV CODE 636 W HCPCS: Performed by: HOSPITALIST

## 2024-06-19 PROCEDURE — 96376 TX/PRO/DX INJ SAME DRUG ADON: CPT

## 2024-06-19 PROCEDURE — 21400001 HC TELEMETRY ROOM

## 2024-06-19 PROCEDURE — 99285 EMERGENCY DEPT VISIT HI MDM: CPT | Mod: 25

## 2024-06-19 PROCEDURE — 25000003 PHARM REV CODE 250: Performed by: HOSPITALIST

## 2024-06-19 PROCEDURE — 96361 HYDRATE IV INFUSION ADD-ON: CPT

## 2024-06-19 PROCEDURE — 25000003 PHARM REV CODE 250: Performed by: EMERGENCY MEDICINE

## 2024-06-19 PROCEDURE — 80053 COMPREHEN METABOLIC PANEL: CPT | Mod: 91 | Performed by: EMERGENCY MEDICINE

## 2024-06-19 PROCEDURE — 99223 1ST HOSP IP/OBS HIGH 75: CPT | Mod: ,,, | Performed by: PHYSICIAN ASSISTANT

## 2024-06-19 PROCEDURE — 80053 COMPREHEN METABOLIC PANEL: CPT | Performed by: EMERGENCY MEDICINE

## 2024-06-19 PROCEDURE — 85610 PROTHROMBIN TIME: CPT | Performed by: EMERGENCY MEDICINE

## 2024-06-19 PROCEDURE — 85730 THROMBOPLASTIN TIME PARTIAL: CPT | Performed by: EMERGENCY MEDICINE

## 2024-06-19 PROCEDURE — 93010 ELECTROCARDIOGRAM REPORT: CPT | Mod: ,,, | Performed by: INTERNAL MEDICINE

## 2024-06-19 PROCEDURE — 83690 ASSAY OF LIPASE: CPT | Performed by: EMERGENCY MEDICINE

## 2024-06-19 PROCEDURE — 63600175 PHARM REV CODE 636 W HCPCS: Performed by: EMERGENCY MEDICINE

## 2024-06-19 PROCEDURE — 96375 TX/PRO/DX INJ NEW DRUG ADDON: CPT

## 2024-06-19 PROCEDURE — 83690 ASSAY OF LIPASE: CPT | Mod: 91 | Performed by: EMERGENCY MEDICINE

## 2024-06-19 PROCEDURE — 84484 ASSAY OF TROPONIN QUANT: CPT | Performed by: EMERGENCY MEDICINE

## 2024-06-19 PROCEDURE — 81003 URINALYSIS AUTO W/O SCOPE: CPT | Performed by: EMERGENCY MEDICINE

## 2024-06-19 PROCEDURE — 25000003 PHARM REV CODE 250: Performed by: NURSE PRACTITIONER

## 2024-06-19 RX ORDER — SODIUM CHLORIDE 9 MG/ML
1000 INJECTION, SOLUTION INTRAVENOUS CONTINUOUS
Status: DISCONTINUED | OUTPATIENT
Start: 2024-06-19 | End: 2024-06-19

## 2024-06-19 RX ORDER — SODIUM CHLORIDE 0.9 % (FLUSH) 0.9 %
10 SYRINGE (ML) INJECTION EVERY 12 HOURS PRN
Status: DISCONTINUED | OUTPATIENT
Start: 2024-06-19 | End: 2024-06-21 | Stop reason: HOSPADM

## 2024-06-19 RX ORDER — HYDROMORPHONE HYDROCHLORIDE 1 MG/ML
2 INJECTION, SOLUTION INTRAMUSCULAR; INTRAVENOUS; SUBCUTANEOUS EVERY 4 HOURS PRN
Status: DISCONTINUED | OUTPATIENT
Start: 2024-06-19 | End: 2024-06-20

## 2024-06-19 RX ORDER — ACETAMINOPHEN 325 MG/1
650 TABLET ORAL EVERY 6 HOURS PRN
Status: CANCELLED | OUTPATIENT
Start: 2024-06-19

## 2024-06-19 RX ORDER — MEPERIDINE HYDROCHLORIDE 25 MG/ML
50 INJECTION INTRAMUSCULAR; INTRAVENOUS; SUBCUTANEOUS
Status: COMPLETED | OUTPATIENT
Start: 2024-06-19 | End: 2024-06-19

## 2024-06-19 RX ORDER — HYDROMORPHONE HYDROCHLORIDE 1 MG/ML
1 INJECTION, SOLUTION INTRAMUSCULAR; INTRAVENOUS; SUBCUTANEOUS
Status: COMPLETED | OUTPATIENT
Start: 2024-06-19 | End: 2024-06-19

## 2024-06-19 RX ORDER — GLUCAGON 1 MG
1 KIT INJECTION
Status: DISCONTINUED | OUTPATIENT
Start: 2024-06-19 | End: 2024-06-21 | Stop reason: HOSPADM

## 2024-06-19 RX ORDER — IBUPROFEN 200 MG
24 TABLET ORAL
Status: DISCONTINUED | OUTPATIENT
Start: 2024-06-19 | End: 2024-06-21 | Stop reason: HOSPADM

## 2024-06-19 RX ORDER — IBUPROFEN 200 MG
16 TABLET ORAL
Status: DISCONTINUED | OUTPATIENT
Start: 2024-06-19 | End: 2024-06-21 | Stop reason: HOSPADM

## 2024-06-19 RX ORDER — SODIUM CHLORIDE 9 MG/ML
INJECTION, SOLUTION INTRAVENOUS CONTINUOUS
Status: DISCONTINUED | OUTPATIENT
Start: 2024-06-19 | End: 2024-06-21 | Stop reason: HOSPADM

## 2024-06-19 RX ORDER — HYDROMORPHONE HYDROCHLORIDE 1 MG/ML
1 INJECTION, SOLUTION INTRAMUSCULAR; INTRAVENOUS; SUBCUTANEOUS EVERY 6 HOURS PRN
Status: DISCONTINUED | OUTPATIENT
Start: 2024-06-19 | End: 2024-06-19

## 2024-06-19 RX ORDER — NALOXONE HCL 0.4 MG/ML
0.02 VIAL (ML) INJECTION
Status: DISCONTINUED | OUTPATIENT
Start: 2024-06-19 | End: 2024-06-21 | Stop reason: HOSPADM

## 2024-06-19 RX ORDER — PANTOPRAZOLE SODIUM 40 MG/10ML
40 INJECTION, POWDER, LYOPHILIZED, FOR SOLUTION INTRAVENOUS DAILY
Status: DISCONTINUED | OUTPATIENT
Start: 2024-06-19 | End: 2024-06-21 | Stop reason: HOSPADM

## 2024-06-19 RX ORDER — LABETALOL HYDROCHLORIDE 5 MG/ML
20 INJECTION, SOLUTION INTRAVENOUS
Status: COMPLETED | OUTPATIENT
Start: 2024-06-19 | End: 2024-06-19

## 2024-06-19 RX ORDER — CARVEDILOL 3.12 MG/1
3.12 TABLET ORAL 2 TIMES DAILY WITH MEALS
Status: DISCONTINUED | OUTPATIENT
Start: 2024-06-19 | End: 2024-06-21 | Stop reason: HOSPADM

## 2024-06-19 RX ORDER — ONDANSETRON HYDROCHLORIDE 2 MG/ML
4 INJECTION, SOLUTION INTRAVENOUS EVERY 6 HOURS PRN
Status: DISCONTINUED | OUTPATIENT
Start: 2024-06-19 | End: 2024-06-21 | Stop reason: HOSPADM

## 2024-06-19 RX ORDER — HYDROMORPHONE HYDROCHLORIDE 1 MG/ML
1 INJECTION, SOLUTION INTRAMUSCULAR; INTRAVENOUS; SUBCUTANEOUS EVERY 4 HOURS PRN
Status: DISCONTINUED | OUTPATIENT
Start: 2024-06-19 | End: 2024-06-20

## 2024-06-19 RX ORDER — LABETALOL HYDROCHLORIDE 5 MG/ML
10 INJECTION, SOLUTION INTRAVENOUS EVERY 4 HOURS PRN
Status: DISCONTINUED | OUTPATIENT
Start: 2024-06-19 | End: 2024-06-21 | Stop reason: HOSPADM

## 2024-06-19 RX ORDER — HYDRALAZINE HYDROCHLORIDE 20 MG/ML
10 INJECTION INTRAMUSCULAR; INTRAVENOUS EVERY 4 HOURS PRN
Status: DISCONTINUED | OUTPATIENT
Start: 2024-06-19 | End: 2024-06-21 | Stop reason: HOSPADM

## 2024-06-19 RX ORDER — MORPHINE SULFATE 4 MG/ML
6 INJECTION, SOLUTION INTRAMUSCULAR; INTRAVENOUS
Status: COMPLETED | OUTPATIENT
Start: 2024-06-19 | End: 2024-06-19

## 2024-06-19 RX ADMIN — HYDROMORPHONE HYDROCHLORIDE 1 MG: 1 INJECTION, SOLUTION INTRAMUSCULAR; INTRAVENOUS; SUBCUTANEOUS at 06:06

## 2024-06-19 RX ADMIN — HYDROMORPHONE HYDROCHLORIDE 1 MG: 1 INJECTION, SOLUTION INTRAMUSCULAR; INTRAVENOUS; SUBCUTANEOUS at 04:06

## 2024-06-19 RX ADMIN — PROMETHAZINE HYDROCHLORIDE 12.5 MG: 25 INJECTION INTRAMUSCULAR; INTRAVENOUS at 03:06

## 2024-06-19 RX ADMIN — HYDRALAZINE HYDROCHLORIDE 10 MG: 20 INJECTION, SOLUTION INTRAMUSCULAR; INTRAVENOUS at 12:06

## 2024-06-19 RX ADMIN — LABETALOL HYDROCHLORIDE 20 MG: 5 INJECTION INTRAVENOUS at 10:06

## 2024-06-19 RX ADMIN — PANCRELIPASE 3 CAPSULE: 60000; 12000; 38000 CAPSULE, DELAYED RELEASE PELLETS ORAL at 09:06

## 2024-06-19 RX ADMIN — SODIUM CHLORIDE: 9 INJECTION, SOLUTION INTRAVENOUS at 09:06

## 2024-06-19 RX ADMIN — PANTOPRAZOLE SODIUM 40 MG: 40 INJECTION, POWDER, FOR SOLUTION INTRAVENOUS at 04:06

## 2024-06-19 RX ADMIN — CARVEDILOL 3.12 MG: 3.12 TABLET, FILM COATED ORAL at 04:06

## 2024-06-19 RX ADMIN — SODIUM CHLORIDE 1000 ML: 9 INJECTION, SOLUTION INTRAVENOUS at 03:06

## 2024-06-19 RX ADMIN — MORPHINE SULFATE 6 MG: 4 INJECTION INTRAVENOUS at 03:06

## 2024-06-19 RX ADMIN — HYDROMORPHONE HYDROCHLORIDE 1 MG: 1 INJECTION, SOLUTION INTRAMUSCULAR; INTRAVENOUS; SUBCUTANEOUS at 08:06

## 2024-06-19 RX ADMIN — ONDANSETRON 4 MG: 2 INJECTION INTRAMUSCULAR; INTRAVENOUS at 08:06

## 2024-06-19 RX ADMIN — PANCRELIPASE 3 CAPSULE: 60000; 12000; 38000 CAPSULE, DELAYED RELEASE PELLETS ORAL at 04:06

## 2024-06-19 RX ADMIN — HYDROMORPHONE HYDROCHLORIDE 2 MG: 1 INJECTION, SOLUTION INTRAMUSCULAR; INTRAVENOUS; SUBCUTANEOUS at 08:06

## 2024-06-19 RX ADMIN — HYDROMORPHONE HYDROCHLORIDE 1 MG: 1 INJECTION, SOLUTION INTRAMUSCULAR; INTRAVENOUS; SUBCUTANEOUS at 12:06

## 2024-06-19 RX ADMIN — HYDRALAZINE HYDROCHLORIDE 10 MG: 20 INJECTION, SOLUTION INTRAMUSCULAR; INTRAVENOUS at 08:06

## 2024-06-19 RX ADMIN — SODIUM CHLORIDE 1000 ML: 9 INJECTION, SOLUTION INTRAVENOUS at 10:06

## 2024-06-19 RX ADMIN — MEPERIDINE HYDROCHLORIDE 50 MG: 25 INJECTION INTRAMUSCULAR; INTRAVENOUS; SUBCUTANEOUS at 03:06

## 2024-06-19 RX ADMIN — HYDROMORPHONE HYDROCHLORIDE 1 MG: 1 INJECTION, SOLUTION INTRAMUSCULAR; INTRAVENOUS; SUBCUTANEOUS at 10:06

## 2024-06-19 RX ADMIN — ONDANSETRON 4 MG: 2 INJECTION INTRAMUSCULAR; INTRAVENOUS at 12:06

## 2024-06-19 NOTE — PHARMACY MED REC
"Admission Medication History     The home medication history was taken by Alec Ramirez.    You may go to "Admission" then "Reconcile Home Medications" tabs to review and/or act upon these items.     The home medication list has been updated by the Pharmacy department.   Please read ALL comments highlighted in yellow.   Please address this information as you see fit.    Feel free to contact us if you have any questions or require assistance.      Medications listed below were obtained from: Patient/family and Analytic software- [employer]  (Not in a hospital admission)      Aelc Ramirez  [id number]    Current Outpatient Medications on File Prior to Encounter   Medication Sig Dispense Refill Last Dose    carvediloL (COREG) 6.25 MG tablet Take 3.125 mg by mouth 2 (two) times daily with meals.   6/18/2024    lipase-protease-amylase (CREON) 36,000-114,000- 180,000 unit CpDR Take 1 capsule by mouth 3 (three) times daily with meals. 270 capsule 3 6/18/2024    metFORMIN (GLUCOPHAGE-XR) 500 MG ER 24hr tablet Take 500 mg by mouth once daily.   6/18/2024    multivitamin (THERAGRAN) per tablet Take 1 tablet by mouth once daily.   6/18/2024    ondansetron (ZOFRAN-ODT) 4 MG TbDL Take 1 tablet (4 mg total) by mouth every 8 (eight) hours as needed (Nausea). 30 tablet 0 6/19/2024    oxyCODONE-acetaminophen (PERCOCET)  mg per tablet Take 1 tablet by mouth every 4 (four) hours as needed for Pain. 21 tablet 0 6/19/2024    pregabalin (LYRICA) 75 MG capsule Take 2 capsules (150 mg total) by mouth every evening. May also take 1 capsule (75 mg total) daily as needed. 90 capsule 2 6/18/2024    ibuprofen (ADVIL,MOTRIN) 800 MG tablet Take 800 mg by mouth every 6 (six) hours as needed.   Unknown    pantoprazole (PROTONIX) 40 MG tablet TAKE 1 TABLET(40 MG) BY MOUTH EVERY DAY 90 tablet 0 Unknown                           .          "

## 2024-06-19 NOTE — ASSESSMENT & PLAN NOTE
Agree with IV fluids, NPO, pain meds and prn antiemetics.   Discussed with Dr. Gonsalez with advanced endoscopy. No definitve obstructive process seen in MRI. Could be secondary to inflammation from pancreatitis causing intermittent biliary obstruction. He recommended treating pancreatitis and monitoring LFTs. Check back in with him tomorrow with an update.

## 2024-06-19 NOTE — CONSULTS
[facility] - Telemetry ([facility])  Gastroenterology  Consult Note    Patient Name: Fabrice Zamorano  MRN: [id number]  Admission Date: 6/19/2024  Hospital Length of Stay: 0 days  Code Status: Full Code   Attending Provider: Eriberto Vazquez MD   Consulting Provider: Adiel Quispe PA-C  Primary Care Physician: Rebecca Mcmillan MD  Principal Problem:Acute on chronic pancreatitis    Inpatient consult to Gastroenterology  Consult performed by: Adiel Quispe PA-C  Consult ordered by: Eriberto Vazquez MD  Reason for consult: h/o hereditary pancreatitis with abd pain/n/v/elevated lfts        Subjective:     HPI:  The patient has a history of 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 He is followed by Dr. Chisholm in [place]. He had a pancreaticojejunostomy in November 2021. His last EUS and celiac plexus block was in March 2024. EUS showed changes consistent with moderate-severe chronic pancreatitis. A mass was noted in the pancreatic body but path showed fibrosis and chronic inflammation.     The patient presented to the ER today for abdominal pain. The pain started gradually a few weeks ago and has been intermittent. In the last few days, the pain has been worse and less easy to control. It is primarily located in the upper abdomen and associated with nausea. He started vomiting yesterday, denies hematemesis. He has had intermittent diarrhea over the last week, denies hematochezia or melen. He took Oxycodone, Zofran, and Phenergan with minimal relief. CBC was unremarkable. LFTs were elevated: , , T. Bili 4.4, . LFTs in March were normal. Lipase 143. MRCP showed inflammatory changes about the pancreas concerning for acute pancreatitis. Mild intra and extrahepatic biliary ductal dilation. The common duct measures 13 mm just superior to the pancreas. This appears more prominent in comparison to the prior CT exam of 03/08/2023. He was given a liter bolus in the ER and  started on IV fluids at 150 cc/hr.     Past Medical History:   Diagnosis Date    Anticoagulant long-term use     On Eliquis for DVT    GERD (gastroesophageal reflux disease)     Hypertension     Hypothyroidism 10/26/2021    Liver disease     fatty liver    Pancreatic pseudocyst     Personal history of colonic polyps     Sleep apnea     Splenic vein thrombosis        Past Surgical History:   Procedure Laterality Date    CHOLECYSTECTOMY      COLONOSCOPY      ENDOSCOPIC ULTRASOUND OF UPPER GASTROINTESTINAL TRACT N/A 10/12/2021    Procedure: ULTRASOUND, UPPER GI TRACT, ENDOSCOPIC;  Surgeon: Odalys Leiva MD;  Location: [facility];  Service: Endoscopy;  Laterality: N/A;  Upper and linear, 22 shark core, cytology and RPMI    ENDOSCOPIC ULTRASOUND OF UPPER GASTROINTESTINAL TRACT N/A 2/24/2023    Procedure: ULTRASOUND, UPPER GI TRACT, ENDOSCOPIC;  Surgeon: Shawn Chisholm MD;  Location: [facility] ([address] [place] [address]);  Service: Endoscopy;  Laterality: N/A;  2/3/23-Instructions via portal-DS    ENDOSCOPIC ULTRASOUND OF UPPER GASTROINTESTINAL TRACT N/A 11/14/2023    Procedure: ULTRASOUND, UPPER GI TRACT, ENDOSCOPIC;  Surgeon: Kwadwo Gonsalez MD;  Location: [facility];  Service: Gastroenterology;  Laterality: N/A;  10/13/23: instructions sent vie portal-GD    ENDOSCOPIC ULTRASOUND OF UPPER GASTROINTESTINAL TRACT N/A 3/12/2024    Procedure: ULTRASOUND, UPPER GI TRACT, ENDOSCOPIC;  Surgeon: Shawn Chisholm MD;  Location: [facility];  Service: Endoscopy;  Laterality: N/A;  2/16/24: inst sent via portal-GD    ERCP N/A 09/29/2021    Procedure: ERCP (ENDOSCOPIC RETROGRADE CHOLANGIOPANCREATOGRAPHY);  Surgeon: Odalys Leiva MD;  Location: [facility];  Service: Endoscopy;  Laterality: N/A;    ERCP N/A 08/15/2022    Procedure: ERCP (ENDOSCOPIC RETROGRADE CHOLANGIOPANCREATOGRAPHY);  Surgeon: Odalys Leiva MD;  Location: [facility];  Service: Endoscopy;  Laterality: N/A;    ERCP N/A 2/24/2023    Procedure: ERCP  (ENDOSCOPIC RETROGRADE CHOLANGIOPANCREATOGRAPHY);  Surgeon: Shawn Chisohlm MD;  [facility]  Service: Endoscopy;  Laterality: N/A;    ERCP W/ PLASTIC STENT PLACEMENT      LATERAL PANCREATICOJEJUNOSTOMY N/A 2021    Procedure: PANCREATICOJEJUNOSTOMY, SIDE-TO-SIDE tier 1;  Surgeon: Brian Hills MD;  [facility];  Service: General;  Laterality: N/A;       Review of patient's allergies indicates:  No Known Allergies  Family History       Problem Relation (Age of Onset)    Arthritis Father    Cancer Father    Pancreatic cancer Father    Thyroid disease Mother, Father          Tobacco Use    Smoking status: Former     Current packs/day: 0.00     Types: Cigarettes     Quit date: 2001     Years since quittin.8    Smokeless tobacco: Never   Substance and Sexual Activity    Alcohol use: Not Currently    Drug use: Never    Sexual activity: Yes     Review of Systems   Constitutional:  Positive for activity change and fatigue. Negative for fever.   HENT:  Negative for hearing loss.    Eyes:  Negative for visual disturbance.   Respiratory:  Negative for cough and shortness of breath.    Cardiovascular:  Negative for chest pain and palpitations.   Gastrointestinal:         As per HPI.   Genitourinary:  Negative for difficulty urinating, dysuria, frequency and hematuria.   Musculoskeletal:  Negative for arthralgias and back pain.   Skin:  Negative for color change and rash.   Neurological:  Negative for seizures, syncope, numbness and headaches.   Hematological:  Does not bruise/bleed easily.   Psychiatric/Behavioral:  The patient is not nervous/anxious.      Objective:     Vital Signs (Most Recent):  Temp: 97.6 °F (36.4 °C) (24 0235)  Pulse: 70 (24 0941)  Resp: 17 (24 1038)  BP: (!) 197/97 (24 1029)  SpO2: 100 % (24 0941) Vital Signs (24h Range):  Temp:  [97.6 °F (36.4 °C)] 97.6 °F (36.4 °C)  Pulse:  [55-74] 70  Resp:  [9-20] 17  SpO2:  [85 %-100 %]  100 %  BP: (144-197)/() 197/97     Weight: 99.6 kg (219 lb 9.3 oz) (06/19/24 0235)  Body mass index is 30.62 kg/m².      Intake/Output Summary (Last 24 hours) at 6/19/2024 1054  Last data filed at 6/19/2024 0340  Gross per 24 hour   Intake 50 ml   Output --   Net 50 ml       Lines/Drains/Airways       Peripheral Intravenous Line  Duration                  Peripheral IV - Single Lumen 06/19/24 0250 20 G Anterior;Left Forearm <1 day                     Physical Exam  Constitutional:       General: He is awake. He is not in acute distress.     Appearance: He is well-developed. He is ill-appearing.   HENT:      Head: Normocephalic and atraumatic.   Eyes:      Extraocular Movements: Extraocular movements intact.   Cardiovascular:      Rate and Rhythm: Normal rate and regular rhythm.      Heart sounds: Normal heart sounds. No murmur heard.  Pulmonary:      Effort: Pulmonary effort is normal. No respiratory distress.      Breath sounds: Normal breath sounds. No wheezing.   Abdominal:      General: Bowel sounds are normal. There is distension.      Palpations: Abdomen is soft. There is no mass.      Tenderness: There is generalized abdominal tenderness (moderate). There is no rebound.   Musculoskeletal:      Cervical back: Normal range of motion and neck supple.      Right lower leg: No edema.      Left lower leg: No edema.   Skin:     General: Skin is warm and dry.      Findings: No rash.   Neurological:      Mental Status: He is oriented to person, place, and time.      Cranial Nerves: No cranial nerve deficit.   Psychiatric:         Behavior: Behavior normal.          Significant Labs:  CBC:   Recent Labs   Lab 06/19/24 0254   WBC 6.86   HGB 14.8   HCT 44.0   *     CMP:   Recent Labs   Lab 06/19/24 0254   *   CALCIUM 9.4   ALBUMIN 3.9   PROT 6.9   *   K 3.8   CO2 23      BUN 12   CREATININE 0.8   ALKPHOS 225*   *   *   BILITOT 4.4*     Coagulation: No results for input(s):  ""PT", "INR", "APTT" in the last 48 hours.    Significant Imaging:  Imaging results within the past 24 hours have been reviewed.  Assessment/Plan:     GI  * Acute on chronic pancreatitis  Agree with IV fluids, NPO, pain meds and prn antiemetics.   Discussed with Dr. Gonsalez with advanced endoscopy. No definitve obstructive process seen in MRI. Could be secondary to inflammation from pancreatitis causing intermittent biliary obstruction. He recommended treating pancreatitis and monitoring LFTs. Check back in with him tomorrow with an update.         Thank you for your consult. I will follow-up with patient. Please contact us if you have any additional questions.    Adiel Quispe PA-C  Gastroenterology  [facility] - Telemetry ([facility])  "

## 2024-06-19 NOTE — HPI
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 He is followed by Dr. Chisholm in [place]. He had a pancreaticojejunostomy in November 2021. His last EUS and celiac plexus block was in March 2024. EUS showed changes consistent with moderate-severe chronic pancreatitis. A mass was noted in the pancreatic body but path showed fibrosis and chronic inflammation.     The patient presented to the ER today for abdominal pain. The pain started gradually a few weeks ago and has been intermittent. In the last few days, the pain has been worse and less easy to control. It is primarily located in the upper abdomen and associated with nausea. He started vomiting yesterday, denies hematemesis. He has had intermittent diarrhea over the last week, denies hematochezia or melen. He took Oxycodone, Zofran, and Phenergan with minimal relief. CBC was unremarkable. LFTs were elevated: , , T. Bili 4.4, . LFTs in March were normal. Lipase 143. MRCP showed inflammatory changes about the pancreas concerning for acute pancreatitis. Mild intra and extrahepatic biliary ductal dilation. The common duct measures 13 mm just superior to the pancreas. This appears more prominent in comparison to the prior CT exam of 03/08/2023. He was given a liter bolus in the ER and started on IV fluids at 150 cc/hr.

## 2024-06-19 NOTE — ED NOTES
Patient AAOX4, respirations even/unlabored. B/P remains elevated. Note sent to hospital medicine, Dr. Wei, re: B/P. Waiting on further orders.

## 2024-06-19 NOTE — PLAN OF CARE
Pt resting in bed .   Pt awake and alert .   Hob elevated .   Wife at beside .   Vitals stable .   POC discussed .   Iv infusing .   Pt tolerating .   Pain controlled   Safety Measures in place   Chart check complete .   Will  continue to monitor .

## 2024-06-19 NOTE — SUBJECTIVE & OBJECTIVE
Past Medical History:   Diagnosis Date    Anticoagulant long-term use     On Eliquis for DVT    GERD (gastroesophageal reflux disease)     Hypertension     Hypothyroidism 10/26/2021    Liver disease     fatty liver    Pancreatic pseudocyst     Personal history of colonic polyps     Sleep apnea     Splenic vein thrombosis        Past Surgical History:   Procedure Laterality Date    CHOLECYSTECTOMY      COLONOSCOPY      ENDOSCOPIC ULTRASOUND OF UPPER GASTROINTESTINAL TRACT N/A 10/12/2021    Procedure: ULTRASOUND, UPPER GI TRACT, ENDOSCOPIC;  Surgeon: Odalys Leiva MD;  Location: [facility];  Service: Endoscopy;  Laterality: N/A;  Upper and linear, 22 shark core, cytology and RPMI    ENDOSCOPIC ULTRASOUND OF UPPER GASTROINTESTINAL TRACT N/A 2/24/2023    Procedure: ULTRASOUND, UPPER GI TRACT, ENDOSCOPIC;  Surgeon: Shawn Chisholm MD;  Location: [facility] ([address] [place] [address]);  Service: Endoscopy;  Laterality: N/A;  2/3/23-Instructions via portal-DS    ENDOSCOPIC ULTRASOUND OF UPPER GASTROINTESTINAL TRACT N/A 11/14/2023    Procedure: ULTRASOUND, UPPER GI TRACT, ENDOSCOPIC;  Surgeon: Kwadwo Gonsalez MD;  Location: [facility];  Service: Gastroenterology;  Laterality: N/A;  10/13/23: instructions sent vie portal-GD    ENDOSCOPIC ULTRASOUND OF UPPER GASTROINTESTINAL TRACT N/A 3/12/2024    Procedure: ULTRASOUND, UPPER GI TRACT, ENDOSCOPIC;  Surgeon: Shawn Chisholm MD;  Location: [facility];  Service: Endoscopy;  Laterality: N/A;  2/16/24: inst sent via portal-GD    ERCP N/A 09/29/2021    Procedure: ERCP (ENDOSCOPIC RETROGRADE CHOLANGIOPANCREATOGRAPHY);  Surgeon: Odalys Leiva MD;  Location: [facility];  Service: Endoscopy;  Laterality: N/A;    ERCP N/A 08/15/2022    Procedure: ERCP (ENDOSCOPIC RETROGRADE CHOLANGIOPANCREATOGRAPHY);  Surgeon: Odalys Leiva MD;  Location: [facility];  Service: Endoscopy;  Laterality: N/A;    ERCP N/A 2/24/2023    Procedure: ERCP (ENDOSCOPIC RETROGRADE  CHOLANGIOPANCREATOGRAPHY);  Surgeon: Shawn Chisholm MD;  Location: [facility] ([address] [place] [address]);  Service: Endoscopy;  Laterality: N/A;    ERCP W/ PLASTIC STENT PLACEMENT      LATERAL PANCREATICOJEJUNOSTOMY N/A 2021    Procedure: PANCREATICOJEJUNOSTOMY, SIDE-TO-SIDE tier 1;  Surgeon: Brian Hills MD;  Location: [facility] OR [address] [place] [address];  Service: General;  Laterality: N/A;       Review of patient's allergies indicates:  No Known Allergies  Family History       Problem Relation (Age of Onset)    Arthritis Father    Cancer Father    Pancreatic cancer Father    Thyroid disease Mother, Father          Tobacco Use    Smoking status: Former     Current packs/day: 0.00     Types: Cigarettes     Quit date: 2001     Years since quittin.8    Smokeless tobacco: Never   Substance and Sexual Activity    Alcohol use: Not Currently    Drug use: Never    Sexual activity: Yes     Review of Systems   Constitutional:  Positive for activity change and fatigue. Negative for fever.   HENT:  Negative for hearing loss.    Eyes:  Negative for visual disturbance.   Respiratory:  Negative for cough and shortness of breath.    Cardiovascular:  Negative for chest pain and palpitations.   Gastrointestinal:         As per HPI.   Genitourinary:  Negative for difficulty urinating, dysuria, frequency and hematuria.   Musculoskeletal:  Negative for arthralgias and back pain.   Skin:  Negative for color change and rash.   Neurological:  Negative for seizures, syncope, numbness and headaches.   Hematological:  Does not bruise/bleed easily.   Psychiatric/Behavioral:  The patient is not nervous/anxious.      Objective:     Vital Signs (Most Recent):  Temp: 97.6 °F (36.4 °C) (24 0235)  Pulse: 70 (24 0941)  Resp: 17 (24 1038)  BP: (!) 197/97 (24 1029)  SpO2: 100 % (24 0941) Vital Signs (24h Range):  Temp:  [97.6 °F (36.4 °C)] 97.6 °F (36.4 °C)  Pulse:  [55-74] 70  Resp:  [9-20] 17  SpO2:  [85 %-100 %] 100 %  BP:  "(144-197)/() 197/97     Weight: 99.6 kg (219 lb 9.3 oz) (06/19/24 0235)  Body mass index is 30.62 kg/m².      Intake/Output Summary (Last 24 hours) at 6/19/2024 1054  Last data filed at 6/19/2024 0340  Gross per 24 hour   Intake 50 ml   Output --   Net 50 ml       Lines/Drains/Airways       Peripheral Intravenous Line  Duration                  Peripheral IV - Single Lumen 06/19/24 0250 20 G Anterior;Left Forearm <1 day                     Physical Exam  Constitutional:       General: He is awake. He is not in acute distress.     Appearance: He is well-developed. He is ill-appearing.   HENT:      Head: Normocephalic and atraumatic.   Eyes:      Extraocular Movements: Extraocular movements intact.   Cardiovascular:      Rate and Rhythm: Normal rate and regular rhythm.      Heart sounds: Normal heart sounds. No murmur heard.  Pulmonary:      Effort: Pulmonary effort is normal. No respiratory distress.      Breath sounds: Normal breath sounds. No wheezing.   Abdominal:      General: Bowel sounds are normal. There is distension.      Palpations: Abdomen is soft. There is no mass.      Tenderness: There is generalized abdominal tenderness (moderate). There is no rebound.   Musculoskeletal:      Cervical back: Normal range of motion and neck supple.      Right lower leg: No edema.      Left lower leg: No edema.   Skin:     General: Skin is warm and dry.      Findings: No rash.   Neurological:      Mental Status: He is oriented to person, place, and time.      Cranial Nerves: No cranial nerve deficit.   Psychiatric:         Behavior: Behavior normal.          Significant Labs:  CBC:   Recent Labs   Lab 06/19/24 0254   WBC 6.86   HGB 14.8   HCT 44.0   *     CMP:   Recent Labs   Lab 06/19/24 0254   *   CALCIUM 9.4   ALBUMIN 3.9   PROT 6.9   *   K 3.8   CO2 23      BUN 12   CREATININE 0.8   ALKPHOS 225*   *   *   BILITOT 4.4*     Coagulation: No results for input(s): "PT", " ""INR", "APTT" in the last 48 hours.    Significant Imaging:  Imaging results within the past 24 hours have been reviewed.  "

## 2024-06-19 NOTE — SUBJECTIVE & OBJECTIVE
Past Medical History:   Diagnosis Date    Anticoagulant long-term use     On Eliquis for DVT    GERD (gastroesophageal reflux disease)     Hypertension     Hypothyroidism 10/26/2021    Liver disease     fatty liver    Pancreatic pseudocyst     Personal history of colonic polyps     Sleep apnea     Splenic vein thrombosis        Past Surgical History:   Procedure Laterality Date    CHOLECYSTECTOMY      COLONOSCOPY      ENDOSCOPIC ULTRASOUND OF UPPER GASTROINTESTINAL TRACT N/A 10/12/2021    Procedure: ULTRASOUND, UPPER GI TRACT, ENDOSCOPIC;  Surgeon: Odalys Leiva MD;  Location: [facility];  Service: Endoscopy;  Laterality: N/A;  Upper and linear, 22 shark core, cytology and RPMI    ENDOSCOPIC ULTRASOUND OF UPPER GASTROINTESTINAL TRACT N/A 2/24/2023    Procedure: ULTRASOUND, UPPER GI TRACT, ENDOSCOPIC;  Surgeon: Shawn Chisholm MD;  Location: [facility] ([address] [place] [address]);  Service: Endoscopy;  Laterality: N/A;  2/3/23-Instructions via portal-DS    ENDOSCOPIC ULTRASOUND OF UPPER GASTROINTESTINAL TRACT N/A 11/14/2023    Procedure: ULTRASOUND, UPPER GI TRACT, ENDOSCOPIC;  Surgeon: Kwadwo Gonsalez MD;  Location: [facility];  Service: Gastroenterology;  Laterality: N/A;  10/13/23: instructions sent vie portal-GD    ENDOSCOPIC ULTRASOUND OF UPPER GASTROINTESTINAL TRACT N/A 3/12/2024    Procedure: ULTRASOUND, UPPER GI TRACT, ENDOSCOPIC;  Surgeon: Shawn Chisholm MD;  Location: [facility];  Service: Endoscopy;  Laterality: N/A;  2/16/24: inst sent via portal-GD    ERCP N/A 09/29/2021    Procedure: ERCP (ENDOSCOPIC RETROGRADE CHOLANGIOPANCREATOGRAPHY);  Surgeon: Odalys Leiva MD;  Location: [facility];  Service: Endoscopy;  Laterality: N/A;    ERCP N/A 08/15/2022    Procedure: ERCP (ENDOSCOPIC RETROGRADE CHOLANGIOPANCREATOGRAPHY);  Surgeon: Odalys Leiva MD;  Location: [facility];  Service: Endoscopy;  Laterality: N/A;    ERCP N/A 2/24/2023    Procedure: ERCP (ENDOSCOPIC RETROGRADE  CHOLANGIOPANCREATOGRAPHY);  Surgeon: Shawn Chisholm MD;  Location: [facility] (2ND FLR);  Service: Endoscopy;  Laterality: N/A;    ERCP W/ PLASTIC STENT PLACEMENT      LATERAL PANCREATICOJEJUNOSTOMY N/A 2021    Procedure: PANCREATICOJEJUNOSTOMY, SIDE-TO-SIDE tier 1;  Surgeon: Brian Hills MD;  [facility];  Service: General;  Laterality: N/A;       Review of patient's allergies indicates:  No Known Allergies    Current Facility-Administered Medications on File Prior to Encounter   Medication    iohexoL (OMNIPAQUE 300) injection     Current Outpatient Medications on File Prior to Encounter   Medication Sig    carvediloL (COREG) 6.25 MG tablet Take 3.125 mg by mouth 2 (two) times daily with meals.    lipase-protease-amylase (CREON) 36,000-114,000- 180,000 unit CpDR Take 1 capsule by mouth 3 (three) times daily with meals.    metFORMIN (GLUCOPHAGE-XR) 500 MG ER 24hr tablet Take 500 mg by mouth once daily.    multivitamin (THERAGRAN) per tablet Take 1 tablet by mouth once daily.    ondansetron (ZOFRAN-ODT) 4 MG TbDL Take 1 tablet (4 mg total) by mouth every 8 (eight) hours as needed (Nausea).    oxyCODONE-acetaminophen (PERCOCET)  mg per tablet Take 1 tablet by mouth every 4 (four) hours as needed for Pain.    pregabalin (LYRICA) 75 MG capsule Take 2 capsules (150 mg total) by mouth every evening. May also take 1 capsule (75 mg total) daily as needed.    ibuprofen (ADVIL,MOTRIN) 800 MG tablet Take 800 mg by mouth every 6 (six) hours as needed.    pantoprazole (PROTONIX) 40 MG tablet TAKE 1 TABLET(40 MG) BY MOUTH EVERY DAY     Family History       Problem Relation (Age of Onset)    Arthritis Father    Cancer Father    Pancreatic cancer Father    Thyroid disease Mother, Father          Tobacco Use    Smoking status: Former     Current packs/day: 0.00     Types: Cigarettes     Quit date: 2001     Years since quittin.8    Smokeless tobacco: Never   Substance and Sexual Activity     Alcohol use: Not Currently    Drug use: Never    Sexual activity: Yes     Review of Systems   All other systems reviewed and are negative.    Objective:     Vital Signs (Most Recent):  Temp: 97.6 °F (36.4 °C) (06/19/24 0235)  Pulse: 92 (06/19/24 1232)  Resp: 20 (06/19/24 1232)  BP: (!) 162/86 (06/19/24 1232)  SpO2: 96 % (06/19/24 1232) Vital Signs (24h Range):  Temp:  [97.6 °F (36.4 °C)] 97.6 °F (36.4 °C)  Pulse:  [55-92] 92  Resp:  [9-20] 20  SpO2:  [85 %-100 %] 96 %  BP: (144-205)/() 162/86     Weight: 99.6 kg (219 lb 9.3 oz)  Body mass index is 30.62 kg/m².     Physical Exam  Vitals and nursing note reviewed.   Constitutional:       General: He is in acute distress.      Appearance: He is obese. He is ill-appearing and diaphoretic.   HENT:      Head: Normocephalic and atraumatic.      Nose: Nose normal.      Mouth/Throat:      Mouth: Mucous membranes are dry.      Pharynx: Oropharynx is clear.   Eyes:      Extraocular Movements: Extraocular movements intact.      Pupils: Pupils are equal, round, and reactive to light.   Cardiovascular:      Rate and Rhythm: Tachycardia present.      Pulses: Normal pulses.      Heart sounds: Normal heart sounds.   Pulmonary:      Effort: Pulmonary effort is normal. No respiratory distress.      Breath sounds: Normal breath sounds. No wheezing, rhonchi or rales.   Abdominal:      General: There is distension.      Palpations: Abdomen is soft.      Tenderness: There is abdominal tenderness. There is no guarding.   Skin:     Coloration: Skin is pale.   Neurological:      General: No focal deficit present.      Mental Status: He is alert and oriented to person, place, and time.   Psychiatric:         Mood and Affect: Mood normal.         Behavior: Behavior normal.              CRANIAL NERVES     CN III, IV, VI   Pupils are equal, round, and reactive to light.       Significant Labs: All pertinent labs within the past 24 hours have been reviewed.  Recent Lab Results          06/19/24  1031   06/19/24  0427   06/19/24  0254        Albumin 3.8     3.9            225            215       Anion Gap 10     10       Appearance, UA   Clear         PTT 30.0  Comment: Refer to local heparin nomogram for intensity/dose specific   therapeutic   range.                  192       Baso #     0.03       Basophil %     0.4       Bilirubin (UA)   1+  Comment: Positive urine bilirubin is not confirmed. Correlate with   serum bilirubin and clinical presentation.           BILIRUBIN TOTAL 5.7  Comment: For infants and newborns, interpretation of results should be based  on gestational age, weight and in agreement with clinical  observations.    Premature Infant recommended reference ranges:  Up to 24 hours.............<8.0 mg/dL  Up to 48 hours............<12.0 mg/dL  3-5 days..................<15.0 mg/dL  6-29 days.................<15.0 mg/dL       4.4  Comment: For infants and newborns, interpretation of results should be based  on gestational age, weight and in agreement with clinical  observations.    Premature Infant recommended reference ranges:  Up to 24 hours.............<8.0 mg/dL  Up to 48 hours............<12.0 mg/dL  3-5 days..................<15.0 mg/dL  6-29 days.................<15.0 mg/dL         BUN 10     12       Calcium 9.2     9.4       Chloride 102     102       CO2 26     23       Color, UA   Yellow         Creatinine 0.8     0.8       Differential Method     Automated       eGFR >60     >60       Eos #     0.2       Eos %     2.8       Glucose 97     118       Glucose, UA   Negative         Gran # (ANC)     4.9       Gran %     71.9       Hematocrit     44.0       Hemoglobin     14.8       Immature Grans (Abs)     0.02  Comment: Mild elevation in immature granulocytes is non specific and   can be seen in a variety of conditions including stress response,   acute inflammation, trauma and pregnancy. Correlation with other   laboratory and clinical findings is  essential.         Immature Granulocytes     0.3       INR 1.1  Comment: Coumadin Therapy:  2.0 - 3.0 for INR for all indicators except mechanical heart valves  and antiphospholipid syndromes which should use 2.5 - 3.5.             Ketones, UA   Negative         Leukocyte Esterase, UA   Negative         Lipase 268     143       Lymph #     1.1       Lymph %     15.7       MCH     30.1       MCHC     33.6       MCV     90       Mono #     0.6       Mono %     8.9       MPV     9.4       NITRITE UA   Negative         nRBC     0       Blood, UA   Negative         pH, UA   7.0         Platelet Count     148       Potassium 4.0     3.8       PROTEIN TOTAL 6.6     6.9       Protein, UA   Negative  Comment: Recommend a 24 hour urine protein or a urine   protein/creatinine ratio if globulin induced proteinuria is  clinically suspected.           PT 11.5           RBC     4.91       RDW     14.0       Sodium 138     135       Spec Grav UA   1.010         Specimen UA   Urine, Clean Catch         Troponin I     <0.006  Comment: The reference interval for Troponin I represents the 99th percentile   cutoff   for our facility and is consistent with 3rd generation assay   performance.         UROBILINOGEN UA   >=8.0         WBC     6.86               Significant Imaging: I have reviewed all pertinent imaging results/findings within the past 24 hours.    MRI MRCP Without Contrast   Final Result   Abnormal      No filling defect in the common duct, however there is increased biliary ductal dilation in comparison to the prior CT exams.  Further evaluation as warranted.      Findings concerning for acute pancreatitis.  Correlation with lipase would be advised.      Surgical changes of pancreaticojejunostomy better evaluated on prior CTs.      This report was flagged in Epic as abnormal.         Electronically signed by: Nikko Ayoub   Date:    06/19/2024   Time:    09:45

## 2024-06-19 NOTE — H&P
[facility] - Emergency Dept.  [facility] Medicine  History & Physical    Patient Name: Fabrice Zamorano  MRN: [id number]  Patient Class: IP- Inpatient  Admission Date: 6/19/2024  Attending Physician: Eriberto Vazquez MD   Primary Care Provider: Rebecca Mcmillan MD         Patient information was obtained from patient, past medical records, and ER records.     Subjective:     Principal Problem:Acute on chronic pancreatitis    Chief Complaint:   Chief Complaint   Patient presents with    Abdominal Pain     RUQ pain, n/v x couple of weeks. Pt reports hx of pancreatitis.        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 Denies chest pain, SOB, fevers/chills. In ER, elevated AST//215, T Bili 4.4, , lipase 143. MRCP revealed findings concerning for acute pancreatitis. [facility] consulted for further management, admit as inpatient for acute on chronic pancreatitis.    Past Medical History:   Diagnosis Date    Anticoagulant long-term use     On Eliquis for DVT    GERD (gastroesophageal reflux disease)     Hypertension     Hypothyroidism 10/26/2021    Liver disease     fatty liver    Pancreatic pseudocyst     Personal history of colonic polyps     Sleep apnea     Splenic vein thrombosis        Past Surgical History:   Procedure Laterality Date    CHOLECYSTECTOMY      COLONOSCOPY      ENDOSCOPIC ULTRASOUND OF UPPER GASTROINTESTINAL TRACT N/A 10/12/2021    Procedure: ULTRASOUND, UPPER GI TRACT, ENDOSCOPIC;  Surgeon: Odalys Leiva MD;  Location: [facility];  Service: Endoscopy;  Laterality: N/A;  Upper and linear, 22 shark core, cytology and [facility]    ENDOSCOPIC ULTRASOUND OF UPPER GASTROINTESTINAL TRACT N/A 2/24/2023    Procedure: ULTRASOUND, UPPER GI TRACT, ENDOSCOPIC;   Surgeon: Shawn Chisholm MD;  [facility]  Service: Endoscopy;  Laterality: N/A;  2/3/23-Instructions via portal-DS    ENDOSCOPIC ULTRASOUND OF UPPER GASTROINTESTINAL TRACT N/A 11/14/2023    Procedure: ULTRASOUND, UPPER GI TRACT, ENDOSCOPIC;  Surgeon: Kwadwo Gonsalez MD;  Location: [facility];  Service: Gastroenterology;  Laterality: N/A;  10/13/23: instructions sent vie portal-GD    ENDOSCOPIC ULTRASOUND OF UPPER GASTROINTESTINAL TRACT N/A 3/12/2024    Procedure: ULTRASOUND, UPPER GI TRACT, ENDOSCOPIC;  Surgeon: Shawn Chisholm MD;  Location: [facility];  Service: Endoscopy;  Laterality: N/A;  2/16/24: inst sent via portal-GD    ERCP N/A 09/29/2021    Procedure: ERCP (ENDOSCOPIC RETROGRADE CHOLANGIOPANCREATOGRAPHY);  Surgeon: Odalys Leiva MD;  Location: [facility];  Service: Endoscopy;  Laterality: N/A;    ERCP N/A 08/15/2022    Procedure: ERCP (ENDOSCOPIC RETROGRADE CHOLANGIOPANCREATOGRAPHY);  Surgeon: Odalys Leiva MD;  Location: [facility];  Service: Endoscopy;  Laterality: N/A;    ERCP N/A 2/24/2023    Procedure: ERCP (ENDOSCOPIC RETROGRADE CHOLANGIOPANCREATOGRAPHY);  Surgeon: Shawn Chisholm MD;  Location: [facility] ([address] [place] [address]);  Service: Endoscopy;  Laterality: N/A;    ERCP W/ PLASTIC STENT PLACEMENT      LATERAL PANCREATICOJEJUNOSTOMY N/A 11/19/2021    Procedure: PANCREATICOJEJUNOSTOMY, SIDE-TO-SIDE tier 1;  Surgeon: Brian Hills MD;  [facility];  Service: General;  Laterality: N/A;       Review of patient's allergies indicates:  No Known Allergies    Current Facility-Administered Medications on File Prior to Encounter   Medication    iohexoL (OMNIPAQUE 300) injection     Current Outpatient Medications on File Prior to Encounter   Medication Sig    carvediloL (COREG) 6.25 MG tablet Take 3.125 mg by mouth 2 (two) times daily with meals.    lipase-protease-amylase (CREON) 36,000-114,000- 180,000 unit CpDR Take 1 capsule by mouth 3 (three) times daily with  meals.    metFORMIN (GLUCOPHAGE-XR) 500 MG ER 24hr tablet Take 500 mg by mouth once daily.    multivitamin (THERAGRAN) per tablet Take 1 tablet by mouth once daily.    ondansetron (ZOFRAN-ODT) 4 MG TbDL Take 1 tablet (4 mg total) by mouth every 8 (eight) hours as needed (Nausea).    oxyCODONE-acetaminophen (PERCOCET)  mg per tablet Take 1 tablet by mouth every 4 (four) hours as needed for Pain.    pregabalin (LYRICA) 75 MG capsule Take 2 capsules (150 mg total) by mouth every evening. May also take 1 capsule (75 mg total) daily as needed.    ibuprofen (ADVIL,MOTRIN) 800 MG tablet Take 800 mg by mouth every 6 (six) hours as needed.    pantoprazole (PROTONIX) 40 MG tablet TAKE 1 TABLET(40 MG) BY MOUTH EVERY DAY     Family History       Problem Relation (Age of Onset)    Arthritis Father    Cancer Father    Pancreatic cancer Father    Thyroid disease Mother, Father          Tobacco Use    Smoking status: Former     Current packs/day: 0.00     Types: Cigarettes     Quit date: 2001     Years since quittin.8    Smokeless tobacco: Never   Substance and Sexual Activity    Alcohol use: Not Currently    Drug use: Never    Sexual activity: Yes     Review of Systems   All other systems reviewed and are negative.    Objective:     Vital Signs (Most Recent):  Temp: 97.6 °F (36.4 °C) (24 0235)  Pulse: 92 (24 1232)  Resp: 20 (24 1232)  BP: (!) 162/86 (24 1232)  SpO2: 96 % (24 1232) Vital Signs (24h Range):  Temp:  [97.6 °F (36.4 °C)] 97.6 °F (36.4 °C)  Pulse:  [55-92] 92  Resp:  [9-20] 20  SpO2:  [85 %-100 %] 96 %  BP: (144-205)/() 162/86     Weight: 99.6 kg (219 lb 9.3 oz)  Body mass index is 30.62 kg/m².     Physical Exam  Vitals and nursing note reviewed.   Constitutional:       General: He is in acute distress.      Appearance: He is obese. He is ill-appearing and diaphoretic.   HENT:      Head: Normocephalic and atraumatic.      Nose: Nose normal.      Mouth/Throat:       Mouth: Mucous membranes are dry.      Pharynx: Oropharynx is clear.   Eyes:      Extraocular Movements: Extraocular movements intact.      Pupils: Pupils are equal, round, and reactive to light.   Cardiovascular:      Rate and Rhythm: Tachycardia present.      Pulses: Normal pulses.      Heart sounds: Normal heart sounds.   Pulmonary:      Effort: Pulmonary effort is normal. No respiratory distress.      Breath sounds: Normal breath sounds. No wheezing, rhonchi or rales.   Abdominal:      General: There is distension.      Palpations: Abdomen is soft.      Tenderness: There is abdominal tenderness. There is no guarding.   Skin:     Coloration: Skin is pale.   Neurological:      General: No focal deficit present.      Mental Status: He is alert and oriented to person, place, and time.   Psychiatric:         Mood and Affect: Mood normal.         Behavior: Behavior normal.              CRANIAL NERVES     CN III, IV, VI   Pupils are equal, round, and reactive to light.       Significant Labs: All pertinent labs within the past 24 hours have been reviewed.  Recent Lab Results         06/19/24  1031   06/19/24  0427   06/19/24  0254        Albumin 3.8     3.9            225            215       Anion Gap 10     10       Appearance, UA   Clear         PTT 30.0  Comment: Refer to local heparin nomogram for intensity/dose specific   therapeutic   range.                  192       Baso #     0.03       Basophil %     0.4       Bilirubin (UA)   1+  Comment: Positive urine bilirubin is not confirmed. Correlate with   serum bilirubin and clinical presentation.           BILIRUBIN TOTAL 5.7  Comment: For infants and newborns, interpretation of results should be based  on gestational age, weight and in agreement with clinical  observations.    Premature Infant recommended reference ranges:  Up to 24 hours.............<8.0 mg/dL  Up to 48 hours............<12.0 mg/dL  3-5 days..................<15.0 mg/dL  6-29  days.................<15.0 mg/dL       4.4  Comment: For infants and newborns, interpretation of results should be based  on gestational age, weight and in agreement with clinical  observations.    Premature Infant recommended reference ranges:  Up to 24 hours.............<8.0 mg/dL  Up to 48 hours............<12.0 mg/dL  3-5 days..................<15.0 mg/dL  6-29 days.................<15.0 mg/dL         BUN 10     12       Calcium 9.2     9.4       Chloride 102     102       CO2 26     23       Color, UA   Yellow         Creatinine 0.8     0.8       Differential Method     Automated       eGFR >60     >60       Eos #     0.2       Eos %     2.8       Glucose 97     118       Glucose, UA   Negative         Gran # (ANC)     4.9       Gran %     71.9       Hematocrit     44.0       Hemoglobin     14.8       Immature Grans (Abs)     0.02  Comment: Mild elevation in immature granulocytes is non specific and   can be seen in a variety of conditions including stress response,   acute inflammation, trauma and pregnancy. Correlation with other   laboratory and clinical findings is essential.         Immature Granulocytes     0.3       INR 1.1  Comment: Coumadin Therapy:  2.0 - 3.0 for INR for all indicators except mechanical heart valves  and antiphospholipid syndromes which should use 2.5 - 3.5.             Ketones, UA   Negative         Leukocyte Esterase, UA   Negative         Lipase 268     143       Lymph #     1.1       Lymph %     15.7       MCH     30.1       MCHC     33.6       MCV     90       Mono #     0.6       Mono %     8.9       MPV     9.4       NITRITE UA   Negative         nRBC     0       Blood, UA   Negative         pH, UA   7.0         Platelet Count     148       Potassium 4.0     3.8       PROTEIN TOTAL 6.6     6.9       Protein, UA   Negative  Comment: Recommend a 24 hour urine protein or a urine   protein/creatinine ratio if globulin induced proteinuria is  clinically suspected.           PT  11.5           RBC     4.91       RDW     14.0       Sodium 138     135       Spec Grav UA   1.010         Specimen UA   Urine, Clean Catch         Troponin I     <0.006  Comment: The reference interval for Troponin I represents the 99th percentile   cutoff   for our facility and is consistent with 3rd generation assay   performance.         UROBILINOGEN UA   >=8.0         WBC     6.86               Significant Imaging: I have reviewed all pertinent imaging results/findings within the past 24 hours.    MRI MRCP Without Contrast   Final Result   Abnormal      No filling defect in the common duct, however there is increased biliary ductal dilation in comparison to the prior CT exams.  Further evaluation as warranted.      Findings concerning for acute pancreatitis.  Correlation with lipase would be advised.      Surgical changes of pancreaticojejunostomy better evaluated on prior CTs.      This report was flagged in Epic as abnormal.         Electronically signed by: Nikko Ayoub   Date:    06/19/2024   Time:    09:45          Assessment/Plan:     * Acute on chronic pancreatitis  Bowel rest, IV fluids, multimodal pain control, anti-emetics PRN  Continue Creon  Hx pancreaticojejunostomy in November 2021  Follows with Dr. Chisholm in [place]  GI following    Hypertension  Chronic, uncontrolled. Latest blood pressure and vitals reviewed-     Temp:  [97.8 °F (36.6 °C)-98.2 °F (36.8 °C)]   Pulse:  [57-99]   Resp:  [10-20]   BP: (162-205)/()   SpO2:  [92 %-100 %] .   Home meds for hypertension were reviewed and noted below.   Hypertension Medications               carvediloL (COREG) 6.25 MG tablet Take 3.125 mg by mouth 2 (two) times daily with meals.            While in the hospital, will manage blood pressure as follows; Continue home antihypertensive regimen    Will utilize p.r.n. blood pressure medication only if patient's blood pressure greater than 180/110 and he develops symptoms such as worsening chest pain  or shortness of breath.    GERD (gastroesophageal reflux disease)  Continue Protonix      VTE Risk Mitigation (From admission, onward)           Ordered     IP VTE HIGH RISK PATIENT  Once         06/19/24 1300     Place sequential compression device  Until discontinued         06/19/24 1300                               AdmissionCare    Guideline: Pancreatitis - INPT, Inpatient    Based on the indications selected for the patient, the bed status of Inpatient was determined to be MET    The following indications were selected as present at the time of evaluation of the patient:      - Clinical Indications for Admission to Inpatient Care            - Admission is indicated for 1 or more of the following:      - Abdominal pain      - Findings on imaging indicative of acute pancreatitis (eg, pancreatic inflammation, pancreatic necrosis, peripancreatic fluid collection)    AdmissionCare documentation entered by: Eriberto Vazquez    [facility] [place], 28th edition, Copyright © 2024 [facility] [place] All Rights Reserved.  [id number]:09-05:00    Eriberto Vazquez MD  Department of Hospital Medicine  [facility] - Emergency Dept.

## 2024-06-19 NOTE — ED PROVIDER NOTES
SCRIBE #1 NOTE: ILashae am scribing for, and in the presence of, Liset Schmidt MD. I have scribed the HPI, ROS, and PEx.     SCRIBE #2 NOTE: I, Catherine Gabriel am scribing for, and in the presence of,  Selma De Jesus MD. I have scribed the remaining portions of the note not scribed by Scribe #1.      History     Chief Complaint   Patient presents with    Abdominal Pain     RUQ pain, n/v x couple of weeks. Pt reports hx of pancreatitis.     Review of patient's allergies indicates:  No Known Allergies      History of Present Illness     HPI    6/19/2024, 3:07 AM  History obtained from the patient      History of Present Illness: Fabrice Zamorano is a 59 y.o. male patient with a PMHx of long-term anticoagulant use (Eliquis), splenic vein thrombosis, pancreatitis, HTN, and hyperthyroidism who presents to the Emergency Department for evaluation constant, moderate to severe RUQ abdominal which onset gradually over the lst 2 months, worse of the past 3-4 days. The patient states that his pain tonight is consistent with past pancreatitis pain episodes. He additionally reports emesis. He has taken Oxycodone, Zofran, and Phenergan with minimal relief. No mitigating or exacerbating factors reported. Patient denies any fever, CP, SOB, diarrhea, and all other sxs at this time. No further complaints or concerns at this time.       Arrival mode: Personal vehicle    PCP: Rebecca Mcmillan MD        Past Medical History:  Past Medical History:   Diagnosis Date    Anticoagulant long-term use     On Eliquis for DVT    GERD (gastroesophageal reflux disease)     Hypertension     Hypothyroidism 10/26/2021    Liver disease     fatty liver    Pancreatic pseudocyst     Personal history of colonic polyps     Sleep apnea     Splenic vein thrombosis        Past Surgical History:  Past Surgical History:   Procedure Laterality Date    CHOLECYSTECTOMY      COLONOSCOPY      ENDOSCOPIC ULTRASOUND OF UPPER GASTROINTESTINAL TRACT N/A  10/12/2021    Procedure: ULTRASOUND, UPPER GI TRACT, ENDOSCOPIC;  Surgeon: Odalys Leiva MD;  Location: [facility];  Service: Endoscopy;  Laterality: N/A;  Upper and linear, 22 shark core, cytology and RPMI    ENDOSCOPIC ULTRASOUND OF UPPER GASTROINTESTINAL TRACT N/A 2/24/2023    Procedure: ULTRASOUND, UPPER GI TRACT, ENDOSCOPIC;  Surgeon: Shawn Chisholm MD;  Location: [facility] (2ND FLR);  Service: Endoscopy;  Laterality: N/A;  2/3/23-Instructions via portal-DS    ENDOSCOPIC ULTRASOUND OF UPPER GASTROINTESTINAL TRACT N/A 11/14/2023    Procedure: ULTRASOUND, UPPER GI TRACT, ENDOSCOPIC;  Surgeon: Kwadwo Gonsalez MD;  Location: [facility];  Service: Gastroenterology;  Laterality: N/A;  10/13/23: instructions sent vie portal-GD    ENDOSCOPIC ULTRASOUND OF UPPER GASTROINTESTINAL TRACT N/A 3/12/2024    Procedure: ULTRASOUND, UPPER GI TRACT, ENDOSCOPIC;  Surgeon: Shawn Chisholm MD;  Location: [facility];  Service: Endoscopy;  Laterality: N/A;  2/16/24: inst sent via portal-GD    ERCP N/A 09/29/2021    Procedure: ERCP (ENDOSCOPIC RETROGRADE CHOLANGIOPANCREATOGRAPHY);  Surgeon: Odalys Leiva MD;  Location: [facility];  Service: Endoscopy;  Laterality: N/A;    ERCP N/A 08/15/2022    Procedure: ERCP (ENDOSCOPIC RETROGRADE CHOLANGIOPANCREATOGRAPHY);  Surgeon: Odalys Leiva MD;  Location: [facility];  Service: Endoscopy;  Laterality: N/A;    ERCP N/A 2/24/2023    Procedure: ERCP (ENDOSCOPIC RETROGRADE CHOLANGIOPANCREATOGRAPHY);  Surgeon: Shawn Chisholm MD;  Location: [facility] (2ND FLR);  Service: Endoscopy;  Laterality: N/A;    ERCP W/ PLASTIC STENT PLACEMENT      LATERAL PANCREATICOJEJUNOSTOMY N/A 11/19/2021    Procedure: PANCREATICOJEJUNOSTOMY, SIDE-TO-SIDE tier 1;  Surgeon: Brian Hills MD;  [address];  Service: General;  Laterality: N/A;         Family History:  Family History   Problem Relation Name Age of Onset    Thyroid disease Mother      Cancer Father       Pancreatic cancer Father      Arthritis Father      Thyroid disease Father         Social History:  Social History     Tobacco Use    Smoking status: Former     Current packs/day: 0.00     Types: Cigarettes     Quit date: 2001     Years since quittin.8    Smokeless tobacco: Never   Substance and Sexual Activity    Alcohol use: Not Currently    Drug use: Never    Sexual activity: Yes        Review of Systems     Review of Systems   Constitutional:  Negative for fever.   HENT:  Negative for sore throat.    Respiratory:  Negative for shortness of breath.    Cardiovascular:  Negative for chest pain.   Gastrointestinal:  Positive for abdominal pain (RUQ), nausea and vomiting. Negative for diarrhea.   Genitourinary:  Negative for dysuria.   Musculoskeletal:  Negative for back pain.   Skin:  Negative for rash.   Neurological:  Negative for weakness.   Hematological:  Does not bruise/bleed easily.   All other systems reviewed and are negative.     Physical Exam     Initial Vitals [24 0235]   BP Pulse Resp Temp SpO2   (!) 172/97 74 18 97.6 °F (36.4 °C) 96 %      MAP       --          Physical Exam  Nursing Notes and Vital Signs Reviewed.  Constitutional: Patient is in no acute distress. Well-developed and well-nourished.  Head: Atraumatic. Normocephalic.  Eyes: PERRL. EOM intact. Conjunctivae are not pale. No scleral icterus.  ENT: Mucous membranes are moist. Oropharynx is clear and symmetric.    Neck: Supple. Full ROM. No lymphadenopathy.  Cardiovascular: Regular rate. Regular rhythm. No murmurs, rubs, or gallops. Distal pulses are 2+ and symmetric.  Pulmonary/Chest: No respiratory distress. Clear to auscultation bilaterally. No wheezing or rales.  Abdominal: Soft and non-distended.  There is RUQ tenderness.  No rebound, guarding, or rigidity. Good bowel sounds.  Genitourinary: No CVA tenderness  Musculoskeletal: Moves all extremities. No obvious deformities. No edema. No calf tenderness.  Skin: Warm and  dry.  Neurological:  Alert, awake, and appropriate.  Normal speech.  No acute focal neurological deficits are appreciated.  Psychiatric: Normal affect. Good eye contact. Appropriate in content.     ED Course   Critical Care    Date/Time: 6/19/2024 1:57 PM    Performed by: Selma De Jesus MD  Authorized by: Eriberto Vazquez MD  Direct patient critical care time: 35 minutes  Additional history critical care time: 8 minutes  Ordering / reviewing critical care time: 8 minutes  Documentation critical care time: 6 minutes  Consulting other physicians critical care time: 10 minutes  Total critical care time (exclusive of procedural time) : 67 minutes  Critical care was necessary to treat or prevent imminent or life-threatening deterioration of the following conditions: hepatic failure and cardiac failure.  Critical care was time spent personally by me on the following activities: blood draw for specimens, development of treatment plan with patient or surrogate, discussions with consultants, discussions with primary provider, interpretation of cardiac output measurements, evaluation of patient's response to treatment, examination of patient, obtaining history from patient or surrogate, ordering and performing treatments and interventions, ordering and review of laboratory studies, ordering and review of radiographic studies, pulse oximetry, re-evaluation of patient's condition and review of old charts (htn crisis, pancreatitis with abnormal liver function).        ED Vital Signs:  Vitals:    06/20/24 0619 06/20/24 0720 06/20/24 1031 06/20/24 1230   BP:  (!) 177/106  (!) 180/101   Pulse:  78  107   Resp: 18 15 15 16   Temp:  98.5 °F (36.9 °C)  98.1 °F (36.7 °C)   TempSrc:  Oral  Oral   SpO2:  (!) 93%  (!) 92%   Weight:        06/20/24 1425 06/20/24 1637 06/20/24 1926 06/20/24 1939   BP:  (!) 163/98 (!) 171/94    Pulse:  108 102    Resp: 16 16 16 15   Temp:  98.8 °F (37.1 °C) 97.8 °F (36.6 °C)    TempSrc:  Oral Oral    SpO2:   (!) 92% (!) 92%    Weight:        06/20/24 2327 06/21/24 0231 06/21/24 0448 06/21/24 0814   BP: (!) 167/97  134/81 (!) 160/85   Pulse: (!) 114  101 91   Resp: 18 17 18 15   Temp: 99.4 °F (37.4 °C)  98.3 °F (36.8 °C) 98.3 °F (36.8 °C)   TempSrc: Oral  Oral Oral   SpO2: (!) 92%  (!) 92% (!) 92%   Weight:        06/21/24 0825 06/21/24 1322 06/21/24 1348   BP:  (!) 171/93    Pulse:  99    Resp: 15 16 16   Temp:  99.4 °F (37.4 °C)    TempSrc:  Oral    SpO2:  (!) 94%    Weight:          Abnormal Lab Results:  Labs Reviewed   CBC W/ AUTO DIFFERENTIAL - Abnormal; Notable for the following components:       Result Value    Platelets 148 (*)     Lymph % 15.7 (*)     All other components within normal limits   COMPREHENSIVE METABOLIC PANEL - Abnormal; Notable for the following components:    Sodium 135 (*)     Glucose 118 (*)     Total Bilirubin 4.4 (*)     Alkaline Phosphatase 225 (*)      (*)      (*)     All other components within normal limits   LIPASE - Abnormal; Notable for the following components:    Lipase 143 (*)     All other components within normal limits   URINALYSIS, REFLEX TO URINE CULTURE - Abnormal; Notable for the following components:    Bilirubin (UA) 1+ (*)     Urobilinogen, UA >=8.0 (*)     All other components within normal limits    Narrative:     Specimen Source->Urine   COMPREHENSIVE METABOLIC PANEL - Abnormal; Notable for the following components:    Total Bilirubin 5.7 (*)     Alkaline Phosphatase 232 (*)      (*)      (*)     All other components within normal limits   LIPASE - Abnormal; Notable for the following components:    Lipase 268 (*)     All other components within normal limits   TROPONIN I   PROTIME-INR   APTT        All Lab Results:  Results for orders placed or performed during the hospital encounter of 06/19/24   CBC auto differential   Result Value Ref Range    WBC 6.86 3.90 - 12.70 K/uL    RBC 4.91 4.60 - 6.20 M/uL    Hemoglobin 14.8 14.0 - 18.0 g/dL     Hematocrit 44.0 40.0 - 54.0 %    MCV 90 82 - 98 fL    MCH 30.1 27.0 - 31.0 pg    MCHC 33.6 32.0 - 36.0 g/dL    RDW 14.0 11.5 - 14.5 %    Platelets 148 (L) 150 - 450 K/uL    MPV 9.4 9.2 - 12.9 fL    Immature Granulocytes 0.3 0.0 - 0.5 %    Gran # (ANC) 4.9 1.8 - 7.7 K/uL    Immature Grans (Abs) 0.02 0.00 - 0.04 K/uL    Lymph # 1.1 1.0 - 4.8 K/uL    Mono # 0.6 0.3 - 1.0 K/uL    Eos # 0.2 0.0 - 0.5 K/uL    Baso # 0.03 0.00 - 0.20 K/uL    nRBC 0 0 /100 WBC    Gran % 71.9 38.0 - 73.0 %    Lymph % 15.7 (L) 18.0 - 48.0 %    Mono % 8.9 4.0 - 15.0 %    Eosinophil % 2.8 0.0 - 8.0 %    Basophil % 0.4 0.0 - 1.9 %    Differential Method Automated    Comprehensive metabolic panel   Result Value Ref Range    Sodium 135 (L) 136 - 145 mmol/L    Potassium 3.8 3.5 - 5.1 mmol/L    Chloride 102 95 - 110 mmol/L    CO2 23 23 - 29 mmol/L    Glucose 118 (H) 70 - 110 mg/dL    BUN 12 6 - 20 mg/dL    Creatinine 0.8 0.5 - 1.4 mg/dL    Calcium 9.4 8.7 - 10.5 mg/dL    Total Protein 6.9 6.0 - 8.4 g/dL    Albumin 3.9 3.5 - 5.2 g/dL    Total Bilirubin 4.4 (H) 0.1 - 1.0 mg/dL    Alkaline Phosphatase 225 (H) 55 - 135 U/L     (H) 10 - 40 U/L     (H) 10 - 44 U/L    eGFR >60 >60 mL/min/1.73 m^2    Anion Gap 10 8 - 16 mmol/L   Lipase   Result Value Ref Range    Lipase 143 (H) 4 - 60 U/L   Urinalysis, Reflex to Urine Culture Urine, Clean Catch    Specimen: Urine   Result Value Ref Range    Specimen UA Urine, Clean Catch     Color, UA Yellow Yellow, Straw, Amrita    Appearance, UA Clear Clear    pH, UA 7.0 5.0 - 8.0    Specific Gravity, UA 1.010 1.005 - 1.030    Protein, UA Negative Negative    Glucose, UA Negative Negative    Ketones, UA Negative Negative    Bilirubin (UA) 1+ (A) Negative    Occult Blood UA Negative Negative    Nitrite, UA Negative Negative    Urobilinogen, UA >=8.0 (A) <2.0 EU/dL    Leukocytes, UA Negative Negative   Troponin I   Result Value Ref Range    Troponin I <0.006 0.000 - 0.026 ng/mL   Comprehensive metabolic panel    Result Value Ref Range    Sodium 138 136 - 145 mmol/L    Potassium 4.0 3.5 - 5.1 mmol/L    Chloride 102 95 - 110 mmol/L    CO2 26 23 - 29 mmol/L    Glucose 97 70 - 110 mg/dL    BUN 10 6 - 20 mg/dL    Creatinine 0.8 0.5 - 1.4 mg/dL    Calcium 9.2 8.7 - 10.5 mg/dL    Total Protein 6.6 6.0 - 8.4 g/dL    Albumin 3.8 3.5 - 5.2 g/dL    Total Bilirubin 5.7 (H) 0.1 - 1.0 mg/dL    Alkaline Phosphatase 232 (H) 55 - 135 U/L     (H) 10 - 40 U/L     (H) 10 - 44 U/L    eGFR >60 >60 mL/min/1.73 m^2    Anion Gap 10 8 - 16 mmol/L   Lipase   Result Value Ref Range    Lipase 268 (H) 4 - 60 U/L   Protime-INR   Result Value Ref Range    Prothrombin Time 11.5 9.0 - 12.5 sec    INR 1.1 0.8 - 1.2   APTT   Result Value Ref Range    aPTT 30.0 21.0 - 32.0 sec   Comprehensive metabolic panel   Result Value Ref Range    Sodium 135 (L) 136 - 145 mmol/L    Potassium 4.1 3.5 - 5.1 mmol/L    Chloride 100 95 - 110 mmol/L    CO2 25 23 - 29 mmol/L    Glucose 155 (H) 70 - 110 mg/dL    BUN 14 6 - 20 mg/dL    Creatinine 0.8 0.5 - 1.4 mg/dL    Calcium 9.1 8.7 - 10.5 mg/dL    Total Protein 6.6 6.0 - 8.4 g/dL    Albumin 3.5 3.5 - 5.2 g/dL    Total Bilirubin 10.1 (H) 0.1 - 1.0 mg/dL    Alkaline Phosphatase 258 (H) 55 - 135 U/L     (H) 10 - 40 U/L     (H) 10 - 44 U/L    eGFR >60 >60 mL/min/1.73 m^2    Anion Gap 10 8 - 16 mmol/L   Lipase   Result Value Ref Range    Lipase 327 (H) 4 - 60 U/L   Comprehensive metabolic panel   Result Value Ref Range    Sodium 132 (L) 136 - 145 mmol/L    Potassium 3.6 3.5 - 5.1 mmol/L    Chloride 102 95 - 110 mmol/L    CO2 23 23 - 29 mmol/L    Glucose 143 (H) 70 - 110 mg/dL    BUN 16 6 - 20 mg/dL    Creatinine 0.7 0.5 - 1.4 mg/dL    Calcium 8.6 (L) 8.7 - 10.5 mg/dL    Total Protein 5.4 (L) 6.0 - 8.4 g/dL    Albumin 2.7 (L) 3.5 - 5.2 g/dL    Total Bilirubin 9.3 (H) 0.1 - 1.0 mg/dL    Alkaline Phosphatase 185 (H) 55 - 135 U/L    AST 86 (H) 10 - 40 U/L     (H) 10 - 44 U/L    eGFR >60 >60  mL/min/1.73 m^2    Anion Gap 7 (L) 8 - 16 mmol/L   CBC auto differential   Result Value Ref Range    WBC 10.05 3.90 - 12.70 K/uL    RBC 4.47 (L) 4.60 - 6.20 M/uL    Hemoglobin 13.6 (L) 14.0 - 18.0 g/dL    Hematocrit 40.5 40.0 - 54.0 %    MCV 91 82 - 98 fL    MCH 30.4 27.0 - 31.0 pg    MCHC 33.6 32.0 - 36.0 g/dL    RDW 14.9 (H) 11.5 - 14.5 %    Platelets 110 (L) 150 - 450 K/uL    MPV 9.9 9.2 - 12.9 fL    Immature Granulocytes 1.3 (H) 0.0 - 0.5 %    Gran # (ANC) 8.5 (H) 1.8 - 7.7 K/uL    Immature Grans (Abs) 0.13 (H) 0.00 - 0.04 K/uL    Lymph # 0.4 (L) 1.0 - 4.8 K/uL    Mono # 1.0 0.3 - 1.0 K/uL    Eos # 0.0 0.0 - 0.5 K/uL    Baso # 0.02 0.00 - 0.20 K/uL    nRBC 0 0 /100 WBC    Gran % 84.8 (H) 38.0 - 73.0 %    Lymph % 3.7 (L) 18.0 - 48.0 %    Mono % 9.9 4.0 - 15.0 %    Eosinophil % 0.1 0.0 - 8.0 %    Basophil % 0.2 0.0 - 1.9 %    Differential Method Automated    EKG 12-lead   Result Value Ref Range    QRS Duration 74 ms    [facility] QTC Calculation 367 ms         Imaging Results:  Imaging Results               MRI MRCP Without Contrast (Final result)  Result time 06/19/24 09:45:04      Final result by Nikko Ayoub MD (06/19/24 09:45:04)                   Impression:      No filling defect in the common duct, however there is increased biliary ductal dilation in comparison to the prior CT exams.  Further evaluation as warranted.    Findings concerning for acute pancreatitis.  Correlation with lipase would be advised.    Surgical changes of pancreaticojejunostomy better evaluated on prior CTs.    This report was flagged in Epic as abnormal.      Electronically signed by: Nikko Ayoub  Date:    06/19/2024  Time:    09:45               Narrative:    EXAMINATION:  MRI ABDOMEN WITHOUT CONTRAST MRCP    CLINICAL HISTORY:  Congenital malformation, gallbladder/bile duct/liver;    TECHNIQUE:  Multiplanar, multisequence images are performed through the liver and upper abdomen.  Additionally 2D and 3D MRCP sequences are performed  as well as MIP images.    COMPARISON:  Multiple priors.    FINDINGS:  There are inflammatory changes about the pancreas concerning for acute pancreatitis.    Mild intra and extrahepatic biliary ductal dilation.  The common duct measures 13 mm just superior to the pancreas.  This appears more prominent in comparison to the prior CT exam of 03/08/2023.    Prior cholecystectomy.    The previously noted lateral pancreaticojejunostomy has been better evaluated on prior CTs.    Right renal simple renal cyst.  No hydronephrosis of the kidneys bilaterally.    Aorta is normal size.    Liver, spleen, and adrenal glands appear unremarkable.    No choledocholithiasis.  Visualized portion of the bowel appears unremarkable.                                       The EKG was ordered, reviewed, and independently interpreted by the ED provider.  Interpretation time: 03:50  Rate: 58 BPM  Rhythm: sinus bradycardia  Interpretation: No acute ST changes. No STEMI.           The Emergency Provider reviewed the vital signs and test results, which are outlined above.     ED Discussion     5:30 AM: Discussed pt's case with Dr. Hebert (Gastroenterology) who recommends obtaining MRCP.    6:00 AM: Dr. Schmidt transfers care of patient to Dr. De Jesus pending imaging results.    10:13 AM: Discussed pt's case with Adiel Quispe NP (Gastroenterology) who states that she will review the MRI with Dr. Hebert and recommends standard treatment of pancreatitis including IV fluids, NPO, pain medications, and antiemetics as needed.     10:26 AM: Discussed case with Susy Avery NP (Hospital Medicine). Dr. Vazquez agrees with current care and management of pt and accepts admission.   Admitting Service: Hospital Medicine  Admitting Physician: Dr. Vazquez  Admit to: Tele     10:29 AM: Re-evaluated pt. I have discussed test results, shared treatment plan, and the need for admission with patient and family at bedside. Pt and family express understanding at this  time and agree with all information. All questions answered. Pt and family have no further questions or concerns at this time. Pt is ready for admit.        Medical Decision Making  DDX: 1. Pancreatitis 2. Biliary obstruction 3. Gastritis    ECG reviewed and no ischemic changes, wbc normal, liver enzymes, bili and alk phos elevated, lipase elevated, troponin normal, UA normal, GI consulted recommended MRCP, MRCP consistent with pancreatitis, given iv pain meds, BP control, iv fluids, nausea meds, pain still not well controlled, hospital med consulted for admission.     Amount and/or Complexity of Data Reviewed  Labs: ordered. Decision-making details documented in ED Course.  Radiology: ordered. Decision-making details documented in ED Course.  ECG/medicine tests: ordered and independent interpretation performed. Decision-making details documented in ED Course.  Discussion of management or test interpretation with external provider(s): Gastroenterology, Hospital Med    Risk  Prescription drug management.  Decision regarding hospitalization.                ED Medication(s):  Medications   sodium chloride 0.9% bolus 1,000 mL 1,000 mL (0 mLs Intravenous Stopped 6/19/24 0430)   morphine injection 6 mg (6 mg Intravenous Given 6/19/24 0300)   promethazine (PHENERGAN) 12.5 mg in sodium chloride 0.9% 50 mL IVPB (0 mg Intravenous Stopped 6/19/24 0340)   meperidine (PF) injection 50 mg (50 mg Intravenous Given 6/19/24 0310)   HYDROmorphone injection 1 mg (1 mg Intravenous Given 6/19/24 0613)   HYDROmorphone injection 1 mg (1 mg Intravenous Given 6/19/24 0815)   labetaloL injection 20 mg (20 mg Intravenous Given 6/19/24 1029)   HYDROmorphone injection 1 mg (1 mg Intravenous Given 6/19/24 1038)       Discharge Medication List as of 6/21/2024  7:44 PM                  Scribe Attestation:   Scribe #1: I performed the above scribed service and the documentation accurately describes the services I performed. I attest to the accuracy  of the note.     Attending:   Physician Attestation Statement for Scribe #1: I, Liset Schmidt MD, personally performed the services described in this documentation, as scribed by Lashae Plaza, in my presence, and it is both accurate and complete.       Scribe Attestation:   Scribe #2: I performed the above scribed service and the documentation accurately describes the services I performed. I attest to the accuracy of the note.    Attending Attestation:           Physician Attestation for Scribe:    Physician Attestation Statement for Scribe #2: I, Selma De Jesus MD, reviewed documentation, as scribed by Catherine Gabriel in my presence, and it is both accurate and complete. I also acknowledge and confirm the content of the note done by Scribe #1.           Clinical Impression       ICD-10-CM ICD-9-CM   1. Acute pancreatitis  K85.90 577.0   2. HTN (hypertension)  I10 401.9   3. Uncontrolled hypertension  I10 401.9   4. Hyperbilirubinemia  E80.6 782.4   5. Abnormal LFTs  R79.89 790.6   6. Chest pain  R07.9 786.50   7. Biliary obstruction  K83.1 576.2       Disposition:   Disposition: Admitted  Condition: Stable         Selma De Jesus MD  06/23/24 1403

## 2024-06-19 NOTE — ADMISSIONCARE
AdmissionCare    Guideline: Pancreatitis - INPT, Inpatient    Based on the indications selected for the patient, the bed status of Inpatient was determined to be MET    The following indications were selected as present at the time of evaluation of the patient:      - Clinical Indications for Admission to Inpatient Care            - Admission is indicated for 1 or more of the following:      - Abdominal pain      - Findings on imaging indicative of acute pancreatitis (eg, pancreatic inflammation, pancreatic necrosis, peripancreatic fluid collection)    AdmissionCare documentation entered by: Eriberto Vazquez    [facility] [place], 28th edition, Copyright © 2024 [facility] [place], [facility] All Rights Reserved.  [id number]:09-05:00

## 2024-06-19 NOTE — ED NOTES
Called MRI to get an update on when pt will get his MRI done. They will be down to get him at 0800.

## 2024-06-20 PROBLEM — I10 UNCONTROLLED HYPERTENSION: Status: ACTIVE | Noted: 2024-06-20

## 2024-06-20 PROBLEM — K83.1 BILIARY OBSTRUCTION: Status: ACTIVE | Noted: 2024-06-20

## 2024-06-20 LAB
ALBUMIN SERPL BCP-MCNC: 3.5 G/DL (ref 3.5–5.2)
ALP SERPL-CCNC: 258 U/L (ref 55–135)
ALT SERPL W/O P-5'-P-CCNC: 217 U/L (ref 10–44)
ANION GAP SERPL CALC-SCNC: 10 MMOL/L (ref 8–16)
AST SERPL-CCNC: 140 U/L (ref 10–40)
BILIRUB SERPL-MCNC: 10.1 MG/DL (ref 0.1–1)
BUN SERPL-MCNC: 14 MG/DL (ref 6–20)
CALCIUM SERPL-MCNC: 9.1 MG/DL (ref 8.7–10.5)
CHLORIDE SERPL-SCNC: 100 MMOL/L (ref 95–110)
CO2 SERPL-SCNC: 25 MMOL/L (ref 23–29)
CREAT SERPL-MCNC: 0.8 MG/DL (ref 0.5–1.4)
EST. GFR  (NO RACE VARIABLE): >60 ML/MIN/1.73 M^2
GLUCOSE SERPL-MCNC: 155 MG/DL (ref 70–110)
LIPASE SERPL-CCNC: 327 U/L (ref 4–60)
POTASSIUM SERPL-SCNC: 4.1 MMOL/L (ref 3.5–5.1)
PROT SERPL-MCNC: 6.6 G/DL (ref 6–8.4)
SODIUM SERPL-SCNC: 135 MMOL/L (ref 136–145)

## 2024-06-20 PROCEDURE — 63600175 PHARM REV CODE 636 W HCPCS: Performed by: HOSPITALIST

## 2024-06-20 PROCEDURE — 36415 COLL VENOUS BLD VENIPUNCTURE: CPT | Performed by: HOSPITALIST

## 2024-06-20 PROCEDURE — 80053 COMPREHEN METABOLIC PANEL: CPT | Performed by: HOSPITALIST

## 2024-06-20 PROCEDURE — 25000003 PHARM REV CODE 250: Performed by: HOSPITALIST

## 2024-06-20 PROCEDURE — 83690 ASSAY OF LIPASE: CPT | Performed by: HOSPITALIST

## 2024-06-20 PROCEDURE — 21400001 HC TELEMETRY ROOM

## 2024-06-20 PROCEDURE — 25000003 PHARM REV CODE 250: Performed by: NURSE PRACTITIONER

## 2024-06-20 PROCEDURE — 99233 SBSQ HOSP IP/OBS HIGH 50: CPT | Mod: ,,, | Performed by: PHYSICIAN ASSISTANT

## 2024-06-20 PROCEDURE — C9113 INJ PANTOPRAZOLE SODIUM, VIA: HCPCS | Performed by: HOSPITALIST

## 2024-06-20 RX ORDER — IBUPROFEN 200 MG
24 TABLET ORAL
Status: CANCELLED | OUTPATIENT
Start: 2024-06-20

## 2024-06-20 RX ORDER — SODIUM CHLORIDE 0.9 % (FLUSH) 0.9 %
10 SYRINGE (ML) INJECTION EVERY 12 HOURS PRN
Status: CANCELLED | OUTPATIENT
Start: 2024-06-20

## 2024-06-20 RX ORDER — ONDANSETRON HYDROCHLORIDE 2 MG/ML
4 INJECTION, SOLUTION INTRAVENOUS EVERY 6 HOURS PRN
Status: CANCELLED | OUTPATIENT
Start: 2024-06-20

## 2024-06-20 RX ORDER — AMOXICILLIN 250 MG
1 CAPSULE ORAL 2 TIMES DAILY PRN
Status: DISCONTINUED | OUTPATIENT
Start: 2024-06-20 | End: 2024-06-20

## 2024-06-20 RX ORDER — HYDRALAZINE HYDROCHLORIDE 20 MG/ML
10 INJECTION INTRAMUSCULAR; INTRAVENOUS EVERY 4 HOURS PRN
Status: CANCELLED | OUTPATIENT
Start: 2024-06-20

## 2024-06-20 RX ORDER — CARVEDILOL 3.12 MG/1
3.12 TABLET ORAL 2 TIMES DAILY WITH MEALS
Status: CANCELLED | OUTPATIENT
Start: 2024-06-20

## 2024-06-20 RX ORDER — IBUPROFEN 200 MG
16 TABLET ORAL
Status: CANCELLED | OUTPATIENT
Start: 2024-06-20

## 2024-06-20 RX ORDER — LABETALOL HYDROCHLORIDE 5 MG/ML
10 INJECTION, SOLUTION INTRAVENOUS EVERY 4 HOURS PRN
Status: CANCELLED | OUTPATIENT
Start: 2024-06-20

## 2024-06-20 RX ORDER — NALOXONE HCL 0.4 MG/ML
0.02 VIAL (ML) INJECTION
Status: CANCELLED | OUTPATIENT
Start: 2024-06-20

## 2024-06-20 RX ORDER — SODIUM CHLORIDE 9 MG/ML
INJECTION, SOLUTION INTRAVENOUS CONTINUOUS
Status: CANCELLED | OUTPATIENT
Start: 2024-06-20

## 2024-06-20 RX ORDER — PANTOPRAZOLE SODIUM 40 MG/10ML
40 INJECTION, POWDER, LYOPHILIZED, FOR SOLUTION INTRAVENOUS DAILY
Status: CANCELLED | OUTPATIENT
Start: 2024-06-21

## 2024-06-20 RX ORDER — GLUCAGON 1 MG
1 KIT INJECTION
Status: CANCELLED | OUTPATIENT
Start: 2024-06-20

## 2024-06-20 RX ORDER — HYDROMORPHONE HYDROCHLORIDE 1 MG/ML
2 INJECTION, SOLUTION INTRAMUSCULAR; INTRAVENOUS; SUBCUTANEOUS
Status: CANCELLED | OUTPATIENT
Start: 2024-06-20

## 2024-06-20 RX ORDER — HYDROMORPHONE HYDROCHLORIDE 1 MG/ML
2 INJECTION, SOLUTION INTRAMUSCULAR; INTRAVENOUS; SUBCUTANEOUS
Status: DISCONTINUED | OUTPATIENT
Start: 2024-06-20 | End: 2024-06-21 | Stop reason: HOSPADM

## 2024-06-20 RX ORDER — DOCUSATE SODIUM 100 MG/1
100 CAPSULE, LIQUID FILLED ORAL 2 TIMES DAILY
Status: DISCONTINUED | OUTPATIENT
Start: 2024-06-20 | End: 2024-06-20

## 2024-06-20 RX ADMIN — ONDANSETRON 4 MG: 2 INJECTION INTRAMUSCULAR; INTRAVENOUS at 07:06

## 2024-06-20 RX ADMIN — PIPERACILLIN SODIUM AND TAZOBACTAM SODIUM 4.5 G: 4; .5 INJECTION, POWDER, FOR SOLUTION INTRAVENOUS at 08:06

## 2024-06-20 RX ADMIN — HYDRALAZINE HYDROCHLORIDE 10 MG: 20 INJECTION, SOLUTION INTRAMUSCULAR; INTRAVENOUS at 07:06

## 2024-06-20 RX ADMIN — PIPERACILLIN SODIUM AND TAZOBACTAM SODIUM 4.5 G: 4; .5 INJECTION, POWDER, FOR SOLUTION INTRAVENOUS at 10:06

## 2024-06-20 RX ADMIN — HYDROMORPHONE HYDROCHLORIDE 2 MG: 1 INJECTION, SOLUTION INTRAMUSCULAR; INTRAVENOUS; SUBCUTANEOUS at 02:06

## 2024-06-20 RX ADMIN — HYDROMORPHONE HYDROCHLORIDE 2 MG: 1 INJECTION, SOLUTION INTRAMUSCULAR; INTRAVENOUS; SUBCUTANEOUS at 07:06

## 2024-06-20 RX ADMIN — SODIUM CHLORIDE: 9 INJECTION, SOLUTION INTRAVENOUS at 03:06

## 2024-06-20 RX ADMIN — PANCRELIPASE 3 CAPSULE: 60000; 12000; 38000 CAPSULE, DELAYED RELEASE PELLETS ORAL at 08:06

## 2024-06-20 RX ADMIN — HYDROMORPHONE HYDROCHLORIDE 2 MG: 1 INJECTION, SOLUTION INTRAMUSCULAR; INTRAVENOUS; SUBCUTANEOUS at 10:06

## 2024-06-20 RX ADMIN — ONDANSETRON 4 MG: 2 INJECTION INTRAMUSCULAR; INTRAVENOUS at 02:06

## 2024-06-20 RX ADMIN — HYDROMORPHONE HYDROCHLORIDE 2 MG: 1 INJECTION, SOLUTION INTRAMUSCULAR; INTRAVENOUS; SUBCUTANEOUS at 06:06

## 2024-06-20 RX ADMIN — PANTOPRAZOLE SODIUM 40 MG: 40 INJECTION, POWDER, FOR SOLUTION INTRAVENOUS at 08:06

## 2024-06-20 RX ADMIN — SODIUM CHLORIDE: 9 INJECTION, SOLUTION INTRAVENOUS at 08:06

## 2024-06-20 RX ADMIN — HYDRALAZINE HYDROCHLORIDE 10 MG: 20 INJECTION, SOLUTION INTRAMUSCULAR; INTRAVENOUS at 05:06

## 2024-06-20 RX ADMIN — HYDRALAZINE HYDROCHLORIDE 10 MG: 20 INJECTION, SOLUTION INTRAMUSCULAR; INTRAVENOUS at 02:06

## 2024-06-20 RX ADMIN — CARVEDILOL 3.12 MG: 3.12 TABLET, FILM COATED ORAL at 04:06

## 2024-06-20 RX ADMIN — ONDANSETRON 4 MG: 2 INJECTION INTRAMUSCULAR; INTRAVENOUS at 08:06

## 2024-06-20 RX ADMIN — CARVEDILOL 3.12 MG: 3.12 TABLET, FILM COATED ORAL at 08:06

## 2024-06-20 NOTE — DISCHARGE SUMMARY
[facility])  [facility] Medicine  Discharge Summary      Patient Name: Fabrice Zamorano  MRN: [id number]  MONCHO: [id number]  Patient Class: IP- Inpatient  Admission Date: 6/19/2024  Hospital Length of Stay: 1 days  Discharge Date and Time:  06/20/2024 1:07 PM  Attending Physician: Eriberto Vazquez MD   Discharging Provider: Eriberto Vazquez MD  Primary Care Provider: Rebecca Mcmillan MD    Primary Care Team: [facility] MEDICINE C    HPI:   60 y/o male with PMHx of DVT on Eliquis, splenic vein thrombosis, hyperthyroidism, HTN, 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 Denies chest pain, SOB, fevers/chills. In ER, elevated AST//215, T Bili 4.4, , lipase 143. MRCP revealed findings concerning for acute pancreatitis. [facility] consulted for further management, admit as inpatient for acute on chronic pancreatitis.    * No surgery found *      Hospital Course:   6/20  Pain ongoing, associated with nausea, adjusted PRN regimen  T Bili rising 10.1, AST//217, lipase 327  Started on empiric coverage with Zosyn, continue IV fluids  Discussed with GI, recommending transfer to [facility] for advanced GI with Dr. Kwadwo Gonsalez     Goals of Care Treatment Preferences:  Code Status: Full Code      Consults:   Consults (From admission, onward)          Status Ordering Provider     Inpatient consult to Gastroenterology  Once        Provider:  Bree Hebert MD    Completed ERIBERTO VAZQUEZ            No new Assessment & Plan notes have been filed under this hospital service since the last note was generated.  Service: Hospital Medicine    Final Active Diagnoses:    Diagnosis Date Noted POA    PRINCIPAL PROBLEM:  Acute on chronic pancreatitis [K85.90, K86.1] 10/26/2021 Yes    Hypertension [I10]  Yes    GERD (gastroesophageal reflux disease) [K21.9]  10/26/2021 Yes    Biliary obstruction [K83.1] 06/20/2024 Yes    Uncontrolled hypertension [I10] 06/20/2024 Yes      Problems Resolved During this Admission:       Discharged Condition: stable    Disposition: Another Health Care Inst*    Follow Up:    Patient Instructions:   No discharge procedures on file.    Significant Diagnostic Studies: Labs: All labs within the past 24 hours have been reviewed    Pending Diagnostic Studies:       None           Medications:  Reconciled Home Medications:      Medication List        ASK your doctor about these medications      carvediloL 6.25 MG tablet  Commonly known as: COREG  Take 3.125 mg by mouth 2 (two) times daily with meals.     ibuprofen 800 MG tablet  Commonly known as: ADVIL,MOTRIN  Take 800 mg by mouth every 6 (six) hours as needed.     lipase-protease-amylase 36,000-114,000- 180,000 unit Cpdr  Commonly known as: CREON  Take 1 capsule by mouth 3 (three) times daily with meals.     metFORMIN 500 MG ER 24hr tablet  Commonly known as: GLUCOPHAGE-XR  Take 500 mg by mouth once daily.     multivitamin per tablet  Commonly known as: THERAGRAN  Take 1 tablet by mouth once daily.     ondansetron 4 MG Tbdl  Commonly known as: ZOFRAN-ODT  Take 1 tablet (4 mg total) by mouth every 8 (eight) hours as needed (Nausea).     oxyCODONE-acetaminophen  mg per tablet  Commonly known as: PERCOCET  Take 1 tablet by mouth every 4 (four) hours as needed for Pain.     pantoprazole 40 MG tablet  Commonly known as: PROTONIX  TAKE 1 TABLET(40 MG) BY MOUTH EVERY DAY     pregabalin 75 MG capsule  Commonly known as: LYRICA  Take 2 capsules (150 mg total) by mouth every evening. May also take 1 capsule (75 mg total) daily as needed.              Indwelling Lines/Drains at time of discharge:   Lines/Drains/Airways       None                   Time spent on the discharge of patient: 35 minutes         Eriberto Vazquez MD  Department of Hospital Medicine  [facility] ([facility])

## 2024-06-20 NOTE — PROGRESS NOTES
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 Denies chest pain, SOB, fevers/chills. In ER, elevated AST//215, T Bili 4.4, , lipase 143. MRCP revealed findings concerning for acute pancreatitis. [facility] consulted for further management, admit as inpatient for acute on chronic pancreatitis.    Overview/Hospital Course:  6/20  Pain ongoing, associated with nausea, adjusted PRN regimen  T Bili rising 10.1, AST//217, lipase 327  Started on empiric coverage with Zosyn, continue IV fluids  Discussed with GI, recommending transfer to [facility] for advanced GI with Dr. Kwadwo Gonsalez      Review of Systems   All other systems reviewed and are negative.    Objective:     Vital Signs (Most Recent):  Temp: 98.5 °F (36.9 °C) (06/20/24 0720)  Pulse: 78 (06/20/24 0720)  Resp: 15 (06/20/24 1031)  BP: (!) 177/106 (06/20/24 0720)  SpO2: (!) 93 % (06/20/24 0720) Vital Signs (24h Range):  Temp:  [97.8 °F (36.6 °C)-98.5 °F (36.9 °C)] 98.5 °F (36.9 °C)  Pulse:  [63-99] 78  Resp:  [14-20] 15  SpO2:  [92 %-98 %] 93 %  BP: (162-205)/() 177/106     Weight: 99.6 kg (219 lb 9.3 oz)  Body mass index is 30.62 kg/m².  No intake or output data in the 24 hours ending 06/20/24 1042      Physical Exam  Constitutional:       General: He is not in acute distress.     Appearance: He is obese. He is ill-appearing and diaphoretic.    Cardiovascular:      Rate and Rhythm: Normal rate and regular rhythm.      Heart sounds: No murmur heard.  Pulmonary:      Effort: Pulmonary effort is normal. No respiratory distress.      Breath sounds: Normal breath sounds. No wheezing.   Abdominal:      General: There is distension.      Palpations: Abdomen is soft.      Tenderness: There is abdominal tenderness.   Neurological:      Mental Status: He is alert.             Significant Labs: All pertinent labs within the past 24 hours have been reviewed.  Recent Lab Results         06/20/24  0732        Albumin 3.5                     Anion Gap 10              BILIRUBIN TOTAL 10.1  Comment: For infants and newborns, interpretation of results should be based  on gestational age, weight and in agreement with clinical  observations.    Premature Infant recommended reference ranges:  Up to 24 hours.............<8.0 mg/dL  Up to 48 hours............<12.0 mg/dL  3-5 days..................<15.0 mg/dL  6-29 days.................<15.0 mg/dL         BUN 14       Calcium 9.1       Chloride 100       CO2 25       Creatinine 0.8       eGFR >60       Glucose 155       Lipase 327       Potassium 4.1       PROTEIN TOTAL 6.6       Sodium 135               Significant Imaging: I have reviewed all pertinent imaging results/findings within the past 24 hours.    MRI MRCP Without Contrast   Final Result   Abnormal      No filling defect in the common duct, however there is increased biliary ductal dilation in comparison to the prior CT exams.  Further evaluation as warranted.      Findings concerning for acute pancreatitis.  Correlation with lipase would be advised.      Surgical changes of pancreaticojejunostomy better evaluated on prior CTs.      This report was flagged in Epic as abnormal.         Electronically signed by: Nikko Ayoub   Date:    06/19/2024   Time:    09:45            Assessment/Plan:      * Acute on chronic pancreatitis  Bowel rest, IV  fluids, multimodal pain control, anti-emetics PRN  Continue Creon  Hx pancreaticojejunostomy in November 2021  Follows with Dr. Chisholm in [place]  GI following    6/20  Started on empiric coverage with Alvaro  GI recommending transfer to [facility] for advanced GI    Hypertension  Chronic, uncontrolled. Latest blood pressure and vitals reviewed-     Temp:  [97.8 °F (36.6 °C)-98.5 °F (36.9 °C)]   Pulse:  [63-99]   Resp:  [14-20]   BP: (162-205)/()   SpO2:  [92 %-98 %] .   Home meds for hypertension were reviewed and noted below.   Hypertension Medications               carvediloL (COREG) 6.25 MG tablet Take 3.125 mg by mouth 2 (two) times daily with meals.            While in the hospital, will manage blood pressure as follows; Continue home antihypertensive regimen    Will utilize p.r.n. blood pressure medication only if patient's blood pressure greater than 180/110 and he develops symptoms such as worsening chest pain or shortness of breath.    GERD (gastroesophageal reflux disease)  Continue Protonix      VTE Risk Mitigation (From admission, onward)           Ordered     IP VTE HIGH RISK PATIENT  Once         06/19/24 1300     Place sequential compression device  Until discontinued         06/19/24 1300                    Discharge Planning   AMBER:      Code Status: Full Code   Is the patient medically ready for discharge?:     Reason for patient still in hospital (select all that apply): Patient trending condition, Laboratory test, Treatment, Imaging, and Consult recommendations                     Eriberto Vazquez MD  Department of Hospital Medicine   [facility] - Telemetry ([facility])

## 2024-06-20 NOTE — SUBJECTIVE & OBJECTIVE
Subjective:     Interval History: Patient said he had a rough night. Pain poorly controlled and had n/v. Abd feels distended. No gas or BM. BP is up. T. Bili up to 10.     Review of Systems   Constitutional:         See Interval History for daily ROS.      Objective:     Vital Signs (Most Recent):  Temp: 98.5 °F (36.9 °C) (06/20/24 0720)  Pulse: 78 (06/20/24 0720)  Resp: 15 (06/20/24 1031)  BP: (!) 177/106 (06/20/24 0720)  SpO2: (!) 93 % (06/20/24 0720) Vital Signs (24h Range):  Temp:  [97.8 °F (36.6 °C)-98.5 °F (36.9 °C)] 98.5 °F (36.9 °C)  Pulse:  [63-99] 78  Resp:  [14-20] 15  SpO2:  [92 %-98 %] 93 %  BP: (162-205)/() 177/106     Weight: 99.6 kg (219 lb 9.3 oz) (06/19/24 0235)  Body mass index is 30.62 kg/m².    No intake or output data in the 24 hours ending 06/20/24 1054    Lines/Drains/Airways       Peripheral Intravenous Line  Duration                  Peripheral IV - Single Lumen 06/19/24 0250 20 G Anterior;Left Forearm 1 day                     Physical Exam  Constitutional:       Appearance: He is well-developed. He is ill-appearing.   HENT:      Head: Normocephalic and atraumatic.   Eyes:      General: Scleral icterus present.      Extraocular Movements: Extraocular movements intact.   Cardiovascular:      Rate and Rhythm: Normal rate and regular rhythm.   Pulmonary:      Effort: Pulmonary effort is normal. No respiratory distress.      Breath sounds: Normal breath sounds. No wheezing.   Abdominal:      General: Bowel sounds are normal. There is distension.      Palpations: Abdomen is soft.      Tenderness: There is no abdominal tenderness.   Skin:     Coloration: Skin is jaundiced.   Neurological:      Mental Status: He is alert and oriented to person, place, and time.      Cranial Nerves: No cranial nerve deficit.   Psychiatric:         Behavior: Behavior normal.          Significant Labs:  CBC:   Recent Labs   Lab 06/19/24  0254   WBC 6.86   HGB 14.8   HCT 44.0   *     CMP:   Recent  Labs   Lab 06/20/24  0732   *   CALCIUM 9.1   ALBUMIN 3.5   PROT 6.6   *   K 4.1   CO2 25      BUN 14   CREATININE 0.8   ALKPHOS 258*   *   *   BILITOT 10.1*     Coagulation:   Recent Labs   Lab 06/19/24  1031   INR 1.1   APTT 30.0         Significant Imaging:  Imaging results within the past 24 hours have been reviewed.

## 2024-06-20 NOTE — ASSESSMENT & PLAN NOTE
Chronic, uncontrolled. Latest blood pressure and vitals reviewed-     Temp:  [97.8 °F (36.6 °C)-98.2 °F (36.8 °C)]   Pulse:  [57-99]   Resp:  [10-20]   BP: (162-205)/()   SpO2:  [92 %-100 %] .   Home meds for hypertension were reviewed and noted below.   Hypertension Medications               carvediloL (COREG) 6.25 MG tablet Take 3.125 mg by mouth 2 (two) times daily with meals.            While in the hospital, will manage blood pressure as follows; Continue home antihypertensive regimen    Will utilize p.r.n. blood pressure medication only if patient's blood pressure greater than 180/110 and he develops symptoms such as worsening chest pain or shortness of breath.

## 2024-06-20 NOTE — SUBJECTIVE & OBJECTIVE
Review of Systems   All other systems reviewed and are negative.    Objective:     Vital Signs (Most Recent):  Temp: 98.5 °F (36.9 °C) (06/20/24 0720)  Pulse: 78 (06/20/24 0720)  Resp: 15 (06/20/24 1031)  BP: (!) 177/106 (06/20/24 0720)  SpO2: (!) 93 % (06/20/24 0720) Vital Signs (24h Range):  Temp:  [97.8 °F (36.6 °C)-98.5 °F (36.9 °C)] 98.5 °F (36.9 °C)  Pulse:  [63-99] 78  Resp:  [14-20] 15  SpO2:  [92 %-98 %] 93 %  BP: (162-205)/() 177/106     Weight: 99.6 kg (219 lb 9.3 oz)  Body mass index is 30.62 kg/m².  No intake or output data in the 24 hours ending 06/20/24 1042      Physical Exam  Constitutional:       General: He is not in acute distress.     Appearance: He is obese. He is ill-appearing and diaphoretic.   Cardiovascular:      Rate and Rhythm: Normal rate and regular rhythm.      Heart sounds: No murmur heard.  Pulmonary:      Effort: Pulmonary effort is normal. No respiratory distress.      Breath sounds: Normal breath sounds. No wheezing.   Abdominal:      General: There is distension.      Palpations: Abdomen is soft.      Tenderness: There is abdominal tenderness.   Neurological:      Mental Status: He is alert.             Significant Labs: All pertinent labs within the past 24 hours have been reviewed.  Recent Lab Results         06/20/24  0732        Albumin 3.5                     Anion Gap 10              BILIRUBIN TOTAL 10.1  Comment: For infants and newborns, interpretation of results should be based  on gestational age, weight and in agreement with clinical  observations.    Premature Infant recommended reference ranges:  Up to 24 hours.............<8.0 mg/dL  Up to 48 hours............<12.0 mg/dL  3-5 days..................<15.0 mg/dL  6-29 days.................<15.0 mg/dL         BUN 14       Calcium 9.1       Chloride 100       CO2 25       Creatinine 0.8       eGFR >60       Glucose 155       Lipase 327       Potassium 4.1       PROTEIN TOTAL 6.6       Sodium  135               Significant Imaging: I have reviewed all pertinent imaging results/findings within the past 24 hours.    MRI MRCP Without Contrast   Final Result   Abnormal      No filling defect in the common duct, however there is increased biliary ductal dilation in comparison to the prior CT exams.  Further evaluation as warranted.      Findings concerning for acute pancreatitis.  Correlation with lipase would be advised.      Surgical changes of pancreaticojejunostomy better evaluated on prior CTs.      This report was flagged in Epic as abnormal.         Electronically signed by: Nikko Ayoub   Date:    06/19/2024   Time:    09:45           Left arm;

## 2024-06-20 NOTE — ASSESSMENT & PLAN NOTE
Discussed with Dr. Gonsalez with advanced endoscopy via secure chat. He recommends a transfer to [place] for a higher level of care given the increase in T. Bili and current clinical status.   Start Zosyn.   Continue IV fluids at 200 cc/hr.   Keep NPO  Aggressive pain control and prn antiemetics.

## 2024-06-20 NOTE — ASSESSMENT & PLAN NOTE
Bowel rest, IV fluids, multimodal pain control, anti-emetics PRN  Continue Creon  Hx pancreaticojejunostomy in November 2021  Follows with Dr. Chisholm in [place]  GI following    6/20  Started on empiric coverage with Zosyn  GI recommending transfer to [facility] for advanced GI   Skin normal color for race, warm, dry and intact. No evidence of rash.

## 2024-06-20 NOTE — ASSESSMENT & PLAN NOTE
Chronic, uncontrolled. Latest blood pressure and vitals reviewed-     Temp:  [97.8 °F (36.6 °C)-98.5 °F (36.9 °C)]   Pulse:  [63-99]   Resp:  [14-20]   BP: (162-205)/()   SpO2:  [92 %-98 %] .   Home meds for hypertension were reviewed and noted below.   Hypertension Medications               carvediloL (COREG) 6.25 MG tablet Take 3.125 mg by mouth 2 (two) times daily with meals.            While in the hospital, will manage blood pressure as follows; Continue home antihypertensive regimen    Will utilize p.r.n. blood pressure medication only if patient's blood pressure greater than 180/110 and he develops symptoms such as worsening chest pain or shortness of breath.

## 2024-06-20 NOTE — ASSESSMENT & PLAN NOTE
Bowel rest, IV fluids, multimodal pain control, anti-emetics PRN  Continue Creon  Hx pancreaticojejunostomy in November 2021  Follows with Dr. Chisholm in [place]  GI following

## 2024-06-20 NOTE — PLAN OF CARE
Pt resting in bed .   Pt awake and alert . Hob elevated .   Vitals stable .   POC discussed .   Fluids infusing   IV intact   Awaiting bed at main  campus   Safety Measures in place   Chart check complete .   Will  continue to monitor .

## 2024-06-20 NOTE — PROVIDER TRANSFER
(Physician in Lead of Transfers)  Outside Transfer Acceptance Note / Regional Referral Center    Upon patient arrival to floor, please send SecureChat to [facility] or call extension [phone] (if no answer, do NOT leave a callback number after the beep, rather please send a SecureChat to [facility]), for Hospital Medicine admit team assignment and for additional admit orders for the patient.  Do not page the attending physician associated with the patient on arrival (this physician may not be on duty at the time of arrival).  Rather, always send a SecureChat to [facility] or call [phone] to reach the triage physician for orders and team assignment.    Referring facility: [facility]   Referring provider: MARILU CHAPARRO  Accepting facility: [facility]  Accepting provider: ELEANOR OSORIO  Reason for transfer:  Need Biliary Service  Transfer diagnosis: Acute pancreatitis  Transfer specialty requested: Biliary Service  Transfer specialty notified: Yes  Transfer level: NUMBER 1-5: 2  Bed type requested: med-tele  Isolation status: No active isolations   Admission class or status: IP- Inpatient      Narrative     59-year-old male with a history of DVT, splenic vein thrombosis, sleep apnea, hypothyroidism, chronic pancreatitis, and hypertension admitted to [facility] on June 19 with abdominal pain and nausea for 2 weeks.  He had no chest pain or dyspnea and no fever chills.  Bilirubin was noted to be elevated.  MRCP had findings concerning for pancreatitis.  He was admitted with acute on chronic pancreatitis and hypertension. Bilirubin increased from 4.4 on June 19 up to 10.1 on June 20.  ALT increased from 143 up to 327.  Abdominal pain persisted.  Blood pressure remained elevated.  Patient reported his abdomen felt distended.  The MRCP did not show filling defects in the common duct, there was increased biliary ductal dilatation compared with prior imaging.  Referring  team spoke with Biliary Service at [facility].  Patient was started on Zosyn.  Plan will be for transfer to Hospital Medicine at [facility] for Biliary Service evaluation of acute pancreatitis and rising bilirubin.     June 20: sodium 135, potassium 4.1, chloride 100, CO2 25, BUN 14, creatinine 0.8, glucose 155, total bilirubin 10.1, , , lipase 327    June 19: INR 1.1, troponin less than 0.006, white blood cells 6.86, hemoglobin 14.8, hematocrit 44, platelets 148  -MRCP had no filling defect in the common duct.  There is increased biliary ductal dilatation comparison to the prior CT from March 20, 2023.  Findings concerning for acute pancreatitis.  Surgical changes of pancreaticojejunostomy.  -EKG showed sinus bradycardia with ventricular rate 58.    March 12, 2024: EUS showed sonographic changes consistent with moderate to severe chronic pancreatitis.  Mass identified in the pancreatic biopsy.  Tissue obtained.    Objective     Vitals: Temp: 98.5 °F (36.9 °C) (06/20/24 0720)  Pulse: 78 (06/20/24 0720)  Resp: 15 (06/20/24 1031)  BP: (!) 177/106 (06/20/24 0720)  SpO2: (!) 93 % (06/20/24 0720)  Recent Labs: CBC:   Recent Labs   Lab 06/19/24  0254   WBC 6.86   HGB 14.8   HCT 44.0   *     CMP:   Recent Labs   Lab 06/19/24  0254 06/19/24  1031 06/20/24  0732   * 138 135*   K 3.8 4.0 4.1    102 100   CO2 23 26 25   * 97 155*   BUN 12 10 14   CREATININE 0.8 0.8 0.8   CALCIUM 9.4 9.2 9.1   PROT 6.9 6.6 6.6   ALBUMIN 3.9 3.8 3.5   BILITOT 4.4* 5.7* 10.1*   ALKPHOS 225* 232* 258*   * 187* 140*   * 224* 217*   ANIONGAP 10 10 10         Instructions    Admit to Hospital Medicine  Consult Biliary Service      BE Ingram MD  Hospital Medicine Staff  Cell: [phone]

## 2024-06-20 NOTE — PROGRESS NOTES
[facility] ([facility])  Gastroenterology  Progress Note    Patient Name: Fabrice Zamorano  MRN: [id number]  Admission Date: 6/19/2024  Hospital Length of Stay: 1 days  Code Status: Full Code   Attending Provider: Eriberto Vazquez MD  Consulting Provider: Adiel Quispe PA-C  Primary Care Physician: Rebecca Mcmillan MD  Principal Problem: Acute on chronic pancreatitis        Subjective:     Interval History: Patient said he had a rough night. Pain poorly controlled and had n/v. Abd feels distended. No gas or BM. BP is up. T. Bili up to 10.     Review of Systems   Constitutional:         See Interval History for daily ROS.      Objective:     Vital Signs (Most Recent):  Temp: 98.5 °F (36.9 °C) (06/20/24 0720)  Pulse: 78 (06/20/24 0720)  Resp: 15 (06/20/24 1031)  BP: (!) 177/106 (06/20/24 0720)  SpO2: (!) 93 % (06/20/24 0720) Vital Signs (24h Range):  Temp:  [97.8 °F (36.6 °C)-98.5 °F (36.9 °C)] 98.5 °F (36.9 °C)  Pulse:  [63-99] 78  Resp:  [14-20] 15  SpO2:  [92 %-98 %] 93 %  BP: (162-205)/() 177/106     Weight: 99.6 kg (219 lb 9.3 oz) (06/19/24 0235)  Body mass index is 30.62 kg/m².    No intake or output data in the 24 hours ending 06/20/24 1054    Lines/Drains/Airways       Peripheral Intravenous Line  Duration                  Peripheral IV - Single Lumen 06/19/24 0250 20 G Anterior;Left Forearm 1 day                     Physical Exam  Constitutional:       Appearance: He is well-developed. He is ill-appearing.   HENT:      Head: Normocephalic and atraumatic.   Eyes:      General: Scleral icterus present.      Extraocular Movements: Extraocular movements intact.   Cardiovascular:      Rate and Rhythm: Normal rate and regular rhythm.   Pulmonary:      Effort: Pulmonary effort is normal. No respiratory distress.      Breath sounds: Normal breath sounds. No wheezing.   Abdominal:      General: Bowel sounds are normal. There is distension.      Palpations: Abdomen is soft.      Tenderness: There is no  abdominal tenderness.   Skin:     Coloration: Skin is jaundiced.   Neurological:      Mental Status: He is alert and oriented to person, place, and time.      Cranial Nerves: No cranial nerve deficit.   Psychiatric:         Behavior: Behavior normal.          Significant Labs:  CBC:   Recent Labs   Lab 06/19/24  0254   WBC 6.86   HGB 14.8   HCT 44.0   *     CMP:   Recent Labs   Lab 06/20/24  0732   *   CALCIUM 9.1   ALBUMIN 3.5   PROT 6.6   *   K 4.1   CO2 25      BUN 14   CREATININE 0.8   ALKPHOS 258*   *   *   BILITOT 10.1*     Coagulation:   Recent Labs   Lab 06/19/24  1031   INR 1.1   APTT 30.0         Significant Imaging:  Imaging results within the past 24 hours have been reviewed.  Assessment/Plan:     Cardiac/Vascular  Hypertension  Management per  team.    GI  * Acute on chronic pancreatitis  Discussed with Dr. Gonsalez with advanced endoscopy via secure chat. He recommends a transfer to [place] for a higher level of care given the increase in T. Bili and current clinical status.   Start Zosyn.   Continue IV fluids at 200 cc/hr.   Keep NPO  Aggressive pain control and prn antiemetics.         Thank you for your consult. I will follow-up with patient. Please contact us if you have any additional questions.    Adiel Quispe PA-C  Gastroenterology  [facility] ([facility])   5

## 2024-06-20 NOTE — HPI
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 Denies chest pain, SOB, fevers/chills. In ER, elevated AST//215, T Bili 4.4, , lipase 143. MRCP revealed findings concerning for acute pancreatitis. [facility] consulted for further management, admit as inpatient for acute on chronic pancreatitis.

## 2024-06-20 NOTE — PLAN OF CARE
[facility] (H[facility])  Discharge Final Note    Primary Care Provider: Rebecca Mcmillan MD    Expected Discharge Date: 6/20/2024    Final Discharge Note (most recent)       Final Note - 06/20/24 1536          Final Note    Assessment Type Final Discharge Note     Anticipated Discharge Disposition Another Health Care Institution Not Defined        Post-Acute Status    Discharge Delays None known at this time                   Pending transfer to [facility]     Important Message from Medicare

## 2024-06-20 NOTE — HOSPITAL COURSE
6/20  Pain ongoing, associated with nausea, adjusted PRN regimen  T Bili rising 10.1, AST//217, lipase 327  Started on empiric coverage with Zosyn, continue IV fluids  Discussed with GI, recommending transfer to [facility] for advanced GI with Dr. Kwadwo Gonsalez

## 2024-06-20 NOTE — PLAN OF CARE
[facility] (H[facility])  Initial Discharge Assessment       Primary Care Provider: Rebecca Mcmillan MD    Admission Diagnosis: Acute pancreatitis [K85.90]  Hyperbilirubinemia [E80.6]  HTN (hypertension) [I10]  Uncontrolled hypertension [I10]  Chest pain [R07.9]  Abnormal LFTs [R79.89]    Admission Date: 6/19/2024  Expected Discharge Date: 6/20/2024    Transition of Care Barriers: None    Payor: [employer] [place] MEDICARE / Plan: [employer] [place] / Product Type: Medicare Advantage /     Extended Emergency Contact Information  Primary Emergency Contact: Aliza Zamorano  Mobile Phone: [phone]  Relation: Spouse  Preferred language: English   needed? No    Discharge Plan A: Home  Discharge Plan B: Home      [id number] - [place], [place] - [address] AT [facility] [address]  [address]  [place] [address]  Phone: [phone] Fax: [phone]      Initial Assessment (most recent)       Adult Discharge Assessment - 06/20/24 1341          Discharge Assessment    Assessment Type Discharge Planning Assessment     Confirmed/corrected address, phone number and insurance Yes     Confirmed Demographics Correct on Facesheet     Source of Information patient;family     Communicated AMBER with patient/caregiver Yes     Reason For Admission Acute on chronic pancreatitis     People in Home spouse     Facility Arrived From: Home     Do you expect to return to your current living situation? Yes     Do you have help at home or someone to help you manage your care at home? Yes     Who are your caregiver(s) and their phone number(s)? Spouse     Prior to hospitilization cognitive status: Alert/Oriented     Current cognitive status: Alert/Oriented     Walking or Climbing Stairs Difficulty no     Dressing/Bathing Difficulty no     Equipment Currently Used at Home none     Readmission within 30 days? No     Patient currently being followed by outpatient case  management? No     Do you currently have service(s) that help you manage your care at home? No     Do you take prescription medications? Yes     Do you have prescription coverage? Yes     Do you have any problems affording any of your prescribed medications? No     Is the patient taking medications as prescribed? yes     Who is going to help you get home at discharge? Spouse     How do you get to doctors appointments? car, drives self;family or friend will provide     Are you on dialysis? No     Do you take coumadin? No     Discharge Plan A Home     Discharge Plan B Home     DME Needed Upon Discharge  none     Discharge Plan discussed with: Patient     Transition of Care Barriers None                   Patient being transferred to [facility]. No needs expressed at this time.

## 2024-06-20 NOTE — PLAN OF CARE
A252/A252 JESSE Avina MAGNOLIA Zamorano is a 59 y.o.male admitted on 6/19/2024 for Acute on chronic pancreatitis   Code Status: Full Code MRN: [id number]   Review of patient's allergies indicates:  No Known Allergies  Past Medical History:   Diagnosis Date    Anticoagulant long-term use     On Eliquis for DVT    GERD (gastroesophageal reflux disease)     Hypertension     Hypothyroidism 10/26/2021    Liver disease     fatty liver    Pancreatic pseudocyst     Personal history of colonic polyps     Sleep apnea     Splenic vein thrombosis       PRN meds    dextrose 10%, 12.5 g, PRN  dextrose 10%, 25 g, PRN  glucagon (human recombinant), 1 mg, PRN  glucose, 16 g, PRN  glucose, 24 g, PRN  hydrALAZINE, 10 mg, Q4H PRN  HYDROmorphone, 1 mg, Q4H PRN  HYDROmorphone, 2 mg, Q4H PRN  labetaloL, 10 mg, Q4H PRN  naloxone, 0.02 mg, PRN  ondansetron, 4 mg, Q6H PRN  sodium chloride 0.9%, 10 mL, Q12H PRN      Chart check completed. Will continue plan of care.         [place] Coma Scale Score: 15     Lead Monitored: Lead II Rhythm: normal sinus rhythm         Last Bowel Movement: 06/17/24  Diet NPO Except for: Medication, Ice Chips     Garrett Score: 20  Fall Risk Score: 9  Accucheck []   Freq?      Lines/Drains/Airways       Peripheral Intravenous Line  Duration                  Peripheral IV - Single Lumen 06/19/24 0250 20 G Anterior;Left Forearm 1 day

## 2024-06-21 ENCOUNTER — HOSPITAL ENCOUNTER (INPATIENT)
Facility: HOSPITAL | Age: 59
LOS: 4 days | Discharge: HOME | DRG: 440 | End: 2024-06-25
Attending: HOSPITALIST | Admitting: INTERNAL MEDICINE
Payer: MEDICARE

## 2024-06-21 VITALS
DIASTOLIC BLOOD PRESSURE: 93 MMHG | TEMPERATURE: 99 F | HEART RATE: 99 BPM | SYSTOLIC BLOOD PRESSURE: 171 MMHG | WEIGHT: 219.56 LBS | RESPIRATION RATE: 16 BRPM | OXYGEN SATURATION: 94 % | BODY MASS INDEX: 30.62 KG/M2

## 2024-06-21 DIAGNOSIS — R07.9 CHEST PAIN: ICD-10-CM

## 2024-06-21 DIAGNOSIS — K85.90 PANCREATITIS: ICD-10-CM

## 2024-06-21 DIAGNOSIS — K86.1 CHRONIC PANCREATITIS, UNSPECIFIED PANCREATITIS TYPE: Primary | ICD-10-CM

## 2024-06-21 LAB
ALBUMIN SERPL BCP-MCNC: 2.7 G/DL (ref 3.5–5.2)
ALP SERPL-CCNC: 185 U/L (ref 55–135)
ALT SERPL W/O P-5'-P-CCNC: 150 U/L (ref 10–44)
ANION GAP SERPL CALC-SCNC: 7 MMOL/L (ref 8–16)
AST SERPL-CCNC: 86 U/L (ref 10–40)
BASOPHILS # BLD AUTO: 0.02 K/UL (ref 0–0.2)
BASOPHILS NFR BLD: 0.2 % (ref 0–1.9)
BILIRUB SERPL-MCNC: 9.3 MG/DL (ref 0.1–1)
BUN SERPL-MCNC: 16 MG/DL (ref 6–20)
CALCIUM SERPL-MCNC: 8.6 MG/DL (ref 8.7–10.5)
CHLORIDE SERPL-SCNC: 102 MMOL/L (ref 95–110)
CO2 SERPL-SCNC: 23 MMOL/L (ref 23–29)
CREAT SERPL-MCNC: 0.7 MG/DL (ref 0.5–1.4)
DIFFERENTIAL METHOD BLD: ABNORMAL
EOSINOPHIL # BLD AUTO: 0 K/UL (ref 0–0.5)
EOSINOPHIL NFR BLD: 0.1 % (ref 0–8)
ERYTHROCYTE [DISTWIDTH] IN BLOOD BY AUTOMATED COUNT: 14.9 % (ref 11.5–14.5)
EST. GFR  (NO RACE VARIABLE): >60 ML/MIN/1.73 M^2
GLUCOSE SERPL-MCNC: 143 MG/DL (ref 70–110)
HCT VFR BLD AUTO: 40.5 % (ref 40–54)
HGB BLD-MCNC: 13.6 G/DL (ref 14–18)
IMM GRANULOCYTES # BLD AUTO: 0.13 K/UL (ref 0–0.04)
IMM GRANULOCYTES NFR BLD AUTO: 1.3 % (ref 0–0.5)
LYMPHOCYTES # BLD AUTO: 0.4 K/UL (ref 1–4.8)
LYMPHOCYTES NFR BLD: 3.7 % (ref 18–48)
MCH RBC QN AUTO: 30.4 PG (ref 27–31)
MCHC RBC AUTO-ENTMCNC: 33.6 G/DL (ref 32–36)
MCV RBC AUTO: 91 FL (ref 82–98)
MONOCYTES # BLD AUTO: 1 K/UL (ref 0.3–1)
MONOCYTES NFR BLD: 9.9 % (ref 4–15)
NEUTROPHILS # BLD AUTO: 8.5 K/UL (ref 1.8–7.7)
NEUTROPHILS NFR BLD: 84.8 % (ref 38–73)
NRBC BLD-RTO: 0 /100 WBC
PLATELET # BLD AUTO: 110 K/UL (ref 150–450)
PMV BLD AUTO: 9.9 FL (ref 9.2–12.9)
POTASSIUM SERPL-SCNC: 3.6 MMOL/L (ref 3.5–5.1)
PROT SERPL-MCNC: 5.4 G/DL (ref 6–8.4)
RBC # BLD AUTO: 4.47 M/UL (ref 4.6–6.2)
SODIUM SERPL-SCNC: 132 MMOL/L (ref 136–145)
WBC # BLD AUTO: 10.05 K/UL (ref 3.9–12.7)

## 2024-06-21 PROCEDURE — 63600175 PHARM REV CODE 636 W HCPCS: Performed by: HOSPITALIST

## 2024-06-21 PROCEDURE — 11000001 HC ACUTE MED/SURG PRIVATE ROOM

## 2024-06-21 PROCEDURE — 80053 COMPREHEN METABOLIC PANEL: CPT | Performed by: HOSPITALIST

## 2024-06-21 PROCEDURE — 36415 COLL VENOUS BLD VENIPUNCTURE: CPT | Performed by: HOSPITALIST

## 2024-06-21 PROCEDURE — 25000003 PHARM REV CODE 250: Performed by: HOSPITALIST

## 2024-06-21 PROCEDURE — 99232 SBSQ HOSP IP/OBS MODERATE 35: CPT | Mod: ,,, | Performed by: PHYSICIAN ASSISTANT

## 2024-06-21 PROCEDURE — 85025 COMPLETE CBC W/AUTO DIFF WBC: CPT | Performed by: HOSPITALIST

## 2024-06-21 PROCEDURE — C9113 INJ PANTOPRAZOLE SODIUM, VIA: HCPCS | Performed by: HOSPITALIST

## 2024-06-21 RX ORDER — HYDROMORPHONE HYDROCHLORIDE 2 MG/ML
2 INJECTION, SOLUTION INTRAMUSCULAR; INTRAVENOUS; SUBCUTANEOUS
Status: DISCONTINUED | OUTPATIENT
Start: 2024-06-21 | End: 2024-06-22

## 2024-06-21 RX ORDER — SODIUM CHLORIDE 0.9 % (FLUSH) 0.9 %
10 SYRINGE (ML) INJECTION EVERY 12 HOURS PRN
Status: DISCONTINUED | OUTPATIENT
Start: 2024-06-21 | End: 2024-06-25 | Stop reason: HOSPADM

## 2024-06-21 RX ORDER — IBUPROFEN 200 MG
16 TABLET ORAL
Status: DISCONTINUED | OUTPATIENT
Start: 2024-06-21 | End: 2024-06-25 | Stop reason: HOSPADM

## 2024-06-21 RX ORDER — PANTOPRAZOLE SODIUM 40 MG/10ML
40 INJECTION, POWDER, LYOPHILIZED, FOR SOLUTION INTRAVENOUS DAILY
Status: DISCONTINUED | OUTPATIENT
Start: 2024-06-22 | End: 2024-06-24

## 2024-06-21 RX ORDER — CARVEDILOL 3.12 MG/1
3.12 TABLET ORAL 2 TIMES DAILY WITH MEALS
Status: DISCONTINUED | OUTPATIENT
Start: 2024-06-22 | End: 2024-06-25 | Stop reason: HOSPADM

## 2024-06-21 RX ORDER — ONDANSETRON HYDROCHLORIDE 2 MG/ML
4 INJECTION, SOLUTION INTRAVENOUS EVERY 6 HOURS PRN
Status: DISCONTINUED | OUTPATIENT
Start: 2024-06-21 | End: 2024-06-22

## 2024-06-21 RX ORDER — NALOXONE HCL 0.4 MG/ML
0.02 VIAL (ML) INJECTION
Status: DISCONTINUED | OUTPATIENT
Start: 2024-06-21 | End: 2024-06-25 | Stop reason: HOSPADM

## 2024-06-21 RX ORDER — GLUCAGON 1 MG
1 KIT INJECTION
Status: DISCONTINUED | OUTPATIENT
Start: 2024-06-21 | End: 2024-06-25 | Stop reason: HOSPADM

## 2024-06-21 RX ORDER — IBUPROFEN 200 MG
24 TABLET ORAL
Status: DISCONTINUED | OUTPATIENT
Start: 2024-06-21 | End: 2024-06-25 | Stop reason: HOSPADM

## 2024-06-21 RX ORDER — SODIUM CHLORIDE 9 MG/ML
INJECTION, SOLUTION INTRAVENOUS CONTINUOUS
Status: DISCONTINUED | OUTPATIENT
Start: 2024-06-21 | End: 2024-06-22

## 2024-06-21 RX ORDER — HYDRALAZINE HYDROCHLORIDE 20 MG/ML
10 INJECTION INTRAMUSCULAR; INTRAVENOUS EVERY 4 HOURS PRN
Status: DISCONTINUED | OUTPATIENT
Start: 2024-06-21 | End: 2024-06-25 | Stop reason: HOSPADM

## 2024-06-21 RX ORDER — LABETALOL HYDROCHLORIDE 5 MG/ML
10 INJECTION, SOLUTION INTRAVENOUS EVERY 4 HOURS PRN
Status: DISCONTINUED | OUTPATIENT
Start: 2024-06-21 | End: 2024-06-25 | Stop reason: HOSPADM

## 2024-06-21 RX ADMIN — ONDANSETRON 4 MG: 2 INJECTION INTRAMUSCULAR; INTRAVENOUS at 02:06

## 2024-06-21 RX ADMIN — PANTOPRAZOLE SODIUM 40 MG: 40 INJECTION, POWDER, FOR SOLUTION INTRAVENOUS at 08:06

## 2024-06-21 RX ADMIN — PROMETHAZINE HYDROCHLORIDE 12.5 MG: 25 INJECTION INTRAMUSCULAR; INTRAVENOUS at 10:06

## 2024-06-21 RX ADMIN — CARVEDILOL 3.12 MG: 3.12 TABLET, FILM COATED ORAL at 08:06

## 2024-06-21 RX ADMIN — SODIUM CHLORIDE: 9 INJECTION, SOLUTION INTRAVENOUS at 10:06

## 2024-06-21 RX ADMIN — HYDROMORPHONE HYDROCHLORIDE 2 MG: 2 INJECTION INTRAMUSCULAR; INTRAVENOUS; SUBCUTANEOUS at 10:06

## 2024-06-21 RX ADMIN — HYDROMORPHONE HYDROCHLORIDE 2 MG: 1 INJECTION, SOLUTION INTRAMUSCULAR; INTRAVENOUS; SUBCUTANEOUS at 02:06

## 2024-06-21 RX ADMIN — HYDROMORPHONE HYDROCHLORIDE 2 MG: 1 INJECTION, SOLUTION INTRAMUSCULAR; INTRAVENOUS; SUBCUTANEOUS at 08:06

## 2024-06-21 RX ADMIN — PROMETHAZINE HYDROCHLORIDE 12.5 MG: 25 INJECTION INTRAMUSCULAR; INTRAVENOUS at 09:06

## 2024-06-21 RX ADMIN — PIPERACILLIN SODIUM AND TAZOBACTAM SODIUM 4.5 G: 4; .5 INJECTION, POWDER, FOR SOLUTION INTRAVENOUS at 03:06

## 2024-06-21 RX ADMIN — CARVEDILOL 3.12 MG: 3.12 TABLET, FILM COATED ORAL at 05:06

## 2024-06-21 RX ADMIN — HYDROMORPHONE HYDROCHLORIDE 2 MG: 1 INJECTION, SOLUTION INTRAMUSCULAR; INTRAVENOUS; SUBCUTANEOUS at 01:06

## 2024-06-21 RX ADMIN — PIPERACILLIN SODIUM AND TAZOBACTAM SODIUM 4.5 G: 4; .5 INJECTION, POWDER, FOR SOLUTION INTRAVENOUS at 10:06

## 2024-06-21 NOTE — SUBJECTIVE & OBJECTIVE
Subjective:     Interval History: Pain better controlled. Still with nausea but no vomiting. Awaiting CMP this morning.     Review of Systems   Constitutional:         See Interval History for daily ROS.      Objective:     Vital Signs (Most Recent):  Temp: 98.3 °F (36.8 °C) (06/21/24 0814)  Pulse: 91 (06/21/24 0814)  Resp: 15 (06/21/24 0825)  BP: (!) 160/85 (06/21/24 0814)  SpO2: (!) 92 % (06/21/24 0814) Vital Signs (24h Range):  Temp:  [97.8 °F (36.6 °C)-99.4 °F (37.4 °C)] 98.3 °F (36.8 °C)  Pulse:  [] 91  Resp:  [15-18] 15  SpO2:  [92 %] 92 %  BP: (134-180)/() 160/85     Weight: 99.6 kg (219 lb 9.3 oz) (06/19/24 0235)  Body mass index is 30.62 kg/m².    No intake or output data in the 24 hours ending 06/21/24 1045    Lines/Drains/Airways       Peripheral Intravenous Line  Duration                  Peripheral IV - Single Lumen 06/19/24 0250 20 G Anterior;Left Forearm 2 days                     Physical Exam  Constitutional:       General: He is not in acute distress.     Appearance: Normal appearance. He is well-developed.   HENT:      Head: Normocephalic and atraumatic.   Eyes:      General: Scleral icterus present.      Extraocular Movements: Extraocular movements intact.   Cardiovascular:      Rate and Rhythm: Normal rate and regular rhythm.   Pulmonary:      Effort: Pulmonary effort is normal. No respiratory distress.      Breath sounds: Normal breath sounds. No wheezing.   Abdominal:      General: Bowel sounds are normal. There is distension.      Palpations: Abdomen is soft.      Tenderness: There is abdominal tenderness (minimal).   Skin:     Coloration: Skin is jaundiced.   Neurological:      Mental Status: He is alert and oriented to person, place, and time.      Cranial Nerves: No cranial nerve deficit.   Psychiatric:         Behavior: Behavior normal.          Significant Labs:  CBC:   Recent Labs   Lab 06/21/24  0907   WBC 10.05   HGB 13.6*   HCT 40.5   *         Significant  Imaging:  Imaging results within the past 24 hours have been reviewed.

## 2024-06-21 NOTE — ASSESSMENT & PLAN NOTE
Patient has been accepted for [facility] to [facility] Awaiting bed assignment.   Symptoms better controlled.   Awaiting CMP this morning.   Continue Zosyn and IV fluids.    Aggressive pain control and prn antiemetics.

## 2024-06-21 NOTE — PLAN OF CARE
A252/A252 JESSE Avina MAGNOLIA Zamorano is a 59 y.o.male admitted on 6/19/2024 for Acute on chronic pancreatitis   Code Status: Full Code MRN: [id number]   Review of patient's allergies indicates:  No Known Allergies  Past Medical History:   Diagnosis Date    Anticoagulant long-term use     On Eliquis for DVT    GERD (gastroesophageal reflux disease)     Hypertension     Hypothyroidism 10/26/2021    Liver disease     fatty liver    Pancreatic pseudocyst     Personal history of colonic polyps     Sleep apnea     Splenic vein thrombosis       PRN meds    dextrose 10%, 12.5 g, PRN  dextrose 10%, 25 g, PRN  glucagon (human recombinant), 1 mg, PRN  glucose, 16 g, PRN  glucose, 24 g, PRN  hydrALAZINE, 10 mg, Q4H PRN  HYDROmorphone, 2 mg, Q3H PRN  labetaloL, 10 mg, Q4H PRN  naloxone, 0.02 mg, PRN  ondansetron, 4 mg, Q6H PRN  promethazine (PHENERGAN) 12.5 mg in sodium chloride 0.9% 50 mL IVPB, 12.5 mg, Q6H PRN  sodium chloride 0.9%, 10 mL, Q12H PRN      Chart check completed. Will continue plan of care.         [place] Coma Scale Score: 15     Lead Monitored: Lead II Rhythm: normal sinus rhythm         Last Bowel Movement: 06/17/24  Diet NPO Except for: Medication, Ice Chips     Garrett Score: 20  Fall Risk Score: 6  Accucheck []   Freq?      Lines/Drains/Airways       Peripheral Intravenous Line  Duration                  Peripheral IV - Single Lumen 06/19/24 0250 20 G Anterior;Left Forearm 2 days

## 2024-06-21 NOTE — PROGRESS NOTES
[facility] ([facility])  Gastroenterology  Progress Note    Patient Name: Fabrice Zamorano  MRN: [id number]  Admission Date: 6/19/2024  Hospital Length of Stay: 2 days  Code Status: Full Code   Attending Provider: Eriberto Vazquez MD  Consulting Provider: Adiel Quispe PA-C  Primary Care Physician: Rebecca Mcmillan MD  Principal Problem: Acute on chronic pancreatitis        Subjective:     Interval History: Pain better controlled. Still with nausea but no vomiting. Awaiting CMP this morning.     Review of Systems   Constitutional:         See Interval History for daily ROS.      Objective:     Vital Signs (Most Recent):  Temp: 98.3 °F (36.8 °C) (06/21/24 0814)  Pulse: 91 (06/21/24 0814)  Resp: 15 (06/21/24 0825)  BP: (!) 160/85 (06/21/24 0814)  SpO2: (!) 92 % (06/21/24 0814) Vital Signs (24h Range):  Temp:  [97.8 °F (36.6 °C)-99.4 °F (37.4 °C)] 98.3 °F (36.8 °C)  Pulse:  [] 91  Resp:  [15-18] 15  SpO2:  [92 %] 92 %  BP: (134-180)/() 160/85     Weight: 99.6 kg (219 lb 9.3 oz) (06/19/24 0235)  Body mass index is 30.62 kg/m².    No intake or output data in the 24 hours ending 06/21/24 1045    Lines/Drains/Airways       Peripheral Intravenous Line  Duration                  Peripheral IV - Single Lumen 06/19/24 0250 20 G Anterior;Left Forearm 2 days                     Physical Exam  Constitutional:       General: He is not in acute distress.     Appearance: Normal appearance. He is well-developed.   HENT:      Head: Normocephalic and atraumatic.   Eyes:      General: Scleral icterus present.      Extraocular Movements: Extraocular movements intact.   Cardiovascular:      Rate and Rhythm: Normal rate and regular rhythm.   Pulmonary:      Effort: Pulmonary effort is normal. No respiratory distress.      Breath sounds: Normal breath sounds. No wheezing.   Abdominal:      General: Bowel sounds are normal. There is distension.      Palpations: Abdomen is soft.      Tenderness: There is abdominal tenderness  (minimal).   Skin:     Coloration: Skin is jaundiced.   Neurological:      Mental Status: He is alert and oriented to person, place, and time.      Cranial Nerves: No cranial nerve deficit.   Psychiatric:         Behavior: Behavior normal.          Significant Labs:  CBC:   Recent Labs   Lab 06/21/24  0907   WBC 10.05   HGB 13.6*   HCT 40.5   *         Significant Imaging:  Imaging results within the past 24 hours have been reviewed.  Assessment/Plan:     GI  * Acute on chronic pancreatitis  Patient has been accepted for transfer to [facility]. Awaiting bed assignment.   Symptoms better controlled.   Awaiting CMP this morning.   Continue Zosyn and IV fluids.    Aggressive pain control and prn antiemetics.     Biliary obstruction  See above.     Thank you for your consult. I will follow-up with patient. Please contact us if you have any additional questions.    Adiel Quispe PA-C  Gastroenterology  [facility] ([facility])

## 2024-06-22 LAB
ALBUMIN SERPL BCP-MCNC: 2.6 G/DL (ref 3.5–5.2)
ALP SERPL-CCNC: 162 U/L (ref 55–135)
ALT SERPL W/O P-5'-P-CCNC: 115 U/L (ref 10–44)
ANION GAP SERPL CALC-SCNC: 6 MMOL/L (ref 8–16)
AST SERPL-CCNC: 54 U/L (ref 10–40)
BASOPHILS # BLD AUTO: 0.02 K/UL (ref 0–0.2)
BASOPHILS NFR BLD: 0.3 % (ref 0–1.9)
BILIRUB SERPL-MCNC: 4.7 MG/DL (ref 0.1–1)
BUN SERPL-MCNC: 20 MG/DL (ref 6–20)
CALCIUM SERPL-MCNC: 8.5 MG/DL (ref 8.7–10.5)
CHLORIDE SERPL-SCNC: 104 MMOL/L (ref 95–110)
CO2 SERPL-SCNC: 27 MMOL/L (ref 23–29)
CREAT SERPL-MCNC: 0.8 MG/DL (ref 0.5–1.4)
DIFFERENTIAL METHOD BLD: ABNORMAL
EOSINOPHIL # BLD AUTO: 0.1 K/UL (ref 0–0.5)
EOSINOPHIL NFR BLD: 1 % (ref 0–8)
ERYTHROCYTE [DISTWIDTH] IN BLOOD BY AUTOMATED COUNT: 15.5 % (ref 11.5–14.5)
EST. GFR  (NO RACE VARIABLE): >60 ML/MIN/1.73 M^2
GLUCOSE SERPL-MCNC: 104 MG/DL (ref 70–110)
HCT VFR BLD AUTO: 41.9 % (ref 40–54)
HGB BLD-MCNC: 13.1 G/DL (ref 14–18)
IMM GRANULOCYTES # BLD AUTO: 0.05 K/UL (ref 0–0.04)
IMM GRANULOCYTES NFR BLD AUTO: 0.8 % (ref 0–0.5)
LYMPHOCYTES # BLD AUTO: 0.5 K/UL (ref 1–4.8)
LYMPHOCYTES NFR BLD: 8.2 % (ref 18–48)
MAGNESIUM SERPL-MCNC: 2.1 MG/DL (ref 1.6–2.6)
MCH RBC QN AUTO: 29.6 PG (ref 27–31)
MCHC RBC AUTO-ENTMCNC: 31.3 G/DL (ref 32–36)
MCV RBC AUTO: 95 FL (ref 82–98)
MONOCYTES # BLD AUTO: 0.7 K/UL (ref 0.3–1)
MONOCYTES NFR BLD: 12.2 % (ref 4–15)
NEUTROPHILS # BLD AUTO: 4.6 K/UL (ref 1.8–7.7)
NEUTROPHILS NFR BLD: 77.5 % (ref 38–73)
NRBC BLD-RTO: 0 /100 WBC
PHOSPHATE SERPL-MCNC: 1.5 MG/DL (ref 2.7–4.5)
PLATELET # BLD AUTO: 96 K/UL (ref 150–450)
PMV BLD AUTO: 10 FL (ref 9.2–12.9)
POTASSIUM SERPL-SCNC: 3.5 MMOL/L (ref 3.5–5.1)
PROT SERPL-MCNC: 5.4 G/DL (ref 6–8.4)
RBC # BLD AUTO: 4.42 M/UL (ref 4.6–6.2)
SODIUM SERPL-SCNC: 137 MMOL/L (ref 136–145)
WBC # BLD AUTO: 5.96 K/UL (ref 3.9–12.7)

## 2024-06-22 PROCEDURE — 36415 COLL VENOUS BLD VENIPUNCTURE: CPT | Performed by: STUDENT IN AN ORGANIZED HEALTH CARE EDUCATION/TRAINING PROGRAM

## 2024-06-22 PROCEDURE — 63600175 PHARM REV CODE 636 W HCPCS: Performed by: STUDENT IN AN ORGANIZED HEALTH CARE EDUCATION/TRAINING PROGRAM

## 2024-06-22 PROCEDURE — 25500020 PHARM REV CODE 255: Performed by: STUDENT IN AN ORGANIZED HEALTH CARE EDUCATION/TRAINING PROGRAM

## 2024-06-22 PROCEDURE — C9113 INJ PANTOPRAZOLE SODIUM, VIA: HCPCS | Performed by: HOSPITALIST

## 2024-06-22 PROCEDURE — 99223 1ST HOSP IP/OBS HIGH 75: CPT | Mod: GC,,, | Performed by: INTERNAL MEDICINE

## 2024-06-22 PROCEDURE — 85025 COMPLETE CBC W/AUTO DIFF WBC: CPT | Performed by: STUDENT IN AN ORGANIZED HEALTH CARE EDUCATION/TRAINING PROGRAM

## 2024-06-22 PROCEDURE — 21400001 HC TELEMETRY ROOM

## 2024-06-22 PROCEDURE — 80053 COMPREHEN METABOLIC PANEL: CPT | Performed by: STUDENT IN AN ORGANIZED HEALTH CARE EDUCATION/TRAINING PROGRAM

## 2024-06-22 PROCEDURE — 25000003 PHARM REV CODE 250: Performed by: HOSPITALIST

## 2024-06-22 PROCEDURE — 11000001 HC ACUTE MED/SURG PRIVATE ROOM

## 2024-06-22 PROCEDURE — 83735 ASSAY OF MAGNESIUM: CPT | Performed by: STUDENT IN AN ORGANIZED HEALTH CARE EDUCATION/TRAINING PROGRAM

## 2024-06-22 PROCEDURE — 63600175 PHARM REV CODE 636 W HCPCS: Performed by: HOSPITALIST

## 2024-06-22 PROCEDURE — 84100 ASSAY OF PHOSPHORUS: CPT | Performed by: STUDENT IN AN ORGANIZED HEALTH CARE EDUCATION/TRAINING PROGRAM

## 2024-06-22 RX ORDER — OXYCODONE HYDROCHLORIDE 5 MG/1
5 TABLET ORAL EVERY 4 HOURS PRN
Status: DISCONTINUED | OUTPATIENT
Start: 2024-06-22 | End: 2024-06-24

## 2024-06-22 RX ORDER — HYDROMORPHONE HYDROCHLORIDE 2 MG/ML
2 INJECTION, SOLUTION INTRAMUSCULAR; INTRAVENOUS; SUBCUTANEOUS
Status: DISCONTINUED | OUTPATIENT
Start: 2024-06-22 | End: 2024-06-24

## 2024-06-22 RX ORDER — SODIUM CHLORIDE, SODIUM LACTATE, POTASSIUM CHLORIDE, CALCIUM CHLORIDE 600; 310; 30; 20 MG/100ML; MG/100ML; MG/100ML; MG/100ML
INJECTION, SOLUTION INTRAVENOUS CONTINUOUS
Status: DISCONTINUED | OUTPATIENT
Start: 2024-06-22 | End: 2024-06-25 | Stop reason: HOSPADM

## 2024-06-22 RX ORDER — TALC
6 POWDER (GRAM) TOPICAL NIGHTLY PRN
Status: DISCONTINUED | OUTPATIENT
Start: 2024-06-22 | End: 2024-06-25 | Stop reason: HOSPADM

## 2024-06-22 RX ORDER — ACETAMINOPHEN 325 MG/1
650 TABLET ORAL EVERY 6 HOURS PRN
Status: DISCONTINUED | OUTPATIENT
Start: 2024-06-22 | End: 2024-06-25 | Stop reason: HOSPADM

## 2024-06-22 RX ORDER — ONDANSETRON HYDROCHLORIDE 2 MG/ML
4 INJECTION, SOLUTION INTRAVENOUS EVERY 4 HOURS PRN
Status: DISCONTINUED | OUTPATIENT
Start: 2024-06-22 | End: 2024-06-25 | Stop reason: HOSPADM

## 2024-06-22 RX ADMIN — HYDROMORPHONE HYDROCHLORIDE 2 MG: 2 INJECTION INTRAMUSCULAR; INTRAVENOUS; SUBCUTANEOUS at 03:06

## 2024-06-22 RX ADMIN — ONDANSETRON 4 MG: 2 INJECTION INTRAMUSCULAR; INTRAVENOUS at 08:06

## 2024-06-22 RX ADMIN — PANTOPRAZOLE SODIUM 40 MG: 40 INJECTION, POWDER, FOR SOLUTION INTRAVENOUS at 09:06

## 2024-06-22 RX ADMIN — PIPERACILLIN SODIUM AND TAZOBACTAM SODIUM 4.5 G: 4; .5 INJECTION, POWDER, FOR SOLUTION INTRAVENOUS at 08:06

## 2024-06-22 RX ADMIN — ONDANSETRON 4 MG: 2 INJECTION INTRAMUSCULAR; INTRAVENOUS at 09:06

## 2024-06-22 RX ADMIN — CARVEDILOL 3.12 MG: 3.12 TABLET, FILM COATED ORAL at 09:06

## 2024-06-22 RX ADMIN — ONDANSETRON 4 MG: 2 INJECTION INTRAMUSCULAR; INTRAVENOUS at 03:06

## 2024-06-22 RX ADMIN — HYDROMORPHONE HYDROCHLORIDE 2 MG: 2 INJECTION INTRAMUSCULAR; INTRAVENOUS; SUBCUTANEOUS at 08:06

## 2024-06-22 RX ADMIN — HYDROMORPHONE HYDROCHLORIDE 2 MG: 2 INJECTION INTRAMUSCULAR; INTRAVENOUS; SUBCUTANEOUS at 09:06

## 2024-06-22 RX ADMIN — SODIUM CHLORIDE, POTASSIUM CHLORIDE, SODIUM LACTATE AND CALCIUM CHLORIDE: 600; 310; 30; 20 INJECTION, SOLUTION INTRAVENOUS at 10:06

## 2024-06-22 RX ADMIN — HYDROMORPHONE HYDROCHLORIDE 2 MG: 2 INJECTION INTRAMUSCULAR; INTRAVENOUS; SUBCUTANEOUS at 02:06

## 2024-06-22 RX ADMIN — IOHEXOL 100 ML: 350 INJECTION, SOLUTION INTRAVENOUS at 02:06

## 2024-06-22 RX ADMIN — PIPERACILLIN SODIUM AND TAZOBACTAM SODIUM 4.5 G: 4; .5 INJECTION, POWDER, FOR SOLUTION INTRAVENOUS at 02:06

## 2024-06-22 RX ADMIN — CARVEDILOL 3.12 MG: 3.12 TABLET, FILM COATED ORAL at 05:06

## 2024-06-22 RX ADMIN — PIPERACILLIN SODIUM AND TAZOBACTAM SODIUM 4.5 G: 4; .5 INJECTION, POWDER, FOR SOLUTION INTRAVENOUS at 11:06

## 2024-06-22 NOTE — PLAN OF CARE
Transfer from [facility] for [facility]    CM to bedside - pt provided assessment info and CM review chart w/ transfer notes. Pt is independent w/ no DME in place, lives w/ spouse. Pt will likely d/c home w/ no needs.     EDWIGE McgillN, RN, [facility]  Transitional Care Manager  [phone]  [email or web address] [address] [facility] ([place] [id number])  Initial Discharge Assessment       Primary Care Provider: Rebecca Mcmillan MD    Admission Diagnosis: Pancreatitis [K85.90]    Admission Date: 6/21/2024  Expected Discharge Date:     Transition of Care Barriers: None    Payor: [facility] MGD MEDICARE / Plan: [employer] [place] / Product Type: Medicare Advantage /     Extended Emergency Contact Information  Primary Emergency Contact: Aliza Zamorano  Mobile Phone: [phone]  Relation: Spouse  Preferred language: English   needed? No    Discharge Plan A: Home with family  Discharge Plan B: Home with family, Home Health      [id number] - [place], [place] - [address] AT [facility] [address]  [address]  [place] [address]  Phone: [phone] Fax: [phone]      Initial Assessment (most recent)       Adult Discharge Assessment - 06/22/24 1032          Discharge Assessment    Assessment Type Discharge Planning Assessment     Confirmed/corrected address, phone number and insurance Yes     Confirmed Demographics Correct on Facesheet     Source of Information patient;health record     When was your last doctors appointment? 05/15/24     Communicated AMBER with patient/caregiver Yes     Reason For Admission [place] [facility] [place] [facility]     People in Home spouse     Facility Arrived From: [facility]     Do you expect to return to your current living situation? Yes     Do you have help at home or someone to help you manage your care at home? Yes     Who are your caregiver(s) and their phone number(s)? spouse Aliza [phone]      Prior to hospitilization cognitive status: Alert/Oriented     Current cognitive status: Alert/Oriented     Walking or Climbing Stairs Difficulty no     Dressing/Bathing Difficulty no     Equipment Currently Used at Home none     Readmission within 30 days? No     Patient currently being followed by outpatient case management? Unable to determine (comments)     Do you currently have service(s) that help you manage your care at home? No     Do you take prescription medications? Yes     Who is going to help you get home at discharge? spouse     How do you get to doctors appointments? car, drives self;family or friend will provide     Are you on dialysis? No     Do you take coumadin? No     Discharge Plan A Home with family     Discharge Plan B Home with family;Home Health     DME Needed Upon Discharge  none     Discharge Plan discussed with: Patient     Transition of Care Barriers None        Physical Activity    On average, how many days per week do you engage in moderate to strenuous exercise (like a brisk walk)? 3 days     On average, how many minutes do you engage in exercise at this level? 10 min        Financial Resource Strain    How hard is it for you to pay for the very basics like food, housing, medical care, and heating? Not hard at all        Housing Stability    In the last 12 months, was there a time when you were not able to pay the mortgage or rent on time? No     At any time in the past 12 months, were you homeless or living in a shelter (including now)? No        Transportation Needs    Has the lack of transportation kept you from medical appointments, meetings, work or from getting things needed for daily living? No        Food Insecurity    Within the past 12 months, you worried that your food would run out before you got the money to buy more. Never true     Within the past 12 months, the food you bought just didn't last and you didn't have money to get more. Never true        Stress    Do you feel  stress - tense, restless, nervous, or anxious, or unable to sleep at night because your mind is troubled all the time - these days? Only a little        Social Isolation    How often do you feel lonely or isolated from those around you?  Rarely        Alcohol Use    Q1: How often do you have a drink containing alcohol? Never     Q2: How many drinks containing alcohol do you have on a typical day when you are drinking? Patient does not drink     Q3: How often do you have six or more drinks on one occasion? Never        Utilities    In the past 12 months has the electric, gas, oil, or water company threatened to shut off services in your home? No        Health Literacy    How often do you need to have someone help you when you read instructions, pamphlets, or other written material from your doctor or pharmacy? Rarely        OTHER    Name(s) of People in Home spouse Aliza                     Readmission Assessment (most recent)       Readmission Assessment - 06/22/24 1031          Readmission    Why were you hospitalized in the last 30 days? no readmission - transfer from [facility]

## 2024-06-22 NOTE — H&P
[facility] - Med Surg ([address] Medicine  History & Physical    Patient Name: Fabrice Zamorano  MRN: [id number]  Patient Class: IP- Inpatient  Admission Date: 6/21/2024  Attending Physician: Solomon Felix MD   Primary Care Provider: Rebecca Mcmillan MD         Patient information was obtained from patient, past medical records, and ER records.     Subjective:     Principal Problem:Acute on chronic pancreatitis    Chief Complaint: No chief complaint on file.       HPI: 59-year-old male with a history of DVT, splenic vein thrombosis, sleep apnea, hypothyroidism, chronic pancreatitis, and hypertension admitted to [facility] on June 19 with abdominal pain and nausea for 2 weeks.  He had no chest pain or dyspnea and no fever chills.  Bilirubin was noted to be elevated.  MRCP had findings concerning for pancreatitis.  He was admitted with acute on chronic pancreatitis and hypertension. Bilirubin increased from 4.4 on June 19 up to 10.1 on June 20.  ALT increased from 143 up to 327.  Abdominal pain persisted.  Blood pressure remained elevated.  Patient reported his abdomen felt distended.  The MRCP did not show filling defects in the common duct, there was increased biliary ductal dilatation compared with prior imaging.  Referring team spoke with Biliary Service at [facility].  Patient was started on Zosyn.  Patient transferred to Hospital Medicine at [facility] for Biliary Service evaluation of acute pancreatitis and rising bilirubin.      June 20: sodium 135, potassium 4.1, chloride 100, CO2 25, BUN 14, creatinine 0.8, glucose 155, total bilirubin 10.1, , , lipase 327     June 19: INR 1.1, troponin less than 0.006, white blood cells 6.86, hemoglobin 14.8, hematocrit 44, platelets 148  -MRCP had no filling defect in the common duct.  There is increased biliary ductal dilatation comparison to the prior CT from March 20, 2023.  Findings concerning for acute  pancreatitis.  Surgical changes of pancreaticojejunostomy.  -EKG showed sinus bradycardia with ventricular rate 58.     March 12, 2024: EUS showed sonographic changes consistent with moderate to severe chronic pancreatitis.  Mass identified in the pancreatic biopsy.  Tissue obtained.    Past Medical History:   Diagnosis Date    Anticoagulant long-term use     On Eliquis for DVT    GERD (gastroesophageal reflux disease)     Hypertension     Hypothyroidism 10/26/2021    Liver disease     fatty liver    Pancreatic pseudocyst     Personal history of colonic polyps     Sleep apnea     Splenic vein thrombosis        Past Surgical History:   Procedure Laterality Date    CHOLECYSTECTOMY      COLONOSCOPY      ENDOSCOPIC ULTRASOUND OF UPPER GASTROINTESTINAL TRACT N/A 10/12/2021    Procedure: ULTRASOUND, UPPER GI TRACT, ENDOSCOPIC;  Surgeon: Odalys Leiva MD;  Location: [facility];  Service: Endoscopy;  Laterality: N/A;  Upper and linear, 22 shark core, cytology and RPMI    ENDOSCOPIC ULTRASOUND OF UPPER GASTROINTESTINAL TRACT N/A 2/24/2023    Procedure: ULTRASOUND, UPPER GI TRACT, ENDOSCOPIC;  Surgeon: Shawn Chisholm MD;  Location: [facility] ([address] [place] [address]);  Service: Endoscopy;  Laterality: N/A;  2/3/23-Instructions via portal-DS    ENDOSCOPIC ULTRASOUND OF UPPER GASTROINTESTINAL TRACT N/A 11/14/2023    Procedure: ULTRASOUND, UPPER GI TRACT, ENDOSCOPIC;  Surgeon: Kwadwo Gonsalez MD;  Location: [facility];  Service: Gastroenterology;  Laterality: N/A;  10/13/23: instructions sent vie portal-GD    ENDOSCOPIC ULTRASOUND OF UPPER GASTROINTESTINAL TRACT N/A 3/12/2024    Procedure: ULTRASOUND, UPPER GI TRACT, ENDOSCOPIC;  Surgeon: Shawn Chisholm MD;  Location: [facility];  Service: Endoscopy;  Laterality: N/A;  2/16/24: inst sent via portal-GD    ERCP N/A 09/29/2021    Procedure: ERCP (ENDOSCOPIC RETROGRADE CHOLANGIOPANCREATOGRAPHY);  Surgeon: Odalys Leiva MD;  Location: [facility];  Service: Endoscopy;   Laterality: N/A;    ERCP N/A 08/15/2022    Procedure: ERCP (ENDOSCOPIC RETROGRADE CHOLANGIOPANCREATOGRAPHY);  Surgeon: Odalys Leiva MD;  Location: [facility] ENDO;  Service: Endoscopy;  Laterality: N/A;    ERCP N/A 2/24/2023    Procedure: ERCP (ENDOSCOPIC RETROGRADE CHOLANGIOPANCREATOGRAPHY);  Surgeon: Shawn Chisholm MD;  Location: [facility] ENDO (2ND FLR);  Service: Endoscopy;  Laterality: N/A;    ERCP W/ PLASTIC STENT PLACEMENT      LATERAL PANCREATICOJEJUNOSTOMY N/A 11/19/2021    Procedure: PANCREATICOJEJUNOSTOMY, SIDE-TO-SIDE tier 1;  Surgeon: Brian Hills MD;  [facility];  Service: General;  Laterality: N/A;       Review of patient's allergies indicates:  No Known Allergies    Current Facility-Administered Medications on File Prior to Encounter   Medication    iohexoL (OMNIPAQUE 300) injection    [DISCONTINUED] 0.9%  NaCl infusion    [DISCONTINUED] carvediloL tablet 3.125 mg    [DISCONTINUED] dextrose 10% bolus 125 mL 125 mL    [DISCONTINUED] dextrose 10% bolus 250 mL 250 mL    [DISCONTINUED] glucagon (human recombinant) injection 1 mg    [DISCONTINUED] glucose chewable tablet 16 g    [DISCONTINUED] glucose chewable tablet 24 g    [DISCONTINUED] hydrALAZINE injection 10 mg    [DISCONTINUED] HYDROmorphone injection 2 mg    [DISCONTINUED] labetaloL injection 10 mg    [DISCONTINUED] naloxone 0.4 mg/mL injection 0.02 mg    [DISCONTINUED] ondansetron injection 4 mg    [DISCONTINUED] pantoprazole injection 40 mg    [DISCONTINUED] piperacillin-tazobactam (ZOSYN) 4.5 g in dextrose 5 % in water (D5W) 100 mL IVPB (MB+)    [DISCONTINUED] promethazine (PHENERGAN) 12.5 mg in sodium chloride 0.9% 50 mL IVPB    [DISCONTINUED] sodium chloride 0.9% flush 10 mL     Current Outpatient Medications on File Prior to Encounter   Medication Sig    carvediloL (COREG) 6.25 MG tablet Take 3.125 mg by mouth 2 (two) times daily with meals.    ibuprofen (ADVIL,MOTRIN) 800 MG tablet Take 800 mg by mouth every 6 (six)  hours as needed.    lipase-protease-amylase (CREON) 36,000-114,000- 180,000 unit CpDR Take 1 capsule by mouth 3 (three) times daily with meals.    metFORMIN (GLUCOPHAGE-XR) 500 MG ER 24hr tablet Take 500 mg by mouth once daily.    multivitamin (THERAGRAN) per tablet Take 1 tablet by mouth once daily.    ondansetron (ZOFRAN-ODT) 4 MG TbDL Take 1 tablet (4 mg total) by mouth every 8 (eight) hours as needed (Nausea).    oxyCODONE-acetaminophen (PERCOCET)  mg per tablet Take 1 tablet by mouth every 4 (four) hours as needed for Pain.    pantoprazole (PROTONIX) 40 MG tablet TAKE 1 TABLET(40 MG) BY MOUTH EVERY DAY    pregabalin (LYRICA) 75 MG capsule Take 2 capsules (150 mg total) by mouth every evening. May also take 1 capsule (75 mg total) daily as needed.     Family History       Problem Relation (Age of Onset)    Arthritis Father    Cancer Father    Pancreatic cancer Father    Thyroid disease Mother, Father          Tobacco Use    Smoking status: Former     Current packs/day: 0.00     Types: Cigarettes     Quit date: 2001     Years since quittin.8    Smokeless tobacco: Never   Substance and Sexual Activity    Alcohol use: Not Currently    Drug use: Never    Sexual activity: Yes     Review of Systems   Constitutional:  Negative for activity change, chills, fever and unexpected weight change.   HENT:  Negative for congestion and sore throat.    Respiratory:  Negative for cough, shortness of breath and wheezing.    Cardiovascular:  Negative for chest pain, palpitations and leg swelling.   Gastrointestinal:  Positive for abdominal pain and nausea. Negative for blood in stool and vomiting.   Genitourinary:  Negative for dysuria and hematuria.   Musculoskeletal:  Negative for arthralgias and neck pain.   Skin:  Negative for color change and rash.   Neurological:  Negative for dizziness, seizures and numbness.   Psychiatric/Behavioral:  Negative for hallucinations and suicidal ideas.      Objective:     Vital  Signs (Most Recent):  Temp: 98.6 °F (37 °C) (06/21/24 2335)  Pulse: 82 (06/21/24 2335)  Resp: 18 (06/21/24 2335)  BP: (!) 143/86 (06/21/24 2335)  SpO2: (!) 92 % (06/21/24 2335) Vital Signs (24h Range):  Temp:  [98.2 °F (36.8 °C)-99.4 °F (37.4 °C)] 98.6 °F (37 °C)  Pulse:  [] 82  Resp:  [15-18] 18  SpO2:  [92 %-94 %] 92 %  BP: (134-171)/(81-93) 143/86        There is no height or weight on file to calculate BMI.     Physical Exam  Vitals reviewed.   Constitutional:       General: He is not in acute distress.     Appearance: He is well-developed.   HENT:      Head: Normocephalic and atraumatic.   Eyes:      Extraocular Movements: Extraocular movements intact.      Pupils: Pupils are equal, round, and reactive to light.   Neck:      Vascular: No JVD.      Trachea: No tracheal deviation.   Cardiovascular:      Rate and Rhythm: Normal rate and regular rhythm.      Heart sounds: No murmur heard.     No friction rub. No gallop.   Pulmonary:      Effort: No respiratory distress.      Breath sounds: Normal breath sounds. No wheezing or rales.   Abdominal:      General: Bowel sounds are normal. There is no distension.      Palpations: Abdomen is soft. There is no mass.      Tenderness: There is abdominal tenderness.      Comments: Mild RUQ and epigastric tenderness, -Jerry's sign   Musculoskeletal:         General: No deformity.      Cervical back: Neck supple.   Lymphadenopathy:      Cervical: No cervical adenopathy.   Skin:     General: Skin is warm and dry.      Findings: No rash.   Neurological:      Mental Status: He is alert and oriented to person, place, and time.              CRANIAL NERVES     CN III, IV, VI   Pupils are equal, round, and reactive to light.       Significant Labs: All pertinent labs within the past 24 hours have been reviewed.    Significant Imaging: I have reviewed all pertinent imaging results/findings within the past 24 hours.  Assessment/Plan:     * Acute on chronic pancreatitis  -Pt. With  abdominal pain, nausea. Lipase levated to 327, Tbili steadily increasing.  -MRCP shows findings consistent with acute pancreatitis. No clear biliary filling defect, but increased biliary ductal dilation in comparison to the prior CT   -Pt. NPO with IV fluids, PRN pain and nausea meds. AES consulted for further evaluation and potential endoscopic intervention      History of DVT (deep vein thrombosis)  -KNown prior hx of DVT and plenic vein thrombosis. He is no longer on AC. Hold DVT ppx until after GI eval, plan to start if pt. Not needing procedure      Hypertension  -Continue home coreg      VTE Risk Mitigation (From admission, onward)           Ordered     IP VTE HIGH RISK PATIENT  Once         06/21/24 2125     Place sequential compression device  Until discontinued         06/21/24 2125                                    Zachariah Wagoner MD  Department of Hospital Medicine  [facility] - Med Surg ([facility]-)

## 2024-06-22 NOTE — ASSESSMENT & PLAN NOTE
-Pt. With abdominal pain, nausea. Lipase levated to 327, Tbili steadily increasing.  -MRCP shows findings consistent with acute pancreatitis. No clear biliary filling defect, but increased biliary ductal dilation in comparison to the prior CT   -Pt. NPO with IV fluids, PRN pain and nausea meds. AES consulted for further evaluation and potential endoscopic intervention

## 2024-06-22 NOTE — HPI
59-year-old male with a history of DVT, splenic vein thrombosis, sleep apnea, hypothyroidism, chronic pancreatitis, and hypertension admitted to [facility] on June 19 with abdominal pain and nausea for 2 weeks.  He had no chest pain or dyspnea and no fever chills.  Bilirubin was noted to be elevated.  MRCP had findings concerning for pancreatitis.  He was admitted with acute on chronic pancreatitis and hypertension. Bilirubin increased from 4.4 on June 19 up to 10.1 on June 20.  ALT increased from 143 up to 327.  Abdominal pain persisted.  Blood pressure remained elevated.  Patient reported his abdomen felt distended.  The MRCP did not show filling defects in the common duct, there was increased biliary ductal dilatation compared with prior imaging.  Referring team spoke with Biliary Service at [facility].  Patient was started on Zosyn.  Patient transferred to Hospital Medicine at [facility] for Biliary Service evaluation of acute pancreatitis and rising bilirubin.      June 20: sodium 135, potassium 4.1, chloride 100, CO2 25, BUN 14, creatinine 0.8, glucose 155, total bilirubin 10.1, , , lipase 327     June 19: INR 1.1, troponin less than 0.006, white blood cells 6.86, hemoglobin 14.8, hematocrit 44, platelets 148  -MRCP had no filling defect in the common duct.  There is increased biliary ductal dilatation comparison to the prior CT from March 20, 2023.  Findings concerning for acute pancreatitis.  Surgical changes of pancreaticojejunostomy.  -EKG showed sinus bradycardia with ventricular rate 58.     March 12, 2024: EUS showed sonographic changes consistent with moderate to severe chronic pancreatitis.  Mass identified in the pancreatic biopsy.  Tissue obtained.

## 2024-06-22 NOTE — NURSING
Nurses Note -- 4 Eyes      6/22/2024   12:04 AM      Skin assessed during: Transfer      [x] No Altered Skin Integrity Present    []Prevention Measures Documented      [] Yes- Altered Skin Integrity Present or Discovered   [] LDA Added if Not in Epic (Describe Wound)   [] New Altered Skin Integrity was Present on Admit and Documented in LDA   [] Wound Image Taken    Wound Care Consulted? No    Attending Nurse:  SHOAIB Neely    Second RN/Staff Member:  SHELBY Plascencia

## 2024-06-22 NOTE — ASSESSMENT & PLAN NOTE
-KNown prior hx of DVT and plenic vein thrombosis. He is no longer on AC. Hold DVT ppx until after GI eval, plan to start if pt. Not needing procedure

## 2024-06-22 NOTE — CONSULTS
[facility]  Advanced Endoscopy Service  Consult Note    Patient Name: Fabrice Zamorano  MRN: [id number]  Admission Date: 6/21/2024  Hospital Length of Stay: 0 days  Code Status: Full Code   Attending Provider: Solomon Felix MD   Consulting Provider: Mary Molina DO  Primary Care Physician: Rebecca Mcmillan MD  Principal Problem:<principal problem not specified>    Inpatient consult to Advanced Endoscopy Service (AES)  Consult performed by: Mary Molina DO  Consult ordered by: Zachariah Wagonre MD        Subjective:     HPI: Fabrice Zamorano is a 59 y.o. male with DVT not on AC, Autoimmune pancreatitis (PRSS 1 mutation) s/p Puestow procedure/lateral pancreaticojejunostomy, s/p celiac plexus block (last 3/12/2024), Splenic Vein Thrombosis, Hypothyroidism, HTN and BRAIN that presents to [facility] as a transfer from OSH for further evaluation. Admitted to OSH on 6/19 with complaints of progressively worsening upper abdominal pain over the past 2 weeks. Diagnosed with acute pancreatitis with imaging findings noted below and lipase elevated to 327 on 6/20. AES consulted for further assistance with management. Of note, patient is known to Dr. Brian Hills and given his history of AI pancreatitis, it was discussed with the patient that he may ultimately be a good candidate for a Alejandro procedure vs a completion total pancreatectomy/Whipple. He reports that following his Puestow procedure, his episodes of pancreatitis did seem to space out. However, more recently in the last month or so, he has more days with pain than without. He feels that his episodes of pancreatitis are becoming more frequent. He reports ongoing epigastric abdominal pain with radiation to his lower back; currently better controlled with pain medications. He is not on blood thinners and has not taken Eliquis since 2021.     Prior Endoscopies:   3/12/24- EUS- Endosonographic imaging of the pancreas showed sonographic changes  consistent with moderate-severe chronic pancreatitis.  A mass was identified in the pancreatic body. Tissue was obtained from this exam, and results are pending. However, the endosonographic appearance is benign inflammatory changes. Fine needle biopsy performed. Celiac plexus block performed. Path benign.   23- EUS- Endosonographic imaging of the pancreas showed sonographic changes consistent with moderate-severe chronic pancreatitis as previously known and seen; no sign of pancreas duct dilation in setting of prior pancreaticojejunostomy procedure (adequately decompressed). Celiac plexus block performed.   24- EUS- There was no sign of significant pathology in   the common bile duct. Endosonographic imaging of the pancreas showed sonographic changes consistent with moderate-severe chronic pancreatitis.   23- ERCP- The major papilla appeared edematous. Prior biliary sphincterotomy appeared open. Prior pancreatic sphincterotomy appeared open. The ventral pancreatic duct was swept and debris was found. I do think there is any opportunity to improve PD drainage at this point. The PD in the HOP drains into the duodenum. The PD in the body/tail appears to drain into the jejunum (via the surgical pancreaticojejunostomy).     Imagin24- MRCP- There are inflammatory changes about the pancreas concerning for acute pancreatitis. Mild intra and extrahepatic biliary ductal dilation. The common duct measures 13 mm just superior to the pancreas. This appears more prominent in comparison to the prior CT exam of 2023. Evidence of pancreaticojejunostomy. Prior cholecystectomy.    Past Medical History:   Diagnosis Date    Anticoagulant long-term use     On Eliquis for DVT    GERD (gastroesophageal reflux disease)     Hypertension     Hypothyroidism 10/26/2021    Liver disease     fatty liver    Pancreatic pseudocyst     Personal history of colonic polyps     Sleep apnea     Splenic vein thrombosis         Past Surgical History:   Procedure Laterality Date    CHOLECYSTECTOMY      COLONOSCOPY      ENDOSCOPIC ULTRASOUND OF UPPER GASTROINTESTINAL TRACT N/A 10/12/2021    Procedure: ULTRASOUND, UPPER GI TRACT, ENDOSCOPIC;  Surgeon: Odalys Leiva MD;  Location: [facility];  Service: Endoscopy;  Laterality: N/A;  Upper and linear, 22 shark core, cytology and RPMI    ENDOSCOPIC ULTRASOUND OF UPPER GASTROINTESTINAL TRACT N/A 2/24/2023    Procedure: ULTRASOUND, UPPER GI TRACT, ENDOSCOPIC;  Surgeon: Shawn Chisholm MD;  Location: [facility] (2ND FLR);  Service: Endoscopy;  Laterality: N/A;  2/3/23-Instructions via portal-DS    ENDOSCOPIC ULTRASOUND OF UPPER GASTROINTESTINAL TRACT N/A 11/14/2023    Procedure: ULTRASOUND, UPPER GI TRACT, ENDOSCOPIC;  Surgeon: Kwadwo Gonsalez MD;  Location: [facility];  Service: Gastroenterology;  Laterality: N/A;  10/13/23: instructions sent vie portal-GD    ENDOSCOPIC ULTRASOUND OF UPPER GASTROINTESTINAL TRACT N/A 3/12/2024    Procedure: ULTRASOUND, UPPER GI TRACT, ENDOSCOPIC;  Surgeon: Shawn Chisholm MD;  Location: [facility];  Service: Endoscopy;  Laterality: N/A;  2/16/24: inst sent via portal-GD    ERCP N/A 09/29/2021    Procedure: ERCP (ENDOSCOPIC RETROGRADE CHOLANGIOPANCREATOGRAPHY);  Surgeon: Odalys Leiva MD;  Location: [facility];  Service: Endoscopy;  Laterality: N/A;    ERCP N/A 08/15/2022    Procedure: ERCP (ENDOSCOPIC RETROGRADE CHOLANGIOPANCREATOGRAPHY);  Surgeon: Odalys Leiva MD;  Location: [facility];  Service: Endoscopy;  Laterality: N/A;    ERCP N/A 2/24/2023    Procedure: ERCP (ENDOSCOPIC RETROGRADE CHOLANGIOPANCREATOGRAPHY);  Surgeon: Shawn Chisholm MD;  [facility]  Service: Endoscopy;  Laterality: N/A;    ERCP W/ PLASTIC STENT PLACEMENT      LATERAL PANCREATICOJEJUNOSTOMY N/A 11/19/2021    Procedure: PANCREATICOJEJUNOSTOMY, SIDE-TO-SIDE tier 1;  Surgeon: Brian Hills MD;  [address]  Service:  General;  Laterality: N/A;       Family History   Problem Relation Name Age of Onset    Thyroid disease Mother      Cancer Father      Pancreatic cancer Father      Arthritis Father      Thyroid disease Father         Social History     Socioeconomic History    Marital status:    Tobacco Use    Smoking status: Former     Current packs/day: 0.00     Types: Cigarettes     Quit date: 2001     Years since quittin.8    Smokeless tobacco: Never   Substance and Sexual Activity    Alcohol use: Not Currently    Drug use: Never    Sexual activity: Yes       Current Facility-Administered Medications on File Prior to Encounter   Medication Dose Route Frequency Provider Last Rate Last Admin    iohexoL (OMNIPAQUE 300) injection    PRN Shawn Chisholm MD   10 mL at 23 0930    [DISCONTINUED] 0.9%  NaCl infusion   Intravenous Continuous Eriberto Vazquez  mL/hr at 24 1031 New Bag at 24 1031    [DISCONTINUED] carvediloL tablet 3.125 mg  3.125 mg Oral BID WM Eriberto Vazquez MD   3.125 mg at 24 1707    [DISCONTINUED] dextrose 10% bolus 125 mL 125 mL  12.5 g Intravenous PRN Eriberto Vazquez MD        [DISCONTINUED] dextrose 10% bolus 250 mL 250 mL  25 g Intravenous PRN Eriberto Vazquez MD        [DISCONTINUED] glucagon (human recombinant) injection 1 mg  1 mg Intramuscular PRN Eriberto Vazquez MD        [DISCONTINUED] glucose chewable tablet 16 g  16 g Oral PRN Eriberto Vazquez MD        [DISCONTINUED] glucose chewable tablet 24 g  24 g Oral PRN Eriberto Vazquez MD        [DISCONTINUED] hydrALAZINE injection 10 mg  10 mg Intravenous Q4H PRN Eriberto Vazquez MD   10 mg at 24 1942    [DISCONTINUED] HYDROmorphone injection 2 mg  2 mg Intravenous Q3H PRN Eriberto Vazquez MD   2 mg at 24 1348    [DISCONTINUED] labetaloL injection 10 mg  10 mg Intravenous Q4H PRN Eriberto Vazquez MD        [DISCONTINUED] naloxone 0.4 mg/mL injection 0.02 mg  0.02 mg Intravenous PRN Eriberto Vazquez MD        [DISCONTINUED]  ondansetron injection 4 mg  4 mg Intravenous Q6H PRN Eriberto Vazquez MD   4 mg at 06/21/24 0229    [DISCONTINUED] pantoprazole injection 40 mg  40 mg Intravenous Daily Eriberto Vazquez MD   40 mg at 06/21/24 0825    [DISCONTINUED] piperacillin-tazobactam (ZOSYN) 4.5 g in dextrose 5 % in water (D5W) 100 mL IVPB (MB+)  4.5 g Intravenous Q8H Eriberto Vazquez MD   Stopped at 06/21/24 1436    [DISCONTINUED] promethazine (PHENERGAN) 12.5 mg in sodium chloride 0.9% 50 mL IVPB  12.5 mg Intravenous Q6H PRN Eriberto Vazquez MD   Stopped at 06/21/24 0946    [DISCONTINUED] sodium chloride 0.9% flush 10 mL  10 mL Intravenous Q12H PRN Eriberto Vazquez MD         Current Outpatient Medications on File Prior to Encounter   Medication Sig Dispense Refill    carvediloL (COREG) 6.25 MG tablet Take 3.125 mg by mouth 2 (two) times daily with meals.      ibuprofen (ADVIL,MOTRIN) 800 MG tablet Take 800 mg by mouth every 6 (six) hours as needed.      lipase-protease-amylase (CREON) 36,000-114,000- 180,000 unit CpDR Take 1 capsule by mouth 3 (three) times daily with meals. 270 capsule 3    metFORMIN (GLUCOPHAGE-XR) 500 MG ER 24hr tablet Take 500 mg by mouth once daily.      multivitamin (THERAGRAN) per tablet Take 1 tablet by mouth once daily.      ondansetron (ZOFRAN-ODT) 4 MG TbDL Take 1 tablet (4 mg total) by mouth every 8 (eight) hours as needed (Nausea). 30 tablet 0    oxyCODONE-acetaminophen (PERCOCET)  mg per tablet Take 1 tablet by mouth every 4 (four) hours as needed for Pain. 21 tablet 0    pantoprazole (PROTONIX) 40 MG tablet TAKE 1 TABLET(40 MG) BY MOUTH EVERY DAY 90 tablet 0    pregabalin (LYRICA) 75 MG capsule Take 2 capsules (150 mg total) by mouth every evening. May also take 1 capsule (75 mg total) daily as needed. 90 capsule 2       Review of patient's allergies indicates:  No Known Allergies    Review of Systems   Constitutional:  Negative for chills and fever.   HENT:  Negative for congestion and sore throat.     Respiratory:  Negative for cough and shortness of breath.    Cardiovascular:  Negative for chest pain and palpitations.   Gastrointestinal:  Positive for abdominal pain. Negative for nausea and vomiting.   Genitourinary:  Negative for dysuria and frequency.   Musculoskeletal:  Positive for back pain. Negative for neck pain.   Skin:  Negative for itching and rash.   Neurological:  Negative for dizziness and headaches.      Objective:     Vitals:    06/21/24 2128   BP: (!) 144/87   Pulse: 88   Resp: 18   Temp: 98.2 °F (36.8 °C)     Constitutional:  not in acute distress and well developed  HENT: Head: Normal, normocephalic, atraumatic.  Eyes: scleral icterus   Cardiovascular: regular rate and rhythm  Respiratory: normal chest expansion & respiratory effort   and no accessory muscle use  GI: soft, nondistended, tenderness moderate in the epigastrium, without guarding, and without rebound  Musculoskeletal: no muscular tenderness noted  Neurological: alert, oriented x3  Psychiatric: mood and affect are within normal limits    Significant Labs:  Recent Labs   Lab 06/19/24  0254 06/21/24  0907   HGB 14.8 13.6*       Lab Results   Component Value Date    WBC 10.05 06/21/2024    HGB 13.6 (L) 06/21/2024    HCT 40.5 06/21/2024    MCV 91 06/21/2024     (L) 06/21/2024       Lab Results   Component Value Date     (L) 06/21/2024    K 3.6 06/21/2024     06/21/2024    CO2 23 06/21/2024    BUN 16 06/21/2024    CREATININE 0.7 06/21/2024    CALCIUM 8.6 (L) 06/21/2024    ANIONGAP 7 (L) 06/21/2024    ESTGFRAFRICA >60.0 07/07/2022    EGFRNONAA >60.0 07/07/2022       Lab Results   Component Value Date     (H) 06/21/2024    AST 86 (H) 06/21/2024    ALKPHOS 185 (H) 06/21/2024    BILITOT 9.3 (H) 06/21/2024       Lab Results   Component Value Date    INR 1.1 06/19/2024    INR 1.0 11/17/2022       Significant Imaging:  Reviewed pertinent radiology findings.       Assessment/Plan:     Fabrice Zamorano is a 59 y.o. male  with DVT not on AC, Autoimmune pancreatitis (PRSS 1 mutation) s/p Puestow procedure/lateral pancreaticojejunostomy, s/p celiac plexus block (last 3/12/2024), Splenic Vein Thrombosis, Hypothyroidism, HTN and BRAIN that presents to [facility] as a transfer from OSH for further evaluation. Admitted to OSH on 6/19 with complaints of progressively worsening upper abdominal pain over the past 2 weeks. Diagnosed with acute pancreatitis with imaging findings noted below and lipase elevated to 327 on 6/20. AES consulted for further assistance with management. Of note, patient is known to Dr. Brian Hills and given his history of AI pancreatitis, it was discussed with the patient that he may ultimately be a good candidate for a Alejandro procedure vs a completion total pancreatectomy/Whipple.     Prior Endoscopies:   3/12/24- EUS- Endosonographic imaging of the pancreas showed sonographic changes consistent with moderate-severe chronic pancreatitis.  A mass was identified in the pancreatic body. Tissue was obtained from this exam, and results are pending. However, the endosonographic appearance is benign inflammatory changes. Fine needle biopsy performed. Celiac plexus block performed. Path benign.   11/14/23- EUS- Endosonographic imaging of the pancreas showed sonographic changes consistent with moderate-severe chronic pancreatitis as previously known and seen; no sign of pancreas duct dilation in setting of prior pancreaticojejunostomy procedure (adequately decompressed). Celiac plexus block performed.   2/24/24- EUS- There was no sign of significant pathology in   the common bile duct. Endosonographic imaging of the pancreas showed sonographic changes consistent with moderate-severe chronic pancreatitis.   2/24/23- ERCP- The major papilla appeared edematous. Prior biliary sphincterotomy appeared open. Prior pancreatic sphincterotomy appeared open. The ventral pancreatic duct was swept and debris was found. I do think there is any  opportunity to improve PD drainage at this point. The PD in the HOP drains into the duodenum. The PD in the body/tail appears to drain into the jejunum (via the surgical pancreaticojejunostomy).     Imagin24- MRCP without contrast- There are inflammatory changes about the pancreas concerning for acute pancreatitis. Mild intra and extrahepatic biliary ductal dilation. The common duct measures 13 mm just superior to the pancreas. This appears more prominent in comparison to the prior CT exam of 2023. Evidence of pancreaticojejunostomy. Prior cholecystectomy.    Problem List:  Acute on Chronic Pancreatitis   Biliary Ductal Dilatation   S/p Lateral Pancreaticojejunostomy/Puestow Procedure (2021)   Jaundice     Recommendations:  - Would recommend obtaining contrasted imaging- please obtain CT A/P with contrast- pancreas protocol to further assess the pancreas  - Please trend daily LFT's   - Further recommendations pending contrasted imaging findings and lab trend   - OK for diet as tolerated  - Pain medications, IVF's and supportive care per primary     Thank you for involving us in the care of Fabrice Zamorano. Please call with any additional questions, concerns or changes in the patient's clinical status.     Mary Molina DO  Gastroenterology Fellow PGY- IV  [facility]

## 2024-06-22 NOTE — PLAN OF CARE
Problem: Adult Inpatient Plan of Care  Goal: Plan of Care Review  Outcome: Progressing  Flowsheets (Taken 6/22/2024 0715)  Plan of Care Reviewed With: patient  Goal: Patient-Specific Goal (Individualized)  Outcome: Progressing  Goal: Absence of Hospital-Acquired Illness or Injury  Outcome: Progressing  Intervention: Prevent and Manage VTE (Venous Thromboembolism) Risk  Flowsheets (Taken 6/22/2024 0715)  VTE Prevention/Management:   bleeding precations maintained   bleeding risk assessed  Intervention: Prevent Infection  Flowsheets (Taken 6/22/2024 0715)  Infection Prevention: hand hygiene promoted  Goal: Optimal Comfort and Wellbeing  Outcome: Progressing  Intervention: Monitor Pain and Promote Comfort  Flowsheets (Taken 6/22/2024 0715)  Pain Management Interventions:   care clustered   pain management plan reviewed with patient/caregiver   quiet environment facilitated  Intervention: Provide Person-Centered Care  Flowsheets (Taken 6/22/2024 0715)  Trust Relationship/Rapport:   care explained   choices provided   emotional support provided   empathic listening provided   questions answered   questions encouraged   reassurance provided   thoughts/feelings acknowledged  Goal: Readiness for Transition of Care  Outcome: Progressing     Problem: Fall Injury Risk  Goal: Absence of Fall and Fall-Related Injury  Outcome: Progressing  Intervention: Identify and Manage Contributors  Flowsheets (Taken 6/22/2024 0715)  Self-Care Promotion: independence encouraged  Medication Review/Management: medications reviewed

## 2024-06-22 NOTE — SUBJECTIVE & OBJECTIVE
Interval History:   No events overnight. Patient with continuee low back and abdominal brittany. Nausea controlled. Continue IVF. AES consulted.      Review of Systems   Constitutional:  Negative for activity change, appetite change, chills, diaphoresis, fatigue and fever.   HENT:  Negative for rhinorrhea and sore throat.    Respiratory:  Negative for cough, chest tightness and shortness of breath.    Cardiovascular:  Negative for chest pain and palpitations.   Gastrointestinal:  Positive for abdominal pain and nausea. Negative for constipation and diarrhea.   Endocrine: Negative for cold intolerance.   Genitourinary:  Negative for decreased urine volume and dysuria.   Musculoskeletal:  Positive for back pain. Negative for arthralgias and myalgias.   Skin:  Negative for rash and wound.   Neurological:  Negative for dizziness, weakness, numbness and headaches.   Psychiatric/Behavioral:  Negative for agitation, behavioral problems and confusion.      Objective:     Vital Signs (Most Recent):  Temp: 99.4 °F (37.4 °C) (06/22/24 1104)  Pulse: 81 (06/22/24 1104)  Resp: 18 (06/22/24 1104)  BP: (!) 158/93 (06/22/24 1104)  SpO2: (!) 94 % (06/22/24 1104) Vital Signs (24h Range):  Temp:  [98.2 °F (36.8 °C)-99.4 °F (37.4 °C)] 99.4 °F (37.4 °C)  Pulse:  [67-88] 81  Resp:  [17-20] 18  SpO2:  [92 %-95 %] 94 %  BP: (143-158)/(86-93) 158/93     Weight: 100.9 kg (222 lb 7.1 oz)  Body mass index is 31.02 kg/m².    Intake/Output Summary (Last 24 hours) at 6/22/2024 1430  Last data filed at 6/22/2024 1144  Gross per 24 hour   Intake --   Output 600 ml   Net -600 ml         Physical Exam  Constitutional:       Appearance: Normal appearance. He is normal weight.   HENT:      Head: Normocephalic and atraumatic.      Mouth/Throat:      Mouth: Mucous membranes are moist.   Eyes:      Extraocular Movements: Extraocular movements intact.      Conjunctiva/sclera: Conjunctivae normal.   Cardiovascular:      Rate and Rhythm: Normal rate and regular  rhythm.      Heart sounds: No murmur heard.  Pulmonary:      Effort: Pulmonary effort is normal. No respiratory distress.      Breath sounds: Normal breath sounds. No wheezing or rales.   Abdominal:      General: Abdomen is flat. There is no distension.      Palpations: Abdomen is soft.      Tenderness: There is abdominal tenderness. There is no guarding.   Musculoskeletal:         General: No swelling or tenderness.   Skin:     Findings: No rash.   Neurological:      General: No focal deficit present.      Mental Status: He is alert and oriented to person, place, and time. Mental status is at baseline.   Psychiatric:         Mood and Affect: Mood normal.         Behavior: Behavior normal.             Significant Labs: All pertinent labs within the past 24 hours have been reviewed.  CBC:   Recent Labs   Lab 06/21/24  0907 06/22/24  0853   WBC 10.05 5.96   HGB 13.6* 13.1*   HCT 40.5 41.9   * 96*     CMP:   Recent Labs   Lab 06/21/24  0907 06/22/24  0853   * 137   K 3.6 3.5    104   CO2 23 27   * 104   BUN 16 20   CREATININE 0.7 0.8   CALCIUM 8.6* 8.5*   PROT 5.4* 5.4*   ALBUMIN 2.7* 2.6*   BILITOT 9.3* 4.7*   ALKPHOS 185* 162*   AST 86* 54*   * 115*   ANIONGAP 7* 6*       Significant Imaging: I have reviewed all pertinent imaging results/findings within the past 24 hours.

## 2024-06-22 NOTE — NURSING
Pt arrived to room via [facility] in no acute distress. Pt arrived with personal belongings and no IV. Pt spouse at bedside stated that IV came out before being transferred. Pt c/o 8/10 abd and back pain, and nausea. Pt AAO x 4, VS stable. Pt with no other complaints. Pt oriented to room and call light use. POC ongoing.

## 2024-06-22 NOTE — SUBJECTIVE & OBJECTIVE
Past Medical History:   Diagnosis Date    Anticoagulant long-term use     On Eliquis for DVT    GERD (gastroesophageal reflux disease)     Hypertension     Hypothyroidism 10/26/2021    Liver disease     fatty liver    Pancreatic pseudocyst     Personal history of colonic polyps     Sleep apnea     Splenic vein thrombosis        Past Surgical History:   Procedure Laterality Date    CHOLECYSTECTOMY      COLONOSCOPY      ENDOSCOPIC ULTRASOUND OF UPPER GASTROINTESTINAL TRACT N/A 10/12/2021    Procedure: ULTRASOUND, UPPER GI TRACT, ENDOSCOPIC;  Surgeon: Odalys Leiva MD;  Location: [facility];  Service: Endoscopy;  Laterality: N/A;  Upper and linear, 22 shark core, cytology and RPMI    ENDOSCOPIC ULTRASOUND OF UPPER GASTROINTESTINAL TRACT N/A 2/24/2023    Procedure: ULTRASOUND, UPPER GI TRACT, ENDOSCOPIC;  Surgeon: Shawn Chisholm MD;  Location: [facility] ([address] [place] [address]);  Service: Endoscopy;  Laterality: N/A;  2/3/23-Instructions via portal-DS    ENDOSCOPIC ULTRASOUND OF UPPER GASTROINTESTINAL TRACT N/A 11/14/2023    Procedure: ULTRASOUND, UPPER GI TRACT, ENDOSCOPIC;  Surgeon: Kwadwo Gonsalez MD;  Location: [facility];  Service: Gastroenterology;  Laterality: N/A;  10/13/23: instructions sent vie portal-GD    ENDOSCOPIC ULTRASOUND OF UPPER GASTROINTESTINAL TRACT N/A 3/12/2024    Procedure: ULTRASOUND, UPPER GI TRACT, ENDOSCOPIC;  Surgeon: Shawn Chisholm MD;  Location: [facility];  Service: Endoscopy;  Laterality: N/A;  2/16/24: inst sent via portal-GD    ERCP N/A 09/29/2021    Procedure: ERCP (ENDOSCOPIC RETROGRADE CHOLANGIOPANCREATOGRAPHY);  Surgeon: Odalys Leiva MD;  Location: [facility];  Service: Endoscopy;  Laterality: N/A;    ERCP N/A 08/15/2022    Procedure: ERCP (ENDOSCOPIC RETROGRADE CHOLANGIOPANCREATOGRAPHY);  Surgeon: Odalys Leiva MD;  Location: [facility];  Service: Endoscopy;  Laterality: N/A;    ERCP N/A 2/24/2023    Procedure: ERCP (ENDOSCOPIC RETROGRADE  CHOLANGIOPANCREATOGRAPHY);  Surgeon: Shawn Chisholm MD;  Location: [facility] (2ND FLR);  Service: Endoscopy;  Laterality: N/A;    ERCP W/ PLASTIC STENT PLACEMENT      LATERAL PANCREATICOJEJUNOSTOMY N/A 11/19/2021    Procedure: PANCREATICOJEJUNOSTOMY, SIDE-TO-SIDE tier 1;  Surgeon: Brian Hills MD;  [facility];  Service: General;  Laterality: N/A;       Review of patient's allergies indicates:  No Known Allergies    Current Facility-Administered Medications on File Prior to Encounter   Medication    iohexoL (OMNIPAQUE 300) injection    [DISCONTINUED] 0.9%  NaCl infusion    [DISCONTINUED] carvediloL tablet 3.125 mg    [DISCONTINUED] dextrose 10% bolus 125 mL 125 mL    [DISCONTINUED] dextrose 10% bolus 250 mL 250 mL    [DISCONTINUED] glucagon (human recombinant) injection 1 mg    [DISCONTINUED] glucose chewable tablet 16 g    [DISCONTINUED] glucose chewable tablet 24 g    [DISCONTINUED] hydrALAZINE injection 10 mg    [DISCONTINUED] HYDROmorphone injection 2 mg    [DISCONTINUED] labetaloL injection 10 mg    [DISCONTINUED] naloxone 0.4 mg/mL injection 0.02 mg    [DISCONTINUED] ondansetron injection 4 mg    [DISCONTINUED] pantoprazole injection 40 mg    [DISCONTINUED] piperacillin-tazobactam (ZOSYN) 4.5 g in dextrose 5 % in water (D5W) 100 mL IVPB (MB+)    [DISCONTINUED] promethazine (PHENERGAN) 12.5 mg in sodium chloride 0.9% 50 mL IVPB    [DISCONTINUED] sodium chloride 0.9% flush 10 mL     Current Outpatient Medications on File Prior to Encounter   Medication Sig    carvediloL (COREG) 6.25 MG tablet Take 3.125 mg by mouth 2 (two) times daily with meals.    ibuprofen (ADVIL,MOTRIN) 800 MG tablet Take 800 mg by mouth every 6 (six) hours as needed.    lipase-protease-amylase (CREON) 36,000-114,000- 180,000 unit CpDR Take 1 capsule by mouth 3 (three) times daily with meals.    metFORMIN (GLUCOPHAGE-XR) 500 MG ER 24hr tablet Take 500 mg by mouth once daily.    multivitamin (THERAGRAN) per tablet Take 1  tablet by mouth once daily.    ondansetron (ZOFRAN-ODT) 4 MG TbDL Take 1 tablet (4 mg total) by mouth every 8 (eight) hours as needed (Nausea).    oxyCODONE-acetaminophen (PERCOCET)  mg per tablet Take 1 tablet by mouth every 4 (four) hours as needed for Pain.    pantoprazole (PROTONIX) 40 MG tablet TAKE 1 TABLET(40 MG) BY MOUTH EVERY DAY    pregabalin (LYRICA) 75 MG capsule Take 2 capsules (150 mg total) by mouth every evening. May also take 1 capsule (75 mg total) daily as needed.     Family History       Problem Relation (Age of Onset)    Arthritis Father    Cancer Father    Pancreatic cancer Father    Thyroid disease Mother, Father          Tobacco Use    Smoking status: Former     Current packs/day: 0.00     Types: Cigarettes     Quit date: 2001     Years since quittin.8    Smokeless tobacco: Never   Substance and Sexual Activity    Alcohol use: Not Currently    Drug use: Never    Sexual activity: Yes     Review of Systems   Constitutional:  Negative for activity change, chills, fever and unexpected weight change.   HENT:  Negative for congestion and sore throat.    Respiratory:  Negative for cough, shortness of breath and wheezing.    Cardiovascular:  Negative for chest pain, palpitations and leg swelling.   Gastrointestinal:  Positive for abdominal pain and nausea. Negative for blood in stool and vomiting.   Genitourinary:  Negative for dysuria and hematuria.   Musculoskeletal:  Negative for arthralgias and neck pain.   Skin:  Negative for color change and rash.   Neurological:  Negative for dizziness, seizures and numbness.   Psychiatric/Behavioral:  Negative for hallucinations and suicidal ideas.      Objective:     Vital Signs (Most Recent):  Temp: 98.6 °F (37 °C) (24)  Pulse: 82 (24)  Resp: 18 (24)  BP: (!) 143/86 (24)  SpO2: (!) 92 % (24) Vital Signs (24h Range):  Temp:  [98.2 °F (36.8 °C)-99.4 °F (37.4 °C)] 98.6 °F (37 °C)  Pulse:   [] 82  Resp:  [15-18] 18  SpO2:  [92 %-94 %] 92 %  BP: (134-171)/(81-93) 143/86        There is no height or weight on file to calculate BMI.     Physical Exam  Vitals reviewed.   Constitutional:       General: He is not in acute distress.     Appearance: He is well-developed.   HENT:      Head: Normocephalic and atraumatic.   Eyes:      Extraocular Movements: Extraocular movements intact.      Pupils: Pupils are equal, round, and reactive to light.   Neck:      Vascular: No JVD.      Trachea: No tracheal deviation.   Cardiovascular:      Rate and Rhythm: Normal rate and regular rhythm.      Heart sounds: No murmur heard.     No friction rub. No gallop.   Pulmonary:      Effort: No respiratory distress.      Breath sounds: Normal breath sounds. No wheezing or rales.   Abdominal:      General: Bowel sounds are normal. There is no distension.      Palpations: Abdomen is soft. There is no mass.      Tenderness: There is abdominal tenderness.      Comments: Mild RUQ and epigastric tenderness, -Jerry's sign   Musculoskeletal:         General: No deformity.      Cervical back: Neck supple.   Lymphadenopathy:      Cervical: No cervical adenopathy.   Skin:     General: Skin is warm and dry.      Findings: No rash.   Neurological:      Mental Status: He is alert and oriented to person, place, and time.              CRANIAL NERVES     CN III, IV, VI   Pupils are equal, round, and reactive to light.       Significant Labs: All pertinent labs within the past 24 hours have been reviewed.    Significant Imaging: I have reviewed all pertinent imaging results/findings within the past 24 hours.

## 2024-06-22 NOTE — ASSESSMENT & PLAN NOTE
Presenting with abdominal pain, nausea. Lipase levated to 327, Tbili 10.1  - MRCP 6/19 with evidence of acute pancreatitis. No clear biliary filling defect, but increased biliary ductal dilation in comparison to the prior CT   - AES consulted  -- CT A/P ordered  -- LR art 150 cc/hr  - PRN pain and nausea meds

## 2024-06-22 NOTE — PLAN OF CARE
06/22/24 1031   Readmission   Why were you hospitalized in the last 30 days? no readmission - transfer from [facility]

## 2024-06-22 NOTE — PROGRESS NOTES
[facility] - Med Surg ([place] [id number])  [facility] Medicine  Progress Note    Patient Name: Fabrice Zamorano  MRN: [id number]  Patient Class: IP- Inpatient   Admission Date: 6/21/2024  Length of Stay: 1 days  Attending Physician: Solomon Felix MD  Primary Care Provider: Rebecca Mcmillan MD        Subjective:     Principal Problem:Acute on chronic pancreatitis        HPI:  59-year-old male with a history of DVT, splenic vein thrombosis, sleep apnea, hypothyroidism, chronic pancreatitis, and hypertension admitted to [facility] on June 19 with abdominal pain and nausea for 2 weeks.  He had no chest pain or dyspnea and no fever chills.  Bilirubin was noted to be elevated.  MRCP had findings concerning for pancreatitis.  He was admitted with acute on chronic pancreatitis and hypertension. Bilirubin increased from 4.4 on June 19 up to 10.1 on June 20.  ALT increased from 143 up to 327.  Abdominal pain persisted.  Blood pressure remained elevated.  Patient reported his abdomen felt distended.  The MRCP did not show filling defects in the common duct, there was increased biliary ductal dilatation compared with prior imaging.  Referring team spoke with Biliary Service at [facility].  Patient was started on Zosyn.  Patient transferred to Hospital Medicine at [facility] for Biliary Service evaluation of acute pancreatitis and rising bilirubin.      June 20: sodium 135, potassium 4.1, chloride 100, CO2 25, BUN 14, creatinine 0.8, glucose 155, total bilirubin 10.1, , , lipase 327     June 19: INR 1.1, troponin less than 0.006, white blood cells 6.86, hemoglobin 14.8, hematocrit 44, platelets 148  -MRCP had no filling defect in the common duct.  There is increased biliary ductal dilatation comparison to the prior CT from March 20, 2023.  Findings concerning for acute pancreatitis.  Surgical changes of pancreaticojejunostomy.  -EKG showed sinus bradycardia with ventricular rate 58.     March 12,  2024: EUS showed sonographic changes consistent with moderate to severe chronic pancreatitis.  Mass identified in the pancreatic biopsy.  Tissue obtained.    Overview/Hospital Course:  No notes on file    Interval History:   No events overnight. Patient with continuee low back and abdominal brittany. Nausea controlled. Continue IVF. AES consulted.      Review of Systems   Constitutional:  Negative for activity change, appetite change, chills, diaphoresis, fatigue and fever.   HENT:  Negative for rhinorrhea and sore throat.    Respiratory:  Negative for cough, chest tightness and shortness of breath.    Cardiovascular:  Negative for chest pain and palpitations.   Gastrointestinal:  Positive for abdominal pain and nausea. Negative for constipation and diarrhea.   Endocrine: Negative for cold intolerance.   Genitourinary:  Negative for decreased urine volume and dysuria.   Musculoskeletal:  Positive for back pain. Negative for arthralgias and myalgias.   Skin:  Negative for rash and wound.   Neurological:  Negative for dizziness, weakness, numbness and headaches.   Psychiatric/Behavioral:  Negative for agitation, behavioral problems and confusion.      Objective:     Vital Signs (Most Recent):  Temp: 99.4 °F (37.4 °C) (06/22/24 1104)  Pulse: 81 (06/22/24 1104)  Resp: 18 (06/22/24 1104)  BP: (!) 158/93 (06/22/24 1104)  SpO2: (!) 94 % (06/22/24 1104) Vital Signs (24h Range):  Temp:  [98.2 °F (36.8 °C)-99.4 °F (37.4 °C)] 99.4 °F (37.4 °C)  Pulse:  [67-88] 81  Resp:  [17-20] 18  SpO2:  [92 %-95 %] 94 %  BP: (143-158)/(86-93) 158/93     Weight: 100.9 kg (222 lb 7.1 oz)  Body mass index is 31.02 kg/m².    Intake/Output Summary (Last 24 hours) at 6/22/2024 1430  Last data filed at 6/22/2024 1144  Gross per 24 hour   Intake --   Output 600 ml   Net -600 ml         Physical Exam  Constitutional:       Appearance: Normal appearance. He is normal weight.   HENT:      Head: Normocephalic and atraumatic.      Mouth/Throat:      Mouth:  Mucous membranes are moist.   Eyes:      Extraocular Movements: Extraocular movements intact.      Conjunctiva/sclera: Conjunctivae normal.   Cardiovascular:      Rate and Rhythm: Normal rate and regular rhythm.      Heart sounds: No murmur heard.  Pulmonary:      Effort: Pulmonary effort is normal. No respiratory distress.      Breath sounds: Normal breath sounds. No wheezing or rales.   Abdominal:      General: Abdomen is flat. There is no distension.      Palpations: Abdomen is soft.      Tenderness: There is abdominal tenderness. There is no guarding.   Musculoskeletal:         General: No swelling or tenderness.   Skin:     Findings: No rash.   Neurological:      General: No focal deficit present.      Mental Status: He is alert and oriented to person, place, and time. Mental status is at baseline.   Psychiatric:         Mood and Affect: Mood normal.         Behavior: Behavior normal.             Significant Labs: All pertinent labs within the past 24 hours have been reviewed.  CBC:   Recent Labs   Lab 06/21/24  0907 06/22/24  0853   WBC 10.05 5.96   HGB 13.6* 13.1*   HCT 40.5 41.9   * 96*     CMP:   Recent Labs   Lab 06/21/24  0907 06/22/24  0853   * 137   K 3.6 3.5    104   CO2 23 27   * 104   BUN 16 20   CREATININE 0.7 0.8   CALCIUM 8.6* 8.5*   PROT 5.4* 5.4*   ALBUMIN 2.7* 2.6*   BILITOT 9.3* 4.7*   ALKPHOS 185* 162*   AST 86* 54*   * 115*   ANIONGAP 7* 6*       Significant Imaging: I have reviewed all pertinent imaging results/findings within the past 24 hours.    Assessment/Plan:      * Acute on chronic pancreatitis  Presenting with abdominal pain, nausea. Lipase levated to 327, Tbili 10.1  - MRCP 6/19 with evidence of acute pancreatitis. No clear biliary filling defect, but increased biliary ductal dilation in comparison to the prior CT   - AES consulted  -- CT A/P ordered  -- LR art 150 cc/hr  - PRN pain and nausea meds    History of DVT (deep vein thrombosis)  - Known  prior hx of DVT and plenic vein thrombosis. He is no longer on AC.   - Hold DVT ppx until after GI procedures, plan to start after interventions    Hypertension  - Continue home coreg      VTE Risk Mitigation (From admission, onward)           Ordered     IP VTE HIGH RISK PATIENT  Once         06/21/24 2125     Place sequential compression device  Until discontinued         06/21/24 2125                    Discharge Planning   AMBER: 6/24/2024     Code Status: Full Code   Is the patient medically ready for discharge?:     Reason for patient still in hospital (select all that apply): Patient trending condition, Laboratory test, Treatment, Imaging, Consult recommendations, and Pending disposition  Discharge Plan A: Home with family                  Solomon Felix MD  Department of Hospital Medicine   [facility] ([place]

## 2024-06-23 LAB
ALBUMIN SERPL BCP-MCNC: 2.4 G/DL (ref 3.5–5.2)
ALP SERPL-CCNC: 147 U/L (ref 55–135)
ALT SERPL W/O P-5'-P-CCNC: 86 U/L (ref 10–44)
ANION GAP SERPL CALC-SCNC: 7 MMOL/L (ref 8–16)
AST SERPL-CCNC: 33 U/L (ref 10–40)
BASOPHILS # BLD AUTO: 0.01 K/UL (ref 0–0.2)
BASOPHILS NFR BLD: 0.2 % (ref 0–1.9)
BILIRUB SERPL-MCNC: 3.9 MG/DL (ref 0.1–1)
BUN SERPL-MCNC: 15 MG/DL (ref 6–20)
CALCIUM SERPL-MCNC: 8.1 MG/DL (ref 8.7–10.5)
CHLORIDE SERPL-SCNC: 97 MMOL/L (ref 95–110)
CO2 SERPL-SCNC: 27 MMOL/L (ref 23–29)
CREAT SERPL-MCNC: 0.7 MG/DL (ref 0.5–1.4)
DIFFERENTIAL METHOD BLD: ABNORMAL
EOSINOPHIL # BLD AUTO: 0.1 K/UL (ref 0–0.5)
EOSINOPHIL NFR BLD: 1.5 % (ref 0–8)
ERYTHROCYTE [DISTWIDTH] IN BLOOD BY AUTOMATED COUNT: 15.3 % (ref 11.5–14.5)
EST. GFR  (NO RACE VARIABLE): >60 ML/MIN/1.73 M^2
GLUCOSE SERPL-MCNC: 115 MG/DL (ref 70–110)
HCT VFR BLD AUTO: 39.2 % (ref 40–54)
HGB BLD-MCNC: 13.1 G/DL (ref 14–18)
IMM GRANULOCYTES # BLD AUTO: 0.02 K/UL (ref 0–0.04)
IMM GRANULOCYTES NFR BLD AUTO: 0.4 % (ref 0–0.5)
LYMPHOCYTES # BLD AUTO: 0.4 K/UL (ref 1–4.8)
LYMPHOCYTES NFR BLD: 8 % (ref 18–48)
MAGNESIUM SERPL-MCNC: 1.8 MG/DL (ref 1.6–2.6)
MCH RBC QN AUTO: 30.5 PG (ref 27–31)
MCHC RBC AUTO-ENTMCNC: 33.4 G/DL (ref 32–36)
MCV RBC AUTO: 91 FL (ref 82–98)
MONOCYTES # BLD AUTO: 0.7 K/UL (ref 0.3–1)
MONOCYTES NFR BLD: 13.3 % (ref 4–15)
NEUTROPHILS # BLD AUTO: 4.2 K/UL (ref 1.8–7.7)
NEUTROPHILS NFR BLD: 76.6 % (ref 38–73)
NRBC BLD-RTO: 0 /100 WBC
PHOSPHATE SERPL-MCNC: 1.9 MG/DL (ref 2.7–4.5)
PLATELET # BLD AUTO: 96 K/UL (ref 150–450)
PMV BLD AUTO: 10.2 FL (ref 9.2–12.9)
POTASSIUM SERPL-SCNC: 3.2 MMOL/L (ref 3.5–5.1)
PROT SERPL-MCNC: 5.4 G/DL (ref 6–8.4)
RBC # BLD AUTO: 4.3 M/UL (ref 4.6–6.2)
SODIUM SERPL-SCNC: 131 MMOL/L (ref 136–145)
WBC # BLD AUTO: 5.47 K/UL (ref 3.9–12.7)

## 2024-06-23 PROCEDURE — 84100 ASSAY OF PHOSPHORUS: CPT | Performed by: STUDENT IN AN ORGANIZED HEALTH CARE EDUCATION/TRAINING PROGRAM

## 2024-06-23 PROCEDURE — 36415 COLL VENOUS BLD VENIPUNCTURE: CPT | Performed by: STUDENT IN AN ORGANIZED HEALTH CARE EDUCATION/TRAINING PROGRAM

## 2024-06-23 PROCEDURE — 85025 COMPLETE CBC W/AUTO DIFF WBC: CPT | Performed by: STUDENT IN AN ORGANIZED HEALTH CARE EDUCATION/TRAINING PROGRAM

## 2024-06-23 PROCEDURE — 83735 ASSAY OF MAGNESIUM: CPT | Performed by: STUDENT IN AN ORGANIZED HEALTH CARE EDUCATION/TRAINING PROGRAM

## 2024-06-23 PROCEDURE — 63600175 PHARM REV CODE 636 W HCPCS: Performed by: HOSPITALIST

## 2024-06-23 PROCEDURE — 80053 COMPREHEN METABOLIC PANEL: CPT | Performed by: STUDENT IN AN ORGANIZED HEALTH CARE EDUCATION/TRAINING PROGRAM

## 2024-06-23 PROCEDURE — 21400001 HC TELEMETRY ROOM

## 2024-06-23 PROCEDURE — 25000003 PHARM REV CODE 250: Performed by: HOSPITALIST

## 2024-06-23 PROCEDURE — C9113 INJ PANTOPRAZOLE SODIUM, VIA: HCPCS | Performed by: HOSPITALIST

## 2024-06-23 PROCEDURE — 63600175 PHARM REV CODE 636 W HCPCS: Performed by: STUDENT IN AN ORGANIZED HEALTH CARE EDUCATION/TRAINING PROGRAM

## 2024-06-23 PROCEDURE — 25000003 PHARM REV CODE 250: Performed by: STUDENT IN AN ORGANIZED HEALTH CARE EDUCATION/TRAINING PROGRAM

## 2024-06-23 PROCEDURE — 11000001 HC ACUTE MED/SURG PRIVATE ROOM

## 2024-06-23 RX ORDER — SODIUM,POTASSIUM PHOSPHATES 280-250MG
2 POWDER IN PACKET (EA) ORAL ONCE
Status: COMPLETED | OUTPATIENT
Start: 2024-06-23 | End: 2024-06-23

## 2024-06-23 RX ADMIN — PANCRELIPASE 1 CAPSULE: 30000; 6000; 19000 CAPSULE, DELAYED RELEASE PELLETS ORAL at 05:06

## 2024-06-23 RX ADMIN — POTASSIUM & SODIUM PHOSPHATES POWDER PACK 280-160-250 MG 2 PACKET: 280-160-250 PACK at 10:06

## 2024-06-23 RX ADMIN — HYDROMORPHONE HYDROCHLORIDE 2 MG: 2 INJECTION INTRAMUSCULAR; INTRAVENOUS; SUBCUTANEOUS at 05:06

## 2024-06-23 RX ADMIN — HYDROMORPHONE HYDROCHLORIDE 2 MG: 2 INJECTION INTRAMUSCULAR; INTRAVENOUS; SUBCUTANEOUS at 10:06

## 2024-06-23 RX ADMIN — HYDROMORPHONE HYDROCHLORIDE 2 MG: 2 INJECTION INTRAMUSCULAR; INTRAVENOUS; SUBCUTANEOUS at 08:06

## 2024-06-23 RX ADMIN — CARVEDILOL 3.12 MG: 3.12 TABLET, FILM COATED ORAL at 05:06

## 2024-06-23 RX ADMIN — PANTOPRAZOLE SODIUM 40 MG: 40 INJECTION, POWDER, FOR SOLUTION INTRAVENOUS at 08:06

## 2024-06-23 RX ADMIN — ONDANSETRON 4 MG: 2 INJECTION INTRAMUSCULAR; INTRAVENOUS at 05:06

## 2024-06-23 RX ADMIN — PIPERACILLIN SODIUM AND TAZOBACTAM SODIUM 4.5 G: 4; .5 INJECTION, POWDER, FOR SOLUTION INTRAVENOUS at 11:06

## 2024-06-23 RX ADMIN — ONDANSETRON 4 MG: 2 INJECTION INTRAMUSCULAR; INTRAVENOUS at 03:06

## 2024-06-23 RX ADMIN — ONDANSETRON 4 MG: 2 INJECTION INTRAMUSCULAR; INTRAVENOUS at 12:06

## 2024-06-23 RX ADMIN — SODIUM CHLORIDE, POTASSIUM CHLORIDE, SODIUM LACTATE AND CALCIUM CHLORIDE: 600; 310; 30; 20 INJECTION, SOLUTION INTRAVENOUS at 08:06

## 2024-06-23 RX ADMIN — PIPERACILLIN SODIUM AND TAZOBACTAM SODIUM 4.5 G: 4; .5 INJECTION, POWDER, FOR SOLUTION INTRAVENOUS at 07:06

## 2024-06-23 RX ADMIN — PIPERACILLIN SODIUM AND TAZOBACTAM SODIUM 4.5 G: 4; .5 INJECTION, POWDER, FOR SOLUTION INTRAVENOUS at 03:06

## 2024-06-23 RX ADMIN — ONDANSETRON 4 MG: 2 INJECTION INTRAMUSCULAR; INTRAVENOUS at 10:06

## 2024-06-23 RX ADMIN — CARVEDILOL 3.12 MG: 3.12 TABLET, FILM COATED ORAL at 08:06

## 2024-06-23 RX ADMIN — HYDROMORPHONE HYDROCHLORIDE 2 MG: 2 INJECTION INTRAMUSCULAR; INTRAVENOUS; SUBCUTANEOUS at 03:06

## 2024-06-23 RX ADMIN — HYDROMORPHONE HYDROCHLORIDE 2 MG: 2 INJECTION INTRAMUSCULAR; INTRAVENOUS; SUBCUTANEOUS at 12:06

## 2024-06-23 RX ADMIN — PANCRELIPASE 1 CAPSULE: 30000; 6000; 19000 CAPSULE, DELAYED RELEASE PELLETS ORAL at 12:06

## 2024-06-23 RX ADMIN — ONDANSETRON 4 MG: 2 INJECTION INTRAMUSCULAR; INTRAVENOUS at 08:06

## 2024-06-23 RX ADMIN — PANCRELIPASE 1 CAPSULE: 30000; 6000; 19000 CAPSULE, DELAYED RELEASE PELLETS ORAL at 09:06

## 2024-06-23 NOTE — SUBJECTIVE & OBJECTIVE
Interval History:   Patient with improvement of abdominal and low back pain. Continued nausea. LFTs improving. AES following.       Review of Systems   Constitutional:  Negative for activity change, appetite change, chills, diaphoresis, fatigue and fever.   HENT:  Negative for rhinorrhea and sore throat.    Respiratory:  Negative for cough, chest tightness and shortness of breath.    Cardiovascular:  Negative for chest pain and palpitations.   Gastrointestinal:  Positive for abdominal pain and nausea. Negative for constipation and diarrhea.   Endocrine: Negative for cold intolerance.   Genitourinary:  Negative for decreased urine volume and dysuria.   Musculoskeletal:  Positive for back pain. Negative for arthralgias and myalgias.   Skin:  Negative for rash and wound.   Neurological:  Negative for dizziness, weakness, numbness and headaches.   Psychiatric/Behavioral:  Negative for agitation, behavioral problems and confusion.      Objective:     Vital Signs (Most Recent):  Temp: 98 °F (36.7 °C) (06/23/24 1102)  Pulse: 61 (06/23/24 1438)  Resp: 16 (06/23/24 1230)  BP: 132/80 (06/23/24 1102)  SpO2: (!) 93 % (06/23/24 1102) Vital Signs (24h Range):  Temp:  [98 °F (36.7 °C)-99.5 °F (37.5 °C)] 98 °F (36.7 °C)  Pulse:  [61-91] 61  Resp:  [15-18] 16  SpO2:  [93 %-94 %] 93 %  BP: (132-166)/(80-94) 132/80     Weight: 100.9 kg (222 lb 7.1 oz)  Body mass index is 31.02 kg/m².    Intake/Output Summary (Last 24 hours) at 6/23/2024 1446  Last data filed at 6/23/2024 1234  Gross per 24 hour   Intake 460 ml   Output 2000 ml   Net -1540 ml         Physical Exam  Constitutional:       Appearance: Normal appearance. He is normal weight.   HENT:      Head: Normocephalic and atraumatic.      Mouth/Throat:      Mouth: Mucous membranes are moist.   Eyes:      Extraocular Movements: Extraocular movements intact.      Conjunctiva/sclera: Conjunctivae normal.   Cardiovascular:      Rate and Rhythm: Normal rate and regular rhythm.      Heart  sounds: No murmur heard.  Pulmonary:      Effort: Pulmonary effort is normal. No respiratory distress.      Breath sounds: Normal breath sounds. No wheezing or rales.   Abdominal:      General: Abdomen is flat. There is no distension.      Palpations: Abdomen is soft.      Tenderness: There is abdominal tenderness. There is no guarding.   Musculoskeletal:         General: No swelling or tenderness.   Skin:     Findings: No rash.   Neurological:      General: No focal deficit present.      Mental Status: He is alert and oriented to person, place, and time. Mental status is at baseline.   Psychiatric:         Mood and Affect: Mood normal.         Behavior: Behavior normal.             Significant Labs: All pertinent labs within the past 24 hours have been reviewed.  CBC:   Recent Labs   Lab 06/22/24  0853 06/23/24  0443   WBC 5.96 5.47   HGB 13.1* 13.1*   HCT 41.9 39.2*   PLT 96* 96*     CMP:   Recent Labs   Lab 06/22/24  0853 06/23/24  0443    131*   K 3.5 3.2*    97   CO2 27 27    115*   BUN 20 15   CREATININE 0.8 0.7   CALCIUM 8.5* 8.1*   PROT 5.4* 5.4*   ALBUMIN 2.6* 2.4*   BILITOT 4.7* 3.9*   ALKPHOS 162* 147*   AST 54* 33   * 86*   ANIONGAP 6* 7*       Significant Imaging: I have reviewed all pertinent imaging results/findings within the past 24 hours.

## 2024-06-23 NOTE — PROGRESS NOTES
[facility] - Med Surg ([place] [address])  [facility] Medicine  Progress Note    Patient Name: Fabrice Zamorano  MRN: [id number]  Patient Class: IP- Inpatient   Admission Date: 6/21/2024  Length of Stay: 2 days  Attending Physician: Solomon Felix MD  Primary Care Provider: Rebecca Mcmillan MD        Subjective:     Principal Problem:Acute on chronic pancreatitis        HPI:  59-year-old male with a history of DVT, splenic vein thrombosis, sleep apnea, hypothyroidism, chronic pancreatitis, and hypertension admitted to [facility] on June 19 with abdominal pain and nausea for 2 weeks.  He had no chest pain or dyspnea and no fever chills.  Bilirubin was noted to be elevated.  MRCP had findings concerning for pancreatitis.  He was admitted with acute on chronic pancreatitis and hypertension. Bilirubin increased from 4.4 on June 19 up to 10.1 on June 20.  ALT increased from 143 up to 327.  Abdominal pain persisted.  Blood pressure remained elevated.  Patient reported his abdomen felt distended.  The MRCP did not show filling defects in the common duct, there was increased biliary ductal dilatation compared with prior imaging.  Referring team spoke with Biliary Service at [facility].  Patient was started on Zosyn.  Patient transferred to Hospital Medicine at [facility] for Biliary Service evaluation of acute pancreatitis and rising bilirubin.      June 20: sodium 135, potassium 4.1, chloride 100, CO2 25, BUN 14, creatinine 0.8, glucose 155, total bilirubin 10.1, , , lipase 327     June 19: INR 1.1, troponin less than 0.006, white blood cells 6.86, hemoglobin 14.8, hematocrit 44, platelets 148  -MRCP had no filling defect in the common duct.  There is increased biliary ductal dilatation comparison to the prior CT from March 20, 2023.  Findings concerning for acute pancreatitis.  Surgical changes of pancreaticojejunostomy.  -EKG showed sinus bradycardia with ventricular rate 58.     March 12,  2024: EUS showed sonographic changes consistent with moderate to severe chronic pancreatitis.  Mass identified in the pancreatic biopsy.  Tissue obtained.    Overview/Hospital Course:  No notes on file    Interval History:   Patient with improvement of abdominal and low back pain. Continued nausea. LFTs improving. AES following.       Review of Systems   Constitutional:  Negative for activity change, appetite change, chills, diaphoresis, fatigue and fever.   HENT:  Negative for rhinorrhea and sore throat.    Respiratory:  Negative for cough, chest tightness and shortness of breath.    Cardiovascular:  Negative for chest pain and palpitations.   Gastrointestinal:  Positive for abdominal pain and nausea. Negative for constipation and diarrhea.   Endocrine: Negative for cold intolerance.   Genitourinary:  Negative for decreased urine volume and dysuria.   Musculoskeletal:  Positive for back pain. Negative for arthralgias and myalgias.   Skin:  Negative for rash and wound.   Neurological:  Negative for dizziness, weakness, numbness and headaches.   Psychiatric/Behavioral:  Negative for agitation, behavioral problems and confusion.      Objective:     Vital Signs (Most Recent):  Temp: 98 °F (36.7 °C) (06/23/24 1102)  Pulse: 61 (06/23/24 1438)  Resp: 16 (06/23/24 1230)  BP: 132/80 (06/23/24 1102)  SpO2: (!) 93 % (06/23/24 1102) Vital Signs (24h Range):  Temp:  [98 °F (36.7 °C)-99.5 °F (37.5 °C)] 98 °F (36.7 °C)  Pulse:  [61-91] 61  Resp:  [15-18] 16  SpO2:  [93 %-94 %] 93 %  BP: (132-166)/(80-94) 132/80     Weight: 100.9 kg (222 lb 7.1 oz)  Body mass index is 31.02 kg/m².    Intake/Output Summary (Last 24 hours) at 6/23/2024 1446  Last data filed at 6/23/2024 1234  Gross per 24 hour   Intake 460 ml   Output 2000 ml   Net -1540 ml         Physical Exam  Constitutional:       Appearance: Normal appearance. He is normal weight.   HENT:      Head: Normocephalic and atraumatic.      Mouth/Throat:      Mouth: Mucous membranes  are moist.   Eyes:      Extraocular Movements: Extraocular movements intact.      Conjunctiva/sclera: Conjunctivae normal.   Cardiovascular:      Rate and Rhythm: Normal rate and regular rhythm.      Heart sounds: No murmur heard.  Pulmonary:      Effort: Pulmonary effort is normal. No respiratory distress.      Breath sounds: Normal breath sounds. No wheezing or rales.   Abdominal:      General: Abdomen is flat. There is no distension.      Palpations: Abdomen is soft.      Tenderness: There is abdominal tenderness. There is no guarding.   Musculoskeletal:         General: No swelling or tenderness.   Skin:     Findings: No rash.   Neurological:      General: No focal deficit present.      Mental Status: He is alert and oriented to person, place, and time. Mental status is at baseline.   Psychiatric:         Mood and Affect: Mood normal.         Behavior: Behavior normal.             Significant Labs: All pertinent labs within the past 24 hours have been reviewed.  CBC:   Recent Labs   Lab 06/22/24  0853 06/23/24  0443   WBC 5.96 5.47   HGB 13.1* 13.1*   HCT 41.9 39.2*   PLT 96* 96*     CMP:   Recent Labs   Lab 06/22/24  0853 06/23/24  0443    131*   K 3.5 3.2*    97   CO2 27 27    115*   BUN 20 15   CREATININE 0.8 0.7   CALCIUM 8.5* 8.1*   PROT 5.4* 5.4*   ALBUMIN 2.6* 2.4*   BILITOT 4.7* 3.9*   ALKPHOS 162* 147*   AST 54* 33   * 86*   ANIONGAP 6* 7*       Significant Imaging: I have reviewed all pertinent imaging results/findings within the past 24 hours.    Assessment/Plan:      * Acute on chronic pancreatitis  Presenting with abdominal pain, nausea. Lipase levated to 327, Tbili 10.1  - MRCP 6/19 with evidence of acute pancreatitis. No clear biliary filling defect, but increased biliary ductal dilation in comparison to the prior CT   - AES consulted  -- CT A/P with heterogeneous enhancement of the pancreatic head and adjacent bowel may be postoperative and is similar to prior. New fluid  collection in the pancreatic tail, possibly representing collection. Intrahepatic and extrahepatic biliary ductal dilatation.  -- LR art 150 cc/hr  -- Improving LFTs  -- NPO midnight 6/24   - PRN pain and nausea meds    History of DVT (deep vein thrombosis)  - Known prior hx of DVT and plenic vein thrombosis. He is no longer on AC.   - Hold DVT ppx until after GI procedures, plan to start after interventions    Hypertension  - Continue home coreg      VTE Risk Mitigation (From admission, onward)           Ordered     IP VTE HIGH RISK PATIENT  Once         06/21/24 2125     Place sequential compression device  Until discontinued         06/21/24 2125                    Discharge Planning   AMBER: 6/24/2024     Code Status: Full Code   Is the patient medically ready for discharge?:     Reason for patient still in hospital (select all that apply): Patient trending condition, Laboratory test, Treatment, Consult recommendations, and Pending disposition  Discharge Plan A: Home with family                  Solomon Felix MD  Department of Hospital Medicine   [facility] - Med Surg ([place]

## 2024-06-23 NOTE — ASSESSMENT & PLAN NOTE
Presenting with abdominal pain, nausea. Lipase levated to 327, Tbili 10.1  - MRCP 6/19 with evidence of acute pancreatitis. No clear biliary filling defect, but increased biliary ductal dilation in comparison to the prior CT   - AES consulted  -- CT A/P with heterogeneous enhancement of the pancreatic head and adjacent bowel may be postoperative and is similar to prior. New fluid collection in the pancreatic tail, possibly representing collection. Intrahepatic and extrahepatic biliary ductal dilatation.  -- LR art 150 cc/hr  -- Improving LFTs  -- NPO midnight 6/24   - PRN pain and nausea meds

## 2024-06-23 NOTE — PLAN OF CARE
Problem: Adult Inpatient Plan of Care  Goal: Plan of Care Review  Outcome: Progressing     Problem: Fall Injury Risk  Goal: Absence of Fall and Fall-Related Injury  Outcome: Progressing     Problem: Pain Acute  Goal: Optimal Pain Control and Function  Outcome: Progressing     Problem: Nausea and Vomiting  Goal: Nausea and Vomiting Relief  Outcome: Progressing

## 2024-06-23 NOTE — PLAN OF CARE
Problem: Adult Inpatient Plan of Care  Goal: Plan of Care Review  Outcome: Not Progressing  Goal: Optimal Comfort and Wellbeing  Outcome: Not Progressing     Problem: Pain Acute  Goal: Optimal Pain Control and Function  Outcome: Not Progressing     AAOx4. Continues with ivf and iv zosyn. Prn pain management given as needed. Reports two episodes of nausea during shift.

## 2024-06-24 LAB
ALBUMIN SERPL BCP-MCNC: 2.3 G/DL (ref 3.5–5.2)
ALP SERPL-CCNC: 125 U/L (ref 55–135)
ALT SERPL W/O P-5'-P-CCNC: 63 U/L (ref 10–44)
ANION GAP SERPL CALC-SCNC: 7 MMOL/L (ref 8–16)
AST SERPL-CCNC: 28 U/L (ref 10–40)
BASOPHILS # BLD AUTO: 0.03 K/UL (ref 0–0.2)
BASOPHILS NFR BLD: 0.5 % (ref 0–1.9)
BILIRUB SERPL-MCNC: 2.4 MG/DL (ref 0.1–1)
BUN SERPL-MCNC: 14 MG/DL (ref 6–20)
CALCIUM SERPL-MCNC: 8.4 MG/DL (ref 8.7–10.5)
CHLORIDE SERPL-SCNC: 101 MMOL/L (ref 95–110)
CO2 SERPL-SCNC: 29 MMOL/L (ref 23–29)
CREAT SERPL-MCNC: 0.7 MG/DL (ref 0.5–1.4)
DIFFERENTIAL METHOD BLD: ABNORMAL
EOSINOPHIL # BLD AUTO: 0.1 K/UL (ref 0–0.5)
EOSINOPHIL NFR BLD: 2.4 % (ref 0–8)
ERYTHROCYTE [DISTWIDTH] IN BLOOD BY AUTOMATED COUNT: 15.4 % (ref 11.5–14.5)
EST. GFR  (NO RACE VARIABLE): >60 ML/MIN/1.73 M^2
GLUCOSE SERPL-MCNC: 108 MG/DL (ref 70–110)
HCT VFR BLD AUTO: 38.6 % (ref 40–54)
HGB BLD-MCNC: 12.3 G/DL (ref 14–18)
IMM GRANULOCYTES # BLD AUTO: 0.02 K/UL (ref 0–0.04)
IMM GRANULOCYTES NFR BLD AUTO: 0.3 % (ref 0–0.5)
LYMPHOCYTES # BLD AUTO: 0.8 K/UL (ref 1–4.8)
LYMPHOCYTES NFR BLD: 13.6 % (ref 18–48)
MAGNESIUM SERPL-MCNC: 2 MG/DL (ref 1.6–2.6)
MCH RBC QN AUTO: 29.9 PG (ref 27–31)
MCHC RBC AUTO-ENTMCNC: 31.9 G/DL (ref 32–36)
MCV RBC AUTO: 94 FL (ref 82–98)
MONOCYTES # BLD AUTO: 1.4 K/UL (ref 0.3–1)
MONOCYTES NFR BLD: 23.3 % (ref 4–15)
NEUTROPHILS # BLD AUTO: 3.5 K/UL (ref 1.8–7.7)
NEUTROPHILS NFR BLD: 59.9 % (ref 38–73)
NRBC BLD-RTO: 0 /100 WBC
PHOSPHATE SERPL-MCNC: 2.8 MG/DL (ref 2.7–4.5)
PLATELET # BLD AUTO: 88 K/UL (ref 150–450)
PMV BLD AUTO: 10.7 FL (ref 9.2–12.9)
POTASSIUM SERPL-SCNC: 3.3 MMOL/L (ref 3.5–5.1)
PROT SERPL-MCNC: 5.2 G/DL (ref 6–8.4)
RBC # BLD AUTO: 4.12 M/UL (ref 4.6–6.2)
SODIUM SERPL-SCNC: 137 MMOL/L (ref 136–145)
WBC # BLD AUTO: 5.8 K/UL (ref 3.9–12.7)

## 2024-06-24 PROCEDURE — C9113 INJ PANTOPRAZOLE SODIUM, VIA: HCPCS | Performed by: HOSPITALIST

## 2024-06-24 PROCEDURE — 25000003 PHARM REV CODE 250: Performed by: HOSPITALIST

## 2024-06-24 PROCEDURE — 21400001 HC TELEMETRY ROOM

## 2024-06-24 PROCEDURE — 25000003 PHARM REV CODE 250: Performed by: STUDENT IN AN ORGANIZED HEALTH CARE EDUCATION/TRAINING PROGRAM

## 2024-06-24 PROCEDURE — 80053 COMPREHEN METABOLIC PANEL: CPT | Performed by: STUDENT IN AN ORGANIZED HEALTH CARE EDUCATION/TRAINING PROGRAM

## 2024-06-24 PROCEDURE — 83735 ASSAY OF MAGNESIUM: CPT | Performed by: STUDENT IN AN ORGANIZED HEALTH CARE EDUCATION/TRAINING PROGRAM

## 2024-06-24 PROCEDURE — 63600175 PHARM REV CODE 636 W HCPCS: Performed by: HOSPITALIST

## 2024-06-24 PROCEDURE — 11000001 HC ACUTE MED/SURG PRIVATE ROOM

## 2024-06-24 PROCEDURE — 36415 COLL VENOUS BLD VENIPUNCTURE: CPT | Performed by: STUDENT IN AN ORGANIZED HEALTH CARE EDUCATION/TRAINING PROGRAM

## 2024-06-24 PROCEDURE — 63600175 PHARM REV CODE 636 W HCPCS: Performed by: STUDENT IN AN ORGANIZED HEALTH CARE EDUCATION/TRAINING PROGRAM

## 2024-06-24 PROCEDURE — 85025 COMPLETE CBC W/AUTO DIFF WBC: CPT | Performed by: STUDENT IN AN ORGANIZED HEALTH CARE EDUCATION/TRAINING PROGRAM

## 2024-06-24 PROCEDURE — 94761 N-INVAS EAR/PLS OXIMETRY MLT: CPT

## 2024-06-24 PROCEDURE — 84100 ASSAY OF PHOSPHORUS: CPT | Performed by: STUDENT IN AN ORGANIZED HEALTH CARE EDUCATION/TRAINING PROGRAM

## 2024-06-24 RX ORDER — POLYETHYLENE GLYCOL 3350 17 G/17G
17 POWDER, FOR SOLUTION ORAL 2 TIMES DAILY PRN
Status: DISCONTINUED | OUTPATIENT
Start: 2024-06-24 | End: 2024-06-25 | Stop reason: HOSPADM

## 2024-06-24 RX ORDER — OXYCODONE HYDROCHLORIDE 10 MG/1
10 TABLET ORAL EVERY 4 HOURS PRN
Status: CANCELLED | OUTPATIENT
Start: 2024-06-24

## 2024-06-24 RX ORDER — HYDROCORTISONE 25 MG/G
CREAM TOPICAL 2 TIMES DAILY
Status: DISCONTINUED | OUTPATIENT
Start: 2024-06-24 | End: 2024-06-25 | Stop reason: HOSPADM

## 2024-06-24 RX ORDER — HYDROMORPHONE HYDROCHLORIDE 1 MG/ML
1.5 INJECTION, SOLUTION INTRAMUSCULAR; INTRAVENOUS; SUBCUTANEOUS
Status: DISCONTINUED | OUTPATIENT
Start: 2024-06-24 | End: 2024-06-24

## 2024-06-24 RX ORDER — PANTOPRAZOLE SODIUM 40 MG/1
40 TABLET, DELAYED RELEASE ORAL DAILY
Status: DISCONTINUED | OUTPATIENT
Start: 2024-06-25 | End: 2024-06-25 | Stop reason: HOSPADM

## 2024-06-24 RX ORDER — OXYCODONE HYDROCHLORIDE 5 MG/1
5 TABLET ORAL EVERY 4 HOURS PRN
Status: DISCONTINUED | OUTPATIENT
Start: 2024-06-24 | End: 2024-06-24

## 2024-06-24 RX ORDER — OXYCODONE HYDROCHLORIDE 10 MG/1
10 TABLET ORAL EVERY 4 HOURS PRN
Status: DISCONTINUED | OUTPATIENT
Start: 2024-06-24 | End: 2024-06-25 | Stop reason: HOSPADM

## 2024-06-24 RX ORDER — POTASSIUM CHLORIDE 20 MEQ/1
40 TABLET, EXTENDED RELEASE ORAL ONCE
Status: COMPLETED | OUTPATIENT
Start: 2024-06-24 | End: 2024-06-24

## 2024-06-24 RX ADMIN — HYDROCORTISONE: 25 CREAM TOPICAL at 10:06

## 2024-06-24 RX ADMIN — OXYCODONE HYDROCHLORIDE 10 MG: 10 TABLET ORAL at 06:06

## 2024-06-24 RX ADMIN — PANCRELIPASE 1 CAPSULE: 30000; 6000; 19000 CAPSULE, DELAYED RELEASE PELLETS ORAL at 04:06

## 2024-06-24 RX ADMIN — CARVEDILOL 3.12 MG: 3.12 TABLET, FILM COATED ORAL at 04:06

## 2024-06-24 RX ADMIN — PANCRELIPASE 1 CAPSULE: 30000; 6000; 19000 CAPSULE, DELAYED RELEASE PELLETS ORAL at 11:06

## 2024-06-24 RX ADMIN — PIPERACILLIN SODIUM AND TAZOBACTAM SODIUM 4.5 G: 4; .5 INJECTION, POWDER, FOR SOLUTION INTRAVENOUS at 04:06

## 2024-06-24 RX ADMIN — POTASSIUM CHLORIDE 40 MEQ: 1500 TABLET, EXTENDED RELEASE ORAL at 08:06

## 2024-06-24 RX ADMIN — OXYCODONE 15 MG: 5 TABLET ORAL at 12:06

## 2024-06-24 RX ADMIN — PANTOPRAZOLE SODIUM 40 MG: 40 INJECTION, POWDER, FOR SOLUTION INTRAVENOUS at 08:06

## 2024-06-24 RX ADMIN — CARVEDILOL 3.12 MG: 3.12 TABLET, FILM COATED ORAL at 09:06

## 2024-06-24 RX ADMIN — OXYCODONE 15 MG: 5 TABLET ORAL at 10:06

## 2024-06-24 RX ADMIN — PANCRELIPASE 1 CAPSULE: 30000; 6000; 19000 CAPSULE, DELAYED RELEASE PELLETS ORAL at 07:06

## 2024-06-24 RX ADMIN — OXYCODONE 5 MG: 5 TABLET ORAL at 09:06

## 2024-06-24 RX ADMIN — SODIUM CHLORIDE, POTASSIUM CHLORIDE, SODIUM LACTATE AND CALCIUM CHLORIDE: 600; 310; 30; 20 INJECTION, SOLUTION INTRAVENOUS at 10:06

## 2024-06-24 NOTE — TREATMENT PLAN
AES Treatment Plan    Fabrice Zamorano is a 59 y.o. male admitted to hospital 6/21/2024 (Hospital Day: 4) due to Acute on chronic pancreatitis.     Interval History    This AM, the patient noticed a small streak of BRB in the commode. Denies melena or large volume hematochezia.     CT pancreas protocol reveals new fluid collection in the tail of pancreas with intrahepatic & extrahepatic ductal dilation. T bili 3.9>2.4, AST 28, ALT 63.     Objective  Temp:  [96.8 °F (36 °C)-98.2 °F (36.8 °C)] 98.2 °F (36.8 °C) (06/24 0719)  Pulse:  [61-91] 69 (06/24 0719)  BP: (122-150)/(74-88) 145/88 (06/24 0719)  Resp:  [16-18] 18 (06/24 0924)  SpO2:  [93 %-96 %] 96 % (06/24 0914)    General: Alert, Oriented x3, no distress  Abdomen: Non-distended. Normal tympany. Soft. Non-tender. No peritoneal signs.    Laboratory  Lab Results   Component Value Date    WBC 5.80 06/24/2024    HGB 12.3 (L) 06/24/2024    HCT 38.6 (L) 06/24/2024    MCV 94 06/24/2024    PLT 88 (L) 06/24/2024       Lab Results   Component Value Date    ALT 63 (H) 06/24/2024    AST 28 06/24/2024    ALKPHOS 125 06/24/2024    BILITOT 2.4 (H) 06/24/2024       Assessment  Fabrice Zamorano is a 59 y.o. male with DVT not on AC, Autoimmune pancreatitis (PRSS 1 mutation) s/p [place] procedure/lateral pancreaticojejunostomy, s/p celiac plexus block (last 3/12/2024), Splenic Vein Thrombosis, Hypothyroidism, HTN and BRAIN that presents to [facility] as a transfer from OSH for further evaluation. Admitted to OSH on 6/19 with complaints of progressively worsening upper abdominal pain over the past 2 weeks. Diagnosed with acute pancreatitis with imaging findings noted below and lipase elevated to 327 on 6/20. AES consulted for further assistance with management. Of note, patient is known to Dr. Brian Hills and given his history of AI pancreatitis, it was discussed with the patient that he may ultimately be a good candidate for a Alejandro procedure vs a completion total pancreatectomy/Whipple.  LFTs now declining.      Prior Endoscopies:   3/12/24- EUS- Endosonographic imaging of the pancreas showed sonographic changes consistent with moderate-severe chronic pancreatitis.  A mass was identified in the pancreatic body. Tissue was obtained from this exam, and results are pending. However, the endosonographic appearance is benign inflammatory changes. Fine needle biopsy performed. Celiac plexus block performed. Path benign.   23- EUS- Endosonographic imaging of the pancreas showed sonographic changes consistent with moderate-severe chronic pancreatitis as previously known and seen; no sign of pancreas duct dilation in setting of prior pancreaticojejunostomy procedure (adequately decompressed). Celiac plexus block performed.   24- EUS- There was no sign of significant pathology in   the common bile duct. Endosonographic imaging of the pancreas showed sonographic changes consistent with moderate-severe chronic pancreatitis.   23- ERCP- The major papilla appeared edematous. Prior biliary sphincterotomy appeared open. Prior pancreatic sphincterotomy appeared open. The ventral pancreatic duct was swept and debris was found. I do think there is any opportunity to improve PD drainage at this point. The PD in the HOP drains into the duodenum. The PD in the body/tail appears to drain into the jejunum (via the surgical pancreaticojejunostomy).      Imagin24- MRCP without contrast- There are inflammatory changes about the pancreas concerning for acute pancreatitis. Mild intra and extrahepatic biliary ductal dilation. The common duct measures 13 mm just superior to the pancreas. This appears more prominent in comparison to the prior CT exam of 2023. Evidence of pancreaticojejunostomy. Prior cholecystectomy.     Problem List:  Acute on Chronic Pancreatitis   Biliary Ductal Dilatation   S/p Lateral Pancreaticojejunostomy/[place] Procedure (2021)   Jaundice     Plan  - No biliary  intervention at this time. Do not foresee need for ERCP if LFTs continue to improve. Continue diet.   - For hemorrhoidal bleeding: Good bowel regimen, Anusol BID x 7 days, Sitz bath BID.   - Daily CBC, CMP.  - Plan of care was discussed with primary team. Continue medical management of acute pancreatitis.   - We will continue to follow.    Thank you for involving us in the care of Fabrice Zamorano. Please call with any additional questions, concerns or changes in the patient's clinical status.    Tariq Magaña MD, PGY-V  Gastroenterology Fellow  [facility]

## 2024-06-24 NOTE — PROGRESS NOTES
[facility] - Med Surg ([place] [address])  [facility] Medicine  Progress Note    Patient Name: Fabrice Zamorano  MRN: [id number]  Patient Class: IP- Inpatient   Admission Date: 6/21/2024  Length of Stay: 3 days  Attending Physician: Solomon Felix MD  Primary Care Provider: Rebecca Mcmillan MD        Subjective:     Principal Problem:Acute on chronic pancreatitis        HPI:  59-year-old male with a history of DVT, splenic vein thrombosis, sleep apnea, hypothyroidism, chronic pancreatitis, and hypertension admitted to [facility] on June 19 with abdominal pain and nausea for 2 weeks.  He had no chest pain or dyspnea and no fever chills.  Bilirubin was noted to be elevated.  MRCP had findings concerning for pancreatitis.  He was admitted with acute on chronic pancreatitis and hypertension. Bilirubin increased from 4.4 on June 19 up to 10.1 on June 20.  ALT increased from 143 up to 327.  Abdominal pain persisted.  Blood pressure remained elevated.  Patient reported his abdomen felt distended.  The MRCP did not show filling defects in the common duct, there was increased biliary ductal dilatation compared with prior imaging.  Referring team spoke with Biliary Service at [facility].  Patient was started on Zosyn.  Patient transferred to Hospital Medicine at [facility] for Biliary Service evaluation of acute pancreatitis and rising bilirubin.      June 20: sodium 135, potassium 4.1, chloride 100, CO2 25, BUN 14, creatinine 0.8, glucose 155, total bilirubin 10.1, , , lipase 327     June 19: INR 1.1, troponin less than 0.006, white blood cells 6.86, hemoglobin 14.8, hematocrit 44, platelets 148  -MRCP had no filling defect in the common duct.  There is increased biliary ductal dilatation comparison to the prior CT from March 20, 2023.  Findings concerning for acute pancreatitis.  Surgical changes of pancreaticojejunostomy.  -EKG showed sinus bradycardia with ventricular rate 58.     March 12,  2024: EUS showed sonographic changes consistent with moderate to severe chronic pancreatitis.  Mass identified in the pancreatic biopsy.  Tissue obtained.    Overview/Hospital Course:  No notes on file    Interval History:   No events overnight. Patient with improvement of abdominal symptoms and LFTs. Continue IVF and pain control. AES following.       Review of Systems   Constitutional:  Negative for activity change, appetite change, chills, diaphoresis, fatigue and fever.   HENT:  Negative for rhinorrhea and sore throat.    Respiratory:  Negative for cough, chest tightness and shortness of breath.    Cardiovascular:  Negative for chest pain and palpitations.   Gastrointestinal:  Negative for abdominal pain, constipation, diarrhea and nausea.   Endocrine: Negative for cold intolerance.   Genitourinary:  Negative for decreased urine volume and dysuria.   Musculoskeletal:  Positive for back pain. Negative for arthralgias and myalgias.   Skin:  Negative for rash and wound.   Neurological:  Negative for dizziness, weakness, numbness and headaches.   Psychiatric/Behavioral:  Negative for agitation, behavioral problems and confusion.      Objective:     Vital Signs (Most Recent):  Temp: 98.3 °F (36.8 °C) (06/24/24 1123)  Pulse: 76 (06/24/24 1123)  Resp: 18 (06/24/24 1253)  BP: (!) 145/89 (06/24/24 1123)  SpO2: 96 % (06/24/24 1123) Vital Signs (24h Range):  Temp:  [96.8 °F (36 °C)-98.3 °F (36.8 °C)] 98.3 °F (36.8 °C)  Pulse:  [61-76] 76  Resp:  [16-18] 18  SpO2:  [93 %-96 %] 96 %  BP: (122-150)/(74-89) 145/89     Weight: 100.9 kg (222 lb 7.1 oz)  Body mass index is 31.02 kg/m².    Intake/Output Summary (Last 24 hours) at 6/24/2024 1415  Last data filed at 6/24/2024 1018  Gross per 24 hour   Intake 840 ml   Output 600 ml   Net 240 ml         Physical Exam  Constitutional:       Appearance: Normal appearance. He is normal weight.   HENT:      Head: Normocephalic and atraumatic.      Mouth/Throat:      Mouth: Mucous  membranes are moist.   Eyes:      Extraocular Movements: Extraocular movements intact.      Conjunctiva/sclera: Conjunctivae normal.   Cardiovascular:      Rate and Rhythm: Normal rate and regular rhythm.      Heart sounds: No murmur heard.  Pulmonary:      Effort: Pulmonary effort is normal. No respiratory distress.      Breath sounds: Normal breath sounds. No wheezing or rales.   Abdominal:      General: Abdomen is flat. There is no distension.      Palpations: Abdomen is soft.      Tenderness: There is no abdominal tenderness. There is no guarding.   Musculoskeletal:         General: No swelling or tenderness.   Skin:     Findings: No rash.   Neurological:      General: No focal deficit present.      Mental Status: He is alert and oriented to person, place, and time. Mental status is at baseline.   Psychiatric:         Mood and Affect: Mood normal.         Behavior: Behavior normal.             Significant Labs: All pertinent labs within the past 24 hours have been reviewed.  CBC:   Recent Labs   Lab 06/23/24  0443 06/24/24  0440   WBC 5.47 5.80   HGB 13.1* 12.3*   HCT 39.2* 38.6*   PLT 96* 88*     CMP:   Recent Labs   Lab 06/23/24  0443 06/24/24  0440   * 137   K 3.2* 3.3*   CL 97 101   CO2 27 29   * 108   BUN 15 14   CREATININE 0.7 0.7   CALCIUM 8.1* 8.4*   PROT 5.4* 5.2*   ALBUMIN 2.4* 2.3*   BILITOT 3.9* 2.4*   ALKPHOS 147* 125   AST 33 28   ALT 86* 63*   ANIONGAP 7* 7*       Significant Imaging: I have reviewed all pertinent imaging results/findings within the past 24 hours.    Assessment/Plan:      * Acute on chronic pancreatitis  Presenting with abdominal pain, nausea. Lipase levated to 327, Tbili 10.1  - MRCP 6/19 with evidence of acute pancreatitis. No clear biliary filling defect, but increased biliary ductal dilation in comparison to the prior CT   - AES consulted  -- CT A/P with heterogeneous enhancement of the pancreatic head and adjacent bowel may be postoperative and is similar to  prior. New fluid collection in the pancreatic tail, possibly representing collection. Intrahepatic and extrahepatic biliary ductal dilatation.  -- IV Zosyn x5 days  -- LR at 150 cc/hr  -- Improving LFTs  - PRN pain and nausea meds    History of DVT (deep vein thrombosis)  - Known prior hx of DVT and plenic vein thrombosis. He is no longer on AC.   - Hold DVT ppx until after GI procedures, plan to start after interventions    Hypertension  - Continue home coreg      VTE Risk Mitigation (From admission, onward)           Ordered     IP VTE HIGH RISK PATIENT  Once         06/21/24 2125     Place sequential compression device  Until discontinued         06/21/24 2125                    Discharge Planning   AMBER: 6/27/2024     Code Status: Full Code   Is the patient medically ready for discharge?:     Reason for patient still in hospital (select all that apply): Patient trending condition, Laboratory test, Treatment, Consult recommendations, and Pending disposition  Discharge Plan A: Home with family                  Solomon Felix MD  Department of Hospital Medicine   [facility] ([place]

## 2024-06-24 NOTE — SUBJECTIVE & OBJECTIVE
Interval History:   No events overnight. Patient with improvement of abdominal symptoms and LFTs. Continue IVF and pain control. AES following.       Review of Systems   Constitutional:  Negative for activity change, appetite change, chills, diaphoresis, fatigue and fever.   HENT:  Negative for rhinorrhea and sore throat.    Respiratory:  Negative for cough, chest tightness and shortness of breath.    Cardiovascular:  Negative for chest pain and palpitations.   Gastrointestinal:  Negative for abdominal pain, constipation, diarrhea and nausea.   Endocrine: Negative for cold intolerance.   Genitourinary:  Negative for decreased urine volume and dysuria.   Musculoskeletal:  Positive for back pain. Negative for arthralgias and myalgias.   Skin:  Negative for rash and wound.   Neurological:  Negative for dizziness, weakness, numbness and headaches.   Psychiatric/Behavioral:  Negative for agitation, behavioral problems and confusion.      Objective:     Vital Signs (Most Recent):  Temp: 98.3 °F (36.8 °C) (06/24/24 1123)  Pulse: 76 (06/24/24 1123)  Resp: 18 (06/24/24 1253)  BP: (!) 145/89 (06/24/24 1123)  SpO2: 96 % (06/24/24 1123) Vital Signs (24h Range):  Temp:  [96.8 °F (36 °C)-98.3 °F (36.8 °C)] 98.3 °F (36.8 °C)  Pulse:  [61-76] 76  Resp:  [16-18] 18  SpO2:  [93 %-96 %] 96 %  BP: (122-150)/(74-89) 145/89     Weight: 100.9 kg (222 lb 7.1 oz)  Body mass index is 31.02 kg/m².    Intake/Output Summary (Last 24 hours) at 6/24/2024 1415  Last data filed at 6/24/2024 1018  Gross per 24 hour   Intake 840 ml   Output 600 ml   Net 240 ml         Physical Exam  Constitutional:       Appearance: Normal appearance. He is normal weight.   HENT:      Head: Normocephalic and atraumatic.      Mouth/Throat:      Mouth: Mucous membranes are moist.   Eyes:      Extraocular Movements: Extraocular movements intact.      Conjunctiva/sclera: Conjunctivae normal.   Cardiovascular:      Rate and Rhythm: Normal rate and regular rhythm.       Heart sounds: No murmur heard.  Pulmonary:      Effort: Pulmonary effort is normal. No respiratory distress.      Breath sounds: Normal breath sounds. No wheezing or rales.   Abdominal:      General: Abdomen is flat. There is no distension.      Palpations: Abdomen is soft.      Tenderness: There is no abdominal tenderness. There is no guarding.   Musculoskeletal:         General: No swelling or tenderness.   Skin:     Findings: No rash.   Neurological:      General: No focal deficit present.      Mental Status: He is alert and oriented to person, place, and time. Mental status is at baseline.   Psychiatric:         Mood and Affect: Mood normal.         Behavior: Behavior normal.             Significant Labs: All pertinent labs within the past 24 hours have been reviewed.  CBC:   Recent Labs   Lab 06/23/24  0443 06/24/24  0440   WBC 5.47 5.80   HGB 13.1* 12.3*   HCT 39.2* 38.6*   PLT 96* 88*     CMP:   Recent Labs   Lab 06/23/24  0443 06/24/24  0440   * 137   K 3.2* 3.3*   CL 97 101   CO2 27 29   * 108   BUN 15 14   CREATININE 0.7 0.7   CALCIUM 8.1* 8.4*   PROT 5.4* 5.2*   ALBUMIN 2.4* 2.3*   BILITOT 3.9* 2.4*   ALKPHOS 147* 125   AST 33 28   ALT 86* 63*   ANIONGAP 7* 7*       Significant Imaging: I have reviewed all pertinent imaging results/findings within the past 24 hours.

## 2024-06-24 NOTE — ASSESSMENT & PLAN NOTE
Presenting with abdominal pain, nausea. Lipase levated to 327, Tbili 10.1  - MRCP 6/19 with evidence of acute pancreatitis. No clear biliary filling defect, but increased biliary ductal dilation in comparison to the prior CT   - AES consulted  -- CT A/P with heterogeneous enhancement of the pancreatic head and adjacent bowel may be postoperative and is similar to prior. New fluid collection in the pancreatic tail, possibly representing collection. Intrahepatic and extrahepatic biliary ductal dilatation.  -- IV Zosyn x5 days  -- LR at 150 cc/hr  -- Improving LFTs  - PRN pain and nausea meds

## 2024-06-25 ENCOUNTER — PATIENT MESSAGE (OUTPATIENT)
Dept: ENDOSCOPY | Facility: HOSPITAL | Age: 59
End: 2024-06-25
Payer: MEDICARE

## 2024-06-25 VITALS
BODY MASS INDEX: 30.86 KG/M2 | WEIGHT: 220.44 LBS | OXYGEN SATURATION: 96 % | RESPIRATION RATE: 18 BRPM | HEIGHT: 71 IN | SYSTOLIC BLOOD PRESSURE: 165 MMHG | HEART RATE: 73 BPM | DIASTOLIC BLOOD PRESSURE: 96 MMHG | TEMPERATURE: 98 F

## 2024-06-25 LAB
ALBUMIN SERPL BCP-MCNC: 2.5 G/DL (ref 3.5–5.2)
ALP SERPL-CCNC: 123 U/L (ref 55–135)
ALT SERPL W/O P-5'-P-CCNC: 53 U/L (ref 10–44)
ANION GAP SERPL CALC-SCNC: 8 MMOL/L (ref 8–16)
AST SERPL-CCNC: 24 U/L (ref 10–40)
BASOPHILS # BLD AUTO: 0.02 K/UL (ref 0–0.2)
BASOPHILS NFR BLD: 0.3 % (ref 0–1.9)
BILIRUB SERPL-MCNC: 2 MG/DL (ref 0.1–1)
BUN SERPL-MCNC: 10 MG/DL (ref 6–20)
CALCIUM SERPL-MCNC: 8.6 MG/DL (ref 8.7–10.5)
CHLORIDE SERPL-SCNC: 104 MMOL/L (ref 95–110)
CO2 SERPL-SCNC: 28 MMOL/L (ref 23–29)
CREAT SERPL-MCNC: 0.7 MG/DL (ref 0.5–1.4)
DIFFERENTIAL METHOD BLD: ABNORMAL
EOSINOPHIL # BLD AUTO: 0.2 K/UL (ref 0–0.5)
EOSINOPHIL NFR BLD: 2.5 % (ref 0–8)
ERYTHROCYTE [DISTWIDTH] IN BLOOD BY AUTOMATED COUNT: 15.3 % (ref 11.5–14.5)
EST. GFR  (NO RACE VARIABLE): >60 ML/MIN/1.73 M^2
GLUCOSE SERPL-MCNC: 94 MG/DL (ref 70–110)
HCT VFR BLD AUTO: 38.5 % (ref 40–54)
HGB BLD-MCNC: 12.3 G/DL (ref 14–18)
IMM GRANULOCYTES # BLD AUTO: 0.06 K/UL (ref 0–0.04)
IMM GRANULOCYTES NFR BLD AUTO: 0.9 % (ref 0–0.5)
LYMPHOCYTES # BLD AUTO: 1.1 K/UL (ref 1–4.8)
LYMPHOCYTES NFR BLD: 17.9 % (ref 18–48)
MAGNESIUM SERPL-MCNC: 1.9 MG/DL (ref 1.6–2.6)
MCH RBC QN AUTO: 29.6 PG (ref 27–31)
MCHC RBC AUTO-ENTMCNC: 31.9 G/DL (ref 32–36)
MCV RBC AUTO: 93 FL (ref 82–98)
MONOCYTES # BLD AUTO: 0.8 K/UL (ref 0.3–1)
MONOCYTES NFR BLD: 12.5 % (ref 4–15)
NEUTROPHILS # BLD AUTO: 4.2 K/UL (ref 1.8–7.7)
NEUTROPHILS NFR BLD: 65.9 % (ref 38–73)
NRBC BLD-RTO: 0 /100 WBC
PHOSPHATE SERPL-MCNC: 3.1 MG/DL (ref 2.7–4.5)
PLATELET # BLD AUTO: 116 K/UL (ref 150–450)
PMV BLD AUTO: 10.4 FL (ref 9.2–12.9)
POTASSIUM SERPL-SCNC: 3.1 MMOL/L (ref 3.5–5.1)
PROT SERPL-MCNC: 5.4 G/DL (ref 6–8.4)
RBC # BLD AUTO: 4.15 M/UL (ref 4.6–6.2)
SODIUM SERPL-SCNC: 140 MMOL/L (ref 136–145)
WBC # BLD AUTO: 6.38 K/UL (ref 3.9–12.7)

## 2024-06-25 PROCEDURE — 85025 COMPLETE CBC W/AUTO DIFF WBC: CPT | Performed by: STUDENT IN AN ORGANIZED HEALTH CARE EDUCATION/TRAINING PROGRAM

## 2024-06-25 PROCEDURE — 25000003 PHARM REV CODE 250: Performed by: STUDENT IN AN ORGANIZED HEALTH CARE EDUCATION/TRAINING PROGRAM

## 2024-06-25 PROCEDURE — 80053 COMPREHEN METABOLIC PANEL: CPT | Performed by: STUDENT IN AN ORGANIZED HEALTH CARE EDUCATION/TRAINING PROGRAM

## 2024-06-25 PROCEDURE — 63600175 PHARM REV CODE 636 W HCPCS: Performed by: STUDENT IN AN ORGANIZED HEALTH CARE EDUCATION/TRAINING PROGRAM

## 2024-06-25 PROCEDURE — 25000003 PHARM REV CODE 250: Performed by: HOSPITALIST

## 2024-06-25 PROCEDURE — 84100 ASSAY OF PHOSPHORUS: CPT | Performed by: STUDENT IN AN ORGANIZED HEALTH CARE EDUCATION/TRAINING PROGRAM

## 2024-06-25 PROCEDURE — 36415 COLL VENOUS BLD VENIPUNCTURE: CPT | Performed by: STUDENT IN AN ORGANIZED HEALTH CARE EDUCATION/TRAINING PROGRAM

## 2024-06-25 PROCEDURE — 83735 ASSAY OF MAGNESIUM: CPT | Performed by: STUDENT IN AN ORGANIZED HEALTH CARE EDUCATION/TRAINING PROGRAM

## 2024-06-25 RX ORDER — POTASSIUM CHLORIDE 20 MEQ/1
40 TABLET, EXTENDED RELEASE ORAL ONCE
Status: COMPLETED | OUTPATIENT
Start: 2024-06-25 | End: 2024-06-25

## 2024-06-25 RX ORDER — OXYCODONE AND ACETAMINOPHEN 10; 325 MG/1; MG/1
1 TABLET ORAL EVERY 4 HOURS PRN
Qty: 21 TABLET | Refills: 0 | Status: SHIPPED | OUTPATIENT
Start: 2024-06-25

## 2024-06-25 RX ADMIN — PANCRELIPASE 1 CAPSULE: 30000; 6000; 19000 CAPSULE, DELAYED RELEASE PELLETS ORAL at 07:06

## 2024-06-25 RX ADMIN — PANTOPRAZOLE SODIUM 40 MG: 40 TABLET, DELAYED RELEASE ORAL at 07:06

## 2024-06-25 RX ADMIN — POTASSIUM CHLORIDE 40 MEQ: 1500 TABLET, EXTENDED RELEASE ORAL at 07:06

## 2024-06-25 RX ADMIN — OXYCODONE HYDROCHLORIDE 10 MG: 10 TABLET ORAL at 05:06

## 2024-06-25 RX ADMIN — SODIUM CHLORIDE, POTASSIUM CHLORIDE, SODIUM LACTATE AND CALCIUM CHLORIDE: 600; 310; 30; 20 INJECTION, SOLUTION INTRAVENOUS at 05:06

## 2024-06-25 RX ADMIN — CARVEDILOL 3.12 MG: 3.12 TABLET, FILM COATED ORAL at 07:06

## 2024-06-25 NOTE — PLAN OF CARE
[facility] - [facility] ([facility]-)  Discharge Reassessment    Primary Care Provider: Rebecca Mcmillan MD    Expected Discharge Date: 6/25/2024    Reassessment (most recent)       Discharge Reassessment - 06/25/24 1406          Discharge Reassessment    Assessment Type Discharge Planning Reassessment (P)      Did the patient's condition or plan change since previous assessment? No (P)      Discharge Plan discussed with: Spouse/sig other (P)      Name(s) and Number(s) Aliza Zamorano (Spouse)  [phone] (P)      Discharge Plan A Home with family (P)      Discharge Plan B Home (P)      DME Needed Upon Discharge  none (P)      Transition of Care Barriers None (P)         Post-Acute Status    Post-Acute Authorization Other (P)      Other Status Awaiting f/u Appts (P)                  Discharge Plan A and Plan B have been determined by review of patient's clinical status, future medical and therapeutic needs, and coverage/benefits for post-acute care in coordination with multidisciplinary team members.                     Kevan Schroeder, MELIZA, LMSW  [facility]  Case Management  Ext. [phone]

## 2024-06-25 NOTE — PLAN OF CARE
[facility] - Med Surg ([facility])  Discharge Final Note    Primary Care Provider: Rebecca Mcmillan MD    Expected Discharge Date: 6/25/2024    Final Discharge Note (most recent)       Final Note - 06/25/24 1406          Final Note    Assessment Type Final Discharge Note (P)      Anticipated Discharge Disposition Home or Self Care (P)      What phone number can be called within the next 1-3 days to see how you are doing after discharge? [phone] (P)         Post-Acute Status    Post-Acute Authorization Other     Other Status Awaiting f/u Appts                     Important Message from Medicare               Patient discharged home w/ family.                  MELIZA Gooden, LMSW  [facility]  Case Management  Ext. [phone]

## 2024-06-25 NOTE — PLAN OF CARE
Nurses Note -- 4 Eyes      6/25/2024   2:00 AM      Skin assessed during: Daily Assessment      [x] No Altered Skin Integrity Present    []Prevention Measures Documented      [] Yes- Altered Skin Integrity Present or Discovered   [] LDA Added if Not in Epic (Describe Wound)   [] New Altered Skin Integrity was Present on Admit and Documented in LDA   [] Wound Image Taken    Wound Care Consulted? No    Attending Nurse:  SHELBY Lee RN/Staff Member:   SHOAIB Pierson

## 2024-06-25 NOTE — PLAN OF CARE
Nurses Note -- 4 Eyes      6/25/2024   4:09 AM      Skin assessed during: Daily Assessment      [x] No Altered Skin Integrity Present    [x]Prevention Measures Documented      [] Yes- Altered Skin Integrity Present or Discovered   [] LDA Added if Not in Epic (Describe Wound)   [] New Altered Skin Integrity was Present on Admit and Documented in LDA   [] Wound Image Taken    Wound Care Consulted? No    Attending Nurse:  SHELBY Lee RN/Staff Member:   SHOAIB Pierson

## 2024-06-25 NOTE — TREATMENT PLAN
AES Treatment Plan    Fabrice Zamorano is a 59 y.o. male admitted to hospital 6/21/2024 (Hospital Day: 5) due to Acute on chronic pancreatitis.     Interval History  NAEON. Has required PRN pain medications. Tolerating diet.     Noted down trending bilirubin and transaminases.     Objective  Temp:  [97.9 °F (36.6 °C)-98.3 °F (36.8 °C)] 98.3 °F (36.8 °C) (06/25 0716)  Pulse:  [64-76] 73 (06/25 0716)  BP: (145-165)/(87-98) 165/96 (06/25 0716)  Resp:  [17-18] 18 (06/25 0716)  SpO2:  [90 %-96 %] 96 % (06/25 0716)    General: Alert, Oriented x3, no distress  Abdomen: Non-distended. Normal tympany. Soft. Non-tender. No peritoneal signs.    Laboratory  Lab Results   Component Value Date    WBC 6.38 06/25/2024    HGB 12.3 (L) 06/25/2024    HCT 38.5 (L) 06/25/2024    MCV 93 06/25/2024     (L) 06/25/2024       Lab Results   Component Value Date    ALT 53 (H) 06/25/2024    AST 24 06/25/2024    ALKPHOS 123 06/25/2024    BILITOT 2.0 (H) 06/25/2024       Assessment  Fabrice Zamorano is a 59 y.o. male with DVT not on AC, Autoimmune pancreatitis (PRSS 1 mutation) s/p [place] procedure/lateral pancreaticojejunostomy, s/p celiac plexus block (last 3/12/2024), Splenic Vein Thrombosis, Hypothyroidism, HTN and BRAIN that presents to [facility] as a transfer from OSH for further evaluation. Admitted to OSH on 6/19 with complaints of progressively worsening upper abdominal pain over the past 2 weeks. Diagnosed with acute pancreatitis with imaging findings noted below and lipase elevated to 327 on 6/20. AES consulted for further assistance with management. Of note, patient is known to Dr. Brian Hills and given his history of AI pancreatitis, it was discussed with the patient that he may ultimately be a good candidate for a Alejandro procedure vs a completion total pancreatectomy/Whipple. LFTs now declining.      Prior Endoscopies:   3/12/24- EUS- Endosonographic imaging of the pancreas showed sonographic changes consistent with moderate-severe  chronic pancreatitis.  A mass was identified in the pancreatic body. Tissue was obtained from this exam, and results are pending. However, the endosonographic appearance is benign inflammatory changes. Fine needle biopsy performed. Celiac plexus block performed. Path benign.   23- EUS- Endosonographic imaging of the pancreas showed sonographic changes consistent with moderate-severe chronic pancreatitis as previously known and seen; no sign of pancreas duct dilation in setting of prior pancreaticojejunostomy procedure (adequately decompressed). Celiac plexus block performed.   24- EUS- There was no sign of significant pathology in   the common bile duct. Endosonographic imaging of the pancreas showed sonographic changes consistent with moderate-severe chronic pancreatitis.   23- ERCP- The major papilla appeared edematous. Prior biliary sphincterotomy appeared open. Prior pancreatic sphincterotomy appeared open. The ventral pancreatic duct was swept and debris was found. I do think there is any opportunity to improve PD drainage at this point. The PD in the HOP drains into the duodenum. The PD in the body/tail appears to drain into the jejunum (via the surgical pancreaticojejunostomy).      Imagin24- MRCP without contrast- There are inflammatory changes about the pancreas concerning for acute pancreatitis. Mild intra and extrahepatic biliary ductal dilation. The common duct measures 13 mm just superior to the pancreas. This appears more prominent in comparison to the prior CT exam of 2023. Evidence of pancreaticojejunostomy. Prior cholecystectomy.     Problem List:  Acute on Chronic Pancreatitis   Hemorrhoidal bleeding  Biliary Ductal Dilatation   S/p Lateral Pancreaticojejunostomy/[place] Procedure (2021)   Jaundice     Plan  - No biliary intervention at this time. Continue diet.   - Stop IVF.   - For hemorrhoidal bleeding: Good bowel regimen, Anusol BID x 7 days, Sitz bath BID.    - Daily CBC, CMP.  - Okay for discharge from our perspective. Will arrange virtual visit with Dr. JINA Chisholm in 4-6 weeks.  Plan of care was discussed with primary team.   - We are signing off.     Thank you for involving us in the care of Fabrice Zamorano. Please call with any additional questions, concerns or changes in the patient's clinical status.    Tariq Magaña MD, PGY-V  Gastroenterology Fellow  [facility]

## 2024-06-25 NOTE — PLAN OF CARE
Problem: Adult Inpatient Plan of Care  Goal: Plan of Care Review  Outcome: Progressing  Goal: Patient-Specific Goal (Individualized)  Outcome: Progressing  Goal: Absence of Hospital-Acquired Illness or Injury  Outcome: Progressing  Goal: Optimal Comfort and Wellbeing  Outcome: Progressing  Goal: Readiness for Transition of Care  Outcome: Progressing     Problem: Fall Injury Risk  Goal: Absence of Fall and Fall-Related Injury  Outcome: Progressing     Problem: Pain Acute  Goal: Optimal Pain Control and Function  Outcome: Progressing     Problem: Nausea and Vomiting  Goal: Nausea and Vomiting Relief  Outcome: Progressing     Problem: Nausea and Vomiting  Goal: Nausea and Vomiting Relief  Outcome: Progressing   Pt AAO X 4; able to express needs.   LR infusing at 150cc/hr. As ordered.  Pain  managed with PO  Oxy.   Home CPAP in use during the night.  Tolerated well.  Safety maintained.  Bed in low position,  call  light in reach.

## 2024-06-25 NOTE — PLAN OF CARE
Problem: Adult Inpatient Plan of Care  Goal: Plan of Care Review  Outcome: Met  Goal: Patient-Specific Goal (Individualized)  Outcome: Met  Goal: Absence of Hospital-Acquired Illness or Injury  Outcome: Met  Goal: Optimal Comfort and Wellbeing  Outcome: Met  Goal: Readiness for Transition of Care  Outcome: Met     Problem: Fall Injury Risk  Goal: Absence of Fall and Fall-Related Injury  Outcome: Met     Problem: Pain Acute  Goal: Optimal Pain Control and Function  Outcome: Met     Problem: Nausea and Vomiting  Goal: Nausea and Vomiting Relief  Outcome: Met

## 2024-06-25 NOTE — PLAN OF CARE
Problem: Adult Inpatient Plan of Care  Goal: Plan of Care Review  Outcome: Progressing  Goal: Patient-Specific Goal (Individualized)  Outcome: Progressing  Goal: Absence of Hospital-Acquired Illness or Injury  Outcome: Progressing  Goal: Optimal Comfort and Wellbeing  Outcome: Progressing  Goal: Readiness for Transition of Care  Outcome: Progressing     Problem: Fall Injury Risk  Goal: Absence of Fall and Fall-Related Injury  Outcome: Progressing     Problem: Pain Acute  Goal: Optimal Pain Control and Function  Outcome: Progressing     Problem: Nausea and Vomiting  Goal: Nausea and Vomiting Relief  Outcome: Progressing

## 2024-06-26 NOTE — DISCHARGE SUMMARY
[facility] - Med Surg ([place] [address])  [facility] Medicine  Discharge Summary      Patient Name: Fabrice Zamorano  MRN: [id number]  [facility]: [id number]  Patient Class: IP- Inpatient  Admission Date: 6/21/2024  Hospital Length of Stay: 4 days  Discharge Date and Time: 6/25/2024 11:10 AM  Attending Physician: No att. providers found   Discharging Provider: Solomon Felix MD  Primary Care Provider: Rebecca Mcmillan MD  Hospital Medicine Team: [facility] Solomon Felix MD  Primary Care Team: [facility]    HPI:   59-year-old male with a history of DVT, splenic vein thrombosis, sleep apnea, hypothyroidism, chronic pancreatitis, and hypertension admitted to [facility] on June 19 with abdominal pain and nausea for 2 weeks.  He had no chest pain or dyspnea and no fever chills.  Bilirubin was noted to be elevated.  MRCP had findings concerning for pancreatitis.  He was admitted with acute on chronic pancreatitis and hypertension. Bilirubin increased from 4.4 on June 19 up to 10.1 on June 20.  ALT increased from 143 up to 327.  Abdominal pain persisted.  Blood pressure remained elevated.  Patient reported his abdomen felt distended.  The MRCP did not show filling defects in the common duct, there was increased biliary ductal dilatation compared with prior imaging.  Referring team spoke with Biliary Service at [facility].  Patient was started on Zosyn.  Patient transferred to Hospital Medicine at [facility] for Biliary Service evaluation of acute pancreatitis and rising bilirubin.      June 20: sodium 135, potassium 4.1, chloride 100, CO2 25, BUN 14, creatinine 0.8, glucose 155, total bilirubin 10.1, , , lipase 327     June 19: INR 1.1, troponin less than 0.006, white blood cells 6.86, hemoglobin 14.8, hematocrit 44, platelets 148  -MRCP had no filling defect in the common duct.  There is increased biliary ductal dilatation comparison to the prior CT from March 20, 2023.  Findings  concerning for acute pancreatitis.  Surgical changes of pancreaticojejunostomy.  -EKG showed sinus bradycardia with ventricular rate 58.     March 12, 2024: EUS showed sonographic changes consistent with moderate to severe chronic pancreatitis.  Mass identified in the pancreatic biopsy.  Tissue obtained.    * No surgery found *      Hospital Course:   Acute on chronic pancreatitis  Presenting with abdominal pain, nausea. Lipase levated to 327, Tbili 10.1  - MRCP 6/19 with evidence of acute pancreatitis. No clear biliary filling defect, but increased biliary ductal dilation in comparison to the prior CT   - AES consulted  -- CT A/P with heterogeneous enhancement of the pancreatic head and adjacent bowel may be postoperative and is similar to prior. New fluid collection in the pancreatic tail, possibly representing collection. Intrahepatic and extrahepatic biliary ductal dilatation.  -- IV Zosyn x5 days  -- LR at 150 cc/hr  -- Improving LFTs and pain without intervention  - PRN pain and nausea meds  - Outpatient primary care and GI follow-up      Patient deemed appropriate for discharge. I personally saw, examined, and evaluated patient prior to departure. Plan discussed with patient, who was agreeable and amenable; medications were discussed and reviewed, outpatient follow-up scheduled, ER precautions were given, all questions were answered to the patient's satisfaction, and Fabrice Zamorano was subsequently discharged.       Goals of Care Treatment Preferences:  Code Status: Full Code      Consults:   Consults (From admission, onward)          Status Ordering Provider     Inpatient consult to [facility] ([facility])  Once        Provider:  (Not yet assigned)    Completed FORTINO YEH            No new Assessment & Plan notes have been filed under this hospital service since the last note was generated.  Service: Hospital Medicine    Final Active Diagnoses:    Diagnosis Date Noted POA    PRINCIPAL PROBLEM:   Acute on chronic pancreatitis [K85.90, K86.1] 10/26/2021 Yes    History of DVT (deep vein thrombosis) [Z86.718] 10/26/2021 Not Applicable    Hypertension [I10]  Yes      Problems Resolved During this Admission:       Discharged Condition: good    Disposition: Home or Self Care    Follow Up:    Patient Instructions:      Ambulatory referral/consult to Internal Medicine   Standing Status: Future   Referral Priority: Routine Referral Type: Consultation   Referral Reason: Specialty Services Required   Requested Specialty: Internal Medicine   Number of Visits Requested: 1     Ambulatory referral/consult to Gastroenterology   Standing Status: Future   Referral Priority: Routine Referral Type: Consultation   Referral Reason: Specialty Services Required   Requested Specialty: Gastroenterology   Number of Visits Requested: 1     Diet Adult Regular     Notify your health care provider if you experience any of the following:  increased confusion or weakness     Notify your health care provider if you experience any of the following:  persistent dizziness, light-headedness, or visual disturbances     Notify your health care provider if you experience any of the following:  worsening rash     Notify your health care provider if you experience any of the following:  severe persistent headache     Notify your health care provider if you experience any of the following:  difficulty breathing or increased cough     Notify your health care provider if you experience any of the following:  redness, tenderness, or signs of infection (pain, swelling, redness, odor or green/yellow discharge around incision site)     Notify your health care provider if you experience any of the following:  severe uncontrolled pain     Notify your health care provider if you experience any of the following:  persistent nausea and vomiting or diarrhea     Notify your health care provider if you experience any of the following:  temperature >100.4      Activity as tolerated       Significant Diagnostic Studies: Labs: All labs within the past 24 hours have been reviewed    Pending Diagnostic Studies:       None           Medications:  Reconciled Home Medications:      Medication List        CONTINUE taking these medications      carvediloL 6.25 MG tablet  Commonly known as: COREG  Take 3.125 mg by mouth 2 (two) times daily with meals.     ibuprofen 800 MG tablet  Commonly known as: ADVIL,MOTRIN  Take 800 mg by mouth every 6 (six) hours as needed.     lipase-protease-amylase 36,000-114,000- 180,000 unit Cpdr  Commonly known as: CREON  Take 1 capsule by mouth 3 (three) times daily with meals.     metFORMIN 500 MG ER 24hr tablet  Commonly known as: GLUCOPHAGE-XR  Take 500 mg by mouth once daily.     multivitamin per tablet  Commonly known as: THERAGRAN  Take 1 tablet by mouth once daily.     ondansetron 4 MG Tbdl  Commonly known as: ZOFRAN-ODT  Take 1 tablet (4 mg total) by mouth every 8 (eight) hours as needed (Nausea).     oxyCODONE-acetaminophen  mg per tablet  Commonly known as: PERCOCET  Take 1 tablet by mouth every 4 (four) hours as needed for Pain.     pantoprazole 40 MG tablet  Commonly known as: PROTONIX  TAKE 1 TABLET(40 MG) BY MOUTH EVERY DAY     pregabalin 75 MG capsule  Commonly known as: LYRICA  Take 2 capsules (150 mg total) by mouth every evening. May also take 1 capsule (75 mg total) daily as needed.              Indwelling Lines/Drains at time of discharge:   Lines/Drains/Airways       None                   Time spent on the discharge of patient: 35 minutes         Solomon Felix MD  Department of Hospital Medicine  [facility] S[facility] ([place]-16)

## 2024-06-26 NOTE — HOSPITAL COURSE
Acute on chronic pancreatitis  Presenting with abdominal pain, nausea. Lipase levated to 327, Tbili 10.1  - MRCP 6/19 with evidence of acute pancreatitis. No clear biliary filling defect, but increased biliary ductal dilation in comparison to the prior CT   - AES consulted  -- CT A/P with heterogeneous enhancement of the pancreatic head and adjacent bowel may be postoperative and is similar to prior. New fluid collection in the pancreatic tail, possibly representing collection. Intrahepatic and extrahepatic biliary ductal dilatation.  -- IV Zosyn x5 days  -- LR at 150 cc/hr  -- Improving LFTs and pain without intervention  - PRN pain and nausea meds  - Outpatient primary care and GI follow-up      Patient deemed appropriate for discharge. I personally saw, examined, and evaluated patient prior to departure. Plan discussed with patient, who was agreeable and amenable; medications were discussed and reviewed, outpatient follow-up scheduled, ER precautions were given, all questions were answered to the patient's satisfaction, and Fabrice Zamorano was subsequently discharged.

## 2024-07-01 ENCOUNTER — HOSPITAL ENCOUNTER (INPATIENT)
Facility: HOSPITAL | Age: 59
LOS: 2 days | Discharge: HOME | DRG: 440 | End: 2024-07-04
Attending: EMERGENCY MEDICINE | Admitting: STUDENT IN AN ORGANIZED HEALTH CARE EDUCATION/TRAINING PROGRAM
Payer: MEDICARE

## 2024-07-01 ENCOUNTER — PATIENT MESSAGE (OUTPATIENT)
Dept: GASTROENTEROLOGY | Facility: CLINIC | Age: 59
End: 2024-07-01
Payer: MEDICARE

## 2024-07-01 DIAGNOSIS — K86.89 ACUTE PANCREATIC FLUID COLLECTION: ICD-10-CM

## 2024-07-01 DIAGNOSIS — R10.13 EPIGASTRIC PAIN: ICD-10-CM

## 2024-07-01 DIAGNOSIS — K85.00 IDIOPATHIC ACUTE PANCREATITIS, UNSPECIFIED COMPLICATION STATUS: Primary | ICD-10-CM

## 2024-07-01 DIAGNOSIS — R05.9 COUGH: ICD-10-CM

## 2024-07-01 DIAGNOSIS — R07.9 CHEST PAIN: ICD-10-CM

## 2024-07-01 DIAGNOSIS — K63.2 COLONIC FISTULA: ICD-10-CM

## 2024-07-01 LAB
ALBUMIN SERPL BCP-MCNC: 3.2 G/DL (ref 3.5–5.2)
ALP SERPL-CCNC: 126 U/L (ref 55–135)
ALT SERPL W/O P-5'-P-CCNC: 67 U/L (ref 10–44)
ANION GAP SERPL CALC-SCNC: 10 MMOL/L (ref 8–16)
AST SERPL-CCNC: 30 U/L (ref 10–40)
BASOPHILS # BLD AUTO: 0.05 K/UL (ref 0–0.2)
BASOPHILS NFR BLD: 0.3 % (ref 0–1.9)
BILIRUB SERPL-MCNC: 1.6 MG/DL (ref 0.1–1)
BILIRUB UR QL STRIP: NEGATIVE
BUN SERPL-MCNC: 17 MG/DL (ref 6–20)
BUN SERPL-MCNC: 18 MG/DL (ref 6–30)
CALCIUM SERPL-MCNC: 9.2 MG/DL (ref 8.7–10.5)
CHLORIDE SERPL-SCNC: 100 MMOL/L (ref 95–110)
CHLORIDE SERPL-SCNC: 98 MMOL/L (ref 95–110)
CLARITY UR REFRACT.AUTO: CLEAR
CO2 SERPL-SCNC: 27 MMOL/L (ref 23–29)
COLOR UR AUTO: YELLOW
CREAT SERPL-MCNC: 0.9 MG/DL (ref 0.5–1.4)
CREAT SERPL-MCNC: 0.9 MG/DL (ref 0.5–1.4)
DIFFERENTIAL METHOD BLD: ABNORMAL
EOSINOPHIL # BLD AUTO: 0.1 K/UL (ref 0–0.5)
EOSINOPHIL NFR BLD: 0.6 % (ref 0–8)
ERYTHROCYTE [DISTWIDTH] IN BLOOD BY AUTOMATED COUNT: 14.4 % (ref 11.5–14.5)
EST. GFR  (NO RACE VARIABLE): >60 ML/MIN/1.73 M^2
GLUCOSE SERPL-MCNC: 135 MG/DL (ref 70–110)
GLUCOSE SERPL-MCNC: 139 MG/DL (ref 70–110)
GLUCOSE UR QL STRIP: NEGATIVE
HCT VFR BLD AUTO: 42.4 % (ref 40–54)
HCT VFR BLD CALC: 43 %PCV (ref 36–54)
HCV AB SERPL QL IA: NORMAL
HGB BLD-MCNC: 13.7 G/DL (ref 14–18)
HGB UR QL STRIP: NEGATIVE
HIV 1+2 AB+HIV1 P24 AG SERPL QL IA: NORMAL
IMM GRANULOCYTES # BLD AUTO: 0.13 K/UL (ref 0–0.04)
IMM GRANULOCYTES NFR BLD AUTO: 0.7 % (ref 0–0.5)
KETONES UR QL STRIP: NEGATIVE
LEUKOCYTE ESTERASE UR QL STRIP: NEGATIVE
LIPASE SERPL-CCNC: 95 U/L (ref 4–60)
LYMPHOCYTES # BLD AUTO: 1.1 K/UL (ref 1–4.8)
LYMPHOCYTES NFR BLD: 5.7 % (ref 18–48)
MCH RBC QN AUTO: 30.2 PG (ref 27–31)
MCHC RBC AUTO-ENTMCNC: 32.3 G/DL (ref 32–36)
MCV RBC AUTO: 94 FL (ref 82–98)
MONOCYTES # BLD AUTO: 1.1 K/UL (ref 0.3–1)
MONOCYTES NFR BLD: 6.1 % (ref 4–15)
NEUTROPHILS # BLD AUTO: 16 K/UL (ref 1.8–7.7)
NEUTROPHILS NFR BLD: 86.6 % (ref 38–73)
NITRITE UR QL STRIP: NEGATIVE
NRBC BLD-RTO: 0 /100 WBC
OHS QRS DURATION: 74 MS
OHS QTC CALCULATION: 410 MS
PH UR STRIP: 7 [PH] (ref 5–8)
PLATELET # BLD AUTO: 313 K/UL (ref 150–450)
PMV BLD AUTO: 9 FL (ref 9.2–12.9)
POC IONIZED CALCIUM: 1.1 MMOL/L (ref 1.06–1.42)
POC TCO2 (MEASURED): 28 MMOL/L (ref 23–29)
POTASSIUM BLD-SCNC: 4.6 MMOL/L (ref 3.5–5.1)
POTASSIUM SERPL-SCNC: 4.6 MMOL/L (ref 3.5–5.1)
PROT SERPL-MCNC: 6.9 G/DL (ref 6–8.4)
PROT UR QL STRIP: ABNORMAL
RBC # BLD AUTO: 4.53 M/UL (ref 4.6–6.2)
SAMPLE: ABNORMAL
SODIUM BLD-SCNC: 134 MMOL/L (ref 136–145)
SODIUM SERPL-SCNC: 137 MMOL/L (ref 136–145)
SP GR UR STRIP: 1.02 (ref 1–1.03)
URN SPEC COLLECT METH UR: ABNORMAL
WBC # BLD AUTO: 18.48 K/UL (ref 3.9–12.7)

## 2024-07-01 PROCEDURE — 83690 ASSAY OF LIPASE: CPT | Performed by: EMERGENCY MEDICINE

## 2024-07-01 PROCEDURE — 81003 URINALYSIS AUTO W/O SCOPE: CPT | Performed by: EMERGENCY MEDICINE

## 2024-07-01 PROCEDURE — 93005 ELECTROCARDIOGRAM TRACING: CPT

## 2024-07-01 PROCEDURE — 96361 HYDRATE IV INFUSION ADD-ON: CPT

## 2024-07-01 PROCEDURE — 93010 ELECTROCARDIOGRAM REPORT: CPT | Mod: ,,, | Performed by: INTERNAL MEDICINE

## 2024-07-01 PROCEDURE — 96375 TX/PRO/DX INJ NEW DRUG ADDON: CPT

## 2024-07-01 PROCEDURE — 63600175 PHARM REV CODE 636 W HCPCS: Performed by: EMERGENCY MEDICINE

## 2024-07-01 PROCEDURE — 87389 HIV-1 AG W/HIV-1&-2 AB AG IA: CPT | Performed by: EMERGENCY MEDICINE

## 2024-07-01 PROCEDURE — 85025 COMPLETE CBC W/AUTO DIFF WBC: CPT | Performed by: EMERGENCY MEDICINE

## 2024-07-01 PROCEDURE — 80047 BASIC METABLC PNL IONIZED CA: CPT

## 2024-07-01 PROCEDURE — 96365 THER/PROPH/DIAG IV INF INIT: CPT

## 2024-07-01 PROCEDURE — G0378 HOSPITAL OBSERVATION PER HR: HCPCS

## 2024-07-01 PROCEDURE — 86803 HEPATITIS C AB TEST: CPT | Performed by: EMERGENCY MEDICINE

## 2024-07-01 PROCEDURE — 25000003 PHARM REV CODE 250: Performed by: EMERGENCY MEDICINE

## 2024-07-01 PROCEDURE — 80053 COMPREHEN METABOLIC PANEL: CPT | Performed by: EMERGENCY MEDICINE

## 2024-07-01 PROCEDURE — 25000003 PHARM REV CODE 250: Performed by: STUDENT IN AN ORGANIZED HEALTH CARE EDUCATION/TRAINING PROGRAM

## 2024-07-01 PROCEDURE — 99285 EMERGENCY DEPT VISIT HI MDM: CPT | Mod: 25

## 2024-07-01 PROCEDURE — 25500020 PHARM REV CODE 255: Performed by: EMERGENCY MEDICINE

## 2024-07-01 RX ORDER — OXYCODONE HYDROCHLORIDE 5 MG/1
5 TABLET ORAL EVERY 4 HOURS PRN
Status: DISCONTINUED | OUTPATIENT
Start: 2024-07-01 | End: 2024-07-04 | Stop reason: HOSPADM

## 2024-07-01 RX ORDER — POLYETHYLENE GLYCOL 3350 17 G/17G
17 POWDER, FOR SOLUTION ORAL DAILY
Status: DISCONTINUED | OUTPATIENT
Start: 2024-07-01 | End: 2024-07-04 | Stop reason: HOSPADM

## 2024-07-01 RX ORDER — SUCRALFATE 1 G/10ML
1 SUSPENSION ORAL EVERY 6 HOURS
Status: DISCONTINUED | OUTPATIENT
Start: 2024-07-01 | End: 2024-07-04 | Stop reason: HOSPADM

## 2024-07-01 RX ORDER — IBUPROFEN 400 MG/1
800 TABLET ORAL EVERY 6 HOURS PRN
Status: DISCONTINUED | OUTPATIENT
Start: 2024-07-01 | End: 2024-07-04 | Stop reason: HOSPADM

## 2024-07-01 RX ORDER — IPRATROPIUM BROMIDE AND ALBUTEROL SULFATE 2.5; .5 MG/3ML; MG/3ML
3 SOLUTION RESPIRATORY (INHALATION) EVERY 6 HOURS PRN
Status: DISCONTINUED | OUTPATIENT
Start: 2024-07-01 | End: 2024-07-04 | Stop reason: HOSPADM

## 2024-07-01 RX ORDER — NALOXONE HCL 0.4 MG/ML
0.02 VIAL (ML) INJECTION
Status: DISCONTINUED | OUTPATIENT
Start: 2024-07-01 | End: 2024-07-04 | Stop reason: HOSPADM

## 2024-07-01 RX ORDER — GLUCAGON 1 MG
1 KIT INJECTION
Status: DISCONTINUED | OUTPATIENT
Start: 2024-07-01 | End: 2024-07-04 | Stop reason: HOSPADM

## 2024-07-01 RX ORDER — SODIUM CHLORIDE, SODIUM LACTATE, POTASSIUM CHLORIDE, CALCIUM CHLORIDE 600; 310; 30; 20 MG/100ML; MG/100ML; MG/100ML; MG/100ML
INJECTION, SOLUTION INTRAVENOUS CONTINUOUS
Status: DISCONTINUED | OUTPATIENT
Start: 2024-07-01 | End: 2024-07-04 | Stop reason: HOSPADM

## 2024-07-01 RX ORDER — ACETAMINOPHEN 325 MG/1
650 TABLET ORAL EVERY 6 HOURS PRN
Status: DISCONTINUED | OUTPATIENT
Start: 2024-07-01 | End: 2024-07-04 | Stop reason: HOSPADM

## 2024-07-01 RX ORDER — HYDROMORPHONE HYDROCHLORIDE 1 MG/ML
1 INJECTION, SOLUTION INTRAMUSCULAR; INTRAVENOUS; SUBCUTANEOUS
Status: COMPLETED | OUTPATIENT
Start: 2024-07-01 | End: 2024-07-01

## 2024-07-01 RX ORDER — ALUMINUM HYDROXIDE, MAGNESIUM HYDROXIDE, AND SIMETHICONE 1200; 120; 1200 MG/30ML; MG/30ML; MG/30ML
30 SUSPENSION ORAL
Status: DISCONTINUED | OUTPATIENT
Start: 2024-07-01 | End: 2024-07-04 | Stop reason: HOSPADM

## 2024-07-01 RX ORDER — TALC
6 POWDER (GRAM) TOPICAL NIGHTLY PRN
Status: DISCONTINUED | OUTPATIENT
Start: 2024-07-01 | End: 2024-07-04 | Stop reason: HOSPADM

## 2024-07-01 RX ORDER — TALC
6 POWDER (GRAM) TOPICAL NIGHTLY PRN
Status: DISCONTINUED | OUTPATIENT
Start: 2024-07-01 | End: 2024-07-01

## 2024-07-01 RX ORDER — PANTOPRAZOLE SODIUM 40 MG/1
40 TABLET, DELAYED RELEASE ORAL DAILY
Status: DISCONTINUED | OUTPATIENT
Start: 2024-07-01 | End: 2024-07-04 | Stop reason: HOSPADM

## 2024-07-01 RX ORDER — SODIUM CHLORIDE 0.9 % (FLUSH) 0.9 %
10 SYRINGE (ML) INJECTION
Status: DISCONTINUED | OUTPATIENT
Start: 2024-07-01 | End: 2024-07-04 | Stop reason: HOSPADM

## 2024-07-01 RX ORDER — SODIUM CHLORIDE 0.9 % (FLUSH) 0.9 %
10 SYRINGE (ML) INJECTION EVERY 8 HOURS PRN
Status: DISCONTINUED | OUTPATIENT
Start: 2024-07-01 | End: 2024-07-04 | Stop reason: HOSPADM

## 2024-07-01 RX ORDER — IBUPROFEN 200 MG
16 TABLET ORAL
Status: DISCONTINUED | OUTPATIENT
Start: 2024-07-01 | End: 2024-07-04 | Stop reason: HOSPADM

## 2024-07-01 RX ORDER — PROCHLORPERAZINE EDISYLATE 5 MG/ML
5 INJECTION INTRAMUSCULAR; INTRAVENOUS EVERY 6 HOURS PRN
Status: DISCONTINUED | OUTPATIENT
Start: 2024-07-01 | End: 2024-07-04 | Stop reason: HOSPADM

## 2024-07-01 RX ORDER — IBUPROFEN 200 MG
24 TABLET ORAL
Status: DISCONTINUED | OUTPATIENT
Start: 2024-07-01 | End: 2024-07-04 | Stop reason: HOSPADM

## 2024-07-01 RX ORDER — LIDOCAINE 50 MG/G
1 PATCH TOPICAL
Status: DISCONTINUED | OUTPATIENT
Start: 2024-07-01 | End: 2024-07-04 | Stop reason: HOSPADM

## 2024-07-01 RX ORDER — ONDANSETRON 8 MG/1
8 TABLET, ORALLY DISINTEGRATING ORAL EVERY 8 HOURS PRN
Status: DISCONTINUED | OUTPATIENT
Start: 2024-07-01 | End: 2024-07-04 | Stop reason: HOSPADM

## 2024-07-01 RX ADMIN — PANTOPRAZOLE SODIUM 40 MG: 40 TABLET, DELAYED RELEASE ORAL at 05:07

## 2024-07-01 RX ADMIN — SODIUM CHLORIDE, POTASSIUM CHLORIDE, SODIUM LACTATE AND CALCIUM CHLORIDE 1000 ML: 600; 310; 30; 20 INJECTION, SOLUTION INTRAVENOUS at 02:07

## 2024-07-01 RX ADMIN — PROMETHAZINE HYDROCHLORIDE 12.5 MG: 25 INJECTION INTRAMUSCULAR; INTRAVENOUS at 02:07

## 2024-07-01 RX ADMIN — SUCRALFATE 1 G: 1 SUSPENSION ORAL at 05:07

## 2024-07-01 RX ADMIN — OXYCODONE 5 MG: 5 TABLET ORAL at 07:07

## 2024-07-01 RX ADMIN — POLYETHYLENE GLYCOL 3350 17 G: 17 POWDER, FOR SOLUTION ORAL at 04:07

## 2024-07-01 RX ADMIN — SODIUM CHLORIDE, POTASSIUM CHLORIDE, SODIUM LACTATE AND CALCIUM CHLORIDE: 600; 310; 30; 20 INJECTION, SOLUTION INTRAVENOUS at 04:07

## 2024-07-01 RX ADMIN — HYDROMORPHONE HYDROCHLORIDE 1 MG: 1 INJECTION, SOLUTION INTRAMUSCULAR; INTRAVENOUS; SUBCUTANEOUS at 02:07

## 2024-07-01 RX ADMIN — LIDOCAINE 1 PATCH: 700 PATCH TOPICAL at 05:07

## 2024-07-01 RX ADMIN — IOHEXOL 100 ML: 350 INJECTION, SOLUTION INTRAVENOUS at 02:07

## 2024-07-01 RX ADMIN — ALUMINUM HYDROXIDE, MAGNESIUM HYDROXIDE, AND SIMETHICONE 30 ML: 1200; 120; 1200 SUSPENSION ORAL at 04:07

## 2024-07-01 NOTE — SUBJECTIVE & OBJECTIVE
Past Medical History:   Diagnosis Date    Anticoagulant long-term use     On Eliquis for DVT    GERD (gastroesophageal reflux disease)     Hypertension     Hypothyroidism 10/26/2021    Liver disease     fatty liver    Pancreatic pseudocyst     Personal history of colonic polyps     Sleep apnea     Splenic vein thrombosis        Past Surgical History:   Procedure Laterality Date    CHOLECYSTECTOMY      COLONOSCOPY      ENDOSCOPIC ULTRASOUND OF UPPER GASTROINTESTINAL TRACT N/A 10/12/2021    Procedure: ULTRASOUND, UPPER GI TRACT, ENDOSCOPIC;  Surgeon: Odalys Leiva MD;  Location: [facility];  Service: Endoscopy;  Laterality: N/A;  Upper and linear, 22 shark core, cytology and RPMI    ENDOSCOPIC ULTRASOUND OF UPPER GASTROINTESTINAL TRACT N/A 2/24/2023    Procedure: ULTRASOUND, UPPER GI TRACT, ENDOSCOPIC;  Surgeon: Shawn Chisholm MD;  Location: [facility] ([address] [place] [address]);  Service: Endoscopy;  Laterality: N/A;  2/3/23-Instructions via portal-DS    ENDOSCOPIC ULTRASOUND OF UPPER GASTROINTESTINAL TRACT N/A 11/14/2023    Procedure: ULTRASOUND, UPPER GI TRACT, ENDOSCOPIC;  Surgeon: Kwadwo Gonsalez MD;  Location: [facility];  Service: Gastroenterology;  Laterality: N/A;  10/13/23: instructions sent vie portal-GD    ENDOSCOPIC ULTRASOUND OF UPPER GASTROINTESTINAL TRACT N/A 3/12/2024    Procedure: ULTRASOUND, UPPER GI TRACT, ENDOSCOPIC;  Surgeon: Shawn Chisholm MD;  Location: [facility];  Service: Endoscopy;  Laterality: N/A;  2/16/24: inst sent via portal-GD    ERCP N/A 09/29/2021    Procedure: ERCP (ENDOSCOPIC RETROGRADE CHOLANGIOPANCREATOGRAPHY);  Surgeon: Odalys Leiva MD;  Location: [facility];  Service: Endoscopy;  Laterality: N/A;    ERCP N/A 08/15/2022    Procedure: ERCP (ENDOSCOPIC RETROGRADE CHOLANGIOPANCREATOGRAPHY);  Surgeon: Odalys Leiva MD;  Location: [facility];  Service: Endoscopy;  Laterality: N/A;    ERCP N/A 2/24/2023    Procedure: ERCP (ENDOSCOPIC RETROGRADE  CHOLANGIOPANCREATOGRAPHY);  Surgeon: Shawn Chisholm MD;  Location: [facility] (2ND FLR);  Service: Endoscopy;  Laterality: N/A;    ERCP W/ PLASTIC STENT PLACEMENT      LATERAL PANCREATICOJEJUNOSTOMY N/A 2021    Procedure: PANCREATICOJEJUNOSTOMY, SIDE-TO-SIDE tier 1;  Surgeon: Brian Hills MD;  [facility];  Service: General;  Laterality: N/A;       Review of patient's allergies indicates:  No Known Allergies    Current Facility-Administered Medications on File Prior to Encounter   Medication    [DISCONTINUED] iohexoL (OMNIPAQUE 300) injection     Current Outpatient Medications on File Prior to Encounter   Medication Sig    carvediloL (COREG) 6.25 MG tablet Take 3.125 mg by mouth 2 (two) times daily with meals.    metFORMIN (GLUCOPHAGE-XR) 500 MG ER 24hr tablet Take 500 mg by mouth once daily.    oxyCODONE-acetaminophen (PERCOCET)  mg per tablet Take 1 tablet by mouth every 4 (four) hours as needed for Pain.    ibuprofen (ADVIL,MOTRIN) 800 MG tablet Take 800 mg by mouth every 6 (six) hours as needed.    lipase-protease-amylase (CREON) 36,000-114,000- 180,000 unit CpDR Take 1 capsule by mouth 3 (three) times daily with meals.    multivitamin (THERAGRAN) per tablet Take 1 tablet by mouth once daily.    ondansetron (ZOFRAN-ODT) 4 MG TbDL Take 1 tablet (4 mg total) by mouth every 8 (eight) hours as needed (Nausea).    pantoprazole (PROTONIX) 40 MG tablet TAKE 1 TABLET(40 MG) BY MOUTH EVERY DAY    pregabalin (LYRICA) 75 MG capsule Take 2 capsules (150 mg total) by mouth every evening. May also take 1 capsule (75 mg total) daily as needed.     Family History       Problem Relation (Age of Onset)    Arthritis Father    Cancer Father    Pancreatic cancer Father    Thyroid disease Mother, Father          Tobacco Use    Smoking status: Former     Current packs/day: 0.00     Types: Cigarettes     Quit date: 2001     Years since quittin.9    Smokeless tobacco: Never   Substance and Sexual  Activity    Alcohol use: Not Currently    Drug use: Never    Sexual activity: Yes     Review of Systems   Constitutional:  Positive for activity change and appetite change. Negative for chills.   HENT:  Negative for congestion and dental problem.    Eyes:  Negative for discharge and itching.   Respiratory:  Negative for apnea, chest tightness, shortness of breath and wheezing.    Cardiovascular:  Negative for chest pain, palpitations and leg swelling.   Gastrointestinal:  Positive for abdominal pain. Negative for abdominal distention, blood in stool and diarrhea.   Endocrine: Negative for cold intolerance and heat intolerance.   Genitourinary:  Negative for difficulty urinating and dysuria.   Musculoskeletal:  Positive for back pain. Negative for arthralgias.   Skin:  Negative for color change and pallor.   Allergic/Immunologic: Negative for environmental allergies and food allergies.   Neurological:  Negative for dizziness and facial asymmetry.   Hematological:  Negative for adenopathy. Does not bruise/bleed easily.   Psychiatric/Behavioral:  Negative for agitation and behavioral problems.      Objective:     Vital Signs (Most Recent):  Temp: 98.4 °F (36.9 °C) (07/01/24 1254)  Pulse: 100 (07/01/24 1254)  Resp: 15 (07/01/24 1413)  BP: (!) 140/99 (07/01/24 1254)  SpO2: 99 % (07/01/24 1254) Vital Signs (24h Range):  Temp:  [98.4 °F (36.9 °C)] 98.4 °F (36.9 °C)  Pulse:  [100] 100  Resp:  [15-18] 15  SpO2:  [99 %] 99 %  BP: (140)/(99) 140/99     Weight: 89.8 kg (198 lb)  Body mass index is 27.62 kg/m².     Physical Exam  Constitutional:       General: He is not in acute distress.     Appearance: Normal appearance. He is not ill-appearing, toxic-appearing or diaphoretic.   HENT:      Head: Normocephalic and atraumatic.      Nose: Nose normal.      Mouth/Throat:      Mouth: Mucous membranes are moist.   Eyes:      Extraocular Movements: Extraocular movements intact.      Conjunctiva/sclera: Conjunctivae normal.       Pupils: Pupils are equal, round, and reactive to light.   Cardiovascular:      Rate and Rhythm: Normal rate and regular rhythm.      Pulses: Normal pulses.      Heart sounds: Normal heart sounds. No murmur heard.     No gallop.   Pulmonary:      Effort: Pulmonary effort is normal. No respiratory distress.      Breath sounds: Normal breath sounds. No wheezing or rales.   Abdominal:      General: Abdomen is flat. Bowel sounds are normal. There is no distension.      Palpations: Abdomen is soft.      Tenderness: There is no abdominal tenderness. There is no guarding.   Musculoskeletal:         General: No swelling. Normal range of motion.      Cervical back: Normal range of motion and neck supple.   Skin:     General: Skin is warm and dry.      Capillary Refill: Capillary refill takes less than 2 seconds.   Neurological:      General: No focal deficit present.      Mental Status: He is alert and oriented to person, place, and time. Mental status is at baseline.   Psychiatric:         Mood and Affect: Mood normal.         Behavior: Behavior normal.         Thought Content: Thought content normal.         Judgment: Judgment normal.              CRANIAL NERVES     CN III, IV, VI   Pupils are equal, round, and reactive to light.       Significant Labs: All pertinent labs within the past 24 hours have been reviewed.    Significant Imaging: I have reviewed all pertinent imaging results/findings within the past 24 hours.

## 2024-07-01 NOTE — ASSESSMENT & PLAN NOTE
-CT A/P: new organized appearing fluid collection dorsal to the pancreas with additional new lobulated collection anterior inferior to the distal pancreatic body, possibly communicating  -Unclear if this represents underlying infection explaining leukocytosis and ongoing abdominal pain

## 2024-07-01 NOTE — ED NOTES
Patient states abd pain with recent admit for pancreatitis, usually uses dilaudid and phenergan, reports nausea, pain , poor appetitie and gen weakness

## 2024-07-01 NOTE — ED PROVIDER NOTES
Encounter Date: 7/1/2024       History     Chief Complaint   Patient presents with    Abdominal Pain     Upper abdominal pain that radiates to back. +nausea. No vomiting. Recently discharged with pancreatitis. No relief from home oxycodone.     The patient is a 59-year-old male with a complicated GI history stemming from chronic idiopathic pancreatitis with subsequent pseudocyst.  The patient was in the hospital a couple of weeks ago for significant pancreatitis that was obstructing his biliary duct.  At 1 point, his bilirubin was greater than 10.  Ultimately, no stenting was required as his pancreatitis improved and the obstruction resolved.  Since discharge 2 weeks ago, the patient has had gradual improvement in his symptoms.  However, they recently became severe again.  He has taken multiple doses of his pain medication without relief of his symptoms.  He currently has abdominal pain and left mid back pain that is consistent with his presentation for pancreatitis recently.  Associated with this, he does have nausea and generalized weakness.  He denies associated jaundice or scleral icterus at this time.  He has no other complaints.      Review of patient's allergies indicates:  No Known Allergies  Past Medical History:   Diagnosis Date    Anticoagulant long-term use     On Eliquis for DVT    GERD (gastroesophageal reflux disease)     Hypertension     Hypothyroidism 10/26/2021    Liver disease     fatty liver    Pancreatic pseudocyst     Personal history of colonic polyps     Sleep apnea     Splenic vein thrombosis      Past Surgical History:   Procedure Laterality Date    CHOLECYSTECTOMY      COLONOSCOPY      ENDOSCOPIC ULTRASOUND OF UPPER GASTROINTESTINAL TRACT N/A 10/12/2021    Procedure: ULTRASOUND, UPPER GI TRACT, ENDOSCOPIC;  Surgeon: Odalys Leiva MD;  Location: [facility];  Service: Endoscopy;  Laterality: N/A;  Upper and linear, 22 shark core, cytology and RPMI    ENDOSCOPIC ULTRASOUND OF UPPER  GASTROINTESTINAL TRACT N/A 2023    Procedure: ULTRASOUND, UPPER GI TRACT, ENDOSCOPIC;  Surgeon: Shawn Chisholm MD;  Location: [facility] ENDO (2ND FLR);  Service: Endoscopy;  Laterality: N/A;  2/3/23-Instructions via portal-DS    ENDOSCOPIC ULTRASOUND OF UPPER GASTROINTESTINAL TRACT N/A 2023    Procedure: ULTRASOUND, UPPER GI TRACT, ENDOSCOPIC;  Surgeon: Kwadwo Gonsalez MD;  Location: [facility];  Service: Gastroenterology;  Laterality: N/A;  10/13/23: instructions sent vie portal-GD    ENDOSCOPIC ULTRASOUND OF UPPER GASTROINTESTINAL TRACT N/A 3/12/2024    Procedure: ULTRASOUND, UPPER GI TRACT, ENDOSCOPIC;  Surgeon: Shawn Chisholm MD;  Location: [facility];  Service: Endoscopy;  Laterality: N/A;  24: inst sent via portal-GD    ERCP N/A 2021    Procedure: ERCP (ENDOSCOPIC RETROGRADE CHOLANGIOPANCREATOGRAPHY);  Surgeon: Odalys Leiva MD;  Location: [facility];  Service: Endoscopy;  Laterality: N/A;    ERCP N/A 08/15/2022    Procedure: ERCP (ENDOSCOPIC RETROGRADE CHOLANGIOPANCREATOGRAPHY);  Surgeon: Odalys Leiva MD;  Location: [facility];  Service: Endoscopy;  Laterality: N/A;    ERCP N/A 2023    Procedure: ERCP (ENDOSCOPIC RETROGRADE CHOLANGIOPANCREATOGRAPHY);  Surgeon: Shawn Chisholm MD;  [facility]  Service: Endoscopy;  Laterality: N/A;    ERCP W/ PLASTIC STENT PLACEMENT      LATERAL PANCREATICOJEJUNOSTOMY N/A 2021    Procedure: PANCREATICOJEJUNOSTOMY, SIDE-TO-SIDE tier 1;  Surgeon: Brian Hills MD;  [facility];  Service: General;  Laterality: N/A;     Family History   Problem Relation Name Age of Onset    Thyroid disease Mother      Cancer Father      Pancreatic cancer Father      Arthritis Father      Thyroid disease Father       Social History     Tobacco Use    Smoking status: Former     Current packs/day: 0.00     Types: Cigarettes     Quit date: 2001     Years since quittin.9    Smokeless tobacco: Never    Substance Use Topics    Alcohol use: Not Currently    Drug use: Never     Review of Systems  General: Denies fever.  Denies chills.  Reports generalized weakness.  HENT: Denies sore throat.  Denies earache.  Denies rhinorrhea.  Eyes: Denies visual changes.  Denies eye pain.  Denies drainage.  Denies yellowing of the eyes.  Cardiovascular: Denies chest pain.  Denies shortness of breath.  Denies orthopnea.  Denies dyspnea on exertion.  Respiratory: Denies shortness of breath.  Denies wheezing.  Reports mild nonproductive coughing.  GI:  Reports significant diffuse abdominal pain.  Reports nausea.  Denies vomiting.  Denies diarrhea.  Denies constipation.  Denies melena.  Denies hematochezia.  : Denies dysuria.  Denies hematuria.    Skin: Denies rashes.  Denies lesions.  Denies pallor.  Denies jaundice.  Neuro: Denies headache.  Denies head trauma.  Denies numbness.  Denies focal weakness.  Musculoskeletal: Denies neck pain.  Denies back pain.  Denies extremity pain.  Denies extremity swelling.      Physical Exam     Initial Vitals [07/01/24 1254]   BP Pulse Resp Temp SpO2   (!) 140/99 100 18 98.4 °F (36.9 °C) 99 %      MAP       --         Physical Exam  General:  Mild distress with pain.  Well-nourished.  Well-developed.  Alert and oriented x3.  HENT: Moist mucous membranes.  Normocephalic atraumatic.  Oropharynx clear.    Eyes: Pupils equally round and reactive to light.  Extraocular movements intact.  No scleral icterus.  No conjunctival pallor.  Cardiovascular: Regular rate and rhythm.  No murmurs, rubs, or gallops.  Brisk capillary fill.  2+ distal pulses.  Respiratory: Clear to auscultation bilaterally.  No wheezes, rales, or rhonchi.  No respiratory distress.  Abdomen:  Diffuse tenderness to palpation noted.  Normoactive bowel sounds auscultated.  No bruits.  No masses palpated.  Voluntary guarding noted.  Mild rebound noted.  : No CVA tenderness.  Skin: No rashes.  No lesions.  No pallor.  No  jaundice.  Neuro: Cranial nerves II through XII grossly intact.  Moving all extremities equally.  No sensory deficits.  Strength 5 out of 5 in all 4 extremities.  Musculoskeletal: Neck supple.  No extremity tenderness.  Moving all extremities without pain.  No back tenderness.  No neck tenderness.        ED Course   Procedures  Labs Reviewed   CBC W/ AUTO DIFFERENTIAL - Abnormal; Notable for the following components:       Result Value    WBC 18.48 (*)     RBC 4.53 (*)     Hemoglobin 13.7 (*)     MPV 9.0 (*)     Immature Granulocytes 0.7 (*)     Gran # (ANC) 16.0 (*)     Immature Grans (Abs) 0.13 (*)     Mono # 1.1 (*)     Gran % 86.6 (*)     Lymph % 5.7 (*)     All other components within normal limits   COMPREHENSIVE METABOLIC PANEL - Abnormal; Notable for the following components:    Glucose 135 (*)     Albumin 3.2 (*)     Total Bilirubin 1.6 (*)     ALT 67 (*)     All other components within normal limits   LIPASE - Abnormal; Notable for the following components:    Lipase 95 (*)     All other components within normal limits   URINALYSIS, REFLEX TO URINE CULTURE - Abnormal; Notable for the following components:    Protein, UA Trace (*)     All other components within normal limits    Narrative:     Specimen Source->Urine   ISTAT PROCEDURE - Abnormal; Notable for the following components:    POC Glucose 139 (*)     POC Sodium 134 (*)     All other components within normal limits   HIV 1 / 2 ANTIBODY    Narrative:     Release to patient->Immediate   HEPATITIS C ANTIBODY    Narrative:     Release to patient->Immediate   ISTAT CHEM8     EKG Readings: (Independently Interpreted)   I independently interpreted this EKG.  Normal sinus rhythm with a rate of 93.  Normal axis.  Normal intervals.  No ischemic changes.     ECG Results              EKG 12-lead (Final result)        Collection Time Result Time QRS Duration OHS QTC Calculation    07/01/24 13:42:55 07/01/24 14:39:22 74 410                     Final result by  Interface, Lab In [facility] (07/01/24 14:39:25)                   Narrative:    Test Reason : R10.13,    Vent. Rate : 093 BPM     Atrial Rate : 093 BPM     P-R Int : 132 ms          QRS Dur : 074 ms      QT Int : 330 ms       P-R-T Axes : 050 026 059 degrees     QTc Int : 410 ms    Normal sinus rhythm  Normal ECG  When compared with ECG of 19-JUN-2024 03:50,  Vent. rate has increased BY  35 BPM  Confirmed by Dusty Richardson MD ([id number]) on 7/1/2024 2:39:17 PM    Referred By: [email or web address]   SELF           Confirmed By:Dusty Richardson MD                                  Imaging Results              X-Ray Chest PA And Lateral (Final result)  Result time 07/01/24 15:50:31      Final result by Stephane Reynolds MD (07/01/24 15:50:31)                   Impression:      No acute intrathoracic process.      Electronically signed by: Stephane Reynolds MD  Date:    07/01/2024  Time:    15:50               Narrative:    EXAMINATION:  XR CHEST PA AND LATERAL    CLINICAL HISTORY:  Cough, unspecified    TECHNIQUE:  PA and lateral views of the chest were performed.    COMPARISON:  03/19/2023.    FINDINGS:  The trachea is unremarkable.  The cardiomediastinal silhouette is within normal limits.  The hilar structures are unremarkable there is no evidence of free air beneath the hemidiaphragms.  There are no pleural effusions.  There is no evidence of a pneumothorax.  There is no evidence of pneumomediastinum.  No airspace opacity is present.  There is unchanged calcified granuloma in the right upper lobe.  There are degenerative changes in the osseous structures.                                       CT Abdomen Pelvis With IV Contrast NO Oral Contrast (Final result)  Result time 07/01/24 14:53:48      Final result by Nikko Quiroz MD (07/01/24 14:53:48)                   Impression:      1. Findings in keeping with ongoing acute pancreatitis, as seen on 06/22/2024 CT. Redemonstrated heterogeneous attenuation of the pancreatic head adjoining  peripancreatic fat stranding. Focal cystic collection in the pancreatic tail measuring on the order of 18 mm, similar to prior.  Findings nonspecific, but developing necrosis difficult to exclude.  2. Since the prior study, there is new adjoining somewhat organized appearing fluid collection dorsal to the pancreas at this level, measuring on the order of 38 x 18 x 14 mm. This likely communicates with additional new lobulated fluid collection anterior inferior to the distal pancreatic body and tail measuring on the order of 30 x 50 x 21 mm. The anterior and lateral component of the latter collection demonstrates immediate proximity to inflamed loop of colon in the region of the splenic flexure, with lack of discrete tissue plane between these 2 structures.  Possibility of developing fistula formation is difficult to exclude.  3. New peripherally enhancing collection within the spleen, concerning for abscess formation.  4. Additional details, as provided in the body of the report.      Electronically signed by: Nikko Quiroz  Date:    07/01/2024  Time:    14:53               Narrative:    EXAMINATION:  CT ABDOMEN PELVIS WITH IV CONTRAST    CLINICAL HISTORY:  Pancreatitis, acute, severe;    TECHNIQUE:  Low dose axial images, sagittal and coronal reformations were obtained from the lung bases to the pubic symphysis following the IV administration of 100 mL of Omnipaque 350    COMPARISON:  CT of the chest, abdomen, and pelvis performed 06/22/2024.    FINDINGS:  Lower chest: Atelectasis and scar at the visualized lung bases.  Calcified granuloma right lower lobe.    Liver: Unremarkable.    Gallbladder and bile ducts: Status post cholecystectomy.  Similar prominent caliber intrahepatic and extrahepatic bile ducts when compared to the 06/22/2024 CT.    Pancreas: History of pancreaticojejunostomy.  Findings in keeping with ongoing acute pancreatitis, as seen on 06/22/2024 CT.  Redemonstrated heterogeneous attenuation of the  pancreatic head adjoining peripancreatic fat stranding.  Focal cystic collection in the pancreatic tail measuring on the order of 18 mm, similar to prior.    Since the prior study, there is new adjoining somewhat organized appearing fluid collection dorsal to the pancreas at this level, measuring on the order of 38 x 18 x 14 mm. This likely communicates with additional new lobulated fluid collection anterior inferior to the distal pancreatic body and tail (axial series 2, image 27; coronal reformat series 601, image 69), measuring on the order of 30 x 50 x 21 mm.  The anterior and lateral component of the latter collection demonstrates immediate proximity to inflamed loop of colon in the region of the splenic flexure, with lack of discrete tissue plane between these 2 structures (axial series 2, image 77).  Possibility of developing fistula formation is difficult to exclude.    Note that the splenic vein is not clearly patent extending from the splenic hilum to approximately the level of the mid pancreatic body (axial series 2, image 69).  This appearance is similar to prior and in keeping with chronic occlusion.    Spleen: There is a new lobulated centrally hypoattenuating collection within the posteroinferior spleen (axial series 2, image 74), measuring on the order of 35 x 16 x 23 mm, concerning for abscess formation.  Perisplenic and perigastric collaterals.    Adrenals: Unremarkable.    Kidneys: No acute change.  Large exophytic cyst at the upper pole right kidney.  Additional subcentimeter hypodense lesions are too small to definitely characterize.    Lymph nodes: Scattered mildly prominent number mesenteric and retroperitoneal lymph nodes appear grossly unchanged, and may be reactive in etiology.    Bowel and mesentery: Findings about the left splenic flexure, as above.  Ongoing inflammatory changes about the proximal duodenum presumed secondary to pancreatic inflammation.  Question small volume diverticulum.   Elsewhere, no evidence of bowel obstruction.  Colonic diverticulosis.  Normal appendix.    Abdominal aorta: Nonaneurysmal.  Mild-to-moderate atherosclerosis.  Short segment narrowing of the celiac artery origin with question mild poststenotic dilatation, as may be seen in the setting of median arcuate ligament syndrome, in the appropriate clinical context.    Inferior vena cava: Unremarkable.    Free fluid or free air: None.    Pelvis: Nonspecific circumferential urinary bladder wall thickening, which may relate to lateral obstruction, given enlargement of the prostate gland.    Body wall: Remote operative sequela in the anterior abdominal wall.  Small fat containing ventral hernia.    Bones: No acute change.  Scalloping of several thoracic and lumbar vertebral body superior endplates, possibly the basis of Schmorl's nodes.  Degenerative findings of the spine.                                       Medications   lactated ringers bolus 1,000 mL (1,000 mLs Intravenous New Bag 7/1/24 1414)   promethazine (PHENERGAN) 12.5 mg in 0.9% NaCl 50 mL IVPB (0 mg Intravenous Stopped 7/1/24 1437)   HYDROmorphone injection 1 mg (1 mg Intravenous Given 7/1/24 1413)   iohexoL (OMNIPAQUE 350) injection 100 mL (100 mLs Intravenous Given 7/1/24 1424)     Medical Decision Making  This is an emergent evaluation.  My differential diagnosis includes acute pancreatitis, biliary obstruction, acute hepatitis, gastritis, perforated viscus, and intra-abdominal infection.  Laboratory studies, urinalysis, and a CT scan of the abdomen and pelvis has been ordered.  Because of the patient's cough over the past 2 weeks, a chest x-ray has also been ordered.  I will provide the patient with IV fluids, IV Phenergan, and IV Dilaudid at this time.  I will reassess.    3:54 p.m.   Lipase is elevated.  LFTs are mildly elevated.  CT scan indicates acute pancreatitis.  I discussed this case with Hospital Medicine.  They have agreed to evaluate and place the  patient in observation.  Gastroenterology will be consulted.    Amount and/or Complexity of Data Reviewed  Labs: ordered.  Radiology: ordered.    Risk  Prescription drug management.                                      Clinical Impression:  Final diagnoses:  [R10.13] Epigastric pain  [R05.9] Cough  [K85.00] Idiopathic acute pancreatitis, unspecified complication status (Primary)          ED Disposition Condition    Observation Stable                Abilio Hoover MD  07/01/24 [id number]

## 2024-07-01 NOTE — H&P
[facility] - Emergency Dept  Hospital Medicine  History & Physical    Patient Name: Fabrice Zamorano  MRN: [id number]  Patient Class: OP- Observation  Admission Date: 7/1/2024  Attending Physician: Uzair Stephen DO   Primary Care Provider: Rebecca Mcmillan MD         Patient information was obtained from patient, spouse/SO, and ER records.     Subjective:     Principal Problem:Acute on chronic pancreatitis    Chief Complaint:   Chief Complaint   Patient presents with    Abdominal Pain     Upper abdominal pain that radiates to back. +nausea. No vomiting. Recently discharged with pancreatitis. No relief from home oxycodone.        HPI: This is a 59-year-old male with a history of hereditary pancreatitis, DVT, BRAIN, HTN who presents with recurrent abdominal pain.  Has had numerous admissions over the last year, most recently admitted 06/21-6/25/24.  States that since discharge he has been fatigued although had slightly improved during the ensuing days.  However abdominal pain has persisted.  Describes sharp epigastric abdominal pain which is constant, occasionally radiating to his lower back.  This a.m., he woke up with stabbing like sensation of his lower back which prompted him to go to ED for further evaluation.  States that appetite has been poor.  Last BM was 2 days ago.  Also endorses weight loss.  Denies any fevers, nausea, vomiting    ED course:  Upon arrival, was afebrile and hemodynamically stable.  Lab work significant for new leukocytosis 18 K, mild lipase elevation 95.  CTA/P obtained which noted findings suggestive of ongoing acute pancreatitis.  Also noted new organized appearing fluid collection dorsal to the pancreas with additional new lobulated collection anterior inferior to the distal pancreatic body, possibly communicating. AES consulted    Past Medical History:   Diagnosis Date    Anticoagulant long-term use     On Eliquis for DVT    GERD (gastroesophageal reflux disease)     Hypertension      Hypothyroidism 10/26/2021    Liver disease     fatty liver    Pancreatic pseudocyst     Personal history of colonic polyps     Sleep apnea     Splenic vein thrombosis        Past Surgical History:   Procedure Laterality Date    CHOLECYSTECTOMY      COLONOSCOPY      ENDOSCOPIC ULTRASOUND OF UPPER GASTROINTESTINAL TRACT N/A 10/12/2021    Procedure: ULTRASOUND, UPPER GI TRACT, ENDOSCOPIC;  Surgeon: Odalys Leiva MD;  Location: [facility];  Service: Endoscopy;  Laterality: N/A;  Upper and linear, 22 shark core, cytology and RPMI    ENDOSCOPIC ULTRASOUND OF UPPER GASTROINTESTINAL TRACT N/A 2/24/2023    Procedure: ULTRASOUND, UPPER GI TRACT, ENDOSCOPIC;  Surgeon: Shawn Chisholm MD;  Location: [facility] (2ND FLR);  Service: Endoscopy;  Laterality: N/A;  2/3/23-Instructions via portal-DS    ENDOSCOPIC ULTRASOUND OF UPPER GASTROINTESTINAL TRACT N/A 11/14/2023    Procedure: ULTRASOUND, UPPER GI TRACT, ENDOSCOPIC;  Surgeon: Kwadwo Gonsalez MD;  Location: [facility];  Service: Gastroenterology;  Laterality: N/A;  10/13/23: instructions sent vie portal-GD    ENDOSCOPIC ULTRASOUND OF UPPER GASTROINTESTINAL TRACT N/A 3/12/2024    Procedure: ULTRASOUND, UPPER GI TRACT, ENDOSCOPIC;  Surgeon: Shawn Chisholm MD;  Location: [facility];  Service: Endoscopy;  Laterality: N/A;  2/16/24: inst sent via portal-GD    ERCP N/A 09/29/2021    Procedure: ERCP (ENDOSCOPIC RETROGRADE CHOLANGIOPANCREATOGRAPHY);  Surgeon: Odalys Leiva MD;  Location: [facility];  Service: Endoscopy;  Laterality: N/A;    ERCP N/A 08/15/2022    Procedure: ERCP (ENDOSCOPIC RETROGRADE CHOLANGIOPANCREATOGRAPHY);  Surgeon: Odalys Leiva MD;  Location: [facility];  Service: Endoscopy;  Laterality: N/A;    ERCP N/A 2/24/2023    Procedure: ERCP (ENDOSCOPIC RETROGRADE CHOLANGIOPANCREATOGRAPHY);  Surgeon: Shawn Chisholm MD;  Location: [facility] (2ND [facility]);  Service: Endoscopy;  Laterality: N/A;    ERCP W/ PLASTIC STENT PLACEMENT      LATERAL  PANCREATICOJEJUNOSTOMY N/A 2021    Procedure: PANCREATICOJEJUNOSTOMY, SIDE-TO-SIDE tier 1;  Surgeon: Brian Hills MD;  Location: [facility] OR [address] [place] [address];  Service: General;  Laterality: N/A;       Review of patient's allergies indicates:  No Known Allergies    Current Facility-Administered Medications on File Prior to Encounter   Medication    [DISCONTINUED] iohexoL (OMNIPAQUE 300) injection     Current Outpatient Medications on File Prior to Encounter   Medication Sig    carvediloL (COREG) 6.25 MG tablet Take 3.125 mg by mouth 2 (two) times daily with meals.    metFORMIN (GLUCOPHAGE-XR) 500 MG ER 24hr tablet Take 500 mg by mouth once daily.    oxyCODONE-acetaminophen (PERCOCET)  mg per tablet Take 1 tablet by mouth every 4 (four) hours as needed for Pain.    ibuprofen (ADVIL,MOTRIN) 800 MG tablet Take 800 mg by mouth every 6 (six) hours as needed.    lipase-protease-amylase (CREON) 36,000-114,000- 180,000 unit CpDR Take 1 capsule by mouth 3 (three) times daily with meals.    multivitamin (THERAGRAN) per tablet Take 1 tablet by mouth once daily.    ondansetron (ZOFRAN-ODT) 4 MG TbDL Take 1 tablet (4 mg total) by mouth every 8 (eight) hours as needed (Nausea).    pantoprazole (PROTONIX) 40 MG tablet TAKE 1 TABLET(40 MG) BY MOUTH EVERY DAY    pregabalin (LYRICA) 75 MG capsule Take 2 capsules (150 mg total) by mouth every evening. May also take 1 capsule (75 mg total) daily as needed.     Family History       Problem Relation (Age of Onset)    Arthritis Father    Cancer Father    Pancreatic cancer Father    Thyroid disease Mother, Father          Tobacco Use    Smoking status: Former     Current packs/day: 0.00     Types: Cigarettes     Quit date: 2001     Years since quittin.9    Smokeless tobacco: Never   Substance and Sexual Activity    Alcohol use: Not Currently    Drug use: Never    Sexual activity: Yes     Review of Systems   Constitutional:  Positive for activity change and appetite change.  Negative for chills.   HENT:  Negative for congestion and dental problem.    Eyes:  Negative for discharge and itching.   Respiratory:  Negative for apnea, chest tightness, shortness of breath and wheezing.    Cardiovascular:  Negative for chest pain, palpitations and leg swelling.   Gastrointestinal:  Positive for abdominal pain. Negative for abdominal distention, blood in stool and diarrhea.   Endocrine: Negative for cold intolerance and heat intolerance.   Genitourinary:  Negative for difficulty urinating and dysuria.   Musculoskeletal:  Positive for back pain. Negative for arthralgias.   Skin:  Negative for color change and pallor.   Allergic/Immunologic: Negative for environmental allergies and food allergies.   Neurological:  Negative for dizziness and facial asymmetry.   Hematological:  Negative for adenopathy. Does not bruise/bleed easily.   Psychiatric/Behavioral:  Negative for agitation and behavioral problems.      Objective:     Vital Signs (Most Recent):  Temp: 98.4 °F (36.9 °C) (07/01/24 1254)  Pulse: 100 (07/01/24 1254)  Resp: 15 (07/01/24 1413)  BP: (!) 140/99 (07/01/24 1254)  SpO2: 99 % (07/01/24 1254) Vital Signs (24h Range):  Temp:  [98.4 °F (36.9 °C)] 98.4 °F (36.9 °C)  Pulse:  [100] 100  Resp:  [15-18] 15  SpO2:  [99 %] 99 %  BP: (140)/(99) 140/99     Weight: 89.8 kg (198 lb)  Body mass index is 27.62 kg/m².     Physical Exam  Constitutional:       General: He is not in acute distress.     Appearance: Normal appearance. He is not ill-appearing, toxic-appearing or diaphoretic.   HENT:      Head: Normocephalic and atraumatic.      Nose: Nose normal.      Mouth/Throat:      Mouth: Mucous membranes are moist.   Eyes:      Extraocular Movements: Extraocular movements intact.      Conjunctiva/sclera: Conjunctivae normal.      Pupils: Pupils are equal, round, and reactive to light.   Cardiovascular:      Rate and Rhythm: Normal rate and regular rhythm.      Pulses: Normal pulses.      Heart sounds:  Normal heart sounds. No murmur heard.     No gallop.   Pulmonary:      Effort: Pulmonary effort is normal. No respiratory distress.      Breath sounds: Normal breath sounds. No wheezing or rales.   Abdominal:      General: Abdomen is flat. Bowel sounds are normal. There is no distension.      Palpations: Abdomen is soft.      Tenderness: There is no abdominal tenderness. There is no guarding.   Musculoskeletal:         General: No swelling. Normal range of motion.      Cervical back: Normal range of motion and neck supple.   Skin:     General: Skin is warm and dry.      Capillary Refill: Capillary refill takes less than 2 seconds.   Neurological:      General: No focal deficit present.      Mental Status: He is alert and oriented to person, place, and time. Mental status is at baseline.   Psychiatric:         Mood and Affect: Mood normal.         Behavior: Behavior normal.         Thought Content: Thought content normal.         Judgment: Judgment normal.              CRANIAL NERVES     CN III, IV, VI   Pupils are equal, round, and reactive to light.       Significant Labs: All pertinent labs within the past 24 hours have been reviewed.    Significant Imaging: I have reviewed all pertinent imaging results/findings within the past 24 hours.  Assessment/Plan:     * Acute on chronic pancreatitis  -history of hereditary pancreatitis complicated by frequent admissions, most recently admitted 06/21-6/25/24  -Lipase minor elevation however diagnosis consistent w acute pancreatitis given typical associated symptoms/positive imaging  -CT A/P noting ongoing acute pancreatitis and also possible communicating fluid collection per below  Plan:    -continue multimodal pain control similar to previous hospitalization  -aggressive mIVF  -Advance diet as tolerated  -AES consulted  -cont pancreatic enzymes      Acute pancreatic fluid collection  -CT A/P: new organized appearing fluid collection dorsal to the pancreas with additional  new lobulated collection anterior inferior to the distal pancreatic body, possibly communicating  -Unclear if this represents underlying infection explaining leukocytosis and ongoing abdominal pain      GERD (gastroesophageal reflux disease)  PPI        VTE Risk Mitigation (From admission, onward)           Ordered     IP VTE LOW RISK PATIENT  Once         07/01/24 1559     Place JUAN LUIS hose  Until discontinued         07/01/24 1559     Place sequential compression device  Until discontinued         07/01/24 1559     Place sequential compression device  Until discontinued         07/01/24 1559                       On 07/01/2024, patient should be placed in hospital observation services under my care.             Uzair Stephen DO  Department of Hospital Medicine  [facility] - Emergency Dept

## 2024-07-01 NOTE — FIRST PROVIDER EVALUATION
"Medical screening examination initiated.  I have conducted a focused provider triage encounter, findings are as follows:    Brief history of present illness:  59-year-old male with a history of DVT, splenic vein thrombosis, sleep apnea, hypothyroidism, chronic pancreatitis, and hypertension presenting with upper abd pain and back pain. +nausea, despite pain meds    Vitals:    07/01/24 1254   BP: (!) 140/99   BP Location: Right arm   Patient Position: Sitting   Pulse: 100   Resp: 18   Temp: 98.4 °F (36.9 °C)   TempSrc: Oral   SpO2: 99%   Weight: 89.8 kg (198 lb)   Height: 5' 11" (1.803 m)       Pertinent physical exam:  tachycardic, non-peritoneal    Brief workup plan:  abd labs    Preliminary workup initiated; this workup will be continued and followed by the physician or advanced practice provider that is assigned to the patient when roomed.    Mariano Ann DO, ANTOINE  Emergency Staff Physician   Dept of Emergency Medicine   [facility]  Spectralink: [id number]        Disclaimer: This note has been generated using voice-recognition software. There may be typographical errors that have been missed during proof-reading.    "

## 2024-07-01 NOTE — ED NOTES
I-STAT Chem-8+ Results:   Value Reference Range   Sodium 134 136-145 mmol/L   Potassium  4.6 3.5-5.1 mmol/L   Chloride 98  mmol/L   Ionized Calcium 1.10 1.06-1.42 mmol/L   CO2 (measured) 28 23-29 mmol/L   Glucose 139  mg/dL   BUN 18 6-30 mg/dL   Creatinine 0.9 0.5-1.4 mg/dL   Hematocrit 43 36-54%

## 2024-07-01 NOTE — ED NOTES
Patient identifiers verified and correct for Mr Zamorano  C/C: ABd pain SEE NN  APPEARANCE: awake and alert in NAD. PAIN  8/10  SKIN: warm, dry and intact. No breakdown or bruising.  MUSCULOSKELETAL: Patient moving all extremities spontaneously, no obvious swelling or deformities noted. Ambulates independently.  RESPIRATORY: Denies shortness of breath.Respirations unlabored.   CARDIAC: Denies CP, 2+ distal pulses; no peripheral edema  ABDOMEN:ABdomen roundm, reports left lower back pain, nausea, poor appetitie  : voids spontaneously, denies difficulty  Neurologic: AAO x 4; follows commands equal strength in all extremities; denies numbness/tingling. Denies dizziness Dneis new weakness

## 2024-07-01 NOTE — HPI
This is a 59-year-old male with a history of hereditary pancreatitis, DVT, BRAIN, HTN who presents with recurrent abdominal pain.  Has had numerous admissions over the last year, most recently admitted 06/21-6/25/24.  States that since discharge he has been fatigued although had slightly improved during the ensuing days.  However abdominal pain has persisted.  Describes sharp epigastric abdominal pain which is constant, occasionally radiating to his lower back.  This a.m., he woke up with stabbing like sensation of his lower back which prompted him to go to ED for further evaluation.  States that appetite has been poor.  Last BM was 2 days ago.  Also endorses weight loss.  Denies any fevers, nausea, vomiting    ED course:  Upon arrival, was afebrile and hemodynamically stable.  Lab work significant for new leukocytosis 18 K, mild lipase elevation 95.  CTA/P obtained which noted findings suggestive of ongoing acute pancreatitis.  Also noted new organized appearing fluid collection dorsal to the pancreas with additional new lobulated collection anterior inferior to the distal pancreatic body, possibly communicating. AES consulted

## 2024-07-01 NOTE — ASSESSMENT & PLAN NOTE
-history of hereditary pancreatitis complicated by frequent admissions, most recently admitted 06/21-6/25/24  -Lipase minor elevation however diagnosis consistent w acute pancreatitis given typical associated symptoms/positive imaging  -CT A/P noting ongoing acute pancreatitis and also possible communicating fluid collection per below  Plan:    -continue multimodal pain control similar to previous hospitalization  -aggressive mIVF  -Advance diet as tolerated  -AES consulted  -cont pancreatic enzymes

## 2024-07-02 ENCOUNTER — ANESTHESIA EVENT (OUTPATIENT)
Dept: ENDOSCOPY | Facility: HOSPITAL | Age: 59
End: 2024-07-02
Payer: MEDICARE

## 2024-07-02 LAB
ALBUMIN SERPL BCP-MCNC: 2.8 G/DL (ref 3.5–5.2)
ALP SERPL-CCNC: 101 U/L (ref 55–135)
ALT SERPL W/O P-5'-P-CCNC: 50 U/L (ref 10–44)
ANION GAP SERPL CALC-SCNC: 9 MMOL/L (ref 8–16)
AST SERPL-CCNC: 22 U/L (ref 10–40)
BASOPHILS # BLD AUTO: 0.03 K/UL (ref 0–0.2)
BASOPHILS NFR BLD: 0.3 % (ref 0–1.9)
BILIRUB SERPL-MCNC: 1.5 MG/DL (ref 0.1–1)
BUN SERPL-MCNC: 11 MG/DL (ref 6–20)
CALCIUM SERPL-MCNC: 8.9 MG/DL (ref 8.7–10.5)
CHLORIDE SERPL-SCNC: 99 MMOL/L (ref 95–110)
CO2 SERPL-SCNC: 28 MMOL/L (ref 23–29)
CREAT SERPL-MCNC: 0.8 MG/DL (ref 0.5–1.4)
DIFFERENTIAL METHOD BLD: ABNORMAL
EOSINOPHIL # BLD AUTO: 0.2 K/UL (ref 0–0.5)
EOSINOPHIL NFR BLD: 1.4 % (ref 0–8)
ERYTHROCYTE [DISTWIDTH] IN BLOOD BY AUTOMATED COUNT: 14.4 % (ref 11.5–14.5)
EST. GFR  (NO RACE VARIABLE): >60 ML/MIN/1.73 M^2
GLUCOSE SERPL-MCNC: 106 MG/DL (ref 70–110)
HCT VFR BLD AUTO: 37.5 % (ref 40–54)
HGB BLD-MCNC: 12 G/DL (ref 14–18)
IMM GRANULOCYTES # BLD AUTO: 0.07 K/UL (ref 0–0.04)
IMM GRANULOCYTES NFR BLD AUTO: 0.7 % (ref 0–0.5)
LYMPHOCYTES # BLD AUTO: 1.6 K/UL (ref 1–4.8)
LYMPHOCYTES NFR BLD: 14.9 % (ref 18–48)
MAGNESIUM SERPL-MCNC: 2 MG/DL (ref 1.6–2.6)
MCH RBC QN AUTO: 29.7 PG (ref 27–31)
MCHC RBC AUTO-ENTMCNC: 32 G/DL (ref 32–36)
MCV RBC AUTO: 93 FL (ref 82–98)
MONOCYTES # BLD AUTO: 1 K/UL (ref 0.3–1)
MONOCYTES NFR BLD: 9.6 % (ref 4–15)
NEUTROPHILS # BLD AUTO: 7.7 K/UL (ref 1.8–7.7)
NEUTROPHILS NFR BLD: 73.1 % (ref 38–73)
NRBC BLD-RTO: 0 /100 WBC
PHOSPHATE SERPL-MCNC: 3.4 MG/DL (ref 2.7–4.5)
PLATELET # BLD AUTO: 224 K/UL (ref 150–450)
PMV BLD AUTO: 9.1 FL (ref 9.2–12.9)
POTASSIUM SERPL-SCNC: 4.5 MMOL/L (ref 3.5–5.1)
PROT SERPL-MCNC: 6 G/DL (ref 6–8.4)
RBC # BLD AUTO: 4.04 M/UL (ref 4.6–6.2)
SODIUM SERPL-SCNC: 136 MMOL/L (ref 136–145)
WBC # BLD AUTO: 10.57 K/UL (ref 3.9–12.7)

## 2024-07-02 PROCEDURE — 25000003 PHARM REV CODE 250: Performed by: STUDENT IN AN ORGANIZED HEALTH CARE EDUCATION/TRAINING PROGRAM

## 2024-07-02 PROCEDURE — 85025 COMPLETE CBC W/AUTO DIFF WBC: CPT | Performed by: STUDENT IN AN ORGANIZED HEALTH CARE EDUCATION/TRAINING PROGRAM

## 2024-07-02 PROCEDURE — 80053 COMPREHEN METABOLIC PANEL: CPT | Performed by: STUDENT IN AN ORGANIZED HEALTH CARE EDUCATION/TRAINING PROGRAM

## 2024-07-02 PROCEDURE — 83735 ASSAY OF MAGNESIUM: CPT | Performed by: STUDENT IN AN ORGANIZED HEALTH CARE EDUCATION/TRAINING PROGRAM

## 2024-07-02 PROCEDURE — 84100 ASSAY OF PHOSPHORUS: CPT | Performed by: STUDENT IN AN ORGANIZED HEALTH CARE EDUCATION/TRAINING PROGRAM

## 2024-07-02 PROCEDURE — 11000001 HC ACUTE MED/SURG PRIVATE ROOM

## 2024-07-02 PROCEDURE — 63600175 PHARM REV CODE 636 W HCPCS: Performed by: EMERGENCY MEDICINE

## 2024-07-02 PROCEDURE — 99223 1ST HOSP IP/OBS HIGH 75: CPT | Mod: GC,,, | Performed by: INTERNAL MEDICINE

## 2024-07-02 PROCEDURE — 96361 HYDRATE IV INFUSION ADD-ON: CPT

## 2024-07-02 PROCEDURE — 36415 COLL VENOUS BLD VENIPUNCTURE: CPT | Performed by: STUDENT IN AN ORGANIZED HEALTH CARE EDUCATION/TRAINING PROGRAM

## 2024-07-02 RX ORDER — BISACODYL 5 MG
10 TABLET, DELAYED RELEASE (ENTERIC COATED) ORAL ONCE
Status: COMPLETED | OUTPATIENT
Start: 2024-07-02 | End: 2024-07-02

## 2024-07-02 RX ORDER — CARVEDILOL 3.12 MG/1
3.12 TABLET ORAL 2 TIMES DAILY WITH MEALS
Status: DISCONTINUED | OUTPATIENT
Start: 2024-07-02 | End: 2024-07-04 | Stop reason: HOSPADM

## 2024-07-02 RX ORDER — POLYETHYLENE GLYCOL 3350, SODIUM SULFATE ANHYDROUS, SODIUM BICARBONATE, SODIUM CHLORIDE, POTASSIUM CHLORIDE 236; 22.74; 6.74; 5.86; 2.97 G/4L; G/4L; G/4L; G/4L; G/4L
4000 POWDER, FOR SOLUTION ORAL ONCE
Status: COMPLETED | OUTPATIENT
Start: 2024-07-02 | End: 2024-07-02

## 2024-07-02 RX ADMIN — SUCRALFATE 1 G: 1 SUSPENSION ORAL at 12:07

## 2024-07-02 RX ADMIN — ALUMINUM HYDROXIDE, MAGNESIUM HYDROXIDE, AND SIMETHICONE 30 ML: 1200; 120; 1200 SUSPENSION ORAL at 06:07

## 2024-07-02 RX ADMIN — CARVEDILOL 3.12 MG: 3.12 TABLET, FILM COATED ORAL at 07:07

## 2024-07-02 RX ADMIN — ONDANSETRON 8 MG: 8 TABLET, ORALLY DISINTEGRATING ORAL at 09:07

## 2024-07-02 RX ADMIN — SUCRALFATE 1 G: 1 SUSPENSION ORAL at 11:07

## 2024-07-02 RX ADMIN — PANTOPRAZOLE SODIUM 40 MG: 40 TABLET, DELAYED RELEASE ORAL at 08:07

## 2024-07-02 RX ADMIN — SODIUM CHLORIDE, POTASSIUM CHLORIDE, SODIUM LACTATE AND CALCIUM CHLORIDE: 600; 310; 30; 20 INJECTION, SOLUTION INTRAVENOUS at 12:07

## 2024-07-02 RX ADMIN — BISACODYL 10 MG: 5 TABLET, COATED ORAL at 11:07

## 2024-07-02 RX ADMIN — SUCRALFATE 1 G: 1 SUSPENSION ORAL at 06:07

## 2024-07-02 RX ADMIN — CARVEDILOL 3.12 MG: 3.12 TABLET, FILM COATED ORAL at 05:07

## 2024-07-02 RX ADMIN — OXYCODONE 5 MG: 5 TABLET ORAL at 12:07

## 2024-07-02 RX ADMIN — POLYETHYLENE GLYCOL 3350, SODIUM SULFATE ANHYDROUS, SODIUM BICARBONATE, SODIUM CHLORIDE, POTASSIUM CHLORIDE 4000 ML: 236; 22.74; 6.74; 5.86; 2.97 POWDER, FOR SOLUTION ORAL at 05:07

## 2024-07-02 RX ADMIN — ALUMINUM HYDROXIDE, MAGNESIUM HYDROXIDE, AND SIMETHICONE 30 ML: 1200; 120; 1200 SUSPENSION ORAL at 08:07

## 2024-07-02 RX ADMIN — POLYETHYLENE GLYCOL 3350 17 G: 17 POWDER, FOR SOLUTION ORAL at 08:07

## 2024-07-02 RX ADMIN — ALUMINUM HYDROXIDE, MAGNESIUM HYDROXIDE, AND SIMETHICONE 30 ML: 1200; 120; 1200 SUSPENSION ORAL at 11:07

## 2024-07-02 NOTE — NURSING
Nurses Note -- 4 Eyes      7/2/2024   7:18 AM      Skin assessed during: Admit      [x] No Altered Skin Integrity Present    []Prevention Measures Documented      [] Yes- Altered Skin Integrity Present or Discovered   [] LDA Added if Not in Epic (Describe Wound)   [] New Altered Skin Integrity was Present on Admit and Documented in LDA   [] Wound Image Taken    Wound Care Consulted? No    Attending Nurse:  Meliza Bray RN/Staff Member:   Mame CRAMER

## 2024-07-02 NOTE — NURSING
Pt received d/c instructions/education. No concerns verbalized at this time. Awaiting transportation before removing IV access. Pt to d/c home with belongings.

## 2024-07-02 NOTE — PLAN OF CARE
[facility] - Med Surg  Initial Discharge Assessment       Primary Care Provider: Rebecca Mcmillan MD    Admission Diagnosis: Cough [R05.9]  Epigastric pain [R10.13]  Chest pain [R07.9]  Idiopathic acute pancreatitis, unspecified complication status [K85.00]    Admission Date: 7/1/2024  Expected Discharge Date: 7/5/2024    Transition of Care Barriers: None    Payor: [facility] MEDICARE / Plan: [employer] [place] / Product Type: Medicare Advantage /     Extended Emergency Contact Information  Primary Emergency Contact: Aliza Zamorano  Mobile Phone: [phone]  Relation: Spouse  Preferred language: English   needed? No    Discharge Plan A: Home with family  Discharge Plan B: Home      [id number] - [place], [place] - [address] [facility] [address]  [address]  [place] [address]  Phone: [phone] Fax: [phone]      Initial Assessment (most recent)       Adult Discharge Assessment - 07/02/24 1350          Discharge Assessment    Assessment Type Discharge Planning Assessment     Confirmed/corrected address, phone number and insurance Yes     Confirmed Demographics Correct on Facesheet     Source of Information family;patient     Does patient/caregiver understand observation status Yes     Communicated AMBER with patient/caregiver Yes     Reason For Admission SOB     People in Home spouse     Facility Arrived From: Home     Do you expect to return to your current living situation? Yes     Do you have help at home or someone to help you manage your care at home? Yes     Who are your caregiver(s) and their phone number(s)? Spouse Aliza     Prior to hospitilization cognitive status: Alert/Oriented;No Deficits     Current cognitive status: Alert/Oriented;No Deficits     Walking or Climbing Stairs Difficulty no     Dressing/Bathing Difficulty no     Home Accessibility wheelchair accessible     Home Layout Able to live on 1st floor     Equipment  Currently Used at Home CPAP     Readmission within 30 days? No     Patient currently being followed by outpatient case management? No     Do you currently have service(s) that help you manage your care at home? No     Do you take prescription medications? Yes     Do you have prescription coverage? Yes     Do you have any problems affording any of your prescribed medications? No     Is the patient taking medications as prescribed? yes     Who is going to help you get home at discharge? Spouse Aliza     How do you get to doctors appointments? car, drives self     Are you on dialysis? No     Do you take coumadin? No     Discharge Plan A Home with family     Discharge Plan B Home     DME Needed Upon Discharge  none     Discharge Plan discussed with: Spouse/sig other;Patient     Name(s) and Number(s) Aliza Spouse     Transition of Care Barriers None        Physical Activity    On average, how many days per week do you engage in moderate to strenuous exercise (like a brisk walk)? 7 days     On average, how many minutes do you engage in exercise at this level? 30 min        Financial Resource Strain    How hard is it for you to pay for the very basics like food, housing, medical care, and heating? Not very hard        Housing Stability    In the last 12 months, was there a time when you were not able to pay the mortgage or rent on time? No     At any time in the past 12 months, were you homeless or living in a shelter (including now)? No        Transportation Needs    Has the lack of transportation kept you from medical appointments, meetings, work or from getting things needed for daily living? No        Food Insecurity    Within the past 12 months, you worried that your food would run out before you got the money to buy more. Never true     Within the past 12 months, the food you bought just didn't last and you didn't have money to get more. Never true        Stress    Do you feel stress - tense, restless, nervous, or  anxious, or unable to sleep at night because your mind is troubled all the time - these days? Not at all        Social Isolation    How often do you feel lonely or isolated from those around you?  Never        Alcohol Use    Q1: How often do you have a drink containing alcohol? Never     Q2: How many drinks containing alcohol do you have on a typical day when you are drinking? Patient does not drink     Q3: How often do you have six or more drinks on one occasion? Never        Utilities    In the past 12 months has the electric, gas, oil, or water company threatened to shut off services in your home? No        Health Literacy    How often do you need to have someone help you when you read instructions, pamphlets, or other written material from your doctor or pharmacy? Never        OTHER    Name(s) of People in Home Spouse Aliza                   Discharge Plan A and Plan B have been determined by review of patient's clinical status, future medical and therapeutic needs, and coverage/benefits for post-acute care in coordination with multidisciplinary team members.

## 2024-07-02 NOTE — CONSULTS
[facility] Surg  General Surgery  Consult Note    Patient Name: Fabrice Zamorano  MRN: [id number]  Code Status: Full Code  Admission Date: 7/1/2024  Hospital Length of Stay: 0 days  Attending Physician: Uzair Stephen DO  Primary Care Provider: Rebecca Mcmillan MD    Patient information was obtained from patient, past medical records, and ER records.     Inpatient consult to Surgical Oncology  Consult performed by: Nicho Sy MD  Consult ordered by: Tariq Magaña MD  Reason for consult: pancreatitis        Subjective:     Principal Problem: Acute on chronic pancreatitis    History of Present Illness: 59 year old male with a past medical history of HTN, HLD, BRAIN, GERD, and recurrent autoimmune pancreatitis now s/p Pu[place] procedure in November of 2021. Following this procedure the patient describes significant improvement in his pancreatitis symptoms. Since that time the patient describes some mild episodes of smoldering pancreatitis symptoms. These are typically very well managed with bowel rest and a highly selective diet. Two weeks ago he experienced worsening abdominal pain, inconsistent bowel movements, worsening nausea and eventual uncontrolled emesis requiring hospitalization. He was found to have a significantly elevated bilirubin level of 10.1 and a new peripancreatic fludi collection on CT scan. AES recommended a 5 day course of IV Zosyn which was completed with improvement in his symptoms. He was discharged on hospital day 5 but never had full resolution of his symptoms. Once again presented to the ED on 7/01 for similar symptoms. Found to have elevated lipase of 85 and mildly elevated bilirubin of 1.5. Surgical Oncology has been following him over this outpatient period. Now consulted for inpatient evaluation.     No current facility-administered medications on file prior to encounter.     Current Outpatient Medications on File Prior to Encounter   Medication Sig    carvediloL (COREG)  6.25 MG tablet Take 3.125 mg by mouth 2 (two) times daily with meals.    metFORMIN (GLUCOPHAGE-XR) 500 MG ER 24hr tablet Take 500 mg by mouth once daily.    oxyCODONE-acetaminophen (PERCOCET)  mg per tablet Take 1 tablet by mouth every 4 (four) hours as needed for Pain.    ibuprofen (ADVIL,MOTRIN) 800 MG tablet Take 800 mg by mouth every 6 (six) hours as needed.    lipase-protease-amylase (CREON) 36,000-114,000- 180,000 unit CpDR Take 1 capsule by mouth 3 (three) times daily with meals.    multivitamin (THERAGRAN) per tablet Take 1 tablet by mouth once daily.    ondansetron (ZOFRAN-ODT) 4 MG TbDL Take 1 tablet (4 mg total) by mouth every 8 (eight) hours as needed (Nausea).    pantoprazole (PROTONIX) 40 MG tablet TAKE 1 TABLET(40 MG) BY MOUTH EVERY DAY    pregabalin (LYRICA) 75 MG capsule Take 2 capsules (150 mg total) by mouth every evening. May also take 1 capsule (75 mg total) daily as needed.       Review of patient's allergies indicates:  No Known Allergies    Past Medical History:   Diagnosis Date    Anticoagulant long-term use     On Eliquis for DVT    GERD (gastroesophageal reflux disease)     Hypertension     Hypothyroidism 10/26/2021    Liver disease     fatty liver    Pancreatic pseudocyst     Personal history of colonic polyps     Sleep apnea     Splenic vein thrombosis      Past Surgical History:   Procedure Laterality Date    CHOLECYSTECTOMY      COLONOSCOPY      ENDOSCOPIC ULTRASOUND OF UPPER GASTROINTESTINAL TRACT N/A 10/12/2021    Procedure: ULTRASOUND, UPPER GI TRACT, ENDOSCOPIC;  Surgeon: Odalys Leiva MD;  Location: [facility];  Service: Endoscopy;  Laterality: N/A;  Upper and linear, 22 shark core, cytology and RPMI    ENDOSCOPIC ULTRASOUND OF UPPER GASTROINTESTINAL TRACT N/A 2/24/2023    Procedure: ULTRASOUND, UPPER GI TRACT, ENDOSCOPIC;  Surgeon: Shawn Chisholm MD;  [facility]  Service: Endoscopy;  Laterality: N/A;  2/3/23-Instructions via portal-DS     ENDOSCOPIC ULTRASOUND OF UPPER GASTROINTESTINAL TRACT N/A 2023    Procedure: ULTRASOUND, UPPER GI TRACT, ENDOSCOPIC;  Surgeon: Kwadwo Gonsalez MD;  Location: [facility];  Service: Gastroenterology;  Laterality: N/A;  10/13/23: instructions sent vie portal-GD    ENDOSCOPIC ULTRASOUND OF UPPER GASTROINTESTINAL TRACT N/A 3/12/2024    Procedure: ULTRASOUND, UPPER GI TRACT, ENDOSCOPIC;  Surgeon: Shawn Chisholm MD;  Location: [facility];  Service: Endoscopy;  Laterality: N/A;  24: inst sent via portal-GD    ERCP N/A 2021    Procedure: ERCP (ENDOSCOPIC RETROGRADE CHOLANGIOPANCREATOGRAPHY);  Surgeon: Odalys Leiva MD;  Location: [facility];  Service: Endoscopy;  Laterality: N/A;    ERCP N/A 08/15/2022    Procedure: ERCP (ENDOSCOPIC RETROGRADE CHOLANGIOPANCREATOGRAPHY);  Surgeon: Odalys Leiva MD;  Location: [facility];  Service: Endoscopy;  Laterality: N/A;    ERCP N/A 2023    Procedure: ERCP (ENDOSCOPIC RETROGRADE CHOLANGIOPANCREATOGRAPHY);  Surgeon: Shawn Chisholm MD;  Location: [facility] ([address] [place] [address]);  Service: Endoscopy;  Laterality: N/A;    ERCP W/ PLASTIC STENT PLACEMENT      LATERAL PANCREATICOJEJUNOSTOMY N/A 2021    Procedure: PANCREATICOJEJUNOSTOMY, SIDE-TO-SIDE tier 1;  Surgeon: Brian Hills MD;  [facility];  Service: General;  Laterality: N/A;     Family History       Problem Relation (Age of Onset)    Arthritis Father    Cancer Father    Pancreatic cancer Father    Thyroid disease Mother, Father          Tobacco Use    Smoking status: Former     Current packs/day: 0.00     Types: Cigarettes     Quit date: 2001     Years since quittin.9    Smokeless tobacco: Never   Substance and Sexual Activity    Alcohol use: Not Currently    Drug use: Never    Sexual activity: Yes     Review of Systems   Constitutional:  Positive for appetite change. Negative for fever and unexpected weight change.   HENT: Negative.     Respiratory: Negative.      Cardiovascular: Negative.    Gastrointestinal:  Positive for abdominal distention, abdominal pain, diarrhea, nausea and vomiting.   Endocrine: Negative.    Genitourinary: Negative.    Musculoskeletal:  Positive for back pain.   Skin:  Negative for color change.   Neurological: Negative.    Hematological: Negative.    Psychiatric/Behavioral: Negative.       Objective:     Vital Signs (Most Recent):  Temp: 98.4 °F (36.9 °C) (07/02/24 1137)  Pulse: 81 (07/02/24 1137)  Resp: 18 (07/02/24 0743)  BP: 117/79 (07/02/24 1137)  SpO2: 95 % (07/02/24 1137) Vital Signs (24h Range):  Temp:  [98 °F (36.7 °C)-98.8 °F (37.1 °C)] 98.4 °F (36.9 °C)  Pulse:  [80-98] 81  Resp:  [16-18] 18  SpO2:  [95 %-98 %] 95 %  BP: (113-161)/(68-97) 117/79     Weight: 93.3 kg (205 lb 11 oz)  Body mass index is 28.69 kg/m².     Physical Exam  Vitals and nursing note reviewed.   Constitutional:       Appearance: Normal appearance. He is not ill-appearing.   HENT:      Head: Normocephalic.      Mouth/Throat:      Mouth: Mucous membranes are moist.      Pharynx: Oropharynx is clear.   Eyes:      General: No scleral icterus.     Extraocular Movements: Extraocular movements intact.      Conjunctiva/sclera: Conjunctivae normal.   Cardiovascular:      Rate and Rhythm: Normal rate.      Pulses: Normal pulses.   Pulmonary:      Effort: Pulmonary effort is normal. No respiratory distress.      Breath sounds: No wheezing.   Abdominal:      General: Bowel sounds are normal. There is distension.      Palpations: Abdomen is soft.      Tenderness: There is abdominal tenderness.      Comments: Soft, mildly distended abdomen. Mild tenderness to palpation in the epigastric region but no significant signs of peritonitis. Incisions are well-healed.   Musculoskeletal:         General: No swelling or tenderness. Normal range of motion.      Cervical back: Normal range of motion.   Skin:     General: Skin is warm and dry.      Coloration: Skin is not jaundiced or pale.    Neurological:      General: No focal deficit present.      Mental Status: He is alert and oriented to person, place, and time.   Psychiatric:         Mood and Affect: Mood normal.            I have reviewed all pertinent lab results within the past 24 hours.  CBC:   Recent Labs   Lab 07/02/24  0436   WBC 10.57   RBC 4.04*   HGB 12.0*   HCT 37.5*      MCV 93   MCH 29.7   MCHC 32.0     CMP:   Recent Labs   Lab 07/02/24  0436      CALCIUM 8.9   ALBUMIN 2.8*   PROT 6.0      K 4.5   CO2 28   CL 99   BUN 11   CREATININE 0.8   ALKPHOS 101   ALT 50*   AST 22   BILITOT 1.5*       Significant Diagnostics:  I have reviewed all pertinent imaging results/findings within the past 24 hours.    Assessment/Plan:     * Acute on chronic pancreatitis  59 year old male with a past medical history of HTN, HLD, BRAIN, GERD, and recurrent autoimmune pancreatitis now s/p [place] procedure in November of 2021. Has done quite well over the past three years but has had some mild episodes of pancreatitis not requiring hospitalization. Most recently has required hospitalization x2 for acute episodes of worsening abdominal pain, nausea, emesis, and elevated T Bili which approached 10 at one point. Surgical Oncology consulted for evaluation.    - Had a long discussion with Mr. Zamorano regarding his symptoms/pathology/and prognosis. Overall he looks quite good. Tolerating most foods and seems to be doing well nutritionally. These episodes seem to be unprovoked and I am unsure of what may have triggered his new onset symptoms.   - Definitive surgery for this condition would likely require total pancreatectomy which comes with both significant acute and chronic issues.   - Patient would like to refrain from surgical intervention at this time which I agree with  - OK to proceed with colonoscopy tomorrow for evaluation of splenic flexure.  - Slowly advance diet  - Continue Creon  - Multimodal pain control  - Surgical Oncology will  continue to follow while inpatient. If colonoscopy is without significant findings I believe that this can be treated conservatively as an outpatient.      VTE Risk Mitigation (From admission, onward)           Ordered     IP VTE LOW RISK PATIENT  Once         07/01/24 1559     Place JUAN LUIS hose  Until discontinued         07/01/24 1559     Place sequential compression device  Until discontinued         07/01/24 1559     Place sequential compression device  Until discontinued         07/01/24 1559                    Thank you for your consult. I will follow-up with patient. Please contact us if you have any additional questions.    Nicho Sy MD  General Surgery  [facility] Surg

## 2024-07-02 NOTE — PLAN OF CARE
Problem: Adult Inpatient Plan of Care  Goal: Plan of Care Review  Outcome: Progressing  Goal: Patient-Specific Goal (Individualized)  Outcome: Progressing  Goal: Absence of Hospital-Acquired Illness or Injury  Outcome: Progressing  Goal: Optimal Comfort and Wellbeing  Outcome: Progressing  Goal: Readiness for Transition of Care  Outcome: Progressing     Problem: Infection  Goal: Absence of Infection Signs and Symptoms  Outcome: Progressing   Alert and orient times  4.Lr infusing at 100 Ml /HR.. Wife is present at bedside.  Pt  was concern about not taking his coreg Md notified and starting first  dose this A.M. other concern voiced . No fall

## 2024-07-02 NOTE — ASSESSMENT & PLAN NOTE
59 year old male with a past medical history of HTN, HLD, BRAIN, GERD, and recurrent autoimmune pancreatitis now s/p [place] procedure in November of 2021. Has done quite well over the past three years but has had some mild episodes of pancreatitis not requiring hospitalization. Most recently has required hospitalization x2 for acute episodes of worsening abdominal pain, nausea, emesis, and elevated T Bili which approached 10 at one point. Surgical Oncology consulted for evaluation.    - Had a long discussion with Mr. Zamorano regarding his symptoms/pathology/and prognosis. Overall he looks quite good. Tolerating most foods and seems to be doing well nutritionally. These episodes seem to be unprovoked and I am unsure of what may have triggered his new onset symptoms.   - Definitive surgery for this condition would likely require total pancreatectomy which comes with both significant acute and chronic issues.   - Patient would like to refrain from surgical intervention at this time which I agree with  - OK to proceed with colonoscopy tomorrow for evaluation of splenic flexure.  - Slowly advance diet  - Continue Creon  - Multimodal pain control  - Surgical Oncology will continue to follow while inpatient. If colonoscopy is without significant findings I believe that this can be treated conservatively as an outpatient.

## 2024-07-02 NOTE — SUBJECTIVE & OBJECTIVE
No current facility-administered medications on file prior to encounter.     Current Outpatient Medications on File Prior to Encounter   Medication Sig    carvediloL (COREG) 6.25 MG tablet Take 3.125 mg by mouth 2 (two) times daily with meals.    metFORMIN (GLUCOPHAGE-XR) 500 MG ER 24hr tablet Take 500 mg by mouth once daily.    oxyCODONE-acetaminophen (PERCOCET)  mg per tablet Take 1 tablet by mouth every 4 (four) hours as needed for Pain.    ibuprofen (ADVIL,MOTRIN) 800 MG tablet Take 800 mg by mouth every 6 (six) hours as needed.    lipase-protease-amylase (CREON) 36,000-114,000- 180,000 unit CpDR Take 1 capsule by mouth 3 (three) times daily with meals.    multivitamin (THERAGRAN) per tablet Take 1 tablet by mouth once daily.    ondansetron (ZOFRAN-ODT) 4 MG TbDL Take 1 tablet (4 mg total) by mouth every 8 (eight) hours as needed (Nausea).    pantoprazole (PROTONIX) 40 MG tablet TAKE 1 TABLET(40 MG) BY MOUTH EVERY DAY    pregabalin (LYRICA) 75 MG capsule Take 2 capsules (150 mg total) by mouth every evening. May also take 1 capsule (75 mg total) daily as needed.       Review of patient's allergies indicates:  No Known Allergies    Past Medical History:   Diagnosis Date    Anticoagulant long-term use     On Eliquis for DVT    GERD (gastroesophageal reflux disease)     Hypertension     Hypothyroidism 10/26/2021    Liver disease     fatty liver    Pancreatic pseudocyst     Personal history of colonic polyps     Sleep apnea     Splenic vein thrombosis      Past Surgical History:   Procedure Laterality Date    CHOLECYSTECTOMY      COLONOSCOPY      ENDOSCOPIC ULTRASOUND OF UPPER GASTROINTESTINAL TRACT N/A 10/12/2021    Procedure: ULTRASOUND, UPPER GI TRACT, ENDOSCOPIC;  Surgeon: Odalys Leiva MD;  Location: [facility];  Service: Endoscopy;  Laterality: N/A;  Upper and linear, 22 shark core, cytology and RPMI    ENDOSCOPIC ULTRASOUND OF UPPER GASTROINTESTINAL TRACT N/A 2/24/2023    Procedure:  ULTRASOUND, UPPER GI TRACT, ENDOSCOPIC;  Surgeon: Shawn Chisholm MD;  [facility]  Service: Endoscopy;  Laterality: N/A;  2/3/23-Instructions via portal-DS    ENDOSCOPIC ULTRASOUND OF UPPER GASTROINTESTINAL TRACT N/A 2023    Procedure: ULTRASOUND, UPPER GI TRACT, ENDOSCOPIC;  Surgeon: Kwadwo Gonsalez MD;  Location: [facility];  Service: Gastroenterology;  Laterality: N/A;  10/13/23: instructions sent vie portal-GD    ENDOSCOPIC ULTRASOUND OF UPPER GASTROINTESTINAL TRACT N/A 3/12/2024    Procedure: ULTRASOUND, UPPER GI TRACT, ENDOSCOPIC;  Surgeon: Shawn Chisholm MD;  Location: [facility];  Service: Endoscopy;  Laterality: N/A;  24: inst sent via portal-GD    ERCP N/A 2021    Procedure: ERCP (ENDOSCOPIC RETROGRADE CHOLANGIOPANCREATOGRAPHY);  Surgeon: Odalys Levia MD;  Location: [facility];  Service: Endoscopy;  Laterality: N/A;    ERCP N/A 08/15/2022    Procedure: ERCP (ENDOSCOPIC RETROGRADE CHOLANGIOPANCREATOGRAPHY);  Surgeon: Odalys Leiva MD;  Location: [facility];  Service: Endoscopy;  Laterality: N/A;    ERCP N/A 2023    Procedure: ERCP (ENDOSCOPIC RETROGRADE CHOLANGIOPANCREATOGRAPHY);  Surgeon: Shawn Chisholm MD;  Location: [facility] ([address] [place] [address]);  Service: Endoscopy;  Laterality: N/A;    ERCP W/ PLASTIC STENT PLACEMENT      LATERAL PANCREATICOJEJUNOSTOMY N/A 2021    Procedure: PANCREATICOJEJUNOSTOMY, SIDE-TO-SIDE tier 1;  Surgeon: Brian Hills MD;  Location: [facility] OR [address] [place] [address];  Service: General;  Laterality: N/A;     Family History       Problem Relation (Age of Onset)    Arthritis Father    Cancer Father    Pancreatic cancer Father    Thyroid disease Mother, Father          Tobacco Use    Smoking status: Former     Current packs/day: 0.00     Types: Cigarettes     Quit date: 2001     Years since quittin.9    Smokeless tobacco: Never   Substance and Sexual Activity    Alcohol use: Not Currently    Drug use: Never    Sexual activity:  Yes     Review of Systems   Constitutional:  Positive for appetite change. Negative for fever and unexpected weight change.   HENT: Negative.     Respiratory: Negative.     Cardiovascular: Negative.    Gastrointestinal:  Positive for abdominal distention, abdominal pain, diarrhea, nausea and vomiting.   Endocrine: Negative.    Genitourinary: Negative.    Musculoskeletal:  Positive for back pain.   Skin:  Negative for color change.   Neurological: Negative.    Hematological: Negative.    Psychiatric/Behavioral: Negative.       Objective:     Vital Signs (Most Recent):  Temp: 98.4 °F (36.9 °C) (07/02/24 1137)  Pulse: 81 (07/02/24 1137)  Resp: 18 (07/02/24 0743)  BP: 117/79 (07/02/24 1137)  SpO2: 95 % (07/02/24 1137) Vital Signs (24h Range):  Temp:  [98 °F (36.7 °C)-98.8 °F (37.1 °C)] 98.4 °F (36.9 °C)  Pulse:  [80-98] 81  Resp:  [16-18] 18  SpO2:  [95 %-98 %] 95 %  BP: (113-161)/(68-97) 117/79     Weight: 93.3 kg (205 lb 11 oz)  Body mass index is 28.69 kg/m².     Physical Exam  Vitals and nursing note reviewed.   Constitutional:       Appearance: Normal appearance. He is not ill-appearing.   HENT:      Head: Normocephalic.      Mouth/Throat:      Mouth: Mucous membranes are moist.      Pharynx: Oropharynx is clear.   Eyes:      General: No scleral icterus.     Extraocular Movements: Extraocular movements intact.      Conjunctiva/sclera: Conjunctivae normal.   Cardiovascular:      Rate and Rhythm: Normal rate.      Pulses: Normal pulses.   Pulmonary:      Effort: Pulmonary effort is normal. No respiratory distress.      Breath sounds: No wheezing.   Abdominal:      General: Bowel sounds are normal. There is distension.      Palpations: Abdomen is soft.      Tenderness: There is abdominal tenderness.      Comments: Soft, mildly distended abdomen. Mild tenderness to palpation in the epigastric region but no significant signs of peritonitis. Incisions are well-healed.   Musculoskeletal:         General: No swelling or  tenderness. Normal range of motion.      Cervical back: Normal range of motion.   Skin:     General: Skin is warm and dry.      Coloration: Skin is not jaundiced or pale.   Neurological:      General: No focal deficit present.      Mental Status: He is alert and oriented to person, place, and time.   Psychiatric:         Mood and Affect: Mood normal.            I have reviewed all pertinent lab results within the past 24 hours.  CBC:   Recent Labs   Lab 07/02/24  0436   WBC 10.57   RBC 4.04*   HGB 12.0*   HCT 37.5*      MCV 93   MCH 29.7   MCHC 32.0     CMP:   Recent Labs   Lab 07/02/24  0436      CALCIUM 8.9   ALBUMIN 2.8*   PROT 6.0      K 4.5   CO2 28   CL 99   BUN 11   CREATININE 0.8   ALKPHOS 101   ALT 50*   AST 22   BILITOT 1.5*       Significant Diagnostics:  I have reviewed all pertinent imaging results/findings within the past 24 hours.

## 2024-07-02 NOTE — ED NOTES
LOC: The patient is awake, alert, aware of environment with an appropriate affect. Oriented x4, speaking appropriately  APPEARANCE: Pt resting comfortably, in no acute distress, pt is clean and well groomed, clothing properly fastened  SKIN:The skin is warm and dry, color consistent with ethnicity, patient has normal skin turgor and moist mucus membranes, no bruising noted   RESPIRATORY:Airway is open and patent, respirations are spontaneous, patient has a normal effort and rate, no accessory muscle use noted.  CARDIAC: Normal rate and rhythm, no peripheral edema noted, capillary refill < 3 seconds, bilateral radial pulses 2+.  ABDOMEN: Soft, non tender, non distended. Bowel sounds present x 4 quadrants. Abdominal pain    NEUROLOGIC: PERRLA, facial expression is symmetrical, patient moving all extremities spontaneously, normal sensation in all extremities when touched with a finger.  Follows all commands appropriately  MUSCULOSKELETAL: Patient moving all extremities spontaneously, no obvious swelling or deformities noted.

## 2024-07-02 NOTE — HPI
59 year old male with a past medical history of HTN, HLD, BRAIN, GERD, and recurrent autoimmune pancreatitis now s/p Pu[place]ow procedure in November of 2021. Following this procedure the patient describes significant improvement in his pancreatitis symptoms. Since that time the patient describes some mild episodes of smoldering pancreatitis symptoms. These are typically very well managed with bowel rest and a highly selective diet. Two weeks ago he experienced worsening abdominal pain, inconsistent bowel movements, worsening nausea and eventual uncontrolled emesis requiring hospitalization. He was found to have a significantly elevated bilirubin level of 10.1 and a new peripancreatic fludi collection on CT scan. AES recommended a 5 day course of IV Zosyn which was completed with improvement in his symptoms. He was discharged on hospital day 5 but never had full resolution of his symptoms. Once again presented to the ED on 7/01 for similar symptoms. Found to have elevated lipase of 85 and mildly elevated bilirubin of 1.5. Surgical Oncology has been following him over this outpatient period. Now consulted for inpatient evaluation.

## 2024-07-02 NOTE — PROGRESS NOTES
[facility] Medicine  Progress Note    Patient Name: Fabrice Zamorano  MRN: [id number]  Patient Class: OP- Observation   Admission Date: 7/1/2024  Length of Stay: 0 days  Attending Physician: Uzair Stephen DO  Primary Care Provider: Rebecca Mcmillan MD        Subjective:     Principal Problem:Acute on chronic pancreatitis        HPI:  This is a 59-year-old male with a history of hereditary pancreatitis, DVT, BRAIN, HTN who presents with recurrent abdominal pain.  Has had numerous admissions over the last year, most recently admitted 06/21-6/25/24.  States that since discharge he has been fatigued although had slightly improved during the ensuing days.  However abdominal pain has persisted.  Describes sharp epigastric abdominal pain which is constant, occasionally radiating to his lower back.  This a.m., he woke up with stabbing like sensation of his lower back which prompted him to go to ED for further evaluation.  States that appetite has been poor.  Last BM was 2 days ago.  Also endorses weight loss.  Denies any fevers, nausea, vomiting    ED course:  Upon arrival, was afebrile and hemodynamically stable.  Lab work significant for new leukocytosis 18 K, mild lipase elevation 95.  CTA/P obtained which noted findings suggestive of ongoing acute pancreatitis.  Also noted new organized appearing fluid collection dorsal to the pancreas with additional new lobulated collection anterior inferior to the distal pancreatic body, possibly communicating. AES consulted    Overview/Hospital Course:  Admitted for recurrent pancreatitis.  CT AP noting findings suggestive of ongoing acute pancreatitis.  Also noting to discrete peripancreatic fluid collections, possibly communicating.  AES consulted, planning for colonoscopy.  Also discussing with surgical oncology to discuss candidacy for Alejandro procedure vs a completion total pancreatectomy/Whipple    Interval History: Seen this AM at bedside.  States that he is  feeling much improved.  Endorsing minimal abdominal pain.  Denies any fevers, chills, nausea.  Had 1 BM overnight    Review of Systems  Objective:     Vital Signs (Most Recent):  Temp: 98.6 °F (37 °C) (07/02/24 0743)  Pulse: 98 (07/02/24 0743)  Resp: 18 (07/02/24 0743)  BP: 119/68 (07/02/24 0743)  SpO2: 95 % (07/02/24 0743) Vital Signs (24h Range):  Temp:  [98 °F (36.7 °C)-98.8 °F (37.1 °C)] 98.6 °F (37 °C)  Pulse:  [] 98  Resp:  [15-18] 18  SpO2:  [95 %-99 %] 95 %  BP: (113-161)/(68-99) 119/68     Weight: 93.3 kg (205 lb 11 oz)  Body mass index is 28.69 kg/m².    Intake/Output Summary (Last 24 hours) at 7/2/2024 1102  Last data filed at 7/2/2024 0625  Gross per 24 hour   Intake 2698.51 ml   Output --   Net 2698.51 ml         Physical Exam  Constitutional:       General: He is not in acute distress.     Appearance: Normal appearance. He is not ill-appearing, toxic-appearing or diaphoretic.   HENT:      Head: Normocephalic and atraumatic.      Mouth/Throat:      Mouth: Mucous membranes are moist.   Eyes:      Pupils: Pupils are equal, round, and reactive to light.   Cardiovascular:      Rate and Rhythm: Normal rate and regular rhythm.      Pulses: Normal pulses.      Heart sounds: Normal heart sounds. No murmur heard.     No gallop.   Pulmonary:      Effort: Pulmonary effort is normal. No respiratory distress.      Breath sounds: Normal breath sounds. No wheezing or rales.   Abdominal:      General: Abdomen is flat. Bowel sounds are normal. There is no distension.      Palpations: Abdomen is soft.      Tenderness: There is no abdominal tenderness. There is no guarding.   Musculoskeletal:         General: No swelling. Normal range of motion.      Cervical back: Normal range of motion.   Skin:     General: Skin is warm and dry.      Capillary Refill: Capillary refill takes less than 2 seconds.   Neurological:      General: No focal deficit present.      Mental Status: He is alert. Mental status is at baseline.    Psychiatric:         Mood and Affect: Mood normal.         Behavior: Behavior normal.         Thought Content: Thought content normal.         Judgment: Judgment normal.             Significant Labs: All pertinent labs within the past 24 hours have been reviewed.    Significant Imaging: I have reviewed all pertinent imaging results/findings within the past 24 hours.    Assessment/Plan:      * Acute on chronic pancreatitis  -history of hereditary pancreatitis complicated by frequent admissions, most recently admitted 06/21-6/25/24  -Lipase minor elevation however diagnosis consistent w acute pancreatitis given typical associated symptoms/positive imaging  -CT A/P noting ongoing acute pancreatitis and also possible communicating fluid collection per below  Plan:    -AES consulted, planning for colonoscopy tomorrow. CLD today, NPO at [place]  -To have discussion with surg onc team regarding candidacy for Alejandro procedure vs a completion total pancreatectomy/Whipple  -continue multimodal pain control similar to previous hospitalization  -aggressive mIVF  -Advance diet as tolerated  -cont pancreatic enzymes      Acute pancreatic fluid collection  -CT A/P: new organized appearing fluid collection dorsal to the pancreas with additional new lobulated collection anterior inferior to the distal pancreatic body, possibly communicating  -Unclear if this represents underlying infection explaining leukocytosis and ongoing abdominal pain  -Surg onc consultation per above      GERD (gastroesophageal reflux disease)  PPI        VTE Risk Mitigation (From admission, onward)           Ordered     IP VTE LOW RISK PATIENT  Once         07/01/24 1559     Place JUAN LUIS hose  Until discontinued         07/01/24 1559     Place sequential compression device  Until discontinued         07/01/24 1559     Place sequential compression device  Until discontinued         07/01/24 1559                    Discharge Planning   AMBER: 7/5/2024     Code Status:  Full Code   Is the patient medically ready for discharge?:     Reason for patient still in hospital (select all that apply): Patient trending condition                     Uzair Stephen DO  Department of Hospital Medicine   [facility]

## 2024-07-02 NOTE — ED NOTES
Assumed care of the patient. Report received from SHOAIB Boone. Pt on continuous cardiac monitoring, continuous pulse oximetry, and automatic BP cuff cycling Q30min. Pt in hospital gown, side rails up X2, bed low and locked, and call light is placed within reach. One family/visitors at bedside at this time. Pt denies any complaints or needs.

## 2024-07-02 NOTE — SUBJECTIVE & OBJECTIVE
Interval History: Seen this AM at bedside.  States that he is feeling much improved.  Endorsing minimal abdominal pain.  Denies any fevers, chills, nausea.  Had 1 BM overnight    Review of Systems  Objective:     Vital Signs (Most Recent):  Temp: 98.6 °F (37 °C) (07/02/24 0743)  Pulse: 98 (07/02/24 0743)  Resp: 18 (07/02/24 0743)  BP: 119/68 (07/02/24 0743)  SpO2: 95 % (07/02/24 0743) Vital Signs (24h Range):  Temp:  [98 °F (36.7 °C)-98.8 °F (37.1 °C)] 98.6 °F (37 °C)  Pulse:  [] 98  Resp:  [15-18] 18  SpO2:  [95 %-99 %] 95 %  BP: (113-161)/(68-99) 119/68     Weight: 93.3 kg (205 lb 11 oz)  Body mass index is 28.69 kg/m².    Intake/Output Summary (Last 24 hours) at 7/2/2024 1102  Last data filed at 7/2/2024 0625  Gross per 24 hour   Intake 2698.51 ml   Output --   Net 2698.51 ml         Physical Exam  Constitutional:       General: He is not in acute distress.     Appearance: Normal appearance. He is not ill-appearing, toxic-appearing or diaphoretic.   HENT:      Head: Normocephalic and atraumatic.      Mouth/Throat:      Mouth: Mucous membranes are moist.   Eyes:      Pupils: Pupils are equal, round, and reactive to light.   Cardiovascular:      Rate and Rhythm: Normal rate and regular rhythm.      Pulses: Normal pulses.      Heart sounds: Normal heart sounds. No murmur heard.     No gallop.   Pulmonary:      Effort: Pulmonary effort is normal. No respiratory distress.      Breath sounds: Normal breath sounds. No wheezing or rales.   Abdominal:      General: Abdomen is flat. Bowel sounds are normal. There is no distension.      Palpations: Abdomen is soft.      Tenderness: There is no abdominal tenderness. There is no guarding.   Musculoskeletal:         General: No swelling. Normal range of motion.      Cervical back: Normal range of motion.   Skin:     General: Skin is warm and dry.      Capillary Refill: Capillary refill takes less than 2 seconds.   Neurological:      General: No focal deficit present.       Mental Status: He is alert. Mental status is at baseline.   Psychiatric:         Mood and Affect: Mood normal.         Behavior: Behavior normal.         Thought Content: Thought content normal.         Judgment: Judgment normal.             Significant Labs: All pertinent labs within the past 24 hours have been reviewed.    Significant Imaging: I have reviewed all pertinent imaging results/findings within the past 24 hours.

## 2024-07-02 NOTE — CONSULTS
[facility] Service Consult Note    Attending: Uzair tSephen DO   Admit Date: 7/1/2024  Today's Date: 07/02/2024    SUBJECTIVE:     HPI:  Fabrice Zamorano is a 59 y.o. male with DVT not on AC, Autoimmune pancreatitis (PRSS 1 mutation) s/p [place] procedure/lateral pancreaticojejunostomy, s/p celiac plexus block (last 3/12/2024), Splenic Vein Thrombosis, Hypothyroidism, HTN and BRAIN that presents to [facility] with upper abdominal pain radiating to the back. Was afebrile and HDS. WBC 18.4, T bili 1.6, AST 30, ALT 67, and lipase 95. CT A/P obtained which noted findings suggestive of ongoing acute pancreatitis. Also noted new organized appearing fluid collection dorsal to the pancreas with additional new lobulated collection anterior inferior to the distal pancreatic body, possibly communicating with the colon.     He was last seen by our team 10 days ago when he was transferred with acute pancreatitis. No endoscopic intervention was performed since his LFTs are declining. The patient is known to Dr. Brian Hills and given his history of AI pancreatitis, it was discussed with the patient that he may ultimately be a good candidate for a Alejandro procedure vs a completion total pancreatectomy/Whipple.     Most Recent Endoscopic Procedures:   3/12/24- EUS- Endosonographic imaging of the pancreas showed sonographic changes consistent with moderate-severe chronic pancreatitis.  A mass was identified in the pancreatic body. Tissue was obtained from this exam, and results are pending. However, the endosonographic appearance is benign inflammatory changes. Fine needle biopsy performed. Celiac plexus block performed. Path benign.   11/14/23- EUS- Endosonographic imaging of the pancreas showed sonographic changes consistent with moderate-severe chronic pancreatitis as previously known and seen; no sign of pancreas duct dilation in setting of prior pancreaticojejunostomy procedure (adequately  "decompressed). Celiac plexus block performed.   2/24/24- EUS- There was no sign of significant pathology in   the common bile duct. Endosonographic imaging of the pancreas showed sonographic changes consistent with moderate-severe chronic pancreatitis.   2/24/23- ERCP- The major papilla appeared edematous. Prior biliary sphincterotomy appeared open. Prior pancreatic sphincterotomy appeared open. The ventral pancreatic duct was swept and debris was found. I do think there is any opportunity to improve PD drainage at this point. The PD in the HOP drains into the duodenum. The PD in the body/tail appears to drain into the jejunum (via the surgical pancreaticojejunostomy).       All home medications reviewed.    Review of Systems   Constitutional:  Negative for chills and fever.   Gastrointestinal:  Positive for abdominal pain. Negative for constipation and diarrhea.   Psychiatric/Behavioral:  Negative for substance abuse.        OBJECTIVE:     Vital Signs Trends/History Reviewed  Vitals:    07/01/24 2345 07/02/24 0012 07/02/24 0457 07/02/24 0743   BP: (!) 161/97  113/71 119/68   BP Location: Right arm      Patient Position: Lying      Pulse: 91  86 98   Resp: 18 16 18 18   Temp: 98.2 °F (36.8 °C)  98.8 °F (37.1 °C) 98.6 °F (37 °C)   TempSrc: Oral  Oral Oral   SpO2: 96%  95% 95%   Weight: 93.3 kg (205 lb 11 oz)      Height: 5' 11" (1.803 m)          Physical Exam  Constitutional:       Appearance: Normal appearance. He is normal weight. He is not toxic-appearing.   HENT:      Head: Normocephalic and atraumatic.   Eyes:      General: No scleral icterus.  Abdominal:      General: Abdomen is flat.      Palpations: Abdomen is soft.      Tenderness: There is abdominal tenderness. There is no guarding or rebound.   Skin:     General: Skin is warm and dry.      Coloration: Skin is not jaundiced.   Neurological:      General: No focal deficit present.      Mental Status: He is alert and oriented to person, place, and time. " Mental status is at baseline.   Psychiatric:         Mood and Affect: Mood normal.         Behavior: Behavior normal.         Thought Content: Thought content normal.         Judgment: Judgment normal.           Laboratory: All labs results within last 24 hours have been reviewed.     Imaging: All imaging results within the last 24 hours have been reviewed.       ASSESSMENT:    Fabrice Zamorano is a 59 y.o. male with DVT not on AC, Autoimmune pancreatitis (PRSS 1 mutation) s/p Pu[place]ow procedure/lateral pancreaticojejunostomy, s/p celiac plexus block (last 3/12/2024), Splenic Vein Thrombosis, Hypothyroidism, HTN and BRAIN that presents to [facility] with upper abdominal pain radiating to the back. Was afebrile and HDS. WBC 18.4, T bili 1.6, AST 30, ALT 67, and lipase 95. CT A/P obtained which noted findings suggestive of ongoing acute pancreatitis. Also noted new organized appearing fluid collection dorsal to the pancreas with additional new lobulated collection anterior inferior to the distal pancreatic body, possibly communicating with the colon.     Of note, patient is known to Dr. Brian Hills and given his history of AI pancreatitis, it was discussed with the patient that he may ultimately be a good candidate for a Alejandro procedure vs a completion total pancreatectomy/Whipple.     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   - Clear liquid diet today and NPO at midnight.   - PO Dulcolax 10 mg once. Golytely prep to start at 6pm, to be completed within 4 hours if possible.   - Consult Surgical Oncology team to weigh in (Dr. Hills's team).  - Supportive care and pain control per primary team.   - Daily CBC, CMP.     Thank you for allowing us to participate in the care of this patient. Please call with  questions.      Tariq Magaña MD , PGY-VI  Gastroenterology Fellow  [facility]

## 2024-07-02 NOTE — ED NOTES
Nurses Note -- 4 Eyes      7/1/2024   9:09 PM      Skin assessed during: Admit      [x] No Altered Skin Integrity Present    []Prevention Measures Documented      [] Yes- Altered Skin Integrity Present or Discovered   [] LDA Added if Not in Epic (Describe Wound)   [] New Altered Skin Integrity was Present on Admit and Documented in LDA   [] Wound Image Taken    Wound Care Consulted? No    Attending Nurse:  Zoe Bray RN/Staff Member:   SHOAIB Quinones

## 2024-07-02 NOTE — HOSPITAL COURSE
CT A/P noting findings suggestive of ongoing acute pancreatitis, discrete peripancreatic fluid collections that are possibly communicating.  AES consulted, planning for colonoscopy.  Surgical oncology consulted to discuss candidacy for Alejandro procedure vs a completion total pancreatectomy/Whipple. Per surgery, 'Definitive surgery for this condition would likely require total pancreatectomy (and also likely partial colectomy, splenectomy, partial gastrectomy, and small bowel resection) which comes with both significant acute and chronic issues.' Patient and surgery team decided on conservative management, especially given improvement of symptoms. Colonoscopy completed by GI.       Findings on colonoscopy:                         - Preparation of the colon was inadequate.                          - Stool in the entire examined colon.                          - One 4 mm polyp at the hepatic flexure, removed                          with a cold snare. Resected and retrieved.                          - An area of significantly congested and                          erythematous mucosa was found at the splenic                          flexure around 45-50cm from anal verge.                          Significant erythema noted. A clear distal                          attachment cap was used on the scope to better                          elucidate mucosa underneath/between folds. Unable                          to localize an overt fistulous process in this                          region given the significant inflammatory                          findings. This area was biopsied with a cold                          forceps for histology. For location marking in                          case further imaging is considered, one hemostatic                          clip was successfully placed (MR conditional) just                          distal to inflammation at around 45cm from anal                          verge.                           - Diverticulosis in the left colon.                          - One 3 mm polyp in the sigmoid colon, removed                          with a cold snare. Resected and retrieved.        Outpatient GI follow up for pathology results with repeat colonoscopy date to be determined after pending pathology results are reviewed for surveillance based on pathology results.       Patient deemed appropriate for discharge. I personally saw, examined, and evaluated patient prior to departure. Plan discussed with patient, who was agreeable and amenable; medications were discussed and reviewed, outpatient follow-up scheduled, ER precautions were given, all questions were answered to the patient's satisfaction, and Fabrice Zamorano was subsequently discharged.

## 2024-07-02 NOTE — ASSESSMENT & PLAN NOTE
-history of hereditary pancreatitis complicated by frequent admissions, most recently admitted 06/21-6/25/24  -Lipase minor elevation however diagnosis consistent w acute pancreatitis given typical associated symptoms/positive imaging  -CT A/P noting ongoing acute pancreatitis and also possible communicating fluid collection per below  Plan:    -AES consulted, planning for colonoscopy tomorrow. CLD today, NPO at [place]  -To have discussion with surg onc team regarding candidacy for Alejandro procedure vs a completion total pancreatectomy/Whipple  -continue multimodal pain control similar to previous hospitalization  -aggressive mIVF  -Advance diet as tolerated  -cont pancreatic enzymes

## 2024-07-02 NOTE — ANESTHESIA PREPROCEDURE EVALUATION
[facility]  Anesthesia Pre-Operative Evaluation         Patient Name: Fabrice Zamorano  YOB: 1965  MRN: [id number]    SUBJECTIVE:     Pre-operative evaluation for Procedure(s) (LRB):  COLONOSCOPY (N/A)     07/02/2024    Fabrice Zamorano is a 59 y.o. male with a significant medical history of autoimmune pancreatitis, well controlled GERD, HTN, BRAIN on CPAP, multiple DVTs (not currently on AC) HLD, and pancreatitic fluid collection with c/f possible communication to with the splenic flexure of colon who presents for the above procedure.    LDA:        Peripheral IV - Single Lumen 07/01/24 1413 20 G Left Antecubital (Active)   Site Assessment Clean;Dry;Intact;No redness;No swelling 07/02/24 0400   Extremity Assessment Distal to IV No abnormal discoloration;No redness;No swelling 07/01/24 1414   Dressing Status Clean;Dry;Intact 07/01/24 1414   Dressing Intervention Integrity maintained 07/01/24 1414   Number of days: 0       Prev airway:   Mask Ventilation:  Easy mask    Method of Intubation:  Video laryngoscopy    Blade:  Bose 3    Laryngeal View Grade: Grade I - full view of cords      Difficult Airway Encountered?: No      Complications:  None    Drips:   lactated ringers   Intravenous Continuous 100 mL/hr at 07/02/24 0057 New Bag at 07/02/24 0057     TTE: 2017  · Normal left ventricle cavity size. Normal wall thickness. Normal   (55-65%) ejection fraction.   · Left Atrium: Normal cavity size.   · Normal central venous pressure (0-5mm Hg).   · The valve is tricuspid. No aortic stenosis. No aortic regurgitation     Patient Active Problem List   Diagnosis    Iron deficiency anemia    Hypertension    Acute on chronic pancreatitis    Alcohol use    GERD (gastroesophageal reflux disease)    Facial palsy    Diverticular disease of colon    History of excision of intestinal structure     Hypothyroidism    Mixed hyperlipidemia    BRAIN on CPAP    Portal vein thrombosis    History of DVT (deep vein thrombosis)    Abnormal liver enzymes    Biliary obstruction    Uncontrolled hypertension    Acute pancreatic fluid collection       Review of patient's allergies indicates:  No Known Allergies    Current Inpatient Medications:   aluminum-magnesium hydroxide-simethicone  30 mL Oral QID (AC & HS)    carvediloL  3.125 mg Oral BID WM    LIDOcaine  1 patch Transdermal Q24H    pantoprazole  40 mg Oral Daily    polyethylene glycol  4,000 mL Oral Once    polyethylene glycol  17 g Oral Daily    sucralfate  1 g Oral Q6H       No current facility-administered medications on file prior to encounter.     Current Outpatient Medications on File Prior to Encounter   Medication Sig Dispense Refill    carvediloL (COREG) 6.25 MG tablet Take 3.125 mg by mouth 2 (two) times daily with meals.      metFORMIN (GLUCOPHAGE-XR) 500 MG ER 24hr tablet Take 500 mg by mouth once daily.      oxyCODONE-acetaminophen (PERCOCET)  mg per tablet Take 1 tablet by mouth every 4 (four) hours as needed for Pain. 21 tablet 0    ibuprofen (ADVIL,MOTRIN) 800 MG tablet Take 800 mg by mouth every 6 (six) hours as needed.      lipase-protease-amylase (CREON) 36,000-114,000- 180,000 unit CpDR Take 1 capsule by mouth 3 (three) times daily with meals. 270 capsule 3    multivitamin (THERAGRAN) per tablet Take 1 tablet by mouth once daily.      ondansetron (ZOFRAN-ODT) 4 MG TbDL Take 1 tablet (4 mg total) by mouth every 8 (eight) hours as needed (Nausea). 30 tablet 0    pantoprazole (PROTONIX) 40 MG tablet TAKE 1 TABLET(40 MG) BY MOUTH EVERY DAY 90 tablet 0    pregabalin (LYRICA) 75 MG capsule Take 2 capsules (150 mg total) by mouth every evening. May also take 1 capsule (75 mg total) daily as needed. 90 capsule 2       Past Surgical History:   Procedure Laterality Date    CHOLECYSTECTOMY      COLONOSCOPY      ENDOSCOPIC ULTRASOUND OF UPPER  GASTROINTESTINAL TRACT N/A 10/12/2021    Procedure: ULTRASOUND, UPPER GI TRACT, ENDOSCOPIC;  Surgeon: Odalys Leiva MD;  Location: [facility];  Service: Endoscopy;  Laterality: N/A;  Upper and linear, 22 shark core, cytology and RPMI    ENDOSCOPIC ULTRASOUND OF UPPER GASTROINTESTINAL TRACT N/A 2/24/2023    Procedure: ULTRASOUND, UPPER GI TRACT, ENDOSCOPIC;  Surgeon: Shawn Chisholm MD;  Location: [facility] (2ND FLR);  Service: Endoscopy;  Laterality: N/A;  2/3/23-Instructions via portal-DS    ENDOSCOPIC ULTRASOUND OF UPPER GASTROINTESTINAL TRACT N/A 11/14/2023    Procedure: ULTRASOUND, UPPER GI TRACT, ENDOSCOPIC;  Surgeon: Kwadwo Gonsalez MD;  Location: [facility];  Service: Gastroenterology;  Laterality: N/A;  10/13/23: instructions sent vie portal-GD    ENDOSCOPIC ULTRASOUND OF UPPER GASTROINTESTINAL TRACT N/A 3/12/2024    Procedure: ULTRASOUND, UPPER GI TRACT, ENDOSCOPIC;  Surgeon: Shawn Chisholm MD;  Location: [facility];  Service: Endoscopy;  Laterality: N/A;  2/16/24: inst sent via portal-GD    ERCP N/A 09/29/2021    Procedure: ERCP (ENDOSCOPIC RETROGRADE CHOLANGIOPANCREATOGRAPHY);  Surgeon: Odalys Leiva MD;  Location: [facility];  Service: Endoscopy;  Laterality: N/A;    ERCP N/A 08/15/2022    Procedure: ERCP (ENDOSCOPIC RETROGRADE CHOLANGIOPANCREATOGRAPHY);  Surgeon: Odalys Leiva MD;  Location: [facility];  Service: Endoscopy;  Laterality: N/A;    ERCP N/A 2/24/2023    Procedure: ERCP (ENDOSCOPIC RETROGRADE CHOLANGIOPANCREATOGRAPHY);  Surgeon: Shawn Chisholm MD;  Location: [facility] (2ND FLR);  Service: Endoscopy;  Laterality: N/A;    ERCP W/ PLASTIC STENT PLACEMENT      LATERAL PANCREATICOJEJUNOSTOMY N/A 11/19/2021    Procedure: PANCREATICOJEJUNOSTOMY, SIDE-TO-SIDE tier 1;  Surgeon: Brian Hills MD;  Location: [address];  Service: General;  Laterality: N/A;       Social History     Socioeconomic History    Marital status:    Tobacco Use    Smoking status:  "Former     Current packs/day: 0.00     Types: Cigarettes     Quit date: 2001     Years since quittin.9    Smokeless tobacco: Never   Substance and Sexual Activity    Alcohol use: Not Currently    Drug use: Never    Sexual activity: Yes     Social Determinants of Health     Financial Resource Strain: Low Risk  (2024)    Overall Financial Resource Strain (CARDIA)     Difficulty of Paying Living Expenses: Not hard at all   Food Insecurity: No Food Insecurity (2024)    Hunger Vital Sign     Worried About Running Out of Food in the Last Year: Never true     Ran Out of Food in the Last Year: Never true   Transportation Needs: No Transportation Needs (2024)    TRANSPORTATION NEEDS     Transportation : No   Physical Activity: Insufficiently Active (2024)    Exercise Vital Sign     Days of Exercise per Week: 2 days     Minutes of Exercise per Session: 30 min   Stress: No Stress Concern Present (2024)    [employer] - Occupational Stress Questionnaire     Feeling of Stress : Not at all   Housing Stability: Low Risk  (2024)    Housing Stability Vital Sign     Unable to Pay for Housing in the Last Year: No     Homeless in the Last Year: No       OBJECTIVE:     Vital Signs Range:      2024    11:04 AM 2024     5:13 AM 2024    12:54 PM   Vitals - 1 value per visit   SYSTOLIC   140   DIASTOLIC   99   Pulse   100   Temp   36.9 °C (98.4 °F)   Resp   18   SPO2   99 %   Weight (lb) 222.44  198   Weight (kg) 100.9  89.812   Height  5' 11" (1.803 m) 5' 11" (1.803 m)   BMI (Calculated)  30.8 27.6         CBC:   Lab Results   Component Value Date    WBC 10.57 2024    HGB 12.0 (L) 2024    HCT 37.5 (L) 2024    MCV 93 2024     2024         CMP:   Sodium   Date Value Ref Range Status   2024 136 136 - 145 mmol/L Final     Potassium   Date Value Ref Range Status   2024 4.5 3.5 - 5.1 mmol/L Final     Chloride   Date " Value Ref Range Status   07/02/2024 99 95 - 110 mmol/L Final     CO2   Date Value Ref Range Status   07/02/2024 28 23 - 29 mmol/L Final     Glucose   Date Value Ref Range Status   07/02/2024 106 70 - 110 mg/dL Final     BUN   Date Value Ref Range Status   07/02/2024 11 6 - 20 mg/dL Final     Creatinine   Date Value Ref Range Status   07/02/2024 0.8 0.5 - 1.4 mg/dL Final     Calcium   Date Value Ref Range Status   07/02/2024 8.9 8.7 - 10.5 mg/dL Final     Total Protein   Date Value Ref Range Status   07/02/2024 6.0 6.0 - 8.4 g/dL Final     Albumin   Date Value Ref Range Status   07/02/2024 2.8 (L) 3.5 - 5.2 g/dL Final     Total Bilirubin   Date Value Ref Range Status   07/02/2024 1.5 (H) 0.1 - 1.0 mg/dL Final     Comment:     For infants and newborns, interpretation of results should be based  on gestational age, weight and in agreement with clinical  observations.    Premature Infant recommended reference ranges:  Up to 24 hours.............<8.0 mg/dL  Up to 48 hours............<12.0 mg/dL  3-5 days..................<15.0 mg/dL  6-29 days.................<15.0 mg/dL       Alkaline Phosphatase   Date Value Ref Range Status   07/02/2024 101 55 - 135 U/L Final     AST   Date Value Ref Range Status   07/02/2024 22 10 - 40 U/L Final     ALT   Date Value Ref Range Status   07/02/2024 50 (H) 10 - 44 U/L Final     Anion Gap   Date Value Ref Range Status   07/02/2024 9 8 - 16 mmol/L Final     eGFR if    Date Value Ref Range Status   07/07/2022 >60.0 >60 mL/min/1.73 m^2 Final     eGFR if non    Date Value Ref Range Status   07/07/2022 >60.0 >60 mL/min/1.73 m^2 Final     Comment:     Calculation used to obtain the estimated glomerular filtration  rate (eGFR) is the CKD-EPI equation.          INR:  Lab Results   Component Value Date    INR 1.1 06/19/2024    INR 1.0 11/17/2022       EKG:   Results for orders placed or performed during the hospital encounter of 07/01/24   EKG 12-lead    Collection  Time: 07/01/24  1:42 PM   Result Value Ref Range    QRS Duration 74 ms    [facility] QTC Calculation 410 ms    Narrative    Test Reason : R10.13,    Vent. Rate : 093 BPM     Atrial Rate : 093 BPM     P-R Int : 132 ms          QRS Dur : 074 ms      QT Int : 330 ms       P-R-T Axes : 050 026 059 degrees     QTc Int : 410 ms    Normal sinus rhythm  Normal ECG  When compared with ECG of 19-JUN-2024 03:50,  Vent. rate has increased BY  35 BPM  Confirmed by Dusty Richardson MD ([id number]) on 7/1/2024 2:39:17 PM    Referred By: ABIMAEL   SELF           Confirmed By:Dusty Richardson MD        2D ECHO:   No results found for this or any previous visit.         ASSESSMENT/PLAN:           Pre-op Assessment    I have reviewed the Patient Summary Reports.     I have reviewed the Nursing Notes. I have reviewed the NPO Status.   I have reviewed the Medications.     Review of Systems  Anesthesia Hx:  No problems with previous Anesthesia   History of prior surgery of interest to airway management or planning:  Previous anesthesia: General        Denies Family Hx of Anesthesia complications.    Denies Personal Hx of Anesthesia complications.                    Social:  Former Smoker, No Alcohol Use       Cardiovascular:     Hypertension, well controlled   Denies MI.  Denies CAD.    Denies CABG/stent.    Denies Angina.                                  Pulmonary:    Denies COPD.  Denies Asthma.    Sleep Apnea, CPAP                Renal/:   Denies Chronic Renal Disease.                Hepatic/GI:     GERD, well controlled Liver Disease,  Autoimmune pancreatitis          Neurological:    Denies CVA.    Denies Seizures.                                Endocrine:   Hypothyroidism              Physical Exam  General: Well nourished, Cooperative and Alert    Airway:  Mallampati: III / II  Mouth Opening: Normal  TM Distance: Normal  Tongue: Normal  Neck ROM: Normal ROM    Dental:    Chest/Lungs:  Normal Respiratory Rate    Heart:  Rate:  Normal        Anesthesia Plan  Type of Anesthesia, risks & benefits discussed:    Anesthesia Type: Gen ETT, MAC  Intra-op Monitoring Plan: Standard ASA Monitors  Post Op Pain Control Plan: multimodal analgesia and IV/PO Opioids PRN  Induction:  IV  Airway Plan: Video and Direct, Post-Induction  Informed Consent: Informed consent signed with the Patient and all parties understand the risks and agree with anesthesia plan.  All questions answered.   ASA Score: 3  Day of Surgery Review of History & Physical: H&P Update referred to the surgeon/provider.    Ready For Surgery From Anesthesia Perspective.     .

## 2024-07-02 NOTE — ASSESSMENT & PLAN NOTE
-CT A/P: new organized appearing fluid collection dorsal to the pancreas with additional new lobulated collection anterior inferior to the distal pancreatic body, possibly communicating  -Unclear if this represents underlying infection explaining leukocytosis and ongoing abdominal pain  -Surg onc consultation per above

## 2024-07-03 ENCOUNTER — ANESTHESIA (OUTPATIENT)
Dept: ENDOSCOPY | Facility: HOSPITAL | Age: 59
End: 2024-07-03
Payer: MEDICARE

## 2024-07-03 LAB
ALBUMIN SERPL BCP-MCNC: 2.7 G/DL (ref 3.5–5.2)
ALP SERPL-CCNC: 97 U/L (ref 55–135)
ALT SERPL W/O P-5'-P-CCNC: 41 U/L (ref 10–44)
ANION GAP SERPL CALC-SCNC: 11 MMOL/L (ref 8–16)
AST SERPL-CCNC: 20 U/L (ref 10–40)
BASOPHILS # BLD AUTO: 0.02 K/UL (ref 0–0.2)
BASOPHILS NFR BLD: 0.2 % (ref 0–1.9)
BILIRUB SERPL-MCNC: 1.2 MG/DL (ref 0.1–1)
BUN SERPL-MCNC: 10 MG/DL (ref 6–20)
CALCIUM SERPL-MCNC: 9 MG/DL (ref 8.7–10.5)
CHLORIDE SERPL-SCNC: 102 MMOL/L (ref 95–110)
CO2 SERPL-SCNC: 25 MMOL/L (ref 23–29)
CREAT SERPL-MCNC: 0.8 MG/DL (ref 0.5–1.4)
DIFFERENTIAL METHOD BLD: ABNORMAL
EOSINOPHIL # BLD AUTO: 0.1 K/UL (ref 0–0.5)
EOSINOPHIL NFR BLD: 1.3 % (ref 0–8)
ERYTHROCYTE [DISTWIDTH] IN BLOOD BY AUTOMATED COUNT: 14 % (ref 11.5–14.5)
EST. GFR  (NO RACE VARIABLE): >60 ML/MIN/1.73 M^2
GLUCOSE SERPL-MCNC: 112 MG/DL (ref 70–110)
HCT VFR BLD AUTO: 35.7 % (ref 40–54)
HGB BLD-MCNC: 11.4 G/DL (ref 14–18)
IMM GRANULOCYTES # BLD AUTO: 0.04 K/UL (ref 0–0.04)
IMM GRANULOCYTES NFR BLD AUTO: 0.5 % (ref 0–0.5)
LYMPHOCYTES # BLD AUTO: 1.2 K/UL (ref 1–4.8)
LYMPHOCYTES NFR BLD: 14 % (ref 18–48)
MAGNESIUM SERPL-MCNC: 2 MG/DL (ref 1.6–2.6)
MCH RBC QN AUTO: 30.2 PG (ref 27–31)
MCHC RBC AUTO-ENTMCNC: 31.9 G/DL (ref 32–36)
MCV RBC AUTO: 95 FL (ref 82–98)
MONOCYTES # BLD AUTO: 0.7 K/UL (ref 0.3–1)
MONOCYTES NFR BLD: 8.6 % (ref 4–15)
NEUTROPHILS # BLD AUTO: 6.5 K/UL (ref 1.8–7.7)
NEUTROPHILS NFR BLD: 75.4 % (ref 38–73)
NRBC BLD-RTO: 0 /100 WBC
PHOSPHATE SERPL-MCNC: 3.4 MG/DL (ref 2.7–4.5)
PLATELET # BLD AUTO: 228 K/UL (ref 150–450)
PMV BLD AUTO: 9.1 FL (ref 9.2–12.9)
POTASSIUM SERPL-SCNC: 4.4 MMOL/L (ref 3.5–5.1)
PROT SERPL-MCNC: 5.8 G/DL (ref 6–8.4)
RBC # BLD AUTO: 3.77 M/UL (ref 4.6–6.2)
SODIUM SERPL-SCNC: 138 MMOL/L (ref 136–145)
WBC # BLD AUTO: 8.64 K/UL (ref 3.9–12.7)

## 2024-07-03 PROCEDURE — 63600175 PHARM REV CODE 636 W HCPCS: Performed by: NURSE ANESTHETIST, CERTIFIED REGISTERED

## 2024-07-03 PROCEDURE — 0DBN8ZX EXCISION OF SIGMOID COLON, VIA NATURAL OR ARTIFICIAL OPENING ENDOSCOPIC, DIAGNOSTIC: ICD-10-PCS | Performed by: INTERNAL MEDICINE

## 2024-07-03 PROCEDURE — 45380 COLONOSCOPY AND BIOPSY: CPT | Mod: 59,,, | Performed by: INTERNAL MEDICINE

## 2024-07-03 PROCEDURE — 85025 COMPLETE CBC W/AUTO DIFF WBC: CPT | Performed by: STUDENT IN AN ORGANIZED HEALTH CARE EDUCATION/TRAINING PROGRAM

## 2024-07-03 PROCEDURE — 27201012 HC FORCEPS, HOT/COLD, DISP: Performed by: INTERNAL MEDICINE

## 2024-07-03 PROCEDURE — 80053 COMPREHEN METABOLIC PANEL: CPT | Performed by: STUDENT IN AN ORGANIZED HEALTH CARE EDUCATION/TRAINING PROGRAM

## 2024-07-03 PROCEDURE — 25000003 PHARM REV CODE 250: Performed by: STUDENT IN AN ORGANIZED HEALTH CARE EDUCATION/TRAINING PROGRAM

## 2024-07-03 PROCEDURE — 45380 COLONOSCOPY AND BIOPSY: CPT | Mod: 59 | Performed by: INTERNAL MEDICINE

## 2024-07-03 PROCEDURE — 11000001 HC ACUTE MED/SURG PRIVATE ROOM

## 2024-07-03 PROCEDURE — 37000009 HC ANESTHESIA EA ADD 15 MINS: Performed by: INTERNAL MEDICINE

## 2024-07-03 PROCEDURE — 45385 COLONOSCOPY W/LESION REMOVAL: CPT | Performed by: INTERNAL MEDICINE

## 2024-07-03 PROCEDURE — 27201089 HC SNARE, DISP (ANY): Performed by: INTERNAL MEDICINE

## 2024-07-03 PROCEDURE — 36415 COLL VENOUS BLD VENIPUNCTURE: CPT | Performed by: STUDENT IN AN ORGANIZED HEALTH CARE EDUCATION/TRAINING PROGRAM

## 2024-07-03 PROCEDURE — 45385 COLONOSCOPY W/LESION REMOVAL: CPT | Mod: 22,,, | Performed by: INTERNAL MEDICINE

## 2024-07-03 PROCEDURE — 63600175 PHARM REV CODE 636 W HCPCS: Performed by: EMERGENCY MEDICINE

## 2024-07-03 PROCEDURE — 37000008 HC ANESTHESIA 1ST 15 MINUTES: Performed by: INTERNAL MEDICINE

## 2024-07-03 PROCEDURE — 83735 ASSAY OF MAGNESIUM: CPT | Performed by: STUDENT IN AN ORGANIZED HEALTH CARE EDUCATION/TRAINING PROGRAM

## 2024-07-03 PROCEDURE — 25000003 PHARM REV CODE 250: Performed by: NURSE ANESTHETIST, CERTIFIED REGISTERED

## 2024-07-03 PROCEDURE — 88305 TISSUE EXAM BY PATHOLOGIST: CPT | Performed by: PATHOLOGY

## 2024-07-03 PROCEDURE — 84100 ASSAY OF PHOSPHORUS: CPT | Performed by: STUDENT IN AN ORGANIZED HEALTH CARE EDUCATION/TRAINING PROGRAM

## 2024-07-03 PROCEDURE — 0DBL8ZX EXCISION OF TRANSVERSE COLON, VIA NATURAL OR ARTIFICIAL OPENING ENDOSCOPIC, DIAGNOSTIC: ICD-10-PCS | Performed by: INTERNAL MEDICINE

## 2024-07-03 RX ORDER — LIDOCAINE HYDROCHLORIDE 20 MG/ML
INJECTION INTRAVENOUS
Status: DISCONTINUED | OUTPATIENT
Start: 2024-07-03 | End: 2024-07-03

## 2024-07-03 RX ORDER — SODIUM CHLORIDE 0.9 % (FLUSH) 0.9 %
10 SYRINGE (ML) INJECTION
OUTPATIENT
Start: 2024-07-03

## 2024-07-03 RX ORDER — PROPOFOL 10 MG/ML
VIAL (ML) INTRAVENOUS CONTINUOUS PRN
Status: DISCONTINUED | OUTPATIENT
Start: 2024-07-03 | End: 2024-07-03

## 2024-07-03 RX ORDER — PROPOFOL 10 MG/ML
VIAL (ML) INTRAVENOUS
Status: DISCONTINUED | OUTPATIENT
Start: 2024-07-03 | End: 2024-07-03

## 2024-07-03 RX ADMIN — SUCRALFATE 1 G: 1 SUSPENSION ORAL at 11:07

## 2024-07-03 RX ADMIN — CARVEDILOL 3.12 MG: 3.12 TABLET, FILM COATED ORAL at 08:07

## 2024-07-03 RX ADMIN — PANCRELIPASE 1 CAPSULE: 120000; 24000; 76000 CAPSULE, DELAYED RELEASE PELLETS ORAL at 05:07

## 2024-07-03 RX ADMIN — PROPOFOL 150 MCG/KG/MIN: 10 INJECTION, EMULSION INTRAVENOUS at 01:07

## 2024-07-03 RX ADMIN — PROPOFOL 100 MG: 10 INJECTION, EMULSION INTRAVENOUS at 01:07

## 2024-07-03 RX ADMIN — SODIUM CHLORIDE: 0.9 INJECTION, SOLUTION INTRAVENOUS at 01:07

## 2024-07-03 RX ADMIN — CARVEDILOL 3.12 MG: 3.12 TABLET, FILM COATED ORAL at 05:07

## 2024-07-03 RX ADMIN — SODIUM CHLORIDE, POTASSIUM CHLORIDE, SODIUM LACTATE AND CALCIUM CHLORIDE: 600; 310; 30; 20 INJECTION, SOLUTION INTRAVENOUS at 12:07

## 2024-07-03 RX ADMIN — ALUMINUM HYDROXIDE, MAGNESIUM HYDROXIDE, AND SIMETHICONE 30 ML: 1200; 120; 1200 SUSPENSION ORAL at 04:07

## 2024-07-03 RX ADMIN — LIDOCAINE HYDROCHLORIDE 50 MG: 20 INJECTION INTRAVENOUS at 01:07

## 2024-07-03 RX ADMIN — SUCRALFATE 1 G: 1 SUSPENSION ORAL at 05:07

## 2024-07-03 RX ADMIN — PANTOPRAZOLE SODIUM 40 MG: 40 TABLET, DELAYED RELEASE ORAL at 08:07

## 2024-07-03 RX ADMIN — ALUMINUM HYDROXIDE, MAGNESIUM HYDROXIDE, AND SIMETHICONE 30 ML: 1200; 120; 1200 SUSPENSION ORAL at 08:07

## 2024-07-03 RX ADMIN — LIDOCAINE 1 PATCH: 700 PATCH TOPICAL at 05:07

## 2024-07-03 NOTE — ASSESSMENT & PLAN NOTE
-history of hereditary pancreatitis complicated by frequent admissions, most recently admitted 06/21-6/25/24  -Lipase minor elevation however diagnosis consistent w acute pancreatitis given typical associated symptoms/positive imaging  -CT A/P noting ongoing acute pancreatitis and also possible communicating fluid collection per below  Plan:    -AES consulted, planning for colonoscopy today  -Surgical oncology consulted: 'Definitive surgery for this condition would likely require total pancreatectomy (and also likely partial colectomy, splenectomy, partial gastrectomy, and small bowel resection) which comes with both significant acute and chronic issues.'  -continue multimodal pain control similar to previous hospitalization  -aggressive mIVF  -Advance diet as tolerated  -cont pancreatic enzymes

## 2024-07-03 NOTE — H&P
Short Stay Endoscopy History and Physical    PCP - Rebecca Mcmillan MD    Procedure - Colonoscopy  ASA - per anesthesia  Mallampati - per anesthesia  History of Anesthesia problems - no  Family history Anesthesia problems -  no   Plan of anesthesia - General    HPI:  This is a 59 y.o. male here for evaluation of : Visualize fistula seen on left side of colon on imaging.     ROS:  Constitutional: No fevers, chills  CV: No chest pain  Pulm: No shortness of breath  GI: see HPI  Derm: No rash    Medical History:  has a past medical history of Anticoagulant long-term use, GERD (gastroesophageal reflux disease), Hypertension, Hypothyroidism (10/26/2021), Liver disease, Pancreatic pseudocyst, Personal history of colonic polyps, Sleep apnea, and Splenic vein thrombosis.    Surgical History:  has a past surgical history that includes ERCP w/ plastic stent placement; Cholecystectomy; ERCP (N/A, 09/29/2021); Endoscopic ultrasound of upper gastrointestinal tract (N/A, 10/12/2021); Lateral pancreaticojejunostomy (N/A, 11/19/2021); ERCP (N/A, 08/15/2022); Colonoscopy; Endoscopic ultrasound of upper gastrointestinal tract (N/A, 2/24/2023); ERCP (N/A, 2/24/2023); Endoscopic ultrasound of upper gastrointestinal tract (N/A, 11/14/2023); and Endoscopic ultrasound of upper gastrointestinal tract (N/A, 3/12/2024).    Family History: family history includes Arthritis in his father; Cancer in his father; Pancreatic cancer in his father; Thyroid disease in his father and mother.. Otherwise no colon cancer, inflammatory bowel disease, or GI malignancies.    Social History:  reports that he quit smoking about 22 years ago. His smoking use included cigarettes. He has never used smokeless tobacco. He reports that he does not currently use alcohol. He reports that he does not use drugs.    Review of patient's allergies indicates:  No Known Allergies    Medications:   Medications Prior to Admission   Medication Sig Dispense Refill Last  Dose    carvediloL (COREG) 6.25 MG tablet Take 3.125 mg by mouth 2 (two) times daily with meals.   7/1/2024    metFORMIN (GLUCOPHAGE-XR) 500 MG ER 24hr tablet Take 500 mg by mouth once daily.   6/30/2024    oxyCODONE-acetaminophen (PERCOCET)  mg per tablet Take 1 tablet by mouth every 4 (four) hours as needed for Pain. 21 tablet 0 7/1/2024    ibuprofen (ADVIL,MOTRIN) 800 MG tablet Take 800 mg by mouth every 6 (six) hours as needed.       lipase-protease-amylase (CREON) 36,000-114,000- 180,000 unit CpDR Take 1 capsule by mouth 3 (three) times daily with meals. 270 capsule 3     multivitamin (THERAGRAN) per tablet Take 1 tablet by mouth once daily.       ondansetron (ZOFRAN-ODT) 4 MG TbDL Take 1 tablet (4 mg total) by mouth every 8 (eight) hours as needed (Nausea). 30 tablet 0     pantoprazole (PROTONIX) 40 MG tablet TAKE 1 TABLET(40 MG) BY MOUTH EVERY DAY 90 tablet 0     pregabalin (LYRICA) 75 MG capsule Take 2 capsules (150 mg total) by mouth every evening. May also take 1 capsule (75 mg total) daily as needed. 90 capsule 2          Physical Exam:    Vital Signs:   Vitals:    07/03/24 1116   BP: 126/76   Pulse: 77   Resp: 18   Temp: 98.4 °F (36.9 °C)       General Appearance: Well appearing in no acute distress  Eyes:    No scleral icterus  ENT: lips and tongue normal  Lungs: no use of accessory muscles  Heart:  normal rate, regular rhythm  Abdomen: Soft, non tender, non distended   Extremities: no edema  Skin: No rash      Labs:  Lab Results   Component Value Date    WBC 8.64 07/03/2024    HGB 11.4 (L) 07/03/2024    HCT 35.7 (L) 07/03/2024     07/03/2024    CHOL 140 09/27/2021    TRIG 75 09/27/2021    HDL 24 (L) 09/27/2021    ALT 41 07/03/2024    AST 20 07/03/2024     07/03/2024    K 4.4 07/03/2024     07/03/2024    CREATININE 0.8 07/03/2024    BUN 10 07/03/2024    CO2 25 07/03/2024    TSH 2.669 03/19/2023    INR 1.1 06/19/2024       I have explained the risks and benefits of endoscopy  procedures to the patient including but not limited to bleeding, perforation, infection, and death.  The patient was asked if they understand and allowed to ask any further questions to their satisfaction.    Traiq Magaña MD

## 2024-07-03 NOTE — PROGRESS NOTES
[facility] Medicine  Progress Note    Patient Name: Fabrice Zamorano  MRN: [id number]  Patient Class: IP- Inpatient   Admission Date: 7/1/2024  Length of Stay: 1 days  Attending Physician: Uzair Stephen DO  Primary Care Provider: Rebecca Mcmillan MD        Subjective:     Principal Problem:Acute on chronic pancreatitis        HPI:  This is a 59-year-old male with a history of hereditary pancreatitis, DVT, BRAIN, HTN who presents with recurrent abdominal pain.  Has had numerous admissions over the last year, most recently admitted 06/21-6/25/24.  States that since discharge he has been fatigued although had slightly improved during the ensuing days.  However abdominal pain has persisted.  Describes sharp epigastric abdominal pain which is constant, occasionally radiating to his lower back.  This a.m., he woke up with stabbing like sensation of his lower back which prompted him to go to ED for further evaluation.  States that appetite has been poor.  Last BM was 2 days ago.  Also endorses weight loss.  Denies any fevers, nausea, vomiting    ED course:  Upon arrival, was afebrile and hemodynamically stable.  Lab work significant for new leukocytosis 18 K, mild lipase elevation 95.  CTA/P obtained which noted findings suggestive of ongoing acute pancreatitis.  Also noted new organized appearing fluid collection dorsal to the pancreas with additional new lobulated collection anterior inferior to the distal pancreatic body, possibly communicating. AES consulted    Overview/Hospital Course:  Admitted for recurrent pancreatitis.  CT AP noting findings suggestive of ongoing acute pancreatitis.  Also noting to discrete peripancreatic fluid collections, possibly communicating.  AES consulted, planning for colonoscopy today 7/3.  Also discussing with surgical oncology to discuss candidacy for Alejandro procedure vs a completion total pancreatectomy/Whipple. Surgery: 'Definitive surgery for this condition would  likely require total pancreatectomy (and also likely partial colectomy, splenectomy, partial gastrectomy, and small bowel resection) which comes with both significant acute and chronic issues.'    Interval History: Seen this AM at bedside.  Pain significantly improved.  Endorsing minimal abdominal pain.  Denies any fevers, chills, nausea.     Review of Systems  Objective:     Vital Signs (Most Recent):  Temp: 98.4 °F (36.9 °C) (07/03/24 1116)  Pulse: 77 (07/03/24 1116)  Resp: 18 (07/03/24 1116)  BP: 126/76 (07/03/24 1116)  SpO2: 96 % (07/03/24 1116) Vital Signs (24h Range):  Temp:  [97.6 °F (36.4 °C)-98.9 °F (37.2 °C)] 98.4 °F (36.9 °C)  Pulse:  [77-86] 77  Resp:  [16-18] 18  SpO2:  [95 %-97 %] 96 %  BP: (108-127)/(72-77) 126/76     Weight: 92.6 kg (204 lb 2.3 oz)  Body mass index is 28.47 kg/m².    Intake/Output Summary (Last 24 hours) at 7/3/2024 1406  Last data filed at 7/2/2024 2100  Gross per 24 hour   Intake 120 ml   Output --   Net 120 ml         Physical Exam  Constitutional:       General: He is not in acute distress.     Appearance: Normal appearance. He is not ill-appearing, toxic-appearing or diaphoretic.   HENT:      Head: Normocephalic and atraumatic.      Mouth/Throat:      Mouth: Mucous membranes are moist.   Eyes:      Pupils: Pupils are equal, round, and reactive to light.   Cardiovascular:      Rate and Rhythm: Normal rate and regular rhythm.      Pulses: Normal pulses.      Heart sounds: Normal heart sounds. No murmur heard.     No gallop.   Pulmonary:      Effort: Pulmonary effort is normal. No respiratory distress.      Breath sounds: Normal breath sounds. No wheezing or rales.   Abdominal:      General: Abdomen is flat. Bowel sounds are normal. There is no distension.      Palpations: Abdomen is soft.      Tenderness: There is no abdominal tenderness. There is no guarding.   Musculoskeletal:         General: No swelling. Normal range of motion.      Cervical back: Normal range of motion.    Skin:     General: Skin is warm and dry.      Capillary Refill: Capillary refill takes less than 2 seconds.   Neurological:      General: No focal deficit present.      Mental Status: He is alert. Mental status is at baseline.   Psychiatric:         Mood and Affect: Mood normal.         Behavior: Behavior normal.         Thought Content: Thought content normal.         Judgment: Judgment normal.             Significant Labs: All pertinent labs within the past 24 hours have been reviewed.    Significant Imaging: I have reviewed all pertinent imaging results/findings within the past 24 hours.    Assessment/Plan:      * Acute on chronic pancreatitis  -history of hereditary pancreatitis complicated by frequent admissions, most recently admitted 06/21-6/25/24  -Lipase minor elevation however diagnosis consistent w acute pancreatitis given typical associated symptoms/positive imaging  -CT A/P noting ongoing acute pancreatitis and also possible communicating fluid collection per below  Plan:    -AES consulted, planning for colonoscopy today  -Surgical oncology consulted: 'Definitive surgery for this condition would likely require total pancreatectomy (and also likely partial colectomy, splenectomy, partial gastrectomy, and small bowel resection) which comes with both significant acute and chronic issues.'  -continue multimodal pain control similar to previous hospitalization  -aggressive mIVF  -Advance diet as tolerated  -cont pancreatic enzymes      Acute pancreatic fluid collection  -CT A/P: new organized appearing fluid collection dorsal to the pancreas with additional new lobulated collection anterior inferior to the distal pancreatic body, possibly communicating  -Unclear if this represents underlying infection explaining leukocytosis and ongoing abdominal pain  -Surg onc consultation per above      GERD (gastroesophageal reflux disease)  PPI        VTE Risk Mitigation (From admission, onward)           Ordered      IP VTE LOW RISK PATIENT  Once         07/01/24 1559     Place JUAN LUIS hose  Until discontinued         07/01/24 1559     Place sequential compression device  Until discontinued         07/01/24 1559     Place sequential compression device  Until discontinued         07/01/24 1559                    Discharge Planning   AMBER: 7/5/2024     Code Status: Full Code   Is the patient medically ready for discharge?:     Reason for patient still in hospital (select all that apply): Patient trending condition  Discharge Plan A: Home with family                  Uzair Stephen DO  Department of Hospital Medicine   [facility] Surg

## 2024-07-03 NOTE — PROGRESS NOTES
[facility] - [facility]  General Surgery  Progress Note    Subjective:     History of Present Illness:  59 year old male with a past medical history of HTN, HLD, BRAIN, GERD, and recurrent autoimmune pancreatitis now s/p [place] procedure in November of 2021. Following this procedure the patient describes significant improvement in his pancreatitis symptoms. Since that time the patient describes some mild episodes of smoldering pancreatitis symptoms. These are typically very well managed with bowel rest and a highly selective diet. Two weeks ago he experienced worsening abdominal pain, inconsistent bowel movements, worsening nausea and eventual uncontrolled emesis requiring hospitalization. He was found to have a significantly elevated bilirubin level of 10.1 and a new peripancreatic fludi collection on CT scan. AES recommended a 5 day course of IV Zosyn which was completed with improvement in his symptoms. He was discharged on hospital day 5 but never had full resolution of his symptoms. Once again presented to the ED on 7/01 for similar symptoms. Found to have elevated lipase of 85 and mildly elevated bilirubin of 1.5. Surgical Oncology has been following him over this outpatient period. Now consulted for inpatient evaluation.     Post-Op Info:  Procedure(s) (LRB):  COLONOSCOPY (N/A)   Day of Surgery     Interval History: NAEO. Feels better than he has. WBC normalized at 8. Bilirubin down to 1.2 from 1.5. Transaminases normal. Plan for colonoscopy today.     Medications:  Continuous Infusions:   lactated ringers   Intravenous Continuous 100 mL/hr at 07/03/24 0014 New Bag at 07/03/24 0014     Scheduled Meds:   aluminum-magnesium hydroxide-simethicone  30 mL Oral QID (AC & HS)    carvediloL  3.125 mg Oral BID WM    LIDOcaine  1 patch Transdermal Q24H    pantoprazole  40 mg Oral Daily    polyethylene glycol  17 g Oral Daily    sucralfate  1 g Oral Q6H     PRN Meds:  Current Facility-Administered Medications:      acetaminophen, 650 mg, Oral, Q6H PRN    albuterol-ipratropium, 3 mL, Nebulization, Q6H PRN    dextrose 10%, 12.5 g, Intravenous, PRN    dextrose 10%, 25 g, Intravenous, PRN    glucagon (human recombinant), 1 mg, Intramuscular, PRN    glucose, 16 g, Oral, PRN    glucose, 24 g, Oral, PRN    ibuprofen, 800 mg, Oral, Q6H PRN    melatonin, 6 mg, Oral, Nightly PRN    naloxone, 0.02 mg, Intravenous, PRN    ondansetron, 8 mg, Oral, Q8H PRN    oxyCODONE, 5 mg, Oral, Q4H PRN    prochlorperazine, 5 mg, Intravenous, Q6H PRN    sodium chloride 0.9%, 10 mL, Intravenous, PRN    sodium chloride 0.9%, 10 mL, Intravenous, Q8H PRN     Review of patient's allergies indicates:  No Known Allergies  Objective:     Vital Signs (Most Recent):  Temp: 98.4 °F (36.9 °C) (07/03/24 1116)  Pulse: 77 (07/03/24 1116)  Resp: 18 (07/03/24 1116)  BP: 126/76 (07/03/24 1116)  SpO2: 96 % (07/03/24 1116) Vital Signs (24h Range):  Temp:  [97.6 °F (36.4 °C)-98.9 °F (37.2 °C)] 98.4 °F (36.9 °C)  Pulse:  [77-86] 77  Resp:  [16-18] 18  SpO2:  [95 %-97 %] 96 %  BP: (108-127)/(72-77) 126/76     Weight: 92.6 kg (204 lb 2.3 oz)  Body mass index is 28.47 kg/m².    Intake/Output - Last 3 Shifts         07/01 0700 07/02 0659 07/02 0700 07/03 0659 07/03 0700  07/04 0659    P.O. 300 120     I.V. (mL/kg) 1398.5 (15)      IV Piggyback 1000      Total Intake(mL/kg) 2698.5 (28.9) 120 (1.3)     Net +2698.5 +120            Urine Occurrence 2 x 3 x     Stool Occurrence 0 x 11 x     Emesis Occurrence 0 x               Physical Exam  Vitals and nursing note reviewed.   Constitutional:       Appearance: Normal appearance. He is not ill-appearing.   HENT:      Head: Normocephalic.      Mouth/Throat:      Mouth: Mucous membranes are moist.      Pharynx: Oropharynx is clear.   Eyes:      General: No scleral icterus.     Extraocular Movements: Extraocular movements intact.      Conjunctiva/sclera: Conjunctivae normal.   Cardiovascular:      Rate and Rhythm: Normal rate.       Pulses: Normal pulses.   Pulmonary:      Effort: Pulmonary effort is normal. No respiratory distress.      Breath sounds: No wheezing.   Abdominal:      General: Bowel sounds are normal. There is distension.      Palpations: Abdomen is soft.      Tenderness: There is abdominal tenderness.      Comments: Soft, mildly distended abdomen. Mild tenderness to palpation in the epigastric region but no significant signs of peritonitis. Incisions are well-healed.   Musculoskeletal:         General: No swelling or tenderness. Normal range of motion.      Cervical back: Normal range of motion.   Skin:     General: Skin is warm and dry.      Coloration: Skin is not jaundiced or pale.   Neurological:      General: No focal deficit present.      Mental Status: He is alert and oriented to person, place, and time.   Psychiatric:         Mood and Affect: Mood normal.          Significant Labs:  I have reviewed all pertinent lab results within the past 24 hours.  CBC:   Recent Labs   Lab 07/03/24  0328   WBC 8.64   RBC 3.77*   HGB 11.4*   HCT 35.7*      MCV 95   MCH 30.2   MCHC 31.9*     CMP:   Recent Labs   Lab 07/03/24  0328   *   CALCIUM 9.0   ALBUMIN 2.7*   PROT 5.8*      K 4.4   CO2 25      BUN 10   CREATININE 0.8   ALKPHOS 97   ALT 41   AST 20   BILITOT 1.2*       Significant Diagnostics:  I have reviewed all pertinent imaging results/findings within the past 24 hours.  Assessment/Plan:     * Acute on chronic pancreatitis  59 year old male with a past medical history of HTN, HLD, BRAIN, GERD, and recurrent autoimmune pancreatitis now s/p [place] procedure in November of 2021. Has done quite well over the past three years but has had some mild episodes of pancreatitis not requiring hospitalization. Most recently has required hospitalization x2 for acute episodes of worsening abdominal pain, nausea, emesis, and elevated T Bili which approached 10 at one point. Surgical Oncology consulted for  evaluation.    - Had a long discussion with Mr. Zamorano regarding his symptoms/pathology/and prognosis. Overall he looks quite good. Tolerating most foods and seems to be doing well nutritionally. These episodes seem to be unprovoked and I am unsure of what may have triggered his new onset symptoms.   - Definitive surgery for this condition would likely require total pancreatectomy (and also likely partial colectomy, splenectomy, partial gastrectomy, and small bowel resection) which comes with both significant acute and chronic issues.   - Patient would like to refrain from surgical intervention at this time which I agree with  - OK to proceed with colonoscopy tomorrow for evaluation of splenic flexure.  - If the colonoscopy is concerning for a colo-pancreatic fistula, he may benefit from diversion  - Slowly advance diet  - Continue Creon  - Multimodal pain control  - Surgical Oncology will continue to follow while inpatient. If colonoscopy is without significant findings I believe that this can be treated conservatively as an outpatient.        Raciel Munoz MD  General Surgery  [facility]

## 2024-07-03 NOTE — NURSING TRANSFER
Nursing Transfer Note      7/3/2024   4:00 PM    Reason patient is being transferred: back to room    Transfer To: 646    Transfer via stretcher    Transported by tech    Order for Tele Monitor? No    4eyes on Skin: yes    Medicines sent: na    Any special needs or follow-up needed: na    Patient belongings transferred with patient: No    Chart send with patient: Yes    Notified: spouse

## 2024-07-03 NOTE — PLAN OF CARE
Problem: Adult Inpatient Plan of Care  Goal: Plan of Care Review  Outcome: Progressing  Goal: Patient-Specific Goal (Individualized)  Outcome: Progressing  Goal: Absence of Hospital-Acquired Illness or Injury  Outcome: Progressing  Goal: Optimal Comfort and Wellbeing  Outcome: Progressing  Goal: Readiness for Transition of Care  Outcome: Progressing     Problem: Infection  Goal: Absence of Infection Signs and Symptoms  Outcome: Progressing   Alert . Slept most of this shift after midnight. Pt stated had 10 stools prior to midnight and once this morning currently light yellow liquid. Tolerated drinking golytely and finish at 2210. Wife is bedside only complain once of nausea he believe from the golyetly. Oral zofran was given KALYN Bauer call and this information was given to staff

## 2024-07-03 NOTE — PROVATION PATIENT INSTRUCTIONS
Discharge Summary/Instructions after an Endoscopic Procedure  Patient Name: Fabrice Zamorano  Patient MRN: [id number]  Patient YOB: 1965  Wednesday, July 3, 2024  Kwadwo Gonsalez MD  Dear patient,  As a result of recent federal legislation (The Federal Cures Act), you may   receive lab or pathology results from your procedure in your [facility]   account before your physician is able to contact you. Your physician or   their representative will relay the results to you with their   recommendations at their soonest availability.  Thank you,  RESTRICTIONS:  During your procedure today, you received medications for sedation.  These   medications may affect your judgment, balance and coordination.  Therefore,   for 24 hours, you have the following restrictions:   - DO NOT drive a car, operate machinery, make legal/financial decisions,   sign important papers or drink alcohol.    ACTIVITY:  Today: no heavy lifting, straining or running due to procedural   sedation/anesthesia.  The following day: return to full activity including work.  DIET:  Eat and drink normally unless instructed otherwise.     TREATMENT FOR COMMON SIDE EFFECTS:  - Mild abdominal pain, nausea, belching, bloating or excessive gas:  rest,   eat lightly and use a heating pad.  - Sore Throat: treat with throat lozenges and/or gargle with warm salt   water.  - Because air was used during the procedure, expelling large amounts of air   from your rectum or belching is normal.  - If a bowel prep was taken, you may not have a bowel movement for 1-3 days.    This is normal.  SYMPTOMS TO WATCH FOR AND REPORT TO YOUR PHYSICIAN:  1. Abdominal pain or bloating, other than gas cramps.  2. Chest pain.  3. Back pain.  4. Signs of infection such as: chills or fever occurring within 24 hours   after the procedure.  5. Rectal bleeding, which would show as bright red, maroon, or black stools.   (A tablespoon of blood from the rectum is not serious, especially if    hemorrhoids are present.)  6. Vomiting.  7. Weakness or dizziness.  GO DIRECTLY TO THE NEAREST EMERGENCY ROOM IF YOU HAVE ANY OF THE FOLLOWING:      Difficulty breathing              Chills and/or fever over 101 F   Persistent vomiting and/or vomiting blood   Severe abdominal pain   Severe chest pain   Black, tarry stools   Bleeding- more than one tablespoon   Any other symptom or condition that you feel may need urgent attention  Your doctor recommends these additional instructions:  If any biopsies were taken, your doctors clinic will contact you in 1 to 2   weeks with any results.  - [facility].   - Advance diet as tolerated.   - Continue present medications.   - Await pathology results.   - Repeat colonoscopy date to be determined after pending pathology results   are reviewed for surveillance based on pathology results.  For questions, problems or results please call your physician - Kwadwo Gonsalez MD at Work:  [phone].  [facility] [place], EMERGENCY ROOM PHONE NUMBER: [phone]  IF A COMPLICATION OR EMERGENCY SITUATION ARISES AND YOU ARE UNABLE TO REACH   YOUR PHYSICIAN - GO DIRECTLY TO THE EMERGENCY ROOM.  Kwadwo Gonsalez MD  7/3/2024 3:13:34 PM  This report has been verified and signed electronically.  Dear patient,  As a result of recent federal legislation (The Federal Cures Act), you may   receive lab or pathology results from your procedure in your [facility]   account before your physician is able to contact you. Your physician or   their representative will relay the results to you with their   recommendations at their soonest availability.  Thank you,  PROVATION

## 2024-07-03 NOTE — TRANSFER OF CARE
"Anesthesia Transfer of Care Note    Patient: Fabrice Zamorano    Procedure(s) Performed: Procedure(s) (LRB):  COLONOSCOPY (N/A)    Patient location: PACU    Anesthesia Type: general    Transport from OR: Transported from OR on 6-10 L/min O2 by face mask with adequate spontaneous ventilation    Post pain: adequate analgesia    Post assessment: no apparent anesthetic complications and tolerated procedure well    Post vital signs: stable    Level of consciousness: awake and alert    Nausea/Vomiting: no nausea/vomiting    Complications: none    Transfer of care protocol was followedComments: Report given to recovery, RN. All questions answered.      Last vitals: Visit Vitals  /72   Pulse 70   Temp 36.9 °C (98.4 °F) (Oral)   Resp 18   Ht 5' 11" (1.803 m)   Wt 92.6 kg (204 lb 2.3 oz)   SpO2 99%   BMI 28.47 kg/m²     "

## 2024-07-03 NOTE — TREATMENT PLAN
Brief Advanced Endoscopy Treatment Plan    Findings on colonoscopy today:                         - Preparation of the colon was inadequate.                          - Stool in the entire examined colon.                          - One 4 mm polyp at the hepatic flexure, removed                          with a cold snare. Resected and retrieved.                          - An area of significantly congested and                          erythematous mucosa was found at the splenic                          flexure around 45-50cm from anal verge.                          Significant erythema noted. A clear distal                          attachment cap was used on the scope to better                          elucidate mucosa underneath/between folds. Unable                          to localize an overt fistulous process in this                          region given the significant inflammatory                          findings. This area was biopsied with a cold                          forceps for histology. For location marking in                          case further imaging is considered, one hemostatic                          clip was successfully placed (MR conditional) just                          distal to inflammation at around 45cm from anal                          verge.                          - Diverticulosis in the left colon.                          - One 3 mm polyp in the sigmoid colon, removed                          with a cold snare. Resected and retrieved.     PLAN:                         - [facility].                          - Advance diet as tolerated.                          - Continue present medications.                          - Await pathology results.                          - Repeat colonoscopy date to be determined after                          pending pathology results are reviewed for                          surveillance based on pathology  results.     We will follow along.     Tariq Magaña MD  PGY-V, GI/Hepatology

## 2024-07-03 NOTE — SUBJECTIVE & OBJECTIVE
Interval History: NAEO. Feels better than he has. WBC normalized at 8. Bilirubin down to 1.2 from 1.5. Transaminases normal. Plan for colonoscopy today.     Medications:  Continuous Infusions:   lactated ringers   Intravenous Continuous 100 mL/hr at 07/03/24 0014 New Bag at 07/03/24 0014     Scheduled Meds:   aluminum-magnesium hydroxide-simethicone  30 mL Oral QID (AC & HS)    carvediloL  3.125 mg Oral BID WM    LIDOcaine  1 patch Transdermal Q24H    pantoprazole  40 mg Oral Daily    polyethylene glycol  17 g Oral Daily    sucralfate  1 g Oral Q6H     PRN Meds:  Current Facility-Administered Medications:     acetaminophen, 650 mg, Oral, Q6H PRN    albuterol-ipratropium, 3 mL, Nebulization, Q6H PRN    dextrose 10%, 12.5 g, Intravenous, PRN    dextrose 10%, 25 g, Intravenous, PRN    glucagon (human recombinant), 1 mg, Intramuscular, PRN    glucose, 16 g, Oral, PRN    glucose, 24 g, Oral, PRN    ibuprofen, 800 mg, Oral, Q6H PRN    melatonin, 6 mg, Oral, Nightly PRN    naloxone, 0.02 mg, Intravenous, PRN    ondansetron, 8 mg, Oral, Q8H PRN    oxyCODONE, 5 mg, Oral, Q4H PRN    prochlorperazine, 5 mg, Intravenous, Q6H PRN    sodium chloride 0.9%, 10 mL, Intravenous, PRN    sodium chloride 0.9%, 10 mL, Intravenous, Q8H PRN     Review of patient's allergies indicates:  No Known Allergies  Objective:     Vital Signs (Most Recent):  Temp: 98.4 °F (36.9 °C) (07/03/24 1116)  Pulse: 77 (07/03/24 1116)  Resp: 18 (07/03/24 1116)  BP: 126/76 (07/03/24 1116)  SpO2: 96 % (07/03/24 1116) Vital Signs (24h Range):  Temp:  [97.6 °F (36.4 °C)-98.9 °F (37.2 °C)] 98.4 °F (36.9 °C)  Pulse:  [77-86] 77  Resp:  [16-18] 18  SpO2:  [95 %-97 %] 96 %  BP: (108-127)/(72-77) 126/76     Weight: 92.6 kg (204 lb 2.3 oz)  Body mass index is 28.47 kg/m².    Intake/Output - Last 3 Shifts         07/01 0700  07/02 0659 07/02 0700  07/03 0659 07/03 0700  07/04 0659    P.O. 300 120     I.V. (mL/kg) 1398.5 (15)      IV Piggyback 1000      Total Intake(mL/kg)  2698.5 (28.9) 120 (1.3)     Net +2698.5 +120            Urine Occurrence 2 x 3 x     Stool Occurrence 0 x 11 x     Emesis Occurrence 0 x               Physical Exam  Vitals and nursing note reviewed.   Constitutional:       Appearance: Normal appearance. He is not ill-appearing.   HENT:      Head: Normocephalic.      Mouth/Throat:      Mouth: Mucous membranes are moist.      Pharynx: Oropharynx is clear.   Eyes:      General: No scleral icterus.     Extraocular Movements: Extraocular movements intact.      Conjunctiva/sclera: Conjunctivae normal.   Cardiovascular:      Rate and Rhythm: Normal rate.      Pulses: Normal pulses.   Pulmonary:      Effort: Pulmonary effort is normal. No respiratory distress.      Breath sounds: No wheezing.   Abdominal:      General: Bowel sounds are normal. There is distension.      Palpations: Abdomen is soft.      Tenderness: There is abdominal tenderness.      Comments: Soft, mildly distended abdomen. Mild tenderness to palpation in the epigastric region but no significant signs of peritonitis. Incisions are well-healed.   Musculoskeletal:         General: No swelling or tenderness. Normal range of motion.      Cervical back: Normal range of motion.   Skin:     General: Skin is warm and dry.      Coloration: Skin is not jaundiced or pale.   Neurological:      General: No focal deficit present.      Mental Status: He is alert and oriented to person, place, and time.   Psychiatric:         Mood and Affect: Mood normal.          Significant Labs:  I have reviewed all pertinent lab results within the past 24 hours.  CBC:   Recent Labs   Lab 07/03/24  0328   WBC 8.64   RBC 3.77*   HGB 11.4*   HCT 35.7*      MCV 95   MCH 30.2   MCHC 31.9*     CMP:   Recent Labs   Lab 07/03/24  0328   *   CALCIUM 9.0   ALBUMIN 2.7*   PROT 5.8*      K 4.4   CO2 25      BUN 10   CREATININE 0.8   ALKPHOS 97   ALT 41   AST 20   BILITOT 1.2*       Significant Diagnostics:  I have  reviewed all pertinent imaging results/findings within the past 24 hours.

## 2024-07-03 NOTE — SUBJECTIVE & OBJECTIVE
Interval History: Seen this AM at bedside.  Pain significantly improved.  Endorsing minimal abdominal pain.  Denies any fevers, chills, nausea.     Review of Systems  Objective:     Vital Signs (Most Recent):  Temp: 98.4 °F (36.9 °C) (07/03/24 1116)  Pulse: 77 (07/03/24 1116)  Resp: 18 (07/03/24 1116)  BP: 126/76 (07/03/24 1116)  SpO2: 96 % (07/03/24 1116) Vital Signs (24h Range):  Temp:  [97.6 °F (36.4 °C)-98.9 °F (37.2 °C)] 98.4 °F (36.9 °C)  Pulse:  [77-86] 77  Resp:  [16-18] 18  SpO2:  [95 %-97 %] 96 %  BP: (108-127)/(72-77) 126/76     Weight: 92.6 kg (204 lb 2.3 oz)  Body mass index is 28.47 kg/m².    Intake/Output Summary (Last 24 hours) at 7/3/2024 1406  Last data filed at 7/2/2024 2100  Gross per 24 hour   Intake 120 ml   Output --   Net 120 ml         Physical Exam  Constitutional:       General: He is not in acute distress.     Appearance: Normal appearance. He is not ill-appearing, toxic-appearing or diaphoretic.   HENT:      Head: Normocephalic and atraumatic.      Mouth/Throat:      Mouth: Mucous membranes are moist.   Eyes:      Pupils: Pupils are equal, round, and reactive to light.   Cardiovascular:      Rate and Rhythm: Normal rate and regular rhythm.      Pulses: Normal pulses.      Heart sounds: Normal heart sounds. No murmur heard.     No gallop.   Pulmonary:      Effort: Pulmonary effort is normal. No respiratory distress.      Breath sounds: Normal breath sounds. No wheezing or rales.   Abdominal:      General: Abdomen is flat. Bowel sounds are normal. There is no distension.      Palpations: Abdomen is soft.      Tenderness: There is no abdominal tenderness. There is no guarding.   Musculoskeletal:         General: No swelling. Normal range of motion.      Cervical back: Normal range of motion.   Skin:     General: Skin is warm and dry.      Capillary Refill: Capillary refill takes less than 2 seconds.   Neurological:      General: No focal deficit present.      Mental Status: He is alert.  Mental status is at baseline.   Psychiatric:         Mood and Affect: Mood normal.         Behavior: Behavior normal.         Thought Content: Thought content normal.         Judgment: Judgment normal.             Significant Labs: All pertinent labs within the past 24 hours have been reviewed.    Significant Imaging: I have reviewed all pertinent imaging results/findings within the past 24 hours.

## 2024-07-04 VITALS
DIASTOLIC BLOOD PRESSURE: 74 MMHG | HEIGHT: 71 IN | HEART RATE: 76 BPM | WEIGHT: 204.13 LBS | BODY MASS INDEX: 28.58 KG/M2 | TEMPERATURE: 98 F | RESPIRATION RATE: 18 BRPM | OXYGEN SATURATION: 96 % | SYSTOLIC BLOOD PRESSURE: 120 MMHG

## 2024-07-04 LAB
ALBUMIN SERPL BCP-MCNC: 2.8 G/DL (ref 3.5–5.2)
ALP SERPL-CCNC: 91 U/L (ref 55–135)
ALT SERPL W/O P-5'-P-CCNC: 32 U/L (ref 10–44)
ANION GAP SERPL CALC-SCNC: 7 MMOL/L (ref 8–16)
AST SERPL-CCNC: 14 U/L (ref 10–40)
BASOPHILS # BLD AUTO: 0.03 K/UL (ref 0–0.2)
BASOPHILS NFR BLD: 0.4 % (ref 0–1.9)
BILIRUB SERPL-MCNC: 1 MG/DL (ref 0.1–1)
BUN SERPL-MCNC: 8 MG/DL (ref 6–20)
CALCIUM SERPL-MCNC: 9 MG/DL (ref 8.7–10.5)
CHLORIDE SERPL-SCNC: 103 MMOL/L (ref 95–110)
CO2 SERPL-SCNC: 29 MMOL/L (ref 23–29)
CREAT SERPL-MCNC: 0.9 MG/DL (ref 0.5–1.4)
DIFFERENTIAL METHOD BLD: ABNORMAL
EOSINOPHIL # BLD AUTO: 0.1 K/UL (ref 0–0.5)
EOSINOPHIL NFR BLD: 1.3 % (ref 0–8)
ERYTHROCYTE [DISTWIDTH] IN BLOOD BY AUTOMATED COUNT: 13.7 % (ref 11.5–14.5)
EST. GFR  (NO RACE VARIABLE): >60 ML/MIN/1.73 M^2
GLUCOSE SERPL-MCNC: 128 MG/DL (ref 70–110)
HCT VFR BLD AUTO: 36.1 % (ref 40–54)
HGB BLD-MCNC: 11.3 G/DL (ref 14–18)
IMM GRANULOCYTES # BLD AUTO: 0.04 K/UL (ref 0–0.04)
IMM GRANULOCYTES NFR BLD AUTO: 0.5 % (ref 0–0.5)
LYMPHOCYTES # BLD AUTO: 1.2 K/UL (ref 1–4.8)
LYMPHOCYTES NFR BLD: 15.2 % (ref 18–48)
MAGNESIUM SERPL-MCNC: 2.3 MG/DL (ref 1.6–2.6)
MCH RBC QN AUTO: 29.3 PG (ref 27–31)
MCHC RBC AUTO-ENTMCNC: 31.3 G/DL (ref 32–36)
MCV RBC AUTO: 94 FL (ref 82–98)
MONOCYTES # BLD AUTO: 0.6 K/UL (ref 0.3–1)
MONOCYTES NFR BLD: 7.8 % (ref 4–15)
NEUTROPHILS # BLD AUTO: 5.8 K/UL (ref 1.8–7.7)
NEUTROPHILS NFR BLD: 74.8 % (ref 38–73)
NRBC BLD-RTO: 0 /100 WBC
PHOSPHATE SERPL-MCNC: 3.3 MG/DL (ref 2.7–4.5)
PLATELET # BLD AUTO: 245 K/UL (ref 150–450)
PMV BLD AUTO: 9.2 FL (ref 9.2–12.9)
POTASSIUM SERPL-SCNC: 3.9 MMOL/L (ref 3.5–5.1)
PROT SERPL-MCNC: 6 G/DL (ref 6–8.4)
RBC # BLD AUTO: 3.86 M/UL (ref 4.6–6.2)
SODIUM SERPL-SCNC: 139 MMOL/L (ref 136–145)
WBC # BLD AUTO: 7.78 K/UL (ref 3.9–12.7)

## 2024-07-04 PROCEDURE — 25000003 PHARM REV CODE 250: Performed by: STUDENT IN AN ORGANIZED HEALTH CARE EDUCATION/TRAINING PROGRAM

## 2024-07-04 PROCEDURE — 84100 ASSAY OF PHOSPHORUS: CPT | Performed by: STUDENT IN AN ORGANIZED HEALTH CARE EDUCATION/TRAINING PROGRAM

## 2024-07-04 PROCEDURE — 85025 COMPLETE CBC W/AUTO DIFF WBC: CPT | Performed by: STUDENT IN AN ORGANIZED HEALTH CARE EDUCATION/TRAINING PROGRAM

## 2024-07-04 PROCEDURE — 83735 ASSAY OF MAGNESIUM: CPT | Performed by: STUDENT IN AN ORGANIZED HEALTH CARE EDUCATION/TRAINING PROGRAM

## 2024-07-04 PROCEDURE — 63600175 PHARM REV CODE 636 W HCPCS: Performed by: EMERGENCY MEDICINE

## 2024-07-04 PROCEDURE — 80053 COMPREHEN METABOLIC PANEL: CPT | Performed by: STUDENT IN AN ORGANIZED HEALTH CARE EDUCATION/TRAINING PROGRAM

## 2024-07-04 PROCEDURE — 36415 COLL VENOUS BLD VENIPUNCTURE: CPT | Performed by: STUDENT IN AN ORGANIZED HEALTH CARE EDUCATION/TRAINING PROGRAM

## 2024-07-04 RX ADMIN — CARVEDILOL 3.12 MG: 3.12 TABLET, FILM COATED ORAL at 08:07

## 2024-07-04 RX ADMIN — SUCRALFATE 1 G: 1 SUSPENSION ORAL at 05:07

## 2024-07-04 RX ADMIN — SUCRALFATE 1 G: 1 SUSPENSION ORAL at 11:07

## 2024-07-04 RX ADMIN — PANTOPRAZOLE SODIUM 40 MG: 40 TABLET, DELAYED RELEASE ORAL at 08:07

## 2024-07-04 RX ADMIN — ALUMINUM HYDROXIDE, MAGNESIUM HYDROXIDE, AND SIMETHICONE 30 ML: 1200; 120; 1200 SUSPENSION ORAL at 06:07

## 2024-07-04 RX ADMIN — ALUMINUM HYDROXIDE, MAGNESIUM HYDROXIDE, AND SIMETHICONE 30 ML: 1200; 120; 1200 SUSPENSION ORAL at 11:07

## 2024-07-04 RX ADMIN — SODIUM CHLORIDE, POTASSIUM CHLORIDE, SODIUM LACTATE AND CALCIUM CHLORIDE: 600; 310; 30; 20 INJECTION, SOLUTION INTRAVENOUS at 04:07

## 2024-07-04 RX ADMIN — PANCRELIPASE 1 CAPSULE: 120000; 24000; 76000 CAPSULE, DELAYED RELEASE PELLETS ORAL at 08:07

## 2024-07-04 RX ADMIN — PANCRELIPASE 1 CAPSULE: 120000; 24000; 76000 CAPSULE, DELAYED RELEASE PELLETS ORAL at 11:07

## 2024-07-04 NOTE — PLAN OF CARE
[facility] - Med Surg  Discharge Final Note    Primary Care Provider: Rebecca Mcmillan MD    Expected Discharge Date: 7/4/2024    Final Discharge Note (most recent)       Final Note - 07/04/24 1208          Final Note    Assessment Type Final Discharge Note     Anticipated Discharge Disposition Home or Self Care     Hospital Resources/Appts/Education Provided Appointments scheduled and added to AVS        Post-Acute Status    Post-Acute Authorization Other     Other Status No Post-Acute Service Needs     Discharge Delays None known at this time                     Important Message from Medicare

## 2024-07-04 NOTE — PLAN OF CARE
Pt discharged to home with belongings. IV removed. Discharge packet discussed with pt. All questions answered.

## 2024-07-04 NOTE — SUBJECTIVE & OBJECTIVE
Interval History:  Colonoscopy yesterday revealing some atheromatous mucosa around the splenic flexure of the colon, but no significant concern for fistulization.  Patient has remained afebrile.  Abdominal pain has improved.  Doing quite well.    Medications:  Continuous Infusions:   lactated ringers   Intravenous Continuous 100 mL/hr at 07/04/24 0446 New Bag at 07/04/24 0446     Scheduled Meds:   aluminum-magnesium hydroxide-simethicone  30 mL Oral QID (AC & HS)    carvediloL  3.125 mg Oral BID WM    LIDOcaine  1 patch Transdermal Q24H    lipase-protease-amylase 24,000-76,000-120,000 units  1 capsule Oral TID WM    pantoprazole  40 mg Oral Daily    polyethylene glycol  17 g Oral Daily    sucralfate  1 g Oral Q6H     PRN Meds:  Current Facility-Administered Medications:     acetaminophen, 650 mg, Oral, Q6H PRN    albuterol-ipratropium, 3 mL, Nebulization, Q6H PRN    dextrose 10%, 12.5 g, Intravenous, PRN    dextrose 10%, 25 g, Intravenous, PRN    glucagon (human recombinant), 1 mg, Intramuscular, PRN    glucose, 16 g, Oral, PRN    glucose, 24 g, Oral, PRN    ibuprofen, 800 mg, Oral, Q6H PRN    melatonin, 6 mg, Oral, Nightly PRN    naloxone, 0.02 mg, Intravenous, PRN    ondansetron, 8 mg, Oral, Q8H PRN    oxyCODONE, 5 mg, Oral, Q4H PRN    prochlorperazine, 5 mg, Intravenous, Q6H PRN    sodium chloride 0.9%, 10 mL, Intravenous, PRN    sodium chloride 0.9%, 10 mL, Intravenous, Q8H PRN     Review of patient's allergies indicates:  No Known Allergies  Objective:     Vital Signs (Most Recent):  Temp: 98 °F (36.7 °C) (07/04/24 0823)  Pulse: 72 (07/04/24 0823)  Resp: 18 (07/04/24 0823)  BP: 127/79 (07/04/24 0823)  SpO2: 97 % (07/04/24 0823) Vital Signs (24h Range):  Temp:  [97.5 °F (36.4 °C)-98.7 °F (37.1 °C)] 98 °F (36.7 °C)  Pulse:  [68-92] 72  Resp:  [12-18] 18  SpO2:  [95 %-100 %] 97 %  BP: (116-138)/(69-89) 127/79     Weight: 92.6 kg (204 lb 2.3 oz)  Body mass index is 28.47 kg/m².    Intake/Output - Last 3 Shifts          07/02 0700  07/03 0659 07/03 0700  07/04 0659 07/04 0700  07/05 0659    P.O. 120 360     I.V. (mL/kg)  250 (2.7)     IV Piggyback  1000     Total Intake(mL/kg) 120 (1.3) 1610 (17.4)     Net +120 +1610            Urine Occurrence 3 x 4 x     Stool Occurrence 11 x 0 x              Physical Exam  Vitals and nursing note reviewed.   Constitutional:       Appearance: Normal appearance. He is not ill-appearing.   HENT:      Head: Normocephalic.      Mouth/Throat:      Mouth: Mucous membranes are moist.      Pharynx: Oropharynx is clear.   Eyes:      General: No scleral icterus.     Extraocular Movements: Extraocular movements intact.      Conjunctiva/sclera: Conjunctivae normal.   Cardiovascular:      Rate and Rhythm: Normal rate.      Pulses: Normal pulses.   Pulmonary:      Effort: Pulmonary effort is normal. No respiratory distress.      Breath sounds: No wheezing.   Abdominal:      General: Bowel sounds are normal. There is distension.      Palpations: Abdomen is soft.      Tenderness: There is abdominal tenderness.      Comments: Soft, mildly distended abdomen. Mild tenderness to palpation in the epigastric region but no significant signs of peritonitis. Incisions are well-healed.   Musculoskeletal:         General: No swelling or tenderness. Normal range of motion.      Cervical back: Normal range of motion.   Skin:     General: Skin is warm and dry.      Coloration: Skin is not jaundiced or pale.   Neurological:      General: No focal deficit present.      Mental Status: He is alert and oriented to person, place, and time.   Psychiatric:         Mood and Affect: Mood normal.          Significant Labs:  I have reviewed all pertinent lab results within the past 24 hours.  CBC:   Recent Labs   Lab 07/04/24  0502   WBC 7.78   RBC 3.86*   HGB 11.3*   HCT 36.1*      MCV 94   MCH 29.3   MCHC 31.3*     CMP:   Recent Labs   Lab 07/04/24  0502   *   CALCIUM 9.0   ALBUMIN 2.8*   PROT 6.0      K 3.9    CO2 29      BUN 8   CREATININE 0.9   ALKPHOS 91   ALT 32   AST 14   BILITOT 1.0       Significant Diagnostics:  I have reviewed all pertinent imaging results/findings within the past 24 hours.

## 2024-07-04 NOTE — ANESTHESIA POSTPROCEDURE EVALUATION
Anesthesia Post Evaluation    Patient: Fabrice Zamorano    Procedure(s) Performed: Procedure(s) (LRB):  COLONOSCOPY (N/A)    Final Anesthesia Type: general      Patient location during evaluation: PACU  Patient participation: Yes- Able to Participate  Level of consciousness: awake and alert  Post-procedure vital signs: reviewed and stable  Pain management: adequate  Airway patency: patent    PONV status at discharge: No PONV  Anesthetic complications: no      Cardiovascular status: hemodynamically stable  Hydration status: euvolemic  Follow-up not needed.              Vitals Value Taken Time   /74 07/04/24 1115   Temp 36.6 °C (97.9 °F) 07/04/24 1115   Pulse 76 07/04/24 1115   Resp 18 07/04/24 1115   SpO2 96 % 07/04/24 1115         Event Time   Out of Recovery 15:58:00         Pain/Kellie Score: Pain Rating Post Med Admin: 0 (7/3/2024  3:22 PM)  Kellie Score: 10 (7/3/2024  3:30 PM)

## 2024-07-04 NOTE — PROGRESS NOTES
[facility] - [facility]  General Surgery  Progress Note    Subjective:     History of Present Illness:  59 year old male with a past medical history of HTN, HLD, BRAIN, GERD, and recurrent autoimmune pancreatitis now s/p Puestow procedure in November of 2021. Following this procedure the patient describes significant improvement in his pancreatitis symptoms. Since that time the patient describes some mild episodes of smoldering pancreatitis symptoms. These are typically very well managed with bowel rest and a highly selective diet. Two weeks ago he experienced worsening abdominal pain, inconsistent bowel movements, worsening nausea and eventual uncontrolled emesis requiring hospitalization. He was found to have a significantly elevated bilirubin level of 10.1 and a new peripancreatic fludi collection on CT scan. AES recommended a 5 day course of IV Zosyn which was completed with improvement in his symptoms. He was discharged on hospital day 5 but never had full resolution of his symptoms. Once again presented to the ED on 7/01 for similar symptoms. Found to have elevated lipase of 85 and mildly elevated bilirubin of 1.5. Surgical Oncology has been following him over this outpatient period. Now consulted for inpatient evaluation.     Post-Op Info:  Procedure(s) (LRB):  COLONOSCOPY (N/A)   1 Day Post-Op     Interval History:  Colonoscopy yesterday revealing some atheromatous mucosa around the splenic flexure of the colon, but no significant concern for fistulization.  Patient has remained afebrile.  Abdominal pain has improved.  Doing quite well.    Medications:  Continuous Infusions:   lactated ringers   Intravenous Continuous 100 mL/hr at 07/04/24 0446 New Bag at 07/04/24 0446     Scheduled Meds:   aluminum-magnesium hydroxide-simethicone  30 mL Oral QID (AC & HS)    carvediloL  3.125 mg Oral BID WM    LIDOcaine  1 patch Transdermal Q24H    lipase-protease-amylase 24,000-76,000-120,000 units  1 capsule Oral TID WM     pantoprazole  40 mg Oral Daily    polyethylene glycol  17 g Oral Daily    sucralfate  1 g Oral Q6H     PRN Meds:  Current Facility-Administered Medications:     acetaminophen, 650 mg, Oral, Q6H PRN    albuterol-ipratropium, 3 mL, Nebulization, Q6H PRN    dextrose 10%, 12.5 g, Intravenous, PRN    dextrose 10%, 25 g, Intravenous, PRN    glucagon (human recombinant), 1 mg, Intramuscular, PRN    glucose, 16 g, Oral, PRN    glucose, 24 g, Oral, PRN    ibuprofen, 800 mg, Oral, Q6H PRN    melatonin, 6 mg, Oral, Nightly PRN    naloxone, 0.02 mg, Intravenous, PRN    ondansetron, 8 mg, Oral, Q8H PRN    oxyCODONE, 5 mg, Oral, Q4H PRN    prochlorperazine, 5 mg, Intravenous, Q6H PRN    sodium chloride 0.9%, 10 mL, Intravenous, PRN    sodium chloride 0.9%, 10 mL, Intravenous, Q8H PRN     Review of patient's allergies indicates:  No Known Allergies  Objective:     Vital Signs (Most Recent):  Temp: 98 °F (36.7 °C) (07/04/24 0823)  Pulse: 72 (07/04/24 0823)  Resp: 18 (07/04/24 0823)  BP: 127/79 (07/04/24 0823)  SpO2: 97 % (07/04/24 0823) Vital Signs (24h Range):  Temp:  [97.5 °F (36.4 °C)-98.7 °F (37.1 °C)] 98 °F (36.7 °C)  Pulse:  [68-92] 72  Resp:  [12-18] 18  SpO2:  [95 %-100 %] 97 %  BP: (116-138)/(69-89) 127/79     Weight: 92.6 kg (204 lb 2.3 oz)  Body mass index is 28.47 kg/m².    Intake/Output - Last 3 Shifts         07/02 0700  07/03 0659 07/03 0700  07/04 0659 07/04 0700  07/05 0659    P.O. 120 360     I.V. (mL/kg)  250 (2.7)     IV Piggyback  1000     Total Intake(mL/kg) 120 (1.3) 1610 (17.4)     Net +120 +1610            Urine Occurrence 3 x 4 x     Stool Occurrence 11 x 0 x              Physical Exam  Vitals and nursing note reviewed.   Constitutional:       Appearance: Normal appearance. He is not ill-appearing.   HENT:      Head: Normocephalic.      Mouth/Throat:      Mouth: Mucous membranes are moist.      Pharynx: Oropharynx is clear.   Eyes:      General: No scleral icterus.     Extraocular Movements: Extraocular  movements intact.      Conjunctiva/sclera: Conjunctivae normal.   Cardiovascular:      Rate and Rhythm: Normal rate.      Pulses: Normal pulses.   Pulmonary:      Effort: Pulmonary effort is normal. No respiratory distress.      Breath sounds: No wheezing.   Abdominal:      General: Bowel sounds are normal. There is distension.      Palpations: Abdomen is soft.      Tenderness: There is abdominal tenderness.      Comments: Soft, mildly distended abdomen. Mild tenderness to palpation in the epigastric region but no significant signs of peritonitis. Incisions are well-healed.   Musculoskeletal:         General: No swelling or tenderness. Normal range of motion.      Cervical back: Normal range of motion.   Skin:     General: Skin is warm and dry.      Coloration: Skin is not jaundiced or pale.   Neurological:      General: No focal deficit present.      Mental Status: He is alert and oriented to person, place, and time.   Psychiatric:         Mood and Affect: Mood normal.          Significant Labs:  I have reviewed all pertinent lab results within the past 24 hours.  CBC:   Recent Labs   Lab 07/04/24  0502   WBC 7.78   RBC 3.86*   HGB 11.3*   HCT 36.1*      MCV 94   MCH 29.3   MCHC 31.3*     CMP:   Recent Labs   Lab 07/04/24  0502   *   CALCIUM 9.0   ALBUMIN 2.8*   PROT 6.0      K 3.9   CO2 29      BUN 8   CREATININE 0.9   ALKPHOS 91   ALT 32   AST 14   BILITOT 1.0       Significant Diagnostics:  I have reviewed all pertinent imaging results/findings within the past 24 hours.  Assessment/Plan:     * Acute on chronic pancreatitis  59 year old male with a past medical history of HTN, HLD, BRAIN, GERD, and recurrent autoimmune pancreatitis now s/p Puestow procedure in November of 2021. Has done quite well over the past three years but has had some mild episodes of pancreatitis not requiring hospitalization. Most recently has required hospitalization x2 for acute episodes of worsening abdominal  pain, nausea, emesis, and elevated T Bili which approached 10 at one point. Surgical Oncology consulted for evaluation.    - Had a long discussion with Mr. Zamorano regarding his symptoms/pathology/and prognosis. Overall he looks quite good. Tolerating most foods and seems to be doing well nutritionally. These episodes seem to be unprovoked and I am unsure of what may have triggered his new onset symptoms.   - Definitive surgery for this condition would likely require total pancreatectomy (and also likely partial colectomy, splenectomy, partial gastrectomy, and small bowel resection) which comes with both significant acute and chronic issues.   - Patient would like to refrain from surgical intervention at this time which I agree with  - Recommend discharge. No surgical or procedural intervention warranted at this time. Patient is doing well        Nicho Sy MD  General Surgery  [facility]

## 2024-07-04 NOTE — ASSESSMENT & PLAN NOTE
59 year old male with a past medical history of HTN, HLD, BRAIN, GERD, and recurrent autoimmune pancreatitis now s/p Puestow procedure in November of 2021. Has done quite well over the past three years but has had some mild episodes of pancreatitis not requiring hospitalization. Most recently has required hospitalization x2 for acute episodes of worsening abdominal pain, nausea, emesis, and elevated T Bili which approached 10 at one point. Surgical Oncology consulted for evaluation.    - Had a long discussion with Mr. Zamorano regarding his symptoms/pathology/and prognosis. Overall he looks quite good. Tolerating most foods and seems to be doing well nutritionally. These episodes seem to be unprovoked and I am unsure of what may have triggered his new onset symptoms.   - Definitive surgery for this condition would likely require total pancreatectomy (and also likely partial colectomy, splenectomy, partial gastrectomy, and small bowel resection) which comes with both significant acute and chronic issues.   - Patient would like to refrain from surgical intervention at this time which I agree with  - Recommend discharge. No surgical or procedural intervention warranted at this time. Patient is doing well

## 2024-07-06 NOTE — DISCHARGE SUMMARY
[facility]  Discharge Summary      Patient Name: Fabrice Zamorano  MRN: [id number]  MONCHO: [id number]  Patient Class: IP- Inpatient  Admission Date: 7/1/2024  Hospital Length of Stay: 2 days  Discharge Date and Time: 7/4/2024  2:24 PM  Attending Physician: No att. providers found   Discharging Provider: Solomon Felix MD  Primary Care Provider: Rebecca Mcmillan MD  [facility]  Solomon Felix MD  Primary Care Team: [facility]    HPI:   This is a 59-year-old male with a history of hereditary pancreatitis, DVT, BRAIN, HTN who presents with recurrent abdominal pain.  Has had numerous admissions over the last year, most recently admitted 06/21-6/25/24.  States that since discharge he has been fatigued although had slightly improved during the ensuing days.  However abdominal pain has persisted.  Describes sharp epigastric abdominal pain which is constant, occasionally radiating to his lower back.  This a.m., he woke up with stabbing like sensation of his lower back which prompted him to go to ED for further evaluation.  States that appetite has been poor.  Last BM was 2 days ago.  Also endorses weight loss.  Denies any fevers, nausea, vomiting    ED course:  Upon arrival, was afebrile and hemodynamically stable.  Lab work significant for new leukocytosis 18 K, mild lipase elevation 95.  CTA/P obtained which noted findings suggestive of ongoing acute pancreatitis.  Also noted new organized appearing fluid collection dorsal to the pancreas with additional new lobulated collection anterior inferior to the distal pancreatic body, possibly communicating. AES consulted    Procedure(s) (LRB):  COLONOSCOPY (N/A)      Hospital Course:   CT A/P noting findings suggestive of ongoing acute pancreatitis, discrete peripancreatic fluid collections that are possibly communicating.  AES consulted, planning for colonoscopy.  Surgical oncology consulted to discuss candidacy  for Alejandro procedure vs a completion total pancreatectomy/Whipple. Per surgery, 'Definitive surgery for this condition would likely require total pancreatectomy (and also likely partial colectomy, splenectomy, partial gastrectomy, and small bowel resection) which comes with both significant acute and chronic issues.' Patient and surgery team decided on conservative management, especially given improvement of symptoms. Colonoscopy completed by GI.       Findings on colonoscopy:                         - Preparation of the colon was inadequate.                          - Stool in the entire examined colon.                          - One 4 mm polyp at the hepatic flexure, removed                          with a cold snare. Resected and retrieved.                          - An area of significantly congested and                          erythematous mucosa was found at the splenic                          flexure around 45-50cm from anal verge.                          Significant erythema noted. A clear distal                          attachment cap was used on the scope to better                          elucidate mucosa underneath/between folds. Unable                          to localize an overt fistulous process in this                          region given the significant inflammatory                          findings. This area was biopsied with a cold                          forceps for histology. For location marking in                          case further imaging is considered, one hemostatic                          clip was successfully placed (MR conditional) just                          distal to inflammation at around 45cm from anal                          verge.                          - Diverticulosis in the left colon.                          - One 3 mm polyp in the sigmoid colon, removed                          with a cold snare. Resected and retrieved.        Outpatient GI follow up for  pathology results with repeat colonoscopy date to be determined after pending pathology results are reviewed for surveillance based on pathology results.       Patient deemed appropriate for discharge. I personally saw, examined, and evaluated patient prior to departure. Plan discussed with patient, who was agreeable and amenable; medications were discussed and reviewed, outpatient follow-up scheduled, ER precautions were given, all questions were answered to the patient's satisfaction, and Fabrice Zamorano was subsequently discharged.     Goals of Care Treatment Preferences:  Code Status: Full Code      Consults:   Consults (From admission, onward)          Status Ordering Provider     Inpatient consult to Surgical Oncology  Once        Provider:  Brian Hills MD    Completed YOVANA QUEZADA     Inpatient consult to [facility] ([facility])  Once        Provider:  (Not yet assigned)    Completed OPHELIA GRAYSON            GI  * Acute on chronic pancreatitis  -history of hereditary pancreatitis complicated by frequent admissions, most recently admitted 06/21-6/25/24  -Lipase minor elevation however diagnosis consistent w acute pancreatitis given typical associated symptoms/positive imaging  -CT A/P noting ongoing acute pancreatitis and also possible communicating fluid collection per below  Plan:    -AES consulted, planning for colonoscopy today  -Surgical oncology consulted: 'Definitive surgery for this condition would likely require total pancreatectomy (and also likely partial colectomy, splenectomy, partial gastrectomy, and small bowel resection) which comes with both significant acute and chronic issues.'  -continue multimodal pain control similar to previous hospitalization  -aggressive mIVF  -Advance diet as tolerated  -cont pancreatic enzymes        Final Active Diagnoses:    Diagnosis Date Noted POA    PRINCIPAL PROBLEM:  Acute on chronic pancreatitis [K85.90, K86.1] 10/26/2021 Yes    Acute  pancreatic fluid collection [K86.89] 07/01/2024 Unknown    History of DVT (deep vein thrombosis) [Z86.718] 10/26/2021 Not Applicable    GERD (gastroesophageal reflux disease) [K21.9] 10/26/2021 Yes    Hypertension [I10]  Yes      Problems Resolved During this Admission:       Discharged Condition: good    Disposition: Home or Self Care    Follow Up:    Patient Instructions:      Diet Adult Regular     Notify your health care provider if you experience any of the following:  increased confusion or weakness     Notify your health care provider if you experience any of the following:  persistent dizziness, light-headedness, or visual disturbances     Notify your health care provider if you experience any of the following:  worsening rash     Notify your health care provider if you experience any of the following:  severe persistent headache     Notify your health care provider if you experience any of the following:  difficulty breathing or increased cough     Notify your health care provider if you experience any of the following:  redness, tenderness, or signs of infection (pain, swelling, redness, odor or green/yellow discharge around incision site)     Notify your health care provider if you experience any of the following:  severe uncontrolled pain     Notify your health care provider if you experience any of the following:  temperature >100.4     Notify your health care provider if you experience any of the following:  persistent nausea and vomiting or diarrhea     Activity as tolerated       Significant Diagnostic Studies: Labs: All labs within the past 24 hours have been reviewed    Pending Diagnostic Studies:       Procedure Component Value Units Date/Time    Specimen to Pathology, Surgery Gastrointestinal tract [[id number]] Collected: 07/03/24 1447    Order Status: Sent Lab Status: In process Updated: 07/03/24 1609    Specimen: Tissue            Medications:  Reconciled Home Medications:      Medication List         CONTINUE taking these medications      carvediloL 6.25 MG tablet  Commonly known as: COREG  Take 3.125 mg by mouth 2 (two) times daily with meals.     ibuprofen 800 MG tablet  Commonly known as: ADVIL,MOTRIN  Take 800 mg by mouth every 6 (six) hours as needed.     lipase-protease-amylase 36,000-114,000- 180,000 unit Cpdr  Commonly known as: CREON  Take 1 capsule by mouth 3 (three) times daily with meals.     metFORMIN 500 MG ER 24hr tablet  Commonly known as: GLUCOPHAGE-XR  Take 500 mg by mouth once daily.     multivitamin per tablet  Commonly known as: THERAGRAN  Take 1 tablet by mouth once daily.     ondansetron 4 MG Tbdl  Commonly known as: ZOFRAN-ODT  Take 1 tablet (4 mg total) by mouth every 8 (eight) hours as needed (Nausea).     oxyCODONE-acetaminophen  mg per tablet  Commonly known as: PERCOCET  Take 1 tablet by mouth every 4 (four) hours as needed for Pain.     pantoprazole 40 MG tablet  Commonly known as: PROTONIX  TAKE 1 TABLET(40 MG) BY MOUTH EVERY DAY     pregabalin 75 MG capsule  Commonly known as: LYRICA  Take 2 capsules (150 mg total) by mouth every evening. May also take 1 capsule (75 mg total) daily as needed.              Indwelling Lines/Drains at time of discharge:   Lines/Drains/Airways       None                   Time spent on the discharge of patient: 35 minutes         Solomon Felix MD  Department of Hospital Medicine  [facility]

## 2024-07-08 LAB
FINAL PATHOLOGIC DIAGNOSIS: NORMAL
GROSS: NORMAL
Lab: NORMAL

## 2024-07-09 ENCOUNTER — TELEPHONE (OUTPATIENT)
Dept: GASTROENTEROLOGY | Facility: CLINIC | Age: 59
End: 2024-07-09
Payer: MEDICARE

## 2024-07-09 NOTE — TELEPHONE ENCOUNTER
----- Message from Kwadwo Gonsalez MD sent at 7/8/2024  3:27 PM CDT -----  Herbert Pond, nitza following up with the patient about these path results?    Thanks  ----- Message -----  From: [facility]  Sent: 7/8/2024   1:31 PM CDT  To: Kwadwo Gonsalez MD

## 2024-07-09 NOTE — TELEPHONE ENCOUNTER
Called patient with results.     I informed him that his colon polyp was an adenoma but benign. Since prep was inadequate, would recommend he have his next colonoscopy in 3 years.     I also told him that his splenic flexure biopsy probably reflected ischemic colitis (we did not visualize any fistula). Advised him to hydrate well and keep an eye on his blood pressure.     Has a follow up with Dr. JIAN Chisholm on 7/11/24. He thanked me for my call.      Tariq Magaña MD  PGY-VI, GI/Hepatology

## 2024-07-11 ENCOUNTER — TELEPHONE (OUTPATIENT)
Dept: GASTROENTEROLOGY | Facility: CLINIC | Age: 59
End: 2024-07-11
Payer: MEDICARE

## 2024-07-11 ENCOUNTER — OFFICE VISIT (OUTPATIENT)
Dept: GASTROENTEROLOGY | Facility: CLINIC | Age: 59
End: 2024-07-11
Payer: MEDICARE

## 2024-07-11 DIAGNOSIS — K86.1 CHRONIC PANCREATITIS, UNSPECIFIED PANCREATITIS TYPE: ICD-10-CM

## 2024-07-11 NOTE — PROGRESS NOTES
The patient location is:  [place]  The chief complaint leading to consultation is:  Follow-up of acute on chronic pancreatitis    Visit type: audiovisual    Face to Face time with patient: 15  25 minutes of total time spent on the encounter, which includes face to face time and non-face to face time preparing to see the patient (eg, review of tests), Obtaining and/or reviewing separately obtained history, Documenting clinical information in the electronic or other health record, Independently interpreting results (not separately reported) and communicating results to the patient/family/caregiver, or Care coordination (not separately reported).         Each patient to whom he or she provides medical services by telemedicine is:  (1) informed of the relationship between the physician and patient and the respective role of any other health care provider with respect to management of the patient; and (2) notified that he or she may decline to receive medical services by telemedicine and may withdraw from such care at any time.    Notes:       [facility] / Pancreatic[facility]iliary Service    Reason for visit (Chief Complaint):  Follow-up of acute on chronic pancreatitis    Referring provider/PCP: Solomon Felix MD  [address] [place] [id number]    History of Present Illness: Patient presents for follow-up after recent hospital discharge.  He is known to me from outpatient care.  Please see my note from October 20, 2023 for more details.  He has hereditary pancreatitis.  He was recently hospitalized for an exacerbation of acute pancreatitis.  His pancreatitis was complicated with some peripancreatic fluid collections as well as some biliary obstruction.  The biliary obstruction improved without any procedural intervention.  He feels well since his hospital discharge.  He denies abdominal pain.    Physical Exam:  General: Well-developed, well-appearing, no acute distress      Laboratory:        Imaging:  Reviewed CT images obtained in the hospital from June 22 and July 1.    Assessment/Plan:  Acute on chronic pancreatitis- acute flare of his underlying chronic pancreatitis appears to have improved.  I will plan for a repeat CT scan and labs in about 3 months.        Shawn Chisholm MD, HAIR   - Department of Gastroenterology  [facility] [place]

## 2024-07-11 NOTE — TELEPHONE ENCOUNTER
Message left for patient to return my call regarding scheduling lab work and CT scan.  Patient lives in [place].  Orders are in.   Roselyn

## 2024-07-15 ENCOUNTER — TELEPHONE (OUTPATIENT)
Dept: GASTROENTEROLOGY | Facility: CLINIC | Age: 59
End: 2024-07-15
Payer: MEDICARE

## 2024-07-15 NOTE — TELEPHONE ENCOUNTER
Spoke with patient.  Needing to schedule labs/CT.  Patient stated he is traveling today and asked that I call him back tomorrow.   Roselyn

## 2024-07-16 ENCOUNTER — TELEPHONE (OUTPATIENT)
Dept: GASTROENTEROLOGY | Facility: CLINIC | Age: 59
End: 2024-07-16
Payer: MEDICARE

## 2024-07-16 NOTE — TELEPHONE ENCOUNTER
Spoke with lab at [facility].  Was told patient did not need a scheduled appointment to have blood drawn as long as there is an order.  Patient scheduled for CT on 9/16 at 8:30 and will need CMP prior.  Order placed.   Roselyn

## 2024-09-13 DIAGNOSIS — K86.1 CHRONIC PANCREATITIS, UNSPECIFIED PANCREATITIS TYPE: ICD-10-CM

## 2024-09-13 RX ORDER — PREGABALIN 75 MG/1
CAPSULE ORAL
Qty: 90 CAPSULE | Refills: 0 | Status: SHIPPED | OUTPATIENT
Start: 2024-09-13

## 2024-09-16 ENCOUNTER — HOSPITAL ENCOUNTER (OUTPATIENT)
Dept: RADIOLOGY | Facility: HOSPITAL | Age: 59
Discharge: HOME | End: 2024-09-16
Attending: INTERNAL MEDICINE
Payer: MEDICARE

## 2024-09-16 DIAGNOSIS — K86.1 CHRONIC PANCREATITIS, UNSPECIFIED PANCREATITIS TYPE: ICD-10-CM

## 2024-09-16 PROCEDURE — 74160 CT ABDOMEN W/CONTRAST: CPT | Mod: TC

## 2024-09-16 PROCEDURE — 74160 CT ABDOMEN W/CONTRAST: CPT | Mod: 26,,, | Performed by: RADIOLOGY

## 2024-09-16 PROCEDURE — 25500020 PHARM REV CODE 255: Performed by: INTERNAL MEDICINE

## 2024-09-16 RX ADMIN — IOHEXOL 100 ML: 350 INJECTION, SOLUTION INTRAVENOUS at 08:09

## 2024-09-20 ENCOUNTER — TELEPHONE (OUTPATIENT)
Dept: ENDOSCOPY | Facility: HOSPITAL | Age: 59
End: 2024-09-20
Payer: MEDICARE

## 2024-09-20 DIAGNOSIS — R93.89 ABNORMAL FINDING ON IMAGING: Primary | ICD-10-CM

## 2024-10-10 ENCOUNTER — PATIENT MESSAGE (OUTPATIENT)
Dept: GASTROENTEROLOGY | Facility: CLINIC | Age: 59
End: 2024-10-10
Payer: MEDICARE

## 2024-10-19 ENCOUNTER — HOSPITAL ENCOUNTER (INPATIENT)
Facility: HOSPITAL | Age: 59
LOS: 1 days | Discharge: HOME | DRG: 871 | End: 2024-10-20
Attending: EMERGENCY MEDICINE | Admitting: HOSPITALIST
Payer: MEDICARE

## 2024-10-19 DIAGNOSIS — J18.9 PNEUMONIA OF LEFT LOWER LOBE DUE TO INFECTIOUS ORGANISM: Primary | ICD-10-CM

## 2024-10-19 DIAGNOSIS — A41.9 SEPSIS: ICD-10-CM

## 2024-10-19 LAB
ALBUMIN SERPL BCP-MCNC: 3.5 G/DL (ref 3.5–5.2)
ALP SERPL-CCNC: 240 U/L (ref 40–150)
ALT SERPL W/O P-5'-P-CCNC: 132 U/L (ref 10–44)
ANION GAP SERPL CALC-SCNC: 15 MMOL/L (ref 8–16)
AST SERPL-CCNC: 160 U/L (ref 10–40)
BASOPHILS # BLD AUTO: 0.04 K/UL (ref 0–0.2)
BASOPHILS NFR BLD: 0.2 % (ref 0–1.9)
BILIRUB SERPL-MCNC: 1.1 MG/DL (ref 0.1–1)
BILIRUB UR QL STRIP: NEGATIVE
BUN SERPL-MCNC: 10 MG/DL (ref 6–20)
CALCIUM SERPL-MCNC: 9.4 MG/DL (ref 8.7–10.5)
CHLORIDE SERPL-SCNC: 97 MMOL/L (ref 95–110)
CLARITY UR: CLEAR
CO2 SERPL-SCNC: 21 MMOL/L (ref 23–29)
COLOR UR: YELLOW
CREAT SERPL-MCNC: 1.2 MG/DL (ref 0.5–1.4)
DIFFERENTIAL METHOD BLD: ABNORMAL
EOSINOPHIL # BLD AUTO: 0 K/UL (ref 0–0.5)
EOSINOPHIL NFR BLD: 0 % (ref 0–8)
ERYTHROCYTE [DISTWIDTH] IN BLOOD BY AUTOMATED COUNT: 15.8 % (ref 11.5–14.5)
EST. GFR  (NO RACE VARIABLE): >60 ML/MIN/1.73 M^2
GLUCOSE SERPL-MCNC: 293 MG/DL (ref 70–110)
GLUCOSE UR QL STRIP: NEGATIVE
HCT VFR BLD AUTO: 42.7 % (ref 40–54)
HGB BLD-MCNC: 13.6 G/DL (ref 14–18)
HGB UR QL STRIP: NEGATIVE
IMM GRANULOCYTES # BLD AUTO: 0.15 K/UL (ref 0–0.04)
IMM GRANULOCYTES NFR BLD AUTO: 0.9 % (ref 0–0.5)
INFLUENZA A, MOLECULAR: NEGATIVE
INFLUENZA B, MOLECULAR: NEGATIVE
KETONES UR QL STRIP: NEGATIVE
LACTATE SERPL-SCNC: 2.9 MMOL/L (ref 0.5–2.2)
LACTATE SERPL-SCNC: 4 MMOL/L (ref 0.5–2.2)
LEUKOCYTE ESTERASE UR QL STRIP: NEGATIVE
LYMPHOCYTES # BLD AUTO: 0.4 K/UL (ref 1–4.8)
LYMPHOCYTES NFR BLD: 2.3 % (ref 18–48)
MCH RBC QN AUTO: 27.2 PG (ref 27–31)
MCHC RBC AUTO-ENTMCNC: 31.9 G/DL (ref 32–36)
MCV RBC AUTO: 85 FL (ref 82–98)
MONOCYTES # BLD AUTO: 1 K/UL (ref 0.3–1)
MONOCYTES NFR BLD: 5.8 % (ref 4–15)
NEUTROPHILS # BLD AUTO: 15.4 K/UL (ref 1.8–7.7)
NEUTROPHILS NFR BLD: 90.8 % (ref 38–73)
NITRITE UR QL STRIP: NEGATIVE
NRBC BLD-RTO: 0 /100 WBC
PH UR STRIP: 8 [PH] (ref 5–8)
PLATELET # BLD AUTO: 284 K/UL (ref 150–450)
PMV BLD AUTO: 8.7 FL (ref 9.2–12.9)
POTASSIUM SERPL-SCNC: 4.2 MMOL/L (ref 3.5–5.1)
PROCALCITONIN SERPL IA-MCNC: 1.43 NG/ML
PROT SERPL-MCNC: 6.9 G/DL (ref 6–8.4)
PROT UR QL STRIP: NEGATIVE
RBC # BLD AUTO: 5 M/UL (ref 4.6–6.2)
SARS-COV-2 RDRP RESP QL NAA+PROBE: NEGATIVE
SODIUM SERPL-SCNC: 133 MMOL/L (ref 136–145)
SP GR UR STRIP: 1 (ref 1–1.03)
SPECIMEN SOURCE: NORMAL
URN SPEC COLLECT METH UR: NORMAL
UROBILINOGEN UR STRIP-ACNC: NEGATIVE EU/DL
WBC # BLD AUTO: 16.98 K/UL (ref 3.9–12.7)

## 2024-10-19 PROCEDURE — 87635 SARS-COV-2 COVID-19 AMP PRB: CPT | Performed by: EMERGENCY MEDICINE

## 2024-10-19 PROCEDURE — 25000003 PHARM REV CODE 250: Performed by: NURSE PRACTITIONER

## 2024-10-19 PROCEDURE — 83605 ASSAY OF LACTIC ACID: CPT | Performed by: EMERGENCY MEDICINE

## 2024-10-19 PROCEDURE — 81003 URINALYSIS AUTO W/O SCOPE: CPT | Performed by: EMERGENCY MEDICINE

## 2024-10-19 PROCEDURE — 93005 ELECTROCARDIOGRAM TRACING: CPT

## 2024-10-19 PROCEDURE — 25000003 PHARM REV CODE 250: Performed by: EMERGENCY MEDICINE

## 2024-10-19 PROCEDURE — 87040 BLOOD CULTURE FOR BACTERIA: CPT | Performed by: EMERGENCY MEDICINE

## 2024-10-19 PROCEDURE — 80053 COMPREHEN METABOLIC PANEL: CPT | Performed by: EMERGENCY MEDICINE

## 2024-10-19 PROCEDURE — 63600175 PHARM REV CODE 636 W HCPCS: Performed by: NURSE PRACTITIONER

## 2024-10-19 PROCEDURE — 96361 HYDRATE IV INFUSION ADD-ON: CPT

## 2024-10-19 PROCEDURE — 85025 COMPLETE CBC W/AUTO DIFF WBC: CPT | Performed by: EMERGENCY MEDICINE

## 2024-10-19 PROCEDURE — 21400001 HC TELEMETRY ROOM

## 2024-10-19 PROCEDURE — 99285 EMERGENCY DEPT VISIT HI MDM: CPT | Mod: 25

## 2024-10-19 PROCEDURE — 36415 COLL VENOUS BLD VENIPUNCTURE: CPT | Performed by: EMERGENCY MEDICINE

## 2024-10-19 PROCEDURE — 84145 PROCALCITONIN (PCT): CPT | Performed by: EMERGENCY MEDICINE

## 2024-10-19 PROCEDURE — 87502 INFLUENZA DNA AMP PROBE: CPT

## 2024-10-19 PROCEDURE — 93010 ELECTROCARDIOGRAM REPORT: CPT | Mod: ,,, | Performed by: INTERNAL MEDICINE

## 2024-10-19 PROCEDURE — 63600175 PHARM REV CODE 636 W HCPCS: Performed by: EMERGENCY MEDICINE

## 2024-10-19 PROCEDURE — 87502 INFLUENZA DNA AMP PROBE: CPT | Performed by: EMERGENCY MEDICINE

## 2024-10-19 PROCEDURE — 96374 THER/PROPH/DIAG INJ IV PUSH: CPT

## 2024-10-19 RX ORDER — OXYCODONE AND ACETAMINOPHEN 10; 325 MG/1; MG/1
1 TABLET ORAL EVERY 4 HOURS PRN
Status: DISCONTINUED | OUTPATIENT
Start: 2024-10-19 | End: 2024-10-20 | Stop reason: HOSPADM

## 2024-10-19 RX ORDER — POLYETHYLENE GLYCOL 3350 17 G/17G
17 POWDER, FOR SOLUTION ORAL DAILY
Status: DISCONTINUED | OUTPATIENT
Start: 2024-10-19 | End: 2024-10-20 | Stop reason: HOSPADM

## 2024-10-19 RX ORDER — CARVEDILOL 3.12 MG/1
3.12 TABLET ORAL 2 TIMES DAILY WITH MEALS
Status: DISCONTINUED | OUTPATIENT
Start: 2024-10-19 | End: 2024-10-20 | Stop reason: HOSPADM

## 2024-10-19 RX ORDER — ONDANSETRON HYDROCHLORIDE 2 MG/ML
4 INJECTION, SOLUTION INTRAVENOUS EVERY 8 HOURS PRN
Status: DISCONTINUED | OUTPATIENT
Start: 2024-10-19 | End: 2024-10-20 | Stop reason: HOSPADM

## 2024-10-19 RX ORDER — SODIUM CHLORIDE 0.9 % (FLUSH) 0.9 %
10 SYRINGE (ML) INJECTION EVERY 12 HOURS PRN
Status: DISCONTINUED | OUTPATIENT
Start: 2024-10-19 | End: 2024-10-20 | Stop reason: HOSPADM

## 2024-10-19 RX ORDER — CEFEPIME HYDROCHLORIDE 2 G/1
2 INJECTION, POWDER, FOR SOLUTION INTRAVENOUS
Status: COMPLETED | OUTPATIENT
Start: 2024-10-19 | End: 2024-10-19

## 2024-10-19 RX ORDER — PANTOPRAZOLE SODIUM 40 MG/1
40 TABLET, DELAYED RELEASE ORAL DAILY
Status: DISCONTINUED | OUTPATIENT
Start: 2024-10-20 | End: 2024-10-20 | Stop reason: HOSPADM

## 2024-10-19 RX ORDER — CEFTRIAXONE 2 G/1
2 INJECTION, POWDER, FOR SOLUTION INTRAMUSCULAR; INTRAVENOUS
Status: DISCONTINUED | OUTPATIENT
Start: 2024-10-19 | End: 2024-10-20

## 2024-10-19 RX ORDER — IPRATROPIUM BROMIDE AND ALBUTEROL SULFATE 2.5; .5 MG/3ML; MG/3ML
3 SOLUTION RESPIRATORY (INHALATION) EVERY 6 HOURS PRN
Status: DISCONTINUED | OUTPATIENT
Start: 2024-10-19 | End: 2024-10-20 | Stop reason: HOSPADM

## 2024-10-19 RX ORDER — IBUPROFEN 400 MG/1
400 TABLET ORAL EVERY 6 HOURS PRN
Status: DISCONTINUED | OUTPATIENT
Start: 2024-10-19 | End: 2024-10-20 | Stop reason: HOSPADM

## 2024-10-19 RX ADMIN — CARVEDILOL 3.12 MG: 3.12 TABLET, FILM COATED ORAL at 04:10

## 2024-10-19 RX ADMIN — CEFTRIAXONE 2 G: 2 INJECTION, POWDER, FOR SOLUTION INTRAMUSCULAR; INTRAVENOUS at 03:10

## 2024-10-19 RX ADMIN — CEFEPIME 2 G: 2 INJECTION, POWDER, FOR SOLUTION INTRAVENOUS at 01:10

## 2024-10-19 RX ADMIN — SODIUM CHLORIDE 2739 ML: 9 INJECTION, SOLUTION INTRAVENOUS at 01:10

## 2024-10-19 RX ADMIN — ONDANSETRON 4 MG: 2 INJECTION INTRAMUSCULAR; INTRAVENOUS at 04:10

## 2024-10-19 RX ADMIN — PANCRELIPASE 3 CAPSULE: 60000; 12000; 38000 CAPSULE, DELAYED RELEASE PELLETS ORAL at 05:10

## 2024-10-19 RX ADMIN — AZITHROMYCIN MONOHYDRATE 500 MG: 500 INJECTION, POWDER, LYOPHILIZED, FOR SOLUTION INTRAVENOUS at 04:10

## 2024-10-19 NOTE — HPI
Fabrice Zamorano is a 59 y.o. male patient with a PMHx of GERD, HTN, hypothyroidism, fatty liver, and splenic vein thrombosis who presents to the Emergency Department for evaluation of fever (103F) which onset gradually a few hours ago. Pt's wife notes pt recently had a biliary stent placed on the 30th and was recently hospitalized in [place] for an infection. Associated sxs include nausea, vomiting, diarrhea, abdominal pain (at baseline), appetite changes, and dry cough. Patient denies any dysuria, rhinorrhea, sore throat, HA, and all other sxs at this time.     ED workup showed WBCs of 16.98, glucose of 293, bilirubin 1.1, , , Lactic acid of 4, Procalcitonin 1.43.  Has a right lower lobe pneumonia. Patient has received sepsis fluids as well as antibiotics in the ED. Patient admitted for sepsis likely due to PNA under the care of hospital medicine.

## 2024-10-19 NOTE — ASSESSMENT & PLAN NOTE
Patients blood pressure range in the last 24 hours was: BP  Min: 115/71  Max: 126/75.The patient's inpatient anti-hypertensive regimen is listed below:  Current Antihypertensives  carvediloL tablet 3.125 mg, 2 times daily with meals, Oral    Plan  - BP is controlled, no changes needed to their regimen

## 2024-10-19 NOTE — ASSESSMENT & PLAN NOTE
Previous LFTS normal now increasing   CT scan Biliary stent in good position. Associated pneumobilia. No abnormal focal fluid collections. No acute bowel abnormalities.   If continue to increase consult hepatology.

## 2024-10-19 NOTE — ASSESSMENT & PLAN NOTE
Anemia is likely due to Iron deficiency. Most recent hemoglobin and hematocrit are listed below.  Recent Labs     10/19/24  1254   HGB 13.6*   HCT 42.7     Plan  - Monitor serial CBC: Daily  - Transfuse PRBC if patient becomes hemodynamically unstable, symptomatic or H/H drops below 7/21.  - Patient has not received any PRBC transfusions to date  - Patient's anemia is currently stable

## 2024-10-19 NOTE — H&P
[facility] Medicine  History & Physical    Patient Name: Fabrice Zamorano  MRN: [id number]  Patient Class: IP- Inpatient  Admission Date: 10/19/2024  Attending Physician: Eriberto Vazquez MD   Primary Care Provider: Rebecca Mcmillan MD         Patient information was obtained from patient, spouse/SO, and ER records.     Subjective:     Principal Problem:Sepsis    Chief Complaint:   Chief Complaint   Patient presents with    Fever     Pt c/o fever of 103 that started a few hours ago that led to N/V. Pt reports slight abd pain. Pt reported taking tylenol and Advil prior to arrival. HX: pancreatitis        HPI: Fabrice Zamorano is a 59 y.o. male patient with a PMHx of GERD, HTN, hypothyroidism, fatty liver, and splenic vein thrombosis who presents to the Emergency Department for evaluation of fever (103F) which onset gradually a few hours ago. Pt's wife notes pt recently had a biliary stent placed on the 30th and was recently hospitalized in [place] for an infection. Associated sxs include nausea, vomiting, diarrhea, abdominal pain (at baseline), appetite changes, and dry cough. Patient denies any dysuria, rhinorrhea, sore throat, HA, and all other sxs at this time.     ED workup showed WBCs of 16.98, glucose of 293, bilirubin 1.1, , , Lactic acid of 4, Procalcitonin 1.43.  Has a right lower lobe pneumonia. Patient has received sepsis fluids as well as antibiotics in the ED. Patient admitted for sepsis likely due to PNA under the care of hospital medicine.     Past Medical History:   Diagnosis Date    Anticoagulant long-term use     On Eliquis for DVT    GERD (gastroesophageal reflux disease)     Hypertension     Hypothyroidism 10/26/2021    Liver disease     fatty liver    Pancreatic pseudocyst     Personal history of colonic polyps     Sleep apnea     Splenic vein thrombosis        Past Surgical History:   Procedure Laterality Date    CHOLECYSTECTOMY      COLONOSCOPY       COLONOSCOPY N/A 7/3/2024    Procedure: COLONOSCOPY;  Surgeon: Kwadwo Gonsalez MD;  Location: [facility] (2ND FLR);  Service: Endoscopy;  Laterality: N/A;    ENDOSCOPIC ULTRASOUND OF UPPER GASTROINTESTINAL TRACT N/A 10/12/2021    Procedure: ULTRASOUND, UPPER GI TRACT, ENDOSCOPIC;  Surgeon: Odalys Leiva MD;  Location: [facility];  Service: Endoscopy;  Laterality: N/A;  Upper and linear, 22 shark core, cytology and RPMI    ENDOSCOPIC ULTRASOUND OF UPPER GASTROINTESTINAL TRACT N/A 2/24/2023    Procedure: ULTRASOUND, UPPER GI TRACT, ENDOSCOPIC;  Surgeon: Shawn Chisholm MD;  Location: [facility] (2ND FLR);  Service: Endoscopy;  Laterality: N/A;  2/3/23-Instructions via portal-DS    ENDOSCOPIC ULTRASOUND OF UPPER GASTROINTESTINAL TRACT N/A 11/14/2023    Procedure: ULTRASOUND, UPPER GI TRACT, ENDOSCOPIC;  Surgeon: Kwadwo Gonsalez MD;  Location: [facility];  Service: Gastroenterology;  Laterality: N/A;  10/13/23: instructions sent vie portal-GD    ENDOSCOPIC ULTRASOUND OF UPPER GASTROINTESTINAL TRACT N/A 3/12/2024    Procedure: ULTRASOUND, UPPER GI TRACT, ENDOSCOPIC;  Surgeon: Shawn Chisholm MD;  Location: [facility];  Service: Endoscopy;  Laterality: N/A;  2/16/24: inst sent via portal-GD    ERCP N/A 09/29/2021    Procedure: ERCP (ENDOSCOPIC RETROGRADE CHOLANGIOPANCREATOGRAPHY);  Surgeon: Odalys Leiva MD;  Location: [facility];  Service: Endoscopy;  Laterality: N/A;    ERCP N/A 08/15/2022    Procedure: ERCP (ENDOSCOPIC RETROGRADE CHOLANGIOPANCREATOGRAPHY);  Surgeon: Odalys Leiva MD;  Location: [facility];  Service: Endoscopy;  Laterality: N/A;    ERCP N/A 2/24/2023    Procedure: ERCP (ENDOSCOPIC RETROGRADE CHOLANGIOPANCREATOGRAPHY);  Surgeon: Shawn Chisholm MD;  Location: [facility] (2ND [facility]);  Service: Endoscopy;  Laterality: N/A;    ERCP W/ PLASTIC STENT PLACEMENT      LATERAL PANCREATICOJEJUNOSTOMY N/A 11/19/2021    Procedure: PANCREATICOJEJUNOSTOMY, SIDE-TO-SIDE tier 1;  Surgeon: Brian  BE Hills MD;  Location: [facility] OR [address] [place] [address];  Service: General;  Laterality: N/A;       Review of patient's allergies indicates:  No Known Allergies    No current facility-administered medications on file prior to encounter.     Current Outpatient Medications on File Prior to Encounter   Medication Sig    carvediloL (COREG) 6.25 MG tablet Take 3.125 mg by mouth 2 (two) times daily with meals.    ibuprofen (ADVIL,MOTRIN) 800 MG tablet Take 800 mg by mouth every 6 (six) hours as needed.    lipase-protease-amylase (CREON) 36,000-114,000- 180,000 unit CpDR Take 1 capsule by mouth 3 (three) times daily with meals.    metFORMIN (GLUCOPHAGE-XR) 500 MG ER 24hr tablet Take 500 mg by mouth once daily.    multivitamin (THERAGRAN) per tablet Take 1 tablet by mouth once daily.    ondansetron (ZOFRAN-ODT) 4 MG TbDL Take 1 tablet (4 mg total) by mouth every 8 (eight) hours as needed (Nausea).    oxyCODONE-acetaminophen (PERCOCET)  mg per tablet Take 1 tablet by mouth every 4 (four) hours as needed for Pain.    pantoprazole (PROTONIX) 40 MG tablet TAKE 1 TABLET(40 MG) BY MOUTH EVERY DAY    pregabalin (LYRICA) 75 MG capsule TAKE 2 CAPSULES(150 MG) BY MOUTH EVERY EVENING. MAY ALSO TAKE 1 CAPSULE DAILY AS NEEDED     Family History       Problem Relation (Age of Onset)    Arthritis Father    Cancer Father    Pancreatic cancer Father    Thyroid disease Mother, Father          Tobacco Use    Smoking status: Former     Current packs/day: 0.00     Types: Cigarettes     Quit date: 2001     Years since quittin.2    Smokeless tobacco: Never   Substance and Sexual Activity    Alcohol use: Not Currently    Drug use: Never    Sexual activity: Yes     Review of Systems   Constitutional:  Positive for fever.   HENT: Negative.     Eyes: Negative.    Respiratory:  Positive for cough.    Cardiovascular: Negative.    Gastrointestinal:  Positive for abdominal pain (chronic), nausea and vomiting.   Endocrine: Negative.    Genitourinary:  Negative.    Musculoskeletal: Negative.    Skin: Negative.    Allergic/Immunologic: Negative.    Neurological: Negative.    Hematological: Negative.    Psychiatric/Behavioral: Negative.     All other systems reviewed and are negative.    Objective:     Vital Signs (Most Recent):  Temp: 98.9 °F (37.2 °C) (10/19/24 1400)  Pulse: 106 (10/19/24 1400)  Resp: 20 (10/19/24 1400)  BP: 124/73 (10/19/24 1400)  SpO2: 98 % (10/19/24 1400) Vital Signs (24h Range):  Temp:  [98.9 °F (37.2 °C)-99.6 °F (37.6 °C)] 98.9 °F (37.2 °C)  Pulse:  [106-126] 106  Resp:  [18-20] 20  SpO2:  [95 %-98 %] 98 %  BP: (118-126)/(71-75) 124/73     Weight: 91.3 kg (201 lb 4.5 oz)  Body mass index is 28.07 kg/m².     Physical Exam  Vitals and nursing note reviewed.   Constitutional:       General: He is not in acute distress.     Appearance: Normal appearance. He is normal weight. He is ill-appearing.   HENT:      Head: Normocephalic and atraumatic.      Nose: Nose normal.      Mouth/Throat:      Mouth: Mucous membranes are dry.      Pharynx: Oropharynx is clear.   Eyes:      Extraocular Movements: Extraocular movements intact.      Pupils: Pupils are equal, round, and reactive to light.   Cardiovascular:      Rate and Rhythm: Tachycardia present.      Pulses: Normal pulses.      Heart sounds: Normal heart sounds.   Pulmonary:      Effort: Pulmonary effort is normal. No respiratory distress.      Breath sounds: Rhonchi present. No wheezing or rales.      Comments: CONSTANCE rhonchi  Abdominal:      General: Abdomen is flat. Bowel sounds are normal. There is no distension.      Palpations: Abdomen is soft.      Tenderness: There is no abdominal tenderness. There is no guarding.   Skin:     Coloration: Skin is pale.   Neurological:      General: No focal deficit present.      Mental Status: He is alert and oriented to person, place, and time.   Psychiatric:         Mood and Affect: Mood normal.         Behavior: Behavior normal.              CRANIAL NERVES      CN III, IV, VI   Pupils are equal, round, and reactive to light.       Significant Labs: All pertinent labs within the past 24 hours have been reviewed.  Recent Lab Results         10/19/24  1347   10/19/24  1315   10/19/24  1254        Influenza A, Molecular   Negative         Influenza B, Molecular   Negative         Procalcitonin     1.43  Comment: A concentration < 0.25 ng/mL represents a low risk of bacterial   infection.  Procalcitonin may not be accurate among patients with localized   infection, recent trauma or major surgery, immunosuppressed state,   invasive fungal infection, renal dysfunction. Decisions regarding   initiation or continuation of antibiotic therapy should not be based   solely on procalcitonin levels.         Albumin     3.5       ALP     240       ALT     132       Anion Gap     15       Appearance, UA Clear           AST     160       Baso #     0.04       Basophil %     0.2       Bilirubin (UA) Negative           BILIRUBIN TOTAL     1.1  Comment: For infants and newborns, interpretation of results should be based  on gestational age, weight and in agreement with clinical  observations.    Premature Infant recommended reference ranges:  Up to 24 hours.............<8.0 mg/dL  Up to 48 hours............<12.0 mg/dL  3-5 days..................<15.0 mg/dL  6-29 days.................<15.0 mg/dL         BUN     10       Calcium     9.4       Chloride     97       CO2     21       Color, UA Yellow           Creatinine     1.2       Differential Method     Automated       eGFR     >60       Eos #     0.0       Eos %     0.0       Flu A & B Source   Nasal swab         Glucose     293       Glucose, UA Negative           Gran # (ANC)     15.4       Gran %     90.8       Hematocrit     42.7       Hemoglobin     13.6       Immature Grans (Abs)     0.15  Comment: Mild elevation in immature granulocytes is non specific and   can be seen in a variety of conditions including stress response,   acute  inflammation, trauma and pregnancy. Correlation with other   laboratory and clinical findings is essential.         Immature Granulocytes     0.9       Ketones, UA Negative           Lactic Acid Level     4.0  Comment: Falsely low lactic acid results can be found in samples   containing >=13.0 mg/dL total bilirubin and/or >=3.5 mg/dL   direct bilirubin.  LA critical result(s) called and verbal readback obtained from Guadalupe Reyes RN by NANCY 10/19/2024 13:57         Leukocyte Esterase, UA Negative           Lymph #     0.4       Lymph %     2.3       MCH     27.2       MCHC     31.9       MCV     85       Mono #     1.0       Mono %     5.8       MPV     8.7       NITRITE UA Negative           nRBC     0       Blood, UA Negative           pH, UA 8.0           Platelet Count     284       Potassium     4.2       PROTEIN TOTAL     6.9       Protein, UA Negative  Comment: Recommend a 24 hour urine protein or a urine   protein/creatinine ratio if globulin induced proteinuria is  clinically suspected.             RBC     5.00       RDW     15.8       SARS-CoV-2 RNA, Amplification, Qual   Negative  Comment: This test utilizes isothermal nucleic acid amplification technology   to   detect the SARS-CoV-2 RdRp nucleic acid segment. The analytical   sensitivity   (limit of detection) is 500 copies/swab.    A POSITIVE result is indicative of the presence of SARS-CoV-2 RNA;   clinical   correlation with patient history and other diagnostic information is   necessary to determine patient infection status.    A NEGATIVE result means that SARS-CoV-2 nucleic acids are not present   above   the limit of detection. A NEGATIVE result should be treated as   presumptive.   It does not rule out the possibility of COVID-19 and should not be   the sole   basis for treatment decisions.    If COVID-19 is strongly suspected based on clinical and exposure   history,   re-testing using an alternate molecular assay should be considered.    This  test is Food and Drug Administration (FDA) approved. Performance   characteristics of this has been independently verified by [facility] Department of Pathology and Laboratory Medicine.           Sodium     133       Spec Grav UA 1.005           Specimen UA Urine, Clean Catch           UROBILINOGEN UA Negative           WBC     16.98               Significant Imaging: I have reviewed all pertinent imaging results/findings within the past 24 hours.    CT Abdomen Pelvis  Without Contrast   Final Result      Biliary stent in good position.  Associated pneumobilia.  No abnormal focal fluid collections.  No acute bowel abnormalities.      All CT scans at this facility use dose modulation, iterative reconstructions, and/or weight base dosing when appropriate to reduce radiation dose to as low as reasonably achievable         Electronically signed by: Camille Sanchez MD   Date:    10/19/2024   Time:    14:26      X-Ray Chest AP Portable   Final Result      Slight increased density left costophrenic angle possibly related to low-grade infiltrate.         Electronically signed by: Camille Sanchez MD   Date:    10/19/2024   Time:    13:13          Assessment/Plan:     * Sepsis  This patient does have evidence of infective focus  My overall impression is sepsis.  Source: Respiratory  Antibiotics given-   Antibiotics (72h ago, onward)      Start     Stop Route Frequency Ordered    10/19/24 1600  azithromycin (ZITHROMAX) 500 mg in D5W 250 mL  IVPB (admixture device)  (Community Acquired Pneumonia (CAP) - Low MDR Risk)         10/24/24 1559 IV Every 24 hours (non-standard times) 10/19/24 1455    10/19/24 1530  cefTRIAXone injection 2 g  (Community Acquired Pneumonia (CAP) - Low MDR Risk)         10/25/24 1529 IV Every 24 hours (non-standard times) 10/19/24 1455          Latest lactate reviewed-  Recent Labs   Lab 10/19/24  1254   LACTATE 4.0*     Organ dysfunction indicated by  elevated lactic acid.     Fluid  challenge Actual Body weight- Patient will receive 30ml/kg actual body weight to calculate fluid bolus for treatment of septic shock.     Post- resuscitation assessment No - Post resuscitation assessment not needed       Will Not start Pressors- Levophed for MAP of 65  Source control achieved by: azithromycin and ceftriaxone     Pneumonia  Patient has a diagnosis of pneumonia. The cause of the pneumonia is suspected to be bacterial in etiology but organism is not known. The pneumonia is  new onset . The patient has the following signs/symptoms of pneumonia: cough and shortness of breath. The patient does not have a current oxygen requirement and the patient does not have a home oxygen requirement. I have reviewed the pertinent imaging. The following cultures have been collected: Blood cultures The culture results are listed below.     Current antimicrobial regimen consists of the antibiotics listed below. Will monitor patient closely and continue current treatment plan unchanged.    Antibiotics (From admission, onward)      Start     Stop Route Frequency Ordered    10/19/24 1600  azithromycin (ZITHROMAX) 500 mg in D5W 250 mL  IVPB (admixture device)  (Community Acquired Pneumonia (CAP) - Low MDR Risk)         10/24/24 1559 IV Every 24 hours (non-standard times) 10/19/24 1455    10/19/24 1530  cefTRIAXone injection 2 g  (Community Acquired Pneumonia (CAP) - Low MDR Risk)         10/25/24 1529 IV Every 24 hours (non-standard times) 10/19/24 1455            Microbiology Results (last 7 days)       Procedure Component Value Units Date/Time    Influenza A & B by Molecular [[id number]] Collected: 10/19/24 1315    Order Status: Completed Specimen: Nasopharyngeal Swab Updated: 10/19/24 1358     Influenza A, Molecular Negative     Influenza B, Molecular Negative     Flu A & B Source Nasal swab    Blood culture x two cultures. Draw prior to antibiotics. [[id number]] Collected: 10/19/24 1256    Order Status: Sent Specimen:  Blood from Peripheral, Forearm, Left     Blood culture x two cultures. Draw prior to antibiotics. [[id number]] Collected: 10/19/24 1255    Order Status: Sent Specimen: Blood from Peripheral, Upper Arm, Right             Biliary obstruction  S/p biliary stent on 9/30/24 by Dr. Hills in [place].   Follow-up by Dr. Phan WHITE in [place]       Abnormal liver enzymes  Previous LFTS normal now increasing   CT scan Biliary stent in good position. Associated pneumobilia. No abnormal focal fluid collections. No acute bowel abnormalities.   If continue to increase consult hepatology.       GERD (gastroesophageal reflux disease)  Chronic.   Continue Protonix.       Hypertension  Patients blood pressure range in the last 24 hours was: BP  Min: 115/71  Max: 126/75.The patient's inpatient anti-hypertensive regimen is listed below:  Current Antihypertensives  carvediloL tablet 3.125 mg, 2 times daily with meals, Oral    Plan  - BP is controlled, no changes needed to their regimen      Iron deficiency anemia  Anemia is likely due to Iron deficiency. Most recent hemoglobin and hematocrit are listed below.  Recent Labs     10/19/24  1254   HGB 13.6*   HCT 42.7     Plan  - Monitor serial CBC: Daily  - Transfuse PRBC if patient becomes hemodynamically unstable, symptomatic or H/H drops below 7/21.  - Patient has not received any PRBC transfusions to date  - Patient's anemia is currently stable        VTE Risk Mitigation (From admission, onward)           Ordered     Place sequential compression device  Until discontinued         10/19/24 1455     IP VTE LOW RISK PATIENT  Once         10/19/24 1455                                    Taniya Jasso NP  Department of Hospital Medicine  [facility] - Telemetry ([facility])

## 2024-10-19 NOTE — ED PROVIDER NOTES
SCRIBE #1 NOTE: I, Jim Morales, am scribing for, and in the presence of, Daniel Keith Jr., MD. I have scribed the entire note.       History     Chief Complaint   Patient presents with    Fever     Pt c/o fever of 103 that started a few hours ago that led to N/V. Pt reports slight abd pain. Pt reported taking tylenol and Advil prior to arrival. HX: pancreatitis     Review of patient's allergies indicates:  No Known Allergies      History of Present Illness     HPI    10/19/2024, 12:43 PM  History obtained from the wife and patient      History of Present Illness: Fabrice Zamorano is a 59 y.o. male patient with a PMHx of GERD, HTN, hypothyroidism, fatty liver, and splenic vein thrombosis who presents to the Emergency Department for evaluation of fever (103F) which onset gradually a few hours ago. Pt's wife notes pt recently had a biliary stent placed on the 30th and was recently hospitalized in [place] for an infection. Symptoms are constant and moderate in severity. No mitigating or exacerbating factors reported. Associated sxs include nausea, vomiting, diarrhea, abdominal pain (at baseline), appetite changes, and dry cough. Patient denies any dysuria, rhinorrhea, sore throat, HA, and all other sxs at this time. No prior Tx reported. Pt denies any hx of cardiac diseases. No further complaints or concerns at this time.       Arrival mode: Personal Transportation    PCP: Rebecca Mcmillan MD        Past Medical History:  Past Medical History:   Diagnosis Date    Anticoagulant long-term use     On Eliquis for DVT    GERD (gastroesophageal reflux disease)     Hypertension     Hypothyroidism 10/26/2021    Liver disease     fatty liver    Pancreatic pseudocyst     Personal history of colonic polyps     Sleep apnea     Splenic vein thrombosis        Past Surgical History:  Past Surgical History:   Procedure Laterality Date    CHOLECYSTECTOMY      COLONOSCOPY      COLONOSCOPY N/A 7/3/2024    Procedure: COLONOSCOPY;   Surgeon: Kwadwo Gonsalez MD;  Location: [facility] ([address] [place] [address]);  Service: Endoscopy;  Laterality: N/A;    ENDOSCOPIC ULTRASOUND OF UPPER GASTROINTESTINAL TRACT N/A 10/12/2021    Procedure: ULTRASOUND, UPPER GI TRACT, ENDOSCOPIC;  Surgeon: Odalys Leiva MD;  Location: [facility];  Service: Endoscopy;  Laterality: N/A;  Upper and linear, 22 shark core, cytology and RPMI    ENDOSCOPIC ULTRASOUND OF UPPER GASTROINTESTINAL TRACT N/A 2/24/2023    Procedure: ULTRASOUND, UPPER GI TRACT, ENDOSCOPIC;  Surgeon: Shawn Chisholm MD;  Location: [facility] ([facility]);  Service: Endoscopy;  Laterality: N/A;  2/3/23-Instructions via portal-DS    ENDOSCOPIC ULTRASOUND OF UPPER GASTROINTESTINAL TRACT N/A 11/14/2023    Procedure: ULTRASOUND, UPPER GI TRACT, ENDOSCOPIC;  Surgeon: Kwadwo Gonsalez MD;  Location: [facility];  Service: Gastroenterology;  Laterality: N/A;  10/13/23: instructions sent vie portal-GD    ENDOSCOPIC ULTRASOUND OF UPPER GASTROINTESTINAL TRACT N/A 3/12/2024    Procedure: ULTRASOUND, UPPER GI TRACT, ENDOSCOPIC;  Surgeon: Shawn Chisholm MD;  Location: [facility];  Service: Endoscopy;  Laterality: N/A;  2/16/24: inst sent via portal-GD    ERCP N/A 09/29/2021    Procedure: ERCP (ENDOSCOPIC RETROGRADE CHOLANGIOPANCREATOGRAPHY);  Surgeon: Odalys Leiva MD;  Location: [facility];  Service: Endoscopy;  Laterality: N/A;    ERCP N/A 08/15/2022    Procedure: ERCP (ENDOSCOPIC RETROGRADE CHOLANGIOPANCREATOGRAPHY);  Surgeon: Odalys Leiva MD;  Location: [facility];  Service: Endoscopy;  Laterality: N/A;    ERCP N/A 2/24/2023    Procedure: ERCP (ENDOSCOPIC RETROGRADE CHOLANGIOPANCREATOGRAPHY);  Surgeon: Shawn Chisholm MD;  Location: [address]);  Service: Endoscopy;  Laterality: N/A;    ERCP W/ PLASTIC STENT PLACEMENT      LATERAL PANCREATICOJEJUNOSTOMY N/A 11/19/2021    Procedure: PANCREATICOJEJUNOSTOMY, SIDE-TO-SIDE tier 1;  Surgeon: Brian Hills MD;  Location: [facility] OR [address] [place] [address];  Service:  General;  Laterality: N/A;         Family History:  Family History   Problem Relation Name Age of Onset    Thyroid disease Mother      Cancer Father      Pancreatic cancer Father      Arthritis Father      Thyroid disease Father         Social History:  Social History     Tobacco Use    Smoking status: Former     Current packs/day: 0.00     Types: Cigarettes     Quit date: 2001     Years since quittin.2    Smokeless tobacco: Never   Substance and Sexual Activity    Alcohol use: Not Currently    Drug use: Never    Sexual activity: Yes        Review of Systems     Review of Systems   Constitutional:  Positive for appetite change and fever (103F). Negative for chills.   HENT:  Negative for rhinorrhea and sore throat.    Respiratory:  Positive for cough (dry). Negative for shortness of breath.    Cardiovascular:  Negative for chest pain.   Gastrointestinal:  Positive for abdominal pain, diarrhea, nausea and vomiting.   Genitourinary:  Negative for dysuria.   Musculoskeletal:  Negative for back pain.   Skin:  Negative for rash.   Neurological:  Negative for weakness and headaches.   Hematological:  Does not bruise/bleed easily.   All other systems reviewed and are negative.     Physical Exam     Initial Vitals [10/19/24 1225]   BP Pulse Resp Temp SpO2   126/75 (!) 126 18 99.6 °F (37.6 °C) 95 %      MAP       --          Physical Exam  Nursing Notes and Vital Signs Reviewed.  Constitutional: Patient is in no acute distress. Well-developed and well-nourished.  Head: Atraumatic. Normocephalic.  Eyes:  EOM intact.  No scleral icterus.  ENT: Mucous membranes are moist.  Nares clear   Neck:  Full ROM. No JVD.  Cardiovascular: Regular rate. Regular rhythm No murmurs, rubs, or gallops. Distal pulses are 2+ and symmetric  Pulmonary/Chest: No respiratory distress. Clear to auscultation bilaterally. No wheezing or rales.  Equal chest wall rise bilaterally  Abdominal: Soft and non-distended.  There is no tenderness.  No  rebound, guarding, or rigidity. Good bowel sounds.  Genitourinary: No CVA tenderness.  No suprapubic tenderness  Musculoskeletal: Moves all extremities. No obvious deformities.  5 x 5 strength in all extremities   Skin: Warm and dry.  Neurological:  Alert, awake, and appropriate.  Normal speech.  No acute focal neurological deficits are appreciated.  Two through 12 intact bilaterally.  Psychiatric: Normal affect. Good eye contact. Appropriate in content.     ED Course   Critical Care    Date/Time: 10/19/2024 2:38 PM    Performed by: Daniel Keith Jr., MD  Authorized by: Daniel Keith Jr., MD  Direct patient critical care time: 16 minutes  Additional history critical care time: 10 minutes  Ordering / reviewing critical care time: 12 minutes  Documentation critical care time: 9 minutes  Consulting other physicians critical care time: 7 minutes  Total critical care time (exclusive of procedural time) : 54 minutes  Critical care time was exclusive of separately billable procedures and treating other patients and teaching time.  Critical care was necessary to treat or prevent imminent or life-threatening deterioration of the following conditions: sepsis.  Critical care was time spent personally by me on the following activities: blood draw for specimens, development of treatment plan with patient or surrogate, discussions with consultants, interpretation of cardiac output measurements, evaluation of patient's response to treatment, examination of patient, obtaining history from patient or surrogate, ordering and performing treatments and interventions, ordering and review of laboratory studies, ordering and review of radiographic studies, pulse oximetry, re-evaluation of patient's condition and review of old charts.        ED Vital Signs:  Vitals:    10/19/24 1225 10/19/24 1252 10/19/24 1330 10/19/24 1400   BP: 126/75  118/71 124/73   Pulse: (!) 126 (!) 119 107 106   Resp: 18  19 20   Temp: 99.6 °F (37.6 °C)    "98.9 °F (37.2 °C)   TempSrc: Oral      SpO2: 95%  97% 98%   Weight: 91.3 kg (201 lb 4.5 oz)      Height: 5' 11" (1.803 m)          Abnormal Lab Results:  Labs Reviewed   CBC W/ AUTO DIFFERENTIAL - Abnormal       Result Value    WBC 16.98 (*)     RBC 5.00      Hemoglobin 13.6 (*)     Hematocrit 42.7      MCV 85      MCH 27.2      MCHC 31.9 (*)     RDW 15.8 (*)     Platelets 284      MPV 8.7 (*)     Immature Granulocytes 0.9 (*)     Gran # (ANC) 15.4 (*)     Immature Grans (Abs) 0.15 (*)     Lymph # 0.4 (*)     Mono # 1.0      Eos # 0.0      Baso # 0.04      nRBC 0      Gran % 90.8 (*)     Lymph % 2.3 (*)     Mono % 5.8      Eosinophil % 0.0      Basophil % 0.2      Differential Method Automated     COMPREHENSIVE METABOLIC PANEL - Abnormal    Sodium 133 (*)     Potassium 4.2      Chloride 97      CO2 21 (*)     Glucose 293 (*)     BUN 10      Creatinine 1.2      Calcium 9.4      Total Protein 6.9      Albumin 3.5      Total Bilirubin 1.1 (*)     Alkaline Phosphatase 240 (*)      (*)      (*)     eGFR >60      Anion Gap 15     LACTIC ACID, PLASMA - Abnormal    Lactate (Lactic Acid) 4.0 (*)     Narrative:     LA critical result(s) called and verbal readback obtained from Guadalupe Reyes RN by NANCY 10/19/2024 13:57   PROCALCITONIN - Abnormal    Procalcitonin 1.43 (*)    INFLUENZA A & B BY MOLECULAR    Influenza A, Molecular Negative      Influenza B, Molecular Negative      Flu A & B Source Nasal swab     CULTURE, BLOOD   CULTURE, BLOOD   URINALYSIS, REFLEX TO URINE CULTURE    Specimen UA Urine, Clean Catch      Color, UA Yellow      Appearance, UA Clear      pH, UA 8.0      Specific Gravity, UA 1.005      Protein, UA Negative      Glucose, UA Negative      Ketones, UA Negative      Bilirubin (UA) Negative      Occult Blood UA Negative      Nitrite, UA Negative      Urobilinogen, UA Negative      Leukocytes, UA Negative      Narrative:     Specimen Source->Urine   SARS-COV-2 RNA AMPLIFICATION, QUAL    " SARS-CoV-2 RNA, Amplification, Qual Negative          All Lab Results:  Results for orders placed or performed during the hospital encounter of 10/19/24   CBC auto differential    Collection Time: 10/19/24 12:54 PM   Result Value Ref Range    WBC 16.98 (H) 3.90 - 12.70 K/uL    RBC 5.00 4.60 - 6.20 M/uL    Hemoglobin 13.6 (L) 14.0 - 18.0 g/dL    Hematocrit 42.7 40.0 - 54.0 %    MCV 85 82 - 98 fL    MCH 27.2 27.0 - 31.0 pg    MCHC 31.9 (L) 32.0 - 36.0 g/dL    RDW 15.8 (H) 11.5 - 14.5 %    Platelets 284 150 - 450 K/uL    MPV 8.7 (L) 9.2 - 12.9 fL    Immature Granulocytes 0.9 (H) 0.0 - 0.5 %    Gran # (ANC) 15.4 (H) 1.8 - 7.7 K/uL    Immature Grans (Abs) 0.15 (H) 0.00 - 0.04 K/uL    Lymph # 0.4 (L) 1.0 - 4.8 K/uL    Mono # 1.0 0.3 - 1.0 K/uL    Eos # 0.0 0.0 - 0.5 K/uL    Baso # 0.04 0.00 - 0.20 K/uL    nRBC 0 0 /100 WBC    Gran % 90.8 (H) 38.0 - 73.0 %    Lymph % 2.3 (L) 18.0 - 48.0 %    Mono % 5.8 4.0 - 15.0 %    Eosinophil % 0.0 0.0 - 8.0 %    Basophil % 0.2 0.0 - 1.9 %    Differential Method Automated    Comprehensive metabolic panel    Collection Time: 10/19/24 12:54 PM   Result Value Ref Range    Sodium 133 (L) 136 - 145 mmol/L    Potassium 4.2 3.5 - 5.1 mmol/L    Chloride 97 95 - 110 mmol/L    CO2 21 (L) 23 - 29 mmol/L    Glucose 293 (H) 70 - 110 mg/dL    BUN 10 6 - 20 mg/dL    Creatinine 1.2 0.5 - 1.4 mg/dL    Calcium 9.4 8.7 - 10.5 mg/dL    Total Protein 6.9 6.0 - 8.4 g/dL    Albumin 3.5 3.5 - 5.2 g/dL    Total Bilirubin 1.1 (H) 0.1 - 1.0 mg/dL    Alkaline Phosphatase 240 (H) 40 - 150 U/L     (H) 10 - 40 U/L     (H) 10 - 44 U/L    eGFR >60 >60 mL/min/1.73 m^2    Anion Gap 15 8 - 16 mmol/L   Lactic acid, plasma #1    Collection Time: 10/19/24 12:54 PM   Result Value Ref Range    Lactate (Lactic Acid) 4.0 (HH) 0.5 - 2.2 mmol/L   Procalcitonin    Collection Time: 10/19/24 12:54 PM   Result Value Ref Range    Procalcitonin 1.43 (H) <0.25 ng/mL   EKG 12-lead    Collection Time: 10/19/24 12:56 PM    Result Value Ref Range    QRS Duration 64 ms    [facility] QTC Calculation 403 ms   Influenza A & B by Molecular    Collection Time: 10/19/24  1:15 PM    Specimen: Nasopharyngeal Swab   Result Value Ref Range    Influenza A, Molecular Negative Negative    Influenza B, Molecular Negative Negative    Flu A & B Source Nasal swab    COVID-19 Rapid Screening    Collection Time: 10/19/24  1:15 PM   Result Value Ref Range    SARS-CoV-2 RNA, Amplification, Qual Negative Negative   Urinalysis, Reflex to Urine Culture Urine, Clean Catch    Collection Time: 10/19/24  1:47 PM    Specimen: Urine   Result Value Ref Range    Specimen UA Urine, Clean Catch     Color, UA Yellow Yellow, Straw, Amrita    Appearance, UA Clear Clear    pH, UA 8.0 5.0 - 8.0    Specific Gravity, UA 1.005 1.005 - 1.030    Protein, UA Negative Negative    Glucose, UA Negative Negative    Ketones, UA Negative Negative    Bilirubin (UA) Negative Negative    Occult Blood UA Negative Negative    Nitrite, UA Negative Negative    Urobilinogen, UA Negative <2.0 EU/dL    Leukocytes, UA Negative Negative         Imaging Results:  Imaging Results              CT Abdomen Pelvis  Without Contrast (Final result)  Result time 10/19/24 14:26:52      Final result by Camille SanchezJarochoMD martínez (10/19/24 14:26:52)                   Impression:      Biliary stent in good position.  Associated pneumobilia.  No abnormal focal fluid collections.  No acute bowel abnormalities.    All CT scans at this facility use dose modulation, iterative reconstructions, and/or weight base dosing when appropriate to reduce radiation dose to as low as reasonably achievable      Electronically signed by: Camille Sanchez MD  Date:    10/19/2024  Time:    14:26               Narrative:    EXAMINATION:  CT ABDOMEN PELVIS WITHOUT CONTRAST    CLINICAL HISTORY:  Abdominal abscess/infection suspected;    TECHNIQUE:  Noncontrast images were obtained    COMPARISON:  CT abdomen pelvis  09/16/2024.    FINDINGS:  Lung bases are clear    No liver masses.  No splenic masses.  No focal abnormality involving the spleen.  No peripancreatic fluid collections.    Previous cholecystectomy.  Metallic biliary stent is in place in good position stent appears patent.  There is associated pneumobilia.    Large pedunculated cyst related to the right kidney measuring 8.2 cm.  Kidneys otherwise unremarkable.  Postoperative changes seen related to the small bowel in the upper abdomen.  No abnormal intra-abdominal fluid collections.  Stable bulging of the anterior abdominal wall midline.    The aorta and inferior vena cava are unremarkable.    There are no acute bowel abnormalities.     No evidence of appendicitis.  No evidence of diverticulitis.    Bladder is normal. No abnormal masses or fluid collections in the pelvis.    Skeletal structures are intact.  No acute skeletal findings.                                       X-Ray Chest AP Portable (Final result)  Result time 10/19/24 13:13:28      Final result by Camille SanchezJarocho), MD (10/19/24 13:13:28)                   Impression:      Slight increased density left costophrenic angle possibly related to low-grade infiltrate.      Electronically signed by: Camille Sanchez MD  Date:    10/19/2024  Time:    13:13               Narrative:    EXAMINATION:  XR CHEST AP PORTABLE    CLINICAL HISTORY:  , Sepsis;    COMPARISON:  Chest, 07/01/2024    FINDINGS:  One view.  Normal size heart.  Slight increased density at the left costophrenic angle which could represent a low-grade infiltrate.  Right lung is clear.                                       The EKG was ordered, reviewed, and independently interpreted by the ED provider.  Interpretation time: 12:56  Rate: 116 BPM  Rhythm: sinus tachycardia  Interpretation: Right axis deviation. No STEMI.           The Emergency Provider reviewed the vital signs and test results, which are outlined above.     ED Discussion      2:37 PM: Discussed case with BENJI Chapin ([facility]). BENJI Chapin agrees with current care and management of pt and accepts admission.   Admitting Service: Hospital Medicine   Admitting Physician: Dr. Vazquez  Admit to: [facility] tele    2:37 PM: Re-evaluated pt. I have discussed test results, shared treatment plan, and the need for admission with patient and family at bedside. Pt and family express understanding at this time and agree with all information. All questions answered. Pt and family have no further questions or concerns at this time. Pt is ready for admit.      ED Course as of 10/19/24 1517   Sat Oct 19, 2024   1429 2:30 PM: 30mL/kg IVF have been administered within 3 hours of identification of SIRS criteria. Vital signs were reviewed and a focal sepsis perfusion assessment was performed.  Cap refill less than 2 seconds.  Normal peripheral pulses.  Skin is warm to the touch and non mottled.  Cardiopulmonary exam unchanged     [RT]      ED Course User Index  [RT] Daniel Keith Jr., MD     Medical Decision Making  Differential diagnosis: Sepsis, severe sepsis, septic shock, flu, COVID, pneumonia, intra-abdominal infection    Patient was with recent biliary stenting in [place] presenting with fever of 103 at home and a pulse of 120 on arrival.  He meets SIRS criteria on arrival due to his underlying comorbidities sepsis fluids were ordered in spite of shortages as well as antibiotics.  Patient was has a lactate of 4 confirming the sepsis was significant and requiring IV fluids along with a left lower lobe pneumonia noted on his chest x-ray.  Patient was being admitted to Hospital Medicine for further evaluation and treatment of his severe sepsis    Amount and/or Complexity of Data Reviewed  Independent Historian: spouse     Details: Wife at bedside  Labs: ordered. Decision-making details documented in ED Course.     Details: UA is negative COVID and flu were negative Procardia is  elevated 1.4.  Lactate is 4.  Patient was has a mild elevation in all of his LFTs however recent stenting was noted.  Has a glucose 293 CMP is otherwise benign.  He was 17,000 white count with a left shift and some bands.  Radiology: ordered. Decision-making details documented in ED Course.     Details: Chest x-ray shows left lower lobe infiltrate.  CT abdomen pelvis shows biliary stent good position with associated pneumobilia.  ECG/medicine tests: ordered and independent interpretation performed. Decision-making details documented in ED Course.     Details: No STEMI  Discussion of management or test interpretation with external provider(s): Discussed the case with hospital medicine graciously accepts    Risk  OTC drugs.  Prescription drug management.  Decision regarding hospitalization.    Critical Care  Total time providing critical care: 54 minutes                ED Medication(s):  Medications   carvediloL tablet 3.125 mg (has no administration in time range)   lipase-protease-amylase 12,000-38,000-60,000 units per capsule 3 capsule (has no administration in time range)   pantoprazole EC tablet 40 mg (has no administration in time range)   sodium chloride 0.9% flush 10 mL (has no administration in time range)   ondansetron injection 4 mg (has no administration in time range)   polyethylene glycol packet 17 g (has no administration in time range)   cefTRIAXone injection 2 g (has no administration in time range)   azithromycin (ZITHROMAX) 500 mg in D5W 250 mL  IVPB (admixture device) (has no administration in time range)   ibuprofen tablet 400 mg (has no administration in time range)   albuterol-ipratropium 2.5 mg-0.5 mg/3 mL nebulizer solution 3 mL (has no administration in time range)   oxyCODONE-acetaminophen  mg per tablet 1 tablet (has no administration in time range)   sodium chloride 0.9% bolus 2,739 mL 2,739 mL (0 mLs Intravenous Stopped 10/19/24 2508)   ceFEPIme injection 2 g (2 g Intravenous Given  10/19/24 1312)       New Prescriptions    No medications on file               Scribe Attestation:   Scribe #1: I performed the above scribed service and the documentation accurately describes the services I performed. I attest to the accuracy of the note.     Attending:   Physician Attestation Statement for Scribe #1: I, Daniel Keith Jr., MD, personally performed the services described in this documentation, as scribed by Jim Morales, in my presence, and it is both accurate and complete.           Clinical Impression       ICD-10-CM ICD-9-CM   1. Pneumonia of left lower lobe due to infectious organism  J18.9 486   2. Sepsis  A41.9 038.9     995.91       Disposition:   Disposition: Admitted  Condition: Stable       Daniel Keith Jr., MD  10/19/24 1519

## 2024-10-19 NOTE — ASSESSMENT & PLAN NOTE
Patient has a diagnosis of pneumonia. The cause of the pneumonia is suspected to be bacterial in etiology but organism is not known. The pneumonia is  new onset . The patient has the following signs/symptoms of pneumonia: cough and shortness of breath. The patient does not have a current oxygen requirement and the patient does not have a home oxygen requirement. I have reviewed the pertinent imaging. The following cultures have been collected: Blood cultures The culture results are listed below.     Current antimicrobial regimen consists of the antibiotics listed below. Will monitor patient closely and continue current treatment plan unchanged.    Antibiotics (From admission, onward)      Start     Stop Route Frequency Ordered    10/19/24 1600  azithromycin (ZITHROMAX) 500 mg in D5W 250 mL  IVPB (admixture device)  (Community Acquired Pneumonia (CAP) - Low MDR Risk)         10/24/24 1559 IV Every 24 hours (non-standard times) 10/19/24 1455    10/19/24 1530  cefTRIAXone injection 2 g  (Community Acquired Pneumonia (CAP) - Low MDR Risk)         10/25/24 1529 IV Every 24 hours (non-standard times) 10/19/24 1455            Microbiology Results (last 7 days)       Procedure Component Value Units Date/Time    Influenza A & B by Molecular [[id number]] Collected: 10/19/24 1315    Order Status: Completed Specimen: Nasopharyngeal Swab Updated: 10/19/24 1358     Influenza A, Molecular Negative     Influenza B, Molecular Negative     Flu A & B Source Nasal swab    Blood culture x two cultures. Draw prior to antibiotics. [[id number]] Collected: 10/19/24 1256    Order Status: Sent Specimen: Blood from Peripheral, Forearm, Left     Blood culture x two cultures. Draw prior to antibiotics. [[id number]] Collected: 10/19/24 1255    Order Status: Sent Specimen: Blood from Peripheral, Upper Arm, Right

## 2024-10-19 NOTE — ASSESSMENT & PLAN NOTE
This patient does have evidence of infective focus  My overall impression is sepsis.  Source: Respiratory  Antibiotics given-   Antibiotics (72h ago, onward)      Start     Stop Route Frequency Ordered    10/19/24 1600  azithromycin (ZITHROMAX) 500 mg in D5W 250 mL  IVPB (admixture device)  (Community Acquired Pneumonia (CAP) - Low MDR Risk)         10/24/24 1559 IV Every 24 hours (non-standard times) 10/19/24 1455    10/19/24 1530  cefTRIAXone injection 2 g  (Community Acquired Pneumonia (CAP) - Low MDR Risk)         10/25/24 1529 IV Every 24 hours (non-standard times) 10/19/24 1455          Latest lactate reviewed-  Recent Labs   Lab 10/19/24  1254   LACTATE 4.0*     Organ dysfunction indicated by  elevated lactic acid.     Fluid challenge Actual Body weight- Patient will receive 30ml/kg actual body weight to calculate fluid bolus for treatment of septic shock.     Post- resuscitation assessment No - Post resuscitation assessment not needed       Will Not start Pressors- Levophed for MAP of 65  Source control achieved by: azithromycin and ceftriaxone

## 2024-10-20 VITALS
HEART RATE: 80 BPM | HEIGHT: 71 IN | SYSTOLIC BLOOD PRESSURE: 127 MMHG | TEMPERATURE: 99 F | OXYGEN SATURATION: 97 % | RESPIRATION RATE: 20 BRPM | WEIGHT: 201.25 LBS | DIASTOLIC BLOOD PRESSURE: 87 MMHG | BODY MASS INDEX: 28.18 KG/M2

## 2024-10-20 LAB
ADENOVIRUS: NOT DETECTED
ALBUMIN SERPL BCP-MCNC: 2.8 G/DL (ref 3.5–5.2)
ALP SERPL-CCNC: 176 U/L (ref 40–150)
ALT SERPL W/O P-5'-P-CCNC: 83 U/L (ref 10–44)
ANION GAP SERPL CALC-SCNC: 7 MMOL/L (ref 8–16)
ANISOCYTOSIS BLD QL SMEAR: SLIGHT
AST SERPL-CCNC: 65 U/L (ref 10–40)
BASOPHILS # BLD AUTO: 0.03 K/UL (ref 0–0.2)
BASOPHILS NFR BLD: 0.2 % (ref 0–1.9)
BILIRUB DIRECT SERPL-MCNC: 0.7 MG/DL (ref 0.1–0.3)
BILIRUB SERPL-MCNC: 1.1 MG/DL (ref 0.1–1)
BORDETELLA PARAPERTUSSIS (IS1001): NOT DETECTED
BORDETELLA PERTUSSIS (PTXP): NOT DETECTED
BUN SERPL-MCNC: 11 MG/DL (ref 6–20)
CALCIUM SERPL-MCNC: 8.5 MG/DL (ref 8.7–10.5)
CHLAMYDIA PNEUMONIAE: NOT DETECTED
CHLORIDE SERPL-SCNC: 105 MMOL/L (ref 95–110)
CO2 SERPL-SCNC: 24 MMOL/L (ref 23–29)
CORONAVIRUS 229E, COMMON COLD VIRUS: NOT DETECTED
CORONAVIRUS HKU1, COMMON COLD VIRUS: NOT DETECTED
CORONAVIRUS NL63, COMMON COLD VIRUS: NOT DETECTED
CORONAVIRUS OC43, COMMON COLD VIRUS: NOT DETECTED
CREAT SERPL-MCNC: 0.9 MG/DL (ref 0.5–1.4)
DACRYOCYTES BLD QL SMEAR: ABNORMAL
DIFFERENTIAL METHOD BLD: ABNORMAL
EOSINOPHIL # BLD AUTO: 0.1 K/UL (ref 0–0.5)
EOSINOPHIL NFR BLD: 1.2 % (ref 0–8)
ERYTHROCYTE [DISTWIDTH] IN BLOOD BY AUTOMATED COUNT: 16.3 % (ref 11.5–14.5)
EST. GFR  (NO RACE VARIABLE): >60 ML/MIN/1.73 M^2
FLUBV RNA NPH QL NAA+NON-PROBE: NOT DETECTED
GLUCOSE SERPL-MCNC: 125 MG/DL (ref 70–110)
HCT VFR BLD AUTO: 35.5 % (ref 40–54)
HGB BLD-MCNC: 11.6 G/DL (ref 14–18)
HPIV1 RNA NPH QL NAA+NON-PROBE: NOT DETECTED
HPIV2 RNA NPH QL NAA+NON-PROBE: NOT DETECTED
HPIV3 RNA NPH QL NAA+NON-PROBE: NOT DETECTED
HPIV4 RNA NPH QL NAA+NON-PROBE: NOT DETECTED
HUMAN METAPNEUMOVIRUS: NOT DETECTED
IMM GRANULOCYTES # BLD AUTO: 0.07 K/UL (ref 0–0.04)
IMM GRANULOCYTES NFR BLD AUTO: 0.6 % (ref 0–0.5)
INFLUENZA A (SUBTYPES H1,H1-2009,H3): NOT DETECTED
LACTATE SERPL-SCNC: 1.1 MMOL/L (ref 0.5–2.2)
LYMPHOCYTES # BLD AUTO: 0.8 K/UL (ref 1–4.8)
LYMPHOCYTES NFR BLD: 6.4 % (ref 18–48)
MAGNESIUM SERPL-MCNC: 1.7 MG/DL (ref 1.6–2.6)
MCH RBC QN AUTO: 28 PG (ref 27–31)
MCHC RBC AUTO-ENTMCNC: 32.7 G/DL (ref 32–36)
MCV RBC AUTO: 86 FL (ref 82–98)
MONOCYTES # BLD AUTO: 1 K/UL (ref 0.3–1)
MONOCYTES NFR BLD: 8.3 % (ref 4–15)
MYCOPLASMA PNEUMONIAE: NOT DETECTED
NEUTROPHILS # BLD AUTO: 10 K/UL (ref 1.8–7.7)
NEUTROPHILS NFR BLD: 83.3 % (ref 38–73)
NRBC BLD-RTO: 0 /100 WBC
OHS QRS DURATION: 64 MS
OHS QTC CALCULATION: 403 MS
PHOSPHATE SERPL-MCNC: 3.2 MG/DL (ref 2.7–4.5)
PLATELET # BLD AUTO: 180 K/UL (ref 150–450)
PLATELET BLD QL SMEAR: ABNORMAL
PMV BLD AUTO: 9.3 FL (ref 9.2–12.9)
POTASSIUM SERPL-SCNC: 4.4 MMOL/L (ref 3.5–5.1)
PROT SERPL-MCNC: 5.5 G/DL (ref 6–8.4)
RBC # BLD AUTO: 4.15 M/UL (ref 4.6–6.2)
RESPIRATORY INFECTION PANEL SOURCE: ABNORMAL
RSV RNA NPH QL NAA+NON-PROBE: NOT DETECTED
RV+EV RNA NPH QL NAA+NON-PROBE: DETECTED
SARS-COV-2 RNA RESP QL NAA+PROBE: NOT DETECTED
SODIUM SERPL-SCNC: 136 MMOL/L (ref 136–145)
WBC # BLD AUTO: 12.01 K/UL (ref 3.9–12.7)

## 2024-10-20 PROCEDURE — 83605 ASSAY OF LACTIC ACID: CPT | Performed by: NURSE PRACTITIONER

## 2024-10-20 PROCEDURE — 63600175 PHARM REV CODE 636 W HCPCS: Performed by: NURSE PRACTITIONER

## 2024-10-20 PROCEDURE — 36415 COLL VENOUS BLD VENIPUNCTURE: CPT | Performed by: NURSE PRACTITIONER

## 2024-10-20 PROCEDURE — 83735 ASSAY OF MAGNESIUM: CPT | Performed by: NURSE PRACTITIONER

## 2024-10-20 PROCEDURE — 84100 ASSAY OF PHOSPHORUS: CPT | Performed by: NURSE PRACTITIONER

## 2024-10-20 PROCEDURE — 87486 CHLMYD PNEUM DNA AMP PROBE: CPT | Performed by: HOSPITALIST

## 2024-10-20 PROCEDURE — 25000003 PHARM REV CODE 250: Performed by: NURSE PRACTITIONER

## 2024-10-20 PROCEDURE — 80076 HEPATIC FUNCTION PANEL: CPT | Performed by: NURSE PRACTITIONER

## 2024-10-20 PROCEDURE — 87798 DETECT AGENT NOS DNA AMP: CPT | Performed by: HOSPITALIST

## 2024-10-20 PROCEDURE — 85025 COMPLETE CBC W/AUTO DIFF WBC: CPT | Performed by: NURSE PRACTITIONER

## 2024-10-20 PROCEDURE — 87581 M.PNEUMON DNA AMP PROBE: CPT | Performed by: HOSPITALIST

## 2024-10-20 PROCEDURE — 80048 BASIC METABOLIC PNL TOTAL CA: CPT | Performed by: NURSE PRACTITIONER

## 2024-10-20 RX ORDER — CEFTRIAXONE 2 G/1
2 INJECTION, POWDER, FOR SOLUTION INTRAMUSCULAR; INTRAVENOUS ONCE
Status: COMPLETED | OUTPATIENT
Start: 2024-10-20 | End: 2024-10-20

## 2024-10-20 RX ORDER — DOXYCYCLINE 100 MG/1
100 CAPSULE ORAL EVERY 12 HOURS
Qty: 10 CAPSULE | Refills: 0 | Status: SHIPPED | OUTPATIENT
Start: 2024-10-20 | End: 2024-10-25

## 2024-10-20 RX ADMIN — PANCRELIPASE 3 CAPSULE: 60000; 12000; 38000 CAPSULE, DELAYED RELEASE PELLETS ORAL at 07:10

## 2024-10-20 RX ADMIN — PANTOPRAZOLE SODIUM 40 MG: 40 TABLET, DELAYED RELEASE ORAL at 07:10

## 2024-10-20 RX ADMIN — AZITHROMYCIN MONOHYDRATE 500 MG: 500 INJECTION, POWDER, LYOPHILIZED, FOR SOLUTION INTRAVENOUS at 10:10

## 2024-10-20 RX ADMIN — ONDANSETRON 4 MG: 2 INJECTION INTRAMUSCULAR; INTRAVENOUS at 10:10

## 2024-10-20 RX ADMIN — CARVEDILOL 3.12 MG: 3.12 TABLET, FILM COATED ORAL at 07:10

## 2024-10-20 RX ADMIN — PANCRELIPASE 3 CAPSULE: 60000; 12000; 38000 CAPSULE, DELAYED RELEASE PELLETS ORAL at 11:10

## 2024-10-20 RX ADMIN — CEFTRIAXONE 2 G: 2 INJECTION, POWDER, FOR SOLUTION INTRAMUSCULAR; INTRAVENOUS at 10:10

## 2024-10-20 NOTE — PLAN OF CARE
[facility] - Telemetry (Hospital)  Discharge Final Note    Primary Care Provider: Rebecca Mcmillan MD    Expected Discharge Date: 10/20/2024    Final Discharge Note (most recent)       Final Note - 10/20/24 1057          Final Note    Assessment Type Final Discharge Note     Anticipated Discharge Disposition Home or Self Care        Post-Acute Status    Discharge Delays None known at this time                   No needs a time of chart review. KM,MSW    Important Message from Medicare

## 2024-10-20 NOTE — PLAN OF CARE
Patient arrived to floor with wife at bedside. Aox4, independent for activities, refusing bed alarm. While receiving abx patient endorsed N/V, gave PRN zofran with relief. No reported bowel movement since arriving to floor.     Problem: Adult Inpatient Plan of Care  Goal: Plan of Care Review  Outcome: Progressing  Goal: Patient-Specific Goal (Individualized)  Outcome: Progressing  Goal: Absence of Hospital-Acquired Illness or Injury  Outcome: Progressing  Goal: Optimal Comfort and Wellbeing  Outcome: Progressing  Goal: Readiness for Transition of Care  Outcome: Progressing     Problem: Sepsis/Septic Shock  Goal: Optimal Coping  Outcome: Progressing  Goal: Absence of Bleeding  Outcome: Progressing  Goal: Blood Glucose Level Within Targeted Range  Outcome: Progressing  Goal: Absence of Infection Signs and Symptoms  Outcome: Progressing  Goal: Optimal Nutrition Intake  Outcome: Progressing     Problem: Pneumonia  Goal: Fluid Balance  Outcome: Progressing  Goal: Resolution of Infection Signs and Symptoms  Outcome: Progressing  Goal: Effective Oxygenation and Ventilation  Outcome: Progressing     Problem: Fall Injury Risk  Goal: Absence of Fall and Fall-Related Injury  Outcome: Progressing

## 2024-10-20 NOTE — PLAN OF CARE
Problem: Adult Inpatient Plan of Care  Goal: Plan of Care Review  Outcome: Adequate for Care Transition  Goal: Patient-Specific Goal (Individualized)  Outcome: Adequate for Care Transition  Goal: Absence of Hospital-Acquired Illness or Injury  Outcome: Adequate for Care Transition  Goal: Optimal Comfort and Wellbeing  Outcome: Adequate for Care Transition  Goal: Readiness for Transition of Care  Outcome: Adequate for Care Transition     Problem: Sepsis/Septic Shock  Goal: Optimal Coping  Outcome: Adequate for Care Transition  Goal: Absence of Bleeding  Outcome: Adequate for Care Transition  Goal: Blood Glucose Level Within Targeted Range  Outcome: Adequate for Care Transition  Goal: Absence of Infection Signs and Symptoms  Outcome: Adequate for Care Transition  Goal: Optimal Nutrition Intake  Outcome: Adequate for Care Transition     Problem: Pneumonia  Goal: Fluid Balance  Outcome: Adequate for Care Transition  Goal: Resolution of Infection Signs and Symptoms  Outcome: Adequate for Care Transition  Goal: Effective Oxygenation and Ventilation  Outcome: Adequate for Care Transition     Problem: Fall Injury Risk  Goal: Absence of Fall and Fall-Related Injury  Outcome: Adequate for Care Transition

## 2024-10-20 NOTE — NURSING
AVS virtually reviewed with patient and is wife in its entirety with emphasis on diet, medications, follow-up appointments and reasons to return to the ED or contact the [facility] On Call Nurse Care Line. Patient also encouraged to utilize their patient portal. Ease and convenience of use reiterated. Education complete and patient voiced understanding. All questions answered. Discharge teaching completed.

## 2024-10-20 NOTE — PLAN OF CARE
NAEON.   Aox4. Able to verbalize needs & follow commands.    POC reviewed with pt. Interventions implemented as appropriate.    VS stable.   NSR on tele-monitor, box # [id number].  On ra. o2 nc. Home CPAP in use.  18g PIV to RAC and 20g PIV to LFA. Flushed and saline locked. Dsg cdi. Integrity maintained.    Skin wdi. No new skin issues.   2000 calorie ADA diet. Tolerating well. No n/v/d or any other GI complaints.  Continent of b/b. Urinal w/in reach. BRP.    No c/o pain.    Ambulatory. Activity ad josie.   Low risk for VTE.    Able to reposition in bed independently. Frequent position changes encouraged. Educated pt on skin integrity and breakdown prevention, as well as s/sx of pressure injury;  verbalized understanding.    NADN. Resting quietly in bed.   Free of falls. Hourly rounding complete.   All safety measures remain in place. SR up x2; bed low & locked. Bed alarm refused; educated on increased risk for falls. Call light w/in reach.   Hourly monitoring continues throughout shift.   Chart check complete.

## 2024-10-20 NOTE — DISCHARGE SUMMARY
[facility] - Telemetry ([facility])  [facility] Medicine  Discharge Summary      Patient Name: Fabrice Zamorano  MRN: [id number]  MONCHO: [id number]  Patient Class: IP- Inpatient  Admission Date: 10/19/2024  Hospital Length of Stay: 1 days  Discharge Date and Time:  10/20/2024 2:38 PM  Attending Physician: Sheron att. providers found   Discharging Provider: Teodoro Elizondo NP  Primary Care Provider: Rebecca Mcmillan MD    Primary Care Team: [facility] MEDICINE D    HPI:   Fabrice Zamorano is a 59 y.o. male patient with a PMHx of GERD, HTN, hypothyroidism, fatty liver, and splenic vein thrombosis who presents to the Emergency Department for evaluation of fever (103F) which onset gradually a few hours ago. Pt's wife notes pt recently had a biliary stent placed on the 30th and was recently hospitalized in [place] for an infection. Associated sxs include nausea, vomiting, diarrhea, abdominal pain (at baseline), appetite changes, and dry cough. Patient denies any dysuria, rhinorrhea, sore throat, HA, and all other sxs at this time.     ED workup showed WBCs of 16.98, glucose of 293, bilirubin 1.1, , , Lactic acid of 4, Procalcitonin 1.43.  Has a right lower lobe pneumonia. Patient has received sepsis fluids as well as antibiotics in the ED. Patient admitted for sepsis likely due to PNA under the care of [facility] medicine.     * No surgery found *      Hospital Course:   59 year old male s/p recent biliary stent 9/30/24 who admitted for left lower lobe pneumonia. He presented with fever, abdominal pain, and N/V. Imaging demonstrated left sided infiltrate. He was also noted to have transaminitis but CT abdomen showed patent biliary stent with associated pneumobilia (which is not uncommon after sphincterectomy with stent placement). Patient was managed with IV abx and leukocytosis resolved. His oxygen remained stable on room air during care and walking challenge. Patient voiced improvement of symptoms other than fatigue.  He remained afebrile. Liver enzymes are trending down. Patient was asked to follow up with Dr. Chisholm in [place]. Patient was asked to return to ED if fever returns or he develops acute CP, dyspnea, or abdominal pain. He verbalized understanding. Patient is stable for discharge.      Goals of Care Treatment Preferences:  Code Status: Full Code      SDOH Screening:  The patient was screened for utility difficulties, food insecurity, transport difficulties, housing insecurity, and interpersonal safety and there were no concerns identified this admission.     Consults:     No new Assessment & Plan notes have been filed under this hospital service since the last note was generated.  Service: Hospital Medicine    Final Active Diagnoses:    Diagnosis Date Noted POA    PRINCIPAL PROBLEM:  Sepsis [A41.9] 10/19/2024 Yes    Pneumonia [J18.9] 10/19/2024 Yes    Biliary obstruction [K83.1] 06/20/2024 Yes    Abnormal liver enzymes [R74.8] 03/23/2022 Yes    GERD (gastroesophageal reflux disease) [K21.9] 10/26/2021 Yes    Hypertension [I10]  Yes    Iron deficiency anemia [D50.9] 09/28/2021 Yes     Chronic      Problems Resolved During this Admission:       Discharged Condition: stable    Disposition: Home or Self Care    Follow Up:    Patient Instructions:   No discharge procedures on file.    Significant Diagnostic Studies: Labs: CMP   Recent Labs   Lab 10/19/24  1254 10/20/24  0440   * 136   K 4.2 4.4   CL 97 105   CO2 21* 24   * 125*   BUN 10 11   CREATININE 1.2 0.9   CALCIUM 9.4 8.5*   PROT 6.9 5.5*   ALBUMIN 3.5 2.8*   BILITOT 1.1* 1.1*   ALKPHOS 240* 176*   * 65*   * 83*   ANIONGAP 15 7*    and CBC   Recent Labs   Lab 10/19/24  1254 10/20/24  0440   WBC 16.98* 12.01   HGB 13.6* 11.6*   HCT 42.7 35.5*    180       Pending Diagnostic Studies:       None           Medications:  Reconciled Home Medications:      Medication List        START taking these medications      doxycycline 100 MG  Cap  Commonly known as: VIBRAMYCIN  Take 1 capsule (100 mg total) by mouth every 12 (twelve) hours. for 5 days            CONTINUE taking these medications      carvediloL 6.25 MG tablet  Commonly known as: COREG  Take 3.125 mg by mouth 2 (two) times daily with meals.     ibuprofen 800 MG tablet  Commonly known as: ADVIL,MOTRIN  Take 800 mg by mouth every 6 (six) hours as needed.     lipase-protease-amylase 36,000-114,000- 180,000 unit Cpdr  Commonly known as: CREON  Take 1 capsule by mouth 3 (three) times daily with meals.     metFORMIN 500 MG ER 24hr tablet  Commonly known as: GLUCOPHAGE-XR  Take 500 mg by mouth once daily.     multivitamin per tablet  Commonly known as: THERAGRAN  Take 1 tablet by mouth once daily.     ondansetron 4 MG Tbdl  Commonly known as: ZOFRAN-ODT  Take 1 tablet (4 mg total) by mouth every 8 (eight) hours as needed (Nausea).     oxyCODONE-acetaminophen  mg per tablet  Commonly known as: PERCOCET  Take 1 tablet by mouth every 4 (four) hours as needed for Pain.     pantoprazole 40 MG tablet  Commonly known as: PROTONIX  TAKE 1 TABLET(40 MG) BY MOUTH EVERY DAY     pregabalin 75 MG capsule  Commonly known as: LYRICA  TAKE 2 CAPSULES(150 MG) BY MOUTH EVERY EVENING. MAY ALSO TAKE 1 CAPSULE DAILY AS NEEDED              Indwelling Lines/Drains at time of discharge:   Lines/Drains/Airways       None                   Time spent on the discharge of patient: >35 minutes         Teodoro Elizondo NP  Department of Hospital Medicine  [facility] - Telemetry ([facility])

## 2024-10-20 NOTE — HOSPITAL COURSE
59 year old male s/p recent biliary stent 9/30/24 who admitted for left lower lobe pneumonia. He presented with fever, abdominal pain, and N/V. Imaging demonstrated left sided infiltrate. He was also noted to have transaminitis but CT abdomen showed patent biliary stent with associated pneumobilia (which is not uncommon after sphincterectomy with stent placement). Patient was managed with IV abx and leukocytosis resolved. His oxygen remained stable on room air during care and walking challenge. Patient voiced improvement of symptoms other than fatigue. He remained afebrile. Liver enzymes are trending down. Patient was asked to follow up with Dr. Chisholm in [place]. Patient was asked to return to ED if fever returns or he develops acute CP, dyspnea, or abdominal pain. He verbalized understanding. Patient is stable for discharge.

## 2024-10-21 ENCOUNTER — HOSPITAL ENCOUNTER (OUTPATIENT)
Dept: RADIOLOGY | Facility: HOSPITAL | Age: 59
Discharge: HOME | End: 2024-10-21
Attending: INTERNAL MEDICINE
Payer: MEDICARE

## 2024-10-21 DIAGNOSIS — R93.89 ABNORMAL FINDING ON IMAGING: ICD-10-CM

## 2024-10-21 PROCEDURE — 74183 MRI ABD W/O CNTR FLWD CNTR: CPT | Mod: 26,,, | Performed by: RADIOLOGY

## 2024-10-21 PROCEDURE — 25500020 PHARM REV CODE 255: Performed by: INTERNAL MEDICINE

## 2024-10-21 PROCEDURE — 74183 MRI ABD W/O CNTR FLWD CNTR: CPT | Mod: TC

## 2024-10-21 PROCEDURE — A9585 GADOBUTROL INJECTION: HCPCS | Performed by: INTERNAL MEDICINE

## 2024-10-21 RX ORDER — GADOBUTROL 604.72 MG/ML
10 INJECTION INTRAVENOUS
Status: COMPLETED | OUTPATIENT
Start: 2024-10-21 | End: 2024-10-21

## 2024-10-21 RX ADMIN — GADOBUTROL 9 ML: 604.72 INJECTION INTRAVENOUS at 09:10

## 2024-10-23 ENCOUNTER — PATIENT MESSAGE (OUTPATIENT)
Dept: ENDOSCOPY | Facility: HOSPITAL | Age: 59
End: 2024-10-23
Payer: MEDICARE

## 2024-10-23 ENCOUNTER — TELEPHONE (OUTPATIENT)
Dept: ENDOSCOPY | Facility: HOSPITAL | Age: 59
End: 2024-10-23
Payer: MEDICARE

## 2024-10-23 DIAGNOSIS — K86.1 CHRONIC PANCREATITIS, UNSPECIFIED PANCREATITIS TYPE: Primary | ICD-10-CM

## 2024-10-23 DIAGNOSIS — R93.89 ABNORMAL FINDING ON IMAGING: ICD-10-CM

## 2024-10-23 NOTE — TELEPHONE ENCOUNTER
Spoke to pts wife to schedule procedure(s) ERCP        Physician to perform procedure(s) Dr. JIAN Chisholm  Date of Procedure (s) 12/10/24  Arrival Time 9:00 AM  Time of Procedure(s) 10:00 AM   Location of Procedure(s) [place] 2nd Floor  Type of Rx Prep sent to patient: N/A  Instructions provided to patient via [facility]    Patient was informed on the following information and verbalized understanding. Screening questionnaire reviewed with patient and complete. If procedure requires anesthesia, a responsible adult needs to be present to accompany the patient home, patient cannot drive after receiving anesthesia. Appointment details are tentative, especially check-in time. Patient will receive a prep-op call 7 days prior to confirm check-in time for procedure. If applicable the patient should contact their pharmacy to verify Rx for procedure prep is ready for pick-up. Patient was advised to call the scheduling department at [phone] if pharmacy states no Rx is available. Patient was advised to call the endoscopy scheduling department if any questions or concerns arise.      [facility] Scheduling Department

## 2024-10-25 LAB
BACTERIA BLD CULT: NORMAL
BACTERIA BLD CULT: NORMAL

## 2024-11-13 DIAGNOSIS — K86.1 CHRONIC PANCREATITIS, UNSPECIFIED PANCREATITIS TYPE: ICD-10-CM

## 2024-11-13 RX ORDER — PREGABALIN 75 MG/1
CAPSULE ORAL
Qty: 90 CAPSULE | Refills: 0 | Status: SHIPPED | OUTPATIENT
Start: 2024-11-13

## 2024-11-20 RX ORDER — PANCRELIPASE 36000; 180000; 114000 [USP'U]/1; [USP'U]/1; [USP'U]/1
1 CAPSULE, DELAYED RELEASE PELLETS ORAL
Qty: 200 CAPSULE | Refills: 0 | Status: SHIPPED | OUTPATIENT
Start: 2024-11-20

## 2024-12-10 ENCOUNTER — HOSPITAL ENCOUNTER (OUTPATIENT)
Facility: HOSPITAL | Age: 59
Discharge: HOME | End: 2024-12-10
Attending: INTERNAL MEDICINE | Admitting: INTERNAL MEDICINE
Payer: MEDICARE

## 2024-12-10 ENCOUNTER — ANESTHESIA (OUTPATIENT)
Dept: ENDOSCOPY | Facility: HOSPITAL | Age: 59
End: 2024-12-10
Payer: MEDICARE

## 2024-12-10 ENCOUNTER — ANESTHESIA EVENT (OUTPATIENT)
Dept: ENDOSCOPY | Facility: HOSPITAL | Age: 59
End: 2024-12-10
Payer: MEDICARE

## 2024-12-10 VITALS
WEIGHT: 206 LBS | SYSTOLIC BLOOD PRESSURE: 146 MMHG | OXYGEN SATURATION: 100 % | BODY MASS INDEX: 28.84 KG/M2 | HEIGHT: 71 IN | RESPIRATION RATE: 12 BRPM | HEART RATE: 54 BPM | DIASTOLIC BLOOD PRESSURE: 91 MMHG | TEMPERATURE: 98 F

## 2024-12-10 DIAGNOSIS — K83.1 BILIARY OBSTRUCTION: ICD-10-CM

## 2024-12-10 LAB
POCT GLUCOSE: 115 MG/DL (ref 70–110)
POCT GLUCOSE: 119 MG/DL (ref 70–110)

## 2024-12-10 PROCEDURE — 43264 ERCP REMOVE DUCT CALCULI: CPT | Mod: 51,,, | Performed by: INTERNAL MEDICINE

## 2024-12-10 PROCEDURE — C1769 GUIDE WIRE: HCPCS | Performed by: INTERNAL MEDICINE

## 2024-12-10 PROCEDURE — 27201014 HC GRASPER DEVICE: Performed by: INTERNAL MEDICINE

## 2024-12-10 PROCEDURE — 43276 ERCP STENT EXCHANGE W/DILATE: CPT | Mod: ,,, | Performed by: INTERNAL MEDICINE

## 2024-12-10 PROCEDURE — 43264 ERCP REMOVE DUCT CALCULI: CPT | Performed by: INTERNAL MEDICINE

## 2024-12-10 PROCEDURE — 43276 ERCP STENT EXCHANGE W/DILATE: CPT | Performed by: INTERNAL MEDICINE

## 2024-12-10 PROCEDURE — 25500020 PHARM REV CODE 255: Performed by: INTERNAL MEDICINE

## 2024-12-10 PROCEDURE — 74328 X-RAY BILE DUCT ENDOSCOPY: CPT | Mod: 26,,, | Performed by: INTERNAL MEDICINE

## 2024-12-10 PROCEDURE — 63600175 PHARM REV CODE 636 W HCPCS: Performed by: ANESTHESIOLOGY

## 2024-12-10 PROCEDURE — 27202125 HC BALLOON, EXTRACTION (ANY): Performed by: INTERNAL MEDICINE

## 2024-12-10 PROCEDURE — 74328 X-RAY BILE DUCT ENDOSCOPY: CPT | Mod: TC | Performed by: INTERNAL MEDICINE

## 2024-12-10 PROCEDURE — 37000008 HC ANESTHESIA 1ST 15 MINUTES: Performed by: INTERNAL MEDICINE

## 2024-12-10 PROCEDURE — 37000009 HC ANESTHESIA EA ADD 15 MINS: Performed by: INTERNAL MEDICINE

## 2024-12-10 PROCEDURE — C1874 STENT, COATED/COV W/DEL SYS: HCPCS | Performed by: INTERNAL MEDICINE

## 2024-12-10 PROCEDURE — 82962 GLUCOSE BLOOD TEST: CPT | Performed by: INTERNAL MEDICINE

## 2024-12-10 PROCEDURE — 63600175 PHARM REV CODE 636 W HCPCS: Performed by: NURSE ANESTHETIST, CERTIFIED REGISTERED

## 2024-12-10 RX ORDER — SODIUM CHLORIDE 0.9 % (FLUSH) 0.9 %
10 SYRINGE (ML) INJECTION
Status: DISCONTINUED | OUTPATIENT
Start: 2024-12-10 | End: 2024-12-10 | Stop reason: HOSPADM

## 2024-12-10 RX ORDER — PROPOFOL 10 MG/ML
VIAL (ML) INTRAVENOUS CONTINUOUS PRN
Status: DISCONTINUED | OUTPATIENT
Start: 2024-12-10 | End: 2024-12-10

## 2024-12-10 RX ORDER — HYDROMORPHONE HYDROCHLORIDE 1 MG/ML
0.2 INJECTION, SOLUTION INTRAMUSCULAR; INTRAVENOUS; SUBCUTANEOUS EVERY 5 MIN PRN
Status: DISCONTINUED | OUTPATIENT
Start: 2024-12-10 | End: 2024-12-10 | Stop reason: HOSPADM

## 2024-12-10 RX ORDER — MEPERIDINE HYDROCHLORIDE 50 MG/ML
12.5 INJECTION INTRAMUSCULAR; INTRAVENOUS; SUBCUTANEOUS ONCE AS NEEDED
Status: DISCONTINUED | OUTPATIENT
Start: 2024-12-10 | End: 2024-12-10 | Stop reason: HOSPADM

## 2024-12-10 RX ORDER — SODIUM CHLORIDE 9 MG/ML
INJECTION, SOLUTION INTRAVENOUS CONTINUOUS
Status: DISCONTINUED | OUTPATIENT
Start: 2024-12-10 | End: 2024-12-10 | Stop reason: HOSPADM

## 2024-12-10 RX ORDER — ONDANSETRON HYDROCHLORIDE 2 MG/ML
INJECTION, SOLUTION INTRAVENOUS
Status: DISCONTINUED | OUTPATIENT
Start: 2024-12-10 | End: 2024-12-10

## 2024-12-10 RX ORDER — FENTANYL CITRATE 50 UG/ML
INJECTION, SOLUTION INTRAMUSCULAR; INTRAVENOUS
Status: DISCONTINUED | OUTPATIENT
Start: 2024-12-10 | End: 2024-12-10

## 2024-12-10 RX ORDER — PROPOFOL 10 MG/ML
VIAL (ML) INTRAVENOUS
Status: DISCONTINUED | OUTPATIENT
Start: 2024-12-10 | End: 2024-12-10

## 2024-12-10 RX ORDER — LIDOCAINE HYDROCHLORIDE 20 MG/ML
INJECTION INTRAVENOUS
Status: DISCONTINUED | OUTPATIENT
Start: 2024-12-10 | End: 2024-12-10

## 2024-12-10 RX ORDER — GLUCAGON 1 MG
1 KIT INJECTION
Status: DISCONTINUED | OUTPATIENT
Start: 2024-12-10 | End: 2024-12-10 | Stop reason: HOSPADM

## 2024-12-10 RX ADMIN — FENTANYL CITRATE 50 MCG: 50 INJECTION, SOLUTION INTRAMUSCULAR; INTRAVENOUS at 10:12

## 2024-12-10 RX ADMIN — HYDROMORPHONE HYDROCHLORIDE 0.2 MG: 1 INJECTION, SOLUTION INTRAMUSCULAR; INTRAVENOUS; SUBCUTANEOUS at 11:12

## 2024-12-10 RX ADMIN — ONDANSETRON 4 MG: 2 INJECTION INTRAMUSCULAR; INTRAVENOUS at 10:12

## 2024-12-10 RX ADMIN — FENTANYL CITRATE 25 MCG: 50 INJECTION, SOLUTION INTRAMUSCULAR; INTRAVENOUS at 10:12

## 2024-12-10 RX ADMIN — PROPOFOL 175 MCG/KG/MIN: 10 INJECTION, EMULSION INTRAVENOUS at 10:12

## 2024-12-10 RX ADMIN — PROPOFOL 50 MG: 10 INJECTION, EMULSION INTRAVENOUS at 10:12

## 2024-12-10 RX ADMIN — HYDROMORPHONE HYDROCHLORIDE 0.2 MG: 1 INJECTION, SOLUTION INTRAMUSCULAR; INTRAVENOUS; SUBCUTANEOUS at 12:12

## 2024-12-10 RX ADMIN — GLYCOPYRROLATE 0.1 MG: 0.2 INJECTION, SOLUTION INTRAMUSCULAR; INTRAVENOUS at 10:12

## 2024-12-10 RX ADMIN — LIDOCAINE HYDROCHLORIDE 100 MG: 20 INJECTION INTRAVENOUS at 10:12

## 2024-12-10 NOTE — ANESTHESIA PREPROCEDURE EVALUATION
12/10/2024  Fabrice Zamorano is a 59 y.o., male.      Pre-op Assessment    I have reviewed the Patient Summary Reports.     I have reviewed the Nursing Notes.       Review of Systems  Anesthesia Hx:  No problems with previous Anesthesia                Hematology/Oncology:  Hematology Normal   Oncology Normal                                   EENT/Dental:  EENT/Dental Normal           Cardiovascular:     Hypertension                                          Pulmonary:  Pneumonia      Sleep Apnea                Renal/:  Renal/ Normal                 Hepatic/GI:     GERD Liver Disease,               Musculoskeletal:  Musculoskeletal Normal                Neurological:    Neuromuscular Disease,                                   Endocrine:   Hypothyroidism          Dermatological:  Skin Normal    Psych:  Psychiatric Normal                    Physical Exam  General: Well nourished    Airway:  Mallampati: III / II  Mouth Opening: Normal  TM Distance: Normal  Tongue: Normal  Neck ROM: Normal ROM    Dental:  Intact        Anesthesia Plan  Type of Anesthesia, risks & benefits discussed:    Anesthesia Type: Gen Natural Airway  Intra-op Monitoring Plan: Standard ASA Monitors  Post Op Pain Control Plan: multimodal analgesia  Induction:  IV  Airway Plan: Direct  Informed Consent: Informed consent signed with the Patient and all parties understand the risks and agree with anesthesia plan.  All questions answered. Patient consented to blood products? No  ASA Score: 2  Day of Surgery Review of History & Physical: H&P Update referred to the surgeon/provider.    Ready For Surgery From Anesthesia Perspective.     .

## 2024-12-10 NOTE — PROGRESS NOTES
Discharge instructions reviewed w/pt and wife, verbalized understanding. Pt in NADN. No complaints at this time. Pt and wife comfortable w/ going home at this time, denies pain at this time. Tolerated some water w/ no issues. To be d/c'd home w/ wife. Instructed to return if severe pain etc.

## 2024-12-10 NOTE — TRANSFER OF CARE
"Anesthesia Transfer of Care Note    Patient: Fabrice Zamorano    Procedure(s) Performed: Procedure(s) (LRB):  ERCP (ENDOSCOPIC RETROGRADE CHOLANGIOPANCREATOGRAPHY) (N/A)    Patient location: [facility]    Anesthesia Type: general    Transport from OR: Transported from OR on 2-3 L/min O2 by NC with adequate spontaneous ventilation    Post pain: adequate analgesia    Post assessment: no apparent anesthetic complications and tolerated procedure well    Post vital signs: stable    Level of consciousness: awake, alert and oriented    Nausea/Vomiting: no nausea/vomiting    Complications: none    Transfer of care protocol was followed    Last vitals: Visit Vitals  /77   Pulse 75   Temp 36.7 °C (98 °F) (Temporal)   Resp 16   Ht 5' 11" (1.803 m)   Wt 93.4 kg (206 lb)   SpO2 97%   BMI 28.73 kg/m²     "

## 2024-12-10 NOTE — H&P
Short Stay Endoscopy History and Physical    PCP - Rebecca Mcmillan MD  Referring Physician - Shawn Chisholm MD  [address] [place] [address]    Procedure - ERCP  ASA - per anesthesia  Mallampati - per anesthesia  History of Anesthesia problems - no  Family history Anesthesia problems -  no   Plan of anesthesia - General    HPI:  This is a 59 y.o. male here for evaluation of: biliary stricture    Reflux - no  Dysphagia - no  Abdominal pain - no  Diarrhea - no    ROS:  Constitutional: No fevers, chills, No weight loss  CV: No chest pain  Pulm: No cough, No shortness of breath  GI: see HPI    Medical History:  has a past medical history of Anticoagulant long-term use, GERD (gastroesophageal reflux disease), Hypertension, Hypothyroidism (10/26/2021), Liver disease, Pancreatic pseudocyst, Personal history of colonic polyps, Sleep apnea, and Splenic vein thrombosis.    Surgical History:  has a past surgical history that includes ERCP w/ plastic stent placement; Cholecystectomy; ERCP (N/A, 09/29/2021); Endoscopic ultrasound of upper gastrointestinal tract (N/A, 10/12/2021); Lateral pancreaticojejunostomy (N/A, 11/19/2021); ERCP (N/A, 08/15/2022); Colonoscopy; Endoscopic ultrasound of upper gastrointestinal tract (N/A, 2/24/2023); ERCP (N/A, 2/24/2023); Endoscopic ultrasound of upper gastrointestinal tract (N/A, 11/14/2023); Endoscopic ultrasound of upper gastrointestinal tract (N/A, 3/12/2024); and Colonoscopy (N/A, 7/3/2024).    Family History: family history includes Arthritis in his father; Cancer in his father; Pancreatic cancer in his father; Thyroid disease in his father and mother..    Social History:  reports that he quit smoking about 23 years ago. His smoking use included cigarettes. He has never used smokeless tobacco. He reports that he does not currently use alcohol. He reports that he does not use drugs.    Review of patient's allergies indicates:  No Known Allergies    Medications:    Medications Prior to Admission   Medication Sig Dispense Refill Last Dose/Taking    carvediloL (COREG) 6.25 MG tablet Take 3.125 mg by mouth 2 (two) times daily with meals.   12/10/2024 Morning    lipase-protease-amylase (CREON) 36,000-114,000- 180,000 unit CpDR TAKE 1 CAPSULE BY MOUTH THREE TIMES DAILY WITH MEALS 200 capsule 0 12/9/2024    metFORMIN (GLUCOPHAGE-XR) 500 MG ER 24hr tablet Take 500 mg by mouth once daily.   12/9/2024    multivitamin (THERAGRAN) per tablet Take 1 tablet by mouth once daily.   12/9/2024    pantoprazole (PROTONIX) 40 MG tablet TAKE 1 TABLET(40 MG) BY MOUTH EVERY DAY 90 tablet 0 12/9/2024    pregabalin (LYRICA) 75 MG capsule TAKE 2 CAPSULES(150 MG) BY MOUTH EVERY EVENING. MAY ALSO TAKE 1 CAPSULE DAILY AS NEEDED 90 capsule 0 12/9/2024    ibuprofen (ADVIL,MOTRIN) 800 MG tablet Take 800 mg by mouth every 6 (six) hours as needed.       ondansetron (ZOFRAN-ODT) 4 MG TbDL Take 1 tablet (4 mg total) by mouth every 8 (eight) hours as needed (Nausea). 30 tablet 0     oxyCODONE-acetaminophen (PERCOCET)  mg per tablet Take 1 tablet by mouth every 4 (four) hours as needed for Pain. 21 tablet 0 More than a month       Physical Exam:    Vital Signs:   Vitals:    12/10/24 0854   BP: (!) 139/92   Pulse: 75   Resp: 16   Temp: 98 °F (36.7 °C)       General Appearance: Well appearing in no acute distress  Lungs: no labored breathing  CVS:  regular rate  Abdomen: non tender    Labs:  Lab Results   Component Value Date    WBC 12.01 10/20/2024    HGB 11.6 (L) 10/20/2024    HCT 35.5 (L) 10/20/2024     10/20/2024    CHOL 140 09/27/2021    TRIG 75 09/27/2021    HDL 24 (L) 09/27/2021    ALT 83 (H) 10/20/2024    AST 65 (H) 10/20/2024     10/20/2024    K 4.4 10/20/2024     10/20/2024    CREATININE 0.9 10/20/2024    BUN 11 10/20/2024    CO2 24 10/20/2024    TSH 2.669 03/19/2023    INR 1.1 06/19/2024       I have explained the risks and benefits of this endoscopic procedure to the patient  including but not limited to bleeding, inflammation, infection, perforation, and death.      Shawn Chisholm MD

## 2024-12-10 NOTE — [employer] PATIENT INSTRUCTIONS
Discharge Summary/Instructions after an Endoscopic Procedure  Patient Name: Fabrice Zamorano  Patient MRN: [id number]  Patient YOB: 1965  Tuesday, December 10, 2024  Shawn Chisholm MD  Dear patient,  As a result of recent federal legislation (The Federal Cures Act), you may   receive lab or pathology results from your procedure in your [facility]   account before your physician is able to contact you. Your physician or   their representative will relay the results to you with their   recommendations at their soonest availability.  Thank you,  RESTRICTIONS:  During your procedure today, you received medications for sedation.  These   medications may affect your judgment, balance and coordination.  Therefore,   for 24 hours, you have the following restrictions:   - DO NOT drive a car, operate machinery, make legal/financial decisions,   sign important papers or drink alcohol.    ACTIVITY:  Today: no heavy lifting, straining or running due to procedural   sedation/anesthesia.  The following day: return to full activity including work.  DIET:  Eat and drink normally unless instructed otherwise.     TREATMENT FOR COMMON SIDE EFFECTS:  - Mild abdominal pain, nausea, belching, bloating or excessive gas:  rest,   eat lightly and use a heating pad.  - Sore Throat: treat with throat lozenges and/or gargle with warm salt   water.  - Because air was used during the procedure, expelling large amounts of air   from your rectum or belching is normal.  - If a bowel prep was taken, you may not have a bowel movement for 1-3 days.    This is normal.  SYMPTOMS TO WATCH FOR AND REPORT TO YOUR PHYSICIAN:  1. Abdominal pain or bloating, other than gas cramps.  2. Chest pain.  3. Back pain.  4. Signs of infection such as: chills or fever occurring within 24 hours   after the procedure.  5. Rectal bleeding, which would show as bright red, maroon, or black stools.   (A tablespoon of blood from the rectum is not serious, especially  if   hemorrhoids are present.)  6. Vomiting.  7. Weakness or dizziness.  GO DIRECTLY TO THE NEAREST EMERGENCY ROOM IF YOU HAVE ANY OF THE FOLLOWING:      Difficulty breathing              Chills and/or fever over 101 F   Persistent vomiting and/or vomiting blood   Severe abdominal pain   Severe chest pain   Black, tarry stools   Bleeding- more than one tablespoon   Any other symptom or condition that you feel may need urgent attention  Your doctor recommends these additional instructions:  If any biopsies were taken, your doctors clinic will contact you in 1 to 2   weeks with any results.  - Discharge patient to home (ambulatory).   - Resume previous diet; Discharge to home (ambulatory); Resume outpatient   medications  - Return to primary care physician as previously scheduled.   - My team will arrange for a clinic follow-up with me in 6 weeks. Can   arrange for specific timing of stent removal at that time.  For questions, problems or results please call your physician - Shawn Chisholm MD at Work:  [phone].  [facility], EMERGENCY ROOM PHONE NUMBER: [phone]  IF A COMPLICATION OR EMERGENCY SITUATION ARISES AND YOU ARE UNABLE TO REACH   YOUR PHYSICIAN - GO DIRECTLY TO THE EMERGENCY ROOM.  Shawn Chisholm MD  12/10/2024 11:09:16 AM  This report has been verified and signed electronically.  Dear patient,  As a result of recent federal legislation (The Federal Cures Act), you may   receive lab or pathology results from your procedure in your [facility]   account before your physician is able to contact you. Your physician or   their representative will relay the results to you with their   recommendations at their soonest availability.  Thank you,  [employer]

## 2024-12-11 ENCOUNTER — TELEPHONE (OUTPATIENT)
Dept: GASTROENTEROLOGY | Facility: CLINIC | Age: 59
End: 2024-12-11
Payer: MEDICARE

## 2024-12-11 NOTE — TELEPHONE ENCOUNTER
----- Message from Med Assistant Toketa sent at 12/11/2024  8:25 AM CST -----              - My team will arrange for a clinic follow-up with                          me in 6 weeks. Can arrange for specific timing of                          stent removal at that time.   Attending Participation:        I personally performed the entire procedure.   Shawn Chisholm MD   12/10/2024 11:09:16 AM

## 2024-12-13 ENCOUNTER — TELEPHONE (OUTPATIENT)
Dept: GASTROENTEROLOGY | Facility: CLINIC | Age: 59
End: 2024-12-13
Payer: MEDICARE

## 2024-12-13 NOTE — TELEPHONE ENCOUNTER
----- Message from Shawn Chisholm MD sent at 12/13/2024  9:37 AM CST -----  1/30 is fine.  ----- Message -----  From: Maxine Osorio MA  Sent: 12/13/2024   8:00 AM CST  To: Shawn Chisholm MD      ----- Message -----  From: Quin Tucker MA  Sent: 12/12/2024   7:29 PM CST  To: MIKAELA Francisco Dr next available is 1/30.  You think this is ok?  ----- Message -----  From: Maxine Osorio MA  Sent: 12/11/2024   8:25 AM CST  To: #                - My team will arrange for a clinic follow-up with                          me in 6 weeks. Can arrange for specific timing of                          stent removal at that time.   Attending Participation:        I personally performed the entire procedure.   Shawn Chisholm MD   12/10/2024 11:09:16 AM

## 2024-12-30 RX ORDER — PANCRELIPASE 36000; 180000; 114000 [USP'U]/1; [USP'U]/1; [USP'U]/1
1 CAPSULE, DELAYED RELEASE PELLETS ORAL
Qty: 200 CAPSULE | Refills: 0 | Status: SHIPPED | OUTPATIENT
Start: 2024-12-30

## 2024-12-31 DIAGNOSIS — K86.1 CHRONIC PANCREATITIS, UNSPECIFIED PANCREATITIS TYPE: ICD-10-CM

## 2024-12-31 RX ORDER — PREGABALIN 75 MG/1
CAPSULE ORAL
Qty: 90 CAPSULE | Refills: 2 | Status: SHIPPED | OUTPATIENT
Start: 2024-12-31

## 2025-01-16 ENCOUNTER — PATIENT MESSAGE (OUTPATIENT)
Dept: GASTROENTEROLOGY | Facility: CLINIC | Age: 60
End: 2025-01-16
Payer: MEDICARE

## 2025-01-17 ENCOUNTER — TELEPHONE (OUTPATIENT)
Dept: GASTROENTEROLOGY | Facility: CLINIC | Age: 60
End: 2025-01-17
Payer: MEDICARE

## 2025-01-17 DIAGNOSIS — K83.9 BILIARY DISEASE: Primary | ICD-10-CM

## 2025-01-23 ENCOUNTER — PATIENT MESSAGE (OUTPATIENT)
Dept: GASTROENTEROLOGY | Facility: CLINIC | Age: 60
End: 2025-01-23
Payer: MEDICARE

## 2025-01-24 ENCOUNTER — LAB VISIT (OUTPATIENT)
Dept: LAB | Facility: HOSPITAL | Age: 60
End: 2025-01-24
Attending: INTERNAL MEDICINE
Payer: MEDICARE

## 2025-01-24 DIAGNOSIS — K83.9 BILIARY DISEASE: ICD-10-CM

## 2025-01-24 PROCEDURE — 80076 HEPATIC FUNCTION PANEL: CPT | Performed by: INTERNAL MEDICINE

## 2025-01-24 PROCEDURE — 36415 COLL VENOUS BLD VENIPUNCTURE: CPT | Performed by: INTERNAL MEDICINE

## 2025-01-24 PROCEDURE — 83690 ASSAY OF LIPASE: CPT | Performed by: INTERNAL MEDICINE

## 2025-01-25 LAB
ALBUMIN SERPL BCP-MCNC: 3.7 G/DL (ref 3.5–5.2)
ALP SERPL-CCNC: 96 U/L (ref 40–150)
ALT SERPL W/O P-5'-P-CCNC: 24 U/L (ref 10–44)
AST SERPL-CCNC: 18 U/L (ref 10–40)
BILIRUB DIRECT SERPL-MCNC: 0.2 MG/DL (ref 0.1–0.3)
BILIRUB SERPL-MCNC: 0.5 MG/DL (ref 0.1–1)
LIPASE SERPL-CCNC: 22 U/L (ref 4–60)
PROT SERPL-MCNC: 7.3 G/DL (ref 6–8.4)

## 2025-01-27 ENCOUNTER — PATIENT MESSAGE (OUTPATIENT)
Dept: GASTROENTEROLOGY | Facility: CLINIC | Age: 60
End: 2025-01-27
Payer: MEDICARE

## 2025-01-27 RX ORDER — PANCRELIPASE 36000; 180000; 114000 [USP'U]/1; [USP'U]/1; [USP'U]/1
1 CAPSULE, DELAYED RELEASE PELLETS ORAL
Qty: 200 CAPSULE | Refills: 0 | Status: SHIPPED | OUTPATIENT
Start: 2025-01-27

## 2025-01-29 ENCOUNTER — PATIENT MESSAGE (OUTPATIENT)
Dept: GASTROENTEROLOGY | Facility: CLINIC | Age: 60
End: 2025-01-29
Payer: MEDICARE

## 2025-02-07 ENCOUNTER — PATIENT MESSAGE (OUTPATIENT)
Dept: GASTROENTEROLOGY | Facility: CLINIC | Age: 60
End: 2025-02-07
Payer: MEDICARE

## 2025-02-11 LAB
CHOL/HDLC RATIO: ABNORMAL
CHOLEST SERPL-MSCNC: 96 MG/DL (ref 0–200)
HDLC SERPL-MCNC: 25 MG/DL (ref 35–70)
LDL CHOLESTEROL DIRECT: ABNORMAL
LDLC SERPL CALC-MCNC: 63 MG/DL (ref 0–160)
NONHDLC SERPL-MCNC: 71 MG/DL
TRIGL SERPL-MCNC: 86 MG/DL (ref 40–160)
VLDL CHOLESTEROL: ABNORMAL

## 2025-02-24 ENCOUNTER — OFFICE VISIT (OUTPATIENT)
Dept: FAMILY MEDICINE | Facility: CLINIC | Age: 60
End: 2025-02-24
Payer: MEDICARE

## 2025-02-24 VITALS
HEIGHT: 71 IN | HEART RATE: 87 BPM | OXYGEN SATURATION: 97 % | RESPIRATION RATE: 16 BRPM | WEIGHT: 200.63 LBS | BODY MASS INDEX: 28.09 KG/M2 | SYSTOLIC BLOOD PRESSURE: 122 MMHG | DIASTOLIC BLOOD PRESSURE: 60 MMHG

## 2025-02-24 DIAGNOSIS — K86.1 CHRONIC RECURRENT PANCREATITIS: ICD-10-CM

## 2025-02-24 DIAGNOSIS — E11.9 DIABETES MELLITUS, NEW ONSET: Primary | ICD-10-CM

## 2025-02-24 DIAGNOSIS — K85.90 ACUTE RECURRENT PANCREATITIS: ICD-10-CM

## 2025-02-24 PROCEDURE — 1159F MED LIST DOCD IN RCRD: CPT | Mod: CPTII,S$GLB,, | Performed by: STUDENT IN AN ORGANIZED HEALTH CARE EDUCATION/TRAINING PROGRAM

## 2025-02-24 PROCEDURE — 99999 PR PBB SHADOW E&M-EST. PATIENT-LVL V: CPT | Mod: PBBFAC,,, | Performed by: STUDENT IN AN ORGANIZED HEALTH CARE EDUCATION/TRAINING PROGRAM

## 2025-02-24 PROCEDURE — 99204 OFFICE O/P NEW MOD 45 MIN: CPT | Mod: S$GLB,,, | Performed by: STUDENT IN AN ORGANIZED HEALTH CARE EDUCATION/TRAINING PROGRAM

## 2025-02-24 PROCEDURE — 3008F BODY MASS INDEX DOCD: CPT | Mod: CPTII,S$GLB,, | Performed by: STUDENT IN AN ORGANIZED HEALTH CARE EDUCATION/TRAINING PROGRAM

## 2025-02-24 PROCEDURE — G2211 COMPLEX E/M VISIT ADD ON: HCPCS | Mod: S$GLB,,, | Performed by: STUDENT IN AN ORGANIZED HEALTH CARE EDUCATION/TRAINING PROGRAM

## 2025-02-24 PROCEDURE — 3074F SYST BP LT 130 MM HG: CPT | Mod: CPTII,S$GLB,, | Performed by: STUDENT IN AN ORGANIZED HEALTH CARE EDUCATION/TRAINING PROGRAM

## 2025-02-24 PROCEDURE — 3078F DIAST BP <80 MM HG: CPT | Mod: CPTII,S$GLB,, | Performed by: STUDENT IN AN ORGANIZED HEALTH CARE EDUCATION/TRAINING PROGRAM

## 2025-02-24 RX ORDER — FERROUS GLUCONATE 324(38)MG
38 TABLET ORAL
COMMUNITY

## 2025-02-24 RX ORDER — ALPRAZOLAM 1 MG/1
1 TABLET ORAL DAILY PRN
COMMUNITY
Start: 2024-12-12

## 2025-02-24 RX ORDER — PREGABALIN 75 MG/1
75 CAPSULE ORAL
COMMUNITY
Start: 2024-04-19

## 2025-02-24 RX ORDER — SIMVASTATIN 20 MG/1
20 TABLET, FILM COATED ORAL NIGHTLY
Qty: 90 TABLET | Refills: 3 | Status: SHIPPED | OUTPATIENT
Start: 2025-02-24 | End: 2026-02-24

## 2025-02-24 RX ORDER — EMPAGLIFLOZIN 25 MG/1
TABLET, FILM COATED ORAL
COMMUNITY
Start: 2025-02-12

## 2025-02-24 RX ORDER — HYDROCODONE BITARTRATE AND ACETAMINOPHEN 7.5; 325 MG/1; MG/1
1 TABLET ORAL EVERY 6 HOURS PRN
COMMUNITY
Start: 2024-12-12

## 2025-02-24 RX ORDER — OXYCODONE HYDROCHLORIDE 10 MG/1
10 TABLET ORAL EVERY 4 HOURS PRN
COMMUNITY
Start: 2024-10-03

## 2025-02-24 RX ORDER — FLUORIDE (SODIUM) 1.1 %
PASTE (ML) DENTAL
COMMUNITY
Start: 2024-09-03

## 2025-02-24 RX ORDER — PROMETHAZINE HYDROCHLORIDE 25 MG/1
25 TABLET ORAL EVERY 6 HOURS PRN
COMMUNITY

## 2025-02-24 RX ORDER — IBUPROFEN 600 MG/1
600 TABLET ORAL EVERY 6 HOURS PRN
COMMUNITY
Start: 2024-12-12

## 2025-02-24 RX ORDER — CARVEDILOL 6.25 MG/1
6.25 TABLET ORAL
COMMUNITY
Start: 2025-02-10 | End: 2025-05-11

## 2025-02-24 RX ORDER — PANTOPRAZOLE SODIUM 40 MG/1
1 TABLET, DELAYED RELEASE ORAL DAILY
COMMUNITY
Start: 2025-02-20

## 2025-02-24 RX ORDER — METFORMIN HYDROCHLORIDE 500 MG/1
500 TABLET, EXTENDED RELEASE ORAL
COMMUNITY
Start: 2025-02-10 | End: 2025-05-11

## 2025-02-24 NOTE — PROGRESS NOTES
Patient ID: Fabrice Zamorano is a 60 y.o. male.    Chief Complaint: Establish Care    History of Present Illness    CHIEF COMPLAINT:  Mr. Zamorano presents today to establish care after previous provider joined [facility] practice.    CURRENT SYMPTOMS:  He reports feeling achy across the abdomen and decreased water intake recently due to cold and rainy weather.    CHRONIC HEREDITARY PANCREATITIS:  He has chronic hereditary pancreatitis with confirmed PRSS1 gene mutation, initially diagnosed in 2017. He has experienced approximately 25-30 hospitalizations since diagnosis, with most recent hospitalization in 2023. He underwent peustow procedure in  and recently required bile duct stent placement with subsequent replacement due to inadequate ductal opening. He undergoes regular surveillance with alternating MRI and endoscopic ultrasound every 6 months.    DIABETES:  He was recently diagnosed with type 2 diabetes. A1C has increased from 6.2 six months ago to 8.9 currently. He was started on Jardiance 25 mg daily. He has experienced a 9-pound weight loss over the past two weeks.    FAMILY HISTORY:  He has a significant family history of pancreatic issues. His father  of pancreatic cancer and had diabetes. His uncle had brittle diabetes. His first cousin had a pancreatic tumor removed at age 14 or 15. His 33-year-old son was recently hospitalized for eight days with pancreatitis and is currently undergoing genetic testing.    SOCIAL HISTORY:  He is a former smoker who quit 30 years ago and reports no alcohol use for the past 8 years.      ROS:  General: -fever, -chills, -fatigue, -weight gain, +weight loss  Eyes: -vision changes, -redness, -discharge  ENT: -ear pain, -nasal congestion, -sore throat  Cardiovascular: -chest pain, -palpitations, -lower extremity edema  Respiratory: -cough, -shortness of breath  Gastrointestinal: +abdominal pain, -nausea, -vomiting, -diarrhea, -constipation, -blood in  stool  Genitourinary: -dysuria, -hematuria, -frequency  Musculoskeletal: -joint pain, -muscle pain  Skin: -rash, -lesion  Neurological: -headache, -dizziness, -numbness, -tingling  Psychiatric: -anxiety, -depression, -sleep difficulty         Pmh, Psh, Family Hx, Social Hx updated in Epic Tabs today.       3/8/2023     9:27 AM 2/2/2023     9:04 AM 11/4/2021    10:24 AM   Depression Patient Health Questionnaire   Over the last two weeks how often have you been bothered by little interest or pleasure in doing things Not at all  Not at all  Not at all    Over the last two weeks how often have you been bothered by feeling down, depressed or hopeless Not at all Not at all Not at all    PHQ-2 Total Score 0 0 0       Data saved with a previous flowsheet row definition       Active Problem List with Overview Notes    Diagnosis Date Noted    Chronic recurrent pancreatitis 02/24/2025     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 Follows gastro      Sepsis 10/19/2024    Pneumonia 10/19/2024    Acute pancreatic fluid collection 07/01/2024    Biliary obstruction 06/20/2024    Uncontrolled hypertension 06/20/2024    Abnormal liver enzymes 03/23/2022    Acute on chronic pancreatitis 10/26/2021    GERD (gastroesophageal reflux disease) 10/26/2021    Facial palsy 10/26/2021    Diverticular disease of colon 10/26/2021    History of excision of intestinal structure 10/26/2021    Hypothyroidism 10/26/2021    Mixed hyperlipidemia 10/26/2021    Portal vein thrombosis 10/26/2021    History of DVT (deep vein thrombosis) 10/26/2021    Hypertension     Iron deficiency anemia 09/28/2021    Alcohol use 11/23/2017     Last Assessment & Plan:   Formatting of this note might be different from the original.  This was mild in nature however patient was counseled on cessation given his acute pancreatitis.      BRAIN  on CPAP 11/23/2017     Last Assessment & Plan:   Formatting of this note might be different from the original.  Patient was continued on his home CPAP therapy while in house.  No acute issues during his hospitalization.         Past Medical History:   Diagnosis Date    Anticoagulant long-term use     On Eliquis for DVT    GERD (gastroesophageal reflux disease)     Hypertension     Hypothyroidism 10/26/2021    Liver disease     fatty liver    Pancreatic pseudocyst     Personal history of colonic polyps     Sleep apnea     Splenic vein thrombosis        Past Surgical History:   Procedure Laterality Date    CHOLECYSTECTOMY      COLONOSCOPY      COLONOSCOPY N/A 7/3/2024    Procedure: COLONOSCOPY;  Surgeon: Kwadwo Gonsalez MD;  Location: [facility] ([address] [place] [address]);  Service: Endoscopy;  Laterality: N/A;    ENDOSCOPIC ULTRASOUND OF UPPER GASTROINTESTINAL TRACT N/A 10/12/2021    Procedure: ULTRASOUND, UPPER GI TRACT, ENDOSCOPIC;  Surgeon: Odalys Leiva MD;  Location: [facility];  Service: Endoscopy;  Laterality: N/A;  Upper and linear, 22 shark core, cytology and RPMI    ENDOSCOPIC ULTRASOUND OF UPPER GASTROINTESTINAL TRACT N/A 2/24/2023    Procedure: ULTRASOUND, UPPER GI TRACT, ENDOSCOPIC;  Surgeon: Shawn Chisholm MD;  Location: [facility] ([address] [place] [address]);  Service: Endoscopy;  Laterality: N/A;  2/3/23-Instructions via portal-DS    ENDOSCOPIC ULTRASOUND OF UPPER GASTROINTESTINAL TRACT N/A 11/14/2023    Procedure: ULTRASOUND, UPPER GI TRACT, ENDOSCOPIC;  Surgeon: Kwadwo Gonsalez MD;  Location: [facility];  Service: Gastroenterology;  Laterality: N/A;  10/13/23: instructions sent vie portal-GD    ENDOSCOPIC ULTRASOUND OF UPPER GASTROINTESTINAL TRACT N/A 3/12/2024    Procedure: ULTRASOUND, UPPER GI TRACT, ENDOSCOPIC;  Surgeon: Shawn Chisholm MD;  Location: [facility];  Service: Endoscopy;  Laterality: N/A;  2/16/24: inst sent via portal-GD    ERCP N/A 09/29/2021    Procedure: ERCP (ENDOSCOPIC RETROGRADE  CHOLANGIOPANCREATOGRAPHY);  Surgeon: Odalys Leiva MD;  Location: [facility];  Service: Endoscopy;  Laterality: N/A;    ERCP N/A 08/15/2022    Procedure: ERCP (ENDOSCOPIC RETROGRADE CHOLANGIOPANCREATOGRAPHY);  Surgeon: Odalys Leiva MD;  Location: [facility];  Service: Endoscopy;  Laterality: N/A;    ERCP N/A 2023    Procedure: ERCP (ENDOSCOPIC RETROGRADE CHOLANGIOPANCREATOGRAPHY);  Surgeon: Shawn Chisholm MD;  Location: [facility] (2ND FLR);  Service: Endoscopy;  Laterality: N/A;    ERCP N/A 12/10/2024    Procedure: ERCP (ENDOSCOPIC RETROGRADE CHOLANGIOPANCREATOGRAPHY);  Surgeon: Shawn Chisholm MD;  Location: [facility] (2ND FLR);  Service: Endoscopy;  Laterality: N/A;  10/23/24: instructions sent via portal-GD  12/3-pre call complete with wife-tb    ERCP W/ PLASTIC STENT PLACEMENT      LATERAL PANCREATICOJEJUNOSTOMY N/A 2021    Procedure: PANCREATICOJEJUNOSTOMY, SIDE-TO-SIDE tier 1;  Surgeon: Brian Hills MD;  [facility];  Service: General;  Laterality: N/A;       Family History   Problem Relation Name Age of Onset    Thyroid disease Mother      Cancer Father      Pancreatic cancer Father      Arthritis Father      Thyroid disease Father         Social History     Socioeconomic History    Marital status:    Tobacco Use    Smoking status: Former     Current packs/day: 0.00     Types: Cigarettes     Quit date: 2001     Years since quittin.5    Smokeless tobacco: Never   Substance and Sexual Activity    Alcohol use: Not Currently    Drug use: Never    Sexual activity: Yes     Social Drivers of Health     Financial Resource Strain: Low Risk  (2025)    Overall Financial Resource Strain (CARDIA)     Difficulty of Paying Living Expenses: Not hard at all   Food Insecurity: No Food Insecurity (2025)    Hunger Vital Sign     Worried About Running Out of Food in the Last Year: Never true     Ran Out of Food in the Last Year: Never true  "  Transportation Needs: No Transportation Needs (2/19/2025)    PRAPARE - Transportation     Lack of Transportation (Medical): No     Lack of Transportation (Non-Medical): No   Physical Activity: Inactive (2/19/2025)    Exercise Vital Sign     Days of Exercise per Week: 0 days     Minutes of Exercise per Session: 0 min   Stress: No Stress Concern Present (2/19/2025)    [employer] - Occupational Stress Questionnaire     Feeling of Stress : Not at all   Housing Stability: Low Risk  (2/19/2025)    Housing Stability Vital Sign     Unable to Pay for Housing in the Last Year: No     Number of Times Moved in the Last Year: 0     Homeless in the Last Year: No       Medications Ordered Prior to Encounter[1]    Review of patient's allergies indicates:  No Known Allergies      Review of Systems    General - Well developed, alert and oriented in NAD  HEENT - normocephalic, no evidence of trauma, sclera white, EOMI  Neck - full range of motion  COR - regular rate and rhythm without murmurs or gallops  Lungs - Clear  Abdomen - soft, non-tender  Ext - no cyanosis or edema  Protective Sensation (w/ 10 gram monofilament):  Right: Intact  Left: Intact    Visual Inspection:  Normal -  Bilateral    Pedal Pulses:   Right: Present  Left: Present    Posterior Tibialis Pulses:   Right:Present  Left: Present      Physical Exam     Assessment:     1. Diabetes mellitus, new onset    2. Acute recurrent pancreatitis    3. Chronic recurrent pancreatitis        LABS:   No results found for: "HGBA1C"   Lab Results   Component Value Date    CHOL 140 09/27/2021     Lab Results   Component Value Date    LDLCALC 101.0 09/27/2021     Lab Results   Component Value Date    WBC 12.01 10/20/2024    HGB 11.6 (L) 10/20/2024    HCT 35.5 (L) 10/20/2024     10/20/2024    CHOL 140 09/27/2021    TRIG 75 09/27/2021    HDL 24 (L) 09/27/2021    ALT 24 01/24/2025    AST 18 01/24/2025     10/20/2024    K 4.4 10/20/2024    CL " 105 10/20/2024    CREATININE 0.9 10/20/2024    BUN 11 10/20/2024    CO2 24 10/20/2024    TSH 2.669 03/19/2023    INR 1.1 06/19/2024       Plan:   Fabrice was seen today for establish care.    Diagnoses and all orders for this visit:    Diabetes mellitus, new onset  -     Insulin, random; Future  -     C-PEPTIDE; Future  -     Ambulatory referral/consult to Diabetes Education; Future  -     simvastatin (ZOCOR) 20 MG tablet; Take 1 tablet (20 mg total) by mouth every evening.  -     Microalbumin/Creatinine Ratio, Urine; Future    Acute recurrent pancreatitis    Chronic recurrent pancreatitis  -     Insulin, random; Future  -     C-PEPTIDE; Future  -     Comprehensive Metabolic Panel; Future        Assessment & Plan    IMPRESSION:  - Suspect new onset diabetes due to chronic pancreatitis causing insulin deficiency rather than typical Type 2 diabetes  - Ordered C-peptide and fasting insulin to assess pancreatic function and guide treatment approach  - Considering insulin therapy if pancreas not producing adequate insulin  - Noted family history of pancreatic issues and diabetes, considering genetic factors  - Discussed potential need for total pancreatectomy in the future given severity of chronic pancreatitis    TYPE 2 DIABETES MELLITUS WITHOUT COMPLICATION, WITHOUT LONG-TERM CURRENT USE OF INSULIN:  - Diagnosed type 2 diabetes with A1C of 8.9, increased from 6.2 six months ago.  - Discussed target glucose ranges (fasting  mg/dL, 2 hours post-meal <180 mg/dL) and A1c goal of 7%.  - Instructed patient to monitor and log glucose 4 times daily.  - Prescribed Jardiance 25 mg and simvastatin (low intensity) daily for diabetes and cholesterol management, respectively.  - Ordered C-peptide level, fasting insulin level, and microalbumin creatinine ratio to determine insulin production and exact cause of diabetes.    OTHER CHRONIC PANCREATITIS:  - Mr. Zamorano has hereditary chronic pancreatitis with PRSS1 gene mutation,  resulting in severely calcified pancreas affecting bile duct function.  - History includes 25-30 previous hospitalizations for pancreatitis, recent bile stent placement and replacement, and current intermittent lower abdominal pain.  - Ongoing pain management with Dr. Horton includes regular outpatient fluid administration, with consideration of celiac plexus block.    WEIGHT MANAGEMENT:  - Noted unusual weight loss of 9 lbs in the last 2 weeks.  - Documented weight fluctuations: 201 lbs in August, up to 209 lbs, and current weight loss.  - Expressed concern about weight loss potentially related to insulin deficiency and muscle wasting due to pancreatic issues.    DIABETES:  - Family history includes father and uncle with diabetes, uncle being a brittle diabetic.  - If pancreatic function is compromised, potential insulin therapy may be initiated, starting with a low dose of Lantus.  - Recommend cleaning up diet, particularly reducing sugar and bread intake.    ANEMIA:  - Recommend continuation of OTC iron supplement daily.    SMOKING HISTORY:  - Noted patient's history of smoking cigarettes 30 years ago.    FAMILY HISTORY OF PANCREATIC CANCER:  - Documented patient's father  of pancreatic cancer.  - Recommend informing gastroenterologist about family history.  - Advised regular monitoring with CTs, MRIs, and endoscopic ultrasounds every 6 months.           Face to Face time with patient: 11:00 AM CST -      Each patient to whom he or she provides medical services by telemedicine is:  (1) informed of the relationship between the physician and patient and the respective role of any other health care provider with respect to management of the patient; and (2) notified that he or she may decline to receive medical services by telemedicine and may withdraw from such care at any time.    I spent a total of   45    minutes face to face and non-face to face on the date of this visit.This includes time preparing to see  the patient (eg, review of tests, notes), obtaining and/or reviewing additional history from an independent historian and/or outside medical records, documenting clinical information in the electronic health record, independently interpreting results and/or communicating results to the patient/family/caregiver, or care coordinator.  Visit today included increased complexity associated with the care of the episodic problem addressed and managing the longitudinal care of the patient due to the serious and/or complex managed problem(s).    There are no Patient Instructions on file for this visit.    No follow-ups on file.  The ASCVD Risk score (Thompson DK, et al., 2019) failed to calculate for the following reasons:    Cannot find a previous HDL lab    Cannot find a previous total cholesterol lab    This note was generated with the assistance of ambient listening technology. Verbal consent was obtained by the patient and accompanying visitor(s) for the recording of patient appointment to facilitate this note. I attest to having reviewed and edited the generated note for accuracy, though some syntax or spelling errors may persist. Please contact the author of this note for any clarification.         [1]   Current Outpatient Medications on File Prior to Visit   Medication Sig Dispense Refill    ALPRAZolam (XANAX) 1 MG tablet Take 1 mg by mouth daily as needed.      carvediloL (COREG) 6.25 MG tablet Take 6.25 mg by mouth.      CREON 36,000-114,000- 180,000 unit CpDR TAKE 1 CAPSULE BY MOUTH THREE TIMES DAILY WITH MEALS 200 capsule 0    ferrous gluconate (FERGON) 324 MG tablet Take 38 mg by mouth.      HYDROcodone-acetaminophen (NORCO) 7.5-325 mg per tablet Take 1 tablet by mouth every 6 (six) hours as needed.      ibuprofen (ADVIL,MOTRIN) 600 MG tablet Take 600 mg by mouth every 6 (six) hours as needed.      JARDIANCE 25 mg tablet       metFORMIN (GLUCOPHAGE-XR) 500 MG ER 24hr tablet Take 500 mg by mouth.      multivitamin  (THERAGRAN) per tablet Take 1 tablet by mouth once daily.      ondansetron (ZOFRAN-ODT) 4 MG TbDL Take 1 tablet (4 mg total) by mouth every 8 (eight) hours as needed (Nausea). 30 tablet 0    oxyCODONE (ROXICODONE) 10 mg Tab immediate release tablet Take 10 mg by mouth every 4 (four) hours as needed.      oxyCODONE-acetaminophen (PERCOCET)  mg per tablet Take 1 tablet by mouth every 4 (four) hours as needed for Pain. 21 tablet 0    pantoprazole (PROTONIX) 40 MG tablet Take 1 tablet by mouth once daily.      pregabalin (LYRICA) 75 MG capsule Take 75 mg by mouth.      PREVIDENT 5000 BOOSTER PLUS 1.1 % Pste BRUSH TEETH WITH PASTE BY MOUTH TWICE DAILY. SPIT OUT EXCESS. DO NOT RINSE      promethazine (PHENERGAN) 25 MG tablet Take 25 mg by mouth every 6 (six) hours as needed.      carvediloL (COREG) 6.25 MG tablet Take 3.125 mg by mouth 2 (two) times daily with meals.      ibuprofen (ADVIL,MOTRIN) 800 MG tablet Take 800 mg by mouth every 6 (six) hours as needed.      lipase-protease-amylase (CREON) 36,000-114,000- 180,000 unit CpDR Take 1 Dose by mouth.      metFORMIN (GLUCOPHAGE-XR) 500 MG ER 24hr tablet Take 500 mg by mouth once daily.      pantoprazole (PROTONIX) 40 MG tablet TAKE 1 TABLET(40 MG) BY MOUTH EVERY DAY 90 tablet 0    pregabalin (LYRICA) 75 MG capsule TAKE 2 CAPSULES(150 MG) BY MOUTH EVERY EVENING. MAY ALSO TAKE 1 CAPSULE DAILY AS NEEDED 90 capsule 2     No current facility-administered medications on file prior to visit.

## 2025-02-25 ENCOUNTER — LAB VISIT (OUTPATIENT)
Dept: LAB | Facility: HOSPITAL | Age: 60
End: 2025-02-25
Attending: STUDENT IN AN ORGANIZED HEALTH CARE EDUCATION/TRAINING PROGRAM
Payer: MEDICARE

## 2025-02-25 DIAGNOSIS — K86.1 CHRONIC RECURRENT PANCREATITIS: ICD-10-CM

## 2025-02-25 DIAGNOSIS — E11.9 DIABETES MELLITUS, NEW ONSET: ICD-10-CM

## 2025-02-25 LAB
ALBUMIN SERPL BCP-MCNC: 3.9 G/DL (ref 3.5–5.2)
ALP SERPL-CCNC: 110 U/L (ref 40–150)
ALT SERPL W/O P-5'-P-CCNC: 57 U/L (ref 10–44)
ANION GAP SERPL CALC-SCNC: 10 MMOL/L (ref 8–16)
AST SERPL-CCNC: 54 U/L (ref 10–40)
BILIRUB SERPL-MCNC: 0.5 MG/DL (ref 0.1–1)
BUN SERPL-MCNC: 18 MG/DL (ref 6–20)
C PEPTIDE SERPL-MCNC: 1.78 NG/ML (ref 0.78–5.19)
CALCIUM SERPL-MCNC: 9.3 MG/DL (ref 8.7–10.5)
CHLORIDE SERPL-SCNC: 103 MMOL/L (ref 95–110)
CO2 SERPL-SCNC: 27 MMOL/L (ref 23–29)
CREAT SERPL-MCNC: 1.1 MG/DL (ref 0.5–1.4)
EST. GFR  (NO RACE VARIABLE): >60 ML/MIN/1.73 M^2
GLUCOSE SERPL-MCNC: 124 MG/DL (ref 70–110)
POTASSIUM SERPL-SCNC: 4.1 MMOL/L (ref 3.5–5.1)
PROT SERPL-MCNC: 6.8 G/DL (ref 6–8.4)
SODIUM SERPL-SCNC: 140 MMOL/L (ref 136–145)

## 2025-02-25 PROCEDURE — 36415 COLL VENOUS BLD VENIPUNCTURE: CPT | Mod: PN | Performed by: STUDENT IN AN ORGANIZED HEALTH CARE EDUCATION/TRAINING PROGRAM

## 2025-02-25 PROCEDURE — 83525 ASSAY OF INSULIN: CPT | Performed by: STUDENT IN AN ORGANIZED HEALTH CARE EDUCATION/TRAINING PROGRAM

## 2025-02-25 PROCEDURE — 80053 COMPREHEN METABOLIC PANEL: CPT | Performed by: STUDENT IN AN ORGANIZED HEALTH CARE EDUCATION/TRAINING PROGRAM

## 2025-02-25 PROCEDURE — 84681 ASSAY OF C-PEPTIDE: CPT | Performed by: STUDENT IN AN ORGANIZED HEALTH CARE EDUCATION/TRAINING PROGRAM

## 2025-02-26 LAB
INSULIN COLLECTION INTERVAL: NORMAL
INSULIN SERPL-ACNC: 7.9 UU/ML

## 2025-02-27 ENCOUNTER — PATIENT MESSAGE (OUTPATIENT)
Dept: FAMILY MEDICINE | Facility: CLINIC | Age: 60
End: 2025-02-27
Payer: MEDICARE

## 2025-02-27 ENCOUNTER — OFFICE VISIT (OUTPATIENT)
Dept: GASTROENTEROLOGY | Facility: CLINIC | Age: 60
End: 2025-02-27
Payer: MEDICARE

## 2025-02-27 ENCOUNTER — RESULTS FOLLOW-UP (OUTPATIENT)
Dept: FAMILY MEDICINE | Facility: CLINIC | Age: 60
End: 2025-02-27

## 2025-02-27 DIAGNOSIS — K85.90 HEREDITARY PANCREATITIS: ICD-10-CM

## 2025-02-27 DIAGNOSIS — K86.1 CHRONIC PANCREATITIS, UNSPECIFIED PANCREATITIS TYPE: Primary | ICD-10-CM

## 2025-02-27 RX ORDER — LANCETS
EACH MISCELLANEOUS
Qty: 100 EACH | Refills: 99 | Status: SHIPPED | OUTPATIENT
Start: 2025-02-27

## 2025-02-27 RX ORDER — INSULIN PUMP SYRINGE, 3 ML
EACH MISCELLANEOUS
Qty: 100 EACH | Refills: 0 | Status: SHIPPED | OUTPATIENT
Start: 2025-02-27

## 2025-02-27 NOTE — TELEPHONE ENCOUNTER
Care Due:                  Date            Visit Type   Department     Provider  --------------------------------------------------------------------------------                                NP -                              [facility] FAMILY  Last Visit: 02-      CARE ([facility])   MEDICINE       Atiya Lopez                              EP -                              [facility]  Next Visit: 03-      [facility] ([facility])   MEDICINE       Atiya  John                                                            Last  Test          Frequency    Reason                     Performed    Due Date  --------------------------------------------------------------------------------    Lipid Panel.  12 months..  simvastatin..............  09- 09-    Health [facility] Embedded Care Due Messages. Reference number: [id number].   2/27/2025 9:33:10 AM CST

## 2025-02-27 NOTE — PROGRESS NOTES
The patient location is: [place]  The chief complaint leading to consultation is:  Hereditary pancreatitis; chronic pancreatitis    Visit type: audiovisual    Face to Face time with patient: 15  25 minutes of total time spent on the encounter, which includes face to face time and non-face to face time preparing to see the patient (eg, review of tests), Obtaining and/or reviewing separately obtained history, Documenting clinical information in the electronic or other health record, Independently interpreting results (not separately reported) and communicating results to the patient/family/caregiver, or Care coordination (not separately reported).         Each patient to whom he or she provides medical services by telemedicine is:  (1) informed of the relationship between the physician and patient and the respective role of any other health care provider with respect to management of the patient; and (2) notified that he or she may decline to receive medical services by telemedicine and may withdraw from such care at any time.    Notes:        Advanced Endoscopy / Pancreaticobiliary Service    Reason for visit (Chief Complaint):  Hereditary pancreatitis    Referring provider/PCP: No referring provider defined for this encounter.    History of Present Illness: Patient presents for follow-up of above.  Patient is well known to me from prior visits.  He has known history of hereditary pancreatitis related to a PRSS gene mutation.  He underwent a peustow procedure with some parenchymal debulking in November of 2021.  He has done much better since the surgery, however he still has episodes of ongoing mild attacks of pain.  He is followed by pain management.    While on a trip to [place] last fall, he did develop biliary obstruction related to his underlying chronic pancreatitis.  This was treated with ERCP and stenting.  I performed a repeat ERCP on him in December.  This showed a residual stricture in the lower 3rd of the  bile duct.  I replaced his metal stent that was placed in [place] with a new metal stent at that time.  Recent LFTs have shown no evidence for recurrent biliary obstruction.  He does tell me that his diabetes has more recently worsened, and that this is being actively managed by a new primary care doctor within our system.      Physical Exam:  General: Well-developed, well-appearing, no acute distress      Laboratory:   Reviewed LFTs from Feb and Jan    Imaging:  Reviewed last ERCP images    Assessment/Plan:  Hereditary chronic pancreatitis- recommend continuing diabetes control through his primary care.  Would recommend continuing pancreatic enzyme replacement therapy.  We also discussed that his pancreatitis has led to a biliary stricture.  We are currently addressing this endoscopically.  I will arrange for an endoscopic ultrasound and ERCP to be done in mid to late April.  We discussed that if we can not remediate the biliary stricture through endoscopic stenting sessions, then we may need to discuss whether he needs to see our pancreatic surgeon again for consideration of surgical biliary drainage.  My team will reach out to him to arrange for his next endoscopic procedures.        Shawn Chisholm MD, HAIR   - Department of Gastroenterology  [facility] [place]

## 2025-03-05 ENCOUNTER — TELEPHONE (OUTPATIENT)
Dept: ENDOSCOPY | Facility: HOSPITAL | Age: 60
End: 2025-03-05
Payer: MEDICARE

## 2025-03-05 ENCOUNTER — PATIENT MESSAGE (OUTPATIENT)
Dept: ENDOSCOPY | Facility: HOSPITAL | Age: 60
End: 2025-03-05
Payer: MEDICARE

## 2025-03-05 DIAGNOSIS — K86.1 CHRONIC PANCREATITIS, UNSPECIFIED PANCREATITIS TYPE: Primary | ICD-10-CM

## 2025-03-05 NOTE — TELEPHONE ENCOUNTER
Shawn Chisholm MD  [facility] Schedulers  Caller: Unspecified (6 days ago,  8:23 AM)  Please arrange for EUS and ERCP with me to be done in mid to late April.  Can be done at [facility], [facility] or [place].    Referral for procedure from [facility]      Spoke to wife to schedule procedure(s) ERCP and eus       Physician to perform procedure(s) Dr. JIAN Chisholm  Date of Procedure (s) 4/28/25  Arrival Time 7:00 AM  Time of Procedure(s) 8:00 AM   Location of Procedure(s) [address]  Type of Rx Prep sent to patient: Other  Instructions provided to patient via [facility]    Patient was informed on the following information and verbalized understanding. Screening questionnaire reviewed with patient and complete. If procedure requires anesthesia, a responsible adult needs to be present to accompany the patient home, patient cannot drive after receiving anesthesia. Appointment details are tentative, especially check-in time. Patient will receive a prep-op call 7 days prior to confirm check-in time for procedure. If applicable the patient should contact their pharmacy to verify Rx for procedure prep is ready for pick-up. Patient was advised to call the scheduling department at [phone] if pharmacy states no Rx is available. Patient was advised to call the endoscopy scheduling department if any questions or concerns arise.       Endoscopy Scheduling Department

## 2025-03-10 ENCOUNTER — TELEPHONE (OUTPATIENT)
Dept: DIABETES | Facility: CLINIC | Age: 60
End: 2025-03-10
Payer: MEDICARE

## 2025-03-10 ENCOUNTER — OFFICE VISIT (OUTPATIENT)
Dept: FAMILY MEDICINE | Facility: CLINIC | Age: 60
End: 2025-03-10
Payer: MEDICARE

## 2025-03-10 VITALS
OXYGEN SATURATION: 97 % | BODY MASS INDEX: 28.75 KG/M2 | WEIGHT: 205.38 LBS | TEMPERATURE: 97 F | DIASTOLIC BLOOD PRESSURE: 72 MMHG | SYSTOLIC BLOOD PRESSURE: 122 MMHG | HEIGHT: 71 IN | HEART RATE: 68 BPM

## 2025-03-10 DIAGNOSIS — E11.9 DIABETES MELLITUS, NEW ONSET: Primary | ICD-10-CM

## 2025-03-10 DIAGNOSIS — K86.1 CHRONIC RECURRENT PANCREATITIS: ICD-10-CM

## 2025-03-10 PROCEDURE — 99214 OFFICE O/P EST MOD 30 MIN: CPT | Mod: S$GLB,,, | Performed by: STUDENT IN AN ORGANIZED HEALTH CARE EDUCATION/TRAINING PROGRAM

## 2025-03-10 PROCEDURE — 3008F BODY MASS INDEX DOCD: CPT | Mod: CPTII,S$GLB,, | Performed by: STUDENT IN AN ORGANIZED HEALTH CARE EDUCATION/TRAINING PROGRAM

## 2025-03-10 PROCEDURE — 99999 PR PBB SHADOW E&M-EST. PATIENT-LVL IV: CPT | Mod: PBBFAC,,, | Performed by: STUDENT IN AN ORGANIZED HEALTH CARE EDUCATION/TRAINING PROGRAM

## 2025-03-10 PROCEDURE — 3078F DIAST BP <80 MM HG: CPT | Mod: CPTII,S$GLB,, | Performed by: STUDENT IN AN ORGANIZED HEALTH CARE EDUCATION/TRAINING PROGRAM

## 2025-03-10 PROCEDURE — 3074F SYST BP LT 130 MM HG: CPT | Mod: CPTII,S$GLB,, | Performed by: STUDENT IN AN ORGANIZED HEALTH CARE EDUCATION/TRAINING PROGRAM

## 2025-03-10 PROCEDURE — 1159F MED LIST DOCD IN RCRD: CPT | Mod: CPTII,S$GLB,, | Performed by: STUDENT IN AN ORGANIZED HEALTH CARE EDUCATION/TRAINING PROGRAM

## 2025-03-10 PROCEDURE — G2211 COMPLEX E/M VISIT ADD ON: HCPCS | Mod: S$GLB,,, | Performed by: STUDENT IN AN ORGANIZED HEALTH CARE EDUCATION/TRAINING PROGRAM

## 2025-03-10 RX ORDER — LANCETS 30 GAUGE
EACH MISCELLANEOUS 3 TIMES DAILY
COMMUNITY
Start: 2025-02-27

## 2025-03-10 RX ORDER — LANCETS 33 GAUGE
EACH MISCELLANEOUS 3 TIMES DAILY
COMMUNITY
Start: 2025-02-27

## 2025-03-10 NOTE — PROGRESS NOTES
Patient ID: Fabrice Zamorano is a 60 y.o. male.    Chief Complaint: Follow-up (2 week )    History of Present Illness    CHIEF COMPLAINT:  Mr. Zamorano presents today for follow up of diabetes management    DIABETES MANAGEMENT:  He reports generally well-controlled blood sugar with fasting glucose typically less than 130 mg/dL and postprandial glucose usually less than 180 mg/dL after breakfast. He notes occasional higher readings, such as 238 mg/dL, which he attributes to carbohydrate-heavy meals the night before. He reports improvement in symptoms with decreased exhaustion and dizziness, though some symptoms persist at a lower severity.    MEDICATIONS:  He takes Jardiance 25 mg and metformin twice daily with associated GI side effects. He takes Creon with every meal for pancreatic enzyme supplementation. He reports taking approximately 16 different medications daily.    DIET:  He reports difficulty reducing carbohydrate intake. He cannot tolerate greasy foods, including fried foods such as chicken wings, which cause nausea. His diet consists primarily of lean meats including chicken and occasional red meat, and he removes visible fat from pork loin.      ROS:  General: -fever, -chills, -fatigue, -weight gain, -weight loss  Eyes: -vision changes, -redness, -discharge  ENT: -ear pain, -nasal congestion, -sore throat  Cardiovascular: -chest pain, -palpitations, -lower extremity edema  Respiratory: -cough, -shortness of breath  Gastrointestinal: -abdominal pain, +nausea, -vomiting, -diarrhea, -constipation, -blood in stool  Genitourinary: -dysuria, -hematuria, -frequency  Musculoskeletal: -joint pain, -muscle pain  Skin: -rash, -lesion  Neurological: -headache, -dizziness, -numbness, -tingling  Psychiatric: -anxiety, -depression, -sleep difficulty         Pmh, Psh, Family Hx, Social Hx updated in Epic Tabs today.       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 Follows gastro      Sepsis 10/19/2024    Pneumonia 10/19/2024    Acute pancreatic fluid collection 07/01/2024    Biliary obstruction 06/20/2024    Uncontrolled hypertension 06/20/2024    Abnormal liver enzymes 03/23/2022    Acute on chronic pancreatitis 10/26/2021    GERD (gastroesophageal reflux disease) 10/26/2021    Facial palsy 10/26/2021    Diverticular disease of colon 10/26/2021    History of excision of intestinal structure 10/26/2021    Hypothyroidism 10/26/2021    Mixed hyperlipidemia 10/26/2021    Portal vein thrombosis 10/26/2021    History of DVT (deep vein thrombosis) 10/26/2021    Hypertension     Iron deficiency anemia 09/28/2021    Alcohol use 11/23/2017     Last Assessment & Plan:   Formatting of this note might be different from the original.  This was mild in nature however patient was counseled on cessation given his acute pancreatitis.      BRAIN on CPAP 11/23/2017     Last Assessment & Plan:   Formatting of this note might be different from the original.  Patient was continued on his home CPAP therapy while in house.  No acute issues during his hospitalization.         Past Medical History:   Diagnosis Date    Anticoagulant long-term use     On Eliquis for DVT    GERD (gastroesophageal reflux disease)     Hypertension     Hypothyroidism  10/26/2021    Liver disease     fatty liver    Pancreatic pseudocyst     Personal history of colonic polyps     Sleep apnea     Splenic vein thrombosis        Past Surgical History:   Procedure Laterality Date    CHOLECYSTECTOMY      COLONOSCOPY      COLONOSCOPY N/A 7/3/2024    Procedure: COLONOSCOPY;  Surgeon: Kwadwo Gonsalez MD;  Location: [address]);  Service: Endoscopy;  Laterality: N/A;    ENDOSCOPIC ULTRASOUND OF UPPER GASTROINTESTINAL TRACT N/A 10/12/2021    Procedure: ULTRASOUND, UPPER GI TRACT, ENDOSCOPIC;  Surgeon: Odalys Leiva MD;  Location: [facility];  Service: Endoscopy;  Laterality: N/A;  Upper and linear, 22 shark core, cytology and RPMI    ENDOSCOPIC ULTRASOUND OF UPPER GASTROINTESTINAL TRACT N/A 2/24/2023    Procedure: ULTRASOUND, UPPER GI TRACT, ENDOSCOPIC;  Surgeon: Shawn Chisholm MD;  Location: [address]);  Service: Endoscopy;  Laterality: N/A;  2/3/23-Instructions via portal-DS    ENDOSCOPIC ULTRASOUND OF UPPER GASTROINTESTINAL TRACT N/A 11/14/2023    Procedure: ULTRASOUND, UPPER GI TRACT, ENDOSCOPIC;  Surgeon: Kwadwo Gonsalez MD;  Location: [facility];  Service: Gastroenterology;  Laterality: N/A;  10/13/23: instructions sent vie portal-GD    ENDOSCOPIC ULTRASOUND OF UPPER GASTROINTESTINAL TRACT N/A 3/12/2024    Procedure: ULTRASOUND, UPPER GI TRACT, ENDOSCOPIC;  Surgeon: Shawn Chisholm MD;  Location: [facility];  Service: Endoscopy;  Laterality: N/A;  2/16/24: inst sent via portal-GD    ERCP N/A 09/29/2021    Procedure: ERCP (ENDOSCOPIC RETROGRADE CHOLANGIOPANCREATOGRAPHY);  Surgeon: Odalys Leiva MD;  Location: [facility];  Service: Endoscopy;  Laterality: N/A;    ERCP N/A 08/15/2022    Procedure: ERCP (ENDOSCOPIC RETROGRADE CHOLANGIOPANCREATOGRAPHY);  Surgeon: Odalys Leiva MD;  Location: [facility];  Service: Endoscopy;  Laterality: N/A;    ERCP N/A 2/24/2023    Procedure: ERCP (ENDOSCOPIC RETROGRADE CHOLANGIOPANCREATOGRAPHY);  Surgeon:  Shawn Chisholm MD;  [facility]  Service: Endoscopy;  Laterality: N/A;    ERCP N/A 12/10/2024    Procedure: ERCP (ENDOSCOPIC RETROGRADE CHOLANGIOPANCREATOGRAPHY);  Surgeon: Shawn Chisholm MD;  [facility]  Service: Endoscopy;  Laterality: N/A;  10/23/24: instructions sent via portal-GD  12/3-pre call complete with wife-tb    ERCP W/ PLASTIC STENT PLACEMENT      LATERAL PANCREATICOJEJUNOSTOMY N/A 2021    Procedure: PANCREATICOJEJUNOSTOMY, SIDE-TO-SIDE tier 1;  Surgeon: Brian Hills MD;  Location: [facility] OR [address] [place] [address];  Service: General;  Laterality: N/A;       Family History   Problem Relation Name Age of Onset    Thyroid disease Mother      Cancer Father      Pancreatic cancer Father      Arthritis Father      Thyroid disease Father         Social History     Socioeconomic History    Marital status:    Tobacco Use    Smoking status: Former     Current packs/day: 0.00     Types: Cigarettes     Quit date: 2001     Years since quittin.5    Smokeless tobacco: Never   Substance and Sexual Activity    Alcohol use: Not Currently    Drug use: Never    Sexual activity: Yes     Social Drivers of Health     Financial Resource Strain: Low Risk  (2025)    Overall Financial Resource Strain (CARDIA)     Difficulty of Paying Living Expenses: Not hard at all   Food Insecurity: No Food Insecurity (2025)    Hunger Vital Sign     Worried About Running Out of Food in the Last Year: Never true     Ran Out of Food in the Last Year: Never true   Transportation Needs: No Transportation Needs (2025)    PRAPARE - Transportation     Lack of Transportation (Medical): No     Lack of Transportation (Non-Medical): No   Physical Activity: Inactive (2025)    Exercise Vital Sign     Days of Exercise per Week: 0 days     Minutes of Exercise per Session: 0 min   Stress: No Stress Concern Present (2025)    [employer] [place] [employer] - Occupational Stress  "Questionnaire     Feeling of Stress : Not at all   Housing Stability: Low Risk  (2/19/2025)    Housing Stability Vital Sign     Unable to Pay for Housing in the Last Year: No     Number of Times Moved in the Last Year: 0     Homeless in the Last Year: No       Medications Ordered Prior to Encounter[1]    Review of patient's allergies indicates:  No Known Allergies      Review of Systems    General - Well developed, alert and oriented in NAD  HEENT - normocephalic, no evidence of trauma, sclera white, EOMI  Neck - full range of motion  COR - regular rate and rhythm without murmurs or gallops  Lungs - Clear  Abdomen - soft, non-tender  Ext - no cyanosis or edema    Physical Exam     Assessment:     1. Diabetes mellitus, new onset    2. Chronic recurrent pancreatitis        LABS:   No results found for: "HGBA1C"   Lab Results   Component Value Date    CHOL 140 09/27/2021     Lab Results   Component Value Date    LDLCALC 101.0 09/27/2021     Lab Results   Component Value Date    WBC 12.01 10/20/2024    HGB 11.6 (L) 10/20/2024    HCT 35.5 (L) 10/20/2024     10/20/2024    CHOL 140 09/27/2021    TRIG 75 09/27/2021    HDL 24 (L) 09/27/2021    ALT 57 (H) 02/25/2025    AST 54 (H) 02/25/2025     02/25/2025    K 4.1 02/25/2025     02/25/2025    CREATININE 1.1 02/25/2025    BUN 18 02/25/2025    CO2 27 02/25/2025    TSH 2.669 03/19/2023    INR 1.1 06/19/2024       Plan:   Fabrice was seen today for follow-up.    Diagnoses and all orders for this visit:    Diabetes mellitus, new onset  -     Hemoglobin A1C; Future  -     Lipid Panel; Future  -     Comprehensive Metabolic Panel; Future    Chronic recurrent pancreatitis        Assessment & Plan    IMPRESSION:  - Assessed glucose control, noting most days within target range  - Anticipate A1C improvement based on current glucose trends  - Will increase metformin dose to maximize glycemic control  - Maintained Jardiance 25mg, noting potential contribution to weight " loss  - Determined insulin therapy not currently necessary based on pancreatic function  - Acknowledged improvement in exhaustion and dizziness symptoms  - Evaluated need for biliary stent follow-up and potential referral to pancreatic surgeon if unsuccessful after 4 attempts    DIABETES:  - Monitor glucose levels, with target ranges of  mg/dL for fasting and <180 mg/dL for postprandial.  - Instruct the patient to monitor glucose in relation to meal composition, particularly carbohydrate intake.  - Increase metformin from 500mg twice daily to 1000mg twice daily.  - Continue Jardiance 25mg daily.  - Refer the patient to diabetes education for carb counting and dietary management.  - Note that the patient's blood sugar control is improving, with morning levels less than 130 mg/dL and post-breakfast levels less than 180 mg/dL most days.  - One instance of hyperglycemia (238 mg/dL) was noted.  - Evaluate that the patient's pancreas is still producing insulin.  - Plan to check A1c in 3 months.  - Discuss dietary modifications including complex carbohydrates, sourdough bread, and protein intake.  - Explained benefits of choosing bakery breads with lower added sugar content.  - Discussed importance of moderating carbohydrate consumption and increasing lean protein and vegetable intake.  - Recommend incorporating more complex carbohydrates like quinoa into diet.  - Mr. Zamorano to focus on consuming lean proteins and vegetables.  - Recommend limiting portion sizes of carbohydrate-rich foods.  - Mr. Zamorano to continue current efforts in preparing healthier meals and making mindful food choices.  - Mr. Zamorano to freeze sourdough bread and pasta to decrease sugar spikes.    PANCREATIC INSUFFICIENCY:  - Continue pancreatic enzyme Creon with every meal.    POST-OPERATIVE MONITORING:  - Monitor the patient post-surgery for biliary stent function.  - Note that the patient reports feeling better with less exhaustion and  dizziness.  - Order ultrasound to check biliary stent.  - Consider referral to pancreatic surgeon if unsuccessful after multiple attempts.    LABS:  - Order comprehensive labs in 3 months, including cholesterol, diabetes markers, and kidney function tests.    FOLLOW UP:  - Schedule follow up in 3 months to review lab results and assess diabetes management.  - Advise the patient to contact the office regarding pending diabetes education appointment if not received soon.           Face to Face time with patient: 10:20 AM CDT -      Each patient to whom he or she provides medical services by telemedicine is:  (1) informed of the relationship between the physician and patient and the respective role of any other health care provider with respect to management of the patient; and (2) notified that he or she may decline to receive medical services by telemedicine and may withdraw from such care at any time.    I spent a total of  32     minutes face to face and non-face to face on the date of this visit.This includes time preparing to see the patient (eg, review of tests, notes), obtaining and/or reviewing additional history from an independent historian and/or outside medical records, documenting clinical information in the electronic health record, independently interpreting results and/or communicating results to the patient/family/caregiver, or care coordinator.  Visit today included increased complexity associated with the care of the episodic problem addressed and managing the longitudinal care of the patient due to the serious and/or complex managed problem(s).    There are no Patient Instructions on file for this visit.    No follow-ups on file.  The ASCVD Risk score (Prashant HARRIS, et al., 2019) failed to calculate for the following reasons:    Cannot find a previous HDL lab    Cannot find a previous total cholesterol lab    This note was generated with the assistance of ambient listening technology. Verbal consent  was obtained by the patient and accompanying visitor(s) for the recording of patient appointment to facilitate this note. I attest to having reviewed and edited the generated note for accuracy, though some syntax or spelling errors may persist. Please contact the author of this note for any clarification.         [1]   Current Outpatient Medications on File Prior to Visit   Medication Sig Dispense Refill    blood sugar diagnostic Strp To check BG 3 times daily, to use with insurance preferred meter 100 each 99    blood-glucose meter kit To check BG three times daily, to use with insurance preferred meter 100 each 0    carvediloL (COREG) 6.25 MG tablet Take 6.25 mg by mouth 2 (two) times daily.      CREON 36,000-114,000- 180,000 unit CpDR TAKE 1 CAPSULE BY MOUTH THREE TIMES DAILY WITH MEALS 200 capsule 0    ferrous gluconate (FERGON) 324 MG tablet Take 38 mg by mouth.      JARDIANCE 25 mg tablet       lancets Misc To check BG three times daily, to use with insurance preferred meter 100 each 99    metFORMIN (GLUCOPHAGE-XR) 500 MG ER 24hr tablet Take 500 mg by mouth 2 (two) times daily with meals.      multivitamin (THERAGRAN) per tablet Take 1 tablet by mouth once daily.      ondansetron (ZOFRAN-ODT) 4 MG TbDL Take 1 tablet (4 mg total) by mouth every 8 (eight) hours as needed (Nausea). 30 tablet 0    ONETOUCH DELICA PLUS LANCET 33 gauge Misc Apply topically 3 (three) times daily.      ONETOUCH VERIO FLEX METER Misc 3 (three) times daily. Test Blood Sugar      pantoprazole (PROTONIX) 40 MG tablet TAKE 1 TABLET(40 MG) BY MOUTH EVERY DAY 90 tablet 0    pregabalin (LYRICA) 75 MG capsule TAKE 2 CAPSULES(150 MG) BY MOUTH EVERY EVENING. MAY ALSO TAKE 1 CAPSULE DAILY AS NEEDED 90 capsule 2    PREVIDENT 5000 BOOSTER PLUS 1.1 % Pste BRUSH TEETH WITH PASTE BY MOUTH TWICE DAILY. SPIT OUT EXCESS. DO NOT RINSE      promethazine (PHENERGAN) 25 MG tablet Take 25 mg by mouth every 6 (six) hours as needed.      simvastatin (ZOCOR) 20  MG tablet Take 1 tablet (20 mg total) by mouth every evening. 90 tablet 3    lipase-protease-amylase (CREON) 36,000-114,000- 180,000 unit CpDR Take 1 Dose by mouth. (Patient not taking: Reported on 3/10/2025)      oxyCODONE (ROXICODONE) 10 mg Tab immediate release tablet Take 10 mg by mouth every 4 (four) hours as needed.      oxyCODONE-acetaminophen (PERCOCET)  mg per tablet Take 1 tablet by mouth every 4 (four) hours as needed for Pain. 21 tablet 0    pregabalin (LYRICA) 75 MG capsule Take 75 mg by mouth. (Patient not taking: Reported on 3/10/2025)      [DISCONTINUED] ALPRAZolam (XANAX) 1 MG tablet Take 1 mg by mouth daily as needed. (Patient not taking: Reported on 3/10/2025)      [DISCONTINUED] carvediloL (COREG) 6.25 MG tablet Take 3.125 mg by mouth 2 (two) times daily with meals. (Patient not taking: Reported on 3/10/2025)      [DISCONTINUED] HYDROcodone-acetaminophen (NORCO) 7.5-325 mg per tablet Take 1 tablet by mouth every 6 (six) hours as needed.      [DISCONTINUED] ibuprofen (ADVIL,MOTRIN) 600 MG tablet Take 600 mg by mouth every 6 (six) hours as needed. (Patient not taking: Reported on 3/10/2025)      [DISCONTINUED] ibuprofen (ADVIL,MOTRIN) 800 MG tablet Take 800 mg by mouth every 6 (six) hours as needed. (Patient not taking: Reported on 3/10/2025)      [DISCONTINUED] metFORMIN (GLUCOPHAGE-XR) 500 MG ER 24hr tablet Take 500 mg by mouth once daily. (Patient not taking: Reported on 3/10/2025)      [DISCONTINUED] pantoprazole (PROTONIX) 40 MG tablet Take 1 tablet by mouth once daily. (Patient not taking: Reported on 3/10/2025)       No current facility-administered medications on file prior to visit.

## 2025-03-13 ENCOUNTER — CLINICAL SUPPORT (OUTPATIENT)
Dept: DIABETES | Facility: CLINIC | Age: 60
End: 2025-03-13
Payer: MEDICARE

## 2025-03-13 VITALS — WEIGHT: 204.56 LBS | BODY MASS INDEX: 28.53 KG/M2

## 2025-03-13 DIAGNOSIS — E11.9 DIABETES MELLITUS, NEW ONSET: ICD-10-CM

## 2025-03-13 PROCEDURE — 99999 PR PBB SHADOW E&M-EST. PATIENT-LVL IV: CPT | Mod: PBBFAC,,, | Performed by: DIETITIAN, REGISTERED

## 2025-03-13 PROCEDURE — G0108 DIAB MANAGE TRN  PER INDIV: HCPCS | Mod: S$GLB,,, | Performed by: DIETITIAN, REGISTERED

## 2025-03-17 ENCOUNTER — TELEPHONE (OUTPATIENT)
Dept: DIABETES | Facility: CLINIC | Age: 60
End: 2025-03-17
Payer: MEDICARE

## 2025-03-17 DIAGNOSIS — E11.9 DIABETES MELLITUS, NEW ONSET: Primary | ICD-10-CM

## 2025-03-17 RX ORDER — EMPAGLIFLOZIN 25 MG/1
25 TABLET, FILM COATED ORAL DAILY
Qty: 90 TABLET | Refills: 0 | Status: SHIPPED | OUTPATIENT
Start: 2025-03-17 | End: 2025-06-15

## 2025-03-17 RX ORDER — PANCRELIPASE 36000; 180000; 114000 [USP'U]/1; [USP'U]/1; [USP'U]/1
1 CAPSULE, DELAYED RELEASE PELLETS ORAL
Qty: 200 CAPSULE | Refills: 0 | Status: SHIPPED | OUTPATIENT
Start: 2025-03-17

## 2025-03-17 RX ORDER — METFORMIN HYDROCHLORIDE 500 MG/1
1000 TABLET, EXTENDED RELEASE ORAL 2 TIMES DAILY WITH MEALS
Qty: 360 TABLET | Refills: 0 | Status: SHIPPED | OUTPATIENT
Start: 2025-03-17 | End: 2025-06-15

## 2025-03-17 NOTE — PROGRESS NOTES
Diabetes Care Specialist Progress Note  Author: Soila Taylor RD, [facility] [place]  Date: 3/17/2025    Intake    Program Intake  Reason for Diabetes Program Visit:: Initial Diabetes Assessment (DM referral 2/24/25 Atiya Lopez MD)  Type of Intervention:: Individual  Education: Self-Management Skill Review  Current diabetes risk level:: moderate  In the last month, have you used the ER or been admitted to the hospital: No    Current Diabetes Treatment: Oral Medications (Jardiance 25mg 1tab daily. Metformin xr 500mg 2tab twice daily.)    Continuous Glucose Monitoring  Patient has CGM: No    Weight: 92.8 kg (204 lb 9.4 oz)       Body mass index is 28.53 kg/m².    Lifestyle Coping Support & Clinical    Lifestyle/Coping/Support  Does anyone in your family have diabetes or does anyone in your family support you in your diabetes care?: Family hx pancreatitis. Diabetes: Dad, Pat uncle. // Wife is supportive.  List anything about Diabetes that causes you stress?: none  How do you deal with stress/distress?: na  Learning Barriers:: None  Culture or Denominational beliefs that may impact ability to access healthcare: No  Psychosocial/Coping Skills Assessment Completed: : Yes  Assessment indicates:: Adequate understanding  Area of need?: No    Problem Review  Active Comorbidities:  (HTN, HLD, DVT, Hypothyroidism, Iron defn anemia, Chronic pancreatitis, GERD, BRAIN/cpap.)    Diabetes Self-Management Skills Assessment    Medication Skills Assessment  Patient is able to identify current diabetes medications, dosages, and appropriate timing of medications.: yes  Patient reports problems or concerns with current medication regimen.: yes  Medication regimen problems/concerns:: other (see comments) (Pt requesting updated diabetes prescriptions.)  Medication Skills Assessment Completed:: Yes  Assessment indicates:: Adequate understanding  Area of need?: Yes    Diabetes Disease Process/Treatment Options  Diabetes Type?: Type II  When were you  diagnosed?: Per pt report: 10/24 A1C 6.7, 2/12/25 8.9  If previous diabetes education, when/where:: none  What are your goals for this education session?: Meal planning  Is patient aware of what causes diabetes?: No  Does patient understand the pathophysiology of diabetes?: No  Diabetes Disease Process/Treatment Options: Skills Assessment Completed: Yes  Assessment indicates:: Instruction Needed, Knowledge deficit  Area of need?: Yes    Nutrition/Healthy Eating  Meal Plan 24 Hour Recall - Breakfast: crepe, 2slc gutierrez, sf stephanie/pinapple elizabeth  Meal Plan 24 Hour Recall - Lunch: mini ham/cheese sandwich, chips, unsweetened tea  Meal Plan 24 Hour Recall - Dinner: grilled hamburger on wheat bread, tomato  Meal Plan 24 Hour Recall - Snack: apple OR chips OR pecans OR sf cookies  Meal Plan 24 Hour Recall - Beverage: water, sf sean  Who shops/cooks?: self/wife  Patient can identify foods that impact blood sugar.: yes (starches, sugars)  Challenges to healthy eating:: other (see comments) (Pt states stomach pain related to chroic pancreatitis.)  Nutrition/Healthy Eating Skills Assessment Completed:: Yes  Assessment indicates:: Instruction Needed, Knowledge deficit  Area of need?: Yes    Physical Activity/Exercise  Patient's daily activity level::  (Active in outdoor shop. Gym - uses treadmill 40min 0-2d/wk.)  Physical Activity/Exercise Skills Assessment Completed: : Yes  Assessment indicates:: Instruction Needed  Area of need?: Yes    Home Blood Glucose Monitoring  Patient states that blood sugar is checked at home daily.:  (Per recall, fst -140; pp 150-160 w/ some 230-280.)  Home Blood Glucose Monitoring Skills Assessment Completed: : Yes  Assessment indicates:: Instruction Needed  Area of need?: Yes    Acute Complications  Have you ever had hypoglycemia (low BG 70 or less)?: no (Pt denies recent episodes.)  Do you know the symptoms of low blood sugar and how to treat?: no  Have you ever had hyperglycemia (high  or  more)?: no (Pt denies recent episodes.)   Do you know the symptoms of high blood sugar and how to treat: no  Acute Complications Skills Assessment Completed: : Yes  Assessment indicates:: Instruction Needed, Knowledge deficit  Area of need?: Yes    Chronic Complications  Chronic Complications Skills Assessment Completed: : No  Deferred due to:: Time  Area of need?: Deferred    Assessment Summary and Plan  Based on today's diabetes care assessment, the following areas of need were identified:      Identified Areas of Need      Medication/Current Diabetes Treatment: Yes  Provided review of current diabetes medication action, dosage, frequency, side effects.  Discussed guidelines for preventing, detecting and treating hypoglycemia and hyperglycemia.   Reviewed the importance of meal and medication timing with diabetes mediations for prevention of acute complications and maximum drug benefit.  Referral entered for diabetes JOSÉ to support medical mgmt per pt request.    Lifestyle Coping/Support: No   Diabetes Disease Process/Treatment Options: Yes  Reviewed diabetes pathophysiology, different types of diabetes, signs and symptoms, risk factors and treatment guidelines.    Nutrition/Healthy Eating: Yes -see care plan   Physical Activity/Exercise: Yes   Discussed importance of daily physical activity with review of benefits, methods, guidelines and precautions.  Discussed short distance walking 10-15 min sessions to improve consistency.    Home Blood Glucose Monitoring: Yes   Reviewed benefits, self-monitoring blood glucose.   Discussed appropriate timing and frequency to SMBG, education on parameters on when to notify provider and advised patient to bring logs to all appts with PCP/Endocrinologist/Diabetes Care Specialist.    Acute Complications: Yes   Discussed prevention, identification signs/symptoms and treatment of hyperglycemia and hypoglycemia and when to contact clinic.    Chronic Complications: Deferred      Today's interventions were provided through individual discussion, instruction, and written materials were provided.    Patient verbalized understanding of instruction and written materials.  Pt was able to return back demonstration of instructions today. Patient understood key points, needs reinforcement and further instruction.     Diabetes Self-Management Care Plan:  Today's Diabetes Self-Management Care Plan was developed with Fabrice's input. Fabrice has agreed to work toward the following goal(s) to improve his/her overall diabetes control.      Care Plan: Diabetes Management   Updates made since 3/17/2024 12:00 AM        Problem: Healthy Eating         Goal: Eat 3 meals daily or 5 small meals daily - manage carb 30-45 grams/meal.    Start Date: 3/17/2025   Expected End Date: 2/24/2026   Priority: High   Barriers: No Barriers Identified        Task: Reviewed the sources and role of Carbohydrate, Protein, and Fat and how each nutrient impacts blood sugar. Completed 3/17/2025        Task: Provided visual examples using dry measuring cups, food models, and other familiar objects such as computer mouse, deck or cards, tennis ball etc. to help with visualization of portions. Completed 3/17/2025        Task: Review the importance of balancing carbohydrates with each meal using portion control techniques to count servings of carbohydrate and label reading to identify serving size and amount of total carbs per serving. Completed 3/17/2025        Task: Provided Sample plate method and reviewed the use of the plate to estimate amounts of carbohydrate per meal. Completed 3/17/2025        Follow Up Plan  Follow up in about 3 weeks (around 4/3/2025).  -review BG logs  -eval goals    Today's care plan and follow up schedule was discussed with patient.  Fabrice verbalized understanding of the care plan, goals, and agrees to follow up plan.        The patient was encouraged to communicate with his/her health care  provider/physician and care team regarding his/her condition(s) and treatment.  I provided the patient with my contact information today and encouraged to contact me via phone or [facility]'s Patient Portal as needed.     Length of Visit   Total Time: 60 Minutes

## 2025-03-18 ENCOUNTER — TELEPHONE (OUTPATIENT)
Dept: PAIN MEDICINE | Facility: CLINIC | Age: 60
End: 2025-03-18
Payer: MEDICARE

## 2025-03-18 NOTE — TELEPHONE ENCOUNTER
Reached out to patient to schedule appointment from messages. Apt has been made.   Pt understand. All questions answered.     Brijesh Israel Medical Assistant

## 2025-03-19 ENCOUNTER — OFFICE VISIT (OUTPATIENT)
Dept: PAIN MEDICINE | Facility: CLINIC | Age: 60
End: 2025-03-19
Payer: MEDICARE

## 2025-03-19 DIAGNOSIS — K86.1 CHRONIC PANCREATITIS, UNSPECIFIED PANCREATITIS TYPE: ICD-10-CM

## 2025-03-19 DIAGNOSIS — K86.1 CHRONIC PANCREATITIS, UNSPECIFIED PANCREATITIS TYPE: Primary | ICD-10-CM

## 2025-03-19 DIAGNOSIS — R11.0 NAUSEA: ICD-10-CM

## 2025-03-19 RX ORDER — PREGABALIN 75 MG/1
CAPSULE ORAL
Qty: 90 CAPSULE | Refills: 2 | Status: SHIPPED | OUTPATIENT
Start: 2025-03-19

## 2025-03-19 RX ORDER — PROMETHAZINE HYDROCHLORIDE 25 MG/1
25 TABLET ORAL EVERY 6 HOURS PRN
Qty: 30 TABLET | Refills: 1 | Status: SHIPPED | OUTPATIENT
Start: 2025-03-19

## 2025-03-19 NOTE — PROGRESS NOTES
Established Patient - TeleHealth Visit    The patient location is: [place]  The chief complaint leading to consultation is: chronic pain     Visit type: audiovisual    Encounter includes face to face time and non-face to face time preparing to see the patient (eg, review of tests), Obtaining and/or reviewing separately obtained history, Documenting clinical information in the electronic or other health record, Independently interpreting results (not separately reported) and communicating results to the patient/family/caregiver, or Care coordination (not separately reported).     Each patient to whom he or she provides medical services by telemedicine is:  (1) informed of the relationship between the physician and patient and the respective role of any other health care provider with respect to management of the patient; and (2) notified that he or she may decline to receive medical services by telemedicine and may withdraw from such care at any time.        Referring Physician: No ref. provider found    PCP: Atiya Lopez MD    Chief Complaint:   Abdominal pain  Established Patient - TeleHealth Visit  Interval History (3/19/2025):  Patient Fabrice Zamorano presents today for follow-up visit.  Patient is being seen for follow up of chronic pancreatitis. He will have flare ups every 4-6 weeks and will take phenergan if needed at those times. He takes lyrica as needed. He rates his pain today 0/10 but in a flare it will be 8/10. He continues to be seen by GI. He has had minimal relief with previous celiac plexus blocks. He is scheduled for a stent replacement in the biliary duct next month.  Patient denies any other complaints or concerns at this time.      Interval History (04/16/2025):  Patient returns to clinic for evaluation abdominal pain.  Since last being seen, patient reports the abdominal pain has generally been well-controlled.  Patient reports proximally than 1 episode of increased abdominal pain per month.  Of  "note, patient 2nd celiac plexus block with gastroenterology on 03/12/2024.  Patient reports that pain is worse with p.o. intake, better with hydration.  Pain is currently rated a 3/10, but can increase to an 8/10 with exacerbating activities.      Interval History (8/15/2023):  Fabrice Zamorano presents today for follow-up visit via telemed.  Patient was last seen on 6/13/2023. He reports he continues to have good days and bad days. He reports June was particularly rough. He reports he tries to stay hydrated and avoid dehydration by getting IV hydration therapy and through oral rehydration packets. He reports his pain flares typically last 30-36 hours, but he is still able to manage at home with phenergan, lyrica, and occasional Percocet. Patient reports pain as "0/10 today.      Interval History (6/13/2023):  Fabrice Zamorano presents today  via telemed for follow-up visit.  Patient was last seen on 3/9/2023. At that visit, the plan was to continue Lyrica. He continues to report improvement in his symtpoms with Lyrica. He reports that every few weeks he will have an episode of severe abdominal pain and nausea that requires him to be bed bound and utilize phenergan, but he has not needed hospitalization. Today he is very nauseated, but again reports that he is able to manage his symptoms at home.Patient reports pain as 2/10 today.      Interval History (3/9/2023):  Fabrice Zamorano presents today for follow-up visit.  Patient was last seen on 12/2/2022. At that visit, the plan was to continue Lyrica for abdominal pain and consider celiac plexus block should pain worsen.  He reports Lyrica has been in game changer and has significantly improved his pain.  He continues to be followed by GI and recently had a repeat ERCP.  He also recently had an updated CT of his abdomen which demonstrated a hernia, he reports Dr. Hills stated that at some point in the future he will likely need a hernia repair, but this was deferred " at this time. Patient reports pain as just mild discomfort today today.  He takes Lyrica 100 mg nightly and promethazine as needed for nausea.  He is requesting a refill of his Lyrica.      Interval History (12/02/2022):  Patient returns to clinic for evaluation of abdominal pain.  Patient reports that since last being seen his pain has overall improved.  He reports having 1 episode of pancreatitis pain every 3-4 weeks that lasts approximately 24-36 hours.  He also reports mild epigastric burning pain with food or liquids, that is typically relieved with standing from food.  Pain is currently rated at 0/10.       SUBJECTIVE:    Fabrice Zamorano is a 60 y.o. male who presents to the clinic for the evaluation of  abdominal pain. The pain started 6 years ago followingsymptoms have been worsening.The pain is located in the RUQ and radiate in band across his abdomen to his thoracic spine and to his right shoulder.The pain is described as aching, crushing, dull, sharp, shooting, stabbing and tight band and is rated as 5/10. The pain is rated with a score of  2/10 on the BEST day and a score of 10/10 on the WORST day.  Symptoms interfere with daily activity. The pain is exacerbated by eating a mean.  The pain is mitigated by fasting.         Non-Pharmacologic Treatments:  Physical Therapy/Home Exercise: no  Ice/Heat:no  TENS: no  Acupuncture: no  Massage: no  Chiropractic: no        Previous Pain Medications:  Tylenol, ASA, Percocet     report:  Reviewed and consistent with medication use as prescribed.    Pain Procedures:   None      Imaging:   FL ERCP Biliary And Pancreatic By Non Rad Tech  See OP Notes for results.     IMPRESSION: See OP Notes for results.     This procedure was auto-finalized by: Virtual Radiologist            Past Medical History:   Diagnosis Date    Anticoagulant long-term use     On Eliquis for DVT    GERD (gastroesophageal reflux disease)     Hypertension     Hypothyroidism 10/26/2021    Liver  disease     fatty liver    Pancreatic pseudocyst     Personal history of colonic polyps     Sleep apnea     Splenic vein thrombosis      Past Surgical History:   Procedure Laterality Date    CHOLECYSTECTOMY      COLONOSCOPY      COLONOSCOPY N/A 7/3/2024    Procedure: COLONOSCOPY;  Surgeon: Kwadwo Gonsalez MD;  Location: [address]);  Service: Endoscopy;  Laterality: N/A;    ENDOSCOPIC ULTRASOUND OF UPPER GASTROINTESTINAL TRACT N/A 10/12/2021    Procedure: ULTRASOUND, UPPER GI TRACT, ENDOSCOPIC;  Surgeon: Odalys Leiva MD;  Location: [facility];  Service: Endoscopy;  Laterality: N/A;  Upper and linear, 22 shark core, cytology and RPMI    ENDOSCOPIC ULTRASOUND OF UPPER GASTROINTESTINAL TRACT N/A 2/24/2023    Procedure: ULTRASOUND, UPPER GI TRACT, ENDOSCOPIC;  Surgeon: Shawn Chisholm MD;  Location: [address]);  Service: Endoscopy;  Laterality: N/A;  2/3/23-Instructions via portal-DS    ENDOSCOPIC ULTRASOUND OF UPPER GASTROINTESTINAL TRACT N/A 11/14/2023    Procedure: ULTRASOUND, UPPER GI TRACT, ENDOSCOPIC;  Surgeon: Kwadwo Gonsalez MD;  Location: [facility];  Service: Gastroenterology;  Laterality: N/A;  10/13/23: instructions sent vie portal-GD    ENDOSCOPIC ULTRASOUND OF UPPER GASTROINTESTINAL TRACT N/A 3/12/2024    Procedure: ULTRASOUND, UPPER GI TRACT, ENDOSCOPIC;  Surgeon: Shawn Chisholm MD;  Location: [facility];  Service: Endoscopy;  Laterality: N/A;  2/16/24: inst sent via portal-GD    ERCP N/A 09/29/2021    Procedure: ERCP (ENDOSCOPIC RETROGRADE CHOLANGIOPANCREATOGRAPHY);  Surgeon: Odalys Leiva MD;  Location: [facility];  Service: Endoscopy;  Laterality: N/A;    ERCP N/A 08/15/2022    Procedure: ERCP (ENDOSCOPIC RETROGRADE CHOLANGIOPANCREATOGRAPHY);  Surgeon: Odalys Leiva MD;  Location: [facility];  Service: Endoscopy;  Laterality: N/A;    ERCP N/A 2/24/2023    Procedure: ERCP (ENDOSCOPIC RETROGRADE CHOLANGIOPANCREATOGRAPHY);  Surgeon: Shawn Chisholm MD;   [facility]  Service: Endoscopy;  Laterality: N/A;    ERCP N/A 12/10/2024    Procedure: ERCP (ENDOSCOPIC RETROGRADE CHOLANGIOPANCREATOGRAPHY);  Surgeon: Shawn Chisholm MD;  [facility]  Service: Endoscopy;  Laterality: N/A;  10/23/24: instructions sent via portal-GD  12/3-pre call complete with wife-tb    ERCP W/ PLASTIC STENT PLACEMENT      LATERAL PANCREATICOJEJUNOSTOMY N/A 2021    Procedure: PANCREATICOJEJUNOSTOMY, SIDE-TO-SIDE tier 1;  Surgeon: Brian Hills MD;  [facility];  Service: General;  Laterality: N/A;     Social History     Socioeconomic History    Marital status:    Tobacco Use    Smoking status: Former     Current packs/day: 0.00     Types: Cigarettes     Quit date: 2001     Years since quittin.6    Smokeless tobacco: Never   Substance and Sexual Activity    Alcohol use: Not Currently    Drug use: Never    Sexual activity: Yes     Social Drivers of Health     Financial Resource Strain: Low Risk  (2025)    Overall Financial Resource Strain (CARDIA)     Difficulty of Paying Living Expenses: Not hard at all   Food Insecurity: No Food Insecurity (2025)    Hunger Vital Sign     Worried About Running Out of Food in the Last Year: Never true     Ran Out of Food in the Last Year: Never true   Transportation Needs: No Transportation Needs (2025)    PRAPARE - Transportation     Lack of Transportation (Medical): No     Lack of Transportation (Non-Medical): No   Physical Activity: Inactive (2025)    Exercise Vital Sign     Days of Exercise per Week: 0 days     Minutes of Exercise per Session: 0 min   Stress: No Stress Concern Present (2025)    [employer] - Occupational Stress Questionnaire     Feeling of Stress : Not at all   Housing Stability: Low Risk  (2025)    Housing Stability Vital Sign     Unable to Pay for Housing in the Last Year: No     Number of Times Moved in  the Last Year: 0     Homeless in the Last Year: No     Family History   Problem Relation Name Age of Onset    Thyroid disease Mother      Cancer Father      Pancreatic cancer Father      Arthritis Father      Thyroid disease Father         Review of patient's allergies indicates:  No Known Allergies    Current Outpatient Medications   Medication Sig    blood sugar diagnostic Strp To check BG 3 times daily, to use with insurance preferred meter    blood-glucose meter kit To check BG three times daily, to use with insurance preferred meter    carvediloL (COREG) 6.25 MG tablet Take 6.25 mg by mouth 2 (two) times daily.    CREON 36,000-114,000- 180,000 unit CpDR TAKE 1 CAPSULE BY MOUTH THREE TIMES DAILY WITH MEALS    ferrous gluconate (FERGON) 324 MG tablet Take 38 mg by mouth.    JARDIANCE 25 mg tablet Take 1 tablet (25 mg total) by mouth once daily.    lancets Misc To check BG three times daily, to use with insurance preferred meter    lipase-protease-amylase (CREON) 36,000-114,000- 180,000 unit CpDR Take 1 Dose by mouth. (Patient not taking: Reported on 3/10/2025)    metFORMIN (GLUCOPHAGE-XR) 500 MG ER 24hr tablet Take 2 tablets (1,000 mg total) by mouth 2 (two) times daily with meals.    multivitamin (THERAGRAN) per tablet Take 1 tablet by mouth once daily.    ondansetron (ZOFRAN-ODT) 4 MG TbDL Take 1 tablet (4 mg total) by mouth every 8 (eight) hours as needed (Nausea).    ONETOUCH DELICA PLUS LANCET 33 gauge Misc Apply topically 3 (three) times daily.    ONETOUCH VERIO FLEX METER Misc 3 (three) times daily. Test Blood Sugar    oxyCODONE (ROXICODONE) 10 mg Tab immediate release tablet Take 10 mg by mouth every 4 (four) hours as needed.    oxyCODONE-acetaminophen (PERCOCET)  mg per tablet Take 1 tablet by mouth every 4 (four) hours as needed for Pain.    pantoprazole (PROTONIX) 40 MG tablet TAKE 1 TABLET(40 MG) BY MOUTH EVERY DAY    pregabalin (LYRICA) 75 MG capsule TAKE 2 CAPSULES(150 MG) BY MOUTH EVERY  EVENING. MAY ALSO TAKE 1 CAPSULE DAILY AS NEEDED    pregabalin (LYRICA) 75 MG capsule Take 75 mg by mouth. (Patient not taking: Reported on 3/10/2025)    PREVIDENT 5000 BOOSTER PLUS 1.1 % Pste BRUSH TEETH WITH PASTE BY MOUTH TWICE DAILY. SPIT OUT EXCESS. DO NOT RINSE    promethazine (PHENERGAN) 25 MG tablet Take 1 tablet (25 mg total) by mouth every 6 (six) hours as needed for Nausea.    simvastatin (ZOCOR) 20 MG tablet Take 1 tablet (20 mg total) by mouth every evening.     No current facility-administered medications for this visit.         ROS  Review of Systems   Constitutional:  Positive for appetite change. Negative for activity change, fatigue and fever.   HENT:  Negative for sore throat.    Respiratory:  Negative for cough, chest tightness, shortness of breath, wheezing and stridor.    Cardiovascular:  Positive for leg swelling. Negative for chest pain and palpitations.   Gastrointestinal:  Positive for abdominal pain, blood in stool and constipation. Negative for diarrhea, nausea and vomiting.   Endocrine: Negative for polydipsia, polyphagia and polyuria.   Genitourinary:  Negative for dysuria, hematuria and urgency.   Musculoskeletal:  Negative for arthralgias, back pain, gait problem, joint swelling, myalgias, neck pain and neck stiffness.   Skin:  Negative for rash.   Allergic/Immunologic: Negative for food allergies.   Neurological:  Negative for dizziness, tremors, seizures, syncope, facial asymmetry, speech difficulty, weakness, light-headedness, numbness and headaches.   Psychiatric/Behavioral:  Negative for agitation, hallucinations, self-injury and suicidal ideas. The patient is not nervous/anxious and is not hyperactive.         Telemedicine Exam  There were no vitals filed for this visit.  There is no height or weight on file to calculate BMI.      Physical Exam: last in clinic visit:      OBJECTIVE:    There were no vitals taken for this visit.    Virtual exam, physical exam from previous clinic  visit       Physical Exam  Constitutional:       General: He is not in acute distress.     Appearance: Normal appearance. He is not ill-appearing.   HENT:      Head: Normocephalic and atraumatic.      Nose: No congestion or rhinorrhea.      Mouth/Throat:      Mouth: Mucous membranes are moist.   Eyes:      Extraocular Movements: Extraocular movements intact.      Pupils: Pupils are equal, round, and reactive to light.   Cardiovascular:      Pulses: Normal pulses.   Pulmonary:      Effort: Pulmonary effort is normal.   Abdominal:      General: Abdomen is flat.      Palpations: Abdomen is soft.      Tenderness: There is abdominal tenderness (Tenderness to deep palpation over the right upper quadrant).   Musculoskeletal:      Cervical back: Normal range of motion and neck supple.   Skin:     General: Skin is warm and dry.      Capillary Refill: Capillary refill takes less than 2 seconds.   Neurological:      General: No focal deficit present.      Mental Status: He is alert and oriented to person, place, and time.      Cranial Nerves: No cranial nerve deficit.      Sensory: No sensory deficit.      Motor: No weakness.      Gait: Gait normal.   Psychiatric:         Mood and Affect: Mood normal.         Behavior: Behavior normal.         Thought Content: Thought content normal.               LABS:  Lab Results   Component Value Date    WBC 12.01 10/20/2024    HGB 11.6 (L) 10/20/2024    HCT 35.5 (L) 10/20/2024    MCV 86 10/20/2024     10/20/2024       CMP  Sodium   Date Value Ref Range Status   02/25/2025 140 136 - 145 mmol/L Final     Potassium   Date Value Ref Range Status   02/25/2025 4.1 3.5 - 5.1 mmol/L Final     Chloride   Date Value Ref Range Status   02/25/2025 103 95 - 110 mmol/L Final     CO2   Date Value Ref Range Status   02/25/2025 27 23 - 29 mmol/L Final     Glucose   Date Value Ref Range Status   02/25/2025 124 (H) 70 - 110 mg/dL Final     BUN   Date Value Ref Range Status   02/25/2025 18 6 - 20  "mg/dL Final     Creatinine   Date Value Ref Range Status   02/25/2025 1.1 0.5 - 1.4 mg/dL Final     Calcium   Date Value Ref Range Status   02/25/2025 9.3 8.7 - 10.5 mg/dL Final     Total Protein   Date Value Ref Range Status   02/25/2025 6.8 6.0 - 8.4 g/dL Final     Albumin   Date Value Ref Range Status   02/25/2025 3.9 3.5 - 5.2 g/dL Final     Total Bilirubin   Date Value Ref Range Status   02/25/2025 0.5 0.1 - 1.0 mg/dL Final     Comment:     For infants and newborns, interpretation of results should be based  on gestational age, weight and in agreement with clinical  observations.    Premature Infant recommended reference ranges:  Up to 24 hours.............<8.0 mg/dL  Up to 48 hours............<12.0 mg/dL  3-5 days..................<15.0 mg/dL  6-29 days.................<15.0 mg/dL       Alkaline Phosphatase   Date Value Ref Range Status   02/25/2025 110 40 - 150 U/L Final     AST   Date Value Ref Range Status   02/25/2025 54 (H) 10 - 40 U/L Final     ALT   Date Value Ref Range Status   02/25/2025 57 (H) 10 - 44 U/L Final     Anion Gap   Date Value Ref Range Status   02/25/2025 10 8 - 16 mmol/L Final     eGFR if    Date Value Ref Range Status   07/07/2022 >60.0 >60 mL/min/1.73 m^2 Final     eGFR if non    Date Value Ref Range Status   07/07/2022 >60.0 >60 mL/min/1.73 m^2 Final     Comment:     Calculation used to obtain the estimated glomerular filtration  rate (eGFR) is the CKD-EPI equation.          No results found for: "LABA1C", "HGBA1C"          ASSESSMENT:       60 y.o. year old male with abdominal pain, consistent with     1. Chronic pancreatitis, unspecified pancreatitis type  promethazine (PHENERGAN) 25 MG tablet      2. Nausea  promethazine (PHENERGAN) 25 MG tablet                Chronic pancreatitis, unspecified pancreatitis type  -     promethazine (PHENERGAN) 25 MG tablet; Take 1 tablet (25 mg total) by mouth every 6 (six) hours as needed for Nausea.  Dispense: 30 " tablet; Refill: 1    Nausea  -     promethazine (PHENERGAN) 25 MG tablet; Take 1 tablet (25 mg total) by mouth every 6 (six) hours as needed for Nausea.  Dispense: 30 tablet; Refill: 1                     PLAN:   - Interventions:    None at this time.  Patient has had 2nd celiac plexus block with gastroenterology on 03/12/2024.  - Anticoagulation use:   no no anticoagulation    - Medications:   Refill Lyrica 150 mg nightly, additional 75 mg tablet as needed during flares for additional relief We discussed potential side effects of this medication which may include drowsiness,dizziness, dry mouth, constipation or peripheral edema.  Refill phenergan 25mg TID prn. States only takes every 4-6 weeks. Takes sparingly. Educated on medication and side effects     Continue Zofran every 8 hours as needed for nausea   cotninue Percocet 10 mg every 8 hours as needed to take for severe pain only, takes sparingly- once weekly or less    - Imaging:   CT abdomen reviewed    - Consults/Referrals:   Continue follow-up with GI  and hepatology.         - Counseled patient regarding the importance of activity modification    - Patient Questions: Answered all of the patient's questions regarding diagnosis, therapy, and treatment    - Follow up visit:  Return to clinic p.r.n./6 months medication management        The above plan and management options were discussed at length with patient. Patient is in agreement with the above and verbalized understanding.    I discussed the goals of interventional chronic pain management with the patient on today's visit.  I explained the utility of injections for diagnostic and therapeutic purposes.  We discussed a multimodal approach to pain including treating the patient's given worst pain at any given time.  We will use a systematic approach to addressing pain.  We will also adopt a multimodal approach that includes injections, adjuvant medications, physical therapy, at times psychiatry.  There may  be a limited role for opioid use intermittently in the treatment of pain, more particularly for acute pain although no one approach can be used as a sole treatment modality.    I emphasized the importance of regular exercise, core strengthening and stretching, diet and weight loss as a cornerstone of long-term pain management.    Visit today included increased complexity associated with the care of the episodic problem of chronic pain which was addressed and continue to manage the longitudinal care of the patient due to the serious and/or complex managed problem(s) listed above.    Guillermina Monroe NP  Interventional Pain Management  [facility]    Disclaimer:  This note was prepared using voice recognition system and is likely to have sound alike errors that may have been overlooked even after proof reading.  Please call me with any questions

## 2025-04-03 ENCOUNTER — CLINICAL SUPPORT (OUTPATIENT)
Dept: DIABETES | Facility: CLINIC | Age: 60
End: 2025-04-03
Payer: MEDICARE

## 2025-04-03 ENCOUNTER — TELEPHONE (OUTPATIENT)
Dept: DIABETES | Facility: CLINIC | Age: 60
End: 2025-04-03
Payer: MEDICARE

## 2025-04-03 DIAGNOSIS — E11.9 DIABETES MELLITUS, NEW ONSET: Primary | ICD-10-CM

## 2025-04-03 NOTE — PROGRESS NOTES
Diabetes Care Specialist Virtual Visit Note     The patient location is: home in [place]  The chief complaint leading to consultation is: Diabetes  Visit type: audiovisual  Total time spent with patient: 30 min   Each patient to whom he or she provides medical services by telemedicine is:  (1) informed of the relationship between the physician and patient and the respective role of any other health care provider with respect to management of the patient; and (2) notified that he or she may decline to receive medical services by telemedicine and may withdraw from such care at any time.    Diabetes Care Specialist Progress Note  Author: Soila Taylor RD, [facility]  Date: 4/3/2025    Intake    Program Intake  Reason for Diabetes Program Visit:: Intervention (DM referral 2/24/25 Atiya Lopez MD)  Type of Intervention:: Individual  Education: Self-Management Skill Review, Nutrition and Meal Planning  Current diabetes risk level:: moderate  In the last month, have you used the ER or been admitted to the hospital: No    Current Diabetes Treatment: Oral Medications (Jardiance 25mg 1tab daily. Metformin xr 500mg 2tab twice daily.)    Continuous Glucose Monitoring  Patient has CGM: No    Lifestyle Coping Support & Clinical    Problem Review  Active Comorbidities:  (HTN, HLD, DVT, Hypothyroidism, Iron defn anemia, Chronic pancreatitis, GERD, BRAIN/cpap.)    Diabetes Self-Management Skills Assessment    Medication Skills Assessment  Patient is able to identify current diabetes medications, dosages, and appropriate timing of medications.: yes  Patient reports problems or concerns with current medication regimen.: no  Medication Skills Assessment Completed:: Yes  Assessment indicates:: Adequate understanding  Area of need?: No     Nutrition/Healthy Eating  Meal Plan 24 Hour Recall - Breakfast: 2 eggs, grits, reduced fat sausage  Meal Plan 24 Hour Recall - Lunch: turkey/ham sandwich/wheat or honey wheat bread, few chips  Meal Plan 24  Hour Recall - Dinner: soup (lf grd meat, peas, corn, carrots, potatoes), ritz crackers 7-8 OR 2-3oz grilled fish, roasted potatoes 1/3c, green beans or peas  Meal Plan 24 Hour Recall - Snack: fruit, crackers  Meal Plan 24 Hour Recall - Beverage: water, unsweetened tea  Patient can identify foods that impact blood sugar.: yes  Challenges to healthy eating:: other (see comments) (Early satiety due to pancreatitis symptoms contributing to inadequate protein intake.)  Nutrition/Healthy Eating Skills Assessment Completed:: Yes  Assessment indicates:: Instruction Needed  Area of need?: Yes    Physical Activity/Exercise  Patient's daily activity level::  (Active in outdoor shop. Pt states using treadmill but at higher intensity and duration contributing to abdominal pain.)  Physical Activity/Exercise Skills Assessment Completed: : Yes  Assessment indicates:: Instruction Needed  Area of need?: Yes    Home Blood Glucose Monitoring  Patient states that blood sugar is checked at home daily.: yes (Per recall, fst -130, 150; pp 160s.)  Home Blood Glucose Monitoring Skills Assessment Completed: : Yes  Assessment indicates:: Adequate understanding  Area of need?: No    Acute Complications  Have you ever had hypoglycemia (low BG 70 or less)?:  (Pt denies recent episodes.)  Have you ever had hyperglycemia (high  or more)?:  (Pt denies recent episodes.)  Acute Complications Skills Assessment Completed: : Yes  Assessment indicates:: Adequate understanding  Area of need?: No     Assessment Summary and Plan  Based on today's diabetes care assessment, the following areas of need were identified:      Identified Areas of Need      Medication/Current Diabetes Treatment: No   Nutrition/Healthy Eating: Yes - see care plan   Physical Activity/Exercise: Yes - advised to reduce to light intensity and duration ~10min of walking    Home Blood Glucose Monitoring: No    Acute Complications: No      Today's interventions were provided  through individual discussion, instruction, and written materials were provided.    Patient verbalized understanding of instruction and written materials.  Pt was able to return back demonstration of instructions today. Patient understood key points, needs reinforcement and further instruction.     Diabetes Self-Management Care Plan:  Today's Diabetes Self-Management Care Plan was developed with Fabrice's input. Fabrice has agreed to work toward the following goal(s) to improve his/her overall diabetes control.      Care Plan: Diabetes Management   Updates made since 4/3/2024 12:00 AM        Problem: Healthy Eating         Goal: Eat 3 meals daily or 5 small meals daily - manage carb 30-45 grams/meal.    Start Date: 3/17/2025   Expected End Date: 2/24/2026   This Visit's Progress: On track   Priority: High   Barriers: No Barriers Identified   Encouraged overall progress and reviewed diet and supplemental tips for incorporating additional protein (unflv pwd to recipes, pre-made shakes). Detailed msg sent to [facility].      Follow Up Plan  Follow up in about 4 weeks (around 5/1/2025).  -review BG logs  -eval goals    Today's care plan and follow up schedule was discussed with patient.  Fabrice verbalized understanding of the care plan, goals, and agrees to follow up plan.        The patient was encouraged to communicate with his/her health care provider/physician and care team regarding his/her condition(s) and treatment.  I provided the patient with my contact information today and encouraged to contact me via phone or [facility] Patient Portal as needed.     Length of Visit   Total Time: 30 Minutes

## 2025-04-15 ENCOUNTER — TELEPHONE (OUTPATIENT)
Dept: DIABETES | Facility: CLINIC | Age: 60
End: 2025-04-15
Payer: MEDICARE

## 2025-04-15 NOTE — TELEPHONE ENCOUNTER
----- Message from Teresa sent at 4/15/2025  2:14 PM CDT -----  Contact: pt  Type:  Patient Returning CallWho Called: ptWho Left Message for Patient: Saeed the patient know what this is regarding?: Would the patient rather a call back or a response via [facility]? phoneBest Call Back Number: [phone] Information:

## 2025-04-28 ENCOUNTER — HOSPITAL ENCOUNTER (OUTPATIENT)
Facility: HOSPITAL | Age: 60
Discharge: HOME | End: 2025-04-28
Attending: INTERNAL MEDICINE | Admitting: INTERNAL MEDICINE
Payer: MEDICARE

## 2025-04-28 ENCOUNTER — ANESTHESIA EVENT (OUTPATIENT)
Dept: ENDOSCOPY | Facility: HOSPITAL | Age: 60
End: 2025-04-28
Payer: MEDICARE

## 2025-04-28 ENCOUNTER — ANESTHESIA (OUTPATIENT)
Dept: ENDOSCOPY | Facility: HOSPITAL | Age: 60
End: 2025-04-28
Payer: MEDICARE

## 2025-04-28 VITALS
RESPIRATION RATE: 17 BRPM | TEMPERATURE: 98 F | OXYGEN SATURATION: 97 % | BODY MASS INDEX: 27.86 KG/M2 | SYSTOLIC BLOOD PRESSURE: 141 MMHG | HEART RATE: 62 BPM | HEIGHT: 71 IN | DIASTOLIC BLOOD PRESSURE: 85 MMHG | WEIGHT: 199 LBS

## 2025-04-28 DIAGNOSIS — K86.1 CHRONIC RECURRENT PANCREATITIS: Primary | ICD-10-CM

## 2025-04-28 DIAGNOSIS — K83.1 BILIARY OBSTRUCTION: ICD-10-CM

## 2025-04-28 LAB
POCT GLUCOSE: 116 MG/DL (ref 70–110)
POCT GLUCOSE: 125 MG/DL (ref 70–110)

## 2025-04-28 PROCEDURE — 43264 ERCP REMOVE DUCT CALCULI: CPT | Performed by: INTERNAL MEDICINE

## 2025-04-28 PROCEDURE — 43264 ERCP REMOVE DUCT CALCULI: CPT | Mod: 51,,, | Performed by: INTERNAL MEDICINE

## 2025-04-28 PROCEDURE — 25000003 PHARM REV CODE 250: Performed by: INTERNAL MEDICINE

## 2025-04-28 PROCEDURE — 27202125 HC BALLOON, EXTRACTION (ANY): Performed by: INTERNAL MEDICINE

## 2025-04-28 PROCEDURE — C1769 GUIDE WIRE: HCPCS | Performed by: INTERNAL MEDICINE

## 2025-04-28 PROCEDURE — 27202303 HC GRASPER, SPECIALTY: Performed by: INTERNAL MEDICINE

## 2025-04-28 PROCEDURE — D9220A PRA ANESTHESIA: Mod: ANES,,, | Performed by: ANESTHESIOLOGY

## 2025-04-28 PROCEDURE — 37000008 HC ANESTHESIA 1ST 15 MINUTES: Performed by: INTERNAL MEDICINE

## 2025-04-28 PROCEDURE — 37000009 HC ANESTHESIA EA ADD 15 MINS: Performed by: INTERNAL MEDICINE

## 2025-04-28 PROCEDURE — D9220A PRA ANESTHESIA: Mod: CRNA,,, | Performed by: REGISTERED NURSE

## 2025-04-28 PROCEDURE — 43275 ERCP REMOVE FORGN BODY DUCT: CPT | Performed by: INTERNAL MEDICINE

## 2025-04-28 PROCEDURE — 74328 X-RAY BILE DUCT ENDOSCOPY: CPT | Mod: 26,,, | Performed by: INTERNAL MEDICINE

## 2025-04-28 PROCEDURE — 43259 EGD US EXAM DUODENUM/JEJUNUM: CPT | Mod: 51,,, | Performed by: INTERNAL MEDICINE

## 2025-04-28 PROCEDURE — 74328 X-RAY BILE DUCT ENDOSCOPY: CPT | Mod: TC | Performed by: INTERNAL MEDICINE

## 2025-04-28 PROCEDURE — 63600175 PHARM REV CODE 636 W HCPCS: Performed by: ANESTHESIOLOGY

## 2025-04-28 PROCEDURE — 63600175 PHARM REV CODE 636 W HCPCS: Performed by: REGISTERED NURSE

## 2025-04-28 PROCEDURE — 25500020 PHARM REV CODE 255: Performed by: INTERNAL MEDICINE

## 2025-04-28 PROCEDURE — 25000003 PHARM REV CODE 250: Performed by: REGISTERED NURSE

## 2025-04-28 PROCEDURE — 43275 ERCP REMOVE FORGN BODY DUCT: CPT | Mod: ,,, | Performed by: INTERNAL MEDICINE

## 2025-04-28 PROCEDURE — 43259 EGD US EXAM DUODENUM/JEJUNUM: CPT | Performed by: INTERNAL MEDICINE

## 2025-04-28 RX ORDER — FENTANYL CITRATE 50 UG/ML
INJECTION, SOLUTION INTRAMUSCULAR; INTRAVENOUS
Status: DISCONTINUED | OUTPATIENT
Start: 2025-04-28 | End: 2025-04-28

## 2025-04-28 RX ORDER — PROPOFOL 10 MG/ML
VIAL (ML) INTRAVENOUS
Status: DISCONTINUED | OUTPATIENT
Start: 2025-04-28 | End: 2025-04-28

## 2025-04-28 RX ORDER — OXYCODONE HYDROCHLORIDE 5 MG/1
5 TABLET ORAL ONCE
Refills: 0 | Status: COMPLETED | OUTPATIENT
Start: 2025-04-28 | End: 2025-04-28

## 2025-04-28 RX ORDER — SODIUM CHLORIDE 0.9 % (FLUSH) 0.9 %
10 SYRINGE (ML) INJECTION
Status: DISCONTINUED | OUTPATIENT
Start: 2025-04-28 | End: 2025-04-28 | Stop reason: HOSPADM

## 2025-04-28 RX ORDER — PROPOFOL 10 MG/ML
VIAL (ML) INTRAVENOUS CONTINUOUS PRN
Status: DISCONTINUED | OUTPATIENT
Start: 2025-04-28 | End: 2025-04-28

## 2025-04-28 RX ORDER — OXYCODONE HYDROCHLORIDE 5 MG/1
TABLET ORAL
Status: COMPLETED
Start: 2025-04-28 | End: 2025-04-28

## 2025-04-28 RX ORDER — FENTANYL CITRATE 50 UG/ML
25 INJECTION, SOLUTION INTRAMUSCULAR; INTRAVENOUS EVERY 5 MIN PRN
Status: COMPLETED | OUTPATIENT
Start: 2025-04-28 | End: 2025-04-28

## 2025-04-28 RX ORDER — LIDOCAINE HYDROCHLORIDE 20 MG/ML
INJECTION INTRAVENOUS
Status: DISCONTINUED | OUTPATIENT
Start: 2025-04-28 | End: 2025-04-28

## 2025-04-28 RX ORDER — PANCRELIPASE 36000; 180000; 114000 [USP'U]/1; [USP'U]/1; [USP'U]/1
2 CAPSULE, DELAYED RELEASE PELLETS ORAL
Qty: 540 CAPSULE | Refills: 3 | Status: SHIPPED | OUTPATIENT
Start: 2025-04-28 | End: 2026-04-28

## 2025-04-28 RX ORDER — DEXMEDETOMIDINE HYDROCHLORIDE 100 UG/ML
INJECTION, SOLUTION INTRAVENOUS
Status: DISCONTINUED | OUTPATIENT
Start: 2025-04-28 | End: 2025-04-28

## 2025-04-28 RX ORDER — GLUCAGON 1 MG
1 KIT INJECTION
Status: DISCONTINUED | OUTPATIENT
Start: 2025-04-28 | End: 2025-04-28 | Stop reason: HOSPADM

## 2025-04-28 RX ADMIN — SODIUM CHLORIDE: 0.9 INJECTION, SOLUTION INTRAVENOUS at 07:04

## 2025-04-28 RX ADMIN — PROPOFOL 125 MCG/KG/MIN: 10 INJECTION, EMULSION INTRAVENOUS at 07:04

## 2025-04-28 RX ADMIN — FENTANYL CITRATE 25 MCG: 50 INJECTION INTRAMUSCULAR; INTRAVENOUS at 09:04

## 2025-04-28 RX ADMIN — DEXMEDETOMIDINE 12 MCG: 100 INJECTION, SOLUTION, CONCENTRATE INTRAVENOUS at 07:04

## 2025-04-28 RX ADMIN — GLYCOPYRROLATE 0.2 MG: 0.2 INJECTION, SOLUTION INTRAMUSCULAR; INTRAVENOUS at 07:04

## 2025-04-28 RX ADMIN — PROPOFOL 25 MG: 10 INJECTION, EMULSION INTRAVENOUS at 08:04

## 2025-04-28 RX ADMIN — FENTANYL CITRATE 25 MCG: 50 INJECTION, SOLUTION INTRAMUSCULAR; INTRAVENOUS at 08:04

## 2025-04-28 RX ADMIN — LIDOCAINE HYDROCHLORIDE 100 MG: 20 INJECTION INTRAVENOUS at 07:04

## 2025-04-28 RX ADMIN — OXYCODONE 5 MG: 5 TABLET ORAL at 08:04

## 2025-04-28 RX ADMIN — FENTANYL CITRATE 25 MCG: 50 INJECTION, SOLUTION INTRAMUSCULAR; INTRAVENOUS at 07:04

## 2025-04-28 RX ADMIN — PROPOFOL 100 MG: 10 INJECTION, EMULSION INTRAVENOUS at 07:04

## 2025-04-28 RX ADMIN — FENTANYL CITRATE 50 MCG: 50 INJECTION, SOLUTION INTRAMUSCULAR; INTRAVENOUS at 07:04

## 2025-04-28 NOTE — PROVATION PATIENT INSTRUCTIONS
Discharge Summary/Instructions after an Endoscopic Procedure  Patient Name: Fabrice Zamorano  Patient MRN: [id number]  Patient YOB: 1965 Monday, April 28, 2025  Shawn Chisholm MD  Dear patient,  As a result of recent federal legislation (The Federal Cures Act), you may   receive lab or pathology results from your procedure in your [facility]   account before your physician is able to contact you. Your physician or   their representative will relay the results to you with their   recommendations at their soonest availability.  Thank you,  RESTRICTIONS:  During your procedure today, you received medications for sedation.  These   medications may affect your judgment, balance and coordination.  Therefore,   for 24 hours, you have the following restrictions:   - DO NOT drive a car, operate machinery, make legal/financial decisions,   sign important papers or drink alcohol.    ACTIVITY:  Today: no heavy lifting, straining or running due to procedural   sedation/anesthesia.  The following day: return to full activity including work.  DIET:  Eat and drink normally unless instructed otherwise.     TREATMENT FOR COMMON SIDE EFFECTS:  - Mild abdominal pain, nausea, belching, bloating or excessive gas:  rest,   eat lightly and use a heating pad.  - Sore Throat: treat with throat lozenges and/or gargle with warm salt   water.  - Because air was used during the procedure, expelling large amounts of air   from your rectum or belching is normal.  - If a bowel prep was taken, you may not have a bowel movement for 1-3 days.    This is normal.  SYMPTOMS TO WATCH FOR AND REPORT TO YOUR PHYSICIAN:  1. Abdominal pain or bloating, other than gas cramps.  2. Chest pain.  3. Back pain.  4. Signs of infection such as: chills or fever occurring within 24 hours   after the procedure.  5. Rectal bleeding, which would show as bright red, maroon, or black stools.   (A tablespoon of blood from the rectum is not serious, especially if    hemorrhoids are present.)  6. Vomiting.  7. Weakness or dizziness.  GO DIRECTLY TO THE NEAREST EMERGENCY ROOM IF YOU HAVE ANY OF THE FOLLOWING:      Difficulty breathing              Chills and/or fever over 101 F   Persistent vomiting and/or vomiting blood   Severe abdominal pain   Severe chest pain   Black, tarry stools   Bleeding- more than one tablespoon   Any other symptom or condition that you feel may need urgent attention  Your doctor recommends these additional instructions:  If any biopsies were taken, your doctors clinic will contact you in 1 to 2   weeks with any results.  - Discharge patient to home (ambulatory).   - Resume previous diet; Discharge to home (ambulatory); Resume outpatient   medications  - Return to primary care physician as previously scheduled.   - Will arrange for LFTs in 2-3 weeks.  For questions, problems or results please call your physician - Shawn Chisholm MD at Work:  [phone].  [facility], EMERGENCY ROOM PHONE NUMBER: [phone]  IF A COMPLICATION OR EMERGENCY SITUATION ARISES AND YOU ARE UNABLE TO REACH   YOUR PHYSICIAN - GO DIRECTLY TO THE EMERGENCY ROOM.  Shawn Chisholm MD  4/28/2025 8:56:55 AM  This report has been verified and signed electronically.  Dear patient,  As a result of recent federal legislation (The Federal Cures Act), you may   receive lab or pathology results from your procedure in your [facility]   account before your physician is able to contact you. Your physician or   their representative will relay the results to you with their   recommendations at their soonest availability.  Thank you,  PROVATION

## 2025-04-28 NOTE — PROVATION PATIENT INSTRUCTIONS
Discharge Summary/Instructions after an Endoscopic Procedure  Patient Name: Fabrice Zamorano  Patient MRN: [id number]  Patient YOB: 1965 Monday, April 28, 2025  Shawn Chisholm MD  Dear patient,  As a result of recent federal legislation (The Federal Cures Act), you may   receive lab or pathology results from your procedure in your [facility]   account before your physician is able to contact you. Your physician or   their representative will relay the results to you with their   recommendations at their soonest availability.  Thank you,  RESTRICTIONS:  During your procedure today, you received medications for sedation.  These   medications may affect your judgment, balance and coordination.  Therefore,   for 24 hours, you have the following restrictions:   - DO NOT drive a car, operate machinery, make legal/financial decisions,   sign important papers or drink alcohol.    ACTIVITY:  Today: no heavy lifting, straining or running due to procedural   sedation/anesthesia.  The following day: return to full activity including work.  DIET:  Eat and drink normally unless instructed otherwise.     TREATMENT FOR COMMON SIDE EFFECTS:  - Mild abdominal pain, nausea, belching, bloating or excessive gas:  rest,   eat lightly and use a heating pad.  - Sore Throat: treat with throat lozenges and/or gargle with warm salt   water.  - Because air was used during the procedure, expelling large amounts of air   from your rectum or belching is normal.  - If a bowel prep was taken, you may not have a bowel movement for 1-3 days.    This is normal.  SYMPTOMS TO WATCH FOR AND REPORT TO YOUR PHYSICIAN:  1. Abdominal pain or bloating, other than gas cramps.  2. Chest pain.  3. Back pain.  4. Signs of infection such as: chills or fever occurring within 24 hours   after the procedure.  5. Rectal bleeding, which would show as bright red, maroon, or black stools.   (A tablespoon of blood from the rectum is not serious, especially if    hemorrhoids are present.)  6. Vomiting.  7. Weakness or dizziness.  GO DIRECTLY TO THE NEAREST EMERGENCY ROOM IF YOU HAVE ANY OF THE FOLLOWING:      Difficulty breathing              Chills and/or fever over 101 F   Persistent vomiting and/or vomiting blood   Severe abdominal pain   Severe chest pain   Black, tarry stools   Bleeding- more than one tablespoon   Any other symptom or condition that you feel may need urgent attention  Your doctor recommends these additional instructions:  If any biopsies were taken, your doctors clinic will contact you in 1 to 2   weeks with any results.  - Discharge patient to home (ambulatory).   - Resume previous diet; Discharge to home (ambulatory); Resume outpatient   medications  - Return to primary care physician as previously scheduled.   - Perform an ERCP today.  For questions, problems or results please call your physician - Shawn Chisholm MD at Work:  [phone].  [facility] PHONE NUMBER: [phone]  IF A COMPLICATION OR EMERGENCY SITUATION ARISES AND YOU ARE UNABLE TO REACH   YOUR PHYSICIAN - GO DIRECTLY TO THE EMERGENCY ROOM.  Shawn Chisholm MD  4/28/2025 8:53:36 AM  This report has been verified and signed electronically.  Dear patient,  As a result of recent federal legislation (The Federal Cures Act), you may   receive lab or pathology results from your procedure in your [facility]   account before your physician is able to contact you. Your physician or   their representative will relay the results to you with their   recommendations at their soonest availability.  Thank you,  PROVATION

## 2025-04-28 NOTE — ANESTHESIA PREPROCEDURE EVALUATION
04/28/2025  Fabrice Zamorano is a 60 y.o., male.  Past Medical History:   Diagnosis Date    Anticoagulant long-term use     On Eliquis for DVT    GERD (gastroesophageal reflux disease)     Hypertension     Hypothyroidism 10/26/2021    Liver disease     fatty liver    Pancreatic pseudocyst     Personal history of colonic polyps     Sleep apnea     Splenic vein thrombosis      Past Surgical History:   Procedure Laterality Date    CHOLECYSTECTOMY      COLONOSCOPY      COLONOSCOPY N/A 7/3/2024    Procedure: COLONOSCOPY;  Surgeon: Kwadwo Gonsalez MD;  Location: [facility] ([address] [place] [address]);  Service: Endoscopy;  Laterality: N/A;    ENDOSCOPIC ULTRASOUND OF UPPER GASTROINTESTINAL TRACT N/A 10/12/2021    Procedure: ULTRASOUND, UPPER GI TRACT, ENDOSCOPIC;  Surgeon: Odalys Leiva MD;  Location: [facility];  Service: Endoscopy;  Laterality: N/A;  Upper and linear, 22 shark core, cytology and RPMI    ENDOSCOPIC ULTRASOUND OF UPPER GASTROINTESTINAL TRACT N/A 2/24/2023    Procedure: ULTRASOUND, UPPER GI TRACT, ENDOSCOPIC;  Surgeon: Shawn Chisholm MD;  Location: [facility] ([address] [place] [address]);  Service: Endoscopy;  Laterality: N/A;  2/3/23-Instructions via portal-DS    ENDOSCOPIC ULTRASOUND OF UPPER GASTROINTESTINAL TRACT N/A 11/14/2023    Procedure: ULTRASOUND, UPPER GI TRACT, ENDOSCOPIC;  Surgeon: Kwadwo Gonsalez MD;  Location: [facility];  Service: Gastroenterology;  Laterality: N/A;  10/13/23: instructions sent vie portal-GD    ENDOSCOPIC ULTRASOUND OF UPPER GASTROINTESTINAL TRACT N/A 3/12/2024    Procedure: ULTRASOUND, UPPER GI TRACT, ENDOSCOPIC;  Surgeon: Shawn Chisholm MD;  Location: [facility];  Service: Endoscopy;  Laterality: N/A;  2/16/24: inst sent via portal-GD    ERCP N/A 09/29/2021    Procedure: ERCP (ENDOSCOPIC RETROGRADE CHOLANGIOPANCREATOGRAPHY);  Surgeon: Odalys Leiva MD;  Location:  [facility] ENDO;  Service: Endoscopy;  Laterality: N/A;    ERCP N/A 08/15/2022    Procedure: ERCP (ENDOSCOPIC RETROGRADE CHOLANGIOPANCREATOGRAPHY);  Surgeon: Odalys Leiva MD;  Location: [facility];  Service: Endoscopy;  Laterality: N/A;    ERCP N/A 2/24/2023    Procedure: ERCP (ENDOSCOPIC RETROGRADE CHOLANGIOPANCREATOGRAPHY);  Surgeon: Shawn Chisholm MD;  Location: [facility] ([facility]);  Service: Endoscopy;  Laterality: N/A;    ERCP N/A 12/10/2024    Procedure: ERCP (ENDOSCOPIC RETROGRADE CHOLANGIOPANCREATOGRAPHY);  Surgeon: Shawn Chisholm MD;  [facility]  Service: Endoscopy;  Laterality: N/A;  10/23/24: instructions sent via portal-GD  12/3-pre call complete with wife-tb    ERCP W/ PLASTIC STENT PLACEMENT      LATERAL PANCREATICOJEJUNOSTOMY N/A 11/19/2021    Procedure: PANCREATICOJEJUNOSTOMY, SIDE-TO-SIDE tier 1;  Surgeon: Brian Hills MD;  [facility];  Service: General;  Laterality: N/A;         Pre-op Assessment    I have reviewed the Patient Summary Reports.     I have reviewed the Nursing Notes.       Review of Systems  Anesthesia Hx:  No problems with previous Anesthesia   History of prior surgery of interest to airway management or planning:          Denies Family Hx of Anesthesia complications.    Denies Personal Hx of Anesthesia complications.                    Hematology/Oncology:  Hematology Normal   Oncology Normal                                   EENT/Dental:  EENT/Dental Normal           Cardiovascular:     Hypertension   Denies MI.  Denies CAD.                                          Pulmonary:  Pneumonia      Sleep Apnea, CPAP                Renal/:  Renal/ Normal                 Hepatic/GI:     GERD Liver Disease,               Musculoskeletal:  Musculoskeletal Normal                Neurological:    Neuromuscular Disease,                                   Endocrine:   Hypothyroidism          Dermatological:  Skin Normal    Psych:  Psychiatric  Normal                  Physical Exam  General: Well nourished    Airway:  Mallampati: III / II  Mouth Opening: Normal  TM Distance: Normal  Tongue: Normal  Neck ROM: Normal ROM    Dental:  Intact      Anesthesia Plan  Type of Anesthesia, risks & benefits discussed:    Anesthesia Type: Gen Natural Airway  Intra-op Monitoring Plan: Standard ASA Monitors  Post Op Pain Control Plan: multimodal analgesia  Induction:  IV  Airway Plan: Direct  Informed Consent: Informed consent signed with the Patient and all parties understand the risks and agree with anesthesia plan.  All questions answered. Patient consented to blood products? No  ASA Score: 3  Day of Surgery Review of History & Physical: H&P Update referred to the surgeon/provider.    Ready For Surgery From Anesthesia Perspective.     .

## 2025-04-28 NOTE — TRANSFER OF CARE
"Anesthesia Transfer of Care Note    Patient: Fabrice Zamorano    Procedure(s) Performed: Procedure(s) (LRB):  ERCP (ENDOSCOPIC RETROGRADE CHOLANGIOPANCREATOGRAPHY) (N/A)  ULTRASOUND, UPPER GI TRACT, ENDOSCOPIC (N/A)    Patient location: [facility]    Anesthesia Type: general    Transport from OR: Transported from OR on 2-3 L/min O2 by NC with adequate spontaneous ventilation    Post pain: adequate analgesia    Post assessment: no apparent anesthetic complications and tolerated procedure well    Post vital signs: stable    Level of consciousness: awake    Nausea/Vomiting: no nausea/vomiting    Complications: none    Transfer of care protocol was followed    Last vitals: Visit Vitals  BP (!) 143/87 (BP Location: Left arm, Patient Position: Lying)   Pulse 65   Temp 36.7 °C (98.1 °F) (Temporal)   Resp 12   Ht 5' 11" (1.803 m)   Wt 90.3 kg (199 lb)   SpO2 98%   BMI 27.75 kg/m²     "

## 2025-04-28 NOTE — H&P
Short Stay Endoscopy History and Physical    PCP - Atiya Lopez MD  Referring Physician - Shawn Chisholm MD  [address] [place] [address]    Procedure - EUS/ERCP  ASA - per anesthesia  Mallampati - per anesthesia  History of Anesthesia problems - no  Family history Anesthesia problems -  no   Plan of anesthesia - General    HPI:  This is a 60 y.o. male here for evaluation of: chronic pancreatitis; biliary stricture    Reflux - no  Dysphagia - no  Abdominal pain - no  Diarrhea - no    ROS:  Constitutional: No fevers, chills, No weight loss  CV: No chest pain  Pulm: No cough, No shortness of breath  GI: see HPI    Medical History:  has a past medical history of Anticoagulant long-term use, GERD (gastroesophageal reflux disease), Hypertension, Hypothyroidism (10/26/2021), Liver disease, Pancreatic pseudocyst, Personal history of colonic polyps, Sleep apnea, and Splenic vein thrombosis.    Surgical History:  has a past surgical history that includes ERCP w/ plastic stent placement; Cholecystectomy; ERCP (N/A, 09/29/2021); Endoscopic ultrasound of upper gastrointestinal tract (N/A, 10/12/2021); Lateral pancreaticojejunostomy (N/A, 11/19/2021); ERCP (N/A, 08/15/2022); Colonoscopy; Endoscopic ultrasound of upper gastrointestinal tract (N/A, 2/24/2023); ERCP (N/A, 2/24/2023); Endoscopic ultrasound of upper gastrointestinal tract (N/A, 11/14/2023); Endoscopic ultrasound of upper gastrointestinal tract (N/A, 3/12/2024); Colonoscopy (N/A, 7/3/2024); and ERCP (N/A, 12/10/2024).    Family History: family history includes Arthritis in his father; Cancer in his father; Pancreatic cancer in his father; Thyroid disease in his father and mother..    Social History:  reports that he quit smoking about 23 years ago. His smoking use included cigarettes. He has never used smokeless tobacco. He reports that he does not currently use alcohol. He reports that he does not use drugs.    Review of patient's allergies  indicates:  No Known Allergies    Medications:   Prescriptions Prior to Admission[1]    Physical Exam:    Vital Signs:   Vitals:    04/28/25 0719   BP: (!) 143/87   Pulse: 65   Resp: 12   Temp: 98.1 °F (36.7 °C)       General Appearance: Well appearing in no acute distress  Lungs: no labored breathing  CVS:  regular rate  Abdomen: non tender    Labs:  Lab Results   Component Value Date    WBC 12.01 10/20/2024    HGB 11.6 (L) 10/20/2024    HCT 35.5 (L) 10/20/2024     10/20/2024    CHOL 140 09/27/2021    TRIG 75 09/27/2021    HDL 24 (L) 09/27/2021    ALT 57 (H) 02/25/2025    AST 54 (H) 02/25/2025     02/25/2025    K 4.1 02/25/2025     02/25/2025    CREATININE 1.1 02/25/2025    BUN 18 02/25/2025    CO2 27 02/25/2025    TSH 2.669 03/19/2023    INR 1.1 06/19/2024       I have explained the risks and benefits of this endoscopic procedure to the patient including but not limited to bleeding, inflammation, infection, perforation, and death.      Shawn Chisholm MD        [1]   Medications Prior to Admission   Medication Sig Dispense Refill Last Dose/Taking    carvediloL (COREG) 6.25 MG tablet Take 6.25 mg by mouth 2 (two) times daily.   4/28/2025 Morning    CREON 36,000-114,000- 180,000 unit CpDR TAKE 1 CAPSULE BY MOUTH THREE TIMES DAILY WITH MEALS 200 capsule 0 4/27/2025    ferrous gluconate (FERGON) 324 MG tablet Take 38 mg by mouth.   4/27/2025    metFORMIN (GLUCOPHAGE-XR) 500 MG ER 24hr tablet Take 2 tablets (1,000 mg total) by mouth 2 (two) times daily with meals. 360 tablet 0 4/27/2025    multivitamin (THERAGRAN) per tablet Take 1 tablet by mouth once daily.   4/27/2025    ondansetron (ZOFRAN-ODT) 4 MG TbDL Take 1 tablet (4 mg total) by mouth every 8 (eight) hours as needed (Nausea). 30 tablet 0 Past Month    pantoprazole (PROTONIX) 40 MG tablet TAKE 1 TABLET(40 MG) BY MOUTH EVERY DAY 90 tablet 0 4/27/2025    pregabalin (LYRICA) 75 MG capsule Take 75 mg by mouth.   4/27/2025    promethazine  (PHENERGAN) 25 MG tablet Take 1 tablet (25 mg total) by mouth every 6 (six) hours as needed for Nausea. 30 tablet 1 Past Month    simvastatin (ZOCOR) 20 MG tablet Take 1 tablet (20 mg total) by mouth every evening. 90 tablet 3 Past Week    blood sugar diagnostic Strp To check BG 3 times daily, to use with insurance preferred meter 100 each 99     blood-glucose meter kit To check BG three times daily, to use with insurance preferred meter 100 each 0     JARDIANCE 25 mg tablet Take 1 tablet (25 mg total) by mouth once daily. 90 tablet 0 4/23/2025    lancets Misc To check BG three times daily, to use with insurance preferred meter 100 each 99     lipase-protease-amylase (CREON) 36,000-114,000- 180,000 unit CpDR Take 1 Dose by mouth. (Patient not taking: Reported on 3/10/2025)       ONETOUCH DELICA PLUS LANCET 33 gauge Misc Apply topically 3 (three) times daily.       ONETOUCH VERIO FLEX METER Misc 3 (three) times daily. Test Blood Sugar       oxyCODONE (ROXICODONE) 10 mg Tab immediate release tablet Take 10 mg by mouth every 4 (four) hours as needed.       oxyCODONE-acetaminophen (PERCOCET)  mg per tablet Take 1 tablet by mouth every 4 (four) hours as needed for Pain. 21 tablet 0 More than a month    pregabalin (LYRICA) 75 MG capsule TAKE 2 CAPSULES(150 MG) BY MOUTH EVERY EVENING. MAY ALSO TAKE 1 CAPSULE DAILY AS NEEDED 90 capsule 2     PREVIDENT 5000 BOOSTER PLUS 1.1 % Pste BRUSH TEETH WITH PASTE BY MOUTH TWICE DAILY. SPIT OUT EXCESS. DO NOT RINSE

## 2025-05-08 ENCOUNTER — CLINICAL SUPPORT (OUTPATIENT)
Dept: DIABETES | Facility: CLINIC | Age: 60
End: 2025-05-08
Payer: MEDICARE

## 2025-05-08 DIAGNOSIS — E11.9 DIABETES MELLITUS, NEW ONSET: Primary | ICD-10-CM

## 2025-05-08 NOTE — PROGRESS NOTES
Diabetes Care Specialist Virtual Visit Note     The patient location is: parked vehicle in [place]  The chief complaint leading to consultation is: Diabetes  Visit type: audiovisual  Total time spent with patient: 30 min   Each patient to whom he or she provides medical services by telemedicine is:  (1) informed of the relationship between the physician and patient and the respective role of any other health care provider with respect to management of the patient; and (2) notified that he or she may decline to receive medical services by telemedicine and may withdraw from such care at any time.      Diabetes Care Specialist Progress Note  Author: Soila Taylor RD, [facility]  Date: 5/8/2025    Intake    Program Intake  Reason for Diabetes Program Visit:: Intervention  Type of Intervention:: Individual  Education: Self-Management Skill Review, Nutrition and Meal Planning  Current diabetes risk level:: moderate  In the last month, have you used the ER or been admitted to the hospital: No    Current Diabetes Treatment: Oral Medications (Jardiance 25mg 1tab daily. Metformin xr 500mg 2tab twice daily.)    Pt's wife states outside A1C labs:  2/11/25       8.9  10/15/24    6.7  8/21/24      6.2    Lifestyle Coping Support & Clinical    Problem Review  Active Comorbidities:  (HTN, HLD, DVT, Hypothyroidism, Iron defn anemia, Chronic pancreatitis, GERD, BRAIN/cpap.)    Diabetes Self-Management Skills Assessment    Medication Skills Assessment  Patient is able to identify current diabetes medications, dosages, and appropriate timing of medications.: yes  Patient reports problems or concerns with current medication regimen.: no  Medication Skills Assessment Completed:: Yes  Assessment indicates:: Adequate understanding  Area of need?: No    Nutrition/Healthy Eating  Meal Plan 24 Hour Recall - Breakfast: red fat sausage, pancake/sf syrup - Zero juice pineapple/peach/stephanie elizabeth  Meal Plan 24 Hour Recall - Lunch: homemade chili 1c, 4-5  cracker - unsweetened tea  Meal Plan 24 Hour Recall - Dinner: gumbo 1c, hushpuppies 3 - 1/2 sweet / unsweet tea (pt states rare fried foods, air frying & selecting lean proteins at home)  Meal Plan 24 Hour Recall - Snack: few chips - small bag (pt requesting additional ideas for pm snack)  Who shops/cooks?: self/wife  Patient can identify foods that impact blood sugar.: yes  Challenges to healthy eating::  (Pt states working to improve protein intake blaire at snacks since his meal sizes are limited due early satiety. He's started Isopure vanilla smoothie w/ frozen fruit (1+ cup), 1% lactaid milk (am snack).)  Nutrition/Healthy Eating Skills Assessment Completed:: Yes  Assessment indicates:: Instruction Needed  Area of need?: Yes    Physical Activity/Exercise  Patient's daily activity level:: lightly active (Pt states active chores.)  Patient formally exercises outside of work.: no  Reasons for not exercising:: physically unable to exercise currently  Physical Activity/Exercise Skills Assessment Completed: : Yes  Assessment indicates:: Adequate understanding  Area of need?: Deferred    Home Blood Glucose Monitoring  Patient states that blood sugar is checked at home daily.:  (Per recall, fst BG ; occs pm 130-180.)  Home Blood Glucose Monitoring Skills Assessment Completed: : Yes  Assessment indicates:: Adequate understanding  Area of need?: No    Acute Complications  Have you ever had hypoglycemia (low BG 70 or less)?: no (Pt denies recent episodes.)  Have you ever had hyperglycemia (high  or more)?: no (Pt denies recent episodes.)  Acute Complications Skills Assessment Completed: : Yes  Assessment indicates:: Adequate understanding  Area of need?: No    Assessment Summary and Plan  Based on today's diabetes care assessment, the following areas of need were identified:      Identified Areas of Need      Medication/Current Diabetes Treatment: No   Nutrition/Healthy Eating: Yes -see care plan   Physical  Activity/Exercise: Deferred    Home Blood Glucose Monitoring: No    Acute Complications: No      Today's interventions were provided through individual discussion, instruction, and written materials were provided.   Patient verbalized understanding of instruction and written materials.  Pt was able to return back demonstration of instructions today. Patient understood key points, needs reinforcement and further instruction.     Diabetes Self-Management Care Plan:  Today's Diabetes Self-Management Care Plan was developed with Fabrice's input. Fabrice has agreed to work toward the following goal(s) to improve his/her overall diabetes control.      Care Plan: Diabetes Management   Updates made since 5/8/2024 12:00 AM        Problem: Healthy Eating         Goal: Eat 3 meals daily or 5 small meals daily - manage carb 30-45 grams/meal.    Start Date: 3/17/2025   Expected End Date: 2/24/2026   This Visit's Progress: On track   Recent Progress: On track   Priority: High   Barriers: No Barriers Identified   Note:    Encouraged overall progress. Instructed on carb/pro snack ideas. Pt agrees to reduce fruit in smoothie from 1+cup to 1/3cup and try adding 1-2 Tbsp cottage cheese with Isopure protein.      Follow Up Plan  Follow up in about 3 months (around 8/8/2025).  -review BG logs, A1C labs  -eval progress     Today's care plan and follow up schedule was discussed with patient.  Fabrice verbalized understanding of the care plan, goals, and agrees to follow up plan.        The patient was encouraged to communicate with his/her health care provider/physician and care team regarding his/her condition(s) and treatment.  I provided the patient with my contact information today and encouraged to contact me via phone or [facility] Patient Portal as needed.     Length of Visit   Total Time: 30 Minutes

## 2025-05-12 ENCOUNTER — LAB VISIT (OUTPATIENT)
Dept: LAB | Facility: HOSPITAL | Age: 60
End: 2025-05-12
Attending: INTERNAL MEDICINE
Payer: MEDICARE

## 2025-05-12 DIAGNOSIS — K86.1 CHRONIC PANCREATITIS, UNSPECIFIED PANCREATITIS TYPE: ICD-10-CM

## 2025-05-12 LAB
ALBUMIN SERPL BCP-MCNC: 4 G/DL (ref 3.5–5.2)
ALP SERPL-CCNC: 103 UNIT/L (ref 40–150)
ALT SERPL W/O P-5'-P-CCNC: 26 UNIT/L (ref 10–44)
ANION GAP (OHS): 13 MMOL/L (ref 8–16)
AST SERPL-CCNC: 21 UNIT/L (ref 11–45)
BILIRUB SERPL-MCNC: 0.4 MG/DL (ref 0.1–1)
BUN SERPL-MCNC: 15 MG/DL (ref 6–20)
CALCIUM SERPL-MCNC: 9.7 MG/DL (ref 8.7–10.5)
CHLORIDE SERPL-SCNC: 100 MMOL/L (ref 95–110)
CO2 SERPL-SCNC: 27 MMOL/L (ref 23–29)
CREAT SERPL-MCNC: 1 MG/DL (ref 0.5–1.4)
GFR SERPLBLD CREATININE-BSD FMLA CKD-EPI: >60 ML/MIN/1.73/M2
GLUCOSE SERPL-MCNC: 187 MG/DL (ref 70–110)
POTASSIUM SERPL-SCNC: 4.3 MMOL/L (ref 3.5–5.1)
PROT SERPL-MCNC: 7 GM/DL (ref 6–8.4)
SODIUM SERPL-SCNC: 140 MMOL/L (ref 136–145)

## 2025-05-12 PROCEDURE — 82040 ASSAY OF SERUM ALBUMIN: CPT

## 2025-05-12 PROCEDURE — 36415 COLL VENOUS BLD VENIPUNCTURE: CPT | Mod: PN

## 2025-05-13 ENCOUNTER — RESULTS FOLLOW-UP (OUTPATIENT)
Dept: GASTROENTEROLOGY | Facility: HOSPITAL | Age: 60
End: 2025-05-13

## 2025-05-13 ENCOUNTER — TELEPHONE (OUTPATIENT)
Dept: ENDOSCOPY | Facility: HOSPITAL | Age: 60
End: 2025-05-13
Payer: MEDICARE

## 2025-05-13 ENCOUNTER — PATIENT OUTREACH (OUTPATIENT)
Dept: ADMINISTRATIVE | Facility: HOSPITAL | Age: 60
End: 2025-05-13
Payer: MEDICARE

## 2025-05-13 DIAGNOSIS — K86.1 CHRONIC PANCREATITIS, UNSPECIFIED PANCREATITIS TYPE: Primary | ICD-10-CM

## 2025-05-13 NOTE — LETTER
25    AUTHORIZATION FOR RELEASE OF   CONFIDENTIAL INFORMATION    DR MARSHALL    We are seeing Fabrice Zamorano, date of birth 1965, in the clinic at [facility]. Atiya Lopez MD is the patient's PCP. Fabrice Zamorano has an outstanding lab/procedure at the time we reviewed his chart. In order to help keep his health information updated, he has authorized us to request the following medical record(s):       EYE EXAM DONE ON 24         Please fax records to [facility], Garg, Aastha, MD,  at [phone] or email to [email or web address].            Adama Zamorano  MRN: [id number]  : 1965  Age: 60 y.o.  Sex: male         Patient/Legal Guardian Signature  This signature was collected at 2025           _______________________________   Printed Name/Relationship to Patient      Consent for Examination and Treatment: I hereby authorize the providers and employees of [facility] ([facility]) to provide medical treatment/services which includes, but is not limited to, performing and administering tests and diagnostic procedures that are deemed necessary, including, but not limited to, imaging examinations, blood tests and other laboratory procedures as may be required by the hospital, clinic, or may be ordered by my physician(s) or persons working under the general and/or special instructions of my physician(s).      I understand and agree that this consent covers all authorized persons, including but not limited to physicians, residents, nurse practitioners, physicians' assistants, specialists, consultants, student nurses, and independently contracted physicians, who are called upon by the physician in charge, to carry out the diagnostic procedures and medical or surgical treatment.     I hereby authorize [facility] to retain or dispose of any specimens or tissue, should there be such remaining from any test or procedure.     I hereby authorize and give consent for  [facility] providers and employees to take photographs, images or videotapes of such diagnostic, surgical or treatment procedures of Patient as may be required by [facility] or as may be ordered by a physician. I further acknowledge and agree that [facility] may use cameras or other devices for patient monitoring.     I am aware that the practice of medicine is not an exact science, and I acknowledge that no guarantees have been made to me as to the outcome of any tests, procedures or treatment.     Authorization for Release of Information: I understand that my insurance company and/or their agents may need information necessary to make determinations about payment/reimbursement. I hereby provide authorization to release to all insurance companies, their successors, assignees, other parties with whom they may have contracted, or others acting on their behalf, that are involved with payment for any hospital and/or clinic charges incurred by the patient, any information that they request and deem necessary for payment/reimbursement, and/or quality review.  I further authorize the release of my health information to physicians or other health care practitioners on staff who are involved in my health care now and in the future, and to other health care providers, entities, or institutions for the purpose of my continued care and treatment, including referrals.     REGISTRATION AUTHORIZATION  Form No. 82265 (Rev. 3/25/2024)    Page 1 of 3         Patient Name: Fabrice Zamorano  : 1965  Patient Phone #: [phone]

## 2025-05-13 NOTE — Clinical Note
Lipid found in CE 2.11.25 hyperlink to , request Eye exam done by Dr Gamaliel Vu 11.14.24 request record.  Thanks Rosemarie GORMAN

## 2025-05-13 NOTE — PROGRESS NOTES
DM LABS: per chart review pt is OVERDUE for Ha1c/MICROALBUMIN already linked to lab appt 6.3.25. Lipid found CE done 2.11.25 at [facility] hyperlink needed, and Eye exam done 11.4.24 with Dr Gamaliel Vu will send to [facility] to request record.

## 2025-05-20 DIAGNOSIS — E11.9 DIABETES MELLITUS, NEW ONSET: ICD-10-CM

## 2025-05-20 DIAGNOSIS — K21.9 GASTROESOPHAGEAL REFLUX DISEASE, UNSPECIFIED WHETHER ESOPHAGITIS PRESENT: Primary | ICD-10-CM

## 2025-05-20 DIAGNOSIS — I10 UNCONTROLLED HYPERTENSION: ICD-10-CM

## 2025-05-20 RX ORDER — CARVEDILOL 6.25 MG/1
6.25 TABLET ORAL 2 TIMES DAILY
Qty: 180 TABLET | Refills: 0 | Status: SHIPPED | OUTPATIENT
Start: 2025-05-20 | End: 2025-08-18

## 2025-05-20 RX ORDER — PANTOPRAZOLE SODIUM 40 MG/1
40 TABLET, DELAYED RELEASE ORAL DAILY
Qty: 90 TABLET | Refills: 0 | Status: SHIPPED | OUTPATIENT
Start: 2025-05-20

## 2025-05-20 RX ORDER — EMPAGLIFLOZIN 25 MG/1
25 TABLET, FILM COATED ORAL DAILY
Qty: 90 TABLET | Refills: 0 | Status: SHIPPED | OUTPATIENT
Start: 2025-05-20 | End: 2025-08-18

## 2025-05-20 NOTE — TELEPHONE ENCOUNTER
----- Message from Jeri sent at 5/20/2025  8:46 AM CDT -----  Type:  RX Refill RequestWho Called: ErrolRefill or New Rx:refillRX Name and Strength: JARDIANCE 25 mg tablet &  carvediloL (COREG) 6.25 MG tablet &  pantoprazole (PROTONIX) 40 MG tablet How is the patient currently taking it? (ex. 1XDay): Is this a 30 day or 90 day RX: [id number] - [place], [place]  [address] AT [facility] OF [facility] & [id number] [facility] [id number]: [phone] Fax: [phone] Local or Mail Order:local Ordering Provider:Would the patient rather a call back or a response via [facility]? [facility] Call Back Number:[phone] Additional Information: PT is requesting that the medications listed be put under MD Lopez since he has switched providers

## 2025-05-20 NOTE — TELEPHONE ENCOUNTER
Care Due:                  Date            Visit Type   Department     Provider  --------------------------------------------------------------------------------                                EP -                              [facility]  Last Visit: 03-      CARE ([facility])   MEDICINE       Atiya Lopez                              [facility]  Next Visit: 06-      CARE ([facility])   MEDICINE       Atiya  John                                                            Last  Test          Frequency    Reason                     Performed    Due Date  --------------------------------------------------------------------------------    HBA1C.......  6 months...  JARDIANCE, metFORMIN.....  Not Found    Overdue    [employer] Embedded Care Due Messages. Reference number: [id number].   5/20/2025 9:47:25 AM CDT

## 2025-05-27 ENCOUNTER — PATIENT OUTREACH (OUTPATIENT)
Dept: ADMINISTRATIVE | Facility: HOSPITAL | Age: 60
End: 2025-05-27
Payer: MEDICARE

## 2025-06-03 ENCOUNTER — LAB VISIT (OUTPATIENT)
Dept: LAB | Facility: HOSPITAL | Age: 60
End: 2025-06-03
Attending: STUDENT IN AN ORGANIZED HEALTH CARE EDUCATION/TRAINING PROGRAM
Payer: MEDICARE

## 2025-06-03 DIAGNOSIS — E11.9 DIABETES MELLITUS, NEW ONSET: ICD-10-CM

## 2025-06-03 LAB
ALBUMIN SERPL BCP-MCNC: 4.2 G/DL (ref 3.5–5.2)
ALBUMIN/CREAT UR: 11.1 UG/MG
ALP SERPL-CCNC: 86 UNIT/L (ref 40–150)
ALT SERPL W/O P-5'-P-CCNC: 35 UNIT/L (ref 10–44)
ANION GAP (OHS): 9 MMOL/L (ref 8–16)
AST SERPL-CCNC: 32 UNIT/L (ref 11–45)
BILIRUB SERPL-MCNC: 0.5 MG/DL (ref 0.1–1)
BUN SERPL-MCNC: 15 MG/DL (ref 6–20)
CALCIUM SERPL-MCNC: 9 MG/DL (ref 8.7–10.5)
CHLORIDE SERPL-SCNC: 105 MMOL/L (ref 95–110)
CHOLEST SERPL-MCNC: 84 MG/DL (ref 120–199)
CHOLEST/HDLC SERPL: 3.8 {RATIO} (ref 2–5)
CO2 SERPL-SCNC: 26 MMOL/L (ref 23–29)
CREAT SERPL-MCNC: 1 MG/DL (ref 0.5–1.4)
CREAT UR-MCNC: 162 MG/DL (ref 23–375)
EAG (OHS): 137 MG/DL (ref 68–131)
GFR SERPLBLD CREATININE-BSD FMLA CKD-EPI: >60 ML/MIN/1.73/M2
GLUCOSE SERPL-MCNC: 106 MG/DL (ref 70–110)
HBA1C MFR BLD: 6.4 % (ref 4–5.6)
HDLC SERPL-MCNC: 22 MG/DL (ref 40–75)
HDLC SERPL: 26.2 % (ref 20–50)
LDLC SERPL CALC-MCNC: 45.2 MG/DL (ref 63–159)
MICROALBUMIN UR-MCNC: 18 UG/ML (ref ?–5000)
NONHDLC SERPL-MCNC: 62 MG/DL
POTASSIUM SERPL-SCNC: 3.9 MMOL/L (ref 3.5–5.1)
PROT SERPL-MCNC: 6.7 GM/DL (ref 6–8.4)
SODIUM SERPL-SCNC: 140 MMOL/L (ref 136–145)
TRIGL SERPL-MCNC: 84 MG/DL (ref 30–150)

## 2025-06-03 PROCEDURE — 83036 HEMOGLOBIN GLYCOSYLATED A1C: CPT

## 2025-06-03 PROCEDURE — 36415 COLL VENOUS BLD VENIPUNCTURE: CPT | Mod: PN

## 2025-06-03 PROCEDURE — 80061 LIPID PANEL: CPT

## 2025-06-03 PROCEDURE — 82043 UR ALBUMIN QUANTITATIVE: CPT

## 2025-06-03 PROCEDURE — 80053 COMPREHEN METABOLIC PANEL: CPT

## 2025-06-10 ENCOUNTER — OFFICE VISIT (OUTPATIENT)
Dept: FAMILY MEDICINE | Facility: CLINIC | Age: 60
End: 2025-06-10
Payer: MEDICARE

## 2025-06-10 VITALS
DIASTOLIC BLOOD PRESSURE: 72 MMHG | HEART RATE: 80 BPM | WEIGHT: 204.13 LBS | SYSTOLIC BLOOD PRESSURE: 120 MMHG | BODY MASS INDEX: 28.58 KG/M2 | HEIGHT: 71 IN | OXYGEN SATURATION: 97 %

## 2025-06-10 DIAGNOSIS — D69.6 THROMBOCYTOPENIA: ICD-10-CM

## 2025-06-10 DIAGNOSIS — I10 PRIMARY HYPERTENSION: ICD-10-CM

## 2025-06-10 DIAGNOSIS — E11.9 DIABETES MELLITUS, NEW ONSET: Primary | ICD-10-CM

## 2025-06-10 DIAGNOSIS — E03.9 HYPOTHYROIDISM, UNSPECIFIED TYPE: ICD-10-CM

## 2025-06-10 PROCEDURE — G2211 COMPLEX E/M VISIT ADD ON: HCPCS | Mod: S$GLB,,, | Performed by: STUDENT IN AN ORGANIZED HEALTH CARE EDUCATION/TRAINING PROGRAM

## 2025-06-10 PROCEDURE — 3078F DIAST BP <80 MM HG: CPT | Mod: CPTII,S$GLB,, | Performed by: STUDENT IN AN ORGANIZED HEALTH CARE EDUCATION/TRAINING PROGRAM

## 2025-06-10 PROCEDURE — 3066F NEPHROPATHY DOC TX: CPT | Mod: CPTII,S$GLB,, | Performed by: STUDENT IN AN ORGANIZED HEALTH CARE EDUCATION/TRAINING PROGRAM

## 2025-06-10 PROCEDURE — 99999 PR PBB SHADOW E&M-EST. PATIENT-LVL V: CPT | Mod: PBBFAC,,, | Performed by: STUDENT IN AN ORGANIZED HEALTH CARE EDUCATION/TRAINING PROGRAM

## 2025-06-10 PROCEDURE — 3044F HG A1C LEVEL LT 7.0%: CPT | Mod: CPTII,S$GLB,, | Performed by: STUDENT IN AN ORGANIZED HEALTH CARE EDUCATION/TRAINING PROGRAM

## 2025-06-10 PROCEDURE — 3074F SYST BP LT 130 MM HG: CPT | Mod: CPTII,S$GLB,, | Performed by: STUDENT IN AN ORGANIZED HEALTH CARE EDUCATION/TRAINING PROGRAM

## 2025-06-10 PROCEDURE — 3061F NEG MICROALBUMINURIA REV: CPT | Mod: CPTII,S$GLB,, | Performed by: STUDENT IN AN ORGANIZED HEALTH CARE EDUCATION/TRAINING PROGRAM

## 2025-06-10 PROCEDURE — 1159F MED LIST DOCD IN RCRD: CPT | Mod: CPTII,S$GLB,, | Performed by: STUDENT IN AN ORGANIZED HEALTH CARE EDUCATION/TRAINING PROGRAM

## 2025-06-10 PROCEDURE — 3008F BODY MASS INDEX DOCD: CPT | Mod: CPTII,S$GLB,, | Performed by: STUDENT IN AN ORGANIZED HEALTH CARE EDUCATION/TRAINING PROGRAM

## 2025-06-10 PROCEDURE — 99214 OFFICE O/P EST MOD 30 MIN: CPT | Mod: S$GLB,,, | Performed by: STUDENT IN AN ORGANIZED HEALTH CARE EDUCATION/TRAINING PROGRAM

## 2025-06-10 RX ORDER — METFORMIN HYDROCHLORIDE 1000 MG/1
1000 TABLET ORAL 2 TIMES DAILY WITH MEALS
Qty: 180 TABLET | Refills: 3 | Status: SHIPPED | OUTPATIENT
Start: 2025-06-10 | End: 2026-06-10

## 2025-06-10 NOTE — PROGRESS NOTES
Patient ID: Fabrice Zamorano is a 60 y.o. male.    Chief Complaint: Follow-up and Pain (Left lower back pain)    History of Present Illness    CHIEF COMPLAINT:  Mr. Zamorano presents today for follow up with stomach issues    GASTROINTESTINAL:  He reports poor food tolerance this morning from previous evening's meal despite taking Creon with all meals. He recently had a stent removal after being in place for 4-5 months and expresses concern about potential future biliary obstruction.    DIABETES:  He continues Jardiance 25mg and Metformin twice daily for diabetes management.    IRON DEFICIENCY:  He has a history of iron deficiency with associated fatigue and previous iron infusions in 2021 prior to surgery. He started OTC iron supplements two months ago with significant improvement in energy levels.    MUSCULOSKELETAL:  He reports left-sided morning back pain for the past couple months, rated 1-2/10 in severity. He uses Tylenol occasionally for pain relief, having taken it twice in the past week.      ROS:  General: -fever, -chills, +fatigue, -weight gain, -weight loss, +heat intolerance  Eyes: -vision changes, -redness, -discharge  ENT: -ear pain, -nasal congestion, -sore throat  Cardiovascular: -chest pain, -palpitations, -lower extremity edema  Respiratory: -cough, -shortness of breath  Gastrointestinal: -abdominal pain, -nausea, -vomiting, -diarrhea, -constipation, -blood in stool  Genitourinary: -dysuria, -hematuria, -frequency  Musculoskeletal: -joint pain, -muscle pain, +back pain  Skin: -rash, -lesion  Neurological: -headache, -dizziness, -numbness, -tingling  Psychiatric: -anxiety, -depression, -sleep difficulty         Pmh, Psh, Family Hx, Social Hx updated in Epic Tabs today.       3/8/2023     9:27 AM 2/2/2023     9:04 AM 11/4/2021    10:24 AM   Depression Patient Health Questionnaire   Over the last two weeks how often have you been bothered by little interest or pleasure in doing things Not at all  Not  at all  Not at all    Over the last two weeks how often have you been bothered by feeling down, depressed or hopeless Not at all Not at all Not at all    PHQ-2 Total Score 0 0 0       Data saved with a previous flowsheet row definition       Active Problem List with Overview Notes    Diagnosis Date Noted    Diabetes mellitus, new onset 06/10/2025    Chronic recurrent pancreatitis 02/24/2025     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 Follows gastro      Sepsis 10/19/2024    Pneumonia 10/19/2024    Acute pancreatic fluid collection 07/01/2024    Biliary obstruction 06/20/2024    Uncontrolled hypertension 06/20/2024    Abnormal liver enzymes 03/23/2022    Acute on chronic pancreatitis 10/26/2021    GERD (gastroesophageal reflux disease) 10/26/2021    Facial palsy 10/26/2021    Diverticular disease of colon 10/26/2021    History of excision of intestinal structure 10/26/2021    Hypothyroidism 10/26/2021    Mixed hyperlipidemia 10/26/2021    Portal vein thrombosis 10/26/2021    History of DVT (deep vein thrombosis) 10/26/2021    Hypertension     Iron deficiency anemia 09/28/2021    Alcohol use 11/23/2017     Last Assessment & Plan:   Formatting of this note might be different from the original.  This was mild in nature however patient was counseled on cessation given his acute pancreatitis.      BRAIN on CPAP 11/23/2017     Last Assessment & Plan:   Formatting of this note might be different from the original.  Patient was continued on his home CPAP therapy while in house.  No acute issues during his hospitalization.         Past Medical History:   Diagnosis Date    Anticoagulant long-term use     On Eliquis for DVT    GERD (gastroesophageal reflux disease)     Hypertension     Hypothyroidism 10/26/2021    Liver disease     fatty liver    Pancreatic pseudocyst     Personal history of  colonic polyps     Sleep apnea     Splenic vein thrombosis     Type 2 diabetes mellitus without complications        Past Surgical History:   Procedure Laterality Date    CHOLECYSTECTOMY      COLONOSCOPY      COLONOSCOPY N/A 7/3/2024    Procedure: COLONOSCOPY;  Surgeon: Kwadwo Gonsalez MD;  Location: [address]);  Service: Endoscopy;  Laterality: N/A;    ENDOSCOPIC ULTRASOUND OF UPPER GASTROINTESTINAL TRACT N/A 10/12/2021    Procedure: ULTRASOUND, UPPER GI TRACT, ENDOSCOPIC;  Surgeon: Odalys Leiva MD;  Location: [facility];  Service: Endoscopy;  Laterality: N/A;  Upper and linear, 22 shark core, cytology and RPMI    ENDOSCOPIC ULTRASOUND OF UPPER GASTROINTESTINAL TRACT N/A 2/24/2023    Procedure: ULTRASOUND, UPPER GI TRACT, ENDOSCOPIC;  Surgeon: Shawn Chisholm MD;  Location: [facility] ([address] [place] [address]);  Service: Endoscopy;  Laterality: N/A;  2/3/23-Instructions via portal-DS    ENDOSCOPIC ULTRASOUND OF UPPER GASTROINTESTINAL TRACT N/A 11/14/2023    Procedure: ULTRASOUND, UPPER GI TRACT, ENDOSCOPIC;  Surgeon: Kwadwo Gonsalez MD;  Location: [facility];  Service: Gastroenterology;  Laterality: N/A;  10/13/23: instructions sent vie portal-GD    ENDOSCOPIC ULTRASOUND OF UPPER GASTROINTESTINAL TRACT N/A 3/12/2024    Procedure: ULTRASOUND, UPPER GI TRACT, ENDOSCOPIC;  Surgeon: Shawn Chisholm MD;  Location: [facility];  Service: Endoscopy;  Laterality: N/A;  2/16/24: inst sent via portal-GD    ENDOSCOPIC ULTRASOUND OF UPPER GASTROINTESTINAL TRACT N/A 4/28/2025    Procedure: ULTRASOUND, UPPER GI TRACT, ENDOSCOPIC;  Surgeon: Shawn Chisholm MD;  Location: [facility] ([address] [place] [address]);  Service: Endoscopy;  Laterality: N/A;    ERCP N/A 09/29/2021    Procedure: ERCP (ENDOSCOPIC RETROGRADE CHOLANGIOPANCREATOGRAPHY);  Surgeon: Odalys Leiva MD;  Location: [facility];  Service: Endoscopy;  Laterality: N/A;    ERCP N/A 08/15/2022    Procedure: ERCP (ENDOSCOPIC RETROGRADE CHOLANGIOPANCREATOGRAPHY);  Surgeon:  Odalys Leiva MD;  Location: [facility];  Service: Endoscopy;  Laterality: N/A;    ERCP N/A 2023    Procedure: ERCP (ENDOSCOPIC RETROGRADE CHOLANGIOPANCREATOGRAPHY);  Surgeon: Shawn Chisholm MD;  [address]  Service: Endoscopy;  Laterality: N/A;    ERCP N/A 12/10/2024    Procedure: ERCP (ENDOSCOPIC RETROGRADE CHOLANGIOPANCREATOGRAPHY);  Surgeon: Shawn Chisholm MD;  [facility]  Service: Endoscopy;  Laterality: N/A;  10/23/24: instructions sent via portal-GD  12/3-pre call complete with wife-tb    ERCP N/A 2025    Procedure: ERCP (ENDOSCOPIC RETROGRADE CHOLANGIOPANCREATOGRAPHY);  Surgeon: Shawn Chisholm MD;  Location: [address]);  Service: Endoscopy;  Laterality: N/A;  Please arrange for EUS and ERCP with me to be done in mid to late April.  Can be done at [facility] or [place].-shalom  [birth year]-fwgxro-cr-jardiance   precall complete. will hold jardiance. kw    ERCP W/ PLASTIC STENT PLACEMENT      LATERAL PANCREATICOJEJUNOSTOMY N/A 2021    Procedure: PANCREATICOJEJUNOSTOMY, SIDE-TO-SIDE tier 1;  Surgeon: Brian Hills MD;  Location: [facility] OR [address] [place] [address];  Service: General;  Laterality: N/A;       Family History   Problem Relation Name Age of Onset    Thyroid disease Mother santa     Arthritis Mother santa     Cancer Father tyree     Pancreatic cancer Father tyree     Arthritis Father tyree     Thyroid disease Father tyree        Social History     Socioeconomic History    Marital status:    Tobacco Use    Smoking status: Former     Current packs/day: 0.00     Average packs/day: 1.5 packs/day for 15.0 years (22.5 ttl pk-yrs)     Types: Cigarettes     Quit date: 2001     Years since quittin.7    Smokeless tobacco: Never   Substance and Sexual Activity    Alcohol use: Not Currently    Drug use: Never    Sexual activity: Yes     Partners: Female     Birth control/protection: None     Social Drivers of Health     Financial Resource  Strain: Low Risk  (2/19/2025)    Overall Financial Resource Strain (CARDIA)     Difficulty of Paying Living Expenses: Not hard at all   Food Insecurity: No Food Insecurity (2/19/2025)    Hunger Vital Sign     Worried About Running Out of Food in the Last Year: Never true     Ran Out of Food in the Last Year: Never true   Transportation Needs: No Transportation Needs (2/19/2025)    PRAPARE - Transportation     Lack of Transportation (Medical): No     Lack of Transportation (Non-Medical): No   Physical Activity: Inactive (2/19/2025)    Exercise Vital Sign     Days of Exercise per Week: 0 days     Minutes of Exercise per Session: 0 min   Stress: No Stress Concern Present (2/19/2025)    [employer] - Occupational Stress Questionnaire     Feeling of Stress : Not at all   Housing Stability: Low Risk  (2/19/2025)    Housing Stability Vital Sign     Unable to Pay for Housing in the Last Year: No     Number of Times Moved in the Last Year: 0     Homeless in the Last Year: No       Medications Ordered Prior to Encounter[1]    Review of patient's allergies indicates:  No Known Allergies      Review of Systems    General - Well developed, alert and oriented in NAD  HEENT - normocephalic, no evidence of trauma, sclera white, EOMI  Neck - full range of motion  COR - regular rate and rhythm without murmurs or gallops  Lungs - Clear  Abdomen - soft, non-tender  Ext - no cyanosis or edema    Physical Exam     Assessment:     1. Diabetes mellitus, new onset    2. Primary hypertension    3. Hypothyroidism, unspecified type    4. Thrombocytopenia        LABS:   Lab Results   Component Value Date    HGBA1C 6.4 (H) 06/03/2025      Lab Results   Component Value Date    CHOL 84 (L) 06/03/2025    CHOL 96 02/11/2025    CHOL 140 09/27/2021     Lab Results   Component Value Date    LDLCALC 45.2 (L) 06/03/2025    LDLCALC 63 02/11/2025    LDLCALC 101.0 09/27/2021     Lab Results   Component Value Date    WBC 12.01  10/20/2024    HGB 11.6 (L) 10/20/2024    HCT 35.5 (L) 10/20/2024     10/20/2024    CHOL 84 (L) 06/03/2025    TRIG 84 06/03/2025    HDL 22 (L) 06/03/2025    ALT 35 06/03/2025    AST 32 06/03/2025     06/03/2025    K 3.9 06/03/2025     06/03/2025    CREATININE 1.0 06/03/2025    BUN 15 06/03/2025    CO2 26 06/03/2025    TSH 2.669 03/19/2023    INR 1.1 06/19/2024    HGBA1C 6.4 (H) 06/03/2025       Plan:   Fabrice was seen today for follow-up and pain.    Diagnoses and all orders for this visit:    Diabetes mellitus, new onset  -     Comprehensive Metabolic Panel; Future  -     Hemoglobin A1C; Future  -     Lipid Panel; Future  -     metFORMIN (GLUCOPHAGE) 1000 MG tablet; Take 1 tablet (1,000 mg total) by mouth 2 (two) times daily with meals.    Primary hypertension    Hypothyroidism, unspecified type  -     TSH; Future    Thrombocytopenia  -     CBC Auto Differential; Future        Assessment & Plan    TYPE 2 DIABETES MELLITUS:  - Noted significant improvement in diabetes management, with HbA1c decreasing from 8.9 in February to 6.4 currently, indicating effective management with oral hypoglycemic drugs.  - Changed Metformin prescription from two 500 mg tablets twice daily to one 1000 mg tablet twice daily to improve adherence.  - Continued Jardiance 25 mg daily.  - Ordered diabetes-related labs to be completed in 3 months.    IRON DEFICIENCY ANEMIA:  - Mr. Zamorano reports taking OTC iron tablets (approximately 18 mg dose) for the last 2 months with subjective improvement in symptoms.  - Ordered CBC to be completed with scheduled hepatic function test on June 25th.  - Plan to repeat CBC in 3 months to monitor iron levels.  - Continue current OTC iron supplementation.    LOW BACK PAIN:  - Assessed morning back pain on the left side, rated 1-2 on a scale of 10, likely muscular in nature.  - Advised patient to use IcyHot, heat, or ice pack for management.  - Recommend low-strength Tylenol (325 mg)  occasionally for pain, limiting to 4 tablets per day due to liver concerns.  - May consider physical therapy if pain persists.    FATIGUE:  - Mr. Zamorano reports fatigue and low energy, which has improved after taking iron tablets for 2 months.  - Will monitor with repeat CBC in 3 months.    FOLLOW-UP:  - Follow up in 3 months.           Face to Face time with patient: 10:00 AM CDT -      Each patient to whom he or she provides medical services by telemedicine is:  (1) informed of the relationship between the physician and patient and the respective role of any other health care provider with respect to management of the patient; and (2) notified that he or she may decline to receive medical services by telemedicine and may withdraw from such care at any time.    I spent a total of     32  minutes face to face and non-face to face on the date of this visit.This includes time preparing to see the patient (eg, review of tests, notes), obtaining and/or reviewing additional history from an independent historian and/or outside medical records, documenting clinical information in the electronic health record, independently interpreting results and/or communicating results to the patient/family/caregiver, or care coordinator.  Visit today included increased complexity associated with the care of the episodic problem addressed and managing the longitudinal care of the patient due to the serious and/or complex managed problem(s).    There are no Patient Instructions on file for this visit.    Follow up in about 6 months (around 12/10/2025), or if symptoms worsen or fail to improve.  The ASCVD Risk score (Prashant HARRIS, et al., 2019) failed to calculate for the following reasons:    The valid total cholesterol range is 130 to 320 mg/dL    This note was generated with the assistance of ambient listening technology. Verbal consent was obtained by the patient and accompanying visitor(s) for the recording of patient appointment to  facilitate this note. I attest to having reviewed and edited the generated note for accuracy, though some syntax or spelling errors may persist. Please contact the author of this note for any clarification.         [1]   Current Outpatient Medications on File Prior to Visit   Medication Sig Dispense Refill    blood sugar diagnostic Strp To check BG 3 times daily, to use with insurance preferred meter 100 each 99    blood-glucose meter kit To check BG three times daily, to use with insurance preferred meter 100 each 0    carvediloL (COREG) 6.25 MG tablet Take 1 tablet (6.25 mg total) by mouth 2 (two) times daily. 180 tablet 0    ferrous gluconate (FERGON) 324 MG tablet Take 38 mg by mouth.      JARDIANCE 25 mg tablet Take 1 tablet (25 mg total) by mouth once daily. 90 tablet 0    lancets Misc To check BG three times daily, to use with insurance preferred meter 100 each 99    lipase-protease-amylase (CREON) 36,000-114,000- 180,000 unit CpDR Take 2 capsules by mouth 3 (three) times daily with meals. 540 capsule 3    multivitamin (THERAGRAN) per tablet Take 1 tablet by mouth once daily.      ondansetron (ZOFRAN-ODT) 4 MG TbDL Take 1 tablet (4 mg total) by mouth every 8 (eight) hours as needed (Nausea). 30 tablet 0    ONETOUCH DELICA PLUS LANCET 33 gauge Misc Apply topically 3 (three) times daily.      ONETOUCH VERIO FLEX METER Misc 3 (three) times daily. Test Blood Sugar      oxyCODONE (ROXICODONE) 10 mg Tab immediate release tablet Take 10 mg by mouth every 4 (four) hours as needed.      pantoprazole (PROTONIX) 40 MG tablet Take 1 tablet (40 mg total) by mouth once daily. 90 tablet 0    pregabalin (LYRICA) 75 MG capsule TAKE 2 CAPSULES(150 MG) BY MOUTH EVERY EVENING. MAY ALSO TAKE 1 CAPSULE DAILY AS NEEDED 90 capsule 2    PREVIDENT 5000 BOOSTER PLUS 1.1 % Pste BRUSH TEETH WITH PASTE BY MOUTH TWICE DAILY. SPIT OUT EXCESS. DO NOT RINSE      promethazine (PHENERGAN) 25 MG tablet Take 1 tablet (25 mg total) by mouth every  6 (six) hours as needed for Nausea. 30 tablet 1    simvastatin (ZOCOR) 20 MG tablet Take 1 tablet (20 mg total) by mouth every evening. 90 tablet 3    [DISCONTINUED] metFORMIN (GLUCOPHAGE-XR) 500 MG ER 24hr tablet Take 2 tablets (1,000 mg total) by mouth 2 (two) times daily with meals. 360 tablet 0    lipase-protease-amylase (CREON) 36,000-114,000- 180,000 unit CpDR Take 1 Dose by mouth. (Patient not taking: Reported on 6/10/2025)      oxyCODONE-acetaminophen (PERCOCET)  mg per tablet Take 1 tablet by mouth every 4 (four) hours as needed for Pain. (Patient not taking: Reported on 6/10/2025) 21 tablet 0    pregabalin (LYRICA) 75 MG capsule Take 75 mg by mouth. (Patient not taking: Reported on 6/10/2025)       No current facility-administered medications on file prior to visit.

## 2025-06-18 ENCOUNTER — OFFICE VISIT (OUTPATIENT)
Dept: DIABETES | Facility: CLINIC | Age: 60
End: 2025-06-18
Payer: MEDICARE

## 2025-06-18 VITALS
SYSTOLIC BLOOD PRESSURE: 126 MMHG | WEIGHT: 203.94 LBS | BODY MASS INDEX: 28.44 KG/M2 | DIASTOLIC BLOOD PRESSURE: 87 MMHG | HEART RATE: 80 BPM

## 2025-06-18 DIAGNOSIS — K86.1 CHRONIC RECURRENT PANCREATITIS: Primary | ICD-10-CM

## 2025-06-18 DIAGNOSIS — E78.2 MIXED HYPERLIPIDEMIA: ICD-10-CM

## 2025-06-18 DIAGNOSIS — E11.9 DIABETES MELLITUS, NEW ONSET: ICD-10-CM

## 2025-06-18 DIAGNOSIS — I10 HYPERTENSION, UNSPECIFIED TYPE: ICD-10-CM

## 2025-06-18 PROBLEM — F10.90 ALCOHOL USE: Status: RESOLVED | Noted: 2017-11-23 | Resolved: 2025-06-18

## 2025-06-18 LAB — GLUCOSE SERPL-MCNC: 106 MG/DL (ref 70–110)

## 2025-06-18 PROCEDURE — 99999 PR PBB SHADOW E&M-EST. PATIENT-LVL V: CPT | Mod: PBBFAC,,, | Performed by: NURSE PRACTITIONER

## 2025-06-18 PROCEDURE — 3008F BODY MASS INDEX DOCD: CPT | Mod: CPTII,S$GLB,, | Performed by: NURSE PRACTITIONER

## 2025-06-18 PROCEDURE — 3079F DIAST BP 80-89 MM HG: CPT | Mod: CPTII,S$GLB,, | Performed by: NURSE PRACTITIONER

## 2025-06-18 PROCEDURE — 82962 GLUCOSE BLOOD TEST: CPT | Mod: S$GLB,,, | Performed by: NURSE PRACTITIONER

## 2025-06-18 PROCEDURE — 1159F MED LIST DOCD IN RCRD: CPT | Mod: CPTII,S$GLB,, | Performed by: NURSE PRACTITIONER

## 2025-06-18 PROCEDURE — 3061F NEG MICROALBUMINURIA REV: CPT | Mod: CPTII,S$GLB,, | Performed by: NURSE PRACTITIONER

## 2025-06-18 PROCEDURE — 3074F SYST BP LT 130 MM HG: CPT | Mod: CPTII,S$GLB,, | Performed by: NURSE PRACTITIONER

## 2025-06-18 PROCEDURE — 3066F NEPHROPATHY DOC TX: CPT | Mod: CPTII,S$GLB,, | Performed by: NURSE PRACTITIONER

## 2025-06-18 PROCEDURE — G2211 COMPLEX E/M VISIT ADD ON: HCPCS | Mod: S$GLB,,, | Performed by: NURSE PRACTITIONER

## 2025-06-18 PROCEDURE — 3044F HG A1C LEVEL LT 7.0%: CPT | Mod: CPTII,S$GLB,, | Performed by: NURSE PRACTITIONER

## 2025-06-18 PROCEDURE — 99214 OFFICE O/P EST MOD 30 MIN: CPT | Mod: S$GLB,,, | Performed by: NURSE PRACTITIONER

## 2025-06-18 RX ORDER — PEN NEEDLE, DIABETIC 30 GX3/16"
NEEDLE, DISPOSABLE MISCELLANEOUS
Qty: 100 EACH | Refills: 5 | Status: SHIPPED | OUTPATIENT
Start: 2025-06-18

## 2025-06-18 RX ORDER — INSULIN ASPART 100 [IU]/ML
4 INJECTION, SOLUTION INTRAVENOUS; SUBCUTANEOUS EVERY 4 HOURS PRN
Qty: 7.2 ML | Refills: 11 | Status: SHIPPED | OUTPATIENT
Start: 2025-06-18 | End: 2026-06-18

## 2025-06-18 RX ORDER — BLOOD-GLUCOSE,RECEIVER,CONT
1 EACH MISCELLANEOUS DAILY
Qty: 1 EACH | Refills: 0 | Status: SHIPPED | OUTPATIENT
Start: 2025-06-18 | End: 2026-06-18

## 2025-06-18 RX ORDER — BLOOD-GLUCOSE SENSOR
1 EACH MISCELLANEOUS
Qty: 2 EACH | Refills: 11 | Status: SHIPPED | OUTPATIENT
Start: 2025-06-18 | End: 2026-06-18

## 2025-06-18 NOTE — PROGRESS NOTES
Fabrice Zamorano is a 60 y.o. male who  has a past medical history of Anticoagulant long-term use, GERD (gastroesophageal reflux disease), Hypertension, Hypothyroidism (10/26/2021), Liver disease, Pancreatic pseudocyst, Personal history of colonic polyps, Sleep apnea, Splenic vein thrombosis, and Type 2 diabetes mellitus without complications., who present for an initial evaluation of secondary diabetes mellitus.     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  Underwent a peustow procedure with some parenchymal debulking in November of 2021. Fam hx of pancreatic cancer in father.    Previous failed treatments include:  none    Social Documentation:  Patient lives in [place] with wife.   Occupation: on disability 2/2 chronic pancreatitis.  Exercise: walking every morning for 20 minutes.   Meal Patterns: 3 meals a day. Limits sugary drinks.   Sleep: no c/o    Current monitoring regimen: capillary blood glucose monitoring with finger sticks.  Fasting: less than 130 most of the time.    Current Diabetes related symptoms/problems include none and have been unchanged.     Diabetes related complications:   none.   reports Pancreatitis - Chronic pancreatitis since 2017. 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  Underwent a peustow procedure with some parenchymal debulking in November of 2021.   denies Gastroparesis  denies DKA  denies Hx/family Hx of MEN2/MTC  denies Frequent UTIs/yeast infections    Cardiovascular Risk Factors: advanced age (>55 for men, >65 for women), dyslipidemia, hypertension, male gender, and smoking/tobacco exposure. Quit smoking in 2001    Patient currently taking insulin or sulfonylurea?  NO    Any episodes of hypoglycemia? No.   Hypoglycemia Unawareness? no  Severe hypoglycemia requiring 3rd party? No    Last  "seen by Diabetes Education: no    Diabetes Medications              JARDIANCE 25 mg tablet Take 1 tablet (25 mg total) by mouth once daily.    metFORMIN (GLUCOPHAGE) 1000 MG tablet Take 1 tablet (1,000 mg total) by mouth 2 (two) times daily with meals.     DIABETES DISEASE MANAGEMENT STATUS  Statin: Taking  ACE/ARB: Not taking  Screening or Prevention Patient's value Goal Complete/Controlled?   HgA1C Testing and Control   Lab Results   Component Value Date    HGBA1C 6.4 (H) 06/03/2025      Annually/Less than 8% Yes   Lipid profile : 06/03/2025 Annually Yes   LDL control Lab Results   Component Value Date    LDLCALC 45.2 (L) 06/03/2025    Annually/Less than 100 mg/dl  Yes   Nephropathy screening Lab Results   Component Value Date    LABMICR 18.0 06/03/2025     Lab Results   Component Value Date    PROTEINUA Negative 10/19/2024     No results found for: "UTPCR"   Annually Yes   Blood pressure BP Readings from Last 1 Encounters:   06/18/25 126/87    Less than 140/90 Yes   Dilated retinal exam : 11/04/2024 Annually Yes   Foot exam   : 02/24/2025 Annually Yes   Patient's medications, allergies, past medical, surgical, social and family histories were reviewed and updated as appropriate.     Review of Systems   Constitutional:  Negative for weight loss.   Eyes:  Negative for blurred vision and double vision.   Cardiovascular:  Negative for chest pain.   Gastrointestinal:  Negative for nausea and vomiting.   Genitourinary:  Negative for frequency.   Musculoskeletal:  Negative for falls.   Neurological:  Negative for dizziness and weakness.   Endo/Heme/Allergies:  Negative for polydipsia.   Psychiatric/Behavioral:  Negative for depression.    All other systems reviewed and are negative.       Physical Exam  Constitutional:       General: He is not in acute distress.     Appearance: Normal appearance.   Eyes:      Extraocular Movements: Extraocular movements intact.      Pupils: Pupils are equal, round, and reactive to " light.   Cardiovascular:      Rate and Rhythm: Normal rate and regular rhythm.      Heart sounds: Normal heart sounds.   Pulmonary:      Effort: Pulmonary effort is normal. No respiratory distress.      Breath sounds: Normal breath sounds.   Musculoskeletal:      Cervical back: Normal range of motion and neck supple.   Neurological:      Mental Status: He is alert and oriented to person, place, and time.   Psychiatric:         Mood and Affect: Mood normal.         Behavior: Behavior normal.          Blood pressure 126/87, pulse 80, weight 92.5 kg (203 lb 14.8 oz).  Wt Readings from Last 3 Encounters:   06/18/25 92.5 kg (203 lb 14.8 oz)   06/10/25 92.6 kg (204 lb 2.3 oz)   04/28/25 90.3 kg (199 lb)       LAB REVIEW  Lab Results   Component Value Date     06/03/2025    K 3.9 06/03/2025     06/03/2025    CO2 26 06/03/2025    BUN 15 06/03/2025    CREATININE 1.0 06/03/2025    CALCIUM 9.0 06/03/2025    ANIONGAP 9 06/03/2025    EGFRNORACEVR >60 06/03/2025     Lab Results   Component Value Date    CPEPTIDE 1.78 02/25/2025     Hemoglobin A1c   Date Value Ref Range Status   06/03/2025 6.4 (H) 4.0 - 5.6 % Final     Comment:     ADA Screening Guidelines:  5.7-6.4%  Consistent with prediabetes  >=6.5%  Consistent with diabetes    High levels of fetal hemoglobin interfere with the HbA1C  assay. Heterozygous hemoglobin variants (HbS, HgC, etc)do  not significantly interfere with this assay.   However, presence of multiple variants may affect accuracy.       Lab Results   Component Value Date    POCGLU 106 06/18/2025       ASSESSMENT    ICD-10-CM ICD-9-CM   1. Chronic recurrent pancreatitis  K86.1 577.1   2. Diabetes mellitus, new onset  E11.9 250.00   3. Hypertension, unspecified type  I10 401.9   4. Mixed hyperlipidemia  E78.2 272.2        PLAN  Diagnoses and all orders for this visit:    Chronic recurrent pancreatitis    Diabetes mellitus, new onset  -     Ambulatory referral/consult to Diabetic Advanced Practice  "Providers (Medical Management)  -     POCT Glucose, Hand-Held Device  -     NOVOLOG FLEXPEN U-100 INSULIN 100 unit/mL (3 mL) InPn pen; Inject 4 Units into the skin every 4 (four) hours as needed (FOR CLUGOSE OVER 300.).  -     FREESTYLE ALBERT 3 PLUS SENSOR [place]; 1 Device by Misc.(Non-Drug; Combo Route) route Every 15 days.  -     FREESTYLE ALBERT 3 READER Misc; 1 Device by Misc.(Non-Drug; Combo Route) route once daily.  -     pen needle, diabetic (BD ULTRA-FINE MARY PEN NEEDLE) 32 gauge x 5/32" Ndle; Use with insulin 3 times daily  -     Ambulatory referral/consult to Diabetes Education; Future  -     IA-2 Antibody; Future  -     Glutamic Acid Decarboxylase; Future  -     C-Peptide; Future  -     Zinc Transporter 8 (ZnT8) Antibody; Future    Hypertension, unspecified type    Mixed hyperlipidemia        Reviewed pathophysiology of diabetes, complications related to the disease, importance of annual dilated eye exam and daily foot examination. Explained MOA, SE, dosage of medications. Written instructions given and reviewed with patient and patient verbalizes understanding. Discussed importance of A1c less than 7% to reduce risk of micro and macro complications, including nephropathy, neuropathy, retinopathy, heart attack and stroke. Reviewed the importance of controlled Blood Pressure and Cholesterol Lab Values in preventing disease.     PATIENT INSTRUCTIONS    Continue Jardiance 25 mg by mouth daily.   Continue Metformin 1000 mg by mouth twice daily.     Novolog 4 units subcutaneously every 4 hours as needed for glucose over 300.     Albert 3+ CGM ordered, sent to pharmacy.   Blood Sugar Goals:       Fastin-130.       1-2 hours after a meal: Less than 180.     Follow up in about 3 months (around 2025) for Albert, Schedule fasting labs, Schedule DM Education.    Portions of this note were prepared with [employer] voice recognition transcription software. Grammatical errors, including garbled " syntax, mangle pronouns, and other bizarre constructions may be attributed to that software system.

## 2025-06-18 NOTE — PATIENT INSTRUCTIONS
PATIENT INSTRUCTIONS    Continue Jardiance 25 mg by mouth daily.   Continue Metformin 1000 mg by mouth twice daily.     Novolog 4 units subcutaneously every 4 hours as needed for glucose over 300.     Sg 3+ CGM ordered, sent to pharmacy.   Blood Sugar Goals:       Fastin-130.       1-2 hours after a meal: Less than 180.    No

## 2025-06-25 ENCOUNTER — LAB VISIT (OUTPATIENT)
Dept: LAB | Facility: HOSPITAL | Age: 60
End: 2025-06-25
Attending: INTERNAL MEDICINE
Payer: MEDICARE

## 2025-06-25 DIAGNOSIS — K86.1 CHRONIC PANCREATITIS, UNSPECIFIED PANCREATITIS TYPE: ICD-10-CM

## 2025-06-25 DIAGNOSIS — D69.6 THROMBOCYTOPENIA: ICD-10-CM

## 2025-06-25 DIAGNOSIS — E03.9 HYPOTHYROIDISM, UNSPECIFIED TYPE: ICD-10-CM

## 2025-06-25 DIAGNOSIS — E11.9 DIABETES MELLITUS, NEW ONSET: ICD-10-CM

## 2025-06-25 LAB
ABSOLUTE EOSINOPHIL (OHS): 0.16 K/UL
ABSOLUTE MONOCYTE (OHS): 0.6 K/UL (ref 0.3–1)
ABSOLUTE NEUTROPHIL COUNT (OHS): 4.09 K/UL (ref 1.8–7.7)
ALBUMIN SERPL BCP-MCNC: 4.3 G/DL (ref 3.5–5.2)
ALBUMIN SERPL BCP-MCNC: 4.3 G/DL (ref 3.5–5.2)
ALP SERPL-CCNC: 103 UNIT/L (ref 40–150)
ALP SERPL-CCNC: 105 UNIT/L (ref 40–150)
ALT SERPL W/O P-5'-P-CCNC: 47 UNIT/L (ref 10–44)
ALT SERPL W/O P-5'-P-CCNC: 49 UNIT/L (ref 10–44)
ANION GAP (OHS): 12 MMOL/L (ref 8–16)
AST SERPL-CCNC: 37 UNIT/L (ref 11–45)
AST SERPL-CCNC: 39 UNIT/L (ref 11–45)
BASOPHILS # BLD AUTO: 0.03 K/UL
BASOPHILS NFR BLD AUTO: 0.5 %
BILIRUB DIRECT SERPL-MCNC: 0.1 MG/DL (ref 0.1–0.3)
BILIRUB SERPL-MCNC: 0.3 MG/DL (ref 0.1–1)
BILIRUB SERPL-MCNC: 0.3 MG/DL (ref 0.1–1)
BUN SERPL-MCNC: 13 MG/DL (ref 6–20)
C PEPTIDE SERPL-MCNC: 6.94 NG/ML (ref 0.78–5.19)
CALCIUM SERPL-MCNC: 9.1 MG/DL (ref 8.7–10.5)
CHLORIDE SERPL-SCNC: 103 MMOL/L (ref 95–110)
CHOLEST SERPL-MCNC: 106 MG/DL (ref 120–199)
CHOLEST/HDLC SERPL: 4.4 {RATIO} (ref 2–5)
CO2 SERPL-SCNC: 24 MMOL/L (ref 23–29)
CREAT SERPL-MCNC: 1.1 MG/DL (ref 0.5–1.4)
EAG (OHS): 137 MG/DL (ref 68–131)
ERYTHROCYTE [DISTWIDTH] IN BLOOD BY AUTOMATED COUNT: 13.2 % (ref 11.5–14.5)
GFR SERPLBLD CREATININE-BSD FMLA CKD-EPI: >60 ML/MIN/1.73/M2
GLUCOSE SERPL-MCNC: 195 MG/DL (ref 70–110)
HBA1C MFR BLD: 6.4 % (ref 4–5.6)
HCT VFR BLD AUTO: 46.5 % (ref 40–54)
HDLC SERPL-MCNC: 24 MG/DL (ref 40–75)
HDLC SERPL: 22.6 % (ref 20–50)
HGB BLD-MCNC: 14.7 GM/DL (ref 14–18)
IMM GRANULOCYTES # BLD AUTO: 0.01 K/UL (ref 0–0.04)
IMM GRANULOCYTES NFR BLD AUTO: 0.2 % (ref 0–0.5)
LDLC SERPL CALC-MCNC: 49.6 MG/DL (ref 63–159)
LYMPHOCYTES # BLD AUTO: 1.31 K/UL (ref 1–4.8)
MCH RBC QN AUTO: 28.4 PG (ref 27–31)
MCHC RBC AUTO-ENTMCNC: 31.6 G/DL (ref 32–36)
MCV RBC AUTO: 90 FL (ref 82–98)
NONHDLC SERPL-MCNC: 82 MG/DL
NUCLEATED RBC (/100WBC) (OHS): 0 /100 WBC
PLATELET # BLD AUTO: 137 K/UL (ref 150–450)
PMV BLD AUTO: 10.6 FL (ref 9.2–12.9)
POTASSIUM SERPL-SCNC: 4.2 MMOL/L (ref 3.5–5.1)
PROT SERPL-MCNC: 6.9 GM/DL (ref 6–8.4)
PROT SERPL-MCNC: 6.9 GM/DL (ref 6–8.4)
RBC # BLD AUTO: 5.18 M/UL (ref 4.6–6.2)
RELATIVE EOSINOPHIL (OHS): 2.6 %
RELATIVE LYMPHOCYTE (OHS): 21.1 % (ref 18–48)
RELATIVE MONOCYTE (OHS): 9.7 % (ref 4–15)
RELATIVE NEUTROPHIL (OHS): 65.9 % (ref 38–73)
SODIUM SERPL-SCNC: 139 MMOL/L (ref 136–145)
TRIGL SERPL-MCNC: 162 MG/DL (ref 30–150)
TSH SERPL-ACNC: 2.12 UIU/ML (ref 0.4–4)
WBC # BLD AUTO: 6.2 K/UL (ref 3.9–12.7)

## 2025-06-25 PROCEDURE — 86341 ISLET CELL ANTIBODY: CPT | Mod: 59

## 2025-06-25 PROCEDURE — 84681 ASSAY OF C-PEPTIDE: CPT

## 2025-06-25 PROCEDURE — 84443 ASSAY THYROID STIM HORMONE: CPT

## 2025-06-25 PROCEDURE — 80076 HEPATIC FUNCTION PANEL: CPT

## 2025-06-25 PROCEDURE — 83036 HEMOGLOBIN GLYCOSYLATED A1C: CPT

## 2025-06-25 PROCEDURE — 36415 COLL VENOUS BLD VENIPUNCTURE: CPT | Mod: PN

## 2025-06-25 PROCEDURE — 85025 COMPLETE CBC W/AUTO DIFF WBC: CPT

## 2025-06-25 PROCEDURE — 86341 ISLET CELL ANTIBODY: CPT

## 2025-06-25 PROCEDURE — 80061 LIPID PANEL: CPT

## 2025-06-25 PROCEDURE — 80053 COMPREHEN METABOLIC PANEL: CPT

## 2025-06-30 ENCOUNTER — PATIENT MESSAGE (OUTPATIENT)
Dept: DIABETES | Facility: CLINIC | Age: 60
End: 2025-06-30
Payer: MEDICARE

## 2025-06-30 ENCOUNTER — RESULTS FOLLOW-UP (OUTPATIENT)
Dept: GASTROENTEROLOGY | Facility: HOSPITAL | Age: 60
End: 2025-06-30

## 2025-06-30 ENCOUNTER — PATIENT MESSAGE (OUTPATIENT)
Dept: GASTROENTEROLOGY | Facility: CLINIC | Age: 60
End: 2025-06-30
Payer: MEDICARE

## 2025-06-30 LAB
GAD65 AB SER-SCNC: 0.08 NMOL/L
ISLET CELL512 AB SER-SCNC: 0 NMOL/L
ZNT8 AB SERPL IA-ACNC: <15 U/ML

## 2025-07-01 ENCOUNTER — TELEPHONE (OUTPATIENT)
Dept: DIABETES | Facility: CLINIC | Age: 60
End: 2025-07-01
Payer: MEDICARE

## 2025-07-01 DIAGNOSIS — K86.1 CHRONIC PANCREATITIS, UNSPECIFIED PANCREATITIS TYPE: Primary | ICD-10-CM

## 2025-07-02 ENCOUNTER — RESULTS FOLLOW-UP (OUTPATIENT)
Dept: DIABETES | Facility: CLINIC | Age: 60
End: 2025-07-02
Payer: MEDICARE

## 2025-07-07 ENCOUNTER — TELEPHONE (OUTPATIENT)
Dept: DIABETES | Facility: CLINIC | Age: 60
End: 2025-07-07
Payer: MEDICARE

## 2025-07-07 ENCOUNTER — CLINICAL SUPPORT (OUTPATIENT)
Dept: DIABETES | Facility: CLINIC | Age: 60
End: 2025-07-07
Payer: MEDICARE

## 2025-07-07 VITALS — BODY MASS INDEX: 28.84 KG/M2 | WEIGHT: 206.81 LBS

## 2025-07-07 DIAGNOSIS — E11.9 DIABETES MELLITUS, NEW ONSET: ICD-10-CM

## 2025-07-07 PROCEDURE — G0108 DIAB MANAGE TRN  PER INDIV: HCPCS | Mod: S$GLB,,, | Performed by: DIETITIAN, REGISTERED

## 2025-07-07 PROCEDURE — 99999 PR PBB SHADOW E&M-EST. PATIENT-LVL IV: CPT | Mod: PBBFAC,,, | Performed by: DIETITIAN, REGISTERED

## 2025-07-21 ENCOUNTER — PATIENT MESSAGE (OUTPATIENT)
Dept: GASTROENTEROLOGY | Facility: CLINIC | Age: 60
End: 2025-07-21
Payer: MEDICARE

## 2025-08-12 ENCOUNTER — LAB VISIT (OUTPATIENT)
Dept: LAB | Facility: HOSPITAL | Age: 60
End: 2025-08-12
Attending: INTERNAL MEDICINE
Payer: MEDICARE

## 2025-08-12 DIAGNOSIS — K86.1 CHRONIC PANCREATITIS, UNSPECIFIED PANCREATITIS TYPE: ICD-10-CM

## 2025-08-12 LAB
ALBUMIN SERPL BCP-MCNC: 4.3 G/DL (ref 3.5–5.2)
ALP SERPL-CCNC: 75 UNIT/L (ref 40–150)
ALT SERPL W/O P-5'-P-CCNC: 31 UNIT/L (ref 0–55)
AST SERPL-CCNC: 29 UNIT/L (ref 0–50)
BILIRUB DIRECT SERPL-MCNC: 0.2 MG/DL (ref 0.1–0.3)
BILIRUB SERPL-MCNC: 0.4 MG/DL (ref 0.1–1)
PROT SERPL-MCNC: 7 GM/DL (ref 6–8.4)

## 2025-08-12 PROCEDURE — 80076 HEPATIC FUNCTION PANEL: CPT

## 2025-08-12 PROCEDURE — 36415 COLL VENOUS BLD VENIPUNCTURE: CPT | Mod: PN

## 2025-08-14 ENCOUNTER — CLINICAL SUPPORT (OUTPATIENT)
Dept: DIABETES | Facility: CLINIC | Age: 60
End: 2025-08-14
Payer: MEDICARE

## 2025-08-14 DIAGNOSIS — E11.9 DIABETES MELLITUS, NEW ONSET: Primary | ICD-10-CM

## 2025-08-15 DIAGNOSIS — K83.9 BILIARY DISEASE: Primary | ICD-10-CM

## 2025-08-18 DIAGNOSIS — I10 UNCONTROLLED HYPERTENSION: ICD-10-CM

## 2025-08-18 RX ORDER — CARVEDILOL 6.25 MG/1
6.25 TABLET ORAL 2 TIMES DAILY
Qty: 180 TABLET | Refills: 3 | Status: SHIPPED | OUTPATIENT
Start: 2025-08-18 | End: 2026-08-13

## 2025-08-23 DIAGNOSIS — K21.9 GASTROESOPHAGEAL REFLUX DISEASE, UNSPECIFIED WHETHER ESOPHAGITIS PRESENT: ICD-10-CM

## 2025-08-23 DIAGNOSIS — E11.9 DIABETES MELLITUS, NEW ONSET: ICD-10-CM

## 2025-08-24 RX ORDER — PANTOPRAZOLE SODIUM 40 MG/1
40 TABLET, DELAYED RELEASE ORAL DAILY
Qty: 90 TABLET | Refills: 3 | Status: SHIPPED | OUTPATIENT
Start: 2025-08-24

## 2025-08-24 RX ORDER — SIMVASTATIN 20 MG/1
20 TABLET, FILM COATED ORAL NIGHTLY
Qty: 90 TABLET | Refills: 3 | Status: SHIPPED | OUTPATIENT
Start: 2025-08-24

## 2025-08-26 ENCOUNTER — LAB VISIT (OUTPATIENT)
Dept: LAB | Facility: HOSPITAL | Age: 60
End: 2025-08-26
Attending: STUDENT IN AN ORGANIZED HEALTH CARE EDUCATION/TRAINING PROGRAM
Payer: MEDICARE

## 2025-08-28 ENCOUNTER — OFFICE VISIT (OUTPATIENT)
Dept: FAMILY MEDICINE | Facility: CLINIC | Age: 60
End: 2025-08-28
Payer: MEDICARE

## 2025-08-28 DIAGNOSIS — E03.9 HYPOTHYROIDISM, UNSPECIFIED TYPE: ICD-10-CM

## 2025-08-28 DIAGNOSIS — E11.9 TYPE 2 DIABETES MELLITUS WITHOUT COMPLICATION, WITHOUT LONG-TERM CURRENT USE OF INSULIN: ICD-10-CM

## 2025-08-28 DIAGNOSIS — I10 PRIMARY HYPERTENSION: Primary | ICD-10-CM

## 2025-08-28 PROCEDURE — 99999 PR PBB SHADOW E&M-EST. PATIENT-LVL III: CPT | Mod: PBBFAC,,, | Performed by: STUDENT IN AN ORGANIZED HEALTH CARE EDUCATION/TRAINING PROGRAM

## 2025-08-28 PROCEDURE — 3061F NEG MICROALBUMINURIA REV: CPT | Mod: CPTII,S$GLB,, | Performed by: STUDENT IN AN ORGANIZED HEALTH CARE EDUCATION/TRAINING PROGRAM

## 2025-08-28 PROCEDURE — G2211 COMPLEX E/M VISIT ADD ON: HCPCS | Mod: S$GLB,,, | Performed by: STUDENT IN AN ORGANIZED HEALTH CARE EDUCATION/TRAINING PROGRAM

## 2025-08-28 PROCEDURE — 3044F HG A1C LEVEL LT 7.0%: CPT | Mod: CPTII,S$GLB,, | Performed by: STUDENT IN AN ORGANIZED HEALTH CARE EDUCATION/TRAINING PROGRAM

## 2025-08-28 PROCEDURE — 3066F NEPHROPATHY DOC TX: CPT | Mod: CPTII,S$GLB,, | Performed by: STUDENT IN AN ORGANIZED HEALTH CARE EDUCATION/TRAINING PROGRAM

## 2025-08-28 PROCEDURE — 99214 OFFICE O/P EST MOD 30 MIN: CPT | Mod: S$GLB,,, | Performed by: STUDENT IN AN ORGANIZED HEALTH CARE EDUCATION/TRAINING PROGRAM

## 2025-08-28 PROCEDURE — 1159F MED LIST DOCD IN RCRD: CPT | Mod: CPTII,S$GLB,, | Performed by: STUDENT IN AN ORGANIZED HEALTH CARE EDUCATION/TRAINING PROGRAM

## 2025-08-28 RX ORDER — EMPAGLIFLOZIN 25 MG/1
25 TABLET, FILM COATED ORAL
COMMUNITY
Start: 2025-08-13

## (undated) DEVICE — SUT PROLENE 4-0 RB-1 BL MO

## (undated) DEVICE — SUT 6/0 30IN PDS II VIO MON

## (undated) DEVICE — GOWN SURGICAL X-LARGE

## (undated) DEVICE — SUT 4/0 27IN PDS II VIO MO

## (undated) DEVICE — BLADE 4 INCH EDGE UN-INS

## (undated) DEVICE — SUT ETHILON 2-0 PSLX 30IN

## (undated) DEVICE — GAUZE SPONGE 4X4 12PLY

## (undated) DEVICE — SUT 3-0 12-18IN SILK

## (undated) DEVICE — SEE MEDLINE ITEM 156902

## (undated) DEVICE — SUT SILK 3-0 SH DETACH 30IN

## (undated) DEVICE — SUT CTD VICRYL VIL BR CR/SH

## (undated) DEVICE — PAD K-THERMIA 24IN X 60IN

## (undated) DEVICE — SUT PDS BV-1 7-0 24IN

## (undated) DEVICE — APPLICATOR CHLORAPREP ORN 26ML

## (undated) DEVICE — SUT 1 48IN PDS II VIO MONO

## (undated) DEVICE — TUBE PENROSE DRAIN 18IN X 3/8I

## (undated) DEVICE — SUT 5/0 30IN PDS II VIO MO

## (undated) DEVICE — SEE MEDLINE ITEM 157117

## (undated) DEVICE — ADHESIVE DERMABOND ADVANCED

## (undated) DEVICE — LOOP VESSEL BLUE MAXI

## (undated) DEVICE — BOOT SUTURE AID

## (undated) DEVICE — CONTAINER SPECIMEN STRL 4OZ

## (undated) DEVICE — ELECTRODE REM PLYHSV RETURN 9

## (undated) DEVICE — SUT PROLENE 3-0 SH DA 36 BL

## (undated) DEVICE — SUT 2-0 VICRYL / CT-1

## (undated) DEVICE — SEALER LIGASURE MARYLAND 23CM

## (undated) DEVICE — STAPLER ENDO GIA 60MM MED THCK

## (undated) DEVICE — ELECTRODE NEEDLE 2.8IN

## (undated) DEVICE — SUT VICRYL 3-0 27 SH

## (undated) DEVICE — TRAY FOLEY 16FR INFECTION CONT

## (undated) DEVICE — Device

## (undated) DEVICE — SUT 2-0 12-18IN SILK

## (undated) DEVICE — SET DECANTER MEDICHOICE

## (undated) DEVICE — SUT SILK SH 2-0 30IN MP BLK

## (undated) DEVICE — DRAPE ABDOMINAL TIBURON 14X11

## (undated) DEVICE — SUT VICRYL+ 1 CT1 18IN

## (undated) DEVICE — SEE MEDLINE ITEM 146292

## (undated) DEVICE — SUT 5/0 27IN PDS II VIO MO

## (undated) DEVICE — NDL 18GA X1 1/2 REG BEVEL

## (undated) DEVICE — EVACUATOR WOUND BULB 100CC

## (undated) DEVICE — SPONGE IV DRAIN 4X4 STERILE

## (undated) DEVICE — APPLIER CLIP LIAGCLIP 9.375IN

## (undated) DEVICE — SEE MEDLINE ITEM 157144

## (undated) DEVICE — SYR 10CC LUER LOCK

## (undated) DEVICE — SUT PROLENE RB-1 4-0

## (undated) DEVICE — SUT 3-0 VICRYL / SH (J416)

## (undated) DEVICE — TOWEL OR DISP STRL BLUE 4/PK

## (undated) DEVICE — SUT PROLENE 5/0 RB-1 36 IN

## (undated) DEVICE — STAPLER GIA HANDLE STD

## (undated) DEVICE — SPONGE LAP 18X18 PREWASHED

## (undated) DEVICE — GAUZE SPONGE PEANUT STRL

## (undated) DEVICE — SUT SILK 3-0 SH 18IN BLACK

## (undated) DEVICE — TUBING SUC UNIV W/CONN 12FT

## (undated) DEVICE — SUT SILK 3-0 STRANDS 30IN

## (undated) DEVICE — KIT SAHARA DRAPE DRAW/LIFT

## (undated) DEVICE — SYR 30CC LUER LOCK

## (undated) DEVICE — SUT SILK 2-0 STRANDS 30IN

## (undated) DEVICE — DRAIN CHANNEL ROUND 15FR

## (undated) DEVICE — STAPLER SKIN PROXIMATE WIDE